# Patient Record
Sex: FEMALE | Race: WHITE | NOT HISPANIC OR LATINO | Employment: OTHER | URBAN - METROPOLITAN AREA
[De-identification: names, ages, dates, MRNs, and addresses within clinical notes are randomized per-mention and may not be internally consistent; named-entity substitution may affect disease eponyms.]

---

## 2017-01-18 ENCOUNTER — MYC MEDICAL ADVICE (OUTPATIENT)
Dept: FAMILY MEDICINE | Facility: CLINIC | Age: 62
End: 2017-01-18

## 2017-01-31 ENCOUNTER — OFFICE VISIT (OUTPATIENT)
Dept: FAMILY MEDICINE | Facility: CLINIC | Age: 62
End: 2017-01-31

## 2017-01-31 VITALS
TEMPERATURE: 97.6 F | OXYGEN SATURATION: 96 % | WEIGHT: 198.4 LBS | HEART RATE: 75 BPM | RESPIRATION RATE: 16 BRPM | BODY MASS INDEX: 32.04 KG/M2 | DIASTOLIC BLOOD PRESSURE: 76 MMHG | SYSTOLIC BLOOD PRESSURE: 113 MMHG

## 2017-01-31 DIAGNOSIS — Z00.00 PREVENTATIVE HEALTH CARE: ICD-10-CM

## 2017-01-31 DIAGNOSIS — Z01.818 PREOP GENERAL PHYSICAL EXAM: Primary | ICD-10-CM

## 2017-01-31 DIAGNOSIS — B35.1 TOENAIL FUNGUS: ICD-10-CM

## 2017-01-31 RX ORDER — CLOTRIMAZOLE 1 G/ML
SOLUTION TOPICAL 2 TIMES DAILY
Qty: 60 ML | Refills: 0 | Status: SHIPPED | OUTPATIENT
Start: 2017-01-31 | End: 2019-01-09

## 2017-01-31 NOTE — PROGRESS NOTES
[facility]  [address],  [address]  [facility] [address]  Phone: [phone]  Fax: [phone]    1/31/2017    Adult PRE-OP Evaluation:    Vickie REYES [place], 1955 presents for pre-operative evaluation and assessment as requested by  ***, prior to undergoing surgery/procedure for treatment of R intraocular lens dislocation.   Proposed procedure:  R eye scleral fixation of dislocated intraocular lens. Possible intraocular lens explantation, possible secondary intraocular lens placement .        Date of Surgery/ Procedure: 02/23/2017  Hospital/Surgical Facility: [facility]. Fax: [phone]     Primary Physician: Gaby Lynn  Type of Anesthesia Anticipated: {ANESTHESIA:820172}  History of anesthesia complications: NONE  History of  abnormal bleeding: NONE   History of blood transfusions: NO  Patient has a Health Care Directive or Living Will:  NO    Preoperative Questions   1. NO - Do you have a history of heart attack, stroke, stent, bypass or surgery on an artery in the head, neck, heart or legs?  2. NO - Do you ever have any pain or discomfort in your chest?  3. NO - Have you ever had a severe pain across the front of your chest lasting for half an hour or more?  4. NO - Do you have a history of Congestive Heart Failure?  5. NO - Are you troubled by shortness of breath when: walking on the level/ up a slight hill/ at night?  6. NO - Does your chest ever sound wheezy or whistling?  7. NO - Do you currently have a cold, bronchitis or other respiratory infection?  8. NO - Have you had a cold, bronchitis or other respiratory infection within the last 2 weeks?  9. NO - Do you usually have a cough?  10. NO - Do you sometimes get pains in the calves of your legs when you walk?  11. NO - Do you or anyone in your family have previous history of blood clots?  12. NO - Do you or does anyone in your family have a serious bleeding problem such as prolonged bleeding  following surgeries or cuts?  13. NO - Have you ever had problems with anemia or been told to take iron pills?  14. NO - Have you had any abnormal blood loss such as black, tarry or bloody stools, or abnormal vaginal bleeding?  15. NO - Have you ever had a blood transfusion?  16. NO - Have you or any of your relatives ever had problems with anesthesia?  17. YES - Do you have sleep apnea, excessive snoring or daytime drowsiness? Intermittent snorting with arousal at night, while lying supine  18. NO - Do you have any prosthetic heart valves?  19. NO - Do you have prosthetic joints?  20. NO - Is there any chance that you may be pregnant?    Patient Active Problem List   Diagnosis     CARDIOVASCULAR SCREENING; LDL GOAL LESS THAN 160     Encounter for counseling     Senile nuclear sclerosis     Tear film insufficiency     Hyperlipidemia     Regular astigmatism     Myopia     Overweight     Other specified menopausal and postmenopausal disorder     After-cataract     Presbyopia     Pupillary abnormalities     Diplopia     Visual field defect     Vitreous membranes and strands     Health Care Home     Tinnitus     Family history of malignant neoplasm of breast     Family history of cardiomyopathy     Benign neoplasm of colon         Current Outpatient Prescriptions on File Prior to Visit:  simvastatin (ZOCOR) 40 MG tablet Take 1 tablet (40 mg) by mouth At Bedtime   simvastatin (ZOCOR) 20 MG tablet 1 1/2 pills daily for total dose of 30 mg.   Carboxymeth-Glycerin-Polysorb (REFRESH OPTIVE ADVANCED) 0.5-1-0.5 % SOLN Place 1 drop into both eyes 3 times daily   loratadine (CLARITIN) 10 MG tablet Take 10 mg by mouth daily. Prn, seasonal for ragweed and lilacs     No current facility-administered medications on file prior to visit.    OTC products: Women's One a Day    Allergies   Allergen Reactions     Seasonal Allergies      Typhoid Vaccines Nausea and Vomiting     Fever, vice- h/a, body aches.  Was hospitalized for 2d.      Latex Allergy: NO    Social History     Social History     Marital Status: Single     Spouse Name: N/A     Number of Children: N/A     Years of Education: N/A     Social History Main Topics     Smoking status: Never Smoker      Smokeless tobacco: Never Used     Alcohol Use: Yes      Comment: 4 drinks per month     Drug Use: Not on file     Sexual Activity: Not on file     Other Topics Concern     Not on file     Social History Narrative       REVIEW OF SYSTEMS:   Constitutional, HEENT, cardiovascular, pulmonary, GI, , musculoskeletal, neuro, skin, endocrine and psych systems are negative, except as otherwise noted.    EXAM:   No data found.    There is no weight on file to calculate BMI.  GENERAL: healthy, alert and no distress  EYES: Eyes grossly normal to inspection, extraocular movements - intact, and PERRL  HENT: ear canals- normal; TMs- normal; Nose- normal; Mouth- no ulcers, no lesions  NECK: no tenderness, no adenopathy, no asymmetry, no masses, no stiffness; thyroid- normal to palpation  RESP: lungs clear to auscultation - no rales, no rhonchi, no wheezes  CV: regular rates and rhythm, normal S1 S2, no S3 or S4 and no murmur, no click or rub -  ABDOMEN: soft, no tenderness, no  hepatosplenomegaly, no masses, normal bowel sounds  MS: extremities- no gross deformities noted, no edema  SKIN: no suspicious lesions, no rashes  NEURO: strength and tone- normal, sensory exam- grossly normal, mentation- intact, speech- normal, reflexes- symmetric  BACK: no CVA tenderness, no paralumbar tenderness  PSYCH: Alert and oriented times 3; speech- coherent , normal rate and volume; able to articulate logical thoughts  LYMPHATICS: ant. cervical- normal, post. cervical- normal    DIAGNOSTICS:      No labs or EKG required for low risk surgery (cataract, skin procedure, breast biopsy, etc)    RISK ASSESSMENT:     Cardiovascular Risk:  -Patient is able to participate in strenuous activities without chest pain.  -The  "patient does not have chest pain at all.  -Patient does not have a history of congestive heart failure.    -The patient does not have a history of stroke and does not have a history of valvular disease.    Pulmonary Risk:  -In terms of risk factors for pulmonary complication, the patient has no risk factors    Perioperative Complications:  -The patient does not have a history of bleeding or clotting problems in the past.    -The patient has not had complications from surgeries.    -The patient does not have a family history of any anesthesia or surgical complications.      IMPRESSION:   Reason for surgery/procedure: R lens dislocation    The proposed surgical procedure is considered LOW risk.    For above listed surgery and anesthesia:   Patient is at low risk for surgery/procedure and perioperative/procedure complications.    RECOMMENDATIONS:     Labs:  Not needed    Fasting:  {FASTIN}    Preop Plan:  {PREOP PLAN:150129}    Medications:  Patient should {hold/take:388865864} their regular medications the morning of surgery unless otherwise instructed.    {PRE OP MED RECOMMENDATIONS:521906459}      Gaby Lynn MD    Please contact our office if there are any further questions or information required about this patient.        HPI:       Vickie Alvarado is a 61 year old who presents for the following  Patient presents with:  Pre-Op Exam: Eye Surgery [facility]        {  :426330}   {+++++++  Additional Concerns  (FM):686606}  Problem, Medication and Allergy Lists were {Reviewed Lists:805878::\"reviewed and are current.\"}  Patient is {New/Established:885938::\"an established patient of this clinic.\"}         Review of Systems:   Review of Systems          Physical Exam:   Patient Vitals for the past 24 hrs:   BP Temp Temp src Pulse Resp SpO2 Weight   17 0806 113/76 mmHg 97.6  F (36.4  C) Oral 75 16 96 % 198 lb 6.4 oz (89.994 kg)     Body mass index is 32.04 " "kg/(m^2).  {SMI VITALS OPTIONS:419392::\"Vitals were reviewed and were normal\"}     Physical Exam  {  Detailed Ortho and mental status exam:934842}    Results:     {Result Choices:004957}  Assessment and Plan     {Assessment/Plan Pick List :356430}  There are no discontinued medications.  Options for treatment and follow-up care were reviewed with the patient. Vickie Alvarado  engaged in the decision making process and verbalized understanding of the options discussed and agreed with the final plan.    Gaby Lynn MD      "

## 2017-01-31 NOTE — MR AVS SNAPSHOT
After Visit Summary   1/31/2017    Vickie Alvarado    MRN: [id number]           Patient Information     Date Of Birth          1955        Visit Information        Provider Department      1/31/2017 8:00 AM Gaby Lynn MD [facility]        Today's Diagnoses     Preop general physical exam    -  1     Toenail fungus           Care Instructions      Presurgery Checklist  You are scheduled to have surgery. The healthcare staff will try to make your stay comfortable. Use the guidelines below to remind yourself what to do before surgery. Be sure to follow any specific pre-op instructions from your surgeon or nurse.   Preparing for Surgery  Ask your surgeon if you ll need a blood transfusion during surgery and if so, how to prepare for it. In some cases, you can donate blood before surgery. If needed, this blood can be given back (transfused) to you during or after surgery.  If you are having abdominal surgery, ask what you need to do to clear your bowel.  Tell your surgeon if you have allergies to any medications or foods.  Arrange for an adult family member or friend to drive you home after surgery. If possible, have someone ready to help you at home as you recover.  Call the surgeon if you get a cold, fever, sore throat, diarrhea, or other health problem just before surgery. Your surgeon can decide whether or not to postpone the surgery.  Medications  Tell your surgeon about all medications you take, including prescription and over-the-counter products such as herbal remedies and vitamins. Ask if you should continue taking them.  If you take ibuprofen, naproxen, or  blood thinners  such as aspirin, clopidogrel (Plavix), or warfarin (Coumadin), ask your surgeon whether you should stop taking them and how long before surgery you should stop.  You may be told to take antibiotics just before surgery to prevent infection. If so, follow instructions carefully on how to take  them.  If you are told to take medications called anticoagulants to prevent blood clots after surgery, be sure to follow the instructions on how to take them.  Stop Smoking  If you smoke, healing may take longer. So at least 2 week(s) before surgery, stop smoking.  Bathing or Showering Before Surgery  If instructed, wash with antibacterial soap. Afterward, do not use lotions or powders.  If you are having surgery on the head, you may be asked to shampoo with antibacterial soap. Follow instructions for doing so.  Do Not Remove Hair from the Surgery Site  Do not shave hair from the incision site, unless you are given specific instructions to do so. Usually, if hair needs to be removed, it will be done at the hospital right before surgery.  Don t Eat or Drink  Your doctor will tell you when to stop eating and drinking. If you do not follow your doctor's instructions, your procedure may be postponed or rescheduled for another day.  If your surgeon tells you to continue any medications, take them with small sips of water.  You can brush your teeth and rinse your mouth, but don t swallow any water.  Day of Surgery  Do not wear makeup. Do not use perfume, deodorant, or hairspray. Remove nail polish and artificial nails.  Leave jewelry (including rings), watches, and other valuables at home.  Be sure to bring health insurance cards or forms and a photo ID.  Bring a list of your medications (include the name, dose, how often you take them, and the time last dose was taken).  Arrive on time at the hospital or surgery facility.        Follow-ups after your visit        Your next 10 appointments already scheduled     Feb 14, 2017  7:30 AM   NEW RETINA with Joy Fox MD   Eye Clinic ([facility])    [facility]  [address]  [facility] [address]   [phone]            Feb 24, 2017 10:00 AM   Post-Op with Dominga Walters MD   Eye Clinic ([facility])    [facility]  [address]  [address]  [place] [address]   [phone]            Mar 06, 2017  8:00 AM   Post-Op with Hiro Christian MD   Eye Clinic ([facility])    [facility]  [address]  [place] [address]   [phone]              Who to contact     Please call your clinic at [phone] to:    Ask questions about your health    Make or cancel appointments    Discuss your medicines    Learn about your test results    Speak to your doctor   If you have compliments or concerns about an experience at your clinic, or if you wish to file a complaint, please contact [facility] Patient Relations at [phone] or email us at [email or web address]         Additional Information About Your Visit        [employer] Information     [employer] gives you secure access to your electronic health record. If you see a primary care provider, you can also send messages to your care team and make appointments. If you have questions, please call your primary care clinic.  If you do not have a primary care provider, please call [phone] and they will assist you.      [employer] is an electronic gateway that provides easy, online access to your medical records. With [employer], you can request a clinic appointment, read your test results, renew a prescription or communicate with your care team.     To access your existing account, please contact your [facility] or call [phone] for assistance.        Care EveryWhere ID     This is your Care EveryWhere ID. This could be used by other organizations to access your [facility] medical records  [id number]        Your Vitals Were     Pulse Temperature Respirations Pulse Oximetry Last Period       75 97.6  F (36.4  C) (Oral) 16 96% 05/17/2011        Blood Pressure from Last 3 Encounters:   01/31/17 113/76   04/14/16 136/88   11/25/15 116/80    Weight  from Last 3 Encounters:   01/31/17 198 lb 6.4 oz (89.994 kg)   04/14/16 200 lb (90.719 kg)   11/25/15 199 lb (90.266 kg)              Today, you had the following     No orders found for display         Today's Medication Changes          These changes are accurate as of: 1/31/17  9:10 AM.  If you have any questions, ask your nurse or doctor.               Start taking these medicines.        Dose/Directions    clotrimazole 1 % solution   Commonly known as:  LOTRIMIN   Used for:  Toenail fungus   Started by:  Gaby Lynn MD        Apply topically 2 times daily   Quantity:  60 mL   Refills:  0         These medicines have changed or have updated prescriptions.        Dose/Directions    simvastatin 40 MG tablet   Commonly known as:  ZOCOR   This may have changed:  Another medication with the same name was removed. Continue taking this medication, and follow the directions you see here.   Used for:  Hyperlipidemia LDL goal <130   Changed by:  Gaby Lynn MD        Dose:  40 mg   Take 1 tablet (40 mg) by mouth At Bedtime   Quantity:  90 tablet   Refills:  3            Where to get your medicines      These medications were sent to [facility] [address] [address] [facility]  [address] [place] [address]     Phone:  [phone]    - clotrimazole 1 % solution             Primary Care Provider Office Phone # Fax #    Gaby Lynn [phone]       [facility] [address] [place] [phone]        Thank you!     Thank you for choosing [facility]  for your care. Our goal is always to provide you with excellent care. Hearing back from our patients is one way we can continue to improve our services. Please take a few minutes to complete the written survey that you may receive in the mail after your visit with us. Thank you!             Your Updated Medication List - Protect others around you:  Learn how to safely use, store and throw away your medicines at www.disposemymeds.org.          This list is accurate as of: 1/31/17  9:10 AM.  Always use your most recent med list.                   Brand Name Dispense Instructions for use    CLARITIN 10 MG tablet   Generic drug:  loratadine      Take 10 mg by mouth daily. Prn, seasonal for ragweed and lilacs       clotrimazole 1 % solution    LOTRIMIN    60 mL    Apply topically 2 times daily       REFRESH OPTIVE ADVANCED 0.5-1-0.5 % Soln   Generic drug:  Carboxymeth-Glycerin-Polysorb      Place 1 drop into both eyes 3 times daily       simvastatin 40 MG tablet    ZOCOR    90 tablet    Take 1 tablet (40 mg) by mouth At Bedtime

## 2017-01-31 NOTE — PROGRESS NOTES
[facility] MEDICINE CLINIC  [address],  [address]  [facility] [address]  Phone: [phone]  Fax: [phone]    1/31/2017    Adult PRE-OP Evaluation:    Vickie Alvarado, 1955 presents for pre-operative evaluation and assessment as requested by Dr. Hiro Christian, prior to undergoing surgery/procedure for treatment of R intraocular lens dislocation.   Proposed procedure:  R eye scleral fixation of dislocated intraocular lens. Possible intraocular lens explantation, possible secondary intraocular lens placement .        Date of Surgery/ Procedure: 02/23/2017  Hospital/Surgical Facility: [facility]. Fax: [phone]     Primary Physician: Gaby Lynn  Type of Anesthesia Anticipated: Local with MAC  History of anesthesia complications: NONE  History of  abnormal bleeding: NONE   History of blood transfusions: NO  Patient has a Health Care Directive or Living Will:  NO    Preoperative Questions   1. NO - Do you have a history of heart attack, stroke, stent, bypass or surgery on an artery in the head, neck, heart or legs?  2. NO - Do you ever have any pain or discomfort in your chest?  3. NO - Have you ever had a severe pain across the front of your chest lasting for half an hour or more?  4. NO - Do you have a history of Congestive Heart Failure?  5. NO - Are you troubled by shortness of breath when: walking on the level/ up a slight hill/ at night?  6. NO - Does your chest ever sound wheezy or whistling?  7. NO - Do you currently have a cold, bronchitis or other respiratory infection?  8. NO - Have you had a cold, bronchitis or other respiratory infection within the last 2 weeks?  9. NO - Do you usually have a cough?  10. NO - Do you sometimes get pains in the calves of your legs when you walk?  11. NO - Do you or anyone in your family have previous history of blood clots?  12. NO - Do you or does anyone in your family have a serious bleeding problem such as prolonged bleeding  following surgeries or cuts?  13. NO - Have you ever had problems with anemia or been told to take iron pills?  14. NO - Have you had any abnormal blood loss such as black, tarry or bloody stools, or abnormal vaginal bleeding?  15. NO - Have you ever had a blood transfusion?  16. NO - Have you or any of your relatives ever had problems with anesthesia?  17. YES - Do you have sleep apnea, excessive snoring or daytime drowsiness? Intermittent snorting with arousal at night, while lying supine  18. NO - Do you have any prosthetic heart valves?  19. NO - Do you have prosthetic joints?  20. NO - Is there any chance that you may be pregnant?    Patient Active Problem List   Diagnosis     CARDIOVASCULAR SCREENING; LDL GOAL LESS THAN 160     Encounter for counseling     Senile nuclear sclerosis     Tear film insufficiency     Hyperlipidemia     Regular astigmatism     Myopia     Overweight     Other specified menopausal and postmenopausal disorder     After-cataract     Presbyopia     Pupillary abnormalities     Diplopia     Visual field defect     Vitreous membranes and strands     Health Care Home     Tinnitus     Family history of malignant neoplasm of breast     Family history of cardiomyopathy     Benign neoplasm of colon         Current Outpatient Prescriptions on File Prior to Visit:  simvastatin (ZOCOR) 40 MG tablet Take 1 tablet (40 mg) by mouth At Bedtime   simvastatin (ZOCOR) 20 MG tablet 1 1/2 pills daily for total dose of 30 mg.   Carboxymeth-Glycerin-Polysorb (REFRESH OPTIVE ADVANCED) 0.5-1-0.5 % SOLN Place 1 drop into both eyes 3 times daily   loratadine (CLARITIN) 10 MG tablet Take 10 mg by mouth daily. Prn, seasonal for ragweed and lilacs     No current facility-administered medications on file prior to visit.    OTC products: Women's One a Day    Allergies   Allergen Reactions     Seasonal Allergies      Typhoid Vaccines Nausea and Vomiting     Fever, vice- h/a, body aches.  Was hospitalized for 2d.      Latex Allergy: NO    Social History     Social History     Marital Status: Single     Spouse Name: N/A     Number of Children: N/A     Years of Education: N/A     Social History Main Topics     Smoking status: Never Smoker      Smokeless tobacco: Never Used     Alcohol Use: Yes      Comment: 4 drinks per month     Drug Use: Not on file     Sexual Activity: Not on file     Other Topics Concern     Not on file     Social History Narrative       REVIEW OF SYSTEMS:   Constitutional, HEENT, cardiovascular, pulmonary, GI, , musculoskeletal, neuro, skin, endocrine and psych systems are negative, except as otherwise noted.    EXAM:   No data found.    There is no weight on file to calculate BMI.  GENERAL: healthy, alert and no distress  EYES: Eyes grossly normal to inspection, extraocular movements - intact, and PERRL  HENT: ear canals- normal; TMs- normal; Nose- normal; Mouth- no ulcers, no lesions  NECK: no tenderness, no adenopathy, no asymmetry, no masses, no stiffness; thyroid- normal to palpation  RESP: lungs clear to auscultation - no rales, no rhonchi, no wheezes  CV: regular rates and rhythm, normal S1 S2, no S3 or S4 and no murmur, no click or rub -  ABDOMEN: soft, no tenderness, no  hepatosplenomegaly, no masses, normal bowel sounds  MS: extremities- no gross deformities noted, no edema  SKIN: no suspicious lesions, no rashes  NEURO: strength and tone- normal, sensory exam- grossly normal, mentation- intact, speech- normal, reflexes- symmetric  BACK: no CVA tenderness, no paralumbar tenderness  PSYCH: Alert and oriented times 3; speech- coherent , normal rate and volume; able to articulate logical thoughts  LYMPHATICS: ant. cervical- normal, post. cervical- normal    DIAGNOSTICS:      No labs or EKG required for low risk surgery (cataract, skin procedure, breast biopsy, etc)    RISK ASSESSMENT:     Cardiovascular Risk:  -Patient is able to participate in strenuous activities without chest pain.  -The  patient does not have chest pain at all.  -Patient does not have a history of congestive heart failure.    -The patient does not have a history of stroke and does not have a history of valvular disease.    Pulmonary Risk:  -In terms of risk factors for pulmonary complication, the patient has no risk factors    Perioperative Complications:  -The patient does not have a history of bleeding or clotting problems in the past.    -The patient has not had complications from surgeries.    -The patient does not have a family history of any anesthesia or surgical complications.      IMPRESSION:   Reason for surgery/procedure: R lens dislocation    The proposed surgical procedure is considered LOW risk.    For above listed surgery and anesthesia:   Patient is at low risk for surgery/procedure and perioperative/procedure complications.    RECOMMENDATIONS:     Labs:  Not needed    Fasting:  NPO for 12 hours prior to surgery    Preop Plan:  --Approval given to proceed with proposed procedure, without further diagnostic evaluation    Medications:  Patient should take their regular medications the morning of surgery unless otherwise instructed.    Hold any NSAIDS for 5 days prior.

## 2017-02-13 ENCOUNTER — TELEPHONE (OUTPATIENT)
Dept: OPHTHALMOLOGY | Facility: CLINIC | Age: 62
End: 2017-02-13

## 2017-02-13 NOTE — TELEPHONE ENCOUNTER
The patient notes that her eye is more blurry.  I discussed her symptoms with the resident.  The patient will keep her upcoming appointment.

## 2017-02-14 ENCOUNTER — OFFICE VISIT (OUTPATIENT)
Dept: OPHTHALMOLOGY | Facility: CLINIC | Age: 62
End: 2017-02-14
Attending: OPHTHALMOLOGY
Payer: COMMERCIAL

## 2017-02-14 DIAGNOSIS — T85.22XA DISLOCATED IOL (INTRAOCULAR LENS), POSTERIOR, RIGHT: Primary | ICD-10-CM

## 2017-02-14 PROCEDURE — 99213 OFFICE O/P EST LOW 20 MIN: CPT | Mod: ZF

## 2017-02-14 ASSESSMENT — CONF VISUAL FIELD
OS_NORMAL: 1
OD_NORMAL: 1

## 2017-02-14 ASSESSMENT — EXTERNAL EXAM - LEFT EYE: OS_EXAM: NORMAL

## 2017-02-14 ASSESSMENT — EXTERNAL EXAM - RIGHT EYE: OD_EXAM: NORMAL

## 2017-02-14 ASSESSMENT — CUP TO DISC RATIO
OS_RATIO: 0.4
OD_RATIO: 0.4

## 2017-02-14 ASSESSMENT — SLIT LAMP EXAM - LIDS
COMMENTS: DERMATOCHALASIS
COMMENTS: DERMATOCHALSIS

## 2017-02-14 ASSESSMENT — TONOMETRY
IOP_METHOD: TONOPEN
OS_IOP_MMHG: 14
OD_IOP_MMHG: 10

## 2017-02-14 ASSESSMENT — VISUAL ACUITY
METHOD: SNELLEN - LINEAR
CORRECTION_TYPE: GLASSES
OS_CC: 20/20
OD_CC: 20/125
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
## 2017-02-14 NOTE — MR AVS SNAPSHOT
After Visit Summary   2/14/2017    Vickie Alvarado    MRN: [id number]           Patient Information     Date Of Birth          1955        Visit Information        Provider Department      2/14/2017 7:30 AM Joy Fox MD Eye Clinic        Today's Diagnoses     Dislocated IOL (intraocular lens), posterior, right    -  1       Follow-ups after your visit        Follow-up notes from your care team     Return in about 10 days (around 2/24/2017) for 1 day post-op follow-up.      Your next 10 appointments already scheduled     Feb 24, 2017 10:00 AM CST   Post-Op with Dominga Walters MD   Eye Clinic ([facility])    [address]  [address]  [address] [address]  [place] [address]   [phone]            Mar 06, 2017  8:00 AM CST   Post-Op with Hiro Christian MD   Eye Clinic ([facility])    [address]  [address]  [address] [address]  [place] [address]   [phone]              Who to contact     Please call your clinic at [phone] to:    Ask questions about your health    Make or cancel appointments    Discuss your medicines    Learn about your test results    Speak to your doctor   If you have compliments or concerns about an experience at your clinic, or if you wish to file a complaint, please contact [facility] Patient Relations at [phone] or email us at [email or web address]         Additional Information About Your Visit        MyChart Information     [employer] gives you secure access to your electronic health record. If you see a primary care provider, you can also send messages to your care team and make appointments. If you have questions, please call your primary care clinic.  If you do not have a primary care provider, please call [phone] and they will assist you.      [employer] is an electronic gateway that provides easy, online access to your medical records. With  [employer], you can request a clinic appointment, read your test results, renew a prescription or communicate with your care team.     To access your existing account, please contact your [facility] or call [phone] for assistance.        Care EveryWhere ID     This is your Care EveryWhere ID. This could be used by other organizations to access your [place] medical records  [id number]        Your Vitals Were     Last Period                   05/17/2011            Blood Pressure from Last 3 Encounters:   01/31/17 113/76   04/14/16 136/88   11/25/15 116/80    Weight from Last 3 Encounters:   01/31/17 90 kg (198 lb 6.4 oz)   04/14/16 90.7 kg (200 lb)   11/25/15 90.3 kg (199 lb)              Today, you had the following     No orders found for display       Primary Care Provider Office Phone # Fax #    Gaby Lynn [phone]       [facility] [address] [address]        Thank you!     Thank you for choosing EYE CLINIC  for your care. Our goal is always to provide you with excellent care. Hearing back from our patients is one way we can continue to improve our services. Please take a few minutes to complete the written survey that you may receive in the mail after your visit with us. Thank you!             Your Updated Medication List - Protect others around you: Learn how to safely use, store and throw away your medicines at www.disposemymeds.org.          This list is accurate as of: 2/14/17  9:01 AM.  Always use your most recent med list.                   Brand Name Dispense Instructions for use    CLARITIN 10 MG tablet   Generic drug:  loratadine      Take 10 mg by mouth daily. Prn, seasonal for ragweed and lilacs       clotrimazole 1 % solution    LOTRIMIN    60 mL    Apply topically 2 times daily       REFRESH OPTIVE ADVANCED 0.5-1-0.5 % Soln   Generic drug:  Carboxymeth-Glycerin-Polysorb      Place 1 drop into both eyes 3  times daily       simvastatin 40 MG tablet    ZOCOR    90 tablet    Take 1 tablet (40 mg) by mouth At Bedtime

## 2017-02-14 NOTE — NURSING NOTE
Chief Complaints and History of Present Illnesses   Patient presents with     Consult For     Subluxated lens     HPI    Affected eye(s):  Right   Symptoms:        Frequency:  Constant       Do you have eye pain now?:  No      Comments:  Sudden va change that started on Sunday  Tosin F&F  Roseanne Zuniga COT 7:35 AM February 14, 2017

## 2017-02-23 ENCOUNTER — TRANSFERRED RECORDS (OUTPATIENT)
Dept: HEALTH INFORMATION MANAGEMENT | Facility: CLINIC | Age: 62
End: 2017-02-23

## 2017-02-24 ENCOUNTER — TELEPHONE (OUTPATIENT)
Dept: OPHTHALMOLOGY | Facility: CLINIC | Age: 62
End: 2017-02-24

## 2017-02-24 ENCOUNTER — OFFICE VISIT (OUTPATIENT)
Dept: OPHTHALMOLOGY | Facility: CLINIC | Age: 62
End: 2017-02-24
Attending: OPHTHALMOLOGY
Payer: COMMERCIAL

## 2017-02-24 DIAGNOSIS — Z48.810 AFTERCARE FOLLOWING SURGERY OF A SENSORY ORGAN: Primary | ICD-10-CM

## 2017-02-24 PROCEDURE — 99212 OFFICE O/P EST SF 10 MIN: CPT | Mod: ZF

## 2017-02-24 ASSESSMENT — VISUAL ACUITY
OS_PH_SC: 20/30-3
OD_SC: CF @ 1FT
METHOD: SNELLEN - LINEAR
OS_SC: 20/150

## 2017-02-24 ASSESSMENT — TONOMETRY
IOP_METHOD: ICARE
OS_IOP_MMHG: 12
OD_IOP_MMHG: 4

## 2017-02-24 ASSESSMENT — SLIT LAMP EXAM - LIDS: COMMENTS: NORMAL

## 2017-02-24 NOTE — NURSING NOTE
No chief complaint on file.    HPI    Affected eye(s):  Right   Symptoms:        Duration:  1 day   Frequency:  Constant       Do you have eye pain now?:  Yes   Location:  OD   Pain Level:  No Pain (1), Mild Pain (2)   Other:  Took a pain med which is helpful   Pain Frequency:  Intermittent   Pain Characteristics:  Aching      Comments:  Pt slept well last night. Would rate her pain a 1-2 today, took a pain med which helps take the edge off of discomfort. Vision is very blurry, looks like a yellow fog in vision. MD please review post operative instructions with pt today. LEENA JACOB, COA 10:14 AM 02/24/2017

## 2017-02-24 NOTE — PROGRESS NOTES
POD #1 sutured IOL right eye (2/23/17)    Severe corneal edema explains visual acuity    Prednisolone every 2 hours right eye  Ofloxacin four times a day right eye  Post op precautions    F/u 1 week

## 2017-02-24 NOTE — MR AVS SNAPSHOT
After Visit Summary   2/24/2017    Vickie Alvarado    MRN: [id number]           Patient Information     Date Of Birth          1955        Visit Information        Provider Department      2/24/2017 10:00 AM Dominga Walters MD Eye Clinic        Today's Diagnoses     Aftercare following surgery of a sensory organ    -  1       Follow-ups after your visit        Your next 10 appointments already scheduled     Mar 06, 2017  8:00 AM CST   Post-Op with Hiro Christian MD   Eye Clinic ([facility])    [address]  [address] [facility]  [address] Clin [address]  [place] [address]   [phone]              Who to contact     Please call your clinic at [phone] to:    Ask questions about your health    Make or cancel appointments    Discuss your medicines    Learn about your test results    Speak to your doctor   If you have compliments or concerns about an experience at your clinic, or if you wish to file a complaint, please contact [facility] Patient Relations at [phone] or email us at [email or web address]         Additional Information About Your Visit        MyChart Information     [employer] gives you secure access to your electronic health record. If you see a primary care provider, you can also send messages to your care team and make appointments. If you have questions, please call your primary care clinic.  If you do not have a primary care provider, please call [phone] and they will assist you.      [employer] is an electronic gateway that provides easy, online access to your medical records. With [employer], you can request a clinic appointment, read your test results, renew a prescription or communicate with your care team.     To access your existing account, please contact your [facility] or call [phone] for assistance.        Care EveryWhere ID     This is your Care EveryWhere ID. This  could be used by other organizations to access your [place] medical records  [id number]        Your Vitals Were     Last Period                   05/17/2011            Blood Pressure from Last 3 Encounters:   01/31/17 113/76   04/14/16 136/88   11/25/15 116/80    Weight from Last 3 Encounters:   01/31/17 90 kg (198 lb 6.4 oz)   04/14/16 90.7 kg (200 lb)   11/25/15 90.3 kg (199 lb)              Today, you had the following     No orders found for display       Primary Care Provider Office Phone # Fax #    Gaby Lynn [phone]       [facility] [address] [address]        Thank you!     Thank you for choosing EYE CLINIC  for your care. Our goal is always to provide you with excellent care. Hearing back from our patients is one way we can continue to improve our services. Please take a few minutes to complete the written survey that you may receive in the mail after your visit with us. Thank you!             Your Updated Medication List - Protect others around you: Learn how to safely use, store and throw away your medicines at www.disposemymeds.org.          This list is accurate as of: 2/24/17  1:48 PM.  Always use your most recent med list.                   Brand Name Dispense Instructions for use    CLARITIN 10 MG tablet   Generic drug:  loratadine      Take 10 mg by mouth daily. Prn, seasonal for ragweed and lilacs       clotrimazole 1 % solution    LOTRIMIN    60 mL    Apply topically 2 times daily       REFRESH OPTIVE ADVANCED 0.5-1-0.5 % Soln   Generic drug:  Carboxymeth-Glycerin-Polysorb      Place 1 drop into both eyes 3 times daily       simvastatin 40 MG tablet    ZOCOR    90 tablet    Take 1 tablet (40 mg) by mouth At Bedtime

## 2017-02-25 NOTE — TELEPHONE ENCOUNTER
Patient seen today POD 1 s/p sutured IOL. Reports aching, eye redness, and tearing. Had been seen by Dr Brizuela today for postop. Discussed with Dr Alvarado, unlikely to have significant interval change due to endophthalmitis. Recommended patient take pain medicine and continue with post op drops to help with inflammation, may also use artificial tears for comfort. Offered to see tomorrow if pain/symptoms are worse.    Delfino Tanner MD  6:51 PM 02/24/17

## 2017-02-27 ENCOUNTER — OFFICE VISIT (OUTPATIENT)
Dept: OPHTHALMOLOGY | Facility: CLINIC | Age: 62
End: 2017-02-27
Attending: OPHTHALMOLOGY
Payer: COMMERCIAL

## 2017-02-27 ENCOUNTER — TELEPHONE (OUTPATIENT)
Dept: OPHTHALMOLOGY | Facility: CLINIC | Age: 62
End: 2017-02-27

## 2017-02-27 DIAGNOSIS — Z48.810 AFTERCARE FOLLOWING SURGERY OF A SENSORY ORGAN: Primary | ICD-10-CM

## 2017-02-27 PROCEDURE — 99212 OFFICE O/P EST SF 10 MIN: CPT | Mod: ZF

## 2017-02-27 ASSESSMENT — VISUAL ACUITY
METHOD: SNELLEN - LINEAR
OS_CC+: -1
OD_CC: 20/200
OS_CC: 20/20

## 2017-02-27 ASSESSMENT — TONOMETRY
OD_IOP_MMHG: 10
IOP_METHOD: ICARE
OS_IOP_MMHG: 20

## 2017-02-27 ASSESSMENT — SLIT LAMP EXAM - LIDS: COMMENTS: NORMAL

## 2017-02-27 NOTE — MR AVS SNAPSHOT
After Visit Summary   2/27/2017    Vickie Alvarado    MRN: [id number]           Patient Information     Date Of Birth          1955        Visit Information        Provider Department      2/27/2017 2:30 PM Hiro Christian MD Eye Clinic        Today's Diagnoses     Aftercare following surgery of a sensory organ    -  1       Follow-ups after your visit        Your next 10 appointments already scheduled     Mar 06, 2017  8:00 AM CST   Post-Op with Hiro Christian MD   Eye Clinic ([facility])    [address]  [address] [facility]  [address]  [facility] [address]   [phone]              Who to contact     Please call your clinic at [phone] to:    Ask questions about your health    Make or cancel appointments    Discuss your medicines    Learn about your test results    Speak to your doctor   If you have compliments or concerns about an experience at your clinic, or if you wish to file a complaint, please contact [facility] Patient Relations at [phone] or email us at [email or web address]         Additional Information About Your Visit        MyChart Information     [employer] gives you secure access to your electronic health record. If you see a primary care provider, you can also send messages to your care team and make appointments. If you have questions, please call your primary care clinic.  If you do not have a primary care provider, please call [phone] and they will assist you.      [employer] is an electronic gateway that provides easy, online access to your medical records. With [employer], you can request a clinic appointment, read your test results, renew a prescription or communicate with your care team.     To access your existing account, please contact your [facility] or call [phone] for assistance.        Care EveryWhere ID     This is your Care EveryWhere ID. This  could be used by other organizations to access your [place] medical records  [id number]        Your Vitals Were     Last Period                   05/17/2011            Blood Pressure from Last 3 Encounters:   01/31/17 113/76   04/14/16 136/88   11/25/15 116/80    Weight from Last 3 Encounters:   01/31/17 90 kg (198 lb 6.4 oz)   04/14/16 90.7 kg (200 lb)   11/25/15 90.3 kg (199 lb)              Today, you had the following     No orders found for display       Primary Care Provider Office Phone # Fax #    Gaby Lynn [phone]       [facility] [address] [address]        Thank you!     Thank you for choosing EYE CLINIC  for your care. Our goal is always to provide you with excellent care. Hearing back from our patients is one way we can continue to improve our services. Please take a few minutes to complete the written survey that you may receive in the mail after your visit with us. Thank you!             Your Updated Medication List - Protect others around you: Learn how to safely use, store and throw away your medicines at www.disposemymeds.org.          This list is accurate as of: 2/27/17 11:59 PM.  Always use your most recent med list.                   Brand Name Dispense Instructions for use    CLARITIN 10 MG tablet   Generic drug:  loratadine      Take 10 mg by mouth daily. Prn, seasonal for ragweed and lilacs       clotrimazole 1 % solution    LOTRIMIN    60 mL    Apply topically 2 times daily       REFRESH OPTIVE ADVANCED 0.5-1-0.5 % Soln   Generic drug:  Carboxymeth-Glycerin-Polysorb      Place 1 drop into both eyes 3 times daily       simvastatin 40 MG tablet    ZOCOR    90 tablet    Take 1 tablet (40 mg) by mouth At Bedtime

## 2017-02-27 NOTE — NURSING NOTE
Chief Complaints and History of Present Illnesses   Patient presents with     Post Op (Ophthalmology) Right Eye     4 day post op RE     HPI    Affected eye(s):  Right   Symptoms:        Duration:  4 days      Do you have eye pain now?:  Yes   Location:  OD   Pain Level:  Mild Pain (2)   Pain Frequency:  Constant   Pain Characteristics:  Aching      Comments:  sutured IOL right eye (2/23/17)  Ache, redness.tearing,burning  with drops  Had to leave work to come here.  prednisolone q2HRE  [id number] RE  Cass Boyle COA 2:47 PM February 27, 2017

## 2017-02-27 NOTE — TELEPHONE ENCOUNTER
Extreme redness and pain since one day post-op last friday.  Pain is an achy feeling  Cornea still cloudy-- h/o post-op cornea edema  Been using Prednisolone drops every 2 hours and antibiotic drop  Pt concerned if normal or not  Next scheduled appt 7 days from now  Recommended continuing preservative free artificial tears-- waiting 5 minutes in between.    Pt at work, but not able to tolerate work-- would be available for eval, but would at least like call back from MD to review    Note to facilitator/cornea resident for review and callback    Tyler Somers RN 10:02 AM 02/27/17

## 2017-03-06 ENCOUNTER — OFFICE VISIT (OUTPATIENT)
Dept: OPHTHALMOLOGY | Facility: CLINIC | Age: 62
End: 2017-03-06
Attending: OPHTHALMOLOGY
Payer: COMMERCIAL

## 2017-03-06 DIAGNOSIS — T85.22XA DISLOCATED IOL (INTRAOCULAR LENS), POSTERIOR, RIGHT: ICD-10-CM

## 2017-03-06 DIAGNOSIS — Z48.810 AFTERCARE FOLLOWING SURGERY OF A SENSORY ORGAN: Primary | ICD-10-CM

## 2017-03-06 PROCEDURE — 99213 OFFICE O/P EST LOW 20 MIN: CPT | Mod: ZF

## 2017-03-06 RX ORDER — PREDNISOLONE ACETATE 10 MG/ML
1 SUSPENSION/ DROPS OPHTHALMIC
Qty: 10 ML | Refills: 0 | Status: SHIPPED
Start: 2017-03-06 | End: 2019-01-09

## 2017-03-06 RX ORDER — PREDNISOLONE ACETATE 10 MG/ML
1 SUSPENSION/ DROPS OPHTHALMIC
COMMUNITY
End: 2017-04-19

## 2017-03-06 RX ORDER — OXYCODONE AND ACETAMINOPHEN 5; 325 MG/1; MG/1
TABLET ORAL
COMMUNITY
Start: 2017-02-23 | End: 2017-04-19

## 2017-03-06 RX ORDER — OFLOXACIN 3 MG/ML
1 SOLUTION/ DROPS OPHTHALMIC EVERY 4 HOURS
COMMUNITY
End: 2017-04-19

## 2017-03-06 ASSESSMENT — REFRACTION_WEARINGRX
SPECS_TYPE: PAL
OS_CYLINDER: +1.75
OS_ADD: +2.50
OD_AXIS: 089
OD_CYLINDER: +2.00
OD_ADD: +2.50
OS_SPHERE: -4.25
OD_SPHERE: -7.25
OS_AXIS: 074

## 2017-03-06 ASSESSMENT — SLIT LAMP EXAM - LIDS
COMMENTS: NORMAL
COMMENTS: NORMAL

## 2017-03-06 ASSESSMENT — VISUAL ACUITY
OS_CC: 20/15
OD_PH_SC: 20/40-2
METHOD: SNELLEN - LINEAR
OS_CC+: -1
OD_SC: 20/60

## 2017-03-06 ASSESSMENT — TONOMETRY
OD_IOP_MMHG: 10
IOP_METHOD: ICARE
OS_IOP_MMHG: 13

## 2017-03-06 NOTE — MR AVS SNAPSHOT
After Visit Summary   3/6/2017    Vickie Alvarado    MRN: [id number]           Patient Information     Date Of Birth          1955        Visit Information        Provider Department      3/6/2017 8:00 AM Hiro Christian MD [facility]        Today's Diagnoses     Aftercare following surgery of a sensory organ    -  1       Follow-ups after your visit        Your next 10 appointments already scheduled     Apr 19, 2017  7:30 AM CDT   RETURN CORNEA with Hiro Christian MD   Eye Clinic ([facility])    [address]  [address]  9[address]  [facility] [address]   [phone]              Who to contact     Please call your clinic at [phone] to:    Ask questions about your health    Make or cancel appointments    Discuss your medicines    Learn about your test results    Speak to your doctor   If you have compliments or concerns about an experience at your clinic, or if you wish to file a complaint, please contact [facility] Patient Relations at [phone] or email us at [email or web address]         Additional Information About Your Visit        MyChart Information     Pl[employer] gives you secure access to your electronic health record. If you see a primary care provider, you can also send messages to your care team and make appointments. If you have questions, please call your primary care clinic.  If you do not have a primary care provider, please call [phone] and they will assist you.      [employer] is an electronic gateway that provides easy, online access to your medical records. With [employer], you can request a clinic appointment, read your test results, renew a prescription or communicate with your care team.     To access your existing account, please contact your [facility] or call [phone] for assistance.        Care EveryWhere ID     This is your Care EveryWhere ID. This  could be used by other organizations to access your [place] medical records  [id number]        Your Vitals Were     Last Period                   05/17/2011            Blood Pressure from Last 3 Encounters:   01/31/17 113/76   04/14/16 136/88   11/25/15 116/80    Weight from Last 3 Encounters:   01/31/17 90 kg (198 lb 6.4 oz)   04/14/16 90.7 kg (200 lb)   11/25/15 90.3 kg (199 lb)              Today, you had the following     No orders found for display         Today's Medication Changes          These changes are accurate as of: 3/6/17  9:18 AM.  If you have any questions, ask your nurse or doctor.               These medicines have changed or have updated prescriptions.        Dose/Directions    * prednisoLONE acetate 1 % ophthalmic susp   Commonly known as:  PRED FORTE   This may have changed:  Another medication with the same name was added. Make sure you understand how and when to take each.   Changed by:  Hiro Christian MD        Dose:  1 drop   Place 1 drop into the right eye every 2 hours   Refills:  0       * prednisoLONE acetate 1 % ophthalmic susp   Commonly known as:  PRED FORTE   This may have changed:  You were already taking a medication with the same name, and this prescription was added. Make sure you understand how and when to take each.   Used for:  Aftercare following surgery of a sensory organ   Changed by:  Hiro Christian MD        Dose:  1 drop   Place 1 drop into the right eye every 3 hours Shake well before using. Wait at least 2 minutes between eye drops if using concurrently with other eye drops.   Quantity:  10 mL   Refills:  0       * Notice:  This list has 2 medication(s) that are the same as other medications prescribed for you. Read the directions carefully, and ask your doctor or other care provider to review them with you.         Where to get your medicines      These medications were sent to [facility] [address] [address] AT [address]  [address]  [address] [facility] [address]     Phone:  [phone]     prednisoLONE acetate 1 % ophthalmic susp                Primary Care Provider Office Phone # Fax #    Gaby Lynn [phone]       [facility] [address]   [facility] [address]        Thank you!     Thank you for choosing EYE CLINIC  for your care. Our goal is always to provide you with excellent care. Hearing back from our patients is one way we can continue to improve our services. Please take a few minutes to complete the written survey that you may receive in the mail after your visit with us. Thank you!             Your Updated Medication List - Protect others around you: Learn how to safely use, store and throw away your medicines at www.disposemymeds.org.          This list is accurate as of: 3/6/17  9:18 AM.  Always use your most recent med list.                   Brand Name Dispense Instructions for use    CLARITIN 10 MG tablet   Generic drug:  loratadine      Take 10 mg by mouth daily. Prn, seasonal for ragweed and lilacs       clotrimazole 1 % solution    LOTRIMIN    60 mL    Apply topically 2 times daily       ofloxacin 0.3 % ophthalmic solution    OCUFLOX     Place 1 drop into the right eye every 4 hours       oxyCODONE-acetaminophen 5-325 MG per tablet    PERCOCET     Take 1 tablet by mouth every 6 hours if needed for pain. Max acetaminophen dose: 4000 mg in 24 hours.       * prednisoLONE acetate 1 % ophthalmic susp    PRED FORTE     Place 1 drop into the right eye every 2 hours       * prednisoLONE acetate 1 % ophthalmic susp    PRED FORTE    10 mL    Place 1 drop into the right eye every 3 hours Shake well before using. Wait at least 2 minutes between eye drops if using concurrently with other eye drops.       REFRESH OPTIVE ADVANCED 0.5-1-0.5 % Soln   Generic drug:  Carboxymeth-Glycerin-Polysorb      Place 1 drop into both eyes 3 times daily       simvastatin 40 MG tablet     ZOCOR    90 tablet    Take 1 tablet (40 mg) by mouth At Bedtime       * Notice:  This list has 2 medication(s) that are the same as other medications prescribed for you. Read the directions carefully, and ask your doctor or other care provider to review them with you.

## 2017-03-06 NOTE — NURSING NOTE
Chief Complaints and History of Present Illnesses   Patient presents with     Follow Up For     1 week follow up sutured IOL right eye (2/23/17)     HPI    Affected eye(s):  Right   Symptoms:     No floaters   No flashes   No Dryness         Do you have eye pain now?:  Yes   Location:  OD   Pain Frequency:  Constant   Pain Characteristics:  Aching      Comments:  Pt states vision has improved in RE since last week. Pt notes constant ache in RE, little improvement with pain medication. Pain medication makes pt sleepy.    Tatiana LESLIE March 6, 2017 8:10 AM

## 2017-03-06 NOTE — PROGRESS NOTES
POD #11 sutured IOL right eye (2/23/17)    Pt states she is unable to work; wants to get short term disability  Taking Percocet at night to sleep  Still has an ache around the right eye  Taking glasses on and off    Recommend remove right lens in glasses due to anisometropia  D/C Kontour lens  Exposed sutures removed at slit lamp  Decrease prednisolone four times a right eye  Continue Ofloxacin four times a day right eye x 3 more day  Post op precautions    F/u 1 month    ~~~~~~~~~~~~~~~~~~~~~~~~~~~~~~~~~~~~~~~~~~~~~~~~~~~~~~~~~~~~~~~~    Complete documentation of historical and exam elements from today's encounter can be found in the full encounter summary report (not reduplicated in this progress note). I personally obtained the chief complaint(s) and history of present illness.  I confirmed and edited as necessary the review of systems, past medical/surgical history, family history, social history, and examination findings as documented by others; and I examined the patient myself. I personally reviewed the relevant tests, images, and reports as documented above. I formulated and edited as necessary the assessment and plan and discussed the findings and management plan with the patient and family.    Hiro Christian MD

## 2017-03-08 ENCOUNTER — TELEPHONE (OUTPATIENT)
Dept: OPHTHALMOLOGY | Facility: CLINIC | Age: 62
End: 2017-03-08

## 2017-03-08 NOTE — TELEPHONE ENCOUNTER
----- Message from Luna Cortez sent at 3/7/2017 12:37 PM CST -----  Regarding: call back  Contact: [phone]  Pt is requesting a call back concerning scheduling an appointment for Monday with anyone in cornea in order for her to go back to work. What she is looking for is to be seen by a provider who can ok her to go back to work on Monday 3/13/2017.      Thank you  jh

## 2017-03-08 NOTE — TELEPHONE ENCOUNTER
ELSAP today in regards to Disability forms; she wanted to know if she had to come in for another visit for these to be filled out. I advised she did not as we just seen her a few days ago, but that I needed her to fax them to me; gave her the fax number to clinic.

## 2017-03-09 DIAGNOSIS — E78.5 HYPERLIPIDEMIA LDL GOAL <130: ICD-10-CM

## 2017-03-09 RX ORDER — SIMVASTATIN 40 MG
40 TABLET ORAL AT BEDTIME
Qty: 90 TABLET | Refills: 3 | Status: SHIPPED | OUTPATIENT
Start: 2017-03-09 | End: 2018-04-12

## 2017-03-15 ENCOUNTER — TELEPHONE (OUTPATIENT)
Dept: OPHTHALMOLOGY | Facility: CLINIC | Age: 62
End: 2017-03-15

## 2017-03-15 NOTE — TELEPHONE ENCOUNTER
LVM with patient today that I was out of clinic yesterday and I did fax the forms needed for both her STD and HR on Thursday and Friday morning right away.    I was just doing a FU call and I do not need a call back from her unless she is having concerns or issues.    Sally Hunt COA 3:13 PM March 15, 2017

## 2017-03-15 NOTE — TELEPHONE ENCOUNTER
----- Message from Rena Jai sent at 3/14/2017 10:04 AM CDT -----  Regarding: Pt requesting a call back to let her know that her info was sent to HR...Pt called   Contact: [phone]  her HR dept this morning and they did not receive your information.  Pt was hoping to go back to work TODAY.    Pt hand-delivered the form to the reception area of the [facility] yesterday morning and then you called her back to ask for specific info to fax to her work (HR) secured Fax# [phone].    Please call [phone] to let Vickie know that the info was faxed to her HR dept at work.    Thank you,  Billy    Please DO NOT send this message and/or reply back to sender.  Call Center Representatives DO NOT respond to messages.

## 2017-04-19 ENCOUNTER — OFFICE VISIT (OUTPATIENT)
Dept: OPHTHALMOLOGY | Facility: CLINIC | Age: 62
End: 2017-04-19
Attending: OPHTHALMOLOGY
Payer: COMMERCIAL

## 2017-04-19 DIAGNOSIS — Z48.810 AFTERCARE FOLLOWING SURGERY OF A SENSORY ORGAN: Primary | ICD-10-CM

## 2017-04-19 DIAGNOSIS — T85.22XA DISLOCATED IOL (INTRAOCULAR LENS), POSTERIOR, RIGHT: ICD-10-CM

## 2017-04-19 PROCEDURE — 99213 OFFICE O/P EST LOW 20 MIN: CPT | Mod: ZF

## 2017-04-19 PROCEDURE — 92015 DETERMINE REFRACTIVE STATE: CPT | Mod: ZF

## 2017-04-19 ASSESSMENT — REFRACTION_MANIFEST
OD_ADD: +2.50
OD_SPHERE: -1.00
OD_CYLINDER: +0.50
OD_AXIS: 060
OS_ADD: +2.50
OS_AXIS: 085
OS_SPHERE: -4.50
OS_CYLINDER: +1.75

## 2017-04-19 ASSESSMENT — VISUAL ACUITY
METHOD: SNELLEN - LINEAR
OD_SC+: -2
OS_CC+: -1
OD_SC: 20/30
CORRECTION_TYPE: GLASSES
OS_CC: 20/20

## 2017-04-19 ASSESSMENT — TONOMETRY
IOP_METHOD: ICARE
OD_IOP_MMHG: 15
OS_IOP_MMHG: 15

## 2017-04-19 ASSESSMENT — SLIT LAMP EXAM - LIDS
COMMENTS: NORMAL
COMMENTS: NORMAL

## 2017-04-19 NOTE — NURSING NOTE
Chief Complaints and History of Present Illnesses   Patient presents with     Follow Up For     sutured IOL right eye (2/23/17)     HPI    Affected eye(s):  Right   Symptoms:     No blurred vision   No decreased vision         Do you have eye pain now?:  No      Comments:  Mimi HERMOSILLO 7:59 AM April 19, 2017

## 2017-04-19 NOTE — MR AVS SNAPSHOT
After Visit Summary   4/19/2017    Vickie Alvarado    MRN: [id number]           Patient Information     Date Of Birth          1955        Visit Information        Provider Department      4/19/2017 7:30 AM Hiro Christian MD [facility]        Today's Diagnoses     Aftercare following surgery of a sensory organ - Right Eye    -  1    Dislocated IOL (intraocular lens), posterior, right           Follow-ups after your visit        Who to contact     Please call your clinic at [phone] to:    Ask questions about your health    Make or cancel appointments    Discuss your medicines    Learn about your test results    Speak to your doctor   If you have compliments or concerns about an experience at your clinic, or if you wish to file a complaint, please contact [facility] Patient Relations at [phone] or email us at [email or web address]         Additional Information About Your Visit        MyChart Information     [employer] gives you secure access to your electronic health record. If you see a primary care provider, you can also send messages to your care team and make appointments. If you have questions, please call your primary care clinic.  If you do not have a primary care provider, please call [phone] and they will assist you.      PAYMILL is an electronic gateway that provides easy, online access to your medical records. With [employer], you can request a clinic appointment, read your test results, renew a prescription or communicate with your care team.     To access your existing account, please contact your [facility] or call [phone] for assistance.        Care EveryWhere ID     This is your Care EveryWhere ID. This could be used by other organizations to access your [place] medical records  [id number]        Your Vitals Were     Last Period                   05/17/2011            Blood Pressure from  Last 3 Encounters:   01/31/17 113/76   04/14/16 136/88   11/25/15 116/80    Weight from Last 3 Encounters:   01/31/17 90 kg (198 lb 6.4 oz)   04/14/16 90.7 kg (200 lb)   11/25/15 90.3 kg (199 lb)              Today, you had the following     No orders found for display         Today's Medication Changes          These changes are accurate as of: 4/19/17  8:34 AM.  If you have any questions, ask your nurse or doctor.               These medicines have changed or have updated prescriptions.        Dose/Directions    prednisoLONE acetate 1 % ophthalmic susp   Commonly known as:  PRED FORTE   This may have changed:  Another medication with the same name was removed. Continue taking this medication, and follow the directions you see here.   Used for:  Aftercare following surgery of a sensory organ   Changed by:  Hiro Christian MD        Dose:  1 drop   Place 1 drop into the right eye every 3 hours Shake well before using. Wait at least 2 minutes between eye drops if using concurrently with other eye drops.   Quantity:  10 mL   Refills:  0         Stop taking these medicines if you haven't already. Please contact your care team if you have questions.     ofloxacin 0.3 % ophthalmic solution   Commonly known as:  OCUFLOX   Stopped by:  Hiro Christian MD           oxyCODONE-acetaminophen 5-325 MG per tablet   Commonly known as:  PERCOCET   Stopped by:  Hiro Christian MD                    Primary Care Provider Office Phone # Fax #    Gaby TEDDY Lynn [phone]       [facility] [address] [address]        Thank you!     Thank you for choosing EYE CLINIC  for your care. Our goal is always to provide you with excellent care. Hearing back from our patients is one way we can continue to improve our services. Please take a few minutes to complete the written survey that you may receive in the mail after your visit with us. Thank you!             Your Updated  Medication List - Protect others around you: Learn how to safely use, store and throw away your medicines at www.disposemymeds.org.          This list is accurate as of: 4/19/17  8:34 AM.  Always use your most recent med list.                   Brand Name Dispense Instructions for use    CLARITIN 10 MG tablet   Generic drug:  loratadine      Take 10 mg by mouth daily. Prn, seasonal for ragweed and lilacs       clotrimazole 1 % solution    LOTRIMIN    60 mL    Apply topically 2 times daily       prednisoLONE acetate 1 % ophthalmic susp    PRED FORTE    10 mL    Place 1 drop into the right eye every 3 hours Shake well before using. Wait at least 2 minutes between eye drops if using concurrently with other eye drops.       REFRESH OPTIVE ADVANCED 0.5-1-0.5 % Soln   Generic drug:  Carboxymeth-Glycerin-Polysorb      Place 1 drop into both eyes 3 times daily       simvastatin 40 MG tablet    ZOCOR    90 tablet    Take 1 tablet (40 mg) by mouth At Bedtime

## 2017-04-19 NOTE — PROGRESS NOTES
CC: POM#2 sutured IOL right eye (2/23/17)    HPI: Pt is now able to work. Patient denies any pain, redness, irritation, tearing, itchiness, photophobia, blurry vision, or trauma  Took out lens (glasses) right eye due to anisometropia     Gtts:  Prednisolone QID OD    POHx:  Resuturing of dislocated IOL OD - complicated by post-op dislocation  Secondary sutured IOL OD    A/P:  1. S/p secondary sutured IOL OD  - Loose suture removed at slit lamp  - Decrease prednisolone to TID OD x 1week, then BID x1week, then daily x 1week, then STOP  - Start Ofloxacin QID OD x 3 days  - MRx today      F/u 2 months - DFE OU    ~~~~~~~~~~~~~~~~~~~~~~~~~~~~~~~~~~~~~~~~~~~~~~~~~~~~~~~~~~~~~~~~    Complete documentation of historical and exam elements from today's encounter can be found in the full encounter summary report (not reduplicated in this progress note). I personally obtained the chief complaint(s) and history of present illness.  I confirmed and edited as necessary the review of systems, past medical/surgical history, family history, social history, and examination findings as documented by others; and I examined the patient myself. I personally reviewed the relevant tests, images, and reports as documented above. I formulated and edited as necessary the assessment and plan and discussed the findings and management plan with the patient and family.    Hiro Christian MD

## 2017-04-20 DIAGNOSIS — Z98.890 POST-OPERATIVE STATE: Primary | ICD-10-CM

## 2017-04-20 RX ORDER — OFLOXACIN 3 MG/ML
1 SOLUTION/ DROPS OPHTHALMIC 4 TIMES DAILY
Qty: 1 BOTTLE | Refills: 0 | Status: SHIPPED | OUTPATIENT
Start: 2017-04-20 | End: 2019-01-09

## 2017-06-19 ENCOUNTER — OFFICE VISIT (OUTPATIENT)
Dept: OPHTHALMOLOGY | Facility: CLINIC | Age: 62
End: 2017-06-19
Attending: OPHTHALMOLOGY
Payer: COMMERCIAL

## 2017-06-19 DIAGNOSIS — Z98.890 POST-OPERATIVE STATE: Primary | ICD-10-CM

## 2017-06-19 DIAGNOSIS — Z48.810 AFTERCARE FOLLOWING SURGERY OF A SENSORY ORGAN: ICD-10-CM

## 2017-06-19 DIAGNOSIS — T85.22XA DISLOCATED IOL (INTRAOCULAR LENS), POSTERIOR, RIGHT: ICD-10-CM

## 2017-06-19 PROCEDURE — 99212 OFFICE O/P EST SF 10 MIN: CPT | Mod: ZF

## 2017-06-19 ASSESSMENT — CONF VISUAL FIELD
OD_NORMAL: 1
OS_NORMAL: 1

## 2017-06-19 ASSESSMENT — VISUAL ACUITY
METHOD: SNELLEN - LINEAR
CORRECTION_TYPE: GLASSES
OS_CC: 20/20
OD_CC: 20/20

## 2017-06-19 ASSESSMENT — EXTERNAL EXAM - LEFT EYE: OS_EXAM: NORMAL

## 2017-06-19 ASSESSMENT — EXTERNAL EXAM - RIGHT EYE: OD_EXAM: NORMAL

## 2017-06-19 ASSESSMENT — CUP TO DISC RATIO
OD_RATIO: 0.4
OS_RATIO: 0.4

## 2017-06-19 ASSESSMENT — SLIT LAMP EXAM - LIDS
COMMENTS: DERMATOCHALSIS
COMMENTS: DERMATOCHALASIS

## 2017-06-19 ASSESSMENT — TONOMETRY
OS_IOP_MMHG: 14
OD_IOP_MMHG: 12
IOP_METHOD: ICARE

## 2017-06-19 NOTE — NURSING NOTE
Chief Complaints and History of Present Illnesses   Patient presents with     Follow Up For     sutured IOL right eye (2/23/17)     HPI    Affected eye(s):  Both   Symptoms:        Duration:  2 months   Frequency:  Constant       Do you have eye pain now?:  No      Comments:  Pt. States that she is doing well.  No c/o VA or comfort BE.  Jordyn Sabillon COT 3:16 PM June 19, 2017

## 2017-06-19 NOTE — MR AVS SNAPSHOT
After Visit Summary   6/19/2017    Vickie Alvarado    MRN: [id number]           Patient Information     Date Of Birth          1955        Visit Information        Provider Department      6/19/2017 3:00 PM Hiro Christian MD [facility]        Today's Diagnoses     Post-operative state    -  1    Dislocated IOL (intraocular lens), posterior, right        Aftercare following surgery of a sensory organ - Right Eye           Follow-ups after your visit        Your next 10 appointments already scheduled     Jan 17, 2018  3:00 PM CST   RETURN CORNEA with Hiro Christian MD   Eye Clinic ([facility])    [address]  [address]  9th [address] Clin [address]  [facility] [address] [phone]              Who to contact     Please call your clinic at [phone] to:    Ask questions about your health    Make or cancel appointments    Discuss your medicines    Learn about your test results    Speak to your doctor   If you have compliments or concerns about an experience at your clinic, or if you wish to file a complaint, please contact [facility] Patient Relations at [phone] or email us at [email or web address]         Additional Information About Your Visit        MyChart Information     [employer] gives you secure access to your electronic health record. If you see a primary care provider, you can also send messages to your care team and make appointments. If you have questions, please call your primary care clinic.  If you do not have a primary care provider, please call [phone] and they will assist you.      [employer] is an electronic gateway that provides easy, online access to your medical records. With [employer], you can request a clinic appointment, read your test results, renew a prescription or communicate with your care team.     To access your existing account, please contact your [facility] or  call [phone] for assistance.        Care EveryWhere ID     This is your Care EveryWhere ID. This could be used by other organizations to access your [place] medical records  [id number]        Your Vitals Were     Last Period                   05/17/2011            Blood Pressure from Last 3 Encounters:   01/31/17 113/76   04/14/16 136/88   11/25/15 116/80    Weight from Last 3 Encounters:   01/31/17 90 kg (198 lb 6.4 oz)   04/14/16 90.7 kg (200 lb)   11/25/15 90.3 kg (199 lb)              Today, you had the following     No orders found for display       Primary Care Provider Office Phone # Fax #    Gaby Lynn [phone]       [facility] [address] [address]        Thank you!     Thank you for choosing EYE CLINIC  for your care. Our goal is always to provide you with excellent care. Hearing back from our patients is one way we can continue to improve our services. Please take a few minutes to complete the written survey that you may receive in the mail after your visit with us. Thank you!             Your Updated Medication List - Protect others around you: Learn how to safely use, store and throw away your medicines at www.disposemymeds.org.          This list is accurate as of: 6/19/17  4:35 PM.  Always use your most recent med list.                   Brand Name Dispense Instructions for use    CLARITIN 10 MG tablet   Generic drug:  loratadine      Take 10 mg by mouth daily. Prn, seasonal for ragweed and lilacs       clotrimazole 1 % solution    LOTRIMIN    60 mL    Apply topically 2 times daily       ofloxacin 0.3 % ophthalmic solution    OCUFLOX    1 Bottle    Place 1 drop into the right eye 4 times daily Use for 3 days       prednisoLONE acetate 1 % ophthalmic susp    PRED FORTE    10 mL    Place 1 drop into the right eye every 3 hours Shake well before using. Wait at least 2 minutes between eye drops if using concurrently with other eye drops.        REFRESH OPTIVE ADVANCED 0.5-1-0.5 % Soln   Generic drug:  Carboxymeth-Glycerin-Polysorb      Place 1 drop into both eyes 3 times daily       simvastatin 40 MG tablet    ZOCOR    90 tablet    Take 1 tablet (40 mg) by mouth At Bedtime

## 2017-06-19 NOTE — PROGRESS NOTES
CC: POM#4 sutured IOL right eye (2/23/17)    HPI: Pt is doing well. Patient denies any pain, redness, irritation, tearing, itchiness, photophobia, blurry vision, or trauma. No flashes, floaters, diplopia. She is happy with her current glasses.     Gtts:  None      POHx:  Resuturing of dislocated IOL OD - complicated by post-op dislocation  Secondary sutured IOL OD    A/P:  1. S/p secondary sutured IOL OD  - doing great  - vision 20/20 - happy with current glasses  - posterior exam looks very healthy  - mild superior exposure nylon sutures - trimmed at slit lamp  - start ofloxacin QID x 3 days    F/u 6-8 months, earlier if needed.    Sharif Alvarado MD  Ophthalmology resident, PGY-3      ~~~~~~~~~~~~~~~~~~~~~~~~~~~~~~~~~~~~~~~~~~~~~~~~~~~~~~~~~~~~~~~~    Complete documentation of historical and exam elements from today's encounter can be found in the full encounter summary report (not reduplicated in this progress note). I personally obtained the chief complaint(s) and history of present illness.  I confirmed and edited as necessary the review of systems, past medical/surgical history, family history, social history, and examination findings as documented by others; and I examined the patient myself. I personally reviewed the relevant tests, images, and reports as documented above. I formulated and edited as necessary the assessment and plan and discussed the findings and management plan with the patient and family.    Hiro Christian MD

## 2017-10-12 ENCOUNTER — OFFICE VISIT (OUTPATIENT)
Dept: FAMILY MEDICINE | Facility: CLINIC | Age: 62
End: 2017-10-12

## 2017-10-12 VITALS
HEIGHT: 66 IN | DIASTOLIC BLOOD PRESSURE: 84 MMHG | SYSTOLIC BLOOD PRESSURE: 125 MMHG | WEIGHT: 198.6 LBS | RESPIRATION RATE: 20 BRPM | BODY MASS INDEX: 31.92 KG/M2 | TEMPERATURE: 98.2 F | OXYGEN SATURATION: 93 % | HEART RATE: 86 BPM

## 2017-10-12 DIAGNOSIS — R53.81 PHYSICAL DECONDITIONING: ICD-10-CM

## 2017-10-12 DIAGNOSIS — R25.1 TREMOR: ICD-10-CM

## 2017-10-12 DIAGNOSIS — R53.83 FATIGUE, UNSPECIFIED TYPE: ICD-10-CM

## 2017-10-12 DIAGNOSIS — Z13.9 SCREENING FOR CONDITION: ICD-10-CM

## 2017-10-12 DIAGNOSIS — R05.9 COUGH: ICD-10-CM

## 2017-10-12 DIAGNOSIS — Z00.00 ROUTINE HEALTH MAINTENANCE: Primary | ICD-10-CM

## 2017-10-12 DIAGNOSIS — E66.3 OVERWEIGHT: ICD-10-CM

## 2017-10-12 DIAGNOSIS — E78.2 MIXED HYPERLIPIDEMIA: ICD-10-CM

## 2017-10-12 NOTE — MR AVS SNAPSHOT
After Visit Summary   10/12/2017    Vickie Alvarado    MRN: [id number]           Patient Information     Date Of Birth          1955        Visit Information        Provider Department      10/12/2017 3:20 PM Christiano Lynn MD [facility]        Today's Diagnoses     Routine health maintenance    -  1    Overweight        Physical deconditioning        Fatigue, unspecified type        Tremor        Cough        Mixed hyperlipidemia        Screening for condition          Care Instructions    Here is the plan from today's visit    Routine health maintenance  - ADMIN VACCINE, EACH ADDITIONAL  - ADMIN VACCINE, INITIAL  - FLU VAC PRESRV FREE QUAD SPLIT VIR IM, 0.5 mL dosage  - TDAP VACCINE ([facility])    Physical deconditioning    Fatigue, unspecified type  - TSH with free T4 reflex; Future  - CBC with Plt ([facility]); Future  - Comprehensive Metabolic Panel ([facility]); Future    Tremor    Mixed hyperlipidemia  - Lipid Cascade ([facility]); Future    Screening for condition  - Hemoglobin A1c ([facility]); Future      Follow up plan  Please make a clinic appointment for follow up with me (CHRISTIANO LYNN) to follow up labs    Thank you for coming to [facility] today.  Lab Testing:  **If you had lab testing today and your results are reassuring or normal they will be mailed to you or sent through [employer] within 7 days.   **If the lab tests need quick action we will call you with the results.  The phone number we will call with results is # [phone] (home) [phone] (work). If this is not the best number please call our clinic and change the number.  Medication Refills:  If you need any refills please call your pharmacy and they will contact us.   If you need to  your refill at a new pharmacy, please contact the new pharmacy directly. The new pharmacy will help you get your medications transferred faster.   Scheduling:  If you have any concerns about today's visit or  wish to schedule another appointment please call our office during normal business hours [phone] (8-5:00 M-F)  If a referral was made to a [facility] and you don't get a call from central scheduling please call [phone].  If a Mammogram was ordered for you at [facility] call [phone] to schedule or change your appointment.  If you had an XRay/CT/Ultrasound/MRI ordered the number is [phone] to schedule or change your radiology appointment.   Medical Concerns:  If you have urgent medical concerns please call [phone] at any time of the day.          Follow-ups after your visit        Your next 10 appointments already scheduled     Jan 17, 2018  3:00 PM CST   RETURN CORNEA with Hiro Christian MD   Eye Clinic ([facility])    [address]  [address]  [address] [address]  [facility] [address]   [phone]              Future tests that were ordered for you today     Open Future Orders        Priority Expected Expires Ordered    Hepatitis C Screen Reflex to HCV RNA Quant and Genotype Routine  10/12/2018 10/12/2017    CBC with Plt ([facility]) Routine  10/12/2018 10/12/2017    Comprehensive Metabolic Panel ([facility]) Routine  10/12/2018 10/12/2017    Hemoglobin A1c ([facility]) Routine  10/12/2018 10/12/2017    Lipid Ralls ([facility]) Routine  10/12/2018 10/12/2017    TSH with free T4 reflex Routine  10/12/2018 10/12/2017            Who to contact     Please call your clinic at [phone] to:    Ask questions about your health    Make or cancel appointments    Discuss your medicines    Learn about your test results    Speak to your doctor   If you have compliments or concerns about an experience at your clinic, or if you wish to file a complaint, please contact [facility] Patient Relations at [phone] or email us at [email or web address]         Additional Information About Your Visit        Micaela  "Information     [employer] gives you secure access to your electronic health record. If you see a primary care provider, you can also send messages to your care team and make appointments. If you have questions, please call your primary care clinic.  If you do not have a primary care provider, please call [phone] and they will assist you.      [employer] is an electronic gateway that provides easy, online access to your medical records. With [employer], you can request a clinic appointment, read your test results, renew a prescription or communicate with your care team.     To access your existing account, please contact your [facility] or call [phone] for assistance.        Care EveryWhere ID     This is your Care EveryWhere ID. This could be used by other organizations to access your [place] medical records  [id number]        Your Vitals Were     Pulse Temperature Respirations Height Last Period Pulse Oximetry    86 98.2  F (36.8  C) (Oral) 20 5' 6\" (167.6 cm) 05/17/2011 93%    Breastfeeding? BMI (Body Mass Index)                No 32.05 kg/m2           Blood Pressure from Last 3 Encounters:   10/12/17 125/84   01/31/17 113/76   04/14/16 136/88    Weight from Last 3 Encounters:   10/12/17 198 lb 9.6 oz (90.1 kg)   01/31/17 198 lb 6.4 oz (90 kg)   04/14/16 200 lb (90.7 kg)              We Performed the Following     ADMIN VACCINE, EACH ADDITIONAL     ADMIN VACCINE, INITIAL     FLU VAC PRESRV FREE QUAD SPLIT VIR IM, 0.5 mL dosage     TDAP VACCINE ([facility])        Primary Care Provider Office Phone # Fax #    Gaby Lynn [phone]       [address] [address]        Equal Access to Services     [facility]O [facility] AH: Hadii christiano Jimenez, wamelissada lurobinadaha, inna kaalmada kiya, antony lerner. So [facility] [phone].    ATENCIÓN: Si habla español, tiene a cevallos disposición servicios gratuitos de asistencia " lingüísticaAnant Siddiqui al [phone].    We comply with applicable federal civil rights laws and [place] laws. We do not discriminate on the basis of race, color, national origin, age, disability, sex, sexual orientation, or gender identity.            Thank you!     Thank you for choosing [facility]  for your care. Our goal is always to provide you with excellent care. Hearing back from our patients is one way we can continue to improve our services. Please take a few minutes to complete the written survey that you may receive in the mail after your visit with us. Thank you!             Your Updated Medication List - Protect others around you: Learn how to safely use, store and throw away your medicines at www.disposemymeds.org.          This list is accurate as of: 10/12/17  4:22 PM.  Always use your most recent med list.                   Brand Name Dispense Instructions for use Diagnosis    CLARITIN 10 MG tablet   Generic drug:  loratadine      Take 10 mg by mouth daily. Prn, seasonal for ragweed and lilacs        clotrimazole 1 % solution    LOTRIMIN    60 mL    Apply topically 2 times daily    Toenail fungus       ofloxacin 0.3 % ophthalmic solution    OCUFLOX    1 Bottle    Place 1 drop into the right eye 4 times daily Use for 3 days    Post-operative state       prednisoLONE acetate 1 % ophthalmic susp    PRED FORTE    10 mL    Place 1 drop into the right eye every 3 hours Shake well before using. Wait at least 2 minutes between eye drops if using concurrently with other eye drops.    Aftercare following surgery of a sensory organ       REFRESH OPTIVE ADVANCED 0.5-1-0.5 % Soln   Generic drug:  Carboxymeth-Glycerin-Polysorb      Place 1 drop into both eyes 3 times daily        simvastatin 40 MG tablet    ZOCOR    90 tablet    Take 1 tablet (40 mg) by mouth At Bedtime    Hyperlipidemia LDL goal <130

## 2017-10-12 NOTE — PATIENT INSTRUCTIONS
Here is the plan from today's visit    Routine health maintenance  - ADMIN VACCINE, EACH ADDITIONAL  - ADMIN VACCINE, INITIAL  - FLU VAC PRESRV FREE QUAD SPLIT VIR IM, 0.5 mL dosage  - TDAP VACCINE ([facility])    Physical deconditioning    Fatigue, unspecified type  - TSH with free T4 reflex; Future  - CBC with Plt ([facility]); Future  - Comprehensive Metabolic Panel ([facility]); Future    Tremor    Mixed hyperlipidemia  - Lipid Cascade ([facility]); Future    Screening for condition  - Hemoglobin A1c ([facility]); Future      Follow up plan  Please make a clinic appointment for follow up with me (CHRISTIANO MERCADO) to follow up labs    Thank you for coming to [facility] today.  Lab Testing:  **If you had lab testing today and your results are reassuring or normal they will be mailed to you or sent through [employer] within 7 days.   **If the lab tests need quick action we will call you with the results.  The phone number we will call with results is # [phone] (home) [phone] (work). If this is not the best number please call our clinic and change the number.  Medication Refills:  If you need any refills please call your pharmacy and they will contact us.   If you need to  your refill at a new pharmacy, please contact the new pharmacy directly. The new pharmacy will help you get your medications transferred faster.   Scheduling:  If you have any concerns about today's visit or wish to schedule another appointment please call our office during normal business hours [phone] (8-5:00 M-F)  If a referral was made to a [facility] and you don't get a call from central scheduling please call [phone].  If a Mammogram was ordered for you at [facility] call [phone] to schedule or change your appointment.  If you had an XRay/CT/Ultrasound/MRI ordered the number is [phone] to schedule or change your radiology appointment.   Medical Concerns:  If you have urgent medical  concerns please call [phone] at any time of the day.

## 2017-10-12 NOTE — PROGRESS NOTES
HPI:       Vickie Alvarado is a 62 year old who presents for the following  Patient presents with:  Fatigue: Ongoing    63 yo female well known to me, presents feeling more tired than she thinks she should. Feels this is likely related to her stressful job and lack of physical activity - used to work out regularly but has not in the last 2 years. Has plans to start again, but wanted to check labs in interim to make sure everything else ok. Also wants flu shot.    Fatigue    Onset: Ongoing for months    Description: How severe is the fatigue? moderate   Does the fatigue interfere with your life:no     How much sleep are you getting? 6.5 Hours     How much sleep do you normally need to feel well? 6 Hours  Exercise: sporadic irregular exercise  Are there episodes of normal energy levels: yes     Accompanying Signs & Symptoms:  Falling asleep during the day: no  Snoring: no  Do you stop breathing while sleeping: no                 Night sweats: no      Fevers:no  Chest Pain: no  Pain other places in the body? no  Change in appetite: no                 Weight gain/loss: no  Dark or bloody stools: no  Heavy periods (women) no  Substance use:             Caffeine use:no              Alcohol use: 1-3 beverages / week               Illicit drug use:None    Mood  Depressed mood: no  Any new anxiety/stressors:no  Any new deaths or losses? no  No flowsheet data found.      Problem, Medication and Allergy Lists were   reviewed and are current.     Patient Active Problem List    Diagnosis Date Noted     Toenail fungus 2017     Priority: Medium     Benign neoplasm of colon 06/15/2016     Priority: Medium     Colonoscopy . Repeat in 5 years ()       Family history of cardiomyopathy 2016     Priority: Medium     Family history of malignant neoplasm of breast 2015     Priority: Medium     Sister, post menopausal, .       Tinnitus 2013     Priority: Medium     Senile nuclear sclerosis  "10/26/2012     Priority: Medium     Tear film insufficiency 10/26/2012     Priority: Medium     Problem list name updated by automated process. Provider to review       Hyperlipidemia 10/26/2012     Priority: Medium     Problem list name updated by automated process. Provider to review       Regular astigmatism 10/26/2012     Priority: Medium     Myopia 10/26/2012     Priority: Medium     Overweight 10/26/2012     Priority: Medium     Problem list name updated by automated process. Provider to review       Other specified menopausal and postmenopausal disorder 10/26/2012     Priority: Medium     After-cataract 10/26/2012     Priority: Medium     Problem list name updated by automated process. Provider to review       Presbyopia 10/26/2012     Priority: Medium     Pupillary abnormalities 10/26/2012     Priority: Medium     Diplopia 10/26/2012     Priority: Medium     Visual field defect 10/26/2012     Priority: Medium     Problem list name updated by automated process. Provider to review       Vitreous membranes and strands 10/26/2012     Priority: Medium     Health Care Home 10/26/2012     Priority: Medium     Tier 1  DX V65.8 REPLACED WITH 33827 HEALTH CARE HOME (04/08/2013)       CARDIOVASCULAR SCREENING; LDL GOAL LESS THAN 160 11/17/2011     Priority: Medium     Encounter for counseling 11/17/2011     Priority: Medium     Problem list name updated by automated process. Provider to review       Patient is an established patient of this clinic.         Review of Systems:   Review of Systems   Constitutional: Negative for appetite change, chills, diaphoresis, fever and unexpected weight change.   HENT: Negative.    Eyes:        Significant eye issues requiring 2 surgeries over last 2 years have limited her physical activity. Vision good now   Respiratory: Negative.    Cardiovascular: Negative.    Gastrointestinal: Negative.    Endocrine: Negative.    Genitourinary: Negative.    Musculoskeletal:        Some \"aches " "and pains\"   Neurological: Negative.    Psychiatric/Behavioral: Negative.              Physical Exam:   Patient Vitals for the past 24 hrs:   BP Temp Temp src Pulse Resp SpO2 Height Weight   10/12/17 1546 125/84 98.2  F (36.8  C) Oral 86 20 93 % 5' 6\" (167.6 cm) 198 lb 9.6 oz (90.1 kg)     Body mass index is 32.05 kg/(m^2).  Vitals were reviewed and were normal       Physical Exam   Constitutional: She is oriented to person, place, and time. She appears well-developed. No distress.   Overweight   HENT:   Mouth/Throat: Oropharynx is clear and moist.   Eyes: Conjunctivae are normal.   Neck: Normal range of motion. Neck supple. No thyromegaly present.   Cardiovascular: Normal rate, regular rhythm and normal heart sounds.  Exam reveals no gallop and no friction rub.    No murmur heard.  Pulmonary/Chest: Effort normal and breath sounds normal.   Abdominal: Soft. She exhibits no distension. There is no tenderness.   Musculoskeletal: She exhibits no edema.   Lymphadenopathy:     She has no cervical adenopathy.   Neurological: She is alert and oriented to person, place, and time. She displays normal reflexes. No cranial nerve deficit. She exhibits normal muscle tone. Coordination normal.   Resting and intention tremor noted in B hands. She says \"everyone in my family has this\". Notes no stiffness, change in gait   Psychiatric: She has a normal mood and affect. Her behavior is normal.         Results:     pending  Assessment and Plan       Physical deconditioning  Fatigue, unspecified type  - TSH with free T4 reflex; Future  - CBC with Plt ([facility]); Future  - Comprehensive Metabolic Panel ([facility]); Future    Mixed hyperlipidemia  - Lipid Cascade ([facility]); Future    Screening for condition  - Hemoglobin A1c ([facility]); Future    Tremor - both hands, rest and intention. Suspect familial by history  - Otherwise normal neurologic exam. Continue to monitor.     Routine health maintenance  - ADMIN VACCINE, EACH ADDITIONAL  - " ADMIN VACCINE, INITIAL  - FLU VAC PRESRV FREE QUAD SPLIT VIR IM, 0.5 mL dosage  - TDAP VACCINE (BOOSTRIX)    There are no discontinued medications.  Options for treatment and follow-up care were reviewed with the patient. Vickie Alvarado  engaged in the decision making process and verbalized understanding of the options discussed and agreed with the final plan.    Gaby Lynn MD

## 2017-10-13 DIAGNOSIS — E78.2 MIXED HYPERLIPIDEMIA: ICD-10-CM

## 2017-10-13 DIAGNOSIS — R53.83 FATIGUE, UNSPECIFIED TYPE: ICD-10-CM

## 2017-10-13 DIAGNOSIS — Z13.9 SCREENING FOR CONDITION: ICD-10-CM

## 2017-10-13 DIAGNOSIS — E66.3 OVERWEIGHT: ICD-10-CM

## 2017-10-13 LAB
ALBUMIN SERPL-MCNC: 4.1 MG/DL (ref 3.5–4.7)
ALP SERPL-CCNC: 58.8 U/L (ref 31.7–110.5)
ALT SERPL-CCNC: 8.4 U/L (ref 0–45)
AST SERPL-CCNC: 18 U/L (ref 0–45)
BILIRUB SERPL-MCNC: 0.7 MG/DL (ref 0.2–1.3)
BUN SERPL-MCNC: 11.3 MG/DL (ref 7–19)
CALCIUM SERPL-MCNC: 9.6 MG/DL (ref 8.5–10.1)
CHLORIDE SERPLBLD-SCNC: 103.2 MMOL/L (ref 98–110)
CHOLEST SERPL-MCNC: 195 MG/DL (ref 0–200)
CHOLEST/HDLC SERPL: 3.6 {RATIO} (ref 0–5)
CO2 SERPL-SCNC: 27.3 MMOL/L (ref 20–32)
CREAT SERPL-MCNC: 0.7 MG/DL (ref 0.5–1)
GFR SERPL CREATININE-BSD FRML MDRD: >90 ML/MIN/1.7 M2
GLUCOSE SERPL-MCNC: 101.6 MG'DL (ref 70–99)
HBA1C MFR BLD: 5.2 % (ref 4.1–5.7)
HCT VFR BLD AUTO: 45.5 % (ref 35–47)
HCV AB SERPL QL IA: NONREACTIVE
HDLC SERPL-MCNC: 54.6 MG/DL
HEMOGLOBIN: 14.2 G/DL (ref 11.7–15.7)
LDLC SERPL CALC-MCNC: 110 MG/DL (ref 0–129)
MCH RBC QN AUTO: 30 PG (ref 26.5–35)
MCHC RBC AUTO-ENTMCNC: 31.2 G/DL (ref 32–36)
MCV RBC AUTO: 95.9 FL (ref 78–100)
PLATELET # BLD AUTO: 275 K/UL (ref 150–450)
POTASSIUM SERPL-SCNC: 4.2 MMOL/DL (ref 3.3–4.5)
PROT SERPL-MCNC: 7.6 G/DL (ref 6.8–8.8)
RBC # BLD AUTO: 4.74 M/UL (ref 3.8–5.2)
SODIUM SERPL-SCNC: 140.5 MMOL/L (ref 132.6–141.4)
TRIGL SERPL-MCNC: 150.5 MG/DL (ref 0–150)
TSH SERPL DL<=0.005 MIU/L-ACNC: 1.51 MU/L (ref 0.4–4)
VLDL CHOLESTEROL: 30.1 MG/DL (ref 7–32)
WBC # BLD AUTO: 4.9 K/UL (ref 4–11)

## 2017-10-14 NOTE — PROGRESS NOTES
Dear Marni    Here are your results from recent testing. As you can see, they are reassuring. No worrisome findings or explanations for your increased fatigue. I think your plan to increase your physical activity will make a lot of difference in how you feel.    A couple of comments on results:  - your glucose level was mildly elevated but your hemoglobin a1c is normal, and far from diabetic range.  - your cholesterol levels look pretty good! LDL (bad cholesterol) is the lowest it's been. Cautious diet and more exercise will only make this better still.     Please call or message me through My Chart if you have any questions or concerns. Always a pleasure to see you.    Sincerely,   Gaby Lynn MD

## 2017-10-16 ASSESSMENT — ENCOUNTER SYMPTOMS
DIAPHORESIS: 0
FEVER: 0
RESPIRATORY NEGATIVE: 1
ENDOCRINE NEGATIVE: 1
GASTROINTESTINAL NEGATIVE: 1
CHILLS: 0
APPETITE CHANGE: 0
CARDIOVASCULAR NEGATIVE: 1
NEUROLOGICAL NEGATIVE: 1
UNEXPECTED WEIGHT CHANGE: 0
PSYCHIATRIC NEGATIVE: 1

## 2017-11-01 ENCOUNTER — OFFICE VISIT (OUTPATIENT)
Dept: FAMILY MEDICINE | Facility: CLINIC | Age: 62
End: 2017-11-01

## 2017-11-01 ENCOUNTER — TELEPHONE (OUTPATIENT)
Dept: FAMILY MEDICINE | Facility: CLINIC | Age: 62
End: 2017-11-01

## 2017-11-01 VITALS
OXYGEN SATURATION: 91 % | WEIGHT: 198.8 LBS | TEMPERATURE: 97.8 F | SYSTOLIC BLOOD PRESSURE: 121 MMHG | RESPIRATION RATE: 16 BRPM | BODY MASS INDEX: 32.09 KG/M2 | HEART RATE: 87 BPM | DIASTOLIC BLOOD PRESSURE: 83 MMHG

## 2017-11-01 DIAGNOSIS — R31.9 URINARY TRACT INFECTION WITH HEMATURIA, SITE UNSPECIFIED: ICD-10-CM

## 2017-11-01 DIAGNOSIS — T50.905A MEDICATION REACTION, INITIAL ENCOUNTER: Primary | ICD-10-CM

## 2017-11-01 DIAGNOSIS — N39.0 URINARY TRACT INFECTION WITH HEMATURIA, SITE UNSPECIFIED: ICD-10-CM

## 2017-11-01 ASSESSMENT — ENCOUNTER SYMPTOMS
CHILLS: 0
NAUSEA: 1
HEADACHES: 1
ABDOMINAL PAIN: 0
VOMITING: 0
NECK PAIN: 1
FATIGUE: 0
RESPIRATORY NEGATIVE: 1
FREQUENCY: 0
CARDIOVASCULAR NEGATIVE: 1
DYSURIA: 0
FEVER: 0

## 2017-11-01 NOTE — PROGRESS NOTES
"      HPI:       Vickie Alvarado is a 62 year old who presents for the following  Patient presents with:  Allergic Reaction: Received from [facility] at [employer]. Sulfamethoxazole-Trimethoprim       Patient was seen in [facility] on 10/30/17 and was prescribed Bactrim for UTI.   Was on Bactrim last 2016 and no issues.    History of UTI's once yearly.      Patient reports not feeling \"so great\", worked from home in the morning and then went into work for the afternoon.  Started to feel a dull ache in her neck.  Feels like she was struggling to get out her thought processes.  Felt pain at the top of her head and then last night had worsening of headache and neck pain.  Was \"moaning' in pain last night.  +Nausea.    Didn't take anything for the pain.  Did try heat last evening.    This morning woke up with continued neck pain and very mild headache.      No history of migraines.      Took Bactrim dose this morning.  Has not missed any doses. Has had 4 doses in total.  Was put on 7 day course of Bactrim.    Read the drug handout for adverse effects.      Continues to have mild/achy flank pain.  No dysuria, frequency or urgency.  Denies abdominal pain.       Was able to review care everywhere.     Problem, Medication and Allergy Lists were   reviewed and are current.     Patient Active Problem List    Diagnosis Date Noted     Toenail fungus 2017     Priority: Medium     Benign neoplasm of colon 06/15/2016     Priority: Medium     Colonoscopy . Repeat in 5 years ()       Family history of cardiomyopathy 2016     Priority: Medium     Family history of malignant neoplasm of breast 2015     Priority: Medium     Sister, post menopausal, .       Tinnitus 2013     Priority: Medium     Senile nuclear sclerosis 10/26/2012     Priority: Medium     Tear film insufficiency 10/26/2012     Priority: Medium     Problem list name updated by automated process. Provider to review       " Hyperlipidemia 10/26/2012     Priority: Medium     Problem list name updated by automated process. Provider to review       Regular astigmatism 10/26/2012     Priority: Medium     Myopia 10/26/2012     Priority: Medium     Overweight 10/26/2012     Priority: Medium     Problem list name updated by automated process. Provider to review       Other specified menopausal and postmenopausal disorder 10/26/2012     Priority: Medium     After-cataract 10/26/2012     Priority: Medium     Problem list name updated by automated process. Provider to review       Presbyopia 10/26/2012     Priority: Medium     Pupillary abnormalities 10/26/2012     Priority: Medium     Diplopia 10/26/2012     Priority: Medium     Visual field defect 10/26/2012     Priority: Medium     Problem list name updated by automated process. Provider to review       Vitreous membranes and strands 10/26/2012     Priority: Medium     Health Care Home 10/26/2012     Priority: Medium     Tier 1  DX V65.8 REPLACED WITH 95707 HEALTH CARE HOME (04/08/2013)       CARDIOVASCULAR SCREENING; LDL GOAL LESS THAN 160 11/17/2011     Priority: Medium     Encounter for counseling 11/17/2011     Priority: Medium     Problem list name updated by automated process. Provider to review     ,     Current Outpatient Prescriptions   Medication Sig Dispense Refill     simvastatin (ZOCOR) 40 MG tablet Take 1 tablet (40 mg) by mouth At Bedtime 90 tablet 3     clotrimazole (LOTRIMIN) 1 % solution Apply topically 2 times daily 60 mL 0     Carboxymeth-Glycerin-Polysorb (REFRESH OPTIVE ADVANCED) 0.5-1-0.5 % SOLN Place 1 drop into both eyes 3 times daily       ofloxacin (OCUFLOX) 0.3 % ophthalmic solution Place 1 drop into the right eye 4 times daily Use for 3 days (Patient not taking: Reported on 10/12/2017) 1 Bottle 0     prednisoLONE acetate (PRED FORTE) 1 % ophthalmic susp Place 1 drop into the right eye every 3 hours Shake well before using.  Wait at least 2 minutes between eye  drops if using concurrently with other eye drops. (Patient not taking: Reported on 10/12/2017) 10 mL 0     loratadine (CLARITIN) 10 MG tablet Take 10 mg by mouth daily. Prn, seasonal for ragweed and lilacs     ,     Allergies   Allergen Reactions     Seasonal Allergies      Typhoid Vaccines Nausea and Vomiting     Fever, vice- h/a, body aches.  Was hospitalized for 2d.     Patient is an established patient of this clinic.         Review of Systems:   Review of Systems   Constitutional: Negative for chills, fatigue and fever.   Respiratory: Negative.    Cardiovascular: Negative.    Gastrointestinal: Positive for nausea. Negative for abdominal pain and vomiting.   Genitourinary: Negative for dysuria, frequency and urgency.   Musculoskeletal: Positive for neck pain.   Neurological: Positive for headaches.             Physical Exam:   Patient Vitals for the past 24 hrs:   BP Temp Temp src Pulse Resp SpO2 Weight   11/01/17 0926 121/83 97.8  F (36.6  C) Oral 87 16 91 % 198 lb 12.8 oz (90.2 kg)     Body mass index is 32.09 kg/(m^2).  Vitals were reviewed and were normal     Physical Exam   Constitutional: She is oriented to person, place, and time. She appears well-nourished. No distress.   Neck: Normal range of motion. Muscular tenderness (posterior neck with diffuse ttp) present.   Cardiovascular: Normal rate and regular rhythm.    Pulmonary/Chest: Effort normal and breath sounds normal. No respiratory distress. She has no wheezes.   Abdominal: Soft. There is no tenderness. There is no CVA tenderness.   Neurological: She is alert and oriented to person, place, and time. No cranial nerve deficit.       Assessment and Plan     Vickie was seen today for allergic reaction.    Diagnoses and all orders for this visit:    Possible Medication reaction, initial encounter  Discontinue Bactrim.   Discussed possible medication reaction vs. Headache/neck pain. Likely related to medication due to onset of symptoms after initiation  of medication.   Bactrim placed on allergy list.   Ibuprofen as needed for neck pain/headaches and rest today.   Follow-up with no improvement or worsening of symptoms.     Urinary tract infection with hematuria - resolving  Push fluids. Elected to wait on further antibiotics since patient received 4/6 doses of Bactrim recommended for acute cystitis treatment.   Urine culture not available on care everywhere.   Patient to follow-up in clinic with any return of urinary symptoms.     Over 50% of 25 minute appointment was spent on counseling and education regarding above symptoms.       Options for treatment and follow-up care were reviewed with the patient. Vickie Alvarado  engaged in the decision making process and verbalized understanding of the options discussed and agreed with the final plan.    Jocy Saavedra, RAS CNP

## 2017-11-01 NOTE — PATIENT INSTRUCTIONS
Here is the plan from today's visit    1. Possible Medication reaction, initial encounter  Discontinue Bactrim.   Bactrim placed on allergy list.     2. Urinary tract infection with hematuria - resolving  Continue to push fluids.   Follow-up in clinic with any return of pain with urination, urinary frequency or urgency.      Thank you for coming to [facility] today.  Lab Testing:  **If you had lab testing today and your results are reassuring or normal they will be mailed to you or sent through [employer] within 7 days.   **If the lab tests need quick action we will call you with the results.  The phone number we will call with results is # [phone] (home) [phone] (work). If this is not the best number please call our clinic and change the number.  Medication Refills:  If you need any refills please call your pharmacy and they will contact us.   If you need to  your refill at a new pharmacy, please contact the new pharmacy directly. The new pharmacy will help you get your medications transferred faster.   Scheduling:  If you have any concerns about today's visit or wish to schedule another appointment please call our office during normal business hours [phone] (8-5:00 M-F)  If a referral was made to a [facility] and you don't get a call from central scheduling please call [phone].  If a Mammogram was ordered for you at [facility] call [phone] to schedule or change your appointment.  If you had an XRay/CT/Ultrasound/MRI ordered the number is [phone] to schedule or change your radiology appointment.   Medical Concerns:  If you have urgent medical concerns please call [phone] at any time of the day.  If you have a medical emergency please call [phone].

## 2017-11-01 NOTE — MR AVS SNAPSHOT
After Visit Summary   11/1/2017    Vickie Alvarado    MRN: [id number]           Patient Information     Date Of Birth          1955        Visit Information        Provider Department      11/1/2017 9:20 AM Jocy Saavedra APRN CNP [facility]        Today's Diagnoses     Possible Medication reaction, initial encounter    -  1    Urinary tract infection with hematuria - resolving          Care Instructions    Here is the plan from today's visit    1. Possible Medication reaction, initial encounter  Discontinue Bactrim.   Bactrim placed on allergy list.     2. Urinary tract infection with hematuria - resolving  Continue to push fluids.   Follow-up in clinic with any return of pain with urination, urinary frequency or urgency.      Thank you for coming to [facility] today.  Lab Testing:  **If you had lab testing today and your results are reassuring or normal they will be mailed to you or sent through [employer] within 7 days.   **If the lab tests need quick action we will call you with the results.  The phone number we will call with results is # [phone] (home) [phone] (work). If this is not the best number please call our clinic and change the number.  Medication Refills:  If you need any refills please call your pharmacy and they will contact us.   If you need to  your refill at a new pharmacy, please contact the new pharmacy directly. The new pharmacy will help you get your medications transferred faster.   Scheduling:  If you have any concerns about today's visit or wish to schedule another appointment please call our office during normal business hours [phone] (8-5:00 M-F)  If a referral was made to a [facility] and you don't get a call from central scheduling please call [phone].  If a Mammogram was ordered for you at [facility] call [phone] to schedule or change your appointment.  If you had an  XRay/CT/Ultrasound/MRI ordered the number is [phone] to schedule or change your radiology appointment.   Medical Concerns:  If you have urgent medical concerns please call [phone] at any time of the day.  If you have a medical emergency please call 911.            Follow-ups after your visit        Your next 10 appointments already scheduled     Jan 17, 2018  3:00 PM CST   RETURN CORNEA with Hiro Christian MD   Eye Clinic ([facility])    [address]  [address]  [address] [address]  [facility] [address]   [phone]              Who to contact     Please call your clinic at [phone] to:    Ask questions about your health    Make or cancel appointments    Discuss your medicines    Learn about your test results    Speak to your doctor   If you have compliments or concerns about an experience at your clinic, or if you wish to file a complaint, please contact [facility] Patient Relations at [phone] or email us at [email or web address]         Additional Information About Your Visit        [employer] Information     [email or web address] gives you secure access to your electronic health record. If you see a primary care provider, you can also send messages to your care team and make appointments. If you have questions, please call your primary care clinic.  If you do not have a primary care provider, please call [phone] and they will assist you.      [employer] is an electronic gateway that provides easy, online access to your medical records. With [employer], you can request a clinic appointment, read your test results, renew a prescription or communicate with your care team.     To access your existing account, please contact your [facility] or call [phone] for assistance.        Care EveryWhere ID     This is your Care EveryWhere ID. This could be used by other organizations to access your [facility]  records  [id number]        Your Vitals Were     Pulse Temperature Respirations Last Period Pulse Oximetry BMI (Body Mass Index)    87 97.8  F (36.6  C) (Oral) 16 05/17/2011 91% 32.09 kg/m2       Blood Pressure from Last 3 Encounters:   11/01/17 121/83   10/12/17 125/84   01/31/17 113/76    Weight from Last 3 Encounters:   11/01/17 198 lb 12.8 oz (90.2 kg)   10/12/17 198 lb 9.6 oz (90.1 kg)   01/31/17 198 lb 6.4 oz (90 kg)              Today, you had the following     No orders found for display       Primary Care Provider Office Phone # Fax #    Gaby Lynn [phone]       [address] [address]        Equal Access to Services     [facility] : Hadii christiano mendoza Sobryan, waaxda luqadaha, qaybta kaalmasatnam huertas, antony mart . So [facility] [phone].    ATENCIÓN: Si habla español, tiene a cevallos disposición servicios gratuitos de asistencia lingüística. Llricky al [phone].    We comply with applicable federal civil rights laws and [place] laws. We do not discriminate on the basis of race, color, national origin, age, disability, sex, sexual orientation, or gender identity.            Thank you!     Thank you for choosing [facility]  for your care. Our goal is always to provide you with excellent care. Hearing back from our patients is one way we can continue to improve our services. Please take a few minutes to complete the written survey that you may receive in the mail after your visit with us. Thank you!             Your Updated Medication List - Protect others around you: Learn how to safely use, store and throw away your medicines at www.disposemymeds.org.          This list is accurate as of: 11/1/17  9:53 AM.  Always use your most recent med list.                   Brand Name Dispense Instructions for use Diagnosis    CLARITIN 10 MG tablet   Generic drug:  loratadine      Take 10 mg by mouth daily. Prn,  seasonal for ragweed and lilacs        clotrimazole 1 % solution    LOTRIMIN    60 mL    Apply topically 2 times daily    Toenail fungus       ofloxacin 0.3 % ophthalmic solution    OCUFLOX    1 Bottle    Place 1 drop into the right eye 4 times daily Use for 3 days    Post-operative state       prednisoLONE acetate 1 % ophthalmic susp    PRED FORTE    10 mL    Place 1 drop into the right eye every 3 hours Shake well before using. Wait at least 2 minutes between eye drops if using concurrently with other eye drops.    Aftercare following surgery of a sensory organ       REFRESH OPTIVE ADVANCED 0.5-1-0.5 % Soln   Generic drug:  Carboxymeth-Glycerin-Polysorb      Place 1 drop into both eyes 3 times daily        simvastatin 40 MG tablet    ZOCOR    90 tablet    Take 1 tablet (40 mg) by mouth At Bedtime    Hyperlipidemia LDL goal <130

## 2017-11-01 NOTE — TELEPHONE ENCOUNTER
Returned a call to patient who reported that she was recently diagnosed with UTI from urgent care and was started on Bactrim. Yesterday she developed new onset headaches, stiff/neck pain, nausea without vomiting. She denies fever, chills, shortness of breath. She reported flank pain prior to antibiotics. She has taken bactrim before and never had a reaction to medication. I told patient that its possible she might have allergy to medication (although no rash) or pyelonephritis considering systemic symptoms and flank pain. She will call [facility] today for doctors visit and potentially repeat urinalysis and cbc. She agreed to plan.      Pavel Saldana. MD  PGY3 [facility] Resident   Pager: [phone]

## 2017-11-03 ENCOUNTER — OFFICE VISIT (OUTPATIENT)
Dept: FAMILY MEDICINE | Facility: CLINIC | Age: 62
End: 2017-11-03

## 2017-11-03 VITALS
HEART RATE: 83 BPM | WEIGHT: 199.6 LBS | SYSTOLIC BLOOD PRESSURE: 137 MMHG | TEMPERATURE: 98.2 F | DIASTOLIC BLOOD PRESSURE: 88 MMHG | OXYGEN SATURATION: 92 % | BODY MASS INDEX: 32.22 KG/M2 | RESPIRATION RATE: 16 BRPM

## 2017-11-03 DIAGNOSIS — N30.00 ACUTE CYSTITIS WITHOUT HEMATURIA: Primary | ICD-10-CM

## 2017-11-03 DIAGNOSIS — R30.0 DYSURIA: ICD-10-CM

## 2017-11-03 LAB
BILIRUBIN UR: NEGATIVE
BLOOD UR: NEGATIVE
GLUCOSE URINE: NEGATIVE
KETONES UR QL: NEGATIVE
LEUKOCYTE ESTERASE UR: NEGATIVE
NITRITE UR QL STRIP: NEGATIVE
PH UR STRIP: 5.5 [PH] (ref 5–7)
PROTEIN UR: NEGATIVE
SP GR UR STRIP: >=1.03
UROBILINOGEN UR STRIP-ACNC: NORMAL

## 2017-11-03 NOTE — PROGRESS NOTES
HPI:       Vickie Alvarado is a 62 year old who presents for the following    Patient here for follow up UTI.  Was seen in [facility] on 10/30/17 and diagnosed with UTI.  Eventually came back showing E coli sensitive to Bactrim.  Patient prescribed Bactrim DS.  Two days later began having headache and neck discomfort.  Was thought to be allergic rxn.  Bactrim discontinued.  Patient here today for follow up UA.  Reports urinary symptoms gone.  No further headache or neck pain.  She does complain of some lower back pain.    Problem, Medication and Allergy Lists were reviewed and are current..    Patient is an established patient of this clinic.         Physical Exam:     Vitals:    11/03/17 1559   BP: 137/88   Pulse: 83   Resp: 16   Temp: 98.2  F (36.8  C)   TempSrc: Oral   SpO2: 92%   Weight: 199 lb 9.6 oz (90.5 kg)     Body mass index is 32.22 kg/(m^2).  Vitals were reviewed and were normal  GENERAL: healthy, alert and no distress  BACK: no CVA tenderness, no paralumbar tenderness  PSYCH: Alert and oriented times 3; coherent speech, normal rate and volume, able to articulate logical thoughts, able to abstract reason, no tangential thoughts, no hallucinations or delusions. Affect is normal.      Results:      Results from the last 24 hours  Results for orders placed or performed in visit on 11/03/17 (from the past 24 hour(s))   Urinalysis, Micro If (UA) ([facility])   Result Value Ref Range    Specific Gravity Urine >=1.030 1.005 - 1.030    pH Urine 5.5 4.5 - 8.0    Leukocyte Esterase UR Negative NEGATIVE    Nitrite Urine Negative NEGATIVE    Protein UR Negative NEGATIVE    Glucose Urine Negative NEGATIVE    Ketones Urine Negative NEGATIVE    Urobilinogen mg/dL 0.2 E.U./dL 0.2 E.U./dL    Bilirubin UR Negative NEGATIVE    Blood UR Negative NEGATIVE       Assessment and Plan     1. Acute cystitis without hematuria - Resolved  2. Dysuria - Resolved  UA now within normal limits.  Symptoms resolved.  Based on  "history of \"medication reaction\" I am unsure if due to Bactrim, as patient has taken many times before without symptoms.  If patient gets another UTI, could consider a different treatment or trial of Bactrim DS again.      - Urinalysis, Micro If (UA) ([facility])    Options for treatment and follow-up care were reviewed with the patient. Vickie Alvarado  engaged in the decision making process and verbalized understanding of the options discussed and agreed with the final plan.    Dominick Gaytan MD    "

## 2017-11-03 NOTE — MR AVS SNAPSHOT
After Visit Summary   11/3/2017    Vickie Alvarado    MRN: [id number]           Patient Information     Date Of Birth          1955        Visit Information        Provider Department      11/3/2017 4:00 PM Dominick Gaytan MD [facility]        Today's Diagnoses     Acute cystitis without hematuria - Resolved    -  1    Dysuria          Care Instructions    Here is the plan from today's visit    1. Acute cystitis without hematuria - Resolved  2. Dysuria  - Urinalysis, Micro If (UA) ([facility])    Please call or return to clinic if your symptoms don't go away or return    Thank you for coming to [facility] today.  Lab Testing:  **If you had lab testing today and your results are reassuring or normal they will be mailed to you or sent through [employer] within 7 days.   **If the lab tests need quick action we will call you with the results.  The phone number we will call with results is # [phone] (home) [phone] (work). If this is not the best number please call our clinic and change the number.  Medication Refills:  If you need any refills please call your pharmacy and they will contact us.   If you need to  your refill at a new pharmacy, please contact the new pharmacy directly. The new pharmacy will help you get your medications transferred faster.   Scheduling:  If you have any concerns about today's visit or wish to schedule another appointment please call our office during normal business hours [phone] (8-5:00 M-F)  If a referral was made to a [facility] and you don't get a call from central scheduling please call [phone].  If a Mammogram was ordered for you at [facility] call [phone] to schedule or change your appointment.  If you had an XRay/CT/Ultrasound/MRI ordered the number is [phone] to schedule or change your radiology appointment.   Medical Concerns:  If you have urgent medical concerns please  call [phone] at any time of the day.            Follow-ups after your visit        Your next 10 appointments already scheduled     Jan 17, 2018  3:00 PM CST   RETURN CORNEA with Hiro Christian MD   Eye Clinic ([facility])    [address]  [address]  [facility] [address]   [phone]              Who to contact     Please call your clinic at [phone] to:    Ask questions about your health    Make or cancel appointments    Discuss your medicines    Learn about your test results    Speak to your doctor   If you have compliments or concerns about an experience at your clinic, or if you wish to file a complaint, please contact [facility] Patient Relations at [phone] or email us at [email or web address]         Additional Information About Your Visit        [employer] Information     [employer] gives you secure access to your electronic health record. If you see a primary care provider, you can also send messages to your care team and make appointments. If you have questions, please call your primary care clinic.  If you do not have a primary care provider, please call [phone] and they will assist you.      [employer] is an electronic gateway that provides easy, online access to your medical records. With [employer], you can request a clinic appointment, read your test results, renew a prescription or communicate with your care team.     To access your existing account, please contact your [facility] or call [phone] for assistance.        Care EveryWhere ID     This is your Care EveryWhere ID. This could be used by other organizations to access your [facility] medical records  [id number]        Your Vitals Were     Pulse Temperature Respirations Last Period Pulse Oximetry BMI (Body Mass Index)    83 98.2  F (36.8  C) (Oral) 16 05/17/2011 92% 32.22 kg/m2       Blood Pressure from Last 3  Encounters:   11/03/17 137/88   11/01/17 121/83   10/12/17 125/84    Weight from Last 3 Encounters:   11/03/17 199 lb 9.6 oz (90.5 kg)   11/01/17 198 lb 12.8 oz (90.2 kg)   10/12/17 198 lb 9.6 oz (90.1 kg)              We Performed the Following     Urinalysis, Micro If (UA) ([facility])        Primary Care Provider Office Phone # Fax #    Gaby Lynn [phone]       [address] [address]        Equal Access to Services     [facility]: Hadii christiano Jimenez, waaxda lujavier, qaybta kaalmada kiya, antony mart . So [facility] [phone].    ATENCIÓN: Si habla español, tiene a cevallos disposición servicios gratuitos de asistencia lingüística. [facility] [phone].    We comply with applicable federal civil rights laws and [place] laws. We do not discriminate on the basis of race, color, national origin, age, disability, sex, sexual orientation, or gender identity.            Thank you!     Thank you for choosing [facility]  for your care. Our goal is always to provide you with excellent care. Hearing back from our patients is one way we can continue to improve our services. Please take a few minutes to complete the written survey that you may receive in the mail after your visit with us. Thank you!             Your Updated Medication List - Protect others around you: Learn how to safely use, store and throw away your medicines at www.disposemymeds.org.          This list is accurate as of: 11/3/17  4:52 PM.  Always use your most recent med list.                   Brand Name Dispense Instructions for use Diagnosis    CLARITIN 10 MG tablet   Generic drug:  loratadine      Take 10 mg by mouth daily. Prn, seasonal for ragweed and lilacs        clotrimazole 1 % solution    LOTRIMIN    60 mL    Apply topically 2 times daily    Toenail fungus       ofloxacin 0.3 % ophthalmic solution    OCUFLOX    1 Bottle    Place 1  drop into the right eye 4 times daily Use for 3 days    Post-operative state       prednisoLONE acetate 1 % ophthalmic susp    PRED FORTE    10 mL    Place 1 drop into the right eye every 3 hours Shake well before using. Wait at least 2 minutes between eye drops if using concurrently with other eye drops.    Aftercare following surgery of a sensory organ       REFRESH OPTIVE ADVANCED 0.5-1-0.5 % Soln   Generic drug:  Carboxymeth-Glycerin-Polysorb      Place 1 drop into both eyes 3 times daily        simvastatin 40 MG tablet    ZOCOR    90 tablet    Take 1 tablet (40 mg) by mouth At Bedtime    Hyperlipidemia LDL goal <130

## 2017-11-18 ENCOUNTER — HEALTH MAINTENANCE LETTER (OUTPATIENT)
Age: 62
End: 2017-11-18

## 2018-01-13 ENCOUNTER — TRANSFERRED RECORDS (OUTPATIENT)
Dept: HEALTH INFORMATION MANAGEMENT | Facility: CLINIC | Age: 63
End: 2018-01-13

## 2018-01-17 ENCOUNTER — OFFICE VISIT (OUTPATIENT)
Dept: OPHTHALMOLOGY | Facility: CLINIC | Age: 63
End: 2018-01-17
Attending: OPHTHALMOLOGY
Payer: COMMERCIAL

## 2018-01-17 DIAGNOSIS — T85.22XS DISLOCATED INTRAOCULAR LENS, SEQUELA: Primary | ICD-10-CM

## 2018-01-17 PROCEDURE — G0463 HOSPITAL OUTPT CLINIC VISIT: HCPCS | Mod: ZF

## 2018-01-17 RX ORDER — OFLOXACIN 3 MG/ML
1 SOLUTION/ DROPS OPHTHALMIC 4 TIMES DAILY
Qty: 1 ML | Refills: 0 | Status: SHIPPED | OUTPATIENT
Start: 2018-01-17 | End: 2018-01-20

## 2018-01-17 ASSESSMENT — TONOMETRY
OS_IOP_MMHG: 13
IOP_METHOD: ICARE
OD_IOP_MMHG: 13

## 2018-01-17 ASSESSMENT — REFRACTION_WEARINGRX
OD_ADD: +2.50
OD_AXIS: 089
OD_SPHERE: -7.25
OS_CYLINDER: +1.75
SPECS_TYPE: PAL
OS_SPHERE: -4.25
OS_AXIS: 074
OD_CYLINDER: +2.00
OS_ADD: +2.50

## 2018-01-17 ASSESSMENT — EXTERNAL EXAM - LEFT EYE: OS_EXAM: NORMAL

## 2018-01-17 ASSESSMENT — CONF VISUAL FIELD
OS_NORMAL: 1
OD_NORMAL: 1

## 2018-01-17 ASSESSMENT — VISUAL ACUITY
METHOD: SNELLEN - LINEAR
OS_CC: 20/20
OD_CC: 20/20
CORRECTION_TYPE: GLASSES

## 2018-01-17 ASSESSMENT — SLIT LAMP EXAM - LIDS
COMMENTS: DERMATOCHALASIS
COMMENTS: DERMATOCHALSIS

## 2018-01-17 ASSESSMENT — EXTERNAL EXAM - RIGHT EYE: OD_EXAM: NORMAL

## 2018-01-17 NOTE — PROGRESS NOTES
CC: s/p sutured IOL right eye (2/23/17)    HPI: Pt is doing well. Patient denies any pain, redness, irritation, tearing, itchiness, photophobia, blurry vision, or trauma. No flashes, floaters, diplopia. She is happy with her current glasses.     Gtts:  None      POHx:  Resuturing of dislocated IOL OD - complicated by post-op dislocation  Secondary sutured IOL OD    A/P:  1. S/p secondary sutured IOL OD  - doing great  - vision 20/20 - happy with current glasses  - posterior exam looks very healthy  - superior exposure nylon sutures - removed at slit lamp  - start ofloxacin QID x 3 days    F/u 1 year, earlier if needed.    ~~~~~~~~~~~~~~~~~~~~~~~~~~~~~~~~~~~~~~~~~~~~~~~~~~~~~~~~~~~~~~~~    Complete documentation of historical and exam elements from today's encounter can be found in the full encounter summary report (not reduplicated in this progress note). I personally obtained the chief complaint(s) and history of present illness.  I confirmed and edited as necessary the review of systems, past medical/surgical history, family history, social history, and examination findings as documented by others; and I examined the patient myself. I personally reviewed the relevant tests, images, and reports as documented above. I formulated and edited as necessary the assessment and plan and discussed the findings and management plan with the patient and family.    Hiro Christian MD

## 2018-01-17 NOTE — NURSING NOTE
Chief Complaints and History of Present Illnesses   Patient presents with     Follow Up For     S/p secondary sutured IOL OD     HPI    Affected eye(s):  Right   Symptoms:        Duration:  6 months   Frequency:  Constant       Do you have eye pain now?:  No      Comments:  Pt. States that she is dong well.  No change in VA BE.  No c/o comfort BE.  Jordyn Sabillon COT 2:52 PM January 17, 2018

## 2018-01-17 NOTE — MR AVS SNAPSHOT
After Visit Summary   1/17/2018    Vickie Alvarado    MRN: [id number]           Patient Information     Date Of Birth          1955        Visit Information        Provider Department      1/17/2018 3:00 PM Hiro Christian MD [facility]        Today's Diagnoses     Dislocated intraocular lens, sequela - Right Eye    -  1       Follow-ups after your visit        Who to contact     Please call your clinic at [phone] to:    Ask questions about your health    Make or cancel appointments    Discuss your medicines    Learn about your test results    Speak to your doctor   If you have compliments or concerns about an experience at your clinic, or if you wish to file a complaint, please contact [facility] Patient Relations at [phone] or email us at [email or web address]         Additional Information About Your Visit        MyChart Information     [employer] gives you secure access to your electronic health record. If you see a primary care provider, you can also send messages to your care team and make appointments. If you have questions, please call your primary care clinic.  If you do not have a primary care provider, please call [phone] and they will assist you.      [employer] is an electronic gateway that provides easy, online access to your medical records. With [employer], you can request a clinic appointment, read your test results, renew a prescription or communicate with your care team.     To access your existing account, please contact your [facility] or call [phone] for assistance.        Care EveryWhere ID     This is your Care EveryWhere ID. This could be used by other organizations to access your [place] medical records  [id number]        Your Vitals Were     Last Period                   05/17/2011            Blood Pressure from Last 3 Encounters:   11/03/17 137/88   11/01/17 121/83   10/12/17 125/84     Weight from Last 3 Encounters:   11/03/17 90.5 kg (199 lb 9.6 oz)   11/01/17 90.2 kg (198 lb 12.8 oz)   10/12/17 90.1 kg (198 lb 9.6 oz)              Today, you had the following     No orders found for display         Today's Medication Changes          These changes are accurate as of: 1/17/18  3:22 PM.  If you have any questions, ask your nurse or doctor.               These medicines have changed or have updated prescriptions.        Dose/Directions    * ofloxacin 0.3 % ophthalmic solution   Commonly known as:  OCUFLOX   This may have changed:  Another medication with the same name was added. Make sure you understand how and when to take each.   Used for:  Post-operative state   Changed by:  Sharif Alvarado MD        Dose:  1 drop   Place 1 drop into the right eye 4 times daily Use for 3 days   Quantity:  1 Bottle   Refills:  0       * ofloxacin 0.3 % ophthalmic solution   Commonly known as:  OCUFLOX   This may have changed:  You were already taking a medication with the same name, and this prescription was added. Make sure you understand how and when to take each.   Used for:  Dislocated intraocular lens, sequela   Changed by:  Hiro Christian MD        Dose:  1 drop   Place 1 drop into the right eye 4 times daily for 3 days   Quantity:  1 mL   Refills:  0       * Notice:  This list has 2 medication(s) that are the same as other medications prescribed for you. Read the directions carefully, and ask your doctor or other care provider to review them with you.         Where to get your medicines      These medications were sent to [facility] [id number] [address] AT [address]  [employer]  [address] [place] [address]     Phone:  [phone]     ofloxacin 0.3 % ophthalmic solution                Primary Care Provider Office Phone # Fax #    Gaby Lynn [phone]       [address] [address]        Equal Access to  Services     [facility]: Hadii aad ku hadhennysarai Toniabryan, waaxda luqadaha, qaybta kaalmada kiya, antony mart . So [facility] [phone].    ATENCIÓN: Si bonifacio corona, tiene a cevallos disposición servicios gratuitos de asistencia lingüística. Llame al [phone].    We comply with applicable federal civil rights laws and [place] laws. We do not discriminate on the basis of race, color, national origin, age, disability, sex, sexual orientation, or gender identity.            Thank you!     Thank you for choosing E[facility] CL[facility]IC  for your care. Our goal is always to provide you with excellent care. Hearing back from our patients is one way we can continue to improve our services. Please take a few minutes to complete the written survey that you may receive in the mail after your visit with us. Thank you!             Your Updated Medication List - Protect others around you: Learn how to safely use, store and throw away your medicines at www.disposemymeds.org.          This list is accurate as of: 1/17/18  3:22 PM.  Always use your most recent med list.                   Brand Name Dispense Instructions for use Diagnosis    CLARITIN 10 MG tablet   Generic drug:  loratadine      Take 10 mg by mouth daily. Prn, seasonal for ragweed and lilacs        clotrimazole 1 % solution    LOTRIMIN    60 mL    Apply topically 2 times daily    Toenail fungus       * ofloxacin 0.3 % ophthalmic solution    OCUFLOX    1 Bottle    Place 1 drop into the right eye 4 times daily Use for 3 days    Post-operative state       * ofloxacin 0.3 % ophthalmic solution    OCUFLOX    1 mL    Place 1 drop into the right eye 4 times daily for 3 days    Dislocated intraocular lens, sequela       prednisoLONE acetate 1 % ophthalmic susp    PRED FORTE    10 mL    Place 1 drop into the right eye every 3 hours Shake well before using. Wait at least 2 minutes between eye drops if using concurrently with other eye drops.    Aftercare  following surgery of a sensory organ       REFRESH OPTIVE ADVANCED 0.5-1-0.5 % Soln   Generic drug:  Carboxymeth-Glycerin-Polysorb      Place 1 drop into both eyes 3 times daily        simvastatin 40 MG tablet    ZOCOR    90 tablet    Take 1 tablet (40 mg) by mouth At Bedtime    Hyperlipidemia LDL goal <130       * Notice:  This list has 2 medication(s) that are the same as other medications prescribed for you. Read the directions carefully, and ask your doctor or other care provider to review them with you.

## 2018-03-11 ENCOUNTER — TRANSFERRED RECORDS (OUTPATIENT)
Dept: HEALTH INFORMATION MANAGEMENT | Facility: CLINIC | Age: 63
End: 2018-03-11

## 2018-04-12 ENCOUNTER — MYC REFILL (OUTPATIENT)
Dept: FAMILY MEDICINE | Facility: CLINIC | Age: 63
End: 2018-04-12

## 2018-04-12 DIAGNOSIS — E78.5 HYPERLIPIDEMIA LDL GOAL <130: ICD-10-CM

## 2018-04-13 RX ORDER — SIMVASTATIN 40 MG
40 TABLET ORAL AT BEDTIME
Qty: 90 TABLET | Refills: 3 | Status: SHIPPED | OUTPATIENT
Start: 2018-04-13 | End: 2019-05-22

## 2018-04-13 NOTE — TELEPHONE ENCOUNTER
Message from [employer]:  Original authorizing provider: MD Vickie Brown would like a refill of the following medications:  simvastatin (ZOCOR) 40 MG tablet [Gaby Lynn MD]    Preferred pharmacy: [facility] [id number] [place] [address] AT [address] OF [facility]    Comment:  Dr TEDDY Lynn: Seeking refill of my Simvastatin 40 MG to my pharmacy on record. Thank you, Marni Alvarado

## 2018-04-13 NOTE — TELEPHONE ENCOUNTER
Date of last visit at clinic: 11/3/17    Please complete refill and CLOSE ENCOUNTER.  Closing the encounter signifies the refill is complete.    Lorri Zamora RN

## 2018-12-11 ENCOUNTER — OFFICE VISIT (OUTPATIENT)
Dept: FAMILY MEDICINE | Facility: CLINIC | Age: 63
End: 2018-12-11
Payer: COMMERCIAL

## 2018-12-11 VITALS
DIASTOLIC BLOOD PRESSURE: 83 MMHG | OXYGEN SATURATION: 97 % | BODY MASS INDEX: 32.8 KG/M2 | SYSTOLIC BLOOD PRESSURE: 128 MMHG | RESPIRATION RATE: 16 BRPM | HEART RATE: 85 BPM | WEIGHT: 203.2 LBS

## 2018-12-11 DIAGNOSIS — M79.652 PAIN OF LEFT THIGH: ICD-10-CM

## 2018-12-11 DIAGNOSIS — Z11.51 SCREENING FOR HUMAN PAPILLOMAVIRUS: Primary | ICD-10-CM

## 2018-12-11 DIAGNOSIS — Z28.39 IMMUNIZATION DEFICIENCY: ICD-10-CM

## 2018-12-11 DIAGNOSIS — Z23 ENCOUNTER FOR IMMUNIZATION: ICD-10-CM

## 2018-12-11 DIAGNOSIS — M25.562 POSTERIOR KNEE PAIN, LEFT: ICD-10-CM

## 2018-12-11 NOTE — PROGRESS NOTES
HPI       Vickie Alvarado is a 63 year old  who presents for   Chief Complaint   Patient presents with     Gyn Exam     Musculoskeletal Problem     left leg pain     64 yo woman presents for routine pap smear and with new CC of L thigh/knee pain.    , post menopausal. No h/o abnormal pap and negative HPV previously. No vaginal complaints. Not sexually active.     Joint/Muscle Pain    Onset: 1 month of left leg pain. No injury    Injury?  no    Description:   Location(s): behind knee, side of thigh  Character: mostly dull ache and shooting pain    Intensity: moderate    Progression of Symptoms: worse    Accompanying Signs & Symptoms:  Other symptoms: sometimes radiates down into calf too    History:   Previous similar pain: no      Worsened by    Overuse?: Yes   Morning Stiffness?: no    Alleviating factors:  Improved by: ice  Therapies Tried and outcome: Nothing      Problem, Medication and Allergy Lists were reviewed and updated if needed.    Patient is an established patient of this clinic.         Review of Systems:   Review of Systems   All other systems reviewed and are negative.           Physical Exam:     Vitals:    18 0828   BP: 128/83   Pulse: 85   Resp: 16   SpO2: 97%   Weight: 92.2 kg (203 lb 3.2 oz)     Body mass index is 32.8 kg/m .  Vitals were reviewed   Physical Exam   Constitutional: She appears well-developed and well-nourished. No distress.   Overweight   Abdominal: Soft. There is no tenderness.   Genitourinary: Uterus normal. Pelvic exam was performed with patient supine. No vaginal discharge found.   Genitourinary Comments: Vaginal atrophy present.  No CMT, masses   Musculoskeletal:        Left knee: She exhibits normal range of motion, no swelling, no effusion, no deformity, no erythema, no LCL laxity, normal patellar mobility and no MCL laxity. No medial joint line, no lateral joint line and no patellar tendon tenderness noted.        Legs:  Discomfort localizes to  posterior knee and L lateral thigh. Increases with knee flexion and extension but ROM is full. No palpable post knee mass.  No thigh or calf swelling, warmth, erythema.          Results:   Results are ordered and pending    Assessment and Plan        62 yo woman with     Screening for human papillomavirus  - Pap imaged thin layer screen with HPV - recommended age 30 - 65 years (select HPV order below)  - HPV High Risk Types DNA Cervical    Posterior knee pain, left  Pain of left thigh  - suspect IT band issue. Consider Baker's cyst. Low suspicion for DVT  - ice/heat for comfort. Tylenol/Ibuprofen as needed  - [facility]    Encounter for immunization  - Dtap  - advise shingles at pharmacy    Follow up yearly and prn         There are no discontinued medications.    Options for treatment and follow-up care were reviewed with the patient. Vickie Alvarado  engaged in the decision making process and verbalized understanding of the options discussed and agreed with the final plan.    Gaby Lynn MD

## 2018-12-11 NOTE — PATIENT INSTRUCTIONS
Here is the plan from today's visit    Screening for human papillomavirus  - Pap imaged thin layer screen with HPV - recommended age 30 - 65 years (select HPV order below)  - HPV High Risk Types DNA Cervical    Posterior knee pain, left  Pain of left thigh  Make appt at your convenience  - ice/heat for comfort. Tylenol/Ibuprofen as needed  - [facility] REFERR[facility]    Encounter for immunization      Please call or return to clinic if your symptoms don't go away.    Follow up plan  Yearly     Thank you for coming to [facility] today.  Lab Testing:  **If you had lab testing today and your results are reassuring or normal they will be mailed to you or sent through appweevr within 7 days.   **If the lab tests need quick action we will call you with the results.  The phone number we will call with results is # [phone] (home) [phone] (work). If this is not the best number please call our clinic and change the number.  Medication Refills:  If you need any refills please call your pharmacy and they will contact us.   If you need to  your refill at a new pharmacy, please contact the new pharmacy directly. The new pharmacy will help you get your medications transferred faster.   Scheduling:  If you have any concerns about today's visit or wish to schedule another appointment please call our office during normal business hours [phone] (8-5:00 M-F)  If a referral was made to a [facility] and you don't get a call from central scheduling please call [phone].  If a Mammogram was ordered for you at [facility] call [phone] to schedule or change your appointment.  If you had an XRay/CT/Ultrasound/MRI ordered the number is [phone] to schedule or change your radiology appointment.   Medical Concerns:  If you have urgent medical concerns please call [phone] at any time of the day.    Gaby Lynn MD    Patient Education      Understanding Iliotibial Band Syndrome  Iliotibial band syndrome, or IT band syndrome, is a condition that causes pain on the outside of the knee. It most often occurs in athletes, especially long-distance runners. It can also happen if you cycle, ski, row, or play soccer. It can also occur in people who are just starting to exercise.  What is the IT band?  The iliotibial (IT) band is a strong, thick band of tissue that runs down the outside of your thigh. It goes all the way from your hipbones to the top of your shinbone (tibia), just below your knee joint. The bones of your knee joint include your tibia, your thighbone (femur), and your kneecap (patella).  When you bend and straighten your leg, the IT band moves over the outer lower edge of your femur. Over time, bending and straightening your leg can cause the IT band to irritate nearby tissues and cause pain.  What causes IT band syndrome?  Researchers are still learning the exact cause of IT band syndrome. The pain may be caused by the IT band rubbing over the lower outer edge of the femur. This may cause inflammation in the bone, tendons, and small fluid-filled sacs (bursa) in the area. The IT band may also compress the tissue under it and cause pain.  If you are a runner, you might be more likely to develop IT band syndrome if:    You run on uneven or downhill terrain    You run on worn-out shoes    You run many miles per day    Your legs slope a little inward from your knee to your ankle (bowlegs)  What are the symptoms of IT band syndrome?  IT band syndrome causes pain on the outside of the knee or side of the thigh. It might affect one or both of your knees. The pain is an aching, burning feeling that can spread up the thigh to the hip. You may feel this pain only when you exercise, such as while running. The pain may be worst right after you step on your foot. It may only happen near the end of your exercise. As the condition gets worse, pain may  start earlier and continue after you have stopped exercising. Going up and down the stairs may make the pain worse.  How is IT band syndrome diagnosed?  Your doctor will ask about your health history and your symptoms. He or she will give you a physical exam. This will include an exam of your knee. The range of motion and strength of your knee will be tested. The doctor will look for areas of pain around your knee, thigh, and hip. The symptoms of IT band syndrome can be like those of osteoarthritis or a meniscal tear. Your doctor will need to make sure IT band syndrome is the cause of your symptoms. If the diagnosis is not clear, you may need imaging tests. These can include an X-ray or MRI scan.  Date Last Reviewed: 4/1/2017 2000-2018 [employer]. [address] [place] [address], [place] [address]. All rights reserved. This information is not intended as a substitute for professional medical care. Always follow your healthcare professional's instructions.

## 2018-12-12 ENCOUNTER — TELEPHONE (OUTPATIENT)
Dept: FAMILY MEDICINE | Facility: CLINIC | Age: 63
End: 2018-12-12

## 2018-12-12 NOTE — LETTER
December 17, 2018    Vickie Alvarado  [address] [facility] [address]      Dear: Vickie Alvarado    You were recently referred for a Sports Medicine appointment by your primary care provider at [facility].  We have called twice to schedule this appointment, but have been unable to reach you.  If you are interested in receiving this service, please call [facility] at [phone].  We would be happy to schedule this appointment for you.      Thank you.      Sincerely,    Patient Representative    [facility]  Hours:  Monday-Friday 8:00 am - 5:00 pm   Phone Number: [phone]

## 2018-12-13 LAB
COPATH REPORT: NORMAL
PAP: NORMAL

## 2018-12-14 LAB
FINAL DIAGNOSIS: NORMAL
HPV HR 12 DNA CVX QL NAA+PROBE: NEGATIVE
HPV16 DNA SPEC QL NAA+PROBE: NEGATIVE
HPV18 DNA SPEC QL NAA+PROBE: NEGATIVE
SPECIMEN DESCRIPTION: NORMAL
SPECIMEN SOURCE CVX/VAG CYTO: NORMAL

## 2019-01-08 ENCOUNTER — ANCILLARY PROCEDURE (OUTPATIENT)
Dept: GENERAL RADIOLOGY | Facility: CLINIC | Age: 64
End: 2019-01-08
Payer: COMMERCIAL

## 2019-01-08 ENCOUNTER — TELEPHONE (OUTPATIENT)
Dept: FAMILY MEDICINE | Facility: CLINIC | Age: 64
End: 2019-01-08

## 2019-01-08 ENCOUNTER — OFFICE VISIT (OUTPATIENT)
Dept: FAMILY MEDICINE | Facility: CLINIC | Age: 64
End: 2019-01-08
Payer: COMMERCIAL

## 2019-01-08 VITALS
DIASTOLIC BLOOD PRESSURE: 84 MMHG | BODY MASS INDEX: 32.67 KG/M2 | HEART RATE: 85 BPM | WEIGHT: 202.4 LBS | RESPIRATION RATE: 16 BRPM | OXYGEN SATURATION: 96 % | SYSTOLIC BLOOD PRESSURE: 129 MMHG | TEMPERATURE: 97.6 F

## 2019-01-08 DIAGNOSIS — S82.102A CLOSED FRACTURE OF PROXIMAL END OF LEFT TIBIA, UNSPECIFIED FRACTURE MORPHOLOGY, INITIAL ENCOUNTER: ICD-10-CM

## 2019-01-08 DIAGNOSIS — M70.50 PES ANSERINE BURSITIS: ICD-10-CM

## 2019-01-08 DIAGNOSIS — M25.562 ACUTE PAIN OF LEFT KNEE: ICD-10-CM

## 2019-01-08 DIAGNOSIS — R53.1 WEAKNESS: ICD-10-CM

## 2019-01-08 DIAGNOSIS — M25.562 ACUTE PAIN OF LEFT KNEE: Primary | ICD-10-CM

## 2019-01-08 LAB
RADIOLOGIST FLAGS: ABNORMAL
RADIOLOGIST FLAGS: ABNORMAL

## 2019-01-08 NOTE — TELEPHONE ENCOUNTER
Per Dr. Jacob the medical assistant called the patient and gave her the ph # to radiology to schedule her MRI of the knee. Patient will call and schedule asap at the [facility].     Marleni Orlando CMA

## 2019-01-08 NOTE — TELEPHONE ENCOUNTER
Giovani called from Imaging with urgent results on the 3 view knee.  Asking for ordering doctor to call her back.  I did advise Giovani per Dr Jacob she has spoken with the radiologist about imaging already.

## 2019-01-08 NOTE — Clinical Note
"It won't let me delete the \"incomplete note\" so I made a second one that is signed and ready to go. Let me know if you can't delete it either. -Maya"

## 2019-01-08 NOTE — PATIENT INSTRUCTIONS
Ice as needed  Ibuprofen and tylenol as well  Insufficency fracture  Pool therapy with follow up in 8 weeks to reassess

## 2019-01-09 ASSESSMENT — ENCOUNTER SYMPTOMS
ACTIVITY CHANGE: 1
MYALGIAS: 0
COLOR CHANGE: 0
ARTHRALGIAS: 1
WEAKNESS: 1
NUMBNESS: 0
JOINT SWELLING: 1
BACK PAIN: 0

## 2019-01-09 NOTE — PROGRESS NOTES
HPI       Vickie Alvarado is a 63 year old  who presents for   Chief Complaint   Patient presents with     Knee Pain     left knee posterior pain for 2 months. Now pain radiates into the anterior part of the knee/hamstring. Possible cyst. Walking increases pain and feels tender       Left Knee Pain  Patient presents with worsening left knee pain over the past several months.  Patient first reports noticing some discomfort in her posterior aspect of her left knee after she went on a [place] this [place]. Since then she noted increased aching and discomfort most often in the posterior aspect of her knee, but sometimes radiating up into her hamstring.  Many times she is notics a shooting pain often when she is a sleep that will awaken her. Patient reports over the last 5 days an exacerbation of her pain, but now is noted more in the anterior inferior aspect of her knee comparative to the posterior aspect. She has been taking Aleve with relief for the past 48 hours. Patient did report using an ace wrap while she was in [place] with no change in overall symptoms. She feels her gait has been impacted due to the pain and discomfort. She has been unable to seek care earlier due to transitioning occurring at work. Patient was discussing her condition         Problem, Medication and Allergy Lists were reviewed and updated if needed..    Patient is an established patient of this clinic..         Review of Systems:   Review of Systems   Constitutional: Positive for activity change.   Musculoskeletal: Positive for arthralgias, gait problem and joint swelling (mild). Negative for back pain and myalgias.   Skin: Negative for color change.   Neurological: Positive for weakness. Negative for numbness.            Physical Exam:     Vitals:    01/08/19 0823   BP: 129/84   Pulse: 85   Resp: 16   Temp: 97.6  F (36.4  C)   TempSrc: Oral   SpO2: 96%   Weight: 91.8 kg (202 lb 6.4 oz)     Body mass index is 32.67  kg/m .  Vitals were reviewed and were normal     Physical Exam   Constitutional: She appears well-developed and well-nourished. No distress.   Obese   Cardiovascular: Intact distal pulses.   Pulmonary/Chest: Effort normal.   Musculoskeletal:        Left knee: She exhibits effusion (mild).   Neurological: She is alert.   Skin: Skin is warm and dry. Capillary refill takes less than 2 seconds.   Psychiatric:   Anxious appearing, flinching throughout exam even with minimal palpation, appears to be anticipating pain        Left Knee Exam     Tenderness   The patient is experiencing tenderness in the medial hamstring, pes anserinus and patellar tendon (General diffuse tenderness, flinching thoughout exam even with minimal palpation).    Range of Motion   Extension: normal   Flexion: normal     Tests   Varus: negative Valgus: negative  Lachman:  Anterior - negative      Drawer:  Anterior - negative     Posterior - negative  Pivot shift: negative  Patellar apprehension: negative    Other   Erythema: absent  Scars: absent  Sensation: normal  Effusion: effusion (mild) present    Comments:  Difficult to note specific tenderness as appeared to be anticipating pain, often vocalize that a palpation was not painful, but would pull away throughout exam              Results:   X-ray bl Knee and Left Knee 3 view, 1/8/ 2019    3 views left knee radiographs 1/8/2019 9:47 AM     History: 2 months posterior knee pain, radiating into hamstring,  difficult ambulation; Acute pain of left knee     Comparison: None available     Findings:     AP, lateral and patellofemoral views of the left knee were obtained.      There is approximately 7 x 9 mm osseous fragment projecting posterior  to the lateral tibial plateau and superior to the proximal fibula..  Moderate to large joint effusion.     Patellofemoral osteophytosis and joint space narrowing..     No patellar tilt or lateral subluxation.  Soft tissue is unremarkable.                                                                     Impression:  1. Posterior osseous fragment and a moderate to large joint effusion  is of uncertain significance. Recommend MRI.  2. Moderate patellofemoral osteoarthrosis.       Assessment and Plan        Vickie Alvarado is a 64 y/o female who was seen today for left knee pain.    Acute pain of left knee- insufficency fracture tibial plateau  Weakness  Pes anserine bursitis  Patient presents with intermittent and worsening left knee pain with no history of trauma or previous concerns. Physical exam challenging with concern for pes anerine bursitis and weakness of left lower extremity. Imaging concerning for posterior fragment. MRI ordered for follow up. Patient to start pool therapy to aid with overall strengthening. Follow up pending MRI.   -     X-ray bl Knee 1-2 vw; Future  -     X-ray lt knee 3 view; Future  -     POOL THERAPY REFERRAL - EXTERNAL; Future  -     MR Knee Left w/o Contrast; Future       Medications Discontinued During This Encounter   Medication Reason     clotrimazole (LOTRIMIN) 1 % solution      ofloxacin (OCUFLOX) 0.3 % ophthalmic solution      prednisoLONE acetate (PRED FORTE) 1 % ophthalmic susp      clotrimazole (LOTRIMIN) 1 % solution      ofloxacin (OCUFLOX) 0.3 % ophthalmic solution      prednisoLONE acetate (PRED FORTE) 1 % ophthalmic susp      Carboxymeth-Glycerin-Polysorb (REFRESH OPTIVE ADVANCED) 0.5-1-0.5 % SOLN        Options for treatment and follow-up care were reviewed with the patient. Vickie Alvarado  engaged in the decision making process and verbalized understanding of the options discussed and agreed with the final plan.    Maya Rojas MD  [facility] Resident PGY-2  [phone]    Those present during this appointment were: patient, myself and Dr. Jacob  Impression:  1. Extensive medial tibial plateau moderate edema with subchondral  bone plate depression, in absence of recent trauma history, finding  most likely represent insufficiency  fracture.  2. Radial tear of the posterior horn/root ligament junction of the  medial meniscus with likely tear extension into posterior root  ligament.   a. Moderate tibial collateral ligament sprain, pes anserine bursitis,  may be secondary to altered biomechanics from meniscal tear.  3. Signal attenuation of the posterior horn of lateral meniscus, may  be degenerative.  4. Tricompartmental osteoarthrosis with areas of grade IV  chondromalacia in the patellofemoral compartment and medial  Compartment.     Patient seen, evaluated and discussed with the resident. I have verified the content of the note, which accurately reflects my assessment of the patient and the plan of care.   Supervising Physician:  Josie Jacob MD

## 2019-01-10 ENCOUNTER — ANCILLARY PROCEDURE (OUTPATIENT)
Dept: MRI IMAGING | Facility: CLINIC | Age: 64
End: 2019-01-10
Payer: COMMERCIAL

## 2019-01-10 DIAGNOSIS — M70.50 PES ANSERINE BURSITIS: ICD-10-CM

## 2019-01-10 DIAGNOSIS — R53.1 WEAKNESS: ICD-10-CM

## 2019-01-10 DIAGNOSIS — M25.562 ACUTE PAIN OF LEFT KNEE: ICD-10-CM

## 2019-01-11 ENCOUNTER — TELEPHONE (OUTPATIENT)
Dept: FAMILY MEDICINE | Facility: CLINIC | Age: 64
End: 2019-01-11

## 2019-01-11 NOTE — TELEPHONE ENCOUNTER
RN left 2 Vm on home and cell number    Please schedule below if pt calls back    Radha FANNY Baer

## 2019-01-11 NOTE — TELEPHONE ENCOUNTER
Patient called back, lab and Sports medicine scheduled and message below relayed. Patient verbalize understanding.

## 2019-01-14 DIAGNOSIS — S82.102A CLOSED FRACTURE OF PROXIMAL END OF LEFT TIBIA, UNSPECIFIED FRACTURE MORPHOLOGY, INITIAL ENCOUNTER: ICD-10-CM

## 2019-01-15 ENCOUNTER — OFFICE VISIT (OUTPATIENT)
Dept: FAMILY MEDICINE | Facility: CLINIC | Age: 64
End: 2019-01-15
Payer: COMMERCIAL

## 2019-01-15 VITALS
SYSTOLIC BLOOD PRESSURE: 123 MMHG | TEMPERATURE: 98 F | DIASTOLIC BLOOD PRESSURE: 83 MMHG | RESPIRATION RATE: 16 BRPM | BODY MASS INDEX: 32.7 KG/M2 | OXYGEN SATURATION: 95 % | HEART RATE: 94 BPM | WEIGHT: 202.6 LBS

## 2019-01-15 DIAGNOSIS — E55.9 VITAMIN D DEFICIENCY: ICD-10-CM

## 2019-01-15 DIAGNOSIS — M17.12 TRICOMPARTMENT OSTEOARTHRITIS OF LEFT KNEE: ICD-10-CM

## 2019-01-15 DIAGNOSIS — S83.207A TEAR OF MENISCUS OF LEFT KNEE AS CURRENT INJURY, UNSPECIFIED MENISCUS, UNSPECIFIED TEAR TYPE, INITIAL ENCOUNTER: ICD-10-CM

## 2019-01-15 DIAGNOSIS — M84.469A INSUFFICIENCY FRACTURE OF TIBIA, INITIAL ENCOUNTER: Primary | ICD-10-CM

## 2019-01-15 DIAGNOSIS — M70.50 PES ANSERINE BURSITIS: ICD-10-CM

## 2019-01-15 LAB — DEPRECATED CALCIDIOL+CALCIFEROL SERPL-MC: 16 UG/L (ref 20–75)

## 2019-01-15 RX ORDER — CIPROFLOXACIN 500 MG/1
500 TABLET, FILM COATED ORAL 2 TIMES DAILY
COMMUNITY
Start: 2019-01-13 | End: 2019-01-18

## 2019-01-15 RX ORDER — ERGOCALCIFEROL 1.25 MG/1
50000 CAPSULE, LIQUID FILLED ORAL WEEKLY
Qty: 12 CAPSULE | Refills: 0 | Status: SHIPPED | OUTPATIENT
Start: 2019-01-15 | End: 2020-01-15

## 2019-01-15 RX ORDER — ERGOCALCIFEROL 1.25 MG/1
50000 CAPSULE, LIQUID FILLED ORAL DAILY
Qty: 90 CAPSULE | Refills: 3 | Status: SHIPPED | OUTPATIENT
Start: 2019-01-15 | End: 2019-01-15

## 2019-01-15 ASSESSMENT — ENCOUNTER SYMPTOMS
ACTIVITY CHANGE: 1
JOINT SWELLING: 1
BACK PAIN: 0
WEAKNESS: 0
ARTHRALGIAS: 1
COLOR CHANGE: 0
MYALGIAS: 0

## 2019-01-15 NOTE — PROGRESS NOTES
"       HPI       Vickie Alvarado is a 63 year old  who presents for   Chief Complaint   Patient presents with     RECHECK     MRI results of her left knee. Been resting her knee as much as possible and using crutches. Not taking pain meds currently.          MRI follow up   Patient presents for follow up of MRI of her left knee. Patient cannot recall any episodes of trauma to her knee. She was recently in [place], but denies any extensive hiking and only walked on the sand for approximately 30 minutes. Off loading has helped with the pain and patient reports using the crutches most of the time to aid with ambulation. She does drive a manual transmission, but denies any ongoing pain when pushing in the clutch. Patient has kept to simple forward and back motion and minimally side to side. She did discuss a \"Supple\" supplementation drink that has glucosamine and chondrointin that her older sibling takes for her joints.  Patient is curious if she should be taking the supplementation.  Patient has not been taking any ibuprofen or Tylenol at this time.  Patient denies any family history of osteoporosis.       Problem, Medication and Allergy Lists were reviewed and updated if needed..    Patient is an established patient of this clinic..         Review of Systems:   Review of Systems   Constitutional: Positive for activity change (minimal activity).   Musculoskeletal: Positive for arthralgias, gait problem and joint swelling. Negative for back pain and myalgias.   Skin: Negative for color change.   Neurological: Negative for weakness.            Physical Exam:     Vitals:    01/15/19 0935 01/15/19 0937   BP: 138/90 123/83   Pulse: 94    Resp: 16    Temp: 98  F (36.7  C)    TempSrc: Oral    SpO2: 95%    Weight: 91.9 kg (202 lb 9.6 oz)      Body mass index is 32.7 kg/m .  Vital signs normal except elevated diastolic on first check with repeat being appropriate     Physical Exam   Constitutional: She appears well-developed " "and well-nourished. No distress.   Crutches next to patient   HENT:   Head: Normocephalic and atraumatic.   Eyes: Conjunctivae are normal.   Cardiovascular: Intact distal pulses.   Pulmonary/Chest: Effort normal.   Skin: Skin is warm and dry. Capillary refill takes less than 2 seconds.   Psychiatric: She has a normal mood and affect. Thought content normal.       Results:   Results are ordered and pending    Assessment and Plan        Vickie Alvarado is a 64 y/o female who was seen today for MRI follow up    Insufficiency fracture of tibia, initial encounter  Pes anserine bursitis  Tear of meniscus of left knee as current injury, unspecified meniscus, unspecified tear type, initial encounter  Tricompartment osteoarthritis of left knee  Patient presents for MRI follow-up of her left knee. Patient reports overall having improvement with pain with off loading her joint. Writer and attending discussed moving forward with pool therapy to keep up strength in her leg.  We highly recommended against any pounding exercises such as treadmill at this time or in the near future.  We will continue to move forward with conservative management including offloading with crutches.  Did discuss with patient consideration for orthopedics referral for surgical management discussion.  Patient is not interested at this time, but is open to in the future if no improvement.  Additionally discussed other therapies such as Synvisc injection if no improvement. Would not want to move forward with corticosteroids injection. Will obtain a DEXA scan as there is concern for insufficiency fracture and possible underlying bone insufficiency. Encouraged against \"supple\" supplementation as it is not FDA regulated. Follow-up in 1 month.  -     DX Hip/Pelvis/Spine (DEXA); Future     There are no discontinued medications.    Maya Rojas MD  [facility] Resident PGY-2  [phone]    Those present during this appointment were: patient, myself and Dr." Obkiaratar    Options for treatment and follow-up care were reviewed with the patient. Vickie Alvarado  engaged in the decision making process and verbalized understanding of the options discussed and agreed with the final plan.     Patient seen, evaluated and discussed with the resident. I have verified the content of the note, which accurately reflects my assessment of the patient and the plan of care.   Supervising Physician:  Josie Jacob MD

## 2019-01-15 NOTE — PATIENT INSTRUCTIONS
Continue to off load with your crutches. Recommend against to much side to side movement. Please move forward with pool therapy. Follow up in 1 month. Also please schedule to have your DEXA scan to assess your bone density.

## 2019-01-18 ENCOUNTER — OFFICE VISIT (OUTPATIENT)
Dept: OPHTHALMOLOGY | Facility: CLINIC | Age: 64
End: 2019-01-18
Attending: OPHTHALMOLOGY
Payer: COMMERCIAL

## 2019-01-18 DIAGNOSIS — T85.22XS DISLOCATED INTRAOCULAR LENS, SEQUELA: Primary | ICD-10-CM

## 2019-01-18 PROCEDURE — 92015 DETERMINE REFRACTIVE STATE: CPT | Mod: ZF

## 2019-01-18 PROCEDURE — G0463 HOSPITAL OUTPT CLINIC VISIT: HCPCS | Mod: 25,ZF

## 2019-01-18 ASSESSMENT — CUP TO DISC RATIO
OD_RATIO: 0.4
OS_RATIO: 0.4

## 2019-01-18 ASSESSMENT — REFRACTION_WEARINGRX
OD_SPHERE: -1.00
OS_SPHERE: -3.50
OS_AXIS: 074
OD_CYLINDER: +1.25
OS_CYLINDER: +1.25
OD_ADD: +2.50
OD_AXIS: 055
OS_ADD: +2.50
SPECS_TYPE: PAL

## 2019-01-18 ASSESSMENT — VISUAL ACUITY
OS_CC: J1+
OD_CC: 20/20
OS_CC: 20/20
OS_CC+: -2
METHOD: SNELLEN - LINEAR
OD_CC: J1+

## 2019-01-18 ASSESSMENT — REFRACTION_MANIFEST
OD_AXIS: 030
OS_AXIS: 092
OD_SPHERE: -1.00
OD_ADD: +2.25
OS_ADD: +2.25
OS_SPHERE: -4.00
OS_CYLINDER: +1.25
OD_CYLINDER: +0.50

## 2019-01-18 ASSESSMENT — CONF VISUAL FIELD
METHOD: COUNTING FINGERS
OS_NORMAL: 1
OD_NORMAL: 1

## 2019-01-18 ASSESSMENT — SLIT LAMP EXAM - LIDS
COMMENTS: DERMATOCHALSIS
COMMENTS: DERMATOCHALASIS

## 2019-01-18 ASSESSMENT — TONOMETRY
IOP_METHOD: TONOPEN
OD_IOP_MMHG: 17
OS_IOP_MMHG: 14

## 2019-01-18 ASSESSMENT — EXTERNAL EXAM - LEFT EYE: OS_EXAM: NORMAL

## 2019-01-18 ASSESSMENT — EXTERNAL EXAM - RIGHT EYE: OD_EXAM: NORMAL

## 2019-01-18 NOTE — PROGRESS NOTES
CC: s/p sutured IOL right eye (2/23/17)    HPI: Pt is doing well. Patient denies any pain, redness, irritation, tearing, itchiness, photophobia, blurry vision, or trauma. No flashes, floaters, diplopia. She is happy with her current glasses.     Interval: Here for annual exam. Noted irritation in the right eye about 10 days ago - putting in lubricating drops helped. No changes in vision or flashes/floaters. No diplopia.    Gtts:  Refresh lubricating tears PRN     POHx:  Resuturing of dislocated IOL OD - complicated by post-op dislocation  Secondary sutured IOL OD    A/P:  1. S/p secondary sutured IOL OD  - doing great  - vision 20/20 - happy with current glasses  - posterior exam looks very healthy  - suture inferotemp covered by conj but elevated  - can try lubricating ointment  - if no improvement, pt to contact eye clinic    2. Nuclear sclerosis, each eye  -minimally visually significant  -monitor    F/u 1 year, earlier if needed.    Irma Shelley MD  PGY-5, [facility] Fellow  Ophthalmology    Attending Physician Attestation:  Complete documentation of historical and exam elements from today's encounter can be found in the full encounter summary report (not reduplicated in this progress note).  I personally obtained the chief complaint(s) and history of present illness.  I confirmed and edited as necessary the review of systems, past medical/surgical history, family history, social history, and examination findings as documented by others; and I examined the patient myself.  I personally reviewed the relevant tests, images, and reports as documented above.  I formulated and edited as necessary the assessment and plan and discussed the findings and management plan with the patient and family. - Hiro Christian MD

## 2019-01-23 ENCOUNTER — ANCILLARY PROCEDURE (OUTPATIENT)
Dept: BONE DENSITY | Facility: CLINIC | Age: 64
End: 2019-01-23
Payer: COMMERCIAL

## 2019-01-23 DIAGNOSIS — M84.469A INSUFFICIENCY FRACTURE OF TIBIA, INITIAL ENCOUNTER: ICD-10-CM

## 2019-01-23 DIAGNOSIS — M17.12 TRICOMPARTMENT OSTEOARTHRITIS OF LEFT KNEE: ICD-10-CM

## 2019-02-25 ENCOUNTER — OFFICE VISIT (OUTPATIENT)
Dept: FAMILY MEDICINE | Facility: CLINIC | Age: 64
End: 2019-02-25
Payer: COMMERCIAL

## 2019-02-25 VITALS
SYSTOLIC BLOOD PRESSURE: 138 MMHG | BODY MASS INDEX: 32.09 KG/M2 | RESPIRATION RATE: 18 BRPM | TEMPERATURE: 97.9 F | HEART RATE: 88 BPM | DIASTOLIC BLOOD PRESSURE: 86 MMHG | OXYGEN SATURATION: 95 % | WEIGHT: 198.8 LBS

## 2019-02-25 DIAGNOSIS — T36.95XA ANTIBIOTIC-ASSOCIATED DIARRHEA: Primary | ICD-10-CM

## 2019-02-25 DIAGNOSIS — S89.90XS KNEE INJURY, UNSPECIFIED LATERALITY, SEQUELA: ICD-10-CM

## 2019-02-25 DIAGNOSIS — K52.1 ANTIBIOTIC-ASSOCIATED DIARRHEA: Primary | ICD-10-CM

## 2019-02-25 ASSESSMENT — ENCOUNTER SYMPTOMS
VOMITING: 0
CONSTIPATION: 0
DIARRHEA: 1
RHINORRHEA: 0
NAUSEA: 0
HEADACHES: 1
LIGHT-HEADEDNESS: 0
APPETITE CHANGE: 0
FEVER: 0
RECTAL PAIN: 1
SHORTNESS OF BREATH: 0
ABDOMINAL DISTENTION: 0
COUGH: 0
BLOOD IN STOOL: 1
UNEXPECTED WEIGHT CHANGE: 0
WEAKNESS: 0
FATIGUE: 0
ANAL BLEEDING: 0
CHILLS: 0
ABDOMINAL PAIN: 0
DYSURIA: 0

## 2019-02-25 NOTE — PROGRESS NOTES
Preceptor Attestation:   Patient seen, evaluated and discussed with the resident. I have verified the content of the note, which accurately reflects my assessment of the patient and the plan of care.   Supervising Physician:  Shanthi Ortega MD

## 2019-02-25 NOTE — Clinical Note
Can you help with this referral? Patient was referred by obkiaratar for pool therapy. When patient called to make the appointment they said they need the referral faxed. I put in the referral again today and asked patient to call again. Is there anything we can do to help facilitate? Referral was sent to: [address], [phone]!- Jaycee

## 2019-02-25 NOTE — PROGRESS NOTES
"       HPI       Vickie Alvarado is a 63 year old  who presents for   Chief Complaint   Patient presents with     RECHECK     ER Follow up/2/7/19     GI Problem     ED follow-up    Presented to [facility] ED on 2/7 following acute onset nausea, vomiting, and more persistent diarrhea. What triggered ED visit was one episode of stools that appeared grossly bloody (which followed a day of frequent episodes of diarrhea)    CT performed at [facility] showed non-specific L-colonic inflammation     Was afebrile, no WBC elevations    Was discharged with 10 day course of ceftin 500 mg BID x 10 days, and flagyl 500 TID x 10 days.     ED physician also recommended Kefir for probiotics which she has been drinking    Other/subsequent history:    Prior to this acute illness, has never had issues with diarrhea, bloody stools, constipation, chronic abdominal pain.    Colonoscopy in 2016 was normal.    No family history of IBD    Finished course of abx above on 2/17 - during this time, no significant change in amount of stools she put out, but no recurrence of bloody stools.    On 2/22, finally made normal well-formed stools.    Then on 2/24, started to have some \"soft thready loose stools.\" Goes to the toilet q10-15 minutes, small amounts of output each time    Sense of urgency. No blood in stools. Mucousy.     Does have an appetite, no nausea, some mild lower abdominal discomfort without pain.    AFEBRILE throughout this entire course.    Reports frequent UTIs since menopause - last used antibiotics in January (prescribed in urgent care)    Usually stools very regularly, never issues with constipation.    Recent travel in December to [place]. No sick contacts, no contact with young children.    Problem, Medication and Allergy Lists were reviewed and updated if needed..    Patient is an established patient of this clinic..         Review of Systems:   Review of Systems   Constitutional: Negative for appetite change, chills, " fatigue, fever and unexpected weight change.   HENT: Negative for rhinorrhea.    Eyes: Negative for visual disturbance.   Respiratory: Negative for cough and shortness of breath.    Gastrointestinal: Positive for blood in stool, diarrhea and rectal pain. Negative for abdominal distention, abdominal pain (mild discomfort 2-3/10, relieved with heat), anal bleeding, constipation, nausea and vomiting.   Genitourinary: Negative for decreased urine volume and dysuria.   Skin: Negative for rash.   Neurological: Positive for headaches. Negative for weakness and light-headedness.            Physical Exam:     Vitals:    02/25/19 1402   BP: 138/86   Pulse: 88   Resp: 18   Temp: 97.9  F (36.6  C)   SpO2: 95%   Weight: 90.2 kg (198 lb 12.8 oz)     Body mass index is 32.09 kg/m .  Vitals were reviewed and were normal     Physical Exam   Constitutional: She appears well-developed and well-nourished.   HENT:   Head: Normocephalic and atraumatic.   Eyes: Conjunctivae and EOM are normal.   Neck: Normal range of motion.   Abdominal: Soft. Bowel sounds are normal. She exhibits no distension and no mass. There is no tenderness. There is no guarding.   Musculoskeletal: Normal range of motion.   Neurological: She is alert. No cranial nerve deficit.   Skin:   Appears well hydrated         Results:   No testing ordered today    Assessment and Plan        Vickie was seen today for recheck and gi problem.    Diagnoses and all orders for this visit:    Antibiotic-associated diarrhea  Initial ED visit likely viral gastroenteritis, with non-specific CT findings of colitis. Never febrile nor leukocytosis. Patient maintaining good hydration, and initial emesis resolved after acute episode, and only 1 episode of bloody stools (pictures seen) likely inflammatory after having already had several bouts of profuse diarrhea.     Current episodes of diarrhea now most likely associated with 10 day course of abx prescribed in ED as well as continued  intake of dairy in form of kefir and cheeses after acute diarrheal illness. Unlikely C diff given more benign symptoms and exam, and no travel history to indicate parasitic infection. No family history of IBD and colonoscopy 2 years ago normal. Will treat conservatively with fiber, probiotics, avoiding dairy, maintaining hydration. If continuing to have diarrhea despite these measures, consider stool studies for subacute and chronic diarrhea.     Knee injury, unspecified laterality, sequela  Patient sees Dr. Jacob for knee issues and was previously referred for pool therapy but having issues with this referral at [facility] (they say it needs to be faxed in). Put in referral again today, and asked patient to call again and I will also have our coordinators in clinic assist.  -     POOL THERAPY REFERRAL - EXTERNAL; [address], [place]  [phone]       There are no discontinued medications.    Options for treatment and follow-up care were reviewed with the patient. Vickie Alvarado  engaged in the decision making process and verbalized understanding of the options discussed and agreed with the final plan.    Jaycee Mcdaniel MD  [facility] PGY-1  Pager [phone]

## 2019-02-25 NOTE — PATIENT INSTRUCTIONS
Here is the plan from today's visit    1. Antibiotic-associated diarrhea  - Probiotics  - Fiber (metamucil)  - Avoid dairy while your body is recovering  - Keep hydrated    2. Knee injury, unspecified laterality, sequela  - POOL THERAPY REFERRAL - EXTERNAL; Future  Give them a call again today. I will work with our clinic staff to see if they require a fax for referrals.    [address], [place]  [phone]    Please call or return to clinic if your symptoms don't go away.    Follow up plan  Please make a clinic appointment for follow up with your primary physician Gaby Lynn MD in 4 weeks for follow-up.    Thank you for coming to [facility] today.  Lab Testing:  **If you had lab testing today and your results are reassuring or normal they will be mailed to you or sent through [employer] within 7 days.   **If the lab tests need quick action we will call you with the results.  The phone number we will call with results is # [phone] (home) [phone] (work). If this is not the best number please call our clinic and change the number.  Medication Refills:  If you need any refills please call your pharmacy and they will contact us.   If you need to  your refill at a new pharmacy, please contact the new pharmacy directly. The new pharmacy will help you get your medications transferred faster.   Scheduling:  If you have any concerns about today's visit or wish to schedule another appointment please call our office during normal business hours [phone] (8-5:00 M-F)  If a referral was made to a [facility] and you don't get a call from central scheduling please call [phone].  If a Mammogram was ordered for you at [facility] call [phone] to schedule or change your appointment.  If you had an XRay/CT/Ultrasound/MRI ordered the number is [phone] to schedule or change your radiology appointment.   Medical Concerns:  If you have  urgent medical concerns please call [phone] at any time of the day.    Jaycee Mcdaniel MD

## 2019-05-22 ENCOUNTER — MYC REFILL (OUTPATIENT)
Dept: FAMILY MEDICINE | Facility: CLINIC | Age: 64
End: 2019-05-22

## 2019-05-22 DIAGNOSIS — E78.5 HYPERLIPIDEMIA LDL GOAL <130: ICD-10-CM

## 2019-05-22 RX ORDER — SIMVASTATIN 40 MG
40 TABLET ORAL AT BEDTIME
Qty: 90 TABLET | Refills: 3 | Status: SHIPPED | OUTPATIENT
Start: 2019-05-22 | End: 2019-08-27

## 2019-07-02 ENCOUNTER — ANCILLARY PROCEDURE (OUTPATIENT)
Dept: MAMMOGRAPHY | Facility: CLINIC | Age: 64
End: 2019-07-02
Attending: FAMILY MEDICINE
Payer: COMMERCIAL

## 2019-07-02 DIAGNOSIS — Z12.31 VISIT FOR SCREENING MAMMOGRAM: ICD-10-CM

## 2019-07-25 ENCOUNTER — OFFICE VISIT (OUTPATIENT)
Dept: FAMILY MEDICINE | Facility: CLINIC | Age: 64
End: 2019-07-25
Payer: COMMERCIAL

## 2019-07-25 VITALS
WEIGHT: 199.6 LBS | HEIGHT: 66 IN | TEMPERATURE: 97.9 F | BODY MASS INDEX: 32.08 KG/M2 | SYSTOLIC BLOOD PRESSURE: 136 MMHG | HEART RATE: 87 BPM | OXYGEN SATURATION: 95 % | DIASTOLIC BLOOD PRESSURE: 84 MMHG

## 2019-07-25 DIAGNOSIS — M85.9 LOW BONE DENSITY: ICD-10-CM

## 2019-07-25 DIAGNOSIS — Z87.19 HISTORY OF COLITIS: ICD-10-CM

## 2019-07-25 DIAGNOSIS — M17.12 TRICOMPARTMENT OSTEOARTHRITIS OF LEFT KNEE: ICD-10-CM

## 2019-07-25 DIAGNOSIS — S83.207A TEAR OF MENISCUS OF LEFT KNEE AS CURRENT INJURY, UNSPECIFIED MENISCUS, UNSPECIFIED TEAR TYPE, INITIAL ENCOUNTER: Primary | ICD-10-CM

## 2019-07-25 ASSESSMENT — MIFFLIN-ST. JEOR: SCORE: 1477.51

## 2019-07-25 NOTE — Clinical Note
Pt referred to [facility] for [facility] x 2 (can see referrals in Epic). Never contacted. Still wants to go. Please assist. KP

## 2019-07-25 NOTE — PROGRESS NOTES
HPI       Vickie Alvarado is a 64 year old  who presents for   Chief Complaint   Patient presents with     RECHECK     left knee, colitis     Pt well known to me. Here for continuing care.    1) Episode of infectious colitis in Feb treated with abx, then followed by a period of abx induced diarrhea.   Symptoms have resolved. Has noted that avoiding dairy seems to make her feel better overall, so she plans to continue.     2) L knee - seen by Obkiaratar 1/15/19. MRI showed  Insufficiency fracture of tibia, initial encounter  Pes anserine bursitis  Tear of meniscus of left knee as current injury, unspecified meniscus, unspecified tear type, initial encounter  Tricompartment osteoarthritis of left knee  - conservative management; considered synvisc, ortho surgical consult.   - dexa obtained - t score of -2.1 (low bone density). Taking calcium gummies OTC and vit d supplement daily, not sure of dose. Wants to know about dexa follow-up/bone health.  - was [facility] for [facility] x 2 (referrals are in Epic) but never contacted about scheduling. Would still like to go, at that site if possible.     ++++++    Problem, Medication and Allergy Lists were reviewed and updated if needed.     Patient Active Problem List    Diagnosis Date Noted     Toenail fungus 2017     Priority: Medium     Benign neoplasm of colon 06/15/2016     Priority: Medium     Colonoscopy . Repeat in 5 years ()       Family history of cardiomyopathy 2016     Priority: Medium     Family history of malignant neoplasm of breast 2015     Priority: Medium     Sister, post menopausal, .       Tinnitus 2013     Priority: Medium     Senile nuclear sclerosis 10/26/2012     Priority: Medium     Tear film insufficiency 10/26/2012     Priority: Medium     Problem list name updated by automated process. Provider to review       Hyperlipidemia 10/26/2012     Priority: Medium     Problem list name updated by  automated process. Provider to review       Regular astigmatism 10/26/2012     Priority: Medium     Myopia 10/26/2012     Priority: Medium     Overweight 10/26/2012     Priority: Medium     Problem list name updated by automated process. Provider to review       Other specified menopausal and postmenopausal disorder 10/26/2012     Priority: Medium     After-cataract 10/26/2012     Priority: Medium     Problem list name updated by automated process. Provider to review       Presbyopia 10/26/2012     Priority: Medium     Pupillary abnormalities 10/26/2012     Priority: Medium     Diplopia 10/26/2012     Priority: Medium     Visual field defect 10/26/2012     Priority: Medium     Problem list name updated by automated process. Provider to review       Vitreous membranes and strands 10/26/2012     Priority: Medium     Health Care Home 10/26/2012     Priority: Medium     Tier 1  DX V65.8 REPLACED WITH [facility]10 HEALTH CARE HOME (04/08/2013)       CARDIOVASCULAR SCREENING; LDL GOAL LESS THAN 160 11/17/2011     Priority: Medium     Encounter for counseling 11/17/2011     Priority: Medium     Problem list name updated by automated process. Provider to review           Current Outpatient Medications   Medication Sig Dispense Refill     loratadine (CLARITIN) 10 MG tablet Take 10 mg by mouth daily. Prn, seasonal for ragweed and lilacs       simvastatin (ZOCOR) 40 MG tablet Take 1 tablet (40 mg) by mouth At Bedtime 90 tablet 3     vitamin D2 (ERGOCALCIFEROL) 56196 units (1250 mcg) capsule Take 1 capsule (50,000 Units) by mouth once a week (Patient not taking: Reported on 7/25/2019) 12 capsule 0   .    Patient is an established patient of this clinic..         Review of Systems:   Review of Systems   Constitutional: Positive for fatigue. Negative for activity change, appetite change, chills, diaphoresis, fever and unexpected weight change.   Respiratory: Negative.    Cardiovascular: Negative.    Gastrointestinal: Negative for  "abdominal distention, abdominal pain, blood in stool, constipation, diarrhea and vomiting.   Musculoskeletal: Positive for arthralgias and joint swelling. Negative for back pain.            Physical Exam:     Vitals:    07/25/19 1301   BP: 136/84   Pulse: 87   Temp: 97.9  F (36.6  C)   TempSrc: Oral   SpO2: 95%   Weight: 90.5 kg (199 lb 9.6 oz)   Height: 1.685 m (5' 6.34\")     Body mass index is 31.89 kg/m .  Vitals were reviewed   Physical Exam   Constitutional: She appears well-developed and well-nourished. No distress.       Results:       Assessment and Plan      Vickie was seen today for recheck.    Diagnoses and all orders for this visit:    Tear of meniscus of left knee as current injury, unspecified meniscus, unspecified tear type, initial encounter  Tricompartment osteoarthritis of left knee  - Care coordinator to assist with pool therapy referral     History of infectious colitis  - resovled    Low bone density  - postmenopausal. No concerns for secondary causes and unremarkable family hx for osteoporosis.   - not eating dairy.   - advise daily Ca 1200mg divided into twice daily dosing as well as Vit d 800-1000 units daily.      RTC for preventative care visit.   Referred to pharmacy for zoster vaccine.      There are no discontinued medications.    Options for treatment and follow-up care were reviewed with the patient. Vickie REYES Jenny  engaged in the decision making process and verbalized understanding of the options discussed and agreed with the final plan.    Gaby Lynn MD  "

## 2019-07-30 ASSESSMENT — ENCOUNTER SYMPTOMS
DIAPHORESIS: 0
BLOOD IN STOOL: 0
DIARRHEA: 0
ABDOMINAL PAIN: 0
VOMITING: 0
ACTIVITY CHANGE: 0
ARTHRALGIAS: 1
CONSTIPATION: 0
JOINT SWELLING: 1
APPETITE CHANGE: 0
BACK PAIN: 0
ABDOMINAL DISTENTION: 0
CARDIOVASCULAR NEGATIVE: 1
RESPIRATORY NEGATIVE: 1
UNEXPECTED WEIGHT CHANGE: 0
FATIGUE: 1
CHILLS: 0
FEVER: 0

## 2019-08-26 DIAGNOSIS — E78.5 HYPERLIPIDEMIA LDL GOAL <130: ICD-10-CM

## 2019-08-26 NOTE — TELEPHONE ENCOUNTER

## 2019-08-27 RX ORDER — SIMVASTATIN 40 MG
40 TABLET ORAL AT BEDTIME
Qty: 90 TABLET | Refills: 3 | Status: SHIPPED | OUTPATIENT
Start: 2019-08-27 | End: 2020-07-17

## 2019-08-29 ENCOUNTER — MYC MEDICAL ADVICE (OUTPATIENT)
Dept: FAMILY MEDICINE | Facility: CLINIC | Age: 64
End: 2019-08-29

## 2019-08-30 ENCOUNTER — TELEPHONE (OUTPATIENT)
Dept: FAMILY MEDICINE | Facility: CLINIC | Age: 64
End: 2019-08-30

## 2019-08-30 DIAGNOSIS — G89.29 CHRONIC PAIN OF LEFT KNEE: Primary | ICD-10-CM

## 2019-08-30 DIAGNOSIS — M25.562 CHRONIC PAIN OF LEFT KNEE: Primary | ICD-10-CM

## 2019-08-30 NOTE — TELEPHONE ENCOUNTER
[facility] phone call message - order or referral request from patient:     Order or referral being requested: Referral to: Pool therapy  At Location: [facility]    Additional Details: Gisele calling from [facility] to advise that referral for pool therapy needs to indicate whether it is for PT or OT, as they need to do an evaluation. Please update referral to include this information and fax to [phone].    OK to leave a message on voice mail? Yes    Primary language: English      needed? No    Call taken on August 30, 2019 at 2:07 PM by Verena Hall    Order request route to P [facility] TRIAGE   Referrals Route to P [facility] (Green/[place]/Purple) CARE COORDINATOR

## 2019-08-30 NOTE — TELEPHONE ENCOUNTER
9:42a.m last referral was written 2/25/19 and it states that patient will call to schedule own appointment. I can refax referral, but will need new referral from . Forwarded message to  as FYI.

## 2019-08-30 NOTE — PROGRESS NOTES
[facility]  [address] [address]  [phone]  Referral faxed [phone], [facility] to review and will contact patient directly to schedule.      Honey Cantor  Care Coordinator

## 2019-10-01 NOTE — PROGRESS NOTES
Noted.   [facility]     POD #4 sutured IOL right eye (2/23/17)    Has more pain, redness, or irritation right eye - but no evidence of infection  Corneal edema improved    Prednisolone every 2 hours right eye  Ofloxacin four times a day right eye  Post op precautions    F/u 1 week    ~~~~~~~~~~~~~~~~~~~~~~~~~~~~~~~~~~~~~~~~~~~~~~~~~~~~~~~~~~~~~~~~    Complete documentation of historical and exam elements from today's encounter can be found in the full encounter summary report (not reduplicated in this progress note). I personally obtained the chief complaint(s) and history of present illness.  I confirmed and edited as necessary the review of systems, past medical/surgical history, family history, social history, and examination findings as documented by others; and I examined the patient myself. I personally reviewed the relevant tests, images, and reports as documented above. I formulated and edited as necessary the assessment and plan and discussed the findings and management plan with the patient and family.    Hiro Christian MD

## 2019-10-02 ENCOUNTER — HEALTH MAINTENANCE LETTER (OUTPATIENT)
Age: 64
End: 2019-10-02

## 2020-07-08 ENCOUNTER — ANCILLARY PROCEDURE (OUTPATIENT)
Dept: MAMMOGRAPHY | Facility: CLINIC | Age: 65
End: 2020-07-08
Attending: FAMILY MEDICINE
Payer: COMMERCIAL

## 2020-07-08 DIAGNOSIS — Z12.31 VISIT FOR SCREENING MAMMOGRAM: ICD-10-CM

## 2020-07-16 ENCOUNTER — MYC MEDICAL ADVICE (OUTPATIENT)
Dept: FAMILY MEDICINE | Facility: CLINIC | Age: 65
End: 2020-07-16

## 2020-07-16 DIAGNOSIS — E78.5 HYPERLIPIDEMIA LDL GOAL <130: ICD-10-CM

## 2020-07-17 RX ORDER — SIMVASTATIN 40 MG
40 TABLET ORAL AT BEDTIME
Qty: 90 TABLET | Refills: 3 | Status: SHIPPED | OUTPATIENT
Start: 2020-07-17 | End: 2021-03-24

## 2020-07-29 DIAGNOSIS — Z13.9 SCREENING FOR CONDITION: Primary | ICD-10-CM

## 2020-07-29 LAB
ALBUMIN SERPL-MCNC: 4.2 MG/DL (ref 3.5–4.7)
ALP SERPL-CCNC: 65.6 U/L (ref 31.7–110.5)
ALT SERPL-CCNC: 6.6 U/L (ref 0–45)
AST SERPL-CCNC: 16.2 U/L (ref 0–45)
BILIRUB SERPL-MCNC: 0.7 MG/DL (ref 0.2–1.3)
BUN SERPL-MCNC: 14.3 MG/DL (ref 7–19)
CALCIUM SERPL-MCNC: 9.8 MG/DL (ref 8.5–10.1)
CHLORIDE SERPLBLD-SCNC: 99.3 MMOL/L (ref 98–110)
CHOLEST SERPL-MCNC: 219.3 MG/DL (ref 0–200)
CHOLEST/HDLC SERPL: 3.6 {RATIO} (ref 0–5)
CO2 SERPL-SCNC: 28.7 MMOL/L (ref 20–32)
CREAT SERPL-MCNC: 0.7 MG/DL (ref 0.5–1)
GFR SERPL CREATININE-BSD FRML MDRD: 86.8 ML/MIN/1.7 M2
GLUCOSE SERPL-MCNC: 100 MG'DL (ref 70–99)
HDLC SERPL-MCNC: 60.6 MG/DL
LDLC SERPL CALC-MCNC: 135 MG/DL (ref 0–129)
POTASSIUM SERPL-SCNC: 4.2 MMOL/L (ref 3.3–4.5)
PROT SERPL-MCNC: 7.6 G/DL (ref 6.8–8.8)
SODIUM SERPL-SCNC: 134.4 MMOL/L (ref 132.6–141.4)
TRIGL SERPL-MCNC: 118.2 MG/DL (ref 0–150)
VLDL CHOLESTEROL: 23.6 MG/DL (ref 7–32)

## 2020-07-30 LAB
DEPRECATED CALCIDIOL+CALCIFEROL SERPL-MC: 29 UG/L (ref 20–75)
HIV 1+2 AB+HIV1 P24 AG SERPL QL IA: NONREACTIVE

## 2020-08-05 ENCOUNTER — OFFICE VISIT (OUTPATIENT)
Dept: FAMILY MEDICINE | Facility: CLINIC | Age: 65
End: 2020-08-05
Payer: COMMERCIAL

## 2020-08-05 VITALS
DIASTOLIC BLOOD PRESSURE: 86 MMHG | TEMPERATURE: 98.2 F | RESPIRATION RATE: 16 BRPM | WEIGHT: 201.8 LBS | BODY MASS INDEX: 32.43 KG/M2 | HEART RATE: 91 BPM | SYSTOLIC BLOOD PRESSURE: 117 MMHG | HEIGHT: 66 IN | OXYGEN SATURATION: 95 %

## 2020-08-05 DIAGNOSIS — M85.9 LOW BONE DENSITY: ICD-10-CM

## 2020-08-05 DIAGNOSIS — Z13.9 SCREENING FOR CONDITION: ICD-10-CM

## 2020-08-05 DIAGNOSIS — Z79.899 MEDICATION MANAGEMENT: ICD-10-CM

## 2020-08-05 DIAGNOSIS — Z00.00 ENCOUNTER FOR PREVENTIVE CARE: ICD-10-CM

## 2020-08-05 DIAGNOSIS — E78.2 MIXED HYPERLIPIDEMIA: Primary | ICD-10-CM

## 2020-08-05 DIAGNOSIS — Z00.00 MEDICARE ANNUAL WELLNESS VISIT, INITIAL: ICD-10-CM

## 2020-08-05 DIAGNOSIS — E66.3 OVERWEIGHT: ICD-10-CM

## 2020-08-05 RX ORDER — METAPROTERENOL SULFATE 10 MG
500 TABLET ORAL DAILY
Qty: 90 CAPSULE | Refills: 3 | Status: SHIPPED | OUTPATIENT
Start: 2020-08-05 | End: 2022-08-29

## 2020-08-05 ASSESSMENT — MIFFLIN-ST. JEOR: SCORE: 1477.11

## 2020-08-05 NOTE — PROGRESS NOTES
>65 year old Wellness Visit ( Medicare etc)         HPI       This 65 year old female presents as an established patient  Gaby Lynn  who presents for a  Annual Wellness Exam.    Other issues patient wants to address today:    none        Visual Acuity:  Has an appointment next month 09/14/20    Patient Active Problem List   Diagnosis     CARDIOVASCULAR SCREENING; LDL GOAL LESS THAN 160     Encounter for counseling     Senile nuclear sclerosis     Tear film insufficiency     Hyperlipidemia     Regular astigmatism     Myopia     Overweight     Other specified menopausal and postmenopausal disorder     After-cataract     Presbyopia     Pupillary abnormalities     Diplopia     Visual field defect     Vitreous membranes and strands     Health Care Home     Tinnitus     Family history of malignant neoplasm of breast     Family history of cardiomyopathy     Benign neoplasm of colon     Toenail fungus       Past Medical History:   Diagnosis Date     Arthritis      Chest pain      Diarrhea      Headache(784.0)      Hoarseness      Indigestion      Itchy eyes     itchy nose and throat     Nasal congestion      Night sweats      Numbness      Other and unspecified hyperlipidemia      Pneumonia      Sleep problems      Sneezing      Sore throat      Tinnitus      Weight loss         Family History   Problem Relation Age of Onset     C.A.D. Mother      Diabetes Mother      Arthritis Mother      Thyroid Disease Mother      C.A.D. Father      Arthritis Sister      Genetic Disorder Other         nephew         Problem List, Family History and past Medical History reviewed and unchanged/updated.       Health risk Assessment/ Review of Systems:     Constitutional:   Fevers or night sweats?  NO      Eyes:   Vision problems?  NO            Hearing:  Do you feel you have hearing loss?  NO  Cardiovascular:  Chest pain, palpitations, or pain with walking?  NO        Respiratory:   Breathing problems or cough?  NO    :   Difficulty  controlling urination?  NO        Musculoskeletal:   Stiffness or sore joints?  yes       Skin:   concerning lesions or moles?  NO           Nervous System:   loss of strength or sensation,  numbness or tingling,  tremor,  dizziness,  headache?  NO         Mental Health:   depression or anxiety,  sleep problems?  NO   Cognition:  Memory problems?  NO       Weight Loss: Have you lost 10 or more pounds unintentionally in the previous year?  NO  Energy: How much of the time during the past 3 weeks did you feel tired?   a little of the time    PHQ-2 Score:   PHQ-2 ( 1999 Pfizer) 7/25/2019 2/25/2019   Q1: Little interest or pleasure in doing things 1 0   Q2: Feeling down, depressed or hopeless 0 0   PHQ-2 Score 1 0       PHQ-9 Score:   No flowsheet data found.  Medical Care:     Patient Care Team       Relationship Specialty Notifications Start End    Gaby Lynn MD PCP - General   10/3/12     Referred to Eye    Phone: [phone] Fax: [phone]         [address] [address]    Gaby Lynn MD Referring Physician Family Practice  6/2/15     MAMMOGRAM REFERRAL    Phone: [phone] Fax: [phone]         [address] [address]    Hiro Christian MD MD Ophthalmology  7/14/15     Phone: [phone] Fax: [phone]         [address] [address]    Gaby Lynn MD Assigned PCP   6/21/20     Phone: [phone] Fax: [phone]         [address] [address]    Yuri Sarah MD MD Ophthalmology  7/27/20     Phone: [phone] Fax: [phone]         [address] [id number]          Do you have an advanced directive? Yes      Social History / Home Safety     Social History     Tobacco Use     Smoking status: Never Smoker     Smokeless tobacco: Never Used   Substance Use Topics     Alcohol use: Yes     Comment: 4 drinks per month     Marital Status:Single  Who  lives in your household? self  Does your home have any of the following safety concerns? Loose rugs in the hallway, no grab bars in the bathroom, no handrails on the stairs or have poorly lit areas?  No   Do you feel threatened or controlled by a partner, ex-partner or anyone in your life? {Yes No   Has anyone hurt you physically, for example by pushing, hitting, slapping or kicking you   or forcing you to have sex? No       Functional Status     Do you need help with dressing yourself, bathing, or walking?No   Do you need help with the phone, transportation, shopping, preparing meals, housework, laundry, medications or managing money?No   By yourself and not using aids, do you have any difficulty walking up 10 steps  without resting? No   By yourself and not using aids, do you have any difficulty walking several hundred yards? No              Risk Behaviors and Healthy Habits     History   Smoking Status     Never Smoker   Smokeless Tobacco     Never Used     How many servings of fruits and vegetables do you eat a day? 2  Exercise:walking  4-5 days/week for an average of 30-45 minutes  Do you frequently drive without a seatbelt? No   History   Smoking Status     Never Smoker   Smokeless Tobacco     Never Used     Do you use any other drugs? No       Do you use alcohol?Yes  Number of drinking days a week 1-2      Functional Ability   Was the patient's timed Up & Go test (Get up from chair walk, 10 feet turn, return to chair and sit down) unsteady or longer than 10 seconds? No     Fall risk:     1. Have you fallen two or more times in the past year? No   2. Have you fallen and had an injury in the past year?  No         Evaulation of Cognitive Function     Family member/caregiver input: Not needed    1) Repeat 3 items (Leader, Season, Table)2) Clock draw: {Say the following phrases in order: NORMAL  3) 3 item recall: Recalls 3 objects  Results: NORMAL clock, 1-2 items recalled: COGNITIVE IMPAIRMENT LESS  "LIKELY    Mini-CogTM Copyright S Yudelka. Licensed by the author for use in [facility]; reprinted with permission (nehal@.[facility]). All rights reserved.            Other Assessments:     CV Risk based on Pooled Cohort Risk (consider assessing every 4-6 years; consider statin in patients with 10-yr risk > 7.5%):     The 10-year ASCVD risk score (Giana VELASQUEZ JrAnant, et al., 2013) is: 4.7%    Values used to calculate the score:      Age: 65 years      Sex: Female      Is Non- : No      Diabetic: No      Tobacco smoker: No      Systolic Blood Pressure: 117 mmHg      Is BP treated: No      HDL Cholesterol: 60.6 mg/dL      Total Cholesterol: 219.3 mg/dL      Advance Directives: Discussed with patient and family as appropriate.  Has patient completed advance directives? Yes, patient states has an Advance Care Planning document and will bring a copy to the clinic.    Immunization History   Administered Date(s) Administered     HEPA 05/05/2011, 11/17/2011     HepB 11/19/2008, 02/11/2009, 11/24/2009     Influenza (IIV3) PF 11/07/2007, 11/24/2009, 11/04/2010, 10/13/2011, 09/24/2013     Influenza Vaccine IM > 6 months Valent IIV4 11/25/2015, 01/31/2017, 10/12/2017     MMR Not Indicated - By Titer 01[birth year]01/1957     Measles 1955     Pneumo Conj 13-V (2010&after) 12/11/2018     TD (ADULT, 7+) 09/24/1996     TDAP Vaccine (Boostrix) 10/12/2017     Tdap (Adacel,Boostrix) 08/28/2007     Varicella 1955     Varicella Pt Report Hx of Varicella/Chicken Pox 01/01/1957, 05/05/2011     Reviewed Immunization Record Today    Physical Exam     /86   Pulse 91   Temp 98.2  F (36.8  C) (Oral)   Resp 16   Ht 1.676 m (5' 6\")   Wt 91.5 kg (201 lb 12.8 oz)   LMP 05/17/2011   SpO2 95%   BMI 32.57 kg/m       GENERAL APPEARANCE: alert and no distress  HENT: ear canals patent and TM's normal; Hearing loss screen: Normal   DENTITION:  Good repair?  Yes per report  RESP: lungs clear to auscultation - no " rales, rhonchi or wheezes  CV: regular rates and rhythm, no murmur, click or rub, no peripheral edema and peripheral pulses strong  SKIN: no suspicious lesions or rashes  NEURO: Normal strength and tone  MENTAL STATUS EXAM  Appearance: appropriate  Attitude: cooperative  Behavior: normal  Eye Contact: normal  Speech: normal  Orientation: oriented to person , place, time and situation  Mood:  normal  Affect: Mood Congruent  Thought Process: clear    Assessment and Plan     Welcome to Medicare Preventive Visit / Initial Medicare Annual Wellness Exam - reviewed Preventive Services and Plan form with patient as specified in Patient Instructions.    Vickie was seen today for wellness visit.    Diagnoses and all orders for this visit:    Medicare annual wellness visit, initial       -      Routine screenings UTD  -     ADMIN VACCINE, INITIAL  -     Pneumococcal vaccine 23 valent  PPSV23 (Pneumovax) [59804]    Mixed hyperlipidemia  -     Lipid Panel ([employer])  -     Continue statin. Add Omega-3 Fish Oil 500 MG capsule; Take 1 capsule (500 mg) by mouth daily    Overweight  -     Comprehensive Metabolic Panel ([employer])    Low bone density  -     Vitamin D level  -     Continue Ca with D daily    Screening for condition  -     HIV Antigen Antibody Combo      Options for treatment and follow-up care were reviewed with the Vickie Crowleyen and/or guardian engaged in the decision making process and verbalized understanding of the options discussed and agreed with the final plan.    Gaby Lynn MD

## 2020-08-05 NOTE — PATIENT INSTRUCTIONS
[employer] PERSONAL PREVENTIVE SERVICES PLAN - IMMUNIZATIONS     Here are your recommended immunizations.  Take this home for your reference.                                                    IMMUNIZATIONS Description Recommend today?     Influenza (Flu shot) Prevents flu; should get every year No; is up to date.   PCV 13 Pneumonia vaccination; you get it once Yes; Recommended and ordered.   PPSV 23 Second pneumonia vaccination; usually get it 1 year after PCV 13 Not indicated today   Zoster (Shingles) Prevents shingles; you get it once Yes; Recommended   Tetanus Prevents tetanus; once every 10 years No; is up to date.

## 2020-08-10 LAB — HIV 1+2 AB+HIV1 P24 AG SERPL QL IA: NORMAL

## 2020-09-14 ENCOUNTER — OFFICE VISIT (OUTPATIENT)
Dept: OPHTHALMOLOGY | Facility: CLINIC | Age: 65
End: 2020-09-14
Attending: OPHTHALMOLOGY
Payer: COMMERCIAL

## 2020-09-14 DIAGNOSIS — H25.10 NUCLEAR SENILE CATARACT, UNSPECIFIED LATERALITY: ICD-10-CM

## 2020-09-14 DIAGNOSIS — H04.123 INSUFFICIENCY OF TEAR FILM OF BOTH EYES: ICD-10-CM

## 2020-09-14 DIAGNOSIS — H40.002 GLAUCOMA SUSPECT OF LEFT EYE: Primary | ICD-10-CM

## 2020-09-14 PROCEDURE — 92015 DETERMINE REFRACTIVE STATE: CPT | Mod: ZF

## 2020-09-14 PROCEDURE — G0463 HOSPITAL OUTPT CLINIC VISIT: HCPCS | Mod: ZF

## 2020-09-14 PROCEDURE — 92133 CPTRZD OPH DX IMG PST SGM ON: CPT | Mod: ZF | Performed by: OPHTHALMOLOGY

## 2020-09-14 ASSESSMENT — CUP TO DISC RATIO
OD_RATIO: 0.4
OS_RATIO: 0.7

## 2020-09-14 ASSESSMENT — CONF VISUAL FIELD
METHOD: COUNTING FINGERS
OS_NORMAL: 1
OD_NORMAL: 1

## 2020-09-14 ASSESSMENT — REFRACTION_WEARINGRX
OD_ADD: +2.50
OS_ADD: +2.50
OS_SPHERE: -3.50
OS_CYLINDER: +1.25
SPECS_TYPE: PAL
OD_AXIS: 055
OS_AXIS: 074
OD_CYLINDER: +1.25
OD_SPHERE: -1.00

## 2020-09-14 ASSESSMENT — EXTERNAL EXAM - RIGHT EYE: OD_EXAM: NORMAL

## 2020-09-14 ASSESSMENT — REFRACTION_MANIFEST
OD_AXIS: 027
OS_CYLINDER: +1.50
OD_ADD: +2.75
OS_ADD: +2.75
OS_AXIS: 085
OD_SPHERE: -1.00
OS_SPHERE: -3.50
OD_CYLINDER: +0.50

## 2020-09-14 ASSESSMENT — VISUAL ACUITY
METHOD_MR: PT REQUESTED UPDATED MRX TODAY
OD_CC: 20/20
CORRECTION_TYPE: GLASSES
OS_CC+: -2+2
OS_CC: 20/25
METHOD: SNELLEN - LINEAR

## 2020-09-14 ASSESSMENT — SLIT LAMP EXAM - LIDS
COMMENTS: DERMATOCHALSIS
COMMENTS: DERMATOCHALASIS

## 2020-09-14 ASSESSMENT — TONOMETRY
IOP_METHOD: ICARE
OS_IOP_MMHG: 14
OD_IOP_MMHG: 15

## 2020-09-14 ASSESSMENT — EXTERNAL EXAM - LEFT EYE: OS_EXAM: NORMAL

## 2020-09-14 NOTE — NURSING NOTE
Chief Complaints and History of Present Illnesses   Patient presents with     Follow Up     1.5+ year follow up s/p sutured IOL right eye (2/23/17)     Chief Complaint(s) and History of Present Illness(es)     Follow Up     Laterality: both eyes    Course: stable    Associated symptoms: dryness.  Negative for eye pain, tearing and discharge    Treatments tried: artificial tears    Pain scale: 0/10    Comments: 1.5+ year follow up s/p sutured IOL right eye (2/23/17)              Comments     Pt denies any significant vision changes in either eye since last visit.  Complains of BE feeling dry lately.  Complains of having an episode of loosing a spot in vision RE about a year ago - has not had episode since.  Denies any pain, pressure, discharge, and tearing.  Ocular meds: AT's PRN BE    Esther Diaz OT 2:59 PM September 14, 2020

## 2020-09-14 NOTE — PROGRESS NOTES
CC: s/p sutured IOL right eye (2/23/17)    HPI: Pt is doing well. Patient denies any pain, redness, irritation, tearing, itchiness, photophobia, blurry vision, or trauma. No flashes, floaters, diplopia. She is happy with her current glasses.     Interval: Here for annual exam. Having some dryness right eye > left eye. She notes one episode where there was a spot missing in the vision of one eye 11 months ago. This persisted for about 10 minutes and with some lightheadedness. She drank water which helped. It has never happened again. Otherwise her vision is great. No new flashes, floaters, or diplopia. No pain, redness, or tearing.       Gtts:  Refresh lubricating tears PRN     POHx:  Resuturing of dislocated IOL OD - complicated by post-op dislocation  Secondary sutured IOL right eye    +FHx glaucoma: grandfather, brother    A/P:  1. S/p secondary sutured IOL OD  - doing great  - suture inferotemp covered by conj but elevated  - more aggressive AT's and ointment   - if no improvement, pt to contact eye clinic    2. Nuclear sclerosis, left eye  -borderline visually significant  -discussed proceeding with CE/IOL OS if vision deteriorates or symptoms worse    3. Glaucoma suspect  - Positive family history  - Baseline OCT(N) WNL each eye today 9/14/20    F/u 1 year, earlier if needed.    Maral Nieves MD  Cornea & External Disease Fellow    Attending Physician Attestation:  Complete documentation of historical and exam elements from today's encounter can be found in the full encounter summary report (not reduplicated in this progress note).  I personally obtained the chief complaint(s) and history of present illness.  I confirmed and edited as necessary the review of systems, past medical/surgical history, family history, social history, and examination findings as documented by others; and I examined the patient myself.  I personally reviewed the relevant tests, images, and reports as documented above.  I  formulated and edited as necessary the assessment and plan and discussed the findings and management plan with the patient and family. I personally reviewed the ophthalmic test(s) associated with this encounter, agree with the interpretation(s) as documented by the resident/fellow, and have edited the corresponding report(s) as necessary. - Hiro Christian MD    I personally spent great than 40min with the patient, of which >50% of the time was spent face to face with the patient, counseling and coordinating care with the patient. We discussed the complexity of her diagnosis, the need for further information prior to proceeding with yet another surgery, and the unknown prognosis for the patient at this time.    Hiro Christian MD

## 2021-01-15 ENCOUNTER — HEALTH MAINTENANCE LETTER (OUTPATIENT)
Age: 66
End: 2021-01-15

## 2021-02-02 DIAGNOSIS — R93.89 ABNORMAL CAROTID ULTRASOUND: Primary | ICD-10-CM

## 2021-02-02 DIAGNOSIS — E04.1 THYROID NODULE INCIDENTALLY NOTED ON IMAGING STUDY: ICD-10-CM

## 2021-02-10 ENCOUNTER — ANCILLARY PROCEDURE (OUTPATIENT)
Dept: ULTRASOUND IMAGING | Facility: CLINIC | Age: 66
End: 2021-02-10
Attending: FAMILY MEDICINE
Payer: COMMERCIAL

## 2021-02-10 DIAGNOSIS — E04.1 THYROID NODULE INCIDENTALLY NOTED ON IMAGING STUDY: ICD-10-CM

## 2021-02-10 PROCEDURE — 76536 US EXAM OF HEAD AND NECK: CPT | Performed by: RADIOLOGY

## 2021-03-24 DIAGNOSIS — E78.5 HYPERLIPIDEMIA LDL GOAL <130: ICD-10-CM

## 2021-03-24 RX ORDER — SIMVASTATIN 40 MG
40 TABLET ORAL AT BEDTIME
Qty: 30 TABLET | Refills: 11 | Status: SHIPPED | OUTPATIENT
Start: 2021-03-24 | End: 2022-01-06

## 2021-07-13 ENCOUNTER — OFFICE VISIT (OUTPATIENT)
Dept: FAMILY MEDICINE | Facility: CLINIC | Age: 66
End: 2021-07-13
Payer: COMMERCIAL

## 2021-07-13 VITALS
RESPIRATION RATE: 14 BRPM | DIASTOLIC BLOOD PRESSURE: 81 MMHG | WEIGHT: 190 LBS | SYSTOLIC BLOOD PRESSURE: 125 MMHG | OXYGEN SATURATION: 97 % | BODY MASS INDEX: 29.82 KG/M2 | HEART RATE: 82 BPM | HEIGHT: 67 IN | TEMPERATURE: 98.5 F

## 2021-07-13 DIAGNOSIS — M17.11 PRIMARY OSTEOARTHRITIS OF RIGHT KNEE: Primary | ICD-10-CM

## 2021-07-13 PROCEDURE — 99213 OFFICE O/P EST LOW 20 MIN: CPT | Mod: GC | Performed by: FAMILY MEDICINE

## 2021-07-13 ASSESSMENT — MIFFLIN-ST. JEOR: SCORE: 1427.07

## 2021-07-13 NOTE — PATIENT INSTRUCTIONS
Here is the plan from today's visit    1. Primary osteoarthritis of right knee  - PHYSICAL THERAPY REFERRAL - EXTERNAL; Future    Please call or return to clinic if your symptoms don't go away.    Follow up plan  Return if symptoms worsen or fail to improve in about 2 months.     Thank you for coming to [facility] today.  Lab Testing:  **If you had lab testing today and your results are reassuring or normal they will be mailed to you or sent through [email or web address] within 7 days.   **If the lab tests need quick action we will call you with the results.  The phone number we will call with results is # [phone] (home) . If this is not the best number please call our clinic and change the number.  Medication Refills:  If you need any refills please call your pharmacy and they will contact us.   If you need to  your refill at a new pharmacy, please contact the new pharmacy directly. The new pharmacy will help you get your medications transferred faster.   Scheduling:  If you have any concerns about today's visit or wish to schedule another appointment please call our office during normal business hours [phone] (8-5:00 M-F)  Referrals to [facility] please call [phone].   Mammogram Scheduling [phone]     XRay/CT/Ultrasound/MRI Scheduling [phone]    Medical Concerns:  If you have urgent medical concerns please call [phone] at any time of the day.    Julia Cook MD    Referral faxed to [facility] pool therapy. They will contact the patient to schedule appointment.

## 2021-07-13 NOTE — PROGRESS NOTES
"SUBJECTIVE: Vickie Alvarado is a 66 year old female who presents for acute right knee pain    6/30 and 7/3 episode stepping up and down a stair (while carrying a heavy object in right head) caused shooting, sharp pain in right knee. Pain persisted through the day, which would be brought on with any movement.     From 7/3-7/10, avoiding stairs, but able to do her daily her activities. She did note some soreness.     On 7/10, sharp, stabbing pain started again when she woke up. Pain with every step. Using a cane. No pain with standing, sitting, and laying.     Has attempted icing at first episode (6/30) and 7/10 which brought some relief, Alleve which helped.    Sister broke her hip 6/20 and patient was her main helper.     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 Since retiring, she lost 10 pounds.    Notes some hip aching due to change in walking from knee. Denies foot or ankle pain, new back pain.    PAST MEDICAL, SOCIAL, SURGICAL AND FAMILY HISTORY: Past medical, surgical, family and social history was reviewed and unchanged since last visit.    ALLERGIES: She is allergic to bactrim [sulfamethoxazole w/trimethoprim], seasonal allergies, and typhoid vaccines.    CURRENT MEDICATIONS: She has a current medication list which includes the following prescription(s): calcium carb-cholecalciferol, probiotic childrens, loratadine, omega-3 fish oil, and simvastatin.     REVIEW OF SYSTEMS: Review of systems is negative except as noted above.    EXAM:  /81   Pulse 82   Temp 98.5  F (36.9  C) (Oral)   Resp 14   Ht 1.69 m (5' 6.54\")   Wt 86.2 kg (190 lb)   LMP 05/17/2011   SpO2 97%   BMI 30.18 kg/m    CONSTITUTIONIAL: healthy, alert and no distress  HEAD: Normocephalic. No masses, lesions, tenderness or abnormalities  ENT: ENT exam normal, no neck nodes or sinus tenderness  SKIN: no suspicious lesions or rashes  GAIT: limping, favoring " left leg  Stance: normal  NEUROLOGIC: Normal muscle tone and strength, reflexes normal, sensation grossly normal.  PSYCHIATRIC: affect normal/bright and mentation appears normal.    MUSCULOSKELETAL:   Knee appear symmetrical bilaterally to inspection.   Right knee tender to palpation over patellar tendon, non tender to lateral and medial joint line, patella, or quadriceps tendon.  Full range of motions to knee flexion, not limited by pain.   Strength: 4+/5 for right knee flexion, 5/5 for bilateral hip flexion, left knee flexion and bilateral knee extension  Positive Lee on right  Negative anterior drawer test, posterior drawer test, varus and valgus stress, patellar grind test      ASSESSMENT/PLAN:  #Primary osteoarthritis of right knee    Given physical exam tenderness over patellar tender and positive Lee, possible degenerative meniscus and patellar tendinopathy. Not currently concerned for any acute tears. Given patient's previous success with pool therapy on her left knee. Will send a referral for pool therapy for patient's right knee. Instructed patient to follow up if pain worsens or fails to improve over the next couple of months. Patient expressed understanding to the plan.    Julia Cook MD  [facility] Medicine, PGY-1     Patient seen, evaluated and discussed with the resident. I have verified the content of the note, which accurately reflects my assessment of the patient and the plan of care.   Supervising Physician:  Josie Jacob MD

## 2021-07-20 ENCOUNTER — TELEPHONE (OUTPATIENT)
Dept: ORTHOPEDICS | Facility: CLINIC | Age: 66
End: 2021-07-20

## 2021-07-20 DIAGNOSIS — Z80.3 FAMILY HISTORY OF MALIGNANT NEOPLASM OF BREAST: ICD-10-CM

## 2021-07-20 DIAGNOSIS — M17.0 PRIMARY OSTEOARTHRITIS OF BOTH KNEES: Primary | ICD-10-CM

## 2021-07-20 DIAGNOSIS — M17.11 PRIMARY OSTEOARTHRITIS OF RIGHT KNEE: ICD-10-CM

## 2021-07-20 DIAGNOSIS — Z13.9 SCREENING FOR CONDITION: Primary | ICD-10-CM

## 2021-07-20 NOTE — TELEPHONE ENCOUNTER
Worsening right knee pain  MRI orders placed- Can you help coordinate?      We can inject the right knee, but we could try to get the MRI done first    voltaren gel, NSAIDs, Tylenol, lidocaine, biofreeze  Ice, bracing, using a cane

## 2021-07-22 ENCOUNTER — ANCILLARY PROCEDURE (OUTPATIENT)
Dept: MAMMOGRAPHY | Facility: CLINIC | Age: 66
End: 2021-07-22
Attending: FAMILY MEDICINE
Payer: COMMERCIAL

## 2021-07-22 DIAGNOSIS — Z80.3 FAMILY HISTORY OF MALIGNANT NEOPLASM OF BREAST: ICD-10-CM

## 2021-07-22 DIAGNOSIS — Z12.31 VISIT FOR SCREENING MAMMOGRAM: ICD-10-CM

## 2021-07-22 DIAGNOSIS — E66.3 OVERWEIGHT: ICD-10-CM

## 2021-07-22 DIAGNOSIS — Z13.9 SCREENING FOR CONDITION: ICD-10-CM

## 2021-07-22 DIAGNOSIS — M85.9 LOW BONE DENSITY: ICD-10-CM

## 2021-07-22 DIAGNOSIS — E78.2 MIXED HYPERLIPIDEMIA: Primary | ICD-10-CM

## 2021-07-22 PROCEDURE — 77067 SCR MAMMO BI INCL CAD: CPT

## 2021-07-22 PROCEDURE — 77063 BREAST TOMOSYNTHESIS BI: CPT

## 2021-07-23 ENCOUNTER — HOSPITAL ENCOUNTER (OUTPATIENT)
Dept: MRI IMAGING | Facility: CLINIC | Age: 66
Discharge: HOME | End: 2021-07-23
Attending: FAMILY MEDICINE | Admitting: FAMILY MEDICINE
Payer: COMMERCIAL

## 2021-07-23 DIAGNOSIS — M17.0 PRIMARY OSTEOARTHRITIS OF BOTH KNEES: ICD-10-CM

## 2021-07-23 DIAGNOSIS — M17.11 PRIMARY OSTEOARTHRITIS OF RIGHT KNEE: ICD-10-CM

## 2021-07-23 LAB — RADIOLOGIST FLAGS: NORMAL

## 2021-07-23 PROCEDURE — 73721 MRI JNT OF LWR EXTRE W/O DYE: CPT | Mod: 26 | Performed by: RADIOLOGY

## 2021-07-23 PROCEDURE — 73721 MRI JNT OF LWR EXTRE W/O DYE: CPT | Mod: RT

## 2021-07-26 ENCOUNTER — TELEPHONE (OUTPATIENT)
Dept: ORTHOPEDICS | Facility: CLINIC | Age: 66
End: 2021-07-26

## 2021-07-26 NOTE — TELEPHONE ENCOUNTER
[facility] flagged images completed on 7/23/21 ordered by Dr Jacob. MRI of Right knee.   Flagged for Subcortical insufficiency fracture of the lateral tibial plateau.     Forwarded to care team to advise.    Hamida Tsai ATC

## 2021-07-27 ENCOUNTER — MYC MEDICAL ADVICE (OUTPATIENT)
Dept: FAMILY MEDICINE | Facility: CLINIC | Age: 66
End: 2021-07-27

## 2021-07-27 DIAGNOSIS — M25.561 CHRONIC PAIN OF RIGHT KNEE: ICD-10-CM

## 2021-07-27 DIAGNOSIS — M85.9 LOW BONE DENSITY: Primary | ICD-10-CM

## 2021-07-27 DIAGNOSIS — G89.29 CHRONIC PAIN OF RIGHT KNEE: ICD-10-CM

## 2021-07-27 NOTE — TELEPHONE ENCOUNTER
Called patient    MRI with insufficiency fracture and known low bone density    Optimize vit D and calcium    DXA and bone health labs    Needs to off load joint, has crutches    Needs to travel to get her sister settled after hip fracture    2 weeks limiting activities and no cortisone injection at this point

## 2021-08-20 ENCOUNTER — LAB (OUTPATIENT)
Dept: LAB | Facility: CLINIC | Age: 66
End: 2021-08-20
Payer: MEDICARE

## 2021-08-20 DIAGNOSIS — M85.9 LOW BONE DENSITY: ICD-10-CM

## 2021-08-20 DIAGNOSIS — E78.2 MIXED HYPERLIPIDEMIA: ICD-10-CM

## 2021-08-20 DIAGNOSIS — E66.3 OVERWEIGHT: ICD-10-CM

## 2021-08-20 LAB
ALBUMIN SERPL-MCNC: 3.5 G/DL (ref 3.4–5)
ALP SERPL-CCNC: 71 U/L (ref 40–150)
ALT SERPL W P-5'-P-CCNC: 19 U/L (ref 0–50)
ANION GAP SERPL CALCULATED.3IONS-SCNC: 5 MMOL/L (ref 3–14)
AST SERPL W P-5'-P-CCNC: 21 U/L (ref 0–45)
BILIRUB SERPL-MCNC: 0.6 MG/DL (ref 0.2–1.3)
BUN SERPL-MCNC: 16 MG/DL (ref 7–30)
CALCIUM SERPL-MCNC: 9 MG/DL (ref 8.5–10.1)
CHLORIDE BLD-SCNC: 108 MMOL/L (ref 94–109)
CHOLEST SERPL-MCNC: 219 MG/DL
CO2 SERPL-SCNC: 28 MMOL/L (ref 20–32)
CREAT SERPL-MCNC: 0.71 MG/DL (ref 0.52–1.04)
DEPRECATED CALCIDIOL+CALCIFEROL SERPL-MC: 32 UG/L (ref 20–75)
ERYTHROCYTE [DISTWIDTH] IN BLOOD BY AUTOMATED COUNT: 12.6 % (ref 10–15)
FASTING STATUS PATIENT QL REPORTED: NO
GFR SERPL CREATININE-BSD FRML MDRD: 89 ML/MIN/1.73M2
GLUCOSE BLD-MCNC: 86 MG/DL (ref 70–99)
HBA1C MFR BLD: 5.4 % (ref 0–5.6)
HCT VFR BLD AUTO: 42.3 % (ref 35–47)
HDLC SERPL-MCNC: 70 MG/DL
HGB BLD-MCNC: 13.8 G/DL (ref 11.7–15.7)
LDLC SERPL CALC-MCNC: 123 MG/DL
MAGNESIUM SERPL-MCNC: 2.2 MG/DL (ref 1.6–2.3)
MCH RBC QN AUTO: 30.8 PG (ref 26.5–33)
MCHC RBC AUTO-ENTMCNC: 32.6 G/DL (ref 31.5–36.5)
MCV RBC AUTO: 94 FL (ref 78–100)
NONHDLC SERPL-MCNC: 149 MG/DL
PHOSPHATE SERPL-MCNC: 3.6 MG/DL (ref 2.5–4.5)
PLATELET # BLD AUTO: 245 10E3/UL (ref 150–450)
POTASSIUM BLD-SCNC: 4 MMOL/L (ref 3.4–5.3)
PROT SERPL-MCNC: 7.6 G/DL (ref 6.8–8.8)
PTH-INTACT SERPL-MCNC: 48 PG/ML (ref 18–80)
RBC # BLD AUTO: 4.48 10E6/UL (ref 3.8–5.2)
SODIUM SERPL-SCNC: 141 MMOL/L (ref 133–144)
TRIGL SERPL-MCNC: 129 MG/DL
TSH SERPL DL<=0.005 MIU/L-ACNC: 1.41 MU/L (ref 0.4–4)
WBC # BLD AUTO: 5.1 10E3/UL (ref 4–11)

## 2021-08-20 PROCEDURE — 36415 COLL VENOUS BLD VENIPUNCTURE: CPT

## 2021-08-20 PROCEDURE — 83970 ASSAY OF PARATHORMONE: CPT

## 2021-08-20 PROCEDURE — 83036 HEMOGLOBIN GLYCOSYLATED A1C: CPT

## 2021-08-20 PROCEDURE — 80061 LIPID PANEL: CPT

## 2021-08-20 PROCEDURE — 85027 COMPLETE CBC AUTOMATED: CPT

## 2021-08-20 PROCEDURE — 84443 ASSAY THYROID STIM HORMONE: CPT

## 2021-08-20 PROCEDURE — 84100 ASSAY OF PHOSPHORUS: CPT

## 2021-08-20 PROCEDURE — 82306 VITAMIN D 25 HYDROXY: CPT

## 2021-08-20 PROCEDURE — 83735 ASSAY OF MAGNESIUM: CPT

## 2021-08-20 PROCEDURE — 80053 COMPREHEN METABOLIC PANEL: CPT

## 2021-08-24 ENCOUNTER — TELEPHONE (OUTPATIENT)
Dept: ORTHOPEDICS | Facility: CLINIC | Age: 66
End: 2021-08-24

## 2021-08-24 ENCOUNTER — OFFICE VISIT (OUTPATIENT)
Dept: FAMILY MEDICINE | Facility: CLINIC | Age: 66
End: 2021-08-24
Payer: COMMERCIAL

## 2021-08-24 VITALS
DIASTOLIC BLOOD PRESSURE: 76 MMHG | BODY MASS INDEX: 30.31 KG/M2 | WEIGHT: 188.6 LBS | TEMPERATURE: 97.7 F | HEIGHT: 66 IN | OXYGEN SATURATION: 96 % | HEART RATE: 52 BPM | SYSTOLIC BLOOD PRESSURE: 124 MMHG

## 2021-08-24 DIAGNOSIS — D36.9 ADENOMATOUS POLYP: ICD-10-CM

## 2021-08-24 DIAGNOSIS — S89.91XD RIGHT KNEE INJURY, SUBSEQUENT ENCOUNTER: Primary | ICD-10-CM

## 2021-08-24 DIAGNOSIS — I49.9 IRREGULAR CARDIAC RHYTHM: ICD-10-CM

## 2021-08-24 DIAGNOSIS — Z78.0 POST-MENOPAUSAL: ICD-10-CM

## 2021-08-24 DIAGNOSIS — E78.2 MIXED HYPERLIPIDEMIA: ICD-10-CM

## 2021-08-24 DIAGNOSIS — M85.9 LOW BONE DENSITY: ICD-10-CM

## 2021-08-24 DIAGNOSIS — Z13.9 SCREENING FOR CONDITION: ICD-10-CM

## 2021-08-24 DIAGNOSIS — R00.1 BRADYCARDIA: Primary | ICD-10-CM

## 2021-08-24 DIAGNOSIS — M84.469S INSUFFICIENCY FRACTURE OF TIBIA, SEQUELA: ICD-10-CM

## 2021-08-24 DIAGNOSIS — Z00.00 MEDICARE ANNUAL WELLNESS VISIT, SUBSEQUENT: ICD-10-CM

## 2021-08-24 PROCEDURE — G0439 PPPS, SUBSEQ VISIT: HCPCS | Performed by: FAMILY MEDICINE

## 2021-08-24 PROCEDURE — 93000 ELECTROCARDIOGRAM COMPLETE: CPT | Performed by: FAMILY MEDICINE

## 2021-08-24 ASSESSMENT — MIFFLIN-ST. JEOR: SCORE: 1404.29

## 2021-08-24 NOTE — TELEPHONE ENCOUNTER
Asked by PCP to call regarding patients right knee pain.    Right knee pain is uncomfortable, shin splint pain in the knee.    Would love to proceed with pool therapy    Lives [place] mile away and could be on the wait list.    Using OTC meds for pain, now tappered off    OA achy pain- not as much    Land based aquatic eval is done    Dr. Lynn will order DXA- bone health      Radiology for DXA and right knee images    Pt wants to make fixing knee a priority

## 2021-08-24 NOTE — PATIENT INSTRUCTIONS
[facility] MEDICARE PERSONAL PREVENTIVE SERVICES PLAN - SERVICES     Review these tests with your doctor then decide which ones you want and take this page home for your reference  Immunization History   Administered Date(s) Administered     COVID-19,PF,Pfizer 02/25/2021, 03/18/2021     Flu, Unspecified 11/24/2009     HEPA 05/05/2011, 11/17/2011     Hep B, Peds or Adolescent 02/11/2009     HepA-Adult 05/05/2011, 11/17/2011     HepB 11/19/2008, 02/11/2009, 11/24/2009     HepB, Unspecified 11/24/2009     HepB-Adult 11/19/2008     Influenza (IIV3) PF 11/07/2007, 11/24/2009, 11/04/2010, 10/13/2011, 10/11/2012, 09/24/2013, 09/23/2014     Influenza Not Indicated - By Hx 09/27/2020     Influenza Vaccine IM > 6 months Valent IIV4 09/24/2013, 11/25/2015, 01/31/2017, 10/12/2017, 10/07/2018     Influenza, Quad, High Dose, Pf, 65yr + 09/27/2020     MMR Not Indicated - By Titer 01/01/1957     Measles 1955     Pneumo Conj 13-V (2010&after) 12/11/2018     Pneumococcal 23 valent 08/05/2020     TD (ADULT, 7+) 09/24/1996     TDAP Vaccine (Boostrix) 10/12/2017     Td (Adult), Adsorbed 09/24/1996     Tdap (Adacel,Boostrix) 08/28/2007, 10/12/2017     Varicella 1955     Varicella Pt Report Hx of Varicella/Chicken Pox 01/01/1957, 05/05/2011     Zoster Vaccine, Unspecified (historical) 10/01/2020, 10/21/2020     Zoster vaccine recombinant adjuvanted (SHINGRIX) 08/15/2020, 10/21/2020          IMMUNIZATIONS Description Recommend today?     Influenza (flu shot) Helps to prevent flu; you should get it every year Yes; Recommended    PCV 13 Prevents pneumonia; given once No; is up to date.   PPSV 23 Prevents pneumonia; given once No; is up to date.   Tetanus Prevents tetanus; need every 10 years No; is up to date.   Herpes Zoster (shingles) Prevents or lessens symptoms from shingles; given once Yes; Recommended and ordered.   Hepatitis B  If you have any of the following risk factors you should be immunized for hepatitis B: severe  kidney disease, people who live in the same house as a carrier of Hepatitis B virus, people who live in  institutions (e.g. nursing homes or group homes), homosexual men, patients with hemophilia who received Factor VIII or IX concentrates, abusers of illicit injectable drugs No; is up to date.           Health Maintenance   Topic Date Due     ZOSTER IMMUNIZATION (2 of 2) 12/16/2020     MEDICARE ANNUAL WELLNESS VISIT  08/05/2021     FALL RISK ASSESSMENT  09/14/2021     INFLUENZA VACCINE (1) 09/01/2021     MAMMO SCREENING  07/22/2023     ADVANCE CARE PLANNING  08/11/2025     COLORECTAL CANCER SCREENING  05/27/2026     LIPID  08/20/2026     DTAP/TDAP/TD IMMUNIZATION (4 - Td or Tdap) 10/12/2027     DEXA  01/23/2034     HEPATITIS C SCREENING  Completed     PHQ-2  Completed     Pneumococcal Vaccine: 65+ Years  Completed     COVID-19 Vaccine  Completed     IPV IMMUNIZATION  Aged Out     MENINGITIS IMMUNIZATION  Aged Out     HEPATITIS B IMMUNIZATION  Aged Out       SCREENING TESTS     Description   Year of Last Screening   Recommended Today?   Heart disease screening blood tests    Cholesterol level Reducing cholesterol can reduce your risk of heart attacks by 25%.  Screening is recommended yearly if you are at risk of heart disease otherwise every 4-5 years  No; is up to date.   Diabetes screening tests    Hemoglobin A1c blood test   Finding and treating diabetes early can reduce complications.  Screening recommended/covered yearly if you have high blood pressure, high cholesterol, obesity (BMI >30), or a history of high blood glucose tests; or 2 of the following: family history of diabetes, overweight (BMI >25 but <30), age 65 years or older, and a history of diabetes of pregnancy or gave birth to baby weighing more than 9 lbs.  No; is up to date.   Hepatitis B screening Finding hepatitis B early can reduce complications.  Screening is recommended for persons with selected risk factors.  No; is up to date.   Hepatitis C  screening Finding hepatitis C early can reduce complications.  Screening is recommended for all persons born from 1945 through 1965 and for those with selected other risk factors.   No; is up to date.   HIV screening Finding HIV early can reduce complications.  Screening is recommended for persons with risk factors for HIV infection.  No; is up to date.   Glaucoma screening Early detection of glaucoma can prevent blindness.   Please talk to your eye doctor about this.       Health Maintenance   Topic Date Due     ZOSTER IMMUNIZATION (2 of 2) 12/16/2020     MEDICARE ANNUAL WELLNESS VISIT  08/05/2021     FALL RISK ASSESSMENT  09/14/2021     INFLUENZA VACCINE (1) 09/01/2021     MAMMO SCREENING  07/22/2023     ADVANCE CARE PLANNING  08/11/2025     COLORECTAL CANCER SCREENING  05/27/2026     LIPID  08/20/2026     DTAP/TDAP/TD IMMUNIZATION (4 - Td or Tdap) 10/12/2027     DEXA  01/23/2034     HEPATITIS C SCREENING  Completed     PHQ-2  Completed     Pneumococcal Vaccine: 65+ Years  Completed     COVID-19 Vaccine  Completed     IPV IMMUNIZATION  Aged Out     MENINGITIS IMMUNIZATION  Aged Out     HEPATITIS B IMMUNIZATION  Aged Out   }      SCREENING TESTS     Description   Year of Last Screening   Recommended Today?   Colorectal cancer screening    Fecal occult blood test     Screening colonoscopy Screening for colon cancer has been shown to reduce death from colon cancer by 25-30%. Screening recommended to start at 50 years and continuing until age 75 years.    Yes; Recommended and ordered.   Breast Cancer Screening (women)    Mammogram Mammogram screening for breast cancer has been shown to reduce the risk of dying from breast cancer and prolong life. Screening is recommended every 1-2 years for women aged 50 to 74 years.   No; is up to date.   Cervical Cancer screening (women)    Pap Cervical pap smears can reduce cervical cancer. Screening is recommended annually if high risk (history of abnormal pap smears) otherwise  every 2-3 years, stop screening at 65 years of age if history of normal paps.  No; is up to date.   Screening for Osteoporosis:  Bone mass measurements (women)    Dexa Scan Screening and treating Osteoporosis can reduce the risk of hip and spine fractures. Screening is recommended in women 65 years or older and in women and men at risk of osteoporosis.  May need repeat Dexa. TBD   Screening for Lung Cancer     Low-dose CT scanning Screening can reduce mortality in persons aged 55-80 who have smoked at least 30 pack-years and who are either still smoking or have quit in the past 15 years.  No: is not indicated today.   Abdominal Aortic Aneurysm (AAA) screening    Ultrasound (US)   An aneurysm treated before rupture is very safe -a ruptured aneurysm can be fatal.  Screening  by US for AAA is limited to patients who meet one of the following criteria:    Men who are 65-75 years old and have smoked more than 100 cigarettes in their lifetime    Anyone with a family history of abdominal aortic aneurysm  No: is not indicated today.       MEDICARE WELLNESS EXAM PATIENT INSTRUCTIONS    Yearly exam:     See your health care provider every year in order to review changes in your health, review medicines that you take, and discuss preventive care needs such as immunizations and cancer screening.    Get a flu shot each year.     Advance Directive:    If you have not done so, you are encouraged to complete an advance directive. If you would like support with this, please contact the clinic  through the main clinic line. More information about advance directives can be found at:     [email or web address]    Nutrition:     Eat at least 5 servings of fruits and vegetables each day.     Eat whole-grain bread, whole-wheat pasta and brown rice instead of white grains and rice.     Talk to your doctor about Calcium and Vitamin D.     Lifestyle:    Exercise for at least 150  minutes a week (30 minutes a day, 5 days a week). This will help you control your weight and prevent disease.     Limit alcohol to one drink per day.     If you smoke, try to quit - your doctor will be happy to help.     Wear sunscreen to prevent skin cancer.     See your dentist every six months for an exam and cleaning.     See your eye doctor every 1 to 2 years to screen for conditions such as glaucoma, macular degeneration and cataracts.      GI referral    Carole Smallwood Christie R Hey Christie,     I spoke with Marni today and she is going to be going to [facility] in [place] for her procedure. She had to wait until the end of this month for her new insurance to kick in.     Thank you   Carole             Previous Messages       ----- Message -----   From: Honey Cantor   Sent: 9/23/2021  10:36 AM CDT   To: Endoscopy Scheduling Pool     Good Morning,     Can you please take a look at the colonoscopy referral written on 8/24/21 and let me know status for my provider? Thank you, I appreciate your help.     Honey Clemente

## 2021-08-24 NOTE — PROGRESS NOTES
>65 year old Wellness Visit ( Medicare etc)         HPI       This 66 year old female presents as an established patient  Gaby Lynn who presents for an  Annual Wellness Exam.    Other issues patient wants to address today:    Follow-up knee and results    Visual Acuity: Testing not done; patient has seen eye doctor in the past 12 months.    Patient Active Problem List   Diagnosis     CARDIOVASCULAR SCREENING; LDL GOAL LESS THAN 160     Encounter for counseling     Senile nuclear sclerosis     Tear film insufficiency     Hyperlipidemia     Regular astigmatism     Myopia     Overweight     Other specified menopausal and postmenopausal disorder     After-cataract     Presbyopia     Pupillary abnormalities     Diplopia     Visual field defect     Vitreous membranes and strands     Health Care Home     Tinnitus     Family history of malignant neoplasm of breast     Family history of cardiomyopathy     Benign neoplasm of colon     Toenail fungus       Past Medical History:   Diagnosis Date     Arthritis      Chest pain      Diarrhea      Headache(784.0)      Hoarseness      Indigestion      Itchy eyes     itchy nose and throat     Nasal congestion      Night sweats      Numbness      Other and unspecified hyperlipidemia      Pneumonia      Sleep problems      Sneezing      Sore throat      Tinnitus      Weight loss         Family History   Problem Relation Age of Onset     C.A.D. Mother      Diabetes Mother      Arthritis Mother      Thyroid Disease Mother      C.A.D. Father      Arthritis Sister      Genetic Disorder Other         nephew         Problem List, Family History and past Medical History reviewed and unchanged/updated.       Health risk Assessment/ Review of Systems:       Constitutional:   Fevers or night sweats?  NO    Intentional 10# wt loss  Eyes:   Vision problems?  NO            Hearing:  Do you feel you have hearing loss?  NO  Cardiovascular:  Chest pain, palpitations, or pain with walking?  NO         Respiratory:   Breathing problems or cough?  NO    :   Difficulty controlling urination?  NO        Musculoskeletal:   Stiffness or sore joints?  Yes, has been working with Dr. Jacob for what has become chronic right knee pain. MRI shows tricompartmental OA, but also a possible tibial insufficiency fracture. Some recent overuse with helping sister move has caused more pain. Unsure when she should start pool therapy.   Skin:   concerning lesions or moles?  NO           Nervous System:   loss of strength or sensation,  numbness or tingling,  tremor,  dizziness,  headache?  NO       Mental Health:   depression or anxiety,  sleep problems?  NO   Cognition:  Memory problems?  NO       Weight Loss: Have you lost 10 or more pounds unintentionally in the previous year?  NO  Energy: How much of the time during the past 3 weeks did you feel tired?   (check one of the following):   ?  All of the time  ? Most of the time  ? Some of the time  XA little of the time  ? None of the Time    PHQ-2 Score:   PHQ-2 ( 1999 Pfizer) 8/24/2021 7/13/2021   Q1: Little interest or pleasure in doing things 0 0   Q2: Feeling down, depressed or hopeless 0 0   PHQ-2 Score 0 0       PHQ-9 Score:   No flowsheet data found.  Medical Care:     What other specialists or organizations are involved in your medical care?  Sports Medicine with Dr. Jacob for right knee, future plan for aquatic therapy @ [facility] [place]   Patient Care Team       Relationship Specialty Notifications Start End    Gaby Lynn MD PCP - General   10/3/12     Referred to Eye    Phone: [phone] Fax: [phone]         [address] [address]    Gaby Lynn MD Referring Physician Family Practice  6/2/15     MAMMOGRAM REFERRAL    Phone: [phone] Fax: [phone]         [address] [address]    Hiro Christian MD MD Ophthalmology  7/14/15     Phone: [phone] Fax: [phone]  [facility] [address] [facility] [address]    Yuri Sarah MD MD Ophthalmology  7/27/20     Phone: [phone] Fax: [phone]         [address] [place] [address] [address] [facility] [address]    Gaby Lynn MD Assigned PCP   5/27/21     Phone: [phone] Fax: [phone]         [address] [facility] [address] [facility] [address]          Do you have an advanced directive? No-ACP Packet given to patient.      Social History / Home Safety     Social History     Tobacco Use     Smoking status: Never Smoker     Smokeless tobacco: Never Used   Substance Use Topics     Alcohol use: Yes     Comment: 4 drinks per month     Marital Status:Single  Who lives in your household? Self   Does your home have any of the following safety concerns? Loose rugs in the hallway, no grab bars in the bathroom, no handrails on the stairs or have poorly lit areas?  No   Do you feel threatened or controlled by a partner, ex-partner or anyone in your life?  No   Has anyone hurt you physically, for example by pushing, hitting, slapping or kicking you   or forcing you to have sex? No       Functional Status     Do you need help with dressing yourself, bathing, or walking?No   Do you need help with the phone, transportation, shopping, preparing meals, housework, laundry, medications or managing money?No   By yourself and not using aids, do you have any difficulty walking up 10 steps  without resting? No   By yourself and not using aids, do you have any difficulty walking several hundred yards? No              Risk Behaviors and Healthy Habits     History   Smoking Status     Never Smoker   Smokeless Tobacco     Never Used     How many servings of fruits and vegetables do you eat a day? 2  Exercise:walking  Due to knee injury she has placed things on hold   Do you frequently drive without a seatbelt? No   History   Smoking Status     Never Smoker   Smokeless Tobacco     Never Used     Do you use any other drugs? No       Do you use  alcohol?Yes  Number of drinking days a week 1-2      Functional Ability   Was the patient's timed Up & Go test (Get up from chair walk, 10 feet turn, return to chair and sit down) unsteady or longer than 10 seconds? No     Fall risk:     1. Have you fallen two or more times in the past year? No   2. Have you fallen and had an injury in the past year?  No         Evaluation of Cognitive Function     Family member/caregiver input: n/a  1) Repeat 3 items (Leader, Season, Table)    2) Clock draw: NORMAL  3) 3 item recall: Recalls 3 objects  Results: 3 items recalled: COGNITIVE IMPAIRMENT LESS LIKELY      Other Assessments:     CV Risk based on Pooled Cohort Risk (consider assessing every 4-6 years; consider statin in patients with 10-yr risk > 7.5%):    The 10-year ASCVD risk score (Giana VELASQUEZ Jr., et al., 2013) is: 5.6%    Values used to calculate the score:      Age: 66 years      Sex: Female      Is Non- : No      Diabetic: No      Tobacco smoker: No      Systolic Blood Pressure: 124 mmHg      Is BP treated: No      HDL Cholesterol: 70 mg/dL      Total Cholesterol: 219 mg/dL    Advance Directives: Provided with packet    Immunization History   Administered Date(s) Administered     COVID-19,PF,Pfizer 02/25/2021, 03/18/2021     Flu, Unspecified 11/24/2009     HEPA 05/05/2011, 11/17/2011     Hep B, Peds or Adolescent 02/11/2009     HepA-Adult 05/05/2011, 11/17/2011     HepB 11/19/2008, 02/11/2009, 11/24/2009     HepB, Unspecified 11/24/2009     HepB-Adult 11/19/2008     Influenza (IIV3) PF 11/07/2007, 11/24/2009, 11/04/2010, 10/13/2011, 10/11/2012, 09/24/2013, 09/23/2014     Influenza Not Indicated - By Hx 09/27/2020     Influenza Vaccine IM > 6 months Valent IIV4 09/24/2013, 11/25/2015, 01/31/2017, 10/12/2017, 10/07/2018     Influenza, Quad, High Dose, Pf, 65yr + 09/27/2020     MMR Not Indicated - By Titer 01/01/1957     Measles 1955     Pneumo Conj 13-V (2010&after) 12/11/2018      "Pneumococcal 23 valent 08/05/2020     TD (ADULT, 7+) 09/24/1996     TDAP Vaccine (Boostrix) 10/12/2017     Td (Adult), Adsorbed 09/24/1996     Tdap (Adacel,Boostrix) 08/28/2007, 10/12/2017     Varicella 1955     Varicella Pt Report Hx of Varicella/Chicken Pox 01/01/1957, 05/05/2011     Zoster Vaccine, Unspecified (historical) 10/01/2020, 10/21/2020     Zoster vaccine recombinant adjuvanted (SHINGRIX) 08/15/2020, 10/21/2020     Reviewed Immunization Record Today    Physical Exam     Vitals: /76   Pulse 52   Temp 97.7  F (36.5  C) (Oral)   Ht 1.664 m (5' 5.5\")   Wt 85.5 kg (188 lb 9.6 oz)   LMP 05/17/2011   SpO2 96%   BMI 30.91 kg/m       GENERAL APPEARANCE: alert and no distress. Overweight  HENT: ear canals patent and TM's normal; mouth without ulcers or lesions; and oral mucous membranes moist  Hearing loss screen:  normal  DENTITION:  Good repair?  yes  RESP: lungs clear to auscultation - no rales, rhonchi or wheezes  CV: sounds irregular, rate in 70's, no murmur, click or rub.  SKIN: no suspicious lesions or rashes  NEURO: Normal strength and tone  MENTAL STATUS EXAM  Appearance: appropriate  Attitude: cooperative  Behavior: normal  Eye Contact: normal  Speech: normal  Orientation: oriented to person , place, time and situation  Mood:  normal  Affect: Mood Congruent  Thought Process: clear    EKG: shows NSR with rate 76, no ST or T wave changes. RSR in V1 read as nondiagnostic and likely normal    Assessment and Plan     Welcome to Medicare Preventive Visit / Initial Medicare Annual Wellness Exam - reviewed Preventive Services and Plan form with patient as specified in Patient Instructions.    Medicare annual wellness visit, subsequent  - had mammogram  - Immunizations UTD. Has had zoster vax 2, will send date for records    Colon Ca screening  H/o adenomatous polyp  -     Adult Gastro Ref - Procedure Only; Future - on a 5 year schedule    Bradycardia  Irregular cardiac rhythm  Slow rate (50s) " when roomed, in 70's during exam and on EKG.   Sounded irregular but has p waves before every QRS and reads as regular on EKG. No PVCs or PACs on EKG. Asymptomatic and no h/o arrhythmia. (Low CHADsVAD score of 2 - calculated in the event the EKG showed A fib).  -     EKG 12-lead complete w/read - Clinics - read as normal.   -     Advise RTC for repeat CV exam in the next 2 weeks    Mixed hyperlipidemia  - continue statin    Chronic R knee pain/tricompartmental OA  - working with Sports Med, starting pool therapy.    Post-menopausal  Low bone density  Possible insufficiency fracture of tibia, sequela  -     Repeat DX Hip/Pelvis/Spine; Future  -     Continue Ca with Vit D  -     Likely needs more aggressive treatment - bisphosphonate vs other.      Options for treatment and follow-up care were reviewed with the Vickie Crowleyen and/or guardian engaged in the decision making process and verbalized understanding of the options discussed and agreed with the final plan.    Gaby Lynn MD

## 2021-09-04 ENCOUNTER — HEALTH MAINTENANCE LETTER (OUTPATIENT)
Age: 66
End: 2021-09-04

## 2021-09-10 ENCOUNTER — ANCILLARY PROCEDURE (OUTPATIENT)
Dept: GENERAL RADIOLOGY | Facility: CLINIC | Age: 66
End: 2021-09-10
Attending: FAMILY MEDICINE
Payer: MEDICARE

## 2021-09-10 ENCOUNTER — ANCILLARY PROCEDURE (OUTPATIENT)
Dept: BONE DENSITY | Facility: CLINIC | Age: 66
End: 2021-09-10
Attending: FAMILY MEDICINE
Payer: MEDICARE

## 2021-09-10 DIAGNOSIS — S89.91XD RIGHT KNEE INJURY, SUBSEQUENT ENCOUNTER: ICD-10-CM

## 2021-09-10 DIAGNOSIS — M85.9 LOW BONE DENSITY: ICD-10-CM

## 2021-09-10 PROCEDURE — 73562 X-RAY EXAM OF KNEE 3: CPT | Mod: RT | Performed by: RADIOLOGY

## 2021-09-10 PROCEDURE — 77080 DXA BONE DENSITY AXIAL: CPT

## 2021-09-15 ENCOUNTER — OFFICE VISIT (OUTPATIENT)
Dept: OPHTHALMOLOGY | Facility: CLINIC | Age: 66
End: 2021-09-15
Attending: OPHTHALMOLOGY
Payer: MEDICARE

## 2021-09-15 DIAGNOSIS — H40.003 GLAUCOMA SUSPECT OF BOTH EYES: Primary | ICD-10-CM

## 2021-09-15 DIAGNOSIS — H25.12 NUCLEAR SENILE CATARACT OF LEFT EYE: ICD-10-CM

## 2021-09-15 DIAGNOSIS — Z96.1 PSEUDOPHAKIA OF RIGHT EYE: ICD-10-CM

## 2021-09-15 PROCEDURE — 92015 DETERMINE REFRACTIVE STATE: CPT

## 2021-09-15 PROCEDURE — G0463 HOSPITAL OUTPT CLINIC VISIT: HCPCS

## 2021-09-15 PROCEDURE — 99215 OFFICE O/P EST HI 40 MIN: CPT | Mod: GC | Performed by: OPHTHALMOLOGY

## 2021-09-15 PROCEDURE — 92133 CPTRZD OPH DX IMG PST SGM ON: CPT | Performed by: OPHTHALMOLOGY

## 2021-09-15 ASSESSMENT — VISUAL ACUITY
CORRECTION_TYPE: GLASSES
OS_CC+: -2
OS_CC: 20/30
METHOD: SNELLEN - LINEAR
OD_CC: 20/20

## 2021-09-15 ASSESSMENT — REFRACTION_MANIFEST
OS_CYLINDER: +1.75
OS_SPHERE: -3.25
OD_ADD: +2.50
OD_AXIS: 055
OS_ADD: +2.50
OD_SPHERE: -1.00
OD_CYLINDER: +0.50
OS_AXIS: 075

## 2021-09-15 ASSESSMENT — TONOMETRY
OS_IOP_MMHG: 15
IOP_METHOD: TONOPEN
OD_IOP_MMHG: 17

## 2021-09-15 ASSESSMENT — EXTERNAL EXAM - RIGHT EYE: OD_EXAM: NORMAL

## 2021-09-15 ASSESSMENT — REFRACTION_WEARINGRX
OS_ADD: +2.50
OS_CYLINDER: +1.25
OD_SPHERE: -1.00
OS_AXIS: 074
OD_AXIS: 055
OD_ADD: +2.50
OS_SPHERE: -3.50
SPECS_TYPE: PAL
OD_CYLINDER: +1.25

## 2021-09-15 ASSESSMENT — EXTERNAL EXAM - LEFT EYE: OS_EXAM: NORMAL

## 2021-09-15 ASSESSMENT — SLIT LAMP EXAM - LIDS
COMMENTS: DERMATOCHALSIS
COMMENTS: DERMATOCHALASIS

## 2021-09-15 ASSESSMENT — CUP TO DISC RATIO
OS_RATIO: 0.6
OD_RATIO: 0.5

## 2021-09-15 ASSESSMENT — CONF VISUAL FIELD
OD_NORMAL: 1
OS_NORMAL: 1

## 2021-09-15 NOTE — RESULT ENCOUNTER NOTE
Will this patient be following up with Dr. Lynn?  Needs to discuss bone health.  Could put her in on a Tuesday if Gaby wants me to discuss.

## 2021-09-15 NOTE — PROGRESS NOTES
CC: s/p sutured IOL right eye (2/23/17)    HPI: Pt is doing well. Patient denies any pain, redness, irritation, tearing, itchiness, photophobia, blurry vision, or trauma. No flashes, floaters, diplopia. She is happy with her current glasses.     Interval: Here for annual exam. In April or May, she notes that she had an approx 20 minute episode where her central vision was gone but resolved without incident. . No new flashes, floaters, or diplopia. No pain, redness, or tearing.       Gtts:  Refresh lubricating tears PRN     POHx:  Resuturing of dislocated IOL OD - complicated by post-op dislocation  Secondary sutured IOL right eye (2/23/17)    +FHx glaucoma: grandfather, brother    A/P:  # S/p secondary sutured IOL OD  - doing great  - suture inferotemp covered by conj but elevated  - more aggressive AT's and ointment   - if no improvement, pt to contact eye clinic    # Nuclear sclerosis, left eye  -minimally visually significant  -discussed proceeding with CE/IOL OS if vision deteriorates or symptoms worse     # Glaucoma suspect OU  - Positive family history  - Repeat OCT(N) today, WNL each eye 9/2020    F/u 1 year, earlier if needed.    Doug Philippe,   Fellow, Cornea & External Disease  Department of Ophthalmology  [facility]    Attending Physician Attestation:  Complete documentation of historical and exam elements from today's encounter can be found in the full encounter summary report (not reduplicated in this progress note).  I personally obtained the chief complaint(s) and history of present illness.  I confirmed and edited as necessary the review of systems, past medical/surgical history, family history, social history, and examination findings as documented by others; and I examined the patient myself.  I personally reviewed the relevant tests, images, and reports as documented above.  I formulated and edited as necessary the assessment and plan and discussed the findings and management plan  with the patient and family. I personally reviewed the ophthalmic test(s) associated with this encounter, agree with the interpretation(s) as documented by the resident/fellow, and have edited the corresponding report(s) as necessary. - Hiro Christian MD    I personally spent great than 40min with the patient, of which >50% of the time was spent face to face with the patient, counseling and coordinating care with the patient. We discussed the complexity of her diagnosis, the need for further information prior to proceeding with yet another surgery, and the unknown prognosis for the patient at this time.    Hiro Christian MD

## 2021-09-15 NOTE — NURSING NOTE
"Chief Complaints and History of Present Illnesses   Patient presents with     Annual Eye Exam     Chief Complaint(s) and History of Present Illness(es)     Annual Eye Exam     Laterality: both eyes    Course: stable    Associated symptoms: dryness.  Negative for eye pain, floaters and flashes              Comments     States va is the same since last visit. Pt reports \"missing vision\" right eye 2x in April 2021. Pt says when she drinks water it gets better. Pt denies headaches or aura.  PT using:  Refresh Pm each eye  Refresh PRN each eye  JAIMEE GONZALES 12:31 PM September 15, 2021                     "

## 2021-09-21 ENCOUNTER — OFFICE VISIT (OUTPATIENT)
Dept: FAMILY MEDICINE | Facility: CLINIC | Age: 66
End: 2021-09-21
Payer: MEDICARE

## 2021-09-21 VITALS
HEART RATE: 61 BPM | TEMPERATURE: 97.8 F | DIASTOLIC BLOOD PRESSURE: 78 MMHG | SYSTOLIC BLOOD PRESSURE: 123 MMHG | RESPIRATION RATE: 16 BRPM | OXYGEN SATURATION: 96 %

## 2021-09-21 DIAGNOSIS — M84.469S INSUFFICIENCY FRACTURE OF TIBIA, SEQUELA: Primary | ICD-10-CM

## 2021-09-21 DIAGNOSIS — M80.00XA AGE-RELATED OSTEOPOROSIS WITH CURRENT PATHOLOGICAL FRACTURE, INITIAL ENCOUNTER: ICD-10-CM

## 2021-09-21 PROCEDURE — 99214 OFFICE O/P EST MOD 30 MIN: CPT | Performed by: FAMILY MEDICINE

## 2021-09-21 RX ORDER — ALENDRONATE SODIUM 70 MG/1
70 TABLET ORAL
Qty: 12 TABLET | Refills: 3 | Status: SHIPPED | OUTPATIENT
Start: 2021-09-21 | End: 2022-08-29

## 2021-09-21 NOTE — PROGRESS NOTES
SUBJECTIVE:    Vickie Alvarado is a 66 year old female here today to disuss osteoporosis.  Previous DEXA done 2021.  T-score showed her to be Osteopenia.  Gap in going forward with DXA due to Medicare B issues. Risk factors for osteroporosis include postmenopausal,  or  and no cancer or kidney stones.  Never on bisphosp  Osteoporosis Risk Score/FRAX score  Calculated with recent insuffic fx of right knee   Calcium +vit D taken  Broccoli and salmon quite a bit  Diarrhea after consuming dairy so doesn't do much of it     http://[email or web address].ac.uk/FRAX/  Risk of Hip Fracture: 2.4% (Significant if >3%)  Risk of Overall Fracture: 15% (Significant if >20%)    Past Medical History:   Diagnosis Date     Arthritis      Chest pain      Diarrhea      Headache(784.0)      Hoarseness      Indigestion      Itchy eyes     itchy nose and throat     Nasal congestion      Night sweats      Numbness      Other and unspecified hyperlipidemia      Pneumonia      Sleep problems      Sneezing      Sore throat      Tinnitus      Weight loss        Past Surgical History:   Procedure Laterality Date     CATARACT IOL, RT/LT Right 2006     HC YAG LASER CAPSULOTOMY Right 2009     LASER VITREOLYSIS, ANTERIOR OD (RIGHT EYE) Right 2007     REPOSITION INTRAOCULAR LENS Right 11/18/2014    Procedure: REPOSITION INTRAOCULAR LENS;  Surgeon: Vickie Guerra MD;  Location: [facility]     VITRECTOMY PARSPLANA WITH 23 GAUGE SYSTEM Right 11/18/2014    Procedure: VITRECTOMY PARSPLANA WITH 23 GAUGE SYSTEM;  Surgeon: Vickie Guerra MD;  Location: [facility]       Current Outpatient Medications   Medication Sig Dispense Refill     calcium carb-cholecalciferol 600-500 MG-UNIT CAPS        Lactobacillus (PROBIOTIC CHILDRENS) CHEW        loratadine (CLARITIN) 10 MG tablet Take 10 mg by mouth daily. Prn, seasonal for ragweed and lilacs       Omega-3 Fish Oil 500 MG capsule Take 1 capsule (500 mg) by mouth daily 90 capsule 3     simvastatin  "(ZOCOR) 40 MG tablet Take 1 tablet (40 mg) by mouth At Bedtime 30 tablet 11       Family History   Problem Relation Age of Onset     C.A.D. Mother      Diabetes Mother      Arthritis Mother      Thyroid Disease Mother      C.A.D. Father      Arthritis Sister      Genetic Disorder Other         nephew       Social History     Socioeconomic History     Marital status: Single     Spouse name: Not on file     Number of children: Not on file     Years of education: Not on file     Highest education level: Not on file   Occupational History     Not on file   Tobacco Use     Smoking status: Never Smoker     Smokeless tobacco: Never Used   Substance and Sexual Activity     Alcohol use: Yes     Comment: 4 drinks per month     Drug use: No     Sexual activity: Never   Other Topics Concern     Not on file   Social History Narrative     Not on file     Social Determinants of Health     Financial Resource Strain:      Difficulty of Paying Living Expenses:    Food Insecurity:      Worried About Running Out of Food in the Last Year:      Ran Out of Food in the Last Year:    Transportation Needs:      Lack of Transportation (Medical):      Lack of Transportation (Non-Medical):    Physical Activity:      Days of Exercise per Week:      Minutes of Exercise per Session:    Stress:      Feeling of Stress :    Social Connections:      Frequency of Communication with Friends and Family:      Frequency of Social Gatherings with Friends and Family:      Attends Restoration[employer] Services:      Active Member of Clubs or Organizations:      Attends Club or Organization Meetings:      Marital Status:    Intimate Partner Violence:      Fear of Current or Ex-Partner:      Emotionally Abused:      Physically Abused:      Sexually Abused:        OBJECTIVE:  /78   Pulse 61   Temp 97.8  F (36.6  C) (Oral)   Resp 16   LMP 05/17/2011   SpO2 96%   There has been a change in patients height. 5'73/4\" down to 5'5\"    ASSESSMENT:  Osteoporosis  Right " knee insufficency fracture    PLAN:  Optimized vit D and calcium, space calcium out across the day, doesn't do dairy  Fosamax 70mg qweek  Working on weight loss and checking on pool therapy    The importance of calcium and vitamin D in bone health was discussed in detail. A calcium intake of 1500 mg total per day in divided doses, to include diet and supplements, was recommended.  I have urged the patient to obtain as much as the recommended amount of calcium as possible from the diet.  I recommended use of the International Osteoporosis Foundation Calcium Calculator to get an estimate of daily total intake in the diet (www.iofcalciumcalculator).800-1000 international units vitamin D daily was also recommended.       We also discussed the role of weight bearing exercise for the treatment of bone loss. I have also recommended weight training at a minimum of 2x/week.         Josie Jacob MD, CAQ   Sports Medicine and Bone Health team

## 2021-09-21 NOTE — PATIENT INSTRUCTIONS
.[email or web address]      Vit D 2000 international unit(s) daily    Pool therapy scheduled for 2 weeks out    Insurance changes again 10/1/21

## 2021-09-28 ENCOUNTER — OFFICE VISIT (OUTPATIENT)
Dept: FAMILY MEDICINE | Facility: CLINIC | Age: 66
End: 2021-09-28
Payer: MEDICARE

## 2021-09-28 VITALS
RESPIRATION RATE: 16 BRPM | TEMPERATURE: 97.7 F | SYSTOLIC BLOOD PRESSURE: 127 MMHG | OXYGEN SATURATION: 96 % | BODY MASS INDEX: 30.7 KG/M2 | DIASTOLIC BLOOD PRESSURE: 76 MMHG | HEIGHT: 66 IN | WEIGHT: 191 LBS | HEART RATE: 81 BPM

## 2021-09-28 DIAGNOSIS — R01.1 SYSTOLIC MURMUR: ICD-10-CM

## 2021-09-28 DIAGNOSIS — Z23 NEED FOR PROPHYLACTIC VACCINATION AND INOCULATION AGAINST INFLUENZA: Primary | ICD-10-CM

## 2021-09-28 DIAGNOSIS — M84.469S INSUFFICIENCY FRACTURE OF TIBIA, SEQUELA: ICD-10-CM

## 2021-09-28 DIAGNOSIS — M80.00XA AGE-RELATED OSTEOPOROSIS WITH CURRENT PATHOLOGICAL FRACTURE, INITIAL ENCOUNTER: ICD-10-CM

## 2021-09-28 DIAGNOSIS — E78.2 MIXED HYPERLIPIDEMIA: ICD-10-CM

## 2021-09-28 DIAGNOSIS — Z82.49 FAMILY HISTORY OF CARDIOMYOPATHY: ICD-10-CM

## 2021-09-28 PROCEDURE — 99214 OFFICE O/P EST MOD 30 MIN: CPT | Mod: 25 | Performed by: FAMILY MEDICINE

## 2021-09-28 PROCEDURE — 90662 IIV NO PRSV INCREASED AG IM: CPT | Performed by: FAMILY MEDICINE

## 2021-09-28 PROCEDURE — 90471 IMMUNIZATION ADMIN: CPT | Performed by: FAMILY MEDICINE

## 2021-09-28 ASSESSMENT — MIFFLIN-ST. JEOR: SCORE: 1425.37

## 2021-09-28 NOTE — PROGRESS NOTES
"  Assessment & Plan     Systolic murmur, not previously heard  Irregular cardiac rhythm on exam, not picked up on EKG  Family history of cardiomyopathy  - asymptomatic. Agrees to additional evaluation starting with Echo. Requests that referral be held until 10/1/21 when her insurance change is effective. Also considering Zio patch.    Mixed hyperlipidemia  - continue statin and omega 3    Insufficiency fracture of tibia, sequela  Age-related osteoporosis with current pathological fracture, initial encounter  - has been started on Fosamax 70 every day and is taking Ca with Vit D. Repeat Dexa 1 year  - working with Sports Med and starting pool therapy for LE symptoms    Need for prophylactic vaccination and inoculation against influenza  - INFLUENZA, QUAD, HIGH DOSE, PF, 65YR + (FLUZONE HD)  - has been referred for zoster vaccine  - does not qualify for Covid 3 here. May wait or go to her pharmacy to get now    Follow-up post echo    Gaby Lynn MD  [facility]  --------------------------------------------------------------------------------  Subjective   Marni is a 66 year old who presents for the following health issues     HPI   Recheck cardiac exam. Was bradycardic compared to baseline (52) and sounded irregular on exam at CPE on 8/24/21.  EKG showed NSR with rate in 70's. Does not endorse any specific cardiac symptoms. Does have fhx of cardiomyopathy.    Still dealing with a lot of discomfort in LE due to tibial insufficiency fracture. Dexa confirms osteoporosis and has started Fosamax and daily ca with Vit d. Will start pool therapy next week.     Review of Systems   Constitutional: Negative for diaphoresis and fatigue.   Respiratory: Negative for shortness of breath.    Cardiovascular: Negative for chest pain, palpitations and peripheral edema.            Objective    /76   Pulse 81   Temp 97.7  F (36.5  C) (Oral)   Resp 16   Ht 1.68 m (5' 6.14\")   Wt 86.6 kg (191 lb)   LMP " 05/17/2011   SpO2 96%   BMI 30.70 kg/m       Physical Exam  Constitutional:       General: She is not in acute distress.     Appearance: She is not ill-appearing.      Comments: Overweight. Using crutches   Cardiovascular:      Rate and Rhythm: Normal rate.      Heart sounds: Murmur heard.   Systolic murmur is present with a grade of 2/6.        Comments: There is an audible pause in her rhythm every 4-5 beats initially, then became regular. Syst murmur heard at B USB  Musculoskeletal:      Right lower leg: No edema.      Left lower leg: No edema.   Neurological:      Mental Status: She is alert.            Lab on 08/20/2021   Component Date Value Ref Range Status     Cholesterol 08/20/2021 219* <200 mg/dL Final    Age 0-19 years  Desirable: <170 mg/dL  Borderline high:  170-199 mg/dl  High:            >199 mg/dl    Age 20 years and older  Desirable: <200 mg/dL     Triglycerides 08/20/2021 129  <150 mg/dL Final    0-9 years:  Normal:    Less than 75 mg/dL  Borderline high:  75-99 mg/dL  High:             Greater than or equal to 100 mg/dL    0-19 years:  Normal:    Less than 90 mg/dL  Borderline high:   mg/dL  High:             Greater than or equal to 130 mg/dL    20 years and older:  Normal:    Less than 150 mg/dL  Borderline high:  150-199 mg/dL  High:             200-499 mg/dL  Very high:   Greater than or equal to 500 mg/dL     Direct Measure HDL 08/20/2021 70  >=50 mg/dL Final    0-19 years:       Greater than or equal to 45 mg/dL   Low: Less than 40 mg/dL   Borderline low: 40-44 mg/dL     20 years and older:   Female: Greater than or equal to 50 mg/dL   Male:   Greater than or equal to 40 mg/dL          LDL Cholesterol Calculated 08/20/2021 123* <=100 mg/dL Final    Age 0-19 years:  Desirable: 0-110 mg/dL   Borderline high: 110-129 mg/dL   High: >= 130 mg/dL    Age 20 years and older:  Desirable: <100mg/dL  Above desirable: 100-129 mg/dL   Borderline high: 130-159 mg/dL   High: 160-189 mg/dL   Very  high: >= 190 mg/dL     Non HDL Cholesterol 08/20/2021 149* <130 mg/dL Final    0-19 years:  Desirable:          Less than 120 mg/dL  Borderline high:   120-144 mg/dL  High:                   Greater than or equal to 145 mg/dL    20 years and older:  Desirable:          130 mg/dL  Above Desirable: 130-159 mg/dL  Borderline high:   160-189 mg/dL  High:               190-219 mg/dL  Very high:     Greater than or equal to 220 mg/dL     Patient Fasting > 8hrs? 08/20/2021 No   Final     Hemoglobin A1C 08/20/2021 5.4  0.0 - 5.6 % Final    Normal <5.7%   Prediabetes 5.7-6.4%    Diabetes 6.5% or higher     Note: Adopted from ADA consensus guidelines.     Vitamin D, Total (25-Hydroxy) 08/20/2021 32  20 - 75 ug/L Final     TSH 08/20/2021 1.41  0.40 - 4.00 mU/L Final     WBC Count 08/20/2021 5.1  4.0 - 11.0 10e3/uL Final     RBC Count 08/20/2021 4.48  3.80 - 5.20 10e6/uL Final     Hemoglobin 08/20/2021 13.8  11.7 - 15.7 g/dL Final     Hematocrit 08/20/2021 42.3  35.0 - 47.0 % Final     MCV 08/20/2021 94  78 - 100 fL Final     MCH 08/20/2021 30.8  26.5 - 33.0 pg Final     MCHC 08/20/2021 32.6  31.5 - 36.5 g/dL Final     RDW 08/20/2021 12.6  10.0 - 15.0 % Final     Platelet Count 08/20/2021 245  150 - 450 10e3/uL Final     Sodium 08/20/2021 141  133 - 144 mmol/L Final     Potassium 08/20/2021 4.0  3.4 - 5.3 mmol/L Final     Chloride 08/20/2021 108  94 - 109 mmol/L Final     Carbon Dioxide (CO2) 08/20/2021 28  20 - 32 mmol/L Final     Anion Gap 08/20/2021 5  3 - 14 mmol/L Final     Urea Nitrogen 08/20/2021 16  7 - 30 mg/dL Final     Creatinine 08/20/2021 0.71  0.52 - 1.04 mg/dL Final     Calcium 08/20/2021 9.0  8.5 - 10.1 mg/dL Final     Glucose 08/20/2021 86  70 - 99 mg/dL Final     Alkaline Phosphatase 08/20/2021 71  40 - 150 U/L Final     AST 08/20/2021 21  0 - 45 U/L Final     ALT 08/20/2021 19  0 - 50 U/L Final     Protein Total 08/20/2021 7.6  6.8 - 8.8 g/dL Final     Albumin 08/20/2021 3.5  3.4 - 5.0 g/dL Final      Bilirubin Total 08/20/2021 0.6  0.2 - 1.3 mg/dL Final     GFR Estimate 08/20/2021 89  >60 mL/min/1.73m2 Final    As of July 11, 2021, eGFR is calculated by the CKD-EPI creatinine equation, without race adjustment. eGFR can be influenced by muscle mass, exercise, and diet. The reported eGFR is an estimation only and is only applicable if the renal function is stable.     Parathyroid Hormone Intact 08/20/2021 48  18 - 80 pg/mL Final     Magnesium 08/20/2021 2.2  1.6 - 2.3 mg/dL Final     Phosphorus 08/20/2021 3.6  2.5 - 4.5 mg/dL Final     The 10-year ASCVD risk score (Giana VELASQUEZ Jr., et al., 2013) is: 5.8%    Values used to calculate the score:      Age: 66 years      Sex: Female      Is Non- : No      Diabetic: No      Tobacco smoker: No      Systolic Blood Pressure: 127 mmHg      Is BP treated: No      HDL Cholesterol: 70 mg/dL      Total Cholesterol: 219 mg/dL

## 2021-09-30 ASSESSMENT — ENCOUNTER SYMPTOMS
FATIGUE: 0
SHORTNESS OF BREATH: 0
PALPITATIONS: 0
DIAPHORESIS: 0

## 2021-10-01 ENCOUNTER — TELEPHONE (OUTPATIENT)
Dept: FAMILY MEDICINE | Facility: CLINIC | Age: 66
End: 2021-10-01

## 2021-10-01 NOTE — TELEPHONE ENCOUNTER
Pcs contact Marni was assisted in helping scheduling for the ECHO that her PCP had ordered.     Kaycee Tanner MA

## 2021-10-19 ENCOUNTER — OFFICE VISIT (OUTPATIENT)
Dept: FAMILY MEDICINE | Facility: CLINIC | Age: 66
End: 2021-10-19
Payer: MEDICARE

## 2021-10-19 VITALS
DIASTOLIC BLOOD PRESSURE: 61 MMHG | TEMPERATURE: 97.9 F | SYSTOLIC BLOOD PRESSURE: 124 MMHG | HEART RATE: 89 BPM | RESPIRATION RATE: 16 BRPM | OXYGEN SATURATION: 97 %

## 2021-10-19 DIAGNOSIS — M80.00XS AGE-RELATED OSTEOPOROSIS WITH CURRENT PATHOLOGICAL FRACTURE, SEQUELA: ICD-10-CM

## 2021-10-19 DIAGNOSIS — M84.469S INSUFFICIENCY FRACTURE OF TIBIA, SEQUELA: Primary | ICD-10-CM

## 2021-10-19 DIAGNOSIS — Z51.81 ENCOUNTER FOR THERAPEUTIC DRUG MONITORING: ICD-10-CM

## 2021-10-19 PROCEDURE — 99213 OFFICE O/P EST LOW 20 MIN: CPT | Mod: GC | Performed by: FAMILY MEDICINE

## 2021-10-19 NOTE — PROGRESS NOTES
SUBJECTIVE: Vickie Alvarado is a 66 year old female who is presenting for follow up after an insufficiency fracture in her right knee. Is tired because she has had knee pain for he past three months. She is no longer needing to use crutches in the house, just when going out for longer times. Pain is a hair better at home but with prolonged walking is uncomfortable. She's had three pool therapy sessions and silver sneakers time at the pool (going 3x per week on her own). She is getting physical land therapy starting next week, but it has been harder to get into that schedule. Is now taking the fosamax, only taken two doses thus far. Has worked on getting more calcium and vitamin D in diet, is taking the calcium supplement. Ice packs are helpful, heating sometimes helpful to get to sleep. Worse aching after therapy that can slow sleep. Tylenol and aleve for pain control with moderate effect.       PAST MEDICAL, SOCIAL, SURGICAL AND FAMILY HISTORY: Past medical, surgical, family and social history was reviewed and unchanged since last visit.    ALLERGIES: She is allergic to bactrim [sulfamethoxazole w/trimethoprim], seasonal allergies, and typhoid vaccines.    CURRENT MEDICATIONS: She has a current medication list which includes the following prescription(s): alendronate, calcium carb-cholecalciferol, probiotic childrens, loratadine, omega-3 fish oil, and simvastatin.     REVIEW OF SYSTEMS:   No ROM limitations in pool, R knee pain with walking long distances or heavy weight bearing. Lifting is worst pain. Can walking without pain for about 100 feet. No deformity, no buckling, no bruising, no swelling.     EXAM:  /61 (BP Location: Left arm, Patient Position: Sitting, Cuff Size: Adult Regular)   Pulse 89   Temp 97.9  F (36.6  C) (Oral)   Resp 16   LMP 05/17/2011   SpO2 97%   Breastfeeding No   CONSTITUTIONIAL: healthy, alert and no distress  SKIN: no suspicious lesions or rashes  GAIT: crutches and limping  to R leg  Stance: normal  NEUROLOGIC: Normal muscle tone and strength, reflexes normal, sensation grossly normal.  PSYCHIATRIC: affect normal/bright and mentation appears normal.    MUSCULOSKELETAL:   R Knee  No swelling, full ROM at knee against gravity  R medial tibial tenderness to palpation- mild, more anticipation pain  Ligaments intact and stable  Strength intact    XR 9/10 R Knee  Impression:  Subtle concavity in the of the medial tibial plateau corresponding to  insufficiency fracture noted on recent MRI.      ASSESSMENT/PLAN:  Insufficiency fracture tibia  - Continue with PT in pool and land  - Heat packs for comfort   - Goal is to get off crutches and weight bearing    Drug monitoring  - Recheck vitamin D, calcium, kidney function in 3 months    Page Rivero MD         Patient seen, evaluated and discussed with the resident. I have verified the content of the note, which accurately reflects my assessment of the patient and the plan of care.   Supervising Physician:  Josie Jacob MD

## 2021-11-02 ENCOUNTER — TELEPHONE (OUTPATIENT)
Dept: FAMILY MEDICINE | Facility: CLINIC | Age: 66
End: 2021-11-02

## 2021-11-02 DIAGNOSIS — R00.1 BRADYCARDIA: Primary | ICD-10-CM

## 2021-11-02 NOTE — TELEPHONE ENCOUNTER
"Contacted central scheduling to try and get Leadless EKG monitor scheduled along with ECHO Complete that is scheduled for tomorrow 11/3/21 @ 3:00p.m. Scheduling had nothing past 2:00p.m, so Leadless EKG monitor was scheduled for Friday 11/5/21 @ 11:00a.m. Patient has follow up appointment with  with Cardiology on 12/6/21 @ 1:00p.m. All appointments [facility]. Patient stated she \"will get details off of MyChart\".    Honey Cantor  Care Coordinator  "

## 2021-11-03 ENCOUNTER — ANCILLARY PROCEDURE (OUTPATIENT)
Dept: CARDIOLOGY | Facility: CLINIC | Age: 66
End: 2021-11-03
Attending: FAMILY MEDICINE
Payer: MEDICARE

## 2021-11-03 PROCEDURE — 93306 TTE W/DOPPLER COMPLETE: CPT | Performed by: INTERNAL MEDICINE

## 2021-11-05 ENCOUNTER — ANCILLARY PROCEDURE (OUTPATIENT)
Dept: CARDIOLOGY | Facility: CLINIC | Age: 66
End: 2021-11-05
Attending: FAMILY MEDICINE
Payer: MEDICARE

## 2021-11-05 DIAGNOSIS — R00.1 BRADYCARDIA: ICD-10-CM

## 2021-11-05 PROCEDURE — 93248 EXT ECG>7D<15D REV&INTERPJ: CPT | Performed by: INTERNAL MEDICINE

## 2021-11-05 PROCEDURE — 93242 EXT ECG>48HR<7D RECORDING: CPT

## 2021-11-05 NOTE — PROGRESS NOTES
Per Dr. Gaby Lynn, patient to have 7 Day Ziopatch monitor placed.  Diagnosis: Bradycardia, R00.1  Monitor placed: Yes  Patient Instructed: Yes  Patient verbalized understanding: Yes  Holter # [id number]    Monitor placed by Liam Burnett CMA  11:25 AM

## 2021-11-12 NOTE — TELEPHONE ENCOUNTER
RECORDS RECEIVED FROM:   DATE RECEIVED:   NOTES STATUS DETAILS   OFFICE NOTE from referring provider    Internal See mychart messages 11-1-21 from Dr. Lynn    OFFICE NOTE from other cardiologist    N/A    DISCHARGE SUMMARY from hospital    N/A    DISCHARGE REPORT from the ER   N/A    OPERATIVE REPORT    N/A    MEDICATION LIST   Internal    LABS     BMP   N/A    CBC   Internal 8-20-21   CMP   Internal 8-20-21   Lipids   Internal 8-20-21   TSH   Internal 8-20-21   DIAGNOSTIC PROCEDURES     EKG   Internal 8-24-21   Monitor Reports   Internal 11-5-21   IMAGING (DISC & REPORT)      Echo   Internal 11-3-21   Stress Tests   N/A    Cath   N/A    MRI/MRA   N/A    CT/CTA   N/A

## 2021-11-29 ENCOUNTER — MYC MEDICAL ADVICE (OUTPATIENT)
Dept: FAMILY MEDICINE | Facility: CLINIC | Age: 66
End: 2021-11-29

## 2021-11-29 ENCOUNTER — OFFICE VISIT (OUTPATIENT)
Dept: FAMILY MEDICINE | Facility: CLINIC | Age: 66
End: 2021-11-29
Payer: MEDICARE

## 2021-11-29 VITALS
SYSTOLIC BLOOD PRESSURE: 115 MMHG | HEART RATE: 82 BPM | TEMPERATURE: 98.1 F | OXYGEN SATURATION: 96 % | DIASTOLIC BLOOD PRESSURE: 77 MMHG | RESPIRATION RATE: 16 BRPM

## 2021-11-29 DIAGNOSIS — R35.0 URINARY FREQUENCY: Primary | ICD-10-CM

## 2021-11-29 DIAGNOSIS — M80.00XS AGE-RELATED OSTEOPOROSIS WITH CURRENT PATHOLOGICAL FRACTURE, SEQUELA: Primary | ICD-10-CM

## 2021-11-29 DIAGNOSIS — R10.2 SUPRAPUBIC PAIN: ICD-10-CM

## 2021-11-29 DIAGNOSIS — E55.9 VITAMIN D INSUFFICIENCY: ICD-10-CM

## 2021-11-29 LAB
ALBUMIN UR-MCNC: 30 MG/DL
APPEARANCE UR: CLEAR
BACTERIA #/AREA URNS HPF: ABNORMAL /HPF
BILIRUB UR QL STRIP: NEGATIVE
CALCIUM SERPL-MCNC: 8.8 MG/DL (ref 8.5–10.1)
COLOR UR AUTO: YELLOW
CREAT SERPL-MCNC: 0.79 MG/DL (ref 0.52–1.04)
GFR SERPL CREATININE-BSD FRML MDRD: 78 ML/MIN/1.73M2
GLUCOSE UR STRIP-MCNC: NEGATIVE MG/DL
HGB UR QL STRIP: ABNORMAL
KETONES UR STRIP-MCNC: ABNORMAL MG/DL
LEUKOCYTE ESTERASE UR QL STRIP: ABNORMAL
NITRATE UR QL: NEGATIVE
PH UR STRIP: 5.5 [PH] (ref 5–8)
RBC #/AREA URNS AUTO: ABNORMAL /HPF
SP GR UR STRIP: >=1.03 (ref 1–1.03)
UROBILINOGEN UR STRIP-ACNC: 0.2 E.U./DL
WBC #/AREA URNS AUTO: >100 /HPF

## 2021-11-29 PROCEDURE — 99214 OFFICE O/P EST MOD 30 MIN: CPT | Mod: GC | Performed by: STUDENT IN AN ORGANIZED HEALTH CARE EDUCATION/TRAINING PROGRAM

## 2021-11-29 PROCEDURE — 82565 ASSAY OF CREATININE: CPT | Performed by: STUDENT IN AN ORGANIZED HEALTH CARE EDUCATION/TRAINING PROGRAM

## 2021-11-29 PROCEDURE — 81001 URINALYSIS AUTO W/SCOPE: CPT | Performed by: STUDENT IN AN ORGANIZED HEALTH CARE EDUCATION/TRAINING PROGRAM

## 2021-11-29 PROCEDURE — 82306 VITAMIN D 25 HYDROXY: CPT | Performed by: STUDENT IN AN ORGANIZED HEALTH CARE EDUCATION/TRAINING PROGRAM

## 2021-11-29 PROCEDURE — 87086 URINE CULTURE/COLONY COUNT: CPT | Performed by: STUDENT IN AN ORGANIZED HEALTH CARE EDUCATION/TRAINING PROGRAM

## 2021-11-29 PROCEDURE — 36415 COLL VENOUS BLD VENIPUNCTURE: CPT | Performed by: STUDENT IN AN ORGANIZED HEALTH CARE EDUCATION/TRAINING PROGRAM

## 2021-11-29 PROCEDURE — 82310 ASSAY OF CALCIUM: CPT | Performed by: STUDENT IN AN ORGANIZED HEALTH CARE EDUCATION/TRAINING PROGRAM

## 2021-11-29 RX ORDER — CEFPODOXIME PROXETIL 100 MG/1
100 TABLET, FILM COATED ORAL 2 TIMES DAILY
Qty: 10 TABLET | Refills: 0 | Status: SHIPPED | OUTPATIENT
Start: 2021-11-29 | End: 2021-12-04

## 2021-11-29 RX ORDER — ESTRADIOL 0.1 MG/G
CREAM VAGINAL
Qty: 30 G | Refills: 1 | Status: CANCELLED | OUTPATIENT
Start: 2021-11-29

## 2021-11-29 NOTE — PATIENT INSTRUCTIONS
Patient Education   Here is the plan from today's visit    Urinary frequency  - cefpodoxime (VANTIN) 100 MG tablet; Take 1 tablet (100 mg) by mouth 2 times daily for 5 days  Dispense: 10 tablet; Refill: 0  - UA reflex to Microscopic  - Urine Culture Aerobic Bacterial  - Urine Microscopic Exam    Please call or return to clinic if your symptoms don't go away.    Follow up plan  Return if symptoms worsen or fail to improve.    Thank you for coming to [facility] today.  Lab Testing:  **If you had lab testing today and your results are reassuring or normal they will be mailed to you or sent through [employer] within 7 days.   **If the lab tests need quick action we will call you with the results.  **If you are having labs done on a different day, please call [phone] to schedule at [facility] or [phone] for other [facility] locations. Labs do not offer walk-in appointments.  The phone number we will call with results is # [phone] (home) . If this is not the best number please call our clinic and change the number.  Medication Refills:  If you need any refills please call your pharmacy and they will contact us.   If you need to  your refill at a new pharmacy, please contact the new pharmacy directly. The new pharmacy will help you get your medications transferred faster.   Scheduling:  If you have any concerns about today's visit or wish to schedule another appointment please call our office during normal business hours [phone] (8-5:00 M-F)  If a referral was made to a [facility] and you don't get a call from central scheduling please call [phone].  If a Mammogram was ordered for you at [facility] call [phone] to schedule or change your appointment.  If you had an EKG/XRay/CT/Ultrasound/MRI ordered the number is [phone] to schedule or change your radiology appointment.   [facility] has limited ultrasound appointments available  on Wednesdays, if you would like your ultrasound at [facility], please call [phone] to schedule.   Medical Concerns:  If you have urgent medical concerns please call [phone] at any time of the day.    Rani Casillas, DO

## 2021-11-29 NOTE — TELEPHONE ENCOUNTER
[facility]   Carole Mendenhall RN

## 2021-11-29 NOTE — PROGRESS NOTES
Assessment & Plan     Urinary frequency  Suprapubic pain  Patient with symptoms consistent with UTI including suprapubic pain, urinary frequency, cloudy urine. UA with moderate leukocyte esterase, negative nitrites, WBC > 100. Due to symptoms and preliminary UA will treat as UTI. No fever, normotensive, no CVA tenderness not likely pyelonephritis and not consistent with systemic infection. No previous hx of antibiotic resistant UTIs per patient (no cultures for me to review based on epic search). Will treat empirically. Intolerance of Bactrim, will treat with 5 day course of cefpodoxime. Urine culture pending, will alter antibiotics if urine culture shows antibiotic resistance to chosen abx. Discussed vaginal estrogen for recurrent UTIs, pt will think about this for future.   - cefpodoxime (VANTIN) 100 MG tablet  Dispense: 10 tablet; Refill: 0  - UA reflex to Microscopic  - Urine Culture Aerobic Bacterial  - Urine Microscopic Exam    Review of external notes as documented elsewhere in note  Review of the result(s) of each unique test - see HPI  Ordering of each unique test  Prescription drug management     Return if symptoms worsen or fail to improve.    Svetlana Casillas DO  Family Medicine PGY-2  [facility], [facility]   Pronouns: She/Hers    [facility]    Ajay Grider is a 66 year old who presents for the following health issues.     HPI     Concern for UTI  Reviewed MyChart documentation from today   Urinating frequently, cloudy urine  Previous UTI hx:   No previous cultures upon review of chart   Pap NIL 2014 and 2018    UA unremarkable from 2017    A1c 5.4 8/20/2021   Probiotic a few years ago    Today:   Stopped period, got a series of UTIs  56 years age of menopause  Went on probiotic helped symptoms   Now its been a few years since had one   No hx of antibiotic resistant UTIs  Urinary frequency, cloudy urine started a few days ago  Little bit of  suprapubic pressure   Hasn't historically had bad pain with UTIs before   Does feel consistent with previous UTIs   Discussed vaginal estrogen  Fx of breast cancer, no personal history       Review of Systems   Constitutional, HEENT, cardiovascular, pulmonary, gi and gu systems are negative, except as otherwise noted.      Objective    /77   Pulse 82   Temp 98.1  F (36.7  C) (Oral)   Resp 16   LMP 05/17/2011   SpO2 96%   Breastfeeding No   There is no height or weight on file to calculate BMI.  Physical Exam     General: Alert and oriented, in no acute distress.  Skin: Warm and dry, no abnormalities noted.  Eyes: Extra-ocular muscles grossly intact, pupils equal.  ENT: Speech intact, nasal passages open, no hearing impairment noted.  CV: No cyanosis or pallor, warm and well perfused.  Respiratory: No respiratory distress, no accessory muscle use.  Abdomen: Soft, NT, ND. NA BSx2 quad. No rebound, guarding or mass. No HSM. No CVA tenderness.   Neuro: Gait and station normal, comprehension intact. Gross and fine motor skills intact.   Psychiatric: Mood and affect appear normal.   Extremities: Warm, able to move all four extremities at will.      Results for orders placed or performed in visit on 11/29/21 (from the past 24 hour(s))   UA reflex to Microscopic   Result Value Ref Range    Color Urine Yellow Colorless, Straw, Light Yellow, Yellow    Appearance Urine Clear Clear    Glucose Urine Negative Negative mg/dL    Bilirubin Urine Negative Negative    Ketones Urine Trace (A) Negative mg/dL    Specific Gravity Urine >=1.030 1.005 - 1.030    Blood Urine Moderate (A) Negative    pH Urine 5.5 5.0 - 8.0    Protein Albumin Urine 30  (A) Negative mg/dL    Urobilinogen Urine 0.2 0.2, 1.0 E.U./dL    Nitrite Urine Negative Negative    Leukocyte Esterase Urine Moderate (A) Negative   Urine Microscopic Exam   Result Value Ref Range    Bacteria Urine None Seen None Seen /HPF    RBC Urine 10-25 (A) 0-2 /HPF /HPF    WBC  Urine >100 (A) 0-5 /HPF /HPF

## 2021-11-29 NOTE — TELEPHONE ENCOUNTER
Diagnoses and all orders for this visit:    Urinary frequency  -     UA reflex to Microscopic; Future  -     Urine Culture Aerobic Bacterial; Future      Svetlana Casillas DO  Family Medicine PGY-2  [facility], [facility]   Pronouns: She/Hers

## 2021-11-29 NOTE — TELEPHONE ENCOUNTER
RN spoke to patient directly- scheduled for in person visit at 1540 with Dr. Casillas. Patient requesting orders be placed for UA/UC and she will come early to provide sample.   Carole Mendenhall RN

## 2021-11-30 LAB
BACTERIA UR CULT: NORMAL
DEPRECATED CALCIDIOL+CALCIFEROL SERPL-MC: 25 UG/L (ref 20–75)

## 2021-11-30 RX ORDER — ERGOCALCIFEROL 1.25 MG/1
50000 CAPSULE, LIQUID FILLED ORAL WEEKLY
Qty: 8 CAPSULE | Refills: 0 | Status: SHIPPED | OUTPATIENT
Start: 2021-11-30 | End: 2022-02-16

## 2021-11-30 NOTE — TELEPHONE ENCOUNTER
"Spoke with patient  Regarding the my chart message about future lab orders (Vit D, Calcium and Creatine) being completed at the 11/29/21visit vs. being done \"in January to check her kidney functions after being on Fosamax\".   Patient had come in for UTI symptoms on 11/29/21 and had both urine and blood taken.      After reviewing chart did note that Vit D was low - Dr. Jacob notified- see below. Patient notified of this.    Will route question to the billing department re: Pt. On Fosmax and the plan was to have her  creatine level drawn at a future appointment in January . Since the lab was drawn earlier and will probably be reordered in Jan. (at her f/u visit)... how will this be billed to her insurance as they potentially will be paying for a lab that was drawn early.    ROSEMARY Cotto RN    "

## 2021-11-30 NOTE — TELEPHONE ENCOUNTER
Can you tell her I sent Vit D 50,000 international unit(s) qweek x 8 weeks to pharmacy  Vit D only 25    We can delay her labs since these were drawn early

## 2021-12-01 NOTE — TELEPHONE ENCOUNTER
Rn spoke to patient- relayed message from Dr. Jacob about Vit D level. Advised that high dose Vit D weekly supplement sent to [facility] and plan to take for 8 weeks. She verbalized understanding and had no further questions at this time..  Carole Mendenhall RN

## 2021-12-02 PROBLEM — I35.0 AORTIC STENOSIS: Status: ACTIVE | Noted: 2021-12-02

## 2021-12-03 ASSESSMENT — ENCOUNTER SYMPTOMS
SLEEP DISTURBANCES DUE TO BREATHING: 0
JOINT SWELLING: 1
FATIGUE: 1
FLANK PAIN: 0
SHORTNESS OF BREATH: 0
ARTHRALGIAS: 1
MUSCLE CRAMPS: 0
PALPITATIONS: 1
INCREASED ENERGY: 0
EXERCISE INTOLERANCE: 0
MUSCLE WEAKNESS: 1
HALLUCINATIONS: 0
ALTERED TEMPERATURE REGULATION: 0
WHEEZING: 0
ORTHOPNEA: 0
COUGH DISTURBING SLEEP: 0
POLYPHAGIA: 0
CHILLS: 0
HYPERTENSION: 0
DIFFICULTY URINATING: 0
COUGH: 1
DYSPNEA ON EXERTION: 0
DECREASED APPETITE: 0
LEG PAIN: 0
WEIGHT LOSS: 0
POSTURAL DYSPNEA: 0
WEIGHT GAIN: 0
HEMATURIA: 0
BACK PAIN: 1
SYNCOPE: 0
MYALGIAS: 1
HEMOPTYSIS: 0
STIFFNESS: 1
LIGHT-HEADEDNESS: 0
FEVER: 0
DYSURIA: 0
SNORES LOUDLY: 0
HYPOTENSION: 0
NECK PAIN: 0
POLYDIPSIA: 0
NIGHT SWEATS: 0
SPUTUM PRODUCTION: 0

## 2021-12-06 ENCOUNTER — PRE VISIT (OUTPATIENT)
Dept: CARDIOLOGY | Facility: CLINIC | Age: 66
End: 2021-12-06
Payer: MEDICARE

## 2021-12-06 ENCOUNTER — OFFICE VISIT (OUTPATIENT)
Dept: CARDIOLOGY | Facility: CLINIC | Age: 66
End: 2021-12-06
Attending: INTERNAL MEDICINE
Payer: MEDICARE

## 2021-12-06 VITALS
HEIGHT: 66 IN | WEIGHT: 191.1 LBS | HEART RATE: 83 BPM | BODY MASS INDEX: 30.71 KG/M2 | DIASTOLIC BLOOD PRESSURE: 93 MMHG | OXYGEN SATURATION: 96 % | SYSTOLIC BLOOD PRESSURE: 129 MMHG

## 2021-12-06 DIAGNOSIS — I49.3 PVC'S (PREMATURE VENTRICULAR CONTRACTIONS): ICD-10-CM

## 2021-12-06 DIAGNOSIS — I35.0 NONRHEUMATIC AORTIC VALVE STENOSIS: Primary | ICD-10-CM

## 2021-12-06 DIAGNOSIS — R93.1 ABNORMAL FINDINGS ON DIAGNOSTIC IMAGING OF HEART AND CORONARY CIRCULATION: ICD-10-CM

## 2021-12-06 PROCEDURE — 93005 ELECTROCARDIOGRAM TRACING: CPT

## 2021-12-06 PROCEDURE — 99205 OFFICE O/P NEW HI 60 MIN: CPT | Mod: 25 | Performed by: INTERNAL MEDICINE

## 2021-12-06 PROCEDURE — G0463 HOSPITAL OUTPT CLINIC VISIT: HCPCS | Mod: 25

## 2021-12-06 RX ORDER — METOPROLOL SUCCINATE 25 MG/1
25 TABLET, EXTENDED RELEASE ORAL DAILY
Qty: 30 TABLET | Refills: 3 | Status: SHIPPED | OUTPATIENT
Start: 2021-12-06 | End: 2022-03-15

## 2021-12-06 ASSESSMENT — PAIN SCALES - GENERAL: PAINLEVEL: NO PAIN (0)

## 2021-12-06 ASSESSMENT — MIFFLIN-ST. JEOR: SCORE: 1422.7

## 2021-12-06 NOTE — PATIENT INSTRUCTIONS
Metoprolol 25 mg daily - RX sent to pharmacy.    Cardiac MRI     Referral placed to Sleep Medicine department.    If you have not been contacted by them within a few days, call the number listed on the referral below.    Cardiac MRI/MRA:     You will be given intravenous contrast for this exam. To prepare:    The day before your exam, drink extra fluids--at least six 8-ounce glasses (unless your doctor tells you to restrict your fluids).    The MRI machine uses a strong magnet. Please wear clothes without metal (snaps, zippers). A sweatsuit works well, or we may give you a hospital gown.    Please remove any body piercings and hair extensions before you arrive. You will also remove watches, jewelry, hairpins, wallets, dentures, partial dental plates and hearing aids. You may wear contact lenses, and you may be able to wear your rings. We have a safe place to keep your personal items, but it is safer to leave them at home.

## 2021-12-06 NOTE — LETTER
12/6/2021      RE: Vickie Alvarado  [address]  [facility] [address]       Dear Colleague,    Thank you for the opportunity to participate in the care of your patient, Vickie Alavrado, at the [facility] [place] at [facility]. Please see a copy of my visit note below.                                                                                                General Cardiology Clinic       Referring provider:Gaby yLnn MD    Chief complaint: Palpitations, fatigue and chest pressure    HPI: Ms. Vickie Alvarado is a 66 year old  female with PMH significant for    -Hyperlipidemia  -Obesity    Patient is being seen today at the request of  for moderate aortic stenosis recently detected on echocardiogram.  Patient was seen on 9/28/2021 and found to have systolic murmur which was apparently not previously heard.  She was also found to have irregular cardiac rhythm on physical exam.  This was not demonstrated on EKG.  She was recommended echocardiogram and Zio patch.  I reviewed the echocardiogram from 11/3/2021 which shows calcification of the aortic valve with moderate aortic stenosis.  Zio patch showed 9.9% PVCs corresponding to patient's symptoms.  Patient reports feeling fatigued, chest pressure and heart flutters when she lies down at night.  Does not happen every day.  She tells me that she is not aware of the onset of the symptoms or the total duration of the episodes.  She cannot tell me how long this has been going on but definitely not too long.  No apparent trigger.  She recently had knee surgery and she is doing physical therapy in the pool.  No limitation of physical activity during the day.    Lifetime non-smoker.  Alcohol rarely.  No drug abuse.   The patient's risk factor profile is: (-) HTN, (-) diabetes, (+) hyperlipidemia, (-) tobacco use, (-) family Hx CAD.  Patient tells me that there is family members on his  dad's side with hypertrophic cardiomyopathy.  His nephew was diagnosed with HCM as well.  No premature CAD or sudden cardiac death in the family.    Medications, personal, family, and social history reviewed with patient and revised.    PAST MEDICAL HISTORY:  Past Medical History:   Diagnosis Date     Arthritis      Chest pain      Diarrhea      Headache(784.0)      Hoarseness      Indigestion      Itchy eyes     itchy nose and throat     Nasal congestion      Night sweats      Numbness      Other and unspecified hyperlipidemia      Pneumonia      Sleep problems      Sneezing      Sore throat      Tinnitus      Weight loss        CURRENT MEDICATIONS:  Current Outpatient Medications   Medication Sig Dispense Refill     alendronate (FOSAMAX) 70 MG tablet Take 1 tablet (70 mg) by mouth every 7 days 12 tablet 3     calcium carb-cholecalciferol 600-500 MG-UNIT CAPS        Lactobacillus (PROBIOTIC CHILDRENS) CHEW        loratadine (CLARITIN) 10 MG tablet Take 10 mg by mouth daily. Prn, seasonal for ragweed and lilacs       Omega-3 Fish Oil 500 MG capsule Take 1 capsule (500 mg) by mouth daily 90 capsule 3     simvastatin (ZOCOR) 40 MG tablet Take 1 tablet (40 mg) by mouth At Bedtime 30 tablet 11     vitamin D2 (ERGOCALCIFEROL) 03910 units (1250 mcg) capsule Take 1 capsule (50,000 Units) by mouth once a week 8 capsule 0       PAST SURGICAL HISTORY:  Past Surgical History:   Procedure Laterality Date     CATARACT IOL, RT/LT Right 2006     HC YAG LASER CAPSULOTOMY Right 2009     LASER VITREOLYSIS, ANTERIOR OD (RIGHT EYE) Right 2007     REPOSITION INTRAOCULAR LENS Right 11/18/2014    Procedure: REPOSITION INTRAOCULAR LENS;  Surgeon: Vickie Guerra MD;  Location: [facility]     VITRECTOMY PARSPLANA WITH 23 GAUGE SYSTEM Right 11/18/2014    Procedure: VITRECTOMY PARSPLANA WITH 23 GAUGE SYSTEM;  Surgeon: Vickie Guerra MD;  Location: [facility]       ALLERGIES:     Allergies   Allergen Reactions     Bactrim  [Sulfamethoxazole W/Trimethoprim] Nausea     Headache     Seasonal Allergies      Typhoid Vaccines Nausea and Vomiting     Fever, vice- h/a, body aches.  Was hospitalized for 2d.       FAMILY HISTORY:  Family History   Problem Relation Age of Onset     C.A.D. Mother      Diabetes Mother      Arthritis Mother      Thyroid Disease Mother      C.A.D. Father      Arthritis Sister      Genetic Disorder Other         nephew         SOCIAL HISTORY:  Social History     Tobacco Use     Smoking status: Never Smoker     Smokeless tobacco: Never Used   Substance Use Topics     Alcohol use: Yes     Comment: 4 drinks per month     Drug use: No       ROS:   Answers for HPI/ROS submitted by the patient on 12/3/2021  General Symptoms: Yes  Skin Symptoms: No  HENT Symptoms: No  EYE SYMPTOMS: No  HEART SYMPTOMS: Yes  LUNG SYMPTOMS: Yes  INTESTINAL SYMPTOMS: No  URINARY SYMPTOMS: Yes  GYNECOLOGIC SYMPTOMS: No  BREAST SYMPTOMS: No  SKELETAL SYMPTOMS: Yes  BLOOD SYMPTOMS: No  NERVOUS SYSTEM SYMPTOMS: No  MENTAL HEALTH SYMPTOMS: No  Fever: No  Loss of appetite: No  Weight loss: No  Weight gain: No  Fatigue: Yes  Night sweats: No  Chills: No  Increased stress: No  Excessive hunger: No  Excessive thirst: No  Feeling hot or cold when others believe the temperature is normal: No  Loss of height: No  Post-operative complications: No  Surgical site pain: No  Hallucinations: No  Change in or Loss of Energy: No  Hyperactivity: No  Confusion: No  Chest pain or pressure: No  Fast or irregular heartbeat: Yes  Pain in legs with walking: No  Trouble breathing while lying down: No  Fingers or toes appear blue: No  High blood pressure: No  Low blood pressure: No  Fainting: No  Murmurs: Yes  Pacemaker: No  Varicose veins: No  Edema or swelling: No  Wake up at night with shortness of breath: No  Light-headedness: No  Exercise intolerance: No  Cough: Yes  Sputum or phlegm: No  Coughing up blood: No  Difficulty breating or shortness of breath:  "No  Snoring: No  Wheezing: No  Difficulty breathing on exertion: No  Nighttime Cough: No  Difficulty breathing when lying flat: No  Trouble holding urine or incontinence: No  Pain or burning: No  Trouble starting or stopping: No  Increased frequency of urination: Yes  Blood in urine: No  Decreased frequency of urination: No  Frequent nighttime urination: No  Flank pain: No  Difficulty emptying bladder: No  Back pain: Yes  Muscle aches: Yes  Neck pain: No  Swollen joints: Yes  Joint pain: Yes  Bone pain: Yes  Muscle cramps: No  Muscle weakness: Yes  Joint stiffness: Yes  Bone fracture: No      Exam:  BP (!) 129/93   Pulse 83   Ht 1.675 m (5' 5.95\")   Wt 86.7 kg (191 lb 1.6 oz)   LMP 05/17/2011   SpO2 96%   BMI 30.90 kg/m    GENERAL APPEARANCE: alert and no distress  HEENT: no icterus, no central cyanosis  LYMPH/NECK: no adenopathy, no asymmetry, JVP not elevated, no carotid bruits.  RESPIRATORY: lungs clear to auscultation - no rales, rhonchi or wheezes, no use of accessory muscles, no retractions, respirations are unlabored, normal respiratory rate  CARDIOVASCULAR: irregularly regular rhythm, normal S1, S2, no S3 or S4, left parasternal 2/6 systolic ejection murmur  GI: soft, non tender  EXTREMITIES: no edema  NEURO: alert, normal speech,and affect  SKIN: no ecchymoses, no rashes     I have reviewed the labs and personally reviewed the imaging below and made my comment in the assessment and plan.    Labs:  CBC RESULTS:   Lab Results   Component Value Date    WBC 5.1 08/20/2021    WBC 6.1 09/08/2014    RBC 4.48 08/20/2021    RBC 4.60 09/08/2014    HGB 13.8 08/20/2021    HGB 14.2 10/13/2017    HCT 42.3 08/20/2021    HCT 45.5 10/13/2017    MCV 94 08/20/2021    MCV 95.9 10/13/2017    MCH 30.8 08/20/2021    MCH 30.0 10/13/2017    MCHC 32.6 08/20/2021    MCHC 31.2 (L) 10/13/2017    RDW 12.6 08/20/2021    RDW 12.7 09/08/2014     08/20/2021     09/08/2014       BMP RESULTS:  Lab Results   Component Value " Date     08/20/2021    .4 07/29/2020    POTASSIUM 4.0 08/20/2021    POTASSIUM 4.2 07/29/2020    CHLORIDE 108 08/20/2021    CHLORIDE 99.3 07/29/2020    CO2 28 08/20/2021    CO2 28.7 07/29/2020    ANIONGAP 5 08/20/2021    ANIONGAP 8 09/08/2014    GLC 86 08/20/2021    .0 (H) 07/29/2020    BUN 16 08/20/2021    BUN 14.3 07/29/2020    CR 0.79 11/29/2021    CR 0.7 07/29/2020    GFRESTIMATED 78 11/29/2021    GFRESTIMATED 86.8 07/29/2020    GFRESTBLACK >90 07/29/2020    VALENTINA 8.8 11/29/2021    VALENTINA 9.8 07/29/2020      Echocardiogram 11/3/2021  Moderate, paradoxical low-flow, aortic stenosis is present.  Global and regional left ventricular function is normal with an EF of 60-65%.  Global right ventricular function is normal.  IVC diameter <2.1 cm collapsing >50% with sniff suggests a normal RA pressure  of 3 mmHg.  There is no prior study for direct comparison.  Moderate aortic valve calcification is present. Moderate aortic stenosis is  present. The calculated aortic valve are is 1.2 cm^2. The mean AoV pressure  gradient is 17.8 mmHg.    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  It appears idiopathic at this time.  The patient is symptomatic therefore I will start her on metoprolol 25 mg daily.  -Recommended cardiac MRI to rule out infiltrative disease.  -Recommend sleep study to rule out sleep apnea     #Moderate aortic stenosis  -It is difficult to assess aortic valve morphology bicuspid versus tricuspid.  I recommended cardiac MRA of the chest to assess aortic valve morphology and ascending aorta.    Return to clinic 3 months    Total time spent today for this visit is 60 minutes including precharting, face-to-face clinic visit, review of labs/imaging and medical  documentation.    Please donot hesitate to contact me if you have any questions or concerns. Again, thank you for allowing me to participate in the care of your patient.    Nicole BACA MD  [facility] Division of Cardiology  Pager [phone]

## 2021-12-06 NOTE — PROGRESS NOTES
General Cardiology Clinic       Referring provider:Gaby Lynn MD    Chief complaint: Palpitations, fatigue and chest pressure    HPI: Ms. Vickie Alvarado is a 66 year old  female with PMH significant for    -Hyperlipidemia  -Obesity    Patient is being seen today at the request of  for moderate aortic stenosis recently detected on echocardiogram.  Patient was seen on 9/28/2021 and found to have systolic murmur which was apparently not previously heard.  She was also found to have irregular cardiac rhythm on physical exam.  This was not demonstrated on EKG.  She was recommended echocardiogram and Zio patch.  I reviewed the echocardiogram from 11/3/2021 which shows calcification of the aortic valve with moderate aortic stenosis.  Zio patch showed 9.9% PVCs corresponding to patient's symptoms.  Patient reports feeling fatigued, chest pressure and heart flutters when she lies down at night.  Does not happen every day.  She tells me that she is not aware of the onset of the symptoms or the total duration of the episodes.  She cannot tell me how long this has been going on but definitely not too long.  No apparent trigger.  She recently had knee surgery and she is doing physical therapy in the pool.  No limitation of physical activity during the day.    Lifetime non-smoker.  Alcohol rarely.  No drug abuse.   The patient's risk factor profile is: (-) HTN, (-) diabetes, (+) hyperlipidemia, (-) tobacco use, (-) family Hx CAD.  Patient tells me that there is family members on his dad's side with hypertrophic cardiomyopathy.  His nephew was diagnosed with HCM as well.  No premature CAD or sudden cardiac death in the family.    Medications, personal, family, and social history reviewed with patient and revised.    PAST MEDICAL HISTORY:  Past Medical History:   Diagnosis Date     Arthritis      Chest pain      Diarrhea       Headache(784.0)      Hoarseness      Indigestion      Itchy eyes     itchy nose and throat     Nasal congestion      Night sweats      Numbness      Other and unspecified hyperlipidemia      Pneumonia      Sleep problems      Sneezing      Sore throat      Tinnitus      Weight loss        CURRENT MEDICATIONS:  Current Outpatient Medications   Medication Sig Dispense Refill     alendronate (FOSAMAX) 70 MG tablet Take 1 tablet (70 mg) by mouth every 7 days 12 tablet 3     calcium carb-cholecalciferol 600-500 MG-UNIT CAPS        Lactobacillus (PROBIOTIC CHILDRENS) CHEW        loratadine (CLARITIN) 10 MG tablet Take 10 mg by mouth daily. Prn, seasonal for ragweed and lilacs       Omega-3 Fish Oil 500 MG capsule Take 1 capsule (500 mg) by mouth daily 90 capsule 3     simvastatin (ZOCOR) 40 MG tablet Take 1 tablet (40 mg) by mouth At Bedtime 30 tablet 11     vitamin D2 (ERGOCALCIFEROL) 07097 units (1250 mcg) capsule Take 1 capsule (50,000 Units) by mouth once a week 8 capsule 0       PAST SURGICAL HISTORY:  Past Surgical History:   Procedure Laterality Date     CATARACT IOL, RT/LT Right 2006     HC YAG LASER CAPSULOTOMY Right 2009     LASER VITREOLYSIS, ANTERIOR OD (RIGHT EYE) Right 2007     REPOSITION INTRAOCULAR LENS Right 11/18/2014    Procedure: REPOSITION INTRAOCULAR LENS;  Surgeon: Vickie Guerra MD;  Location: [facility]     VITRECTOMY PARSPLANA WITH 23 GAUGE SYSTEM Right 11/18/2014    Procedure: VITRECTOMY PARSPLANA WITH 23 GAUGE SYSTEM;  Surgeon: Vickie Guerra MD;  Location: [facility]       ALLERGIES:     Allergies   Allergen Reactions     Bactrim [Sulfamethoxazole W/Trimethoprim] Nausea     Headache     Seasonal Allergies      Typhoid Vaccines Nausea and Vomiting     Fever, vice- h/a, body aches.  Was hospitalized for 2d.       FAMILY HISTORY:  Family History   Problem Relation Age of Onset     C.A.D. Mother      Diabetes Mother      Arthritis Mother      Thyroid Disease Mother      C.A.D. Father       Arthritis Sister      Genetic Disorder Other         nephew         SOCIAL HISTORY:  Social History     Tobacco Use     Smoking status: Never Smoker     Smokeless tobacco: Never Used   Substance Use Topics     Alcohol use: Yes     Comment: 4 drinks per month     Drug use: No       ROS:   Answers for HPI/ROS submitted by the patient on 12/3/2021  General Symptoms: Yes  Skin Symptoms: No  HENT Symptoms: No  EYE SYMPTOMS: No  HEART SYMPTOMS: Yes  LUNG SYMPTOMS: Yes  INTESTINAL SYMPTOMS: No  URINARY SYMPTOMS: Yes  GYNECOLOGIC SYMPTOMS: No  BREAST SYMPTOMS: No  SKELETAL SYMPTOMS: Yes  BLOOD SYMPTOMS: No  NERVOUS SYSTEM SYMPTOMS: No  MENTAL HEALTH SYMPTOMS: No  Fever: No  Loss of appetite: No  Weight loss: No  Weight gain: No  Fatigue: Yes  Night sweats: No  Chills: No  Increased stress: No  Excessive hunger: No  Excessive thirst: No  Feeling hot or cold when others believe the temperature is normal: No  Loss of height: No  Post-operative complications: No  Surgical site pain: No  Hallucinations: No  Change in or Loss of Energy: No  Hyperactivity: No  Confusion: No  Chest pain or pressure: No  Fast or irregular heartbeat: Yes  Pain in legs with walking: No  Trouble breathing while lying down: No  Fingers or toes appear blue: No  High blood pressure: No  Low blood pressure: No  Fainting: No  Murmurs: Yes  Pacemaker: No  Varicose veins: No  Edema or swelling: No  Wake up at night with shortness of breath: No  Light-headedness: No  Exercise intolerance: No  Cough: Yes  Sputum or phlegm: No  Coughing up blood: No  Difficulty breating or shortness of breath: No  Snoring: No  Wheezing: No  Difficulty breathing on exertion: No  Nighttime Cough: No  Difficulty breathing when lying flat: No  Trouble holding urine or incontinence: No  Pain or burning: No  Trouble starting or stopping: No  Increased frequency of urination: Yes  Blood in urine: No  Decreased frequency of urination: No  Frequent nighttime urination: No  Flank  "pain: No  Difficulty emptying bladder: No  Back pain: Yes  Muscle aches: Yes  Neck pain: No  Swollen joints: Yes  Joint pain: Yes  Bone pain: Yes  Muscle cramps: No  Muscle weakness: Yes  Joint stiffness: Yes  Bone fracture: No      Exam:  BP (!) 129/93   Pulse 83   Ht 1.675 m (5' 5.95\")   Wt 86.7 kg (191 lb 1.6 oz)   LMP 05/17/2011   SpO2 96%   BMI 30.90 kg/m    GENERAL APPEARANCE: alert and no distress  HEENT: no icterus, no central cyanosis  LYMPH/NECK: no adenopathy, no asymmetry, JVP not elevated, no carotid bruits.  RESPIRATORY: lungs clear to auscultation - no rales, rhonchi or wheezes, no use of accessory muscles, no retractions, respirations are unlabored, normal respiratory rate  CARDIOVASCULAR: irregularly regular rhythm, normal S1, S2, no S3 or S4, left parasternal 2/6 systolic ejection murmur  GI: soft, non tender  EXTREMITIES: no edema  NEURO: alert, normal speech,and affect  SKIN: no ecchymoses, no rashes     I have reviewed the labs and personally reviewed the imaging below and made my comment in the assessment and plan.    Labs:  CBC RESULTS:   Lab Results   Component Value Date    WBC 5.1 08/20/2021    WBC 6.1 09/08/2014    RBC 4.48 08/20/2021    RBC 4.60 09/08/2014    HGB 13.8 08/20/2021    HGB 14.2 10/13/2017    HCT 42.3 08/20/2021    HCT 45.5 10/13/2017    MCV 94 08/20/2021    MCV 95.9 10/13/2017    MCH 30.8 08/20/2021    MCH 30.0 10/13/2017    MCHC 32.6 08/20/2021    MCHC 31.2 (L) 10/13/2017    RDW 12.6 08/20/2021    RDW 12.7 09/08/2014     08/20/2021     09/08/2014       BMP RESULTS:  Lab Results   Component Value Date     08/20/2021    .4 07/29/2020    POTASSIUM 4.0 08/20/2021    POTASSIUM 4.2 07/29/2020    CHLORIDE 108 08/20/2021    CHLORIDE 99.3 07/29/2020    CO2 28 08/20/2021    CO2 28.7 07/29/2020    ANIONGAP 5 08/20/2021    ANIONGAP 8 09/08/2014    GLC 86 08/20/2021    .0 (H) 07/29/2020    BUN 16 08/20/2021    BUN 14.3 07/29/2020    CR 0.79 " 11/29/2021    CR 0.7 07/29/2020    GFRESTIMATED 78 11/29/2021    GFRESTIMATED 86.8 07/29/2020    GFRESTBLACK >90 07/29/2020    VALENTINA 8.8 11/29/2021    VALENTINA 9.8 07/29/2020      Echocardiogram 11/3/2021  Moderate, paradoxical low-flow, aortic stenosis is present.  Global and regional left ventricular function is normal with an EF of 60-65%.  Global right ventricular function is normal.  IVC diameter <2.1 cm collapsing >50% with sniff suggests a normal RA pressure  of 3 mmHg.  There is no prior study for direct comparison.  Moderate aortic valve calcification is present. Moderate aortic stenosis is  present. The calculated aortic valve are is 1.2 cm^2. The mean AoV pressure  gradient is 17.8 mmHg.    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  It appears idiopathic at this time.  The patient is symptomatic therefore I will start her on metoprolol 25 mg daily.  -Recommended cardiac MRI to rule out infiltrative disease.  -Recommend sleep study to rule out sleep apnea     #Moderate aortic stenosis  -It is difficult to assess aortic valve morphology bicuspid versus tricuspid.  I recommended cardiac MRA of the chest to assess aortic valve morphology and ascending aorta.    Return to clinic 3 months    Total time spent today for this visit is 60 minutes including precharting, face-to-face clinic visit, review of labs/imaging and medical documentation.    Please donot hesitate to contact me if you have any questions or concerns. Again, thank you for allowing me to participate in the care of your patient.    Nicole BACA MD  [facility] Division of Cardiology  Pager [phone]

## 2021-12-07 LAB
ATRIAL RATE - MUSE: 61 BPM
DIASTOLIC BLOOD PRESSURE - MUSE: NORMAL MMHG
INTERPRETATION ECG - MUSE: NORMAL
P AXIS - MUSE: 14 DEGREES
PR INTERVAL - MUSE: 134 MS
QRS DURATION - MUSE: 82 MS
QT - MUSE: 396 MS
QTC - MUSE: 398 MS
R AXIS - MUSE: -10 DEGREES
SYSTOLIC BLOOD PRESSURE - MUSE: NORMAL MMHG
T AXIS - MUSE: 3 DEGREES
VENTRICULAR RATE- MUSE: 61 BPM

## 2022-01-05 DIAGNOSIS — E78.5 HYPERLIPIDEMIA LDL GOAL <130: ICD-10-CM

## 2022-01-05 NOTE — TELEPHONE ENCOUNTER
"Request for medication refill: Simvastatin     Providers if patient needs an appointment and you are willing to give a one month supply please refill for one month and  send a letter/MyChart using \".SMILLIMITEDREFILL\" .smillimited and route chart to \"P SMI \" (Giving one month refill in non controlled medications is strongly recommended before denial)    If refill has been denied, meaning absolutely no refills without visit, please complete the smart phrase \".smirxrefuse\" and route it to the \"P SMI MED REFILLS\"  pool to inform the patient and the pharmacy.    Shiela Justice, [facility]        "

## 2022-01-06 RX ORDER — SIMVASTATIN 40 MG
40 TABLET ORAL AT BEDTIME
Qty: 90 TABLET | Refills: 3 | Status: SHIPPED | OUTPATIENT
Start: 2022-01-06 | End: 2023-02-17

## 2022-01-27 ENCOUNTER — HOSPITAL ENCOUNTER (OUTPATIENT)
Dept: MRI IMAGING | Facility: CLINIC | Age: 67
End: 2022-01-27
Attending: INTERNAL MEDICINE
Payer: MEDICARE

## 2022-01-27 VITALS
DIASTOLIC BLOOD PRESSURE: 78 MMHG | OXYGEN SATURATION: 97 % | RESPIRATION RATE: 16 BRPM | SYSTOLIC BLOOD PRESSURE: 133 MMHG | HEART RATE: 70 BPM

## 2022-01-27 DIAGNOSIS — R93.1 ABNORMAL FINDINGS ON DIAGNOSTIC IMAGING OF HEART AND CORONARY CIRCULATION: ICD-10-CM

## 2022-01-27 DIAGNOSIS — I49.3 PVC'S (PREMATURE VENTRICULAR CONTRACTIONS): ICD-10-CM

## 2022-01-27 DIAGNOSIS — I35.0 NONRHEUMATIC AORTIC VALVE STENOSIS: ICD-10-CM

## 2022-01-27 LAB
ATRIAL RATE - MUSE: 85 BPM
DIASTOLIC BLOOD PRESSURE - MUSE: NORMAL MMHG
INTERPRETATION ECG - MUSE: NORMAL
P AXIS - MUSE: 50 DEGREES
PR INTERVAL - MUSE: 138 MS
QRS DURATION - MUSE: 80 MS
QT - MUSE: 364 MS
QTC - MUSE: 433 MS
R AXIS - MUSE: 1 DEGREES
SYSTOLIC BLOOD PRESSURE - MUSE: NORMAL MMHG
T AXIS - MUSE: 10 DEGREES
VENTRICULAR RATE- MUSE: 85 BPM

## 2022-01-27 PROCEDURE — 255N000002 HC RX 255 OP 636: Performed by: INTERNAL MEDICINE

## 2022-01-27 PROCEDURE — 93005 ELECTROCARDIOGRAM TRACING: CPT

## 2022-01-27 PROCEDURE — 93010 ELECTROCARDIOGRAM REPORT: CPT | Mod: 59 | Performed by: INTERNAL MEDICINE

## 2022-01-27 PROCEDURE — 93017 CV STRESS TEST TRACING ONLY: CPT

## 2022-01-27 PROCEDURE — 250N000011 HC RX IP 250 OP 636: Performed by: INTERNAL MEDICINE

## 2022-01-27 PROCEDURE — 71555 MRI ANGIO CHEST W OR W/O DYE: CPT | Mod: 26 | Performed by: INTERNAL MEDICINE

## 2022-01-27 PROCEDURE — G1004 CDSM NDSC: HCPCS

## 2022-01-27 PROCEDURE — 999N000054 HC STATISTIC EKG NON-CHARGEABLE

## 2022-01-27 PROCEDURE — G1004 CDSM NDSC: HCPCS | Performed by: INTERNAL MEDICINE

## 2022-01-27 PROCEDURE — 93016 CV STRESS TEST SUPVJ ONLY: CPT | Performed by: INTERNAL MEDICINE

## 2022-01-27 PROCEDURE — 71555 MRI ANGIO CHEST W OR W/O DYE: CPT | Mod: ME

## 2022-01-27 PROCEDURE — 75563 CARD MRI W/STRESS IMG & DYE: CPT | Mod: 26 | Performed by: INTERNAL MEDICINE

## 2022-01-27 PROCEDURE — 93018 CV STRESS TEST I&R ONLY: CPT | Performed by: INTERNAL MEDICINE

## 2022-01-27 PROCEDURE — A9585 GADOBUTROL INJECTION: HCPCS | Performed by: INTERNAL MEDICINE

## 2022-01-27 PROCEDURE — 93010 ELECTROCARDIOGRAM REPORT: CPT | Mod: 76 | Performed by: INTERNAL MEDICINE

## 2022-01-27 RX ORDER — CAFFEINE CITRATE 20 MG/ML
60 SOLUTION INTRAVENOUS
Status: DISCONTINUED | OUTPATIENT
Start: 2022-01-27 | End: 2022-01-28 | Stop reason: HOSPADM

## 2022-01-27 RX ORDER — GADOBUTROL 604.72 MG/ML
10 INJECTION INTRAVENOUS ONCE
Status: COMPLETED | OUTPATIENT
Start: 2022-01-27 | End: 2022-01-27

## 2022-01-27 RX ORDER — ACYCLOVIR 200 MG/1
0-1 CAPSULE ORAL
Status: DISCONTINUED | OUTPATIENT
Start: 2022-01-27 | End: 2022-01-28 | Stop reason: HOSPADM

## 2022-01-27 RX ORDER — DIPHENHYDRAMINE HYDROCHLORIDE 50 MG/ML
25-50 INJECTION INTRAMUSCULAR; INTRAVENOUS
Status: DISCONTINUED | OUTPATIENT
Start: 2022-01-27 | End: 2022-01-28 | Stop reason: HOSPADM

## 2022-01-27 RX ORDER — AMINOPHYLLINE 25 MG/ML
100 INJECTION, SOLUTION INTRAVENOUS ONCE
Status: COMPLETED | OUTPATIENT
Start: 2022-01-27 | End: 2022-01-27

## 2022-01-27 RX ORDER — ALBUTEROL SULFATE 90 UG/1
2 AEROSOL, METERED RESPIRATORY (INHALATION) EVERY 5 MIN PRN
Status: DISCONTINUED | OUTPATIENT
Start: 2022-01-27 | End: 2022-01-28 | Stop reason: HOSPADM

## 2022-01-27 RX ORDER — ONDANSETRON 2 MG/ML
4 INJECTION INTRAMUSCULAR; INTRAVENOUS
Status: DISCONTINUED | OUTPATIENT
Start: 2022-01-27 | End: 2022-01-28 | Stop reason: HOSPADM

## 2022-01-27 RX ORDER — METHYLPREDNISOLONE SODIUM SUCCINATE 125 MG/2ML
125 INJECTION, POWDER, LYOPHILIZED, FOR SOLUTION INTRAMUSCULAR; INTRAVENOUS
Status: DISCONTINUED | OUTPATIENT
Start: 2022-01-27 | End: 2022-01-28 | Stop reason: HOSPADM

## 2022-01-27 RX ORDER — DIAZEPAM 5 MG
5 TABLET ORAL EVERY 30 MIN PRN
Status: DISCONTINUED | OUTPATIENT
Start: 2022-01-27 | End: 2022-01-28 | Stop reason: HOSPADM

## 2022-01-27 RX ORDER — REGADENOSON 0.08 MG/ML
0.4 INJECTION, SOLUTION INTRAVENOUS ONCE
Status: COMPLETED | OUTPATIENT
Start: 2022-01-27 | End: 2022-01-27

## 2022-01-27 RX ORDER — DIPHENHYDRAMINE HCL 25 MG
25 CAPSULE ORAL
Status: DISCONTINUED | OUTPATIENT
Start: 2022-01-27 | End: 2022-01-28 | Stop reason: HOSPADM

## 2022-01-27 RX ADMIN — GADOBUTROL 10 ML: 604.72 INJECTION INTRAVENOUS at 09:27

## 2022-01-27 RX ADMIN — AMINOPHYLLINE 100 MG: 25 INJECTION, SOLUTION INTRAVENOUS at 08:44

## 2022-01-27 RX ADMIN — REGADENOSON 0.4 MG: 0.08 INJECTION, SOLUTION INTRAVENOUS at 08:41

## 2022-01-27 RX ADMIN — GADOBUTROL 10 ML: 604.72 INJECTION INTRAVENOUS at 09:26

## 2022-01-27 NOTE — PROGRESS NOTES
Patient presents for cardiac stress MRI and denies caffeine consumption in the past 12 hours.  Patient is educated on procedure for cardiac stress MRI including the medications that will be used and their possible side effects.  Lungs are clear bulaterally.  Patient tolerated the Lexiscan injection reporting mild SOB which patient states has resolved by 4 minutes post injection.  Aminophylline is given per protocol and patient is monitored x 10 mn, then is left under the care of MRI staff.

## 2022-01-28 LAB
ATRIAL RATE - MUSE: 70 BPM
DIASTOLIC BLOOD PRESSURE - MUSE: NORMAL MMHG
INTERPRETATION ECG - MUSE: NORMAL
P AXIS - MUSE: 27 DEGREES
PR INTERVAL - MUSE: 128 MS
QRS DURATION - MUSE: 80 MS
QT - MUSE: 378 MS
QTC - MUSE: 408 MS
R AXIS - MUSE: -2 DEGREES
SYSTOLIC BLOOD PRESSURE - MUSE: NORMAL MMHG
T AXIS - MUSE: -53 DEGREES
VENTRICULAR RATE- MUSE: 70 BPM

## 2022-02-01 ENCOUNTER — OFFICE VISIT (OUTPATIENT)
Dept: FAMILY MEDICINE | Facility: CLINIC | Age: 67
End: 2022-02-01
Payer: MEDICARE

## 2022-02-01 ENCOUNTER — ANCILLARY PROCEDURE (OUTPATIENT)
Dept: GENERAL RADIOLOGY | Facility: CLINIC | Age: 67
End: 2022-02-01
Attending: FAMILY MEDICINE
Payer: MEDICARE

## 2022-02-01 VITALS
SYSTOLIC BLOOD PRESSURE: 112 MMHG | BODY MASS INDEX: 31.88 KG/M2 | HEART RATE: 86 BPM | OXYGEN SATURATION: 96 % | TEMPERATURE: 98.5 F | WEIGHT: 197.2 LBS | RESPIRATION RATE: 18 BRPM | DIASTOLIC BLOOD PRESSURE: 65 MMHG

## 2022-02-01 DIAGNOSIS — M80.00XS AGE-RELATED OSTEOPOROSIS WITH CURRENT PATHOLOGICAL FRACTURE, SEQUELA: Primary | ICD-10-CM

## 2022-02-01 DIAGNOSIS — G89.29 CHRONIC PAIN OF RIGHT KNEE: ICD-10-CM

## 2022-02-01 DIAGNOSIS — M80.00XS AGE-RELATED OSTEOPOROSIS WITH CURRENT PATHOLOGICAL FRACTURE, SEQUELA: ICD-10-CM

## 2022-02-01 DIAGNOSIS — M25.561 CHRONIC PAIN OF RIGHT KNEE: ICD-10-CM

## 2022-02-01 DIAGNOSIS — M17.11 PRIMARY OSTEOARTHRITIS OF RIGHT KNEE: ICD-10-CM

## 2022-02-01 PROCEDURE — 20610 DRAIN/INJ JOINT/BURSA W/O US: CPT | Performed by: FAMILY MEDICINE

## 2022-02-01 PROCEDURE — 73562 X-RAY EXAM OF KNEE 3: CPT | Mod: RT | Performed by: RADIOLOGY

## 2022-02-01 PROCEDURE — 99207 PR DROP WITH A PROCEDURE: CPT | Performed by: FAMILY MEDICINE

## 2022-02-01 RX ORDER — TRIAMCINOLONE ACETONIDE 40 MG/ML
40 INJECTION, SUSPENSION INTRA-ARTICULAR; INTRAMUSCULAR ONCE
Status: COMPLETED | OUTPATIENT
Start: 2022-02-01 | End: 2022-02-01

## 2022-02-01 RX ADMIN — TRIAMCINOLONE ACETONIDE 40 MG: 40 INJECTION, SUSPENSION INTRA-ARTICULAR; INTRAMUSCULAR at 11:54

## 2022-02-01 NOTE — PROGRESS NOTES
"  Assessment & Plan     Age-related osteoporosis with current pathological fracture, sequela    - XR Knee Right 3 Views; Future    Chronic pain of right knee    - XR Knee Right 3 Views; Future  - triamcinolone (KENALOG-40) injection 40 mg    Primary osteoarthritis of right knee  Moderate OA changes  Patient would prefer to be in pool therapy but due to omicron and Covid she has been concerned about pursuing this physical activity  Patient noted increased pain on the treadmill its possible that she may need to go to just a walking program             BMI:   Estimated body mass index is 31.88 kg/m  as calculated from the following:    Height as of 12/6/21: 1.675 m (5' 5.95\").    Weight as of this encounter: 89.4 kg (197 lb 3.2 oz).   Weight management plan: Discussed healthy diet and exercise guidelines    Work on weight loss  Regular exercise  See Patient Instructions    Return in about 6 weeks (around 3/15/2022), or if symptoms worsen or fail to improve.    Josie Jacob MD  [facility] MELISSA Grider is a 66 year old who presents for the following health issues  accompanied by her self.    HPI   R Knee Insufficiency fracture    Noted in 7/21    Working w/ pool therapy, land therapy, and silver sneakers    Taking fosamax + increasing Ca / Vit D in diet    Pt has helped improve the knee pain, maintenance, 3 months for 12 appts    Wasn't very active, PT, grocery store and sitting at home    Dec- 2 4 hour windows for shopping and can't go beyond 4 hours being out    Trying to live her life and do deep cleaning, hard to fall asleep, stabbing pain when going downstairs    Trying to avoid the pain, loves the pool, can go with Silver Sneakers but due to Omicron has stopped pool therapy, on red [place], 1 mile from home but just worries about state of pandemic    Stabbing pain hasn't really ever gone away    Had a heart MRI- heart monitor, PVCs and found out walking would be helpful, " PT started in October  Had her sister here, she had broken her hip  Trying to get heart appts out of the way  2019- PT for left knee, did 2 visits a week, 12 appts in 6 weeks  Had progress with PT, using bands and bending and moving over- Monster walk was impossible  Exercises added band, working on glute med and hip strength, gave up cane, able to walk if grabbing glutes        How many servings of fruits and vegetables do you eat daily?  2-3    On average, how many sweetened beverages do you drink each day (Examples: soda, juice, sweet tea, etc.  Do NOT count diet or artificially sweetened beverages)?   0    How many days per week do you exercise enough to make your heart beat faster? 3 or less    How many minutes a day do you exercise enough to make your heart beat faster? 9 or less, MRI cardiac- normal so wasn't increasing HR until was clearance    How many days per week do you miss taking your medication? 0        Review of Systems   Constitutional, HEENT, cardiovascular, pulmonary, gi and gu systems are negative, except as otherwise noted.      Objective    /65 (BP Location: Left arm, Patient Position: Sitting, Cuff Size: Adult Large)   Pulse 86   Temp 98.5  F (36.9  C) (Oral)   Resp 18   Wt 89.4 kg (197 lb 3.2 oz)   LMP 05/17/2011   SpO2 96%   Breastfeeding No   BMI 31.88 kg/m    Body mass index is 31.88 kg/m .  Physical Exam       Inspection:       AP/lateral alignment normal  Tender: proximal medial tibia      Non-tender: patellar facets, MCL, LCL, lateral joint line, medial joint line, IT band, posterior knee   Active Range of Motion: all normal  Strength: quad  5/5, Hamstrings  5/5  Special tests: normal Valgus stress test, normal Varus, negative Lachman's test, negative Lee's, no apprehension with lateral stress of the patella.                  PROCEDURE:  JOINT ASPIRATION/INJECTION.         After a discussion of risks, benefits and side effects of procedure, informed patient consent  was obtained.       The right knee was prepped and draped in the usual clean fashion (sterile not required for this procedure).      ASPIRATION: Not performed. (or)                                 INJECTION:  Using 4 cc of 1% lidocaine mixed                           with 40 mg of kenalog, the right knee was successfully injected                           without complication.  Patient did experience some pain                          relief following injection.

## 2022-02-01 NOTE — PATIENT INSTRUCTIONS
Right knee injected    Continue strengthening exercises, walking program  Weight loss    If knee pain is worsening please return to clinic

## 2022-02-13 ASSESSMENT — ENCOUNTER SYMPTOMS
HYPERTENSION: 0
NECK PAIN: 0
MUSCLE WEAKNESS: 0
SLEEP DISTURBANCES DUE TO BREATHING: 0
DOUBLE VISION: 0
HYPOTENSION: 0
DIFFICULTY URINATING: 0
STIFFNESS: 1
VOMITING: 0
DYSURIA: 0
DIARRHEA: 1
EYE WATERING: 0
BACK PAIN: 1
CONSTIPATION: 0
PALPITATIONS: 1
EXERCISE INTOLERANCE: 0
RECTAL PAIN: 0
BLOOD IN STOOL: 0
FLANK PAIN: 0
NAUSEA: 0
EYE REDNESS: 0
JOINT SWELLING: 1
MUSCLE CRAMPS: 0
BOWEL INCONTINENCE: 0
ABDOMINAL PAIN: 0
LIGHT-HEADEDNESS: 0
JAUNDICE: 0
SYNCOPE: 0
EYE IRRITATION: 0
EYE PAIN: 0
LEG PAIN: 0
ARTHRALGIAS: 1
HEARTBURN: 1
HEMATURIA: 0
MYALGIAS: 1
BLOATING: 0
ORTHOPNEA: 0

## 2022-02-13 ASSESSMENT — SLEEP AND FATIGUE QUESTIONNAIRES
HOW LIKELY ARE YOU TO NOD OFF OR FALL ASLEEP WHILE SITTING INACTIVE IN A PUBLIC PLACE: WOULD NEVER DOZE
HOW LIKELY ARE YOU TO NOD OFF OR FALL ASLEEP IN A CAR, WHILE STOPPED FOR A FEW MINUTES IN TRAFFIC: WOULD NEVER DOZE
HOW LIKELY ARE YOU TO NOD OFF OR FALL ASLEEP WHILE SITTING AND READING: WOULD NEVER DOZE
HOW LIKELY ARE YOU TO NOD OFF OR FALL ASLEEP WHEN YOU ARE A PASSENGER IN A CAR FOR AN HOUR WITHOUT A BREAK: WOULD NEVER DOZE
HOW LIKELY ARE YOU TO NOD OFF OR FALL ASLEEP WHILE WATCHING TV: SLIGHT CHANCE OF DOZING
HOW LIKELY ARE YOU TO NOD OFF OR FALL ASLEEP WHILE LYING DOWN TO REST IN THE AFTERNOON WHEN CIRCUMSTANCES PERMIT: MODERATE CHANCE OF DOZING
HOW LIKELY ARE YOU TO NOD OFF OR FALL ASLEEP WHILE SITTING QUIETLY AFTER LUNCH WITHOUT ALCOHOL: WOULD NEVER DOZE
HOW LIKELY ARE YOU TO NOD OFF OR FALL ASLEEP WHILE SITTING AND TALKING TO SOMEONE: WOULD NEVER DOZE

## 2022-02-15 PROBLEM — I49.3 PVC'S (PREMATURE VENTRICULAR CONTRACTIONS): Chronic | Status: ACTIVE | Noted: 2022-02-15

## 2022-02-15 PROBLEM — I35.0 AORTIC STENOSIS: Chronic | Status: ACTIVE | Noted: 2021-12-02

## 2022-02-15 PROBLEM — E66.09 CLASS 1 OBESITY DUE TO EXCESS CALORIES WITHOUT SERIOUS COMORBIDITY WITH BODY MASS INDEX (BMI) OF 30.0 TO 30.9 IN ADULT: Chronic | Status: ACTIVE | Noted: 2022-02-15

## 2022-02-15 PROBLEM — E66.811 CLASS 1 OBESITY DUE TO EXCESS CALORIES WITHOUT SERIOUS COMORBIDITY WITH BODY MASS INDEX (BMI) OF 30.0 TO 30.9 IN ADULT: Chronic | Status: ACTIVE | Noted: 2022-02-15

## 2022-02-15 PROBLEM — M84.469S: Status: RESOLVED | Noted: 2021-09-28 | Resolved: 2022-02-15

## 2022-02-15 PROBLEM — I49.3 PVC'S (PREMATURE VENTRICULAR CONTRACTIONS): Status: ACTIVE | Noted: 2022-02-15

## 2022-02-15 PROBLEM — M80.00XA AGE-RELATED OSTEOPOROSIS WITH CURRENT PATHOLOGICAL FRACTURE, INITIAL ENCOUNTER: Chronic | Status: ACTIVE | Noted: 2021-09-28

## 2022-02-15 PROBLEM — M80.00XA AGE-RELATED OSTEOPOROSIS WITH CURRENT PATHOLOGICAL FRACTURE, INITIAL ENCOUNTER: Status: ACTIVE | Noted: 2021-09-28

## 2022-02-16 ENCOUNTER — OFFICE VISIT (OUTPATIENT)
Dept: SLEEP MEDICINE | Facility: CLINIC | Age: 67
End: 2022-02-16
Attending: INTERNAL MEDICINE
Payer: MEDICARE

## 2022-02-16 VITALS
OXYGEN SATURATION: 96 % | RESPIRATION RATE: 12 BRPM | SYSTOLIC BLOOD PRESSURE: 126 MMHG | BODY MASS INDEX: 31.82 KG/M2 | WEIGHT: 198 LBS | HEIGHT: 66 IN | HEART RATE: 82 BPM | DIASTOLIC BLOOD PRESSURE: 78 MMHG

## 2022-02-16 DIAGNOSIS — I49.3 PVC'S (PREMATURE VENTRICULAR CONTRACTIONS): Primary | ICD-10-CM

## 2022-02-16 DIAGNOSIS — E66.09 CLASS 1 OBESITY DUE TO EXCESS CALORIES WITHOUT SERIOUS COMORBIDITY WITH BODY MASS INDEX (BMI) OF 30.0 TO 30.9 IN ADULT: Chronic | ICD-10-CM

## 2022-02-16 DIAGNOSIS — I35.0 NONRHEUMATIC AORTIC VALVE STENOSIS: ICD-10-CM

## 2022-02-16 DIAGNOSIS — E66.811 CLASS 1 OBESITY DUE TO EXCESS CALORIES WITHOUT SERIOUS COMORBIDITY WITH BODY MASS INDEX (BMI) OF 30.0 TO 30.9 IN ADULT: Chronic | ICD-10-CM

## 2022-02-16 DIAGNOSIS — R93.1 ABNORMAL FINDINGS ON DIAGNOSTIC IMAGING OF HEART AND CORONARY CIRCULATION: ICD-10-CM

## 2022-02-16 DIAGNOSIS — R06.00 DYSPNEA AND RESPIRATORY ABNORMALITY: ICD-10-CM

## 2022-02-16 DIAGNOSIS — R06.89 DYSPNEA AND RESPIRATORY ABNORMALITY: ICD-10-CM

## 2022-02-16 PROCEDURE — 99204 OFFICE O/P NEW MOD 45 MIN: CPT | Performed by: INTERNAL MEDICINE

## 2022-02-16 NOTE — PROGRESS NOTES
Outpatient Sleep Medicine Consultation:  February 16, 2022    Name: Vickie Alvarado MRN# [id number]   Age: 66 year old YOB: 1955     Date of Consultation: February 16, 2022  Consultation is requested by: Nicole Brooke MD  [address]  [place] [address] Nicole Brooke  Primary care provider: Gaby Lynn         Reason for Sleep Consult:     Vickie Alvarado is sent by Nicole Brooke for a sleep consultation regarding possible obstructive sleep apnea .    Chief Complaint   Patient presents with     Consult     PVC's,Nonrheumatic aortic valve stenosis, Abnormal findings on diagnostic imaging of heart and coronary circulation            Assessment and Plan:     Summary Sleep Diagnoses:  Mild/minimal snoring, no bedpartner, crowded oropharynx, obesity minimal symptoms, comorbidities      Comorbid Diagnoses:  PVCs, aortic stenosis       Summary Recommendations:  Polysomnogram (using 4% desaturation/Medicare/ AASM 1B scoring rules) for possible obstructive sleep apnea.          Summary Counseling:    STEVEN, risks, testing    I spent 30 minutes with patient including counseling, and 15 minutes with chart review, and documentation            History of Present Illness:       SLEEP-WAKE SCHEDULE:        Usually gets into bed at  1130 pm  Has trouble falling asleep 0 nights per week  She watches TV in bed most nights on a timer  Wakes up in the middle of the night >3  times per night due to 'light sleep' uncertain reasons  She has no trouble falling back asleep    Patient is usually up at 8-9 AM  Does not use alarm    Vickie Alvarado prefers to sleep in this position(s):  Side and back       Naps  Patient takes a purposeful nap 3  times a week and naps are usually 2-3 hours in duration  She dozes off unintentionally 0 days per week  Patient has not had a driving accident or near-miss due to sleepiness/drowsiness        Sleep Disruptions:  Breathing/Snoring  Patient snores: minimally per her  "sister  Patient lives alone     Patient has not been told she stops breathing in etelvina sleep:    Patient has had infrequent snort arousals     She has no morning headaches  She denies frequent reflux at night    Movement:  Patient denies restless leg syndrome symptoms   Patient has not been told she kicks her legs at night:        Behaviors in Sleep:  She denies any dream enactment          Caffeine, Alcohol and Other Substances:  Number of caffeinated beverages 1 coffee per day      Family History:  Patient has a family member been diagnosed with a sleep disorder:  Nephew sleep walks        Scales:  [place] Sleepiness Scale   How likely are you to doze off or fall asleep in the following situations, in contrast to just feeling tired?    This refers to your usual way of life recently.    Sitting and reading: Would never doze  Watching TV: Slight chance of dozing  Sitting, inactive in a public place (e.g. a theatre or a meeting): Would never doze  As a passenger in a car for an hour without a break: Would never doze  Lying down to rest in the afternoon when circumstances permit: Moderate chance of dozing  Sitting and talking to someone: Would never doze  Sitting quietly after a lunch without alcohol: Would never doze  In a car, while stopped for a few minutes in traffic: Would never doze    Total score - [place]: 3     M.WAnant Mireles  [id number] ESS - [place]/English - Final version - 21 Nov 07 - [facility] [place].       INSOMNIA SEVERITY INDEX (SUNNY)    The Insomnia Severity Index has seven questions. The seven answers are added up to get a total score. When you have your total score, look at the \"Guidelines for Scoring/Interpretation\" below to see where your sleep difficulty fits.     For each question, please choose the number that best describes your answer.     Please rate the CURRENT (i.e LAST 2 WEEKS) SEVERITY of your insomnia problem(s).     1. Difficulty falling asleep: Mild  2. Difficulty staying asleep: " Moderate  3. Problems waking up too early: None  4. How SATISFIED/DISSATISFIED are you with your CURRENT sleep pattern? Moderately Satisfied  5. How NOTICEABLE to others do you think your sleep problem is in terms of impairing the quality of your life? Not at all noticeable  6. How WORRIED/DISTRESSED are you about your sleep problem? Not at all Worried  7. To what extent do you consider your sleep problem to INTERFERE with your daily functioning (e.g. daytime fatigue, mood, ability to fun[place]on at work/daily chores, concentration, memory, mood, etc.) CURRENTLY? A Little    SUNNY Total Score: 6    Guidelines for Scoring/Interpretation:    Total score categories:  0-7 = No clinically significant insomnia   8-14 = Subthreshold insomnia   15-21 = Clinical insomnia (moderate severity)  22-28 = Clinical insomnia (severe)    Used via courtesy of www.[employer].va.gov with permission from Robbie Chowdhury PhD., [facility]          Allergies:    Allergies   Allergen Reactions     Bactrim [Sulfamethoxazole W/Trimethoprim] Nausea     Headache     Seasonal Allergies      Typhoid Vaccines Nausea and Vomiting     Fever, vice- h/a, body aches.  Was hospitalized for 2d.       Medications:    Current Outpatient Medications   Medication Sig Dispense Refill     alendronate (FOSAMAX) 70 MG tablet Take 1 tablet (70 mg) by mouth every 7 days 12 tablet 3     calcium carb-cholecalciferol 600-500 MG-UNIT CAPS        Lactobacillus (PROBIOTIC CHILDRENS) CHEW        loratadine (CLARITIN) 10 MG tablet Take 10 mg by mouth daily. Prn, seasonal for ragweed and lilacs       metoprolol succinate ER (TOPROL-XL) 25 MG 24 hr tablet Take 1 tablet (25 mg) by mouth daily 30 tablet 3     Omega-3 Fish Oil 500 MG capsule Take 1 capsule (500 mg) by mouth daily 90 capsule 3     simvastatin (ZOCOR) 40 MG tablet Take 1 tablet (40 mg) by mouth At Bedtime 90 tablet 3       Problem List:  Patient Active Problem List    Diagnosis Date Noted     Aortic stenosis  2021     Priority: Medium     Echo 10/19/21 - Moderate, paradoxical low-flow, aortic stenosis is present. Global and regional left ventricular function is normal with an EF of 60-65%. Global right ventricular function is normal. IVC diameter <2.1 cm collapsing >50% with sniff suggests a normal RA pressure of 3 mmHg. There is no prior study for direct comparison.       Hyperlipidemia 10/26/2012     Priority: Medium     PVC's (premature ventricular contractions) 02/15/2022     Priority: Low     Class 1 obesity due to excess calories without serious comorbidity with body mass index (BMI) of 30.0 to 30.9 in adult 02/15/2022     Priority: Low     Age-related osteoporosis with current pathological fracture, initial encounter 2021     Priority: Low     Fosamax started 2021, requires DXA 1-2 years, presumed bisphosphate course through        Toenail fungus 2017     Priority: Low     History of colonic polyps 06/15/2016     Priority: Low     Colonoscopy . Repeat in 5 years ()       Diverticular disease of large intestine 2016     Priority: Low     Family history of cardiomyopathy 2016     Priority: Low     Family history of malignant neoplasm of breast 2015     Priority: Low     Sister, post menopausal, .       Tinnitus 2013     Priority: Low     Senile nuclear sclerosis 10/26/2012     Priority: Low     Tear film insufficiency 10/26/2012     Priority: Low     Regular astigmatism 10/26/2012     Priority: Low     Myopia 10/26/2012     Priority: Low     After-cataract 10/26/2012     Priority: Low     Presbyopia 10/26/2012     Priority: Low     Pupillary abnormalities 10/26/2012     Priority: Low     Diplopia 10/26/2012     Priority: Low     Visual field defect 10/26/2012     Priority: Low     Vitreous membranes and strands 10/26/2012     Priority: Low     [facility] 10/26/2012     Priority: Low     Tier 1  DX V65.8 REPLACED WITH [id number] [facility]  (04/08/2013)          Past Medical/Surgical History:  Past Medical History:   Diagnosis Date     Aortic stenosis      Hyperlipidemia      Insufficiency fracture of tibia, sequela 09/28/2021     Polyp of colon 05/26/2016     Past Surgical History:   Procedure Laterality Date     CATARACT IOL, RT/LT Right 2006     HC YAG LASER CAPSULOTOMY Right 2009     LASER VITREOLYSIS, ANTERIOR OD (RIGHT EYE) Right 2007     REPOSITION INTRAOCULAR LENS Right 11/18/2014    Procedure: REPOSITION INTRAOCULAR LENS;  Surgeon: Vickie Guerra MD;  Location: [facility]     VITRECTOMY PARSPLANA WITH 23 GAUGE SYSTEM Right 11/18/2014    Procedure: VITRECTOMY PARSPLANA WITH 23 GAUGE SYSTEM;  Surgeon: Vickie Guerra MD;  Location: [facility]       Social History:  Social History     Socioeconomic History     Marital status: Single     Spouse name: Not on file     Number of children: Not on file     Years of education: Not on file     Highest education level: Not on file   Occupational History     Not on file   Tobacco Use     Smoking status: Never Smoker     Smokeless tobacco: Never Used   Vaping Use     Vaping Use: Never used   Substance and Sexual Activity     Alcohol use: Yes     Comment: 4 drinks per month     Drug use: No     Sexual activity: Never   Other Topics Concern     Not on file   Social History Narrative     Not on file     Social Determinants of Health     Financial Resource Strain: Not on file   Food Insecurity: Not on file   Transportation Needs: Not on file   Physical Activity: Not on file   Stress: Not on file   Social Connections: Not on file   Intimate Partner Violence: Not on file   Housing Stability: Not on file       Family History:  Family History   Problem Relation Age of Onset     C.A.D. Mother      Diabetes Mother      Arthritis Mother      Thyroid Disease Mother      C.A.D. Father      Arthritis Sister      Genetic Disorder Other         nephew       Review of Systems:  A complete review of systems reviewed  "by me is negative with the exeption of what has been mentioned in the history of present illness.    Dry eyes: Yes  Floaters: Yes  Fast or irregular heartbeat: Yes  Heart burn or indigestion: Yeso  Diarrhea: Yes  Trouble holding urine or incontinence: Yes  Back pain: Yes  Muscle aches: Yes  Swollen joints: Yes  Joint pain: Yes  Joint stiffness: Yes          Physical Examination:  Vitals: Resp 12   Ht 1.676 m (5' 6\")   Wt 89.8 kg (198 lb)   LMP 05/17/2011   BMI 31.96 kg/m    BMI= Body mass index is 31.96 kg/m .    Neck Cir (cm): 35 cm    SpO2 Readings from Last 4 Encounters:   02/16/22 96%   02/01/22 96%   01/27/22 97%   12/06/21 96%       GENERAL APPEARANCE: alert and no distress  EYES: eyes grossly normal to inspection  HENT: mouth without ulcers or lesions  NECK: generous size  LUNGS: no shortness of breath , cough, cleare  CV: regular rate and rhythm, 2/6 systolic ejection murmur   ABD: slight protruberamt  NEURO: mentation intact, speech normal and cranial nerves 2-12 appear intact  PSYCH: affect normal/bright  EXT: no edema  Skin: no rahes  Mallampati Class: 2-3              Data: All pertinent previous laboratory data reviewed     MR HEART  Result Date: 1/27/2022  ========================================================================================================== Clinical history: 66F with history of frequent monomorphic PVC, moderate AS. CMR to assess for possible infiltrative disease, aortic valve morphology and ascending aorta. Comparison CMR: none   1. The LV is normal in cavity size and wall thickness. The global systolic function is normal. The LVEF is 63%. There are no regional wall motion abnormalities. There is a myocardial crypt in the basal inferior segment.  2. The RV is normal in cavity size. The global systolic function is normal. The RVEF is 64%.   3. Both atria are normal in size.   4. The aortic valve is calcified and likely tricuspid. There is restricted opening of NCC and RCC. " Aortic stenosis is present, not quantified.   5. Late gadolinium enhancement imaging shows no MI, fibrosis or infiltrative disease.   6. Regadenoson stress perfusion imaging shows no ischemia. 7. There is no pericardial effusion or thickening.   8. There is no intracardiac thrombus.   MRA Aorta   1. The aortic root, ascending aorta, transverse arch and descending thoracic aorta are normal in size without an aneurysm or dissection.  2. The aortic arch is left sided. There is normal branching of the arch vessels. There is no coarctation.   3. The main and proximal branch pulmonary arteries are normal in size.   4. The systemic venous connections are normal.   CONCLUSIONS: No myocardial ischemia or infarction, with normal cardiac function, and no evidence of infiltrative disease. Tricuspid aortic valve, stenosis present (not quantified), with normal thoracic aort        CC: Nicole Kinney MD 2/16/2022

## 2022-02-16 NOTE — NURSING NOTE
Patient scheduled for PSG and follow-up with provider. Gave patient PSG hand-out. Jennifer Hicks, PEG

## 2022-02-16 NOTE — NURSING NOTE
"Chief Complaint   Patient presents with     Consult     PVC's,Nonrheumatic aortic valve stenosis, Abnormal findings on diagnostic imaging of heart and coronary circulation       Initial /78   Pulse 82   Resp 12   Ht 1.676 m (5' 6\")   Wt 89.8 kg (198 lb)   LMP 05/17/2011   SpO2 96%   BMI 31.96 kg/m   Estimated body mass index is 31.96 kg/m  as calculated from the following:    Height as of this encounter: 1.676 m (5' 6\").    Weight as of this encounter: 89.8 kg (198 lb).    Medication Reconciliation: complete   ESS: 3  Neck circumference: 35 centimeters.  DME: None  Jennifer Hicks CMA      "

## 2022-02-16 NOTE — PATIENT INSTRUCTIONS
Your BMI is Body mass index is 31.96 kg/m .  Weight management is a personal decision.  If you are interested in exploring weight loss strategies, the following discussion covers the approaches that may be successful. Body mass index (BMI) is one way to tell whether you are at a healthy weight, overweight, or obese. It measures your weight in relation to your height.  A BMI of 18.5 to 24.9 is in the healthy range. A person with a BMI of 25 to 29.9 is considered overweight, and someone with a BMI of 30 or greater is considered obese. More than two-thirds of American adults are considered overweight or obese.  Being overweight or obese increases the risk for further weight gain. Excess weight may lead to heart disease and diabetes.  Creating and following plans for healthy eating and physical activity may help you improve your health.  Weight control is part of healthy lifestyle and includes exercise, emotional health, and healthy eating habits. Careful eating habits lifelong are the mainstay of weight control. Though there are significant health benefits from weight loss, long-term weight loss with diet alone may be very difficult to achieve- studies show long-term success with dietary management in less than 10% of people. Attaining a healthy weight may be especially difficult to achieve in those with severe obesity. In some cases, medications, devices and surgical management might be considered.  What can you do?  If you are overweight or obese and are interested in methods for weight loss, you should discuss this with your provider.     Consider reducing daily calorie intake by 500 calories.     Keep a food journal.     Avoiding skipping meals, consider cutting portions instead.    Diet combined with exercise helps maintain muscle while optimizing fat loss. Strength training is particularly important for building and maintaining muscle mass. Exercise helps reduce stress, increase energy, and improves fitness.  Increasing exercise without diet control, however, may not burn enough calories to loose weight.       Start walking three days a week 10-20 minutes at a time    Work towards walking thirty minutes five days a week     Eventually, increase the speed of your walking for 1-2 minutes at time    And look into health and wellness programs that may be available through your health insurance provider, employer, local community center, or adan club.    Weight management plan: Patient was referred to their PCP to discuss a diet and exercise plan.

## 2022-02-23 ENCOUNTER — TRANSFERRED RECORDS (OUTPATIENT)
Dept: HEALTH INFORMATION MANAGEMENT | Facility: CLINIC | Age: 67
End: 2022-02-23
Payer: MEDICARE

## 2022-02-28 ENCOUNTER — E-VISIT (OUTPATIENT)
Dept: URGENT CARE | Facility: CLINIC | Age: 67
End: 2022-02-28
Payer: MEDICARE

## 2022-02-28 DIAGNOSIS — N39.0 ACUTE UTI (URINARY TRACT INFECTION): Primary | ICD-10-CM

## 2022-02-28 PROCEDURE — 99207 PR NO BILLABLE SERVICE THIS VISIT: CPT | Performed by: FAMILY MEDICINE

## 2022-02-28 RX ORDER — NITROFURANTOIN 25; 75 MG/1; MG/1
100 CAPSULE ORAL 2 TIMES DAILY
Qty: 10 CAPSULE | Refills: 0 | Status: SHIPPED | OUTPATIENT
Start: 2022-02-28 | End: 2022-03-05

## 2022-02-28 NOTE — PATIENT INSTRUCTIONS
Dear Vickie Alvarado    After reviewing your responses, I've been able to diagnose you with a urinary tract infection, which is a common infection of the bladder with bacteria.  This is not a sexually transmitted infection, though urinating immediately after intercourse can help prevent infections.  Drinking lots of fluids is also helpful to clear your current infection and prevent the next one.      I have sent a prescription for antibiotics to your pharmacy to treat this infection.    It is important that you take all of your prescribed medication even if your symptoms are improving after a few doses.  Taking all of your medicine helps prevent the symptoms from returning.     If your symptoms worsen, you develop pain in your back or stomach, develop fevers, or are not improving in 5 days, please contact your primary care provider for an appointment or visit any of our convenient Walk-in or Urgent Care Centers to be seen, which can be found on our website here.    Thanks again for choosing us as your health care partner,    Krystle Buckley MD

## 2022-03-14 NOTE — PROGRESS NOTES
Marni is a 66 year old who is being evaluated via a billable video visit.      How would you like to obtain your AVS? Mail a copy  If the video visit is dropped, the invitation should be resent by: Send to e-mail at: [email or web address]  Will anyone else be joining your video visit? No            Video-Visit Details    Type of service:  Video Visit  Video Start Time: 3 PM  Video End Time: 3:15 PM    Originating Location (pt. Location): Home    Distant Location (provider location):  [facility] HEART [facility]     Platform used for Video Visit: [facility]       Referring provider:Gaby Lynn MD    Chief complaint: Palpitations, fatigue and chest pressure    HPI: Ms. Vickie Alvarado is a 66 year old  female with PMH significant for    -Hyperlipidemia  -Obesity    Patient is being seen today at the request of  for moderate aortic stenosis recently detected on echocardiogram.  Patient was seen on 9/28/2021 and found to have systolic murmur which was apparently not previously heard.  She was also found to have irregular cardiac rhythm on physical exam.  This was not demonstrated on EKG.  She was recommended echocardiogram and Zio patch.  I reviewed the echocardiogram from 11/3/2021 which shows calcification of the aortic valve with moderate aortic stenosis.  Zio patch showed 9.9% PVCs corresponding to patient's symptoms.  Patient reports feeling fatigued, chest pressure and heart flutters when she lies down at night.  Does not happen every day.  She tells me that she is not aware of the onset of the symptoms or the total duration of the episodes.  She cannot tell me how long this has been going on but definitely not too long.  No apparent trigger.  She recently had knee surgery and she is doing physical therapy in the pool.  No limitation of physical activity during the day.     Lifetime non-smoker.  Alcohol rarely.  No drug abuse.   The patient's risk factor profile is: (-) HTN, (-) diabetes, (+) hyperlipidemia, (-) tobacco use, (-) family Hx CAD.  Patient tells me that there is family members on his dad's side with hypertrophic cardiomyopathy.  His nephew was diagnosed with HCM as well.  No premature CAD or sudden cardiac death in the family.    Medications, personal, family, and social history reviewed with patient and revised.    Interval history 3/15/2022:  Patient is being seen today to discuss results of cardiac MRI which showed trileaflet calcific aortic stenosis.  Normal biventricular size and function.  No infiltrative disease.  Ascending aorta is normal size.      When I saw her last time 3 months ago I started her on metoprolol 25 mg for frequent PVCs and palpitations.  She tells me that she no longer feels heart flutters.  She is very happy with the metoprolol.    PAST MEDICAL HISTORY:  Past Medical History:   Diagnosis Date     Aortic stenosis      Hyperlipidemia      Insufficiency fracture of tibia, sequela 09/28/2021     Polyp of colon 05/26/2016       CURRENT MEDICATIONS:  Current Outpatient Medications   Medication Sig Dispense Refill     alendronate (FOSAMAX) 70 MG tablet Take 1 tablet (70 mg) by mouth every 7 days 12 tablet 3     calcium carb-cholecalciferol 600-500 MG-UNIT CAPS        Lactobacillus (PROBIOTIC CHILDRENS) CHEW        loratadine (CLARITIN) 10 MG tablet Take 10 mg by mouth daily. Prn, seasonal for ragweed and lilacs       metoprolol succinate ER (TOPROL-XL) 25 MG 24 hr tablet Take 1 tablet (25 mg) by mouth daily 30 tablet 3     Omega-3 Fish Oil 500 MG capsule Take 1 capsule (500 mg) by mouth daily 90 capsule 3     simvastatin (ZOCOR) 40 MG tablet Take 1 tablet (40 mg) by mouth At Bedtime 90 tablet 3       PAST SURGICAL HISTORY:  Past Surgical History:   Procedure Laterality Date     CATARACT IOL, RT/LT Right 2006     HC YAG LASER CAPSULOTOMY Right 2009      LASER VITREOLYSIS, ANTERIOR OD (RIGHT EYE) Right 2007     REPOSITION INTRAOCULAR LENS Right 11/18/2014    Procedure: REPOSITION INTRAOCULAR LENS;  Surgeon: Vickie Guerra MD;  Location: [facility]     VITRECTOMY PARSPLANA WITH 23 GAUGE SYSTEM Right 11/18/2014    Procedure: VITRECTOMY PARSPLANA WITH 23 GAUGE SYSTEM;  Surgeon: Vickie Guerra MD;  Location: [facility]       ALLERGIES:     Allergies   Allergen Reactions     Bactrim [Sulfamethoxazole W/Trimethoprim] Nausea     Headache     Seasonal Allergies      Typhoid Vaccines Nausea and Vomiting     Fever, vice- h/a, body aches.  Was hospitalized for 2d.       FAMILY HISTORY:  Family History   Problem Relation Age of Onset     C.A.D. Mother      Diabetes Mother      Arthritis Mother      Thyroid Disease Mother      C.A.D. Father      Arthritis Sister      Genetic Disorder Other         nephew         SOCIAL HISTORY:  Social History     Tobacco Use     Smoking status: Never Smoker     Smokeless tobacco: Never Used   Vaping Use     Vaping Use: Never used   Substance Use Topics     Alcohol use: Yes     Comment: 4 drinks per month     Drug use: No         Exam:  Physical Exam Elements attainable via telehealth:       Constitutional - alert and no distress    Eyes - no redness, no discharge    Respiratory - no cough, no labored breathing    Skin - no discoloration or lesions on the face or the arm.    Neurological -alert, normal speech,and affect, no tremor.       I have reviewed the labs and personally reviewed the imaging below and made my comment in the assessment and plan.    Labs:  CBC RESULTS:   Lab Results   Component Value Date    WBC 5.1 08/20/2021    WBC 6.1 09/08/2014    RBC 4.48 08/20/2021    RBC 4.60 09/08/2014    HGB 13.8 08/20/2021    HGB 14.2 10/13/2017    HCT 42.3 08/20/2021    HCT 45.5 10/13/2017    MCV 94 08/20/2021    MCV 95.9 10/13/2017    MCH 30.8 08/20/2021    MCH 30.0 10/13/2017    MCHC 32.6 08/20/2021    MCHC 31.2 (L) 10/13/2017     RDW 12.6 08/20/2021    RDW 12.7 09/08/2014     08/20/2021     09/08/2014       BMP RESULTS:  Lab Results   Component Value Date     08/20/2021    .4 07/29/2020    POTASSIUM 4.0 08/20/2021    POTASSIUM 4.2 07/29/2020    CHLORIDE 108 08/20/2021    CHLORIDE 99.3 07/29/2020    CO2 28 08/20/2021    CO2 28.7 07/29/2020    ANIONGAP 5 08/20/2021    ANIONGAP 8 09/08/2014    GLC 86 08/20/2021    .0 (H) 07/29/2020    BUN 16 08/20/2021    BUN 14.3 07/29/2020    CR 0.79 11/29/2021    CR 0.7 07/29/2020    GFRESTIMATED 78 11/29/2021    GFRESTIMATED 86.8 07/29/2020    GFRESTBLACK >90 07/29/2020    VALENTINA 8.8 11/29/2021    VALENTINA 9.8 07/29/2020   Cardiac MRI January 27, 2022:  1. The LV is normal in cavity size and wall thickness. The global systolic function is normal. The LVEF is  63%. There are no regional wall motion abnormalities. 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      2. The RV is normal in cavity size. The global systolic function is normal. The RVEF is 64%.      3. Both atria are normal in size.     4. The aortic valve is calcified and likely tricuspid. There is restricted opening of NCC and RCC. Aortic  stenosis is present, not quantified.     5. Late gadolinium enhancement imaging shows no MI, fibrosis or infiltrative disease.      6. Regadenoson stress perfusion imaging shows no ischemia.     7. There is no pericardial effusion or thickening.     8. There is no intracardiac thrombus.     MRA Aorta     1. The aortic root, ascending aorta, transverse arch and descending thoracic aorta are normal in size  without an aneurysm or dissection.     2. The aortic arch is left sided. There is normal branching of the arch vessels. There is no coarctation.     3. The main and proximal branch pulmonary arteries are normal in size.      4. The systemic venous connections are normal.      CONCLUSIONS: No myocardial ischemia or infarction, with normal cardiac function, and no  evidence of infiltrative disease. Tricuspid aortic valve, stenosis present (not quantified), with normal thoracic  aorta.   Echocardiogram 11/3/2021  Moderate, paradoxical low-flow, aortic stenosis is present.  Global and regional left ventricular function is normal with an EF of 60-65%.  Global right ventricular function is normal.  IVC diameter <2.1 cm collapsing >50% with sniff suggests a normal RA pressure  of 3 mmHg.  There is no prior study for direct comparison.  Moderate aortic valve calcification is present. Moderate aortic stenosis is  present. The calculated aortic valve are is 1.2 cm^2. The mean AoV pressure  gradient is 17.8 mmHg.    EKG 12/6/2021 personally reviewed in clinic shows sinus rhythm otherwise normal      Zio patch              Assessment and Plan:     #Frequent monomorphic PVCs detected on Zio patch (9.9%) corresponding the patient's symptoms  -Patient has been on metoprolol 25 mg over the last 3 months which resolved her symptoms.  Cardiac MRI showed normal LV size and function.    #Moderate aortic stenosis  -Cardiac MRI reported trileaflet aortic valve stenosis.  -Recommend echo cardiogram in 1 year to recheck aortic stenosis.    Return to clinic in 1 year with prior echocardiogram.    Total time spent today for this visit is 30 minutes including precharting, face-to-face clinic visit, review of labs/imaging and medical documentation.    Please donot hesitate to contact me if you have any questions or concerns. Again, thank you for allowing me to participate in the care of your patient.    Nicole BACA MD  [facility] Division of Cardiology  Pager [phone]

## 2022-03-15 ENCOUNTER — VIRTUAL VISIT (OUTPATIENT)
Dept: CARDIOLOGY | Facility: CLINIC | Age: 67
End: 2022-03-15
Payer: MEDICARE

## 2022-03-15 DIAGNOSIS — I35.0 MODERATE AORTIC STENOSIS: Primary | ICD-10-CM

## 2022-03-15 DIAGNOSIS — I49.3 PVC'S (PREMATURE VENTRICULAR CONTRACTIONS): ICD-10-CM

## 2022-03-15 PROCEDURE — 99214 OFFICE O/P EST MOD 30 MIN: CPT | Mod: 95 | Performed by: INTERNAL MEDICINE

## 2022-03-15 RX ORDER — METOPROLOL SUCCINATE 25 MG/1
25 TABLET, EXTENDED RELEASE ORAL DAILY
Qty: 90 TABLET | Refills: 3 | Status: SHIPPED | OUTPATIENT
Start: 2022-03-15 | End: 2022-06-13

## 2022-03-15 NOTE — LETTER
3/15/2022      RE: Vickie Alvarado  [address]  [facility] [address]       Dear Colleague,    Thank you for the opportunity to participate in the care of your patient, Vickie Alvarado, at the [facility] HEART [facility]. Please see a copy of my visit note below.    Marni is a 66 year old who is being evaluated via a billable video visit.      How would you like to obtain your AVS? Mail a copy  If the video visit is dropped, the invitation should be resent by: Send to e-mail at: [email or web address]  Will anyone else be joining your video visit? No            Video-Visit Details    Type of service:  Video Visit  Video Start Time: 3 PM  Video End Time: 3:15 PM    Originating Location (pt. Location): Home    Distant Location (provider location):  [facility] HEART [facility]     Platform used for Video Visit: [facility]       Referring provider:Gaby Lynn MD    Chief complaint: Palpitations, fatigue and chest pressure    HPI: Ms. Vikcie Alvarado is a 66 year old  female with PMH significant for    -Hyperlipidemia  -Obesity    Patient is being seen today at the request of  for moderate aortic stenosis recently detected on echocardiogram.  Patient was seen on 9/28/2021 and found to have systolic murmur which was apparently not previously heard.  She was also found to have irregular cardiac rhythm on physical exam.  This was not demonstrated on EKG.  She was recommended echocardiogram and Zio patch.  I reviewed the echocardiogram from 11/3/2021 which shows calcification of the aortic valve with moderate aortic stenosis.  Zio patch showed 9.9% PVCs corresponding to patient's symptoms.  Patient reports feeling fatigued, chest pressure and heart flutters when she lies down at night.  Does not happen  every day.  She tells me that she is not aware of the onset of the symptoms or the total duration of the episodes.  She cannot tell me how long this has been going on but definitely not too long.  No apparent trigger.  She recently had knee surgery and she is doing physical therapy in the pool.  No limitation of physical activity during the day.    Lifetime non-smoker.  Alcohol rarely.  No drug abuse.   The patient's risk factor profile is: (-) HTN, (-) diabetes, (+) hyperlipidemia, (-) tobacco use, (-) family Hx CAD.  Patient tells me that there is family members on his dad's side with hypertrophic cardiomyopathy.  His nephew was diagnosed with HCM as well.  No premature CAD or sudden cardiac death in the family.    Medications, personal, family, and social history reviewed with patient and revised.    Interval history 3/15/2022:  Patient is being seen today to discuss results of cardiac MRI which showed trileaflet calcific aortic stenosis.  Normal biventricular size and function.  No infiltrative disease.  Ascending aorta is normal size.      When I saw her last time 3 months ago I started her on metoprolol 25 mg for frequent PVCs and palpitations.  She tells me that she no longer feels heart flutters.  She is very happy with the metoprolol.    PAST MEDICAL HISTORY:  Past Medical History:   Diagnosis Date     Aortic stenosis      Hyperlipidemia      Insufficiency fracture of tibia, sequela 09/28/2021     Polyp of colon 05/26/2016       CURRENT MEDICATIONS:  Current Outpatient Medications   Medication Sig Dispense Refill     alendronate (FOSAMAX) 70 MG tablet Take 1 tablet (70 mg) by mouth every 7 days 12 tablet 3     calcium carb-cholecalciferol 600-500 MG-UNIT CAPS        Lactobacillus (PROBIOTIC CHILDRENS) CHEW        loratadine (CLARITIN) 10 MG tablet Take 10 mg by mouth daily. Prn, seasonal for ragweed and lilacs       metoprolol succinate ER (TOPROL-XL) 25 MG 24 hr tablet Take 1 tablet (25 mg) by mouth daily  30 tablet 3     Omega-3 Fish Oil 500 MG capsule Take 1 capsule (500 mg) by mouth daily 90 capsule 3     simvastatin (ZOCOR) 40 MG tablet Take 1 tablet (40 mg) by mouth At Bedtime 90 tablet 3       PAST SURGICAL HISTORY:  Past Surgical History:   Procedure Laterality Date     CATARACT IOL, RT/LT Right 2006     HC YAG LASER CAPSULOTOMY Right 2009     LASER VITREOLYSIS, ANTERIOR OD (RIGHT EYE) Right 2007     REPOSITION INTRAOCULAR LENS Right 11/18/2014    Procedure: REPOSITION INTRAOCULAR LENS;  Surgeon: Vickie Guerra MD;  [facility]     VITRECTOMY PARSPLANA WITH 23 GAUGE SYSTEM Right 11/18/2014    Procedure: VITRECTOMY PARSPLANA WITH 23 GAUGE SYSTEM;  Surgeon: Vickie Guerra MD;  Location:  [facility]       ALLERGIES:     Allergies   Allergen Reactions     Bactrim [Sulfamethoxazole W/Trimethoprim] Nausea     Headache     Seasonal Allergies      Typhoid Vaccines Nausea and Vomiting     Fever, vice- h/a, body aches.  Was hospitalized for 2d.       FAMILY HISTORY:  Family History   Problem Relation Age of Onset     C.A.D. Mother      Diabetes Mother      Arthritis Mother      Thyroid Disease Mother      C.A.D. Father      Arthritis Sister      Genetic Disorder Other         nephew         SOCIAL HISTORY:  Social History     Tobacco Use     Smoking status: Never Smoker     Smokeless tobacco: Never Used   Vaping Use     Vaping Use: Never used   Substance Use Topics     Alcohol use: Yes     Comment: 4 drinks per month     Drug use: No         Exam:  Physical Exam Elements attainable via telehealth:       Constitutional - alert and no distress    Eyes - no redness, no discharge    Respiratory - no cough, no labored breathing    Skin - no discoloration or lesions on the face or the arm.    Neurological -alert, normal speech,and affect, no tremor.       I have reviewed the labs and personally reviewed the imaging below and made my comment in the assessment and plan.    Labs:  CBC RESULTS:   Lab Results    Component Value Date    WBC 5.1 08/20/2021    WBC 6.1 09/08/2014    RBC 4.48 08/20/2021    RBC 4.60 09/08/2014    HGB 13.8 08/20/2021    HGB 14.2 10/13/2017    HCT 42.3 08/20/2021    HCT 45.5 10/13/2017    MCV 94 08/20/2021    MCV 95.9 10/13/2017    MCH 30.8 08/20/2021    MCH 30.0 10/13/2017    MCHC 32.6 08/20/2021    MCHC 31.2 (L) 10/13/2017    RDW 12.6 08/20/2021    RDW 12.7 09/08/2014     08/20/2021     09/08/2014       BMP RESULTS:  Lab Results   Component Value Date     08/20/2021    .4 07/29/2020    POTASSIUM 4.0 08/20/2021    POTASSIUM 4.2 07/29/2020    CHLORIDE 108 08/20/2021    CHLORIDE 99.3 07/29/2020    CO2 28 08/20/2021    CO2 28.7 07/29/2020    ANIONGAP 5 08/20/2021    ANIONGAP 8 09/08/2014    GLC 86 08/20/2021    .0 (H) 07/29/2020    BUN 16 08/20/2021    BUN 14.3 07/29/2020    CR 0.79 11/29/2021    CR 0.7 07/29/2020    GFRESTIMATED 78 11/29/2021    GFRESTIMATED 86.8 07/29/2020    GFRESTBLACK >90 07/29/2020    VALENTINA 8.8 11/29/2021    VALENTINA 9.8 07/29/2020   Cardiac MRI January 27, 2022:  1. The LV is normal in cavity size and wall thickness. The global systolic function is normal. The LVEF is  63%. There are no regional wall motion abnormalities. 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      2. The RV is normal in cavity size. The global systolic function is normal. The RVEF is 64%.      3. Both atria are normal in size.     4. The aortic valve is calcified and likely tricuspid. There is restricted opening of NCC and RCC. Aortic  stenosis is present, not quantified.     5. Late gadolinium enhancement imaging shows no MI, fibrosis or infiltrative disease.      6. Regadenoson stress perfusion imaging shows no ischemia.     7. There is no pericardial effusion or thickening.     8. There is no intracardiac thrombus.     MRA Aorta     1. The aortic root, ascending aorta, transverse arch and descending thoracic aorta are normal in size  without an aneurysm or  dissection.     2. The aortic arch is left sided. There is normal branching of the arch vessels. There is no coarctation.     3. The main and proximal branch pulmonary arteries are normal in size.      4. The systemic venous connections are normal.      CONCLUSIONS: No myocardial ischemia or infarction, with normal cardiac function, and no evidence of infiltrative disease. Tricuspid aortic valve, stenosis present (not quantified), with normal thoracic  aorta.   Echocardiogram 11/3/2021  Moderate, paradoxical low-flow, aortic stenosis is present.  Global and regional left ventricular function is normal with an EF of 60-65%.  Global right ventricular function is normal.  IVC diameter <2.1 cm collapsing >50% with sniff suggests a normal RA pressure  of 3 mmHg.  There is no prior study for direct comparison.  Moderate aortic valve calcification is present. Moderate aortic stenosis is  present. The calculated aortic valve are is 1.2 cm^2. The mean AoV pressure  gradient is 17.8 mmHg.    EKG 12/6/2021 personally reviewed in clinic shows sinus rhythm otherwise normal      Zio patch              Assessment and Plan:     #Frequent monomorphic PVCs detected on Zio patch (9.9%) corresponding the patient's symptoms  -Patient has been on metoprolol 25 mg over the last 3 months which resolved her symptoms.  Cardiac MRI showed normal LV size and function.    #Moderate aortic stenosis  -Cardiac MRI reported trileaflet aortic valve stenosis.  -Recommend echo cardiogram in 1 year to recheck aortic stenosis.    Return to clinic in 1 year with prior echocardiogram.    Total time spent today for this visit is 30 minutes including precharting, face-to-face clinic visit, review of labs/imaging and medical documentation.    Please donot hesitate to contact me if you have any questions or concerns. Again, thank you for allowing me to participate in the care of your patient.    Nicole BACA MD  [facility] Division of  Cardiology  Pager [phone]

## 2022-03-15 NOTE — PATIENT INSTRUCTIONS
Thank you for coming to the [facility] [place]; please note the following instructions:    1.  Echocardiogram prior to visit in 1 year.    2.  Return to clinic 1 year.        If you have any questions regarding your visit please contact your care team:     Cardiology  Telephone Number   Vikki AUGUSTINE, RN  Kristina FONSECA, RN   Shiela DAMON, RMRAVINDER ZAMBRANO, RANDY SNEED, LPN  Kiya MENJIVAR, Visit Facilitator   [phone] (option 1)   For scheduling appts:     [phone] (select option 1)       For the Device Clinic (Pacemakers and ICD's)  RN's :  Zaina Cline   During business hours: [phone]    *After business hours:  [phone] (select option 4)      Normal test result notifications will be released via [employer] or mailed within 7 business days.  All other test results, will be communicated via telephone once reviewed by your cardiologist.    If you need a medication refill please contact your pharmacy.  Please allow 3 business days for your refill to be completed.    As always, thank you for trusting us with your health care needs!

## 2022-03-31 ASSESSMENT — ENCOUNTER SYMPTOMS
BACK PAIN: 0
JOINT SWELLING: 0
EYE WATERING: 0
VOMITING: 0
BLOOD IN STOOL: 0
DIARRHEA: 1
DIFFICULTY URINATING: 0
DYSURIA: 0
MYALGIAS: 0
EYE IRRITATION: 0
RECTAL PAIN: 0
BOWEL INCONTINENCE: 0
NECK PAIN: 0
HYPOTENSION: 0
LEG PAIN: 0
ORTHOPNEA: 0
HYPERTENSION: 0
FLANK PAIN: 0
SLEEP DISTURBANCES DUE TO BREATHING: 0
ARTHRALGIAS: 1
EYE REDNESS: 0
HEMATURIA: 0
LIGHT-HEADEDNESS: 0
STIFFNESS: 1
NAUSEA: 0
HEARTBURN: 1
CONSTIPATION: 0
EYE PAIN: 0
MUSCLE CRAMPS: 0
MUSCLE WEAKNESS: 0
JAUNDICE: 0
ABDOMINAL PAIN: 0
PALPITATIONS: 1
EXERCISE INTOLERANCE: 0
BLOATING: 0
DOUBLE VISION: 0
SYNCOPE: 0

## 2022-03-31 ASSESSMENT — SLEEP AND FATIGUE QUESTIONNAIRES
HOW LIKELY ARE YOU TO NOD OFF OR FALL ASLEEP WHILE SITTING AND READING: WOULD NEVER DOZE
HOW LIKELY ARE YOU TO NOD OFF OR FALL ASLEEP WHILE WATCHING TV: SLIGHT CHANCE OF DOZING
HOW LIKELY ARE YOU TO NOD OFF OR FALL ASLEEP WHILE LYING DOWN TO REST IN THE AFTERNOON WHEN CIRCUMSTANCES PERMIT: MODERATE CHANCE OF DOZING
HOW LIKELY ARE YOU TO NOD OFF OR FALL ASLEEP WHILE SITTING QUIETLY AFTER LUNCH WITHOUT ALCOHOL: WOULD NEVER DOZE
HOW LIKELY ARE YOU TO NOD OFF OR FALL ASLEEP WHILE SITTING INACTIVE IN A PUBLIC PLACE: WOULD NEVER DOZE
HOW LIKELY ARE YOU TO NOD OFF OR FALL ASLEEP WHILE SITTING AND TALKING TO SOMEONE: WOULD NEVER DOZE
HOW LIKELY ARE YOU TO NOD OFF OR FALL ASLEEP WHEN YOU ARE A PASSENGER IN A CAR FOR AN HOUR WITHOUT A BREAK: WOULD NEVER DOZE
HOW LIKELY ARE YOU TO NOD OFF OR FALL ASLEEP IN A CAR, WHILE STOPPED FOR A FEW MINUTES IN TRAFFIC: WOULD NEVER DOZE

## 2022-04-03 ENCOUNTER — THERAPY VISIT (OUTPATIENT)
Dept: SLEEP MEDICINE | Facility: CLINIC | Age: 67
End: 2022-04-03
Payer: MEDICARE

## 2022-04-03 DIAGNOSIS — R06.00 DYSPNEA AND RESPIRATORY ABNORMALITY: ICD-10-CM

## 2022-04-03 DIAGNOSIS — E66.09 CLASS 1 OBESITY DUE TO EXCESS CALORIES WITHOUT SERIOUS COMORBIDITY WITH BODY MASS INDEX (BMI) OF 30.0 TO 30.9 IN ADULT: Chronic | ICD-10-CM

## 2022-04-03 DIAGNOSIS — I35.0 NONRHEUMATIC AORTIC VALVE STENOSIS: ICD-10-CM

## 2022-04-03 DIAGNOSIS — R93.1 ABNORMAL FINDINGS ON DIAGNOSTIC IMAGING OF HEART AND CORONARY CIRCULATION: ICD-10-CM

## 2022-04-03 DIAGNOSIS — I49.3 PVC'S (PREMATURE VENTRICULAR CONTRACTIONS): ICD-10-CM

## 2022-04-03 DIAGNOSIS — R06.89 DYSPNEA AND RESPIRATORY ABNORMALITY: ICD-10-CM

## 2022-04-03 DIAGNOSIS — E66.811 CLASS 1 OBESITY DUE TO EXCESS CALORIES WITHOUT SERIOUS COMORBIDITY WITH BODY MASS INDEX (BMI) OF 30.0 TO 30.9 IN ADULT: Chronic | ICD-10-CM

## 2022-04-03 PROCEDURE — 95810 POLYSOM 6/> YRS 4/> PARAM: CPT | Performed by: INTERNAL MEDICINE

## 2022-04-04 NOTE — PATIENT INSTRUCTIONS
[place] [facility]    1. Your sleep study will be reviewed by a sleep physician within the next few days.     2. Please follow up in the sleep clinic as scheduled, or, make an appointment with your sleep provider to be seen within two weeks to discuss the results of the sleep study.    3. If you have any questions or problems with your treatment plan, please contact your sleep clinic provider at [phone] to further manage your condition.    4. Please review your attached medication list, and, at your follow-up appointment advise your sleep clinic provider about any changes.    5. Go to [email or web address] for more information about your sleep problems.    Yvette Proctor, MIGUELGT  April 4, 2022

## 2022-04-07 LAB — SLPCOMP: NORMAL

## 2022-04-07 NOTE — PROCEDURES
" SLEEP STUDY INTERPRETATION  DIAGNOSTIC POLYSOMNOGRAPHY REPORT      Patient: FRANDY ANDERSON  YOB: 1955  Study Date: 4/3/2022  MRN: [id number]  Referring Provider: MD Brooke Ilknur  Ordering Provider: MD Kinney Kent    Indications for Polysomnography: The patient is a 66 year old Female who is 5' 6\" and weighs 198.0 lbs. Her BMI is 32.2, [place] sleepiness scale 3 and neck circumference is 35 cm. Relevant medical history includes (co-morbidities). A diagnostic polysomnogram was performed to evaluate for Mild/minimal snoring, no bedpartner, crowded oropharynx, obesity minimal symptoms, comorbidities.       Polysomnogram Data: A full night polysomnogram recorded the standard physiologic parameters including EEG, EOG, EMG, ECG, nasal and oral airflow. Respiratory parameters of chest and abdominal movements were recorded with respiratory inductance plethysmography. Oxygen saturation was recorded by pulse oximetry. Hypopnea scoring rule used: 1B 4%.    Sleep Architecture:   The total recording time of the polysomnogram was 464.1 minutes. The total sleep time was 297.0 minutes. Sleep latency was normal at 14.1 minutes without the use of a sleep aid. REM latency was 393.5 minutes. Arousal index was increased at 52.1 arousals per hour. Sleep efficiency was decreased at 64.0%. Wake after sleep onset was 152.5 minutes. The patient spent 17.0% of total sleep time in Stage N1, 49.3% in Stage N2, 24.6% in Stage N3, and 9.1% in REM. Time in REM supine was 0 minutes.    Respiration:     Events ? The polysomnogram revealed a presence of 5 obstructive, 0 central, and 0 mixed apneas resulting in an apnea index of 1.0 events per hour. There were 1 obstructive hypopneas and 0 central hypopneas resulting in an obstructive hypopnea index of 0.2 and central hypopnea index of 0 events per hour. The combined apnea/hypopnea index was 1.2 events per hour (central apnea/hypopnea index was 0 events per hour). The REM AHI was 0 " events per hour. The supine AHI was 5.3 events per hour. The RERA index was 5.1 events per hour.  The RDI was 6.3 events per hour.    Snoring - was reported as absent    Respiratory rate and pattern - was notable for normal respiratory rate and pattern.    Sustained Sleep Associated Hypoventilation - Transcutaneous carbon dioxide monitoring was not used, however significant hypoventilation was not suggested by oximetry    Sleep Associated Hypoxemia - (Greater than 5 minutes O2 sat at or below 88%) was not present. Baseline oxygen saturation was 92.7%. Lowest oxygen saturation was 88.0%. Time spent less than or equal to 88% was 0 minutes. Time spent less than or equal to 89% was 1.6 minutes.    Movement Activity:     Periodic Limb Activity - There were 395 PLMs during the entire study. The PLM index was 79.8 movements per hour. The PLM Arousal Index was 28.7 per hour.    REM EMG Activity - Excessive transient/sustained muscle activity was not present.    Nocturnal Behavior - Abnormal sleep related behaviors were not noted     Bruxism - None apparent.      Cardiac Summary:   The average pulse rate was 64.1 bpm. The minimum pulse rate was 51.0 bpm while the maximum pulse rate was 86.0 bpm.  Arrhythmias were not noted.          Assessment:     No evidence of significant obstructive sleep apnea    68 minutes total sleep time supine    Periodic limb movements of sleep    Recommendations:    Suggest optimizing sleep schedule and avoiding sleep deprivation.    Weight management (if BMI > 30).    Pharmacologic therapy should be used for management of restless legs syndrome only if present and clinically indicated and not based on the presence of periodic limb movements alone.      Diagnostic Codes:   Nonrheumatic aortic valve stenosis I35.0    _____________________________________   Electronically Signed By: Joey Kinney MD 4/7/22           Range(%) Time in range (min)   0.0 - 89.0 1.6   0.0 - 88.0 -         Stage Min(mm Hg)  Max(mm Hg)   Wake - -   NREM(1+2+3) - -   REM - -       Range(mmHg) Time in range (min)   55.0 - 100.0 -   Excluded data <20.0 & >65.0 464.5

## 2022-04-23 ASSESSMENT — SLEEP AND FATIGUE QUESTIONNAIRES
HOW LIKELY ARE YOU TO NOD OFF OR FALL ASLEEP WHILE LYING DOWN TO REST IN THE AFTERNOON WHEN CIRCUMSTANCES PERMIT: MODERATE CHANCE OF DOZING
HOW LIKELY ARE YOU TO NOD OFF OR FALL ASLEEP WHILE WATCHING TV: SLIGHT CHANCE OF DOZING
HOW LIKELY ARE YOU TO NOD OFF OR FALL ASLEEP WHILE SITTING INACTIVE IN A PUBLIC PLACE: WOULD NEVER DOZE
HOW LIKELY ARE YOU TO NOD OFF OR FALL ASLEEP WHILE SITTING QUIETLY AFTER LUNCH WITHOUT ALCOHOL: WOULD NEVER DOZE
HOW LIKELY ARE YOU TO NOD OFF OR FALL ASLEEP WHILE SITTING AND READING: WOULD NEVER DOZE
HOW LIKELY ARE YOU TO NOD OFF OR FALL ASLEEP IN A CAR, WHILE STOPPED FOR A FEW MINUTES IN TRAFFIC: WOULD NEVER DOZE
HOW LIKELY ARE YOU TO NOD OFF OR FALL ASLEEP WHILE SITTING AND TALKING TO SOMEONE: WOULD NEVER DOZE
HOW LIKELY ARE YOU TO NOD OFF OR FALL ASLEEP WHEN YOU ARE A PASSENGER IN A CAR FOR AN HOUR WITHOUT A BREAK: WOULD NEVER DOZE

## 2022-04-28 ENCOUNTER — VIRTUAL VISIT (OUTPATIENT)
Dept: SLEEP MEDICINE | Facility: CLINIC | Age: 67
End: 2022-04-28
Payer: MEDICARE

## 2022-04-28 VITALS — BODY MASS INDEX: 30.92 KG/M2 | WEIGHT: 197 LBS | HEIGHT: 67 IN

## 2022-04-28 DIAGNOSIS — E66.811 CLASS 1 OBESITY DUE TO EXCESS CALORIES WITHOUT SERIOUS COMORBIDITY WITH BODY MASS INDEX (BMI) OF 30.0 TO 30.9 IN ADULT: Chronic | ICD-10-CM

## 2022-04-28 DIAGNOSIS — I35.0 AORTIC VALVE STENOSIS, ETIOLOGY OF CARDIAC VALVE DISEASE UNSPECIFIED: Primary | Chronic | ICD-10-CM

## 2022-04-28 DIAGNOSIS — E66.09 CLASS 1 OBESITY DUE TO EXCESS CALORIES WITHOUT SERIOUS COMORBIDITY WITH BODY MASS INDEX (BMI) OF 30.0 TO 30.9 IN ADULT: Chronic | ICD-10-CM

## 2022-04-28 PROCEDURE — 99212 OFFICE O/P EST SF 10 MIN: CPT | Mod: 95 | Performed by: INTERNAL MEDICINE

## 2022-04-28 ASSESSMENT — PAIN SCALES - GENERAL: PAINLEVEL: MODERATE PAIN (4)

## 2022-04-28 NOTE — PROGRESS NOTES
Marni is a 66 year old who is being evaluated via a billable video visit.      How would you like to obtain your AVS? MyChart  If the video visit is dropped, the invitation should be resent by: Send to e-mail at: [email or web address]  Will anyone else be joining your video visit? No       PUNEET Garcia    Video Start Time: 10:20 AM     Video-Visit Details    Type of service:  Video Visit    Video End Time:10:28 AM    Originating Location (pt. Location): Home    Distant Location (provider location):  [facility]     Platform used for Video Visit: [facility]         Sleep Study Follow-Up Visit:    Date on this visit: 4/28/2022    Vickie Alvarado comes in today for follow-up of her sleep study done at the [facility] for Mild/minimal snoring, no bedpartner, crowded oropharynx, obesity minimal symptoms, comorbidities -  PVCs, aortic stenosis     Study Date: 4/3/2022 (198.0 lbs)      Sleep Architecture:   The total recording time of the polysomnogram was 464.1 minutes. The total sleep time was 297.0 minutes. Sleep latency was normal at 14.1 minutes without the use of a sleep aid. REM latency was 393.5 minutes. Arousal index was increased at 52.1 arousals per hour. Sleep efficiency was decreased at 64.0%. Wake after sleep onset was 152.5 minutes. The patient spent 17.0% of total sleep time in Stage N1, 49.3% in Stage N2, 24.6% in Stage N3, and 9.1% in REM. Time in REM supine was 0 minutes.     Respiration:     Events ? The polysomnogram revealed a presence of 5 obstructive, 0 central, and 0 mixed apneas resulting in an apnea index of 1.0 events per hour. There were 1 obstructive hypopneas and 0 central hypopneas resulting in an obstructive hypopnea index of 0.2 and central hypopnea index of 0 events per hour. The combined apnea/hypopnea index was 1.2 events per hour (central apnea/hypopnea index was 0 events per hour). The REM AHI was 0 events per hour. The supine AHI was 5.3  events per hour. The RERA index was 5.1 events per hour.  The RDI was 6.3 events per hour.    Snoring - was reported as absent    Respiratory rate and pattern - was notable for normal respiratory rate and pattern.    Sustained Sleep Associated Hypoventilation - Transcutaneous carbon dioxide monitoring was not used, however significant hypoventilation was not suggested by oximetry    Sleep Associated Hypoxemia - (Greater than 5 minutes O2 sat at or below 88%) was not present. Baseline oxygen saturation was 92.7%. Lowest oxygen saturation was 88.0%. Time spent less than or equal to 88% was 0 minutes. Time spent less than or equal to 89% was 1.6 minutes.     Movement Activity:     Periodic Limb Activity - There were 395 PLMs during the entire study. The PLM index was 79.8 movements per hour. The PLM Arousal Index was 28.7 per hour.    REM EMG Activity - Excessive transient/sustained muscle activity was not present.    Nocturnal Behavior - Abnormal sleep related behaviors were not noted     Bruxism - None apparent.     Cardiac Summary:   The average pulse rate was 64.1 bpm. The minimum pulse rate was 51.0 bpm while the maximum pulse rate was 86.0 bpm.  Arrhythmias were not noted.       These findings were reviewed with patient.         Past medical/surgical history, family history, social history, medications and allergies were reviewed.      Problem List:  Patient Active Problem List    Diagnosis Date Noted     Aortic stenosis 12/02/2021     Priority: Medium     Echo 10/19/21 - Moderate, paradoxical low-flow, aortic stenosis is present. Global and regional left ventricular function is normal with an EF of 60-65%. Global right ventricular function is normal. IVC diameter <2.1 cm collapsing >50% with sniff suggests a normal RA pressure of 3 mmHg. There is no prior study for direct comparison.       Hyperlipidemia 10/26/2012     Priority: Medium     PVC's (premature ventricular contractions) 02/15/2022     Priority: Low      Class 1 obesity due to excess calories without serious comorbidity with body mass index (BMI) of 30.0 to 30.9 in adult 02/15/2022     Priority: Low     Age-related osteoporosis with current pathological fracture, initial encounter 2021     Priority: Low     Fosamax started 2021, requires DXA 1-2 years, presumed bisphosphate course through        Toenail fungus 2017     Priority: Low     History of colonic polyps 06/15/2016     Priority: Low     Colonoscopy . Repeat in 5 years ()       Diverticular disease of large intestine 2016     Priority: Low     Family history of cardiomyopathy 2016     Priority: Low     Family history of malignant neoplasm of breast 2015     Priority: Low     Sister, post menopausal, .       Tinnitus 2013     Priority: Low     Senile nuclear sclerosis 10/26/2012     Priority: Low     Tear film insufficiency 10/26/2012     Priority: Low     Regular astigmatism 10/26/2012     Priority: Low     Myopia 10/26/2012     Priority: Low     After-cataract 10/26/2012     Priority: Low     Presbyopia 10/26/2012     Priority: Low     Pupillary abnormalities 10/26/2012     Priority: Low     Diplopia 10/26/2012     Priority: Low     Visual field defect 10/26/2012     Priority: Low     Vitreous membranes and strands 10/26/2012     Priority: Low     [facility] 10/26/2012     Priority: Low     Tier 1  DX V65.8 REPLACED WITH [id number] [facility] (2013)          Impression/Plan:    No evidence of significant obstructive sleep apnea      She will follow up with me if needed    I spent 5 minutes with patient including counseling, and 5 minutes with chart review, and documentation

## 2022-05-24 DIAGNOSIS — N64.51 HARDNESS OF BREAST: Primary | ICD-10-CM

## 2022-05-24 DIAGNOSIS — Z80.3 FAMILY HISTORY OF MALIGNANT NEOPLASM OF BREAST: ICD-10-CM

## 2022-05-31 ENCOUNTER — ANCILLARY PROCEDURE (OUTPATIENT)
Dept: MAMMOGRAPHY | Facility: CLINIC | Age: 67
End: 2022-05-31
Attending: FAMILY MEDICINE
Payer: MEDICARE

## 2022-05-31 DIAGNOSIS — Z80.3 FAMILY HISTORY OF MALIGNANT NEOPLASM OF BREAST: ICD-10-CM

## 2022-05-31 DIAGNOSIS — N64.51 HARDNESS OF BREAST: ICD-10-CM

## 2022-05-31 PROCEDURE — 76642 ULTRASOUND BREAST LIMITED: CPT | Mod: LT | Performed by: STUDENT IN AN ORGANIZED HEALTH CARE EDUCATION/TRAINING PROGRAM

## 2022-06-03 ENCOUNTER — ANCILLARY PROCEDURE (OUTPATIENT)
Dept: MAMMOGRAPHY | Facility: CLINIC | Age: 67
End: 2022-06-03
Attending: FAMILY MEDICINE
Payer: MEDICARE

## 2022-06-03 DIAGNOSIS — N63.0 BREAST MASS: ICD-10-CM

## 2022-06-03 DIAGNOSIS — R92.8 OTHER ABNORMAL AND INCONCLUSIVE FINDINGS ON DIAGNOSTIC IMAGING OF BREAST: ICD-10-CM

## 2022-06-03 PROCEDURE — 88305 TISSUE EXAM BY PATHOLOGIST: CPT | Mod: 26 | Performed by: PATHOLOGY

## 2022-06-03 PROCEDURE — G0279 TOMOSYNTHESIS, MAMMO: HCPCS | Performed by: RADIOLOGY

## 2022-06-03 PROCEDURE — 88360 TUMOR IMMUNOHISTOCHEM/MANUAL: CPT | Mod: 26 | Performed by: PATHOLOGY

## 2022-06-03 PROCEDURE — 19083 BX BREAST 1ST LESION US IMAG: CPT | Mod: LT | Performed by: RADIOLOGY

## 2022-06-03 PROCEDURE — 88305 TISSUE EXAM BY PATHOLOGIST: CPT | Mod: TC | Performed by: FAMILY MEDICINE

## 2022-06-03 PROCEDURE — 77066 DX MAMMO INCL CAD BI: CPT | Performed by: RADIOLOGY

## 2022-06-03 PROCEDURE — 88377 M/PHMTRC ALYS ISHQUANT/SEMIQ: CPT | Mod: 26 | Performed by: MEDICAL GENETICS

## 2022-06-03 PROCEDURE — 77065 DX MAMMO INCL CAD UNI: CPT | Mod: LT | Performed by: RADIOLOGY

## 2022-06-03 PROCEDURE — 88377 M/PHMTRC ALYS ISHQUANT/SEMIQ: CPT | Performed by: FAMILY MEDICINE

## 2022-06-03 RX ORDER — IOPAMIDOL 612 MG/ML
100 INJECTION, SOLUTION INTRAVASCULAR ONCE
Status: COMPLETED | OUTPATIENT
Start: 2022-06-03 | End: 2022-06-03

## 2022-06-03 RX ADMIN — IOPAMIDOL 134 ML: 612 INJECTION, SOLUTION INTRAVASCULAR at 14:17

## 2022-06-08 ENCOUNTER — TELEPHONE (OUTPATIENT)
Dept: MAMMOGRAPHY | Facility: CLINIC | Age: 67
End: 2022-06-08
Payer: MEDICARE

## 2022-06-08 ENCOUNTER — PATIENT OUTREACH (OUTPATIENT)
Dept: ONCOLOGY | Facility: CLINIC | Age: 67
End: 2022-06-08
Payer: MEDICARE

## 2022-06-08 DIAGNOSIS — C50.912 INVASIVE DUCTAL CARCINOMA OF BREAST, FEMALE, LEFT (H): Primary | ICD-10-CM

## 2022-06-08 NOTE — TELEPHONE ENCOUNTER
Spoke to Marni about her new diagnosis of Invasive Ductal Carcinoma found in her left breast.  We discussed the Radiologist's recommendation of surgical and oncology consultation.  A referral has been placed and someone from their offices will be reaching out to help coordinate the appointments.  Marni verbalized understanding and all questions and concerns were answered at this time.

## 2022-06-08 NOTE — PROGRESS NOTES
New Patient Oncology Nurse Navigator Note     Referring provider: Gaby Lynn MD      Referring Clinic/Organization: [facility]      Referred to (specialty:) Medical Oncology and Cancer Surgery      Date Referral Received: June 8, 2022     Evaluation for:  Breast cancer     Clinical History (per Nurse review of records provided):      Patient presented with a superior mid left breast palpable concern and a bilateral diagnostic mammogram was conducted on 5/31/22 with left breast ultrasound following.    Mammogram views demonstrate new irregular isodense mass 11-12:00 position posterior depth measuring up to 3.2 cm that correlates with palpable concern.  No significant change right breast. Targeted ultrasound shows an irregular hypoechoic solid mass 11:00 position 8 cm nipple that correlates with mammogram, 3.1 x 2.6 x 1.6 cm. No morphologically suspicious lymph nodes in the left axilla. A contrast-enhanced mammogram followed on 6/3/22 and the mass on the left longest diameter is 33 mm. A 5 mm focus of enhancement posteriorly on the right at approximately 4:30 corresponds to a supernumerary nipple.    6/3/22-   LEFT breast, 11:00, 8 cm from nipple, mass, ultrasound guided core biopsy:  -INVASIVE BREAST CARCINOMA OF NO SPECIAL TYPE (INVASIVE DUCTAL CARCINOMA), Rantoul grade 3, measuring at least 14 mm in this biopsy  -Invasive carcinoma is estrogen receptor positive and progesterone receptor positive by immunohistochemistry (see biomarker reporting template below)  -HER2 FISH results will be reported separately by cytogenetics  -See comment  Comment     Invasive carcinoma involves 2 tissue cores, and is 14 mm in greatest dimension in a single core.  The Terry grade is 3 (tubule formation 3 + nuclear pleomorphism 3 + mitotic activity 2 = Rantoul score 8).  There are up to 10 mitotic figures in 10 high power fields (field diameter 0.5 mm).     HER2 by FISH subsequently reported Group 4 and IHC pending.   With IHC it was negative. Please see the original pathology report for detail.    Records Location: See Bookmarked material     Telephoned and spoke with patient to assist in scheduling medical oncology and breast surgery consultations.  She was pleased to hear from us so soon and call transferred to New Patient Scheduling to finalize appointments with Dr. Cadet on 6/13 and Dr. Horvath on 6/14.  Patient is very interested in participating in research if appropriate. Olga Sifuentes RN

## 2022-06-09 NOTE — TELEPHONE ENCOUNTER
RECORDS STATUS - BREAST    RECORDS REQUESTED FROM: Epic   DATE REQUESTED: 6/9   NOTES DETAILS STATUS   OFFICE NOTE from referring provider Epic Gaby Lynn MD    MEDICATION LIST [facility] 3/15/22   LABS     PATHOLOGY REPORTS  (Tissue diagnosis, Stage, ER/WY percentage positive and intensity of staining, HER2 IHC, FISH, and all biopsies from breast and any distant metastasis)                 [facility] 6/3/22L HER 2 MARIA ESTHER FISH/Surg Path   IMAGING (NEED IMAGES & REPORT)     MRI PACS [facility]   MAMMO PACS Epic   ULTRASOUND PACS Epic

## 2022-06-13 ENCOUNTER — ONCOLOGY VISIT (OUTPATIENT)
Dept: ONCOLOGY | Facility: CLINIC | Age: 67
End: 2022-06-13
Attending: FAMILY MEDICINE
Payer: MEDICARE

## 2022-06-13 ENCOUNTER — PRE VISIT (OUTPATIENT)
Dept: ONCOLOGY | Facility: CLINIC | Age: 67
End: 2022-06-13
Payer: MEDICARE

## 2022-06-13 VITALS
WEIGHT: 202 LBS | BODY MASS INDEX: 32.47 KG/M2 | SYSTOLIC BLOOD PRESSURE: 122 MMHG | HEIGHT: 66 IN | OXYGEN SATURATION: 96 % | DIASTOLIC BLOOD PRESSURE: 82 MMHG | RESPIRATION RATE: 16 BRPM | HEART RATE: 91 BPM | TEMPERATURE: 98 F

## 2022-06-13 DIAGNOSIS — C50.912 INVASIVE DUCTAL CARCINOMA OF BREAST, FEMALE, LEFT (H): ICD-10-CM

## 2022-06-13 DIAGNOSIS — I49.3 PVC'S (PREMATURE VENTRICULAR CONTRACTIONS): ICD-10-CM

## 2022-06-13 PROBLEM — Z17.0 MALIGNANT NEOPLASM OF UPPER-INNER QUADRANT OF LEFT BREAST IN FEMALE, ESTROGEN RECEPTOR POSITIVE (H): Status: ACTIVE | Noted: 2022-06-13

## 2022-06-13 PROBLEM — C50.212 MALIGNANT NEOPLASM OF UPPER-INNER QUADRANT OF LEFT BREAST IN FEMALE, ESTROGEN RECEPTOR POSITIVE (H): Status: ACTIVE | Noted: 2022-06-13

## 2022-06-13 LAB
CYTOGENETICS ADDENDUM: NORMAL
INTERPRETATION: NORMAL
PATH REPORT.ADDENDUM SPEC: ABNORMAL
PATH REPORT.COMMENTS IMP SPEC: ABNORMAL
PATH REPORT.COMMENTS IMP SPEC: YES
PATH REPORT.FINAL DX SPEC: ABNORMAL
PATH REPORT.GROSS SPEC: ABNORMAL
PATH REPORT.MICROSCOPIC SPEC OTHER STN: ABNORMAL
PATH REPORT.RELEVANT HX SPEC: ABNORMAL
PATHOLOGY SYNOPTIC REPORT: ABNORMAL
PHOTO IMAGE: ABNORMAL

## 2022-06-13 PROCEDURE — G0463 HOSPITAL OUTPT CLINIC VISIT: HCPCS

## 2022-06-13 PROCEDURE — 99204 OFFICE O/P NEW MOD 45 MIN: CPT | Performed by: SURGERY

## 2022-06-13 RX ORDER — METOPROLOL SUCCINATE 25 MG/1
25 TABLET, EXTENDED RELEASE ORAL DAILY
Qty: 90 TABLET | Refills: 3 | Status: SHIPPED | OUTPATIENT
Start: 2022-06-13 | End: 2023-07-10

## 2022-06-13 ASSESSMENT — PAIN SCALES - GENERAL: PAINLEVEL: MILD PAIN (3)

## 2022-06-13 NOTE — PROGRESS NOTES
HISTORY OF PRESENT ILLNESS:  Vickie Alvarado is a 66-year-old woman who presents with a new left breast cancer.  She noticed a palpable lump in her left breast.  She underwent a mammogram and ultrasound, which demonstrated a 3.1 x 3.2 cm mass.  Her lymph nodes looked normal by on ultrasound.  She underwent a contrast-enhanced mammography, which revealed a 33 mm lesion, no other suspicious findings.  She underwent an image-guided biopsy of a mass at the 11 o'clock position 8 cm from the nipple.  This demonstrated grade 3 invasive ductal cancer, ER positive, WV positive, HER-2/elif negative.  In reviewing her last screening mammogram in July, this was not present.  She has had no prior history of any breast problems.  She has otherwise been healthy except for obesity, PVCs and aortic stenosis.  She has had 2 sisters with breast cancer.  She states that one of her sisters underwent genetic testing and was negative.    PHYSICAL EXAMINATION:  She has a palpable 3 cm mass in her left breast at the 11 o'clock position.  She has no palpable lymphadenopathy.  Right breast examination reveals no dominant masses, skin changes, nipple changes or axillary lymphadenopathy.    IMPRESSION:  Stage II, ER positive breast cancer.    PLAN:  I talked to her about the various treatment options including mastectomy with or without reconstruction versus lumpectomy plus radiation therapy.  I did recommend a sentinel lymph node biopsy.  I told her that endocrine therapy would be recommended.  I told her I did not know whether or not she would benefit from chemotherapy.  She is scheduled to see Dr. Horvath tomorrow.  I did recommend that she have genetic testing.  She is going to have genetic testing tomorrow after Dr. Horvath's consult.  If  Dr. Horvath wants to give her chemotherapy based on the rapid growth of this tumor and the grade 3 tumor, then I would recommended it be done in the neoadjuvant setting.  If Dr. Horvath wants an  Oncotype DX study before starting therapy, then we would delay her surgery until those results are available.    Total time was 45 minutes, which included reviewing her imaging, in-person visit, coordinating her care.

## 2022-06-13 NOTE — NURSING NOTE
"Oncology Rooming Note    June 13, 2022 4:22 PM   Vickie Alvarado is a 66 year old female who presents for:    Chief Complaint   Patient presents with     Oncology Clinic Visit     New; Breast Ca     Initial Vitals: /82   Pulse 91   Temp 98  F (36.7  C) (Oral)   Resp 16   Ht 1.675 m (5' 5.95\")   Wt 91.6 kg (202 lb)   LMP 05/17/2011   SpO2 96%   BMI 32.66 kg/m   Estimated body mass index is 32.66 kg/m  as calculated from the following:    Height as of this encounter: 1.675 m (5' 5.95\").    Weight as of this encounter: 91.6 kg (202 lb). Body surface area is 2.06 meters squared.  Mild Pain (3) Comment: Data Unavailable   Patient's last menstrual period was 05/17/2011.  Allergies reviewed: Yes  Medications reviewed: Yes    Medications: Medication refills not needed today.  Pharmacy [facility] entered into [id number] - [place] [id number] & [facility]    Clinical concerns: Newly diagnosed.       Kristina Munguia CMA              "

## 2022-06-13 NOTE — LETTER
6/13/2022         RE: Vickie Alvarado  [address]  [place] [address]        Dear Colleague,    Thank you for referring your patient, Vickie Alvarado, to the [facility] BREAST [facility]. Please see a copy of my visit note below.    HISTORY OF PRESENT ILLNESS:  Vickie Alvarado is a 66-year-old woman who presents with a new left breast cancer.  She noticed a palpable lump in her left breast.  She underwent a mammogram and ultrasound, which demonstrated a 3.1 x 3.2 cm mass.  Her lymph nodes looked normal by on ultrasound.  She underwent a contrast-enhanced mammography, which revealed a 33 mm lesion, no other suspicious findings.  She underwent an image-guided biopsy of a mass at the 11 o'clock position 8 cm from the nipple.  This demonstrated grade 3 invasive ductal cancer, ER positive, HI positive, HER-2/elif negative.  In reviewing her last screening mammogram in July, this was not present.  She has had no prior history of any breast problems.  She has otherwise been healthy except for obesity, PVCs and aortic stenosis.  She has had 2 sisters with breast cancer.  She states that one of her sisters underwent genetic testing and was negative.    PHYSICAL EXAMINATION:  She has a palpable 3 cm mass in her left breast at the 11 o'clock position.  She has no palpable lymphadenopathy.  Right breast examination reveals no dominant masses, skin changes, nipple changes or axillary lymphadenopathy.    IMPRESSION:  Stage II, ER positive breast cancer.    PLAN:  I talked to her about the various treatment options including mastectomy with or without reconstruction versus lumpectomy plus radiation therapy.  I did recommend a sentinel lymph node biopsy.  I told her that endocrine therapy would be recommended.  I told her I did not know whether or not she would benefit from chemotherapy.  She is scheduled to see Dr. Horvath tomorrow.  I did recommend that she have genetic testing.  She is going to  have genetic testing tomorrow after Dr. Horvath's consult.  If  Dr. Horvath wants to give her chemotherapy based on the rapid growth of this tumor and the grade 3 tumor, then I would recommended it be done in the neoadjuvant setting.  If Dr. Horvath wants an Oncotype DX study before starting therapy, then we would delay her surgery until those results are available.    Total time was 45 minutes, which included reviewing her imaging, in-person visit, coordinating her care.      Sincerely,    Alphonso Cadet MD

## 2022-06-14 ENCOUNTER — PATIENT OUTREACH (OUTPATIENT)
Dept: ONCOLOGY | Facility: CLINIC | Age: 67
End: 2022-06-14
Payer: MEDICARE

## 2022-06-14 ENCOUNTER — ONCOLOGY VISIT (OUTPATIENT)
Dept: ONCOLOGY | Facility: CLINIC | Age: 67
End: 2022-06-14
Attending: INTERNAL MEDICINE
Payer: MEDICARE

## 2022-06-14 VITALS
OXYGEN SATURATION: 97 % | HEIGHT: 66 IN | TEMPERATURE: 98 F | SYSTOLIC BLOOD PRESSURE: 137 MMHG | BODY MASS INDEX: 32.47 KG/M2 | DIASTOLIC BLOOD PRESSURE: 86 MMHG | HEART RATE: 79 BPM | WEIGHT: 202 LBS

## 2022-06-14 DIAGNOSIS — Z86.0100 PERSONAL HISTORY OF COLONIC POLYPS: ICD-10-CM

## 2022-06-14 DIAGNOSIS — Z80.41 FAMILY HISTORY OF MALIGNANT NEOPLASM OF OVARY: ICD-10-CM

## 2022-06-14 DIAGNOSIS — Z80.3 FAMILY HISTORY OF MALIGNANT NEOPLASM OF BREAST: ICD-10-CM

## 2022-06-14 DIAGNOSIS — Z17.0 MALIGNANT NEOPLASM OF UPPER-INNER QUADRANT OF LEFT BREAST IN FEMALE, ESTROGEN RECEPTOR POSITIVE (H): Primary | ICD-10-CM

## 2022-06-14 DIAGNOSIS — C50.212 MALIGNANT NEOPLASM OF UPPER-INNER QUADRANT OF LEFT BREAST IN FEMALE, ESTROGEN RECEPTOR POSITIVE (H): Primary | ICD-10-CM

## 2022-06-14 LAB
INTERPRETATION: NORMAL
LAB PDF RESULT: NORMAL
SIGNIFICANT RESULTS: NORMAL
SPECIMEN DESCRIPTION: NORMAL
TEST DETAILS, MDL: NORMAL

## 2022-06-14 PROCEDURE — 99205 OFFICE O/P NEW HI 60 MIN: CPT | Performed by: INTERNAL MEDICINE

## 2022-06-14 PROCEDURE — G0463 HOSPITAL OUTPT CLINIC VISIT: HCPCS

## 2022-06-14 PROCEDURE — 36415 COLL VENOUS BLD VENIPUNCTURE: CPT | Performed by: INTERNAL MEDICINE

## 2022-06-14 ASSESSMENT — PAIN SCALES - GENERAL: PAINLEVEL: MILD PAIN (3)

## 2022-06-14 NOTE — PROGRESS NOTES
[facility]: Cancer Care                                                                                          Met with patient and niece to introduce self and provide contact information  Plan to submit Oncotype on biopsy specimen.   Will contact once results are back.   Encouraged her to call with questions or concerns.       Deborah King MSN, RN ,OCN  RN Care Coordinator   [facility]  [phone]

## 2022-06-14 NOTE — NURSING NOTE
Patient labs drawn in clinic via venipuncture. Patient tolerated well and was discharged. Specimen(s) sent to first floor lab for processing. See flowsheet for details.      Gisele Grissom on 6/14/2022 at 2:03 PM
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
## 2022-06-15 LAB — INTERPRETATION: NORMAL

## 2022-06-28 ENCOUNTER — VIRTUAL VISIT (OUTPATIENT)
Dept: ONCOLOGY | Facility: CLINIC | Age: 67
End: 2022-06-28
Attending: INTERNAL MEDICINE
Payer: MEDICARE

## 2022-06-28 DIAGNOSIS — Z17.0 MALIGNANT NEOPLASM OF UPPER-INNER QUADRANT OF LEFT BREAST IN FEMALE, ESTROGEN RECEPTOR POSITIVE (H): Primary | ICD-10-CM

## 2022-06-28 DIAGNOSIS — Z51.11 ENCOUNTER FOR ANTINEOPLASTIC CHEMOTHERAPY: ICD-10-CM

## 2022-06-28 DIAGNOSIS — C50.212 MALIGNANT NEOPLASM OF UPPER-INNER QUADRANT OF LEFT BREAST IN FEMALE, ESTROGEN RECEPTOR POSITIVE (H): Primary | ICD-10-CM

## 2022-06-28 LAB — SPECIMEN STATUS: NORMAL

## 2022-06-28 PROCEDURE — G0463 HOSPITAL OUTPT CLINIC VISIT: HCPCS | Mod: PN,RTG | Performed by: INTERNAL MEDICINE

## 2022-06-28 PROCEDURE — 99215 OFFICE O/P EST HI 40 MIN: CPT | Mod: 95 | Performed by: INTERNAL MEDICINE

## 2022-06-28 RX ORDER — EPINEPHRINE 1 MG/ML
0.3 INJECTION, SOLUTION INTRAMUSCULAR; SUBCUTANEOUS EVERY 5 MIN PRN
Status: CANCELLED | OUTPATIENT
Start: 2022-07-06

## 2022-06-28 RX ORDER — ONDANSETRON 2 MG/ML
8 INJECTION INTRAMUSCULAR; INTRAVENOUS ONCE
Status: CANCELLED | OUTPATIENT
Start: 2022-07-06

## 2022-06-28 RX ORDER — NALOXONE HYDROCHLORIDE 0.4 MG/ML
0.2 INJECTION, SOLUTION INTRAMUSCULAR; INTRAVENOUS; SUBCUTANEOUS
Status: CANCELLED | OUTPATIENT
Start: 2022-07-06

## 2022-06-28 RX ORDER — HEPARIN SODIUM (PORCINE) LOCK FLUSH IV SOLN 100 UNIT/ML 100 UNIT/ML
5 SOLUTION INTRAVENOUS
Status: CANCELLED | OUTPATIENT
Start: 2022-07-06

## 2022-06-28 RX ORDER — HEPARIN SODIUM,PORCINE 10 UNIT/ML
5 VIAL (ML) INTRAVENOUS
Status: CANCELLED | OUTPATIENT
Start: 2022-07-06

## 2022-06-28 RX ORDER — ALBUTEROL SULFATE 0.83 MG/ML
2.5 SOLUTION RESPIRATORY (INHALATION)
Status: CANCELLED | OUTPATIENT
Start: 2022-07-06

## 2022-06-28 RX ORDER — ALBUTEROL SULFATE 90 UG/1
1-2 AEROSOL, METERED RESPIRATORY (INHALATION)
Status: CANCELLED
Start: 2022-07-06

## 2022-06-28 RX ORDER — LORAZEPAM 2 MG/ML
0.5 INJECTION INTRAMUSCULAR EVERY 4 HOURS PRN
Status: CANCELLED | OUTPATIENT
Start: 2022-07-06

## 2022-06-28 RX ORDER — METHYLPREDNISOLONE SODIUM SUCCINATE 125 MG/2ML
125 INJECTION, POWDER, LYOPHILIZED, FOR SOLUTION INTRAMUSCULAR; INTRAVENOUS
Status: CANCELLED
Start: 2022-07-06

## 2022-06-28 RX ORDER — DIPHENHYDRAMINE HYDROCHLORIDE 50 MG/ML
50 INJECTION INTRAMUSCULAR; INTRAVENOUS
Status: CANCELLED
Start: 2022-07-06

## 2022-06-28 RX ORDER — MEPERIDINE HYDROCHLORIDE 25 MG/ML
25 INJECTION INTRAMUSCULAR; INTRAVENOUS; SUBCUTANEOUS EVERY 30 MIN PRN
Status: CANCELLED | OUTPATIENT
Start: 2022-07-06

## 2022-06-28 NOTE — PROGRESS NOTES
Marni is a 66 year old who is being evaluated via a billable video visit.      How would you like to obtain your AVS? [employer]  If the video visit is dropped, the invitation should be resent by: Text to cell phone: [phone]  Will anyone else be joining your video visit? No      Video-Visit Details    Type of service:  Video Visit    Originating Location (pt. Location): Home    Distant Location (provider location):  [facility]     Platform used for Video Visit: Acumensuzette    Oncology visit:  Date on this visit: 6/28/2022    Diagnosis: left breast cancer.    Primary Physician: Gaby Lynn     History Of Present Illness:  Ms. Alvarado is a 66 year old female with a left breast cancer.  She self palpated a mass in her mid left breast.  Bilateral diagnostic mammograms showed a mass in the upper inner left breast.  Ultrasound of the left breast showed a mass at 11:00, 8 cm from the nipple measuring 3.1 cm.  There were no suspicious lymph nodes in the left axilla.  Biopsy of the left breast mass was c/w a grade 3 invasive ductal carcinoma, ER strong in 98%, MT moderate in 70%, and HER2 low by IHC (score 1+).   Oncotype DX recurrence score was 24.    Interval History:  Patient was contacted via video visit today to review the results of Oncotype DX testing and treatment plan removing forward.  She continues to feel well.  She denies significant change in left breast mass since last visit.        Past Medical/Surgical History:  Past Medical History:   Diagnosis Date     Aortic stenosis      Hyperlipidemia      Insufficiency fracture of tibia, sequela 09/28/2021     Polyp of colon 05/26/2016   - PVCs    Past Surgical History:   Procedure Laterality Date     CATARACT IOL, RT/LT Right 2006     HC YAG LASER CAPSULOTOMY Right 2009     LASER VITREOLYSIS, ANTERIOR OD (RIGHT EYE) Right 2007     REPOSITION INTRAOCULAR LENS Right 11/18/2014    Procedure: REPOSITION INTRAOCULAR  LENS;  Surgeon: Vickie Guerra MD;  [facility]     VITRECTOMY PARSPLANA WITH 23 GAUGE SYSTEM Right 11/18/2014    Procedure: VITRECTOMY PARSPLANA WITH 23 GAUGE SYSTEM;  Surgeon: Vickie Guerra MD;  Location:  [facility]     Allergies:  Allergies as of 06/28/2022 - Reviewed 06/28/2022   Allergen Reaction Noted     Bactrim [sulfamethoxazole w/trimethoprim] Nausea 11/01/2017     Seasonal allergies  11/13/2014     Typhoid vaccines Nausea and Vomiting 05/05/2011     Current Medications:  Current Outpatient Medications   Medication Sig Dispense Refill     alendronate (FOSAMAX) 70 MG tablet Take 1 tablet (70 mg) by mouth every 7 days 12 tablet 0     alendronate (FOSAMAX) 70 MG tablet Take 1 tablet (70 mg) by mouth every 7 days 12 tablet 3     calcium carb-cholecalciferol 600-500 MG-UNIT CAPS        dexamethasone (DECADRON) 4 MG tablet Take 2 tablets (8 mg) by mouth 2 times daily (with meals) for 3 doses Start evening of Docetaxel infusion and continue for a total of 3 doses. 6 tablet 3     Lactobacillus (PROBIOTIC CHILDRENS) CHEW        loratadine (CLARITIN) 10 MG tablet Take 10 mg by mouth daily Prn, seasonal for ragweed and lilacs       metoprolol succinate ER (TOPROL XL) 25 MG 24 hr tablet Take 1 tablet (25 mg) by mouth daily 90 tablet 3     Omega-3 Fish Oil 500 MG capsule Take 1 capsule (500 mg) by mouth daily 90 capsule 3     ondansetron (ZOFRAN) 8 MG tablet Take 1 tablet (8 mg) by mouth every 8 hours as needed for nausea (vomiting) 30 tablet 2     prochlorperazine (COMPAZINE) 10 MG tablet Take 0.5 tablets (5 mg) by mouth every 6 hours as needed for nausea or vomiting 30 tablet 2     simvastatin (ZOCOR) 40 MG tablet Take 1 tablet (40 mg) by mouth At Bedtime 90 tablet 3      Family and Social History:  See initial consultation dated 6/14/2022 for further details.    Physical Exam:  General:  Well appearing, well nourished adult female in NAD  Eyes:  No erythema or discharge  Respiratory:  Breathing  comfortably on room air.  No wheezing or distress.  Musculoskeletal:  Full ROM of the extremities.  Skin:  No visible concerning skin rashes or lesions  Neuro:  No notable tremor and dyskinetic movements.  Psych:  Mood and affect appear normal.    The rest of a comprehensive physical examination is deferred due to PHE (public health emergency) video visit restrictions.    Laboratory/Imaging Studies  Oncotype DX recurrence score = 24.    ASSESSMENT/PLAN:  Ms. Alvarado is a 66 year old woman with a stage II, T2N0M0, ER+/LA+/HER2 low IDC of her left breast.    1. Left breast carcinoma:   We reviewed Oncotype DX recurrence score of 24.  In a woman >49 yo, an Oncotype DX recurrence score of 24 correlates with an approximately 10% distant risk of recurrence in 10 years if treated with endocrine therapy alone; predicted benefit of chemotherapy is <1%.  This is a group result, meaning any woman >49 yo with an Oncotype of 24 will have these same prediction numbers.  We discussed there is a calculator called RSClin which incorporates both Oncotype DX recurrence score with clinical features of the breast cancer in order to give an individualized prediction of risk of recurrence and benefit from chemotherapy.  According to RSClin, her individualized risk of distant recurrence in 10 years if treated with endocrine therapy alone is 23% and predicted benefit from chemotherapy is an absolute risk reduction of 9%.  Based on this predicted benefit, I am recommending chemotherapy.    We will plan to administer chemotherapy in the neoadjuvant setting.  Options for chemotherapy in the treatment of patients with high-risk ER positive breast cancer include Taxol followed by adriamycin and cyclophosphamide or Taxotere and cyclophosphamide.  We reviewed the risk of cardiac toxicity with adriamycin and that given age, strong family history of CHF, and personal history of PVCs, my recommendation is to use an anthracycline based regimen.  I am  therefore recommending treatment with Taxotere and cyclophosphamide (TC) administered IV once every 3 weeks for a total of 4 cycles.  We discussed TC chemotherapy can be given via either peripheral vein or a port.  She expressed she would like to try peripheral vein.  We reviewed the potential side effects of chemotherapy including alopecia, fatigue, nausea, diarrhea, constipation, low blood counts, neuropathy, and risk of hypersensitivity reaction.  She verbally consented to chemotherapy.  We will schedule first infusion the week of 7/4.  We discussed that my care coordinator will contact her for chemotherapy teaching prior to this.    Following planned 4 cycles of TC chemotherapy, plan will be to proceed with surgery.  Whether or not she will require adjuvant radiation will depend on type of breast surgery and whether she has a positive lymph node at the time of surgery.  Finally, we will plan to treat with a 5-10 course of endocrine therapy.    2.  Family history of breast and ovarian neoplasm:  Patient has a strong family history of both breast and ovarian cancer. She had Breast [employer] genetic testing lab draw.  Results are pending at this time.  She asked if there was a way to expedite these, I will inquire about it.    3.  Personal history of colon polyps:  She has a h/o colon polyps.  Last colonoscopy 2/23/2022 with removal of 3 polyps, each 2-3 mm (2 tubular adenomas, 1 hyperplastic).  Repeat colonoscopy in 02/2027 is recommended.    4.  Follow Up:  Labs, provider visit, and cycle #1 of Taxotere and cyclophosphamide 7/6 or later. Of note, patient already has appointment with me on 7/6 if you can get infusion appointment that day. If no infusion appointment on 7/6, cancel visit with me and schedule for labs and JHONATAN visit prior to chemotherapy infusion 7/7 or 7/8.    Ms. Alvarado had multiple questions which I answered to the best of my ability.  I spent 45 minutes on the date of the encounter writing  chemotherapy orders, and doing chart review, review of test results, interpretation of tests, patient visit and documentation.

## 2022-06-28 NOTE — NURSING NOTE
Please review distress screening. Patient would like resources to be in contact with a dietitian.     Ana Loyd

## 2022-06-28 NOTE — LETTER
6/28/2022         RE: Vickie Alvarado  [address]  [facility] [address]        Dear Colleague,    Thank you for referring your patient, Vickie Alvarado, to the [facility]. Please see a copy of my visit note below.    Marni is a 66 year old who is being evaluated via a billable video visit.      How would you like to obtain your AVS? MyChart  If the video visit is dropped, the invitation should be resent by: Text to cell phone: [phone]  Will anyone else be joining your video visit? No      Oncology visit:  Date on this visit: 6/28/2022    Diagnosis: left breast cancer.    Primary Physician: Gaby Lynn     History Of Present Illness:  Ms. Alvarado is a 66 year old female with a left breast cancer.  She self palpated a mass in her mid left breast.  Bilateral diagnostic mammograms showed a mass in the upper inner left breast.  Ultrasound of the left breast showed a mass at 11:00, 8 cm from the nipple measuring 3.1 cm.  There were no suspicious lymph nodes in the left axilla.  Biopsy of the left breast mass was c/w a grade 3 invasive ductal carcinoma, ER strong in 98%, MS moderate in 70%, and HER2 low by IHC (score 1+).   Oncotype DX recurrence score was 24.    Interval History:  Patient was contacted via video visit today to review the results of Oncotype DX testing and treatment plan removing forward.  She continues to feel well.  She denies significant change in left breast mass since last visit.        Past Medical/Surgical History:  Past Medical History:   Diagnosis Date     Aortic stenosis      Hyperlipidemia      Insufficiency fracture of tibia, sequela 09/28/2021     Polyp of colon 05/26/2016   - PVCs    Past Surgical History:   Procedure Laterality Date     CATARACT IOL, RT/LT Right 2006     HC YAG LASER CAPSULOTOMY Right 2009     LASER VITREOLYSIS, ANTERIOR OD (RIGHT EYE) Right 2007     REPOSITION INTRAOCULAR LENS Right 11/18/2014    Procedure: REPOSITION  INTRAOCULAR LENS;  Surgeon: Vickie Guerra MD;  [facility]     VITRECTOMY PARSPLANA WITH 23 GAUGE SYSTEM Right 11/18/2014    Procedure: VITRECTOMY PARSPLANA WITH 23 GAUGE SYSTEM;  Surgeon: Vickie Guerra MD;  Location:  [facility]     Allergies:  Allergies as of 06/28/2022 - Reviewed 06/28/2022   Allergen Reaction Noted     Bactrim [sulfamethoxazole w/trimethoprim] Nausea 11/01/2017     Seasonal allergies  11/13/2014     Typhoid vaccines Nausea and Vomiting 05/05/2011     Current Medications:  Current Outpatient Medications   Medication Sig Dispense Refill     alendronate (FOSAMAX) 70 MG tablet Take 1 tablet (70 mg) by mouth every 7 days 12 tablet 0     alendronate (FOSAMAX) 70 MG tablet Take 1 tablet (70 mg) by mouth every 7 days 12 tablet 3     calcium carb-cholecalciferol 600-500 MG-UNIT CAPS        dexamethasone (DECADRON) 4 MG tablet Take 2 tablets (8 mg) by mouth 2 times daily (with meals) for 3 doses Start evening of Docetaxel infusion and continue for a total of 3 doses. 6 tablet 3     Lactobacillus (PROBIOTIC CHILDRENS) CHEW        loratadine (CLARITIN) 10 MG tablet Take 10 mg by mouth daily Prn, seasonal for ragweed and lilacs       metoprolol succinate ER (TOPROL XL) 25 MG 24 hr tablet Take 1 tablet (25 mg) by mouth daily 90 tablet 3     Omega-3 Fish Oil 500 MG capsule Take 1 capsule (500 mg) by mouth daily 90 capsule 3     ondansetron (ZOFRAN) 8 MG tablet Take 1 tablet (8 mg) by mouth every 8 hours as needed for nausea (vomiting) 30 tablet 2     prochlorperazine (COMPAZINE) 10 MG tablet Take 0.5 tablets (5 mg) by mouth every 6 hours as needed for nausea or vomiting 30 tablet 2     simvastatin (ZOCOR) 40 MG tablet Take 1 tablet (40 mg) by mouth At Bedtime 90 tablet 3      Family and Social History:  See initial consultation dated 6/14/2022 for further details.    Physical Exam:  General:  Well appearing, well nourished adult female in NAD  Eyes:  No erythema or discharge  Respiratory:   Breathing comfortably on room air.  No wheezing or distress.  Musculoskeletal:  Full ROM of the extremities.  Skin:  No visible concerning skin rashes or lesions  Neuro:  No notable tremor and dyskinetic movements.  Psych:  Mood and affect appear normal.    The rest of a comprehensive physical examination is deferred due to PHE (public health emergency) video visit restrictions.    Laboratory/Imaging Studies  Oncotype DX recurrence score = 24.    ASSESSMENT/PLAN:  Ms. Alvarado is a 66 year old woman with a stage II, T2N0M0, ER+/IL+/HER2 low IDC of her left breast.    1. Left breast carcinoma:   We reviewed Oncotype DX recurrence score of 24.  In a woman >49 yo, an Oncotype DX recurrence score of 24 correlates with an approximately 10% distant risk of recurrence in 10 years if treated with endocrine therapy alone; predicted benefit of chemotherapy is <1%.  This is a group result, meaning any woman >49 yo with an Oncotype of 24 will have these same prediction numbers.  We discussed there is a calculator called RSClin which incorporates both Oncotype DX recurrence score with clinical features of the breast cancer in order to give an individualized prediction of risk of recurrence and benefit from chemotherapy.  According to RSClin, her individualized risk of distant recurrence in 10 years if treated with endocrine therapy alone is 23% and predicted benefit from chemotherapy is an absolute risk reduction of 9%.  Based on this predicted benefit, I am recommending chemotherapy.    We will plan to administer chemotherapy in the neoadjuvant setting.  Options for chemotherapy in the treatment of patients with high-risk ER positive breast cancer include Taxol followed by adriamycin and cyclophosphamide or Taxotere and cyclophosphamide.  We reviewed the risk of cardiac toxicity with adriamycin and that given age, strong family history of CHF, and personal history of PVCs, my recommendation is to use an anthracycline based  regimen.  I am therefore recommending treatment with Taxotere and cyclophosphamide (TC) administered IV once every 3 weeks for a total of 4 cycles.  We discussed TC chemotherapy can be given via either peripheral vein or a port.  She expressed she would like to try peripheral vein.  We reviewed the potential side effects of chemotherapy including alopecia, fatigue, nausea, diarrhea, constipation, low blood counts, neuropathy, and risk of hypersensitivity reaction.  She verbally consented to chemotherapy.  We will schedule first infusion the week of 7/4.  We discussed that my care coordinator will contact her for chemotherapy teaching prior to this.    Following planned 4 cycles of TC chemotherapy, plan will be to proceed with surgery.  Whether or not she will require adjuvant radiation will depend on type of breast surgery and whether she has a positive lymph node at the time of surgery.  Finally, we will plan to treat with a 5-10 course of endocrine therapy.    2.  Family history of breast and ovarian neoplasm:  Patient has a strong family history of both breast and ovarian cancer. She had Breast Actionable genetic testing lab draw.  Results are pending at this time.  She asked if there was a way to expedite these, I will inquire about it.    3.  Personal history of colon polyps:  She has a h/o colon polyps.  Last colonoscopy 2/23/2022 with removal of 3 polyps, each 2-3 mm (2 tubular adenomas, 1 hyperplastic).  Repeat colonoscopy in 02/2027 is recommended.    4.  Follow Up:  Labs, provider visit, and cycle #1 of Taxotere and cyclophosphamide 7/6 or later. Of note, patient already has appointment with me on 7/6 if you can get infusion appointment that day. If no infusion appointment on 7/6, cancel visit with me and schedule for labs and JHONATAN visit prior to chemotherapy infusion 7/7 or 7/8.    Ms. Alvarado had multiple questions which I answered to the best of my ability.  I spent 45 minutes on the date of the  encounter writing chemotherapy orders, and doing chart review, review of test results, interpretation of tests, patient visit and documentation.          Again, thank you for allowing me to participate in the care of your patient.      Sincerely,    Chandrika Horvath MD

## 2022-06-30 ENCOUNTER — TELEPHONE (OUTPATIENT)
Dept: ONCOLOGY | Facility: CLINIC | Age: 67
End: 2022-06-30

## 2022-06-30 NOTE — PROGRESS NOTES
CLINICAL NUTRITION SERVICES- ONCOLOGY DISTRESS SCREENING     Identified Concern and Score From Distress Screening: Patient requested to speak with a Dietitian on the Oncology Distress Screening tool.     Date of Distress Screenin/28     Findings: Marni had questions about if there are certain foods she should be eating and how to deal with possible side effects of chemo like nausea.      Intervention: Discussed importance of adequate protein and ways to deal with potential side effects that may affect her appetite.      Education Provided: as above     Follow-up Required: PRN, provided RD oncology phone number     Julia Romero RD, LD  [phone]

## 2022-07-06 PROBLEM — Z51.11 ENCOUNTER FOR ANTINEOPLASTIC CHEMOTHERAPY: Status: ACTIVE | Noted: 2022-07-06

## 2022-07-07 ENCOUNTER — PATIENT OUTREACH (OUTPATIENT)
Dept: ONCOLOGY | Facility: CLINIC | Age: 67
End: 2022-07-07

## 2022-07-07 NOTE — PROGRESS NOTES
"[facility]: Cancer Care Plan of Care Education Note                                    Discussion with Patient:                                                      Met with Marni via phone to discuss chemotherapy and resources available at the [facility]. Provided patient with \"My Cancer Guidebook\", and [facility] printouts on docetaxel,Cyclophosphamide and neulasta . Reviewed administration, side effects (including myelosuppression, nausea/vomiting, diarrhea/constipation, hair loss, mouth sores) and ongoing symptom management by APPs in clinic. Provided phone numbers for triage and after hours care. Highlighted steps to expect when getting chemotherapy (check in, labs, pre- meds, infusion), Discussed that chemo treatment may be delayed a day or two due to blood counts, infusion schedule or patient's need to modify. Included a one page resource of symptoms and when to contact the Cancer Clinic with questions or concerns.      Answered all Vickie's questions to her stated satisfaction.                                                                                                                                            Assessment:                                                      Assessment completed with:: Patient    Current living arrangement  I live alone    Plan of Care Education   Yearly learning assessment completed?: Yes (see Education tab)  Diagnosis:: breast cancer  Does patient understand diagnosis?: Yes  Tx plan/regimen:: doxetaxel, cyclophosphamide and neulasta  Does patient understand treatment plan/regimen?: Yes  Preparing for treatment:: Reviewed treatment preparation information with patient (vascular access, day of chemo, visitor policy, what to bring, etc.)  Vascular access:: Peripheral IV  Side effect education:: Diarrhea/Constipation;Fatigue;Hair loss;Infection;Lab value monitoring (anemia, neutropenia, thrombocytopenia);Mouth sores;Mylosuppression;Skin " changes;Neuropathy;Nausea/Vomiting  Transportation means:: Regular car  Safety/self care at home reviewed with patient:: Yes  Informal Support system:: Family;Friends  Coping - concerns/fears reviewed with patient:: Yes  Plan of Care:: JHONATAN follow-up appointment;Lab appointment;MD follow-up appointment;Treatment schedule  When to call provider:: Bleeding;Increased shortness of breath;New/worsening pain;Shaking chills;Temperature >100.4F;Uncontrolled nausea/vomiting;Uncontrolled diarrhea/constipation  Reasons for deferring treatment reviewed with patient:: Yes    Evaluation of Learning  Patient Education Provided: Yes  Readiness:: Acceptance  Method:: Explanation;Literature  Response:: Verbalizes understanding    Intervention/Education provided during outreach:                                                       See above note    Patient to follow up as scheduled at next appt    Signature:  Zoey Flores RN

## 2022-07-08 ENCOUNTER — INFUSION THERAPY VISIT (OUTPATIENT)
Dept: ONCOLOGY | Facility: CLINIC | Age: 67
End: 2022-07-08
Attending: INTERNAL MEDICINE
Payer: MEDICARE

## 2022-07-08 ENCOUNTER — APPOINTMENT (OUTPATIENT)
Dept: LAB | Facility: CLINIC | Age: 67
End: 2022-07-08
Attending: INTERNAL MEDICINE
Payer: MEDICARE

## 2022-07-08 ENCOUNTER — ONCOLOGY VISIT (OUTPATIENT)
Dept: ONCOLOGY | Facility: CLINIC | Age: 67
End: 2022-07-08
Attending: PHYSICIAN ASSISTANT
Payer: MEDICARE

## 2022-07-08 VITALS
OXYGEN SATURATION: 96 % | HEART RATE: 79 BPM | DIASTOLIC BLOOD PRESSURE: 80 MMHG | WEIGHT: 201.3 LBS | BODY MASS INDEX: 32.54 KG/M2 | TEMPERATURE: 97.8 F | SYSTOLIC BLOOD PRESSURE: 126 MMHG | RESPIRATION RATE: 18 BRPM

## 2022-07-08 DIAGNOSIS — Z17.0 MALIGNANT NEOPLASM OF UPPER-INNER QUADRANT OF LEFT BREAST IN FEMALE, ESTROGEN RECEPTOR POSITIVE (H): ICD-10-CM

## 2022-07-08 DIAGNOSIS — C50.212 MALIGNANT NEOPLASM OF UPPER-INNER QUADRANT OF LEFT BREAST IN FEMALE, ESTROGEN RECEPTOR POSITIVE (H): ICD-10-CM

## 2022-07-08 DIAGNOSIS — Z17.0 MALIGNANT NEOPLASM OF UPPER-INNER QUADRANT OF LEFT BREAST IN FEMALE, ESTROGEN RECEPTOR POSITIVE (H): Primary | ICD-10-CM

## 2022-07-08 DIAGNOSIS — C50.212 MALIGNANT NEOPLASM OF UPPER-INNER QUADRANT OF LEFT BREAST IN FEMALE, ESTROGEN RECEPTOR POSITIVE (H): Primary | ICD-10-CM

## 2022-07-08 DIAGNOSIS — Z51.11 ENCOUNTER FOR ANTINEOPLASTIC CHEMOTHERAPY: Primary | ICD-10-CM

## 2022-07-08 PROCEDURE — 96375 TX/PRO/DX INJ NEW DRUG ADDON: CPT

## 2022-07-08 PROCEDURE — 96367 TX/PROPH/DG ADDL SEQ IV INF: CPT

## 2022-07-08 PROCEDURE — 96372 THER/PROPH/DIAG INJ SC/IM: CPT | Performed by: INTERNAL MEDICINE

## 2022-07-08 PROCEDURE — 99215 OFFICE O/P EST HI 40 MIN: CPT | Performed by: PHYSICIAN ASSISTANT

## 2022-07-08 PROCEDURE — 96417 CHEMO IV INFUS EACH ADDL SEQ: CPT

## 2022-07-08 PROCEDURE — 258N000003 HC RX IP 258 OP 636: Performed by: INTERNAL MEDICINE

## 2022-07-08 PROCEDURE — G0463 HOSPITAL OUTPT CLINIC VISIT: HCPCS | Mod: 25

## 2022-07-08 PROCEDURE — 250N000011 HC RX IP 250 OP 636: Performed by: INTERNAL MEDICINE

## 2022-07-08 PROCEDURE — 96377 APPLICATON ON-BODY INJECTOR: CPT | Mod: 59

## 2022-07-08 PROCEDURE — 96413 CHEMO IV INFUSION 1 HR: CPT

## 2022-07-08 RX ORDER — ONDANSETRON 2 MG/ML
8 INJECTION INTRAMUSCULAR; INTRAVENOUS ONCE
Status: COMPLETED | OUTPATIENT
Start: 2022-07-08 | End: 2022-07-08

## 2022-07-08 RX ADMIN — DEXAMETHASONE SODIUM PHOSPHATE: 10 INJECTION, SOLUTION INTRAMUSCULAR; INTRAVENOUS at 12:30

## 2022-07-08 RX ADMIN — PEGFILGRASTIM 6 MG: KIT SUBCUTANEOUS at 14:40

## 2022-07-08 RX ADMIN — DOCETAXEL 154 MG: 20 INJECTION, SOLUTION, CONCENTRATE INTRAVENOUS at 13:30

## 2022-07-08 RX ADMIN — SODIUM CHLORIDE 250 ML: 9 INJECTION, SOLUTION INTRAVENOUS at 12:21

## 2022-07-08 RX ADMIN — CYCLOPHOSPHAMIDE 1235 MG: 1 INJECTION, POWDER, FOR SOLUTION INTRAVENOUS; ORAL at 14:36

## 2022-07-08 RX ADMIN — ONDANSETRON 8 MG: 2 INJECTION INTRAMUSCULAR; INTRAVENOUS at 12:24

## 2022-07-08 ASSESSMENT — PAIN SCALES - GENERAL: PAINLEVEL: MODERATE PAIN (4)

## 2022-07-08 NOTE — NURSING NOTE
Chief Complaint   Patient presents with     Blood Draw     IV placement with blood draw by lab RN. Vitals taken and appointment arrived     Lizbet Chappell RN

## 2022-07-08 NOTE — NURSING NOTE
"Oncology Rooming Note    July 8, 2022 10:59 AM   Vickie Alvarado is a 66 year old female who presents for:    Chief Complaint   Patient presents with     Blood Draw     IV placement with blood draw by lab RN. Vitals taken and appointment arrived     Oncology Clinic Visit     Malignant neoplasm of upper-inner quadrant of left breast in female, estrogen receptor positive      Initial Vitals: /80   Pulse 79   Temp 97.8  F (36.6  C) (Oral)   Resp 18   Wt 91.3 kg (201 lb 4.8 oz)   LMP 05/17/2011   SpO2 96%   BMI 32.54 kg/m   Estimated body mass index is 32.54 kg/m  as calculated from the following:    Height as of 6/14/22: 1.675 m (5' 5.95\").    Weight as of this encounter: 91.3 kg (201 lb 4.8 oz). Body surface area is 2.06 meters squared.  Moderate Pain (4) Comment: right knee   Patient's last menstrual period was 05/17/2011.  Allergies reviewed: Yes  Medications reviewed: Yes    Medications: Medication refills not needed today.  Pharmacy name entered into [facility]: [id number] - [facility] [address] AT [place] [address] & [facility]    Clinical concerns: none       Kirt Scales            "

## 2022-07-08 NOTE — PROGRESS NOTES
Oncology Follow up :  Date on this visit: Jul 8, 2022    Primary Physician: Gaby Lynn     History Of Present Illness:  Ms. Alvarado is a 66 year old female with a left breast cancer.  She self palpated a mass in her mid left breast.  Bilateral diagnostic mammograms showed a mass in the upper inner left breast.  Ultrasound of the left breast showed a mass at 11:00, 8 cm from the nipple measuring 3.1 cm.  There were no suspicious lymph nodes in the left axilla.  Biopsy of the left breast mass was c/w a grade 3 invasive ductal carcinoma, ER strong in 98%, KS moderate in 70%, and HER2 low by IHC (score 1+). Her case was discussed at tumor board on 06/16/22. Her oncotype is 24. After risk vs benefit was discussed, it was decided to start neoadjuvant chemotherapy with TC.     Treatment to date:  - Neoadjuvant chemotherapy initiated on 07/08/22: docetaxel, cyclophosphamide with neulasta.     Interval history:   Marni reports feeling well today. She feels well informed about starting chemotherapy today and has no further questions regarding side effects. She questions if there is a specific time frame when she should take her Fosamax and if she can take vitamin c supplements. No chest pain, shortness of breath, or cough. No fevers or chills. No bowel concerns. No urinary concerns. No abdominal pain, nausea, or vomiting. No headaches or vision changes. No peripheral neuropathy at baseline. Endorses chronic right knee pain.     The remainder of a complete 12 point review of systems was reviewed with the patient and was negative with the exception of that mentioned above.    Past Medical/Surgical History:  Past Medical History:   Diagnosis Date     Aortic stenosis      Hyperlipidemia      Insufficiency fracture of tibia, sequela 09/28/2021     Polyp of colon 05/26/2016   - PVCs    Past Surgical History:   Procedure Laterality Date     CATARACT IOL, RT/LT Right 2006      YAG LASER CAPSULOTOMY Right 2009     LASER  VITREOLYSIS, ANTERIOR OD (RIGHT EYE) Right 2007     REPOSITION INTRAOCULAR LENS Right 11/18/2014    Procedure: REPOSITION INTRAOCULAR LENS;  Surgeon: Vickie Guerra MD;  Location: [facility]     VITRECTOMY PARSPLANA WITH 23 GAUGE SYSTEM Right 11/18/2014    Procedure: VITRECTOMY PARSPLANA WITH 23 GAUGE SYSTEM;  Surgeon: Vickie Guerra MD;  Location:  [facility]     Allergies:  Allergies as of 07/08/2022 - Reviewed 06/28/2022   Allergen Reaction Noted     Bactrim [sulfamethoxazole w/trimethoprim] Nausea 11/01/2017     Seasonal allergies  11/13/2014     Typhoid vaccines Nausea and Vomiting 05/05/2011     Current Medications:  Current Outpatient Medications   Medication Sig Dispense Refill     alendronate (FOSAMAX) 70 MG tablet Take 1 tablet (70 mg) by mouth every 7 days 12 tablet 3     calcium carb-cholecalciferol 600-500 MG-UNIT CAPS        dexamethasone (DECADRON) 4 MG tablet Take 2 tablets (8 mg) by mouth 2 times daily (with meals) for 3 doses Start evening of Docetaxel infusion and continue for a total of 3 doses. 6 tablet 3     Lactobacillus (PROBIOTIC CHILDRENS) CHEW        loratadine (CLARITIN) 10 MG tablet Take 10 mg by mouth daily Prn, seasonal for ragweed and lilacs       metoprolol succinate ER (TOPROL XL) 25 MG 24 hr tablet Take 1 tablet (25 mg) by mouth daily 90 tablet 3     Omega-3 Fish Oil 500 MG capsule Take 1 capsule (500 mg) by mouth daily 90 capsule 3     ondansetron (ZOFRAN) 8 MG tablet Take 1 tablet (8 mg) by mouth every 8 hours as needed for nausea (vomiting) 30 tablet 2     prochlorperazine (COMPAZINE) 10 MG tablet Take 0.5 tablets (5 mg) by mouth every 6 hours as needed for nausea or vomiting 30 tablet 2     simvastatin (ZOCOR) 40 MG tablet Take 1 tablet (40 mg) by mouth At Bedtime 90 tablet 3      Social History:  Social History     Socioeconomic History     Marital status: Single     Spouse name: Not on file     Number of children: Not on file     Years of education: Not on file      Highest education level: Not on file   Occupational History     Occupation: Retired - [employer], 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   Tobacco Use     Smoking status: Never Smoker     Smokeless tobacco: Never Used   Vaping Use     Vaping Use: Never used   Substance and Sexual Activity     Alcohol use: Yes     Comment: 4 drinks per month     Drug use: No     Sexual activity: Never   Other Topics Concern     Not on file   Social History Narrative     Not on file     Social Determinants of Health     Financial Resource Strain: Not on file   Food Insecurity: Not on file   Transportation Needs: Not on file   Physical Activity: Not on file   Stress: Not on file   Social Connections: Not on file   Intimate Partner Violence: Not on file   Housing Stability: Not on file     Physical Exam:  /80   Pulse 79   Temp 97.8  F (36.6  C) (Oral)   Resp 18   Wt 91.3 kg (201 lb 4.8 oz)   LMP 05/17/2011   SpO2 96%   BMI 32.54 kg/m     LMP 05/17/2011   General:  Well appearing adult female in NAD.  Alert and oriented x 3.  HEENT:  Normocephalic.  Sclera anicteric.  MMM.  No lesions of the oropharynx.  Lymph:  No axillary lymphadenopathy.   Chest:  CTA bilaterally.  No wheezes or crackles.  CV:  RRR.  Nl S1 and S2.   Breast: 4 x 4.5 cm firm mass palpable in left breast at 11:00, 8 cm from nipple. Left nipple everted and without discharge.  Abd:  Soft/NT/ND.   Ext:  No pitting edema of the bilateral lower extremities.   Musculo:  Full ROM of the bilateral upper extremity  Neuro:  Cranial nerves grossly intact.  Psych:  Mood and affect appear normal.  Skin:  No rashes or lesions on visible skin.     Laboratory/Imaging Studies  5/31/2022 Bilateral diagnostic mammograms:  New irregular isodense mass at 11-12:00 position, posterior depth measuring up to 3.2 cm.  No significant change in the right breast.    5/31/2022 Ultrasound left breast:  Irregular hypoechoic solid mass at 11:00, 8 cm from the nipple measuring 3.1 x 2.6 x 1.6  cm.  No suspicious lymph nodes in the left axilla.    6/3/2022 Left breast biopsy, 11:00, 8 cm from the nipple:  - Grade 3 invasive ductal carcinoma  - Invasive carcinoma is ER strong in 98% and PA moderate in 70%  - HER2 equivocal by FISH with an average of 4.7 HER2 signals/nucleus and a HER2/JOSE-17 ratio of 1.5  - HER2 low by IHC (score 1+)    ASSESSMENT/PLAN:  Ms. Alvarado is a 66 year old woman with an extensive family history of cancer who presents with newly discovered stage II, T2N0M0, ER+/PA+/HER2 low IDC of her left breast.    1. Left breast IDC, grade 3, ER positive, PA positive, and HER2 low:   Imaging demonstrates an approximate 3.2 cm mass in the upper inner left breast. The goal of treatment is cure. She has met with Dr. Cadet were surgical options were discussed. Oncotype is 24. Dr. Horvath discussed with Marni the risk vs benefit of neoadjuvant chemotherapy vs heading directly to surgery. It was decided to proceed with neoadjuvant chemotherapy with doxetaxel and cyclophosphamide with neulasta support.   - Labs and clinically appropriate to move forward with C1D1 TC with neulasta support. We received common side effects and chemotherapy plan again today. Patient had a good understanding what to expect regarding side effects.   - We reviewed oral dexamethasone instructions. [facility].     2.  Family history of breast and ovarian neoplasm:  Patient has a strong family history of both breast and ovarian cancer. Breast Actionable panel pending. Referral to Cancer Risk Management scheduled for September 2022.     3.  Personal history of colon polyps:  She has a h/o colon polyps.  Last colonoscopy 2/23/2022 with removal of 3 polyps, each 2-3 mm (2 tubular adenomas, 1 hyperplastic).  Repeat colonoscopy in 02/2027 is recommended.    4. Osteoporosis: on fosamax and calcium/vit D for osteoporosis with previous history of fracture.     5. Cardiac: hx of aortic stenosis  and PVC's, asymptomatic. On metoprolol, follows with cardiology.     6. Supplements: discussed discontinuation of high dose vitamin c supplementation, which she is agreeable to    4. Follow Up:  Return to clinic in 3 weeks for labs, infusion, and visit with Kate.     45 minutes spent on the date of the encounter doing chart review, review of test results, interpretation of tests, patient visit and documentation     Paty Oakes PA-C    The patient was seen in conjunction with Paty Oakes who served as a scribe for today's visit. I have reviewed and edited the above note, and agree with the above findings and plan.    Kate Quintero PA-C

## 2022-07-08 NOTE — PROGRESS NOTES
"Infusion Nursing Note:  Vickie Alvarado presents today for Cycle 1 Day 1 Taxotere/Cytoxan and Neulasta On-Pro.    Patient seen by provider today: Yes: Kate Quintero NP   present during visit today: Not Applicable.    Note: Patient new to oncology infusion room and is receiving Taxotere/Cytoxan and Neulasta On-Pro for the first time. Pt oriented to infusion room and call light. Chemotherapy teaching done previously by RNCC.  Reinforced chemotherapy teaching/side effects and schedule during infusion visit. New patient folder given to patient.    Patient arrives feeling very well and verbalized a good understanding of chemo side effects upon arrival. Denies pain or other acute concerns.    Copy of AVS reviewed with patient. Pt instructed to call care coordinator, triage (or MD on call if after hours/weekends) with chills/temp >=100.4, questions/concerns. Pt stated understanding of plan.       Intravenous Access:  Peripheral IV placed.    Treatment Conditions:  Lab Results   Component Value Date    HGB 14.2 07/08/2022    WBC 6.3 07/08/2022    ANEU 4.1 09/08/2014    ANEUTAUTO 4.4 07/08/2022     07/08/2022      Lab Results   Component Value Date     07/08/2022    POTASSIUM 3.8 07/08/2022    MAG 2.2 08/20/2021    CR 0.64 07/08/2022    VALENTINA 9.2 07/08/2022    BILITOTAL 0.4 07/08/2022    ALBUMIN 3.6 07/08/2022    ALT 15 07/08/2022    AST 18 07/08/2022     Results reviewed, labs MET treatment parameters, ok to proceed with treatment.    Post Infusion Assessment:  Patient tolerated infusion without incident. Patient noted mild tingling in her feet after Emend done, but she did not endorse any S/S of allergic reaction. She stated \"maybe it's just in my head\".  Blood return noted pre and post infusion.  Site patent and intact, free from redness, edema or discomfort.  No evidence of extravasations.  Access discontinued per protocol.     Neulasta Onpro On-Body injector applied to LUE at ~1445.  Writer " discussed Neulasta injection would start tomorrow 7/9 at 1745, approximately 27 hours after application applied today.  Written and Verbal instruction reviewed with patient.  Pt instructed when the dose delivery starts, it will take about 45 minutes to complete.  Pt aware Neulasta Onpro On-Body should have green flashing light and to call triage or on-call MD if injector flashes red or appears to be leaking. Pt aware to keep Onpro On-Body Neulasta 4 inches away from electrical equipment and to avoid showering 4 hours prior to injection.     Patient already takes Claritin routinely and is aware of bone pain side effect.      Discharge Plan:   Prescription refills given for Zofran, Dexamethasone and Compazine. Pt counseled on medications by pharmacist.  Discharge instructions reviewed with: Patient.  Patient and/or family verbalized understanding of discharge instructions and all questions answered.  Copy of AVS reviewed with patient and/or family.  Patient will return 7/29 for next appointment. Check-out notes yet to scheduled at time of discharge. Pt aware to call triage if no new appts seen on [facility] by Monday afternoon.  Patient discharged in stable condition accompanied by: self.  Departure Mode: Ambulatory.      Gregoria Lowery RN

## 2022-07-08 NOTE — PATIENT INSTRUCTIONS
Contact Numbers    [facility] (for Scheduling/Triage/After Hours Nurse Line): [phone]    Please call the [facility] nurse triage or the after hours nurse line if you experience a temperature greater than or equal to 100.5, shaking chills, have uncontrolled nausea, vomiting and/or diarrhea, dizziness, lightheadedness, shortness of breath, chest pain, bleeding, unexplained bruising, or if you have any other new/concerning symptoms, questions or concerns.     If you are having any concerning symptoms or wish to speak to a provider before your next infusion visit, please call your care coordinator or triage to notify them so we can adequately serve you.     If you need any refills on medications (narcotics or other medications), please call before your infusion appointment.        Lab Results:  Recent Results (from the past 12 hour(s))   Comprehensive metabolic panel    Collection Time: 07/08/22 10:28 AM   Result Value Ref Range    Sodium 138 133 - 144 mmol/L    Potassium 3.8 3.4 - 5.3 mmol/L    Chloride 109 94 - 109 mmol/L    Carbon Dioxide (CO2) 25 20 - 32 mmol/L    Anion Gap 4 3 - 14 mmol/L    Urea Nitrogen 18 7 - 30 mg/dL    Creatinine 0.64 0.52 - 1.04 mg/dL    Calcium 9.2 8.5 - 10.1 mg/dL    Glucose 101 (H) 70 - 99 mg/dL    Alkaline Phosphatase 54 40 - 150 U/L    AST 18 0 - 45 U/L    ALT 15 0 - 50 U/L    Protein Total 7.5 6.8 - 8.8 g/dL    Albumin 3.6 3.4 - 5.0 g/dL    Bilirubin Total 0.4 0.2 - 1.3 mg/dL    GFR Estimate >90 >60 mL/min/1.73m2   CBC with platelets and differential    Collection Time: 07/08/22 10:28 AM   Result Value Ref Range    WBC Count 6.3 4.0 - 11.0 10e3/uL    RBC Count 4.63 3.80 - 5.20 10e6/uL    Hemoglobin 14.2 11.7 - 15.7 g/dL    Hematocrit 42.1 35.0 - 47.0 %    MCV 91 78 - 100 fL    MCH 30.7 26.5 - 33.0 pg    MCHC 33.7 31.5 - 36.5 g/dL    RDW 12.3 10.0 - 15.0 %    Platelet Count 261 150 - 450 10e3/uL    % Neutrophils 69 %    % Lymphocytes 21 %    % Monocytes 7 %    % Eosinophils 2 %    %  Basophils 1 %    % Immature Granulocytes 0 %    NRBCs per 100 WBC 0 <1 /100    Absolute Neutrophils 4.4 1.6 - 8.3 10e3/uL    Absolute Lymphocytes 1.3 0.8 - 5.3 10e3/uL    Absolute Monocytes 0.4 0.0 - 1.3 10e3/uL    Absolute Eosinophils 0.1 0.0 - 0.7 10e3/uL    Absolute Basophils 0.0 0.0 - 0.2 10e3/uL    Absolute Immature Granulocytes 0.0 <=0.4 10e3/uL    Absolute NRBCs 0.0 10e3/uL

## 2022-07-09 ENCOUNTER — TELEPHONE (OUTPATIENT)
Dept: NURSING | Facility: CLINIC | Age: 67
End: 2022-07-09

## 2022-07-09 ENCOUNTER — TELEPHONE (OUTPATIENT)
Dept: ONCOLOGY | Facility: CLINIC | Age: 67
End: 2022-07-09

## 2022-07-10 NOTE — TELEPHONE ENCOUNTER
Pt called oncology clinic line She has a history of L breast cancer T2N0M0 ER+/NM+/HER2 low IDC of her left breast. Started on C1D1 taxotere/cytoxan and neulasta on-pro yesterday. Today she reports some facial swelling and erythema on b/l facial checks. She reported that it is not bothersome and only noticed in the mirror, denies any symptoms, including no fever, chills, lightheadedness, pain, shortness of breath, itch. Temperature at home was reported to be 98.8 Can use cold compresses and benadryl. Advised to go to emergency room if develops concerning symptoms such as shortness of breath, wheezing, or acute worsening swelling.   -Will forward as FYI to primary oncology team

## 2022-07-10 NOTE — TELEPHONE ENCOUNTER
Pt calling with concerns about;    Reports face is red and quite puffy, warm to touch after her neulasta today at or about 5:45 PM  Pt states she feels fine and is not alarmed but was instructed to call with side effects including face swelling.    Denies;  Shortness of breath  Wheezing  Fever (98.8 at time of this call)    Page sent to OC, Dr. Cruz who advised;    Cold compress to face  Can take a benadryl  Go to ED if worsening symptoms/ swelling of face, tongue, or any shortness of breath, or fever develop    Pt notified and verbalized understanding/agrees with this plan.    Tracy Salcido RN, Nurse Advisor 8:00 PM 7/9/2022  COVID 19 Nurse Triage Plan/Patient Instructions    Please be aware that novel coronavirus (COVID-19) may be circulating in the community. If you develop symptoms such as fever, cough, or SOB or if you have concerns about the presence of another infection including coronavirus (COVID-19), please contact your health care provider or visit [email or web address].     Disposition/Instructions    Home care recommended. Follow home care protocol based instructions.    Thank you for taking steps to prevent the spread of this virus.  o Limit your contact with others.  o Wear a simple mask to cover your cough.  o Wash your hands well and often.    Resources    [facility]: About COVID-19: [email or web address]    CDC: What to Do If You're Sick: www.cdc.gov/coronavirus/2019-ncov/about/steps-when-sick.html    CDC: Ending Home Isolation: www.cdc.gov/coronavirus/2019-ncov/hcp/disposition-in-home-patients.html     CDC: Caring for Someone: www.cdc.gov/coronavirus/2019-ncov/if-you-are-sick/care-for-someone.html     [facility]: Interim Guidance for Hospital Discharge to Home: www.health.[facility]/diseases/coronavirus/hcp/hospdischarge.pdf    [facility] clinical trials (COVID-19 research studies): [email or web address].[email or web address]/umn-clinical-trials     Below are the COVID-19 hotlines at  the [place] Department of Health ([facility]). Interpreters are available.   o For health questions: Call 713-437-9144 or 1-503.556.8144 (7 a.m. to 7 p.m.)  o For questions about schools and childcare: Call 698-025-4528 or 1-458.647.6131 (7 a.m. to 7 p.m.)

## 2022-07-11 ENCOUNTER — LAB (OUTPATIENT)
Dept: LAB | Facility: CLINIC | Age: 67
End: 2022-07-11
Payer: MEDICARE

## 2022-07-11 DIAGNOSIS — C50.212 MALIGNANT NEOPLASM OF UPPER-INNER QUADRANT OF LEFT BREAST IN FEMALE, ESTROGEN RECEPTOR POSITIVE (H): Primary | ICD-10-CM

## 2022-07-11 DIAGNOSIS — Z17.0 MALIGNANT NEOPLASM OF UPPER-INNER QUADRANT OF LEFT BREAST IN FEMALE, ESTROGEN RECEPTOR POSITIVE (H): Primary | ICD-10-CM

## 2022-07-11 DIAGNOSIS — Z80.41 FAMILY HISTORY OF MALIGNANT NEOPLASM OF OVARY: ICD-10-CM

## 2022-07-11 DIAGNOSIS — Z80.3 FAMILY HISTORY OF MALIGNANT NEOPLASM OF BREAST: ICD-10-CM

## 2022-07-11 PROCEDURE — G0452 MOLECULAR PATHOLOGY INTERPR: HCPCS | Mod: 26 | Performed by: PATHOLOGY

## 2022-07-11 PROCEDURE — 81162 BRCA1&2 GEN FULL SEQ DUP/DEL: CPT | Performed by: INTERNAL MEDICINE

## 2022-07-12 ENCOUNTER — TELEPHONE (OUTPATIENT)
Dept: ORTHOPEDICS | Facility: CLINIC | Age: 67
End: 2022-07-12

## 2022-07-12 DIAGNOSIS — M80.00XD OSTEOPOROSIS WITH CURRENT PATHOLOGICAL FRACTURE WITH ROUTINE HEALING, UNSPECIFIED OSTEOPOROSIS TYPE, SUBSEQUENT ENCOUNTER: Primary | ICD-10-CM

## 2022-07-12 RX ORDER — ALENDRONATE SODIUM 70 MG/1
70 TABLET ORAL
Qty: 12 TABLET | Refills: 0 | Status: SHIPPED | OUTPATIENT
Start: 2022-07-12 | End: 2022-10-06

## 2022-07-12 NOTE — TELEPHONE ENCOUNTER
Will refill Fosamax for 3 months.    Pt aware will need to be discontinued if she needs Radiation.

## 2022-07-14 ENCOUNTER — PATIENT OUTREACH (OUTPATIENT)
Dept: ONCOLOGY | Facility: CLINIC | Age: 67
End: 2022-07-14

## 2022-07-14 NOTE — PROGRESS NOTES
"[facility]: Cancer Care Long Assessment    Discussion with Patient:                                                      Spoke with patient in follow-up to first chemotherapy treatment.   Overall she is pleased with how things went. She denies n/v or antiemetic use. She is having \"loose\" stools not requiring Imodium.     On Sat she called into triage to report \"red, warm, and puffy cheeks\" within an hour of Neulasta administration. She was directed to take benadryl, symptoms subsided in 2 days. Discussed it is common to have red/warm cheeks for a few days due to steroids, she is unsure if that is what happened or if it is related to Neulasta. She will bring photos of the cheeks to her next appointment.     She reports having \"knee problems\" that flared up after chemo. She experienced one night of \"excruciating\" pain preventing her from sleeping. She has been seen by sports medicine who recommended a cortisone injection. Advised ok to receive injection.                                      Dates of Treament:                                                      Infusion given in last 28 days     Administered MAR Action Medication Dose Rate Visit    07/08/2022 13:30 New Bag DOCEtaxel (TAXOTERE) 154 mg in sodium chloride in non-PVC container 0.9 % 283 mL infusion 154 mg 283 mL/hr Infusion Therapy Visit on 07/08/2022 in [facility]    07/08/2022 14:36 New Bag cyclophosphamide (CYTOXAN) 1,235 mg in sodium chloride 0.9 % 337 mL infusion 1,235 mg 674 mL/hr Infusion Therapy Visit on 07/08/2022 in [facility]          Assessment:                                                      Assessment completed with:: Patient    Constitutional  Patient Reported Constitutional Symptoms?: No    Neurosensory  Patient Reported Neurosensory Symptoms?: No    Eye Disorders  Patient Reported Eye Disorders?: No    Ear Disorders  Ear Disorders  Patient Reported Ear Disorder?: " "No    Cardiovascular  Patient Reported Cardiovascular Symptoms?: No    Respiratory  Patient Reported Respiratory Symptoms?: No    Gastrointestinal  Patient Reported Gastrointestinal Symptoms?: Yes  Anorexia: Absent or within normal limits  Nausea: Absent or within normal limits  Vomiting: Absent or within normal limits  Constipation: Absent or within normal limits  Diarrhea: Increase of less than 4 stools per day over baseline OR mild increase in ostomy output compared to baseline (loose stools)    Genitourinary  Patient Reported Genitourinary Symptoms?: No    Lymph System Disorders       Musculoskeletal and Connective Tissue Disorders  Patient Reported Musculoskeletal or Connective Tissue Disorders?: Yes  Bone Pain: Moderate pain OR limiting instrumental ADL (knee pain x1 day, unable to sleep. Has seen sports med and needs cortisone injection)    Integumentary  Patient Reported Integumentary Symptoms?: Yes  Flushing: Asymptomatic OR clinical or diagnostic observations only (Within hour of Neulasta injection developed \"red puffy\" cheeks. Cheeks  warm to touch. Called on call and was directed to take Benadyl. Redness and warm subsided over next 2 days)    Pain       Patient Coping  Accepting    Clinic Utilization  Patient Aware of Next Appointment?: Yes    Other Patient Concerns  Other Patient Reported Concerns: No        Intervention/Education provided during outreach:                                                       Patient to follow up as scheduled at next appt  Patient to call/[employer] message with updates  Confirmed patient has clinic and triage numbers         Deborah King MSN, RN ,OCN  RN Care Coordinator   [facility]  [phone]   "

## 2022-07-15 ENCOUNTER — NURSE TRIAGE (OUTPATIENT)
Dept: NURSING | Facility: CLINIC | Age: 67
End: 2022-07-15

## 2022-07-15 ENCOUNTER — PATIENT OUTREACH (OUTPATIENT)
Dept: ONCOLOGY | Facility: CLINIC | Age: 67
End: 2022-07-15

## 2022-07-15 NOTE — PROGRESS NOTES
[facility]: Cancer Care                                                                                          Received voice message from Marni stating that last night she had severe, acute knee and tailbone pain, what she suspect is from the neulasta. Patient stated she called the nurse line and was able to speak to the on call oncologist, who suggested she take the tylenol every 4 hours with one aleve. She was able to get relief and some sleep. RN called her back to follow up. Patient stated that she is feeling better, however, since the phone message has started to feel the pain returning. Patient stated she will take some tylenol before the pain gets worse. Patient was instructed to call triage if pain isn't relieved by the tylenol. Patient verbalized understanding.     Signature:  Zoey Flores RN

## 2022-07-15 NOTE — TELEPHONE ENCOUNTER
Last Saturday patient had some knee pain and believes it has to do with Neulasta.    Currently is having pain in tailbone.  Patient took 2 tylenol at 10pm.  Patient states when she took the medication at 10pm her pain was a 8.  It did get better but now is coming back again and it has only been 2 hours.  She has 4 hours before she can take tylenol again.  Patient states the pain is stabbing.  She is not able to get comfortable.  Laying down seems to make it worse.    Patient is concerned this again is a result of the Neulesta.  Bone pain was one side effect discussed.  Patient is wondering if she needs to be seen or can get stronger pain medication.    Paged Valente Leigh on call at [phone] to call patient at .    Josie Sanders RN   07/15/22 12:48 AM  [facility] Nurse Advisor      Reason for Disposition    [1] Caller has URGENT medication question about med that PCP or specialist prescribed AND [2] triager unable to answer question    Additional Information    Negative: Drug overdose and triager unable to answer question    Negative: Caller requesting information unrelated to medicine    Negative: Caller requesting a prescription for Strep throat and has a positive culture result    Negative: Rash while taking a medication or within 3 days of stopping it    Negative: Immunization reaction suspected    Negative: [1] Asthma and [2] having symptoms of asthma (cough, wheezing, etc.)    Negative: [1] Influenza symptoms AND [2] anti-viral med prescription request, such as Tamiflu    Negative: [1] Symptom of illness (e.g., headache, abdominal pain, earache, vomiting) AND [2] more than mild    Negative: MORE THAN A DOUBLE DOSE of a prescription or over-the-counter (OTC) drug    Negative: [1] DOUBLE DOSE (an extra dose or lesser amount) of over-the-counter (OTC) drug AND [2] any symptoms (e.g., dizziness, nausea, pain, sleepiness)    Negative: [1] DOUBLE DOSE (an extra dose or lesser amount) of  "prescription drug AND [2] any symptoms (e.g., dizziness, nausea, pain, sleepiness)    Negative: Took another person's prescription drug    Negative: [1] DOUBLE DOSE (an extra dose or lesser amount) of prescription drug AND [2] NO symptoms (Exception: a double dose of antibiotics)    Negative: Diabetes drug error or overdose (e.g., took wrong type of insulin or took extra dose)    Negative: [1] Request for URGENT new prescription or refill of \"essential\" medication (i.e., likelihood of harm to patient if not taken) AND [2] triager unable to fill per unit policy    Negative: [1] Prescription not at pharmacy AND [2] was prescribed by PCP recently    Negative: [1] Pharmacy calling with prescription questions AND [2] triager unable to answer question    Protocols used: MEDICATION QUESTION CALL-A-AH      "

## 2022-07-26 ENCOUNTER — OFFICE VISIT (OUTPATIENT)
Dept: FAMILY MEDICINE | Facility: CLINIC | Age: 67
End: 2022-07-26
Payer: MEDICARE

## 2022-07-26 VITALS
HEART RATE: 75 BPM | DIASTOLIC BLOOD PRESSURE: 77 MMHG | RESPIRATION RATE: 16 BRPM | TEMPERATURE: 98 F | OXYGEN SATURATION: 100 % | WEIGHT: 201 LBS | SYSTOLIC BLOOD PRESSURE: 114 MMHG | BODY MASS INDEX: 32.5 KG/M2

## 2022-07-26 DIAGNOSIS — M17.11 PRIMARY OSTEOARTHRITIS OF RIGHT KNEE: Primary | ICD-10-CM

## 2022-07-26 PROCEDURE — 20610 DRAIN/INJ JOINT/BURSA W/O US: CPT | Performed by: FAMILY MEDICINE

## 2022-07-26 PROCEDURE — 99207 PR DROP WITH A PROCEDURE: CPT | Performed by: FAMILY MEDICINE

## 2022-07-26 RX ORDER — TRIAMCINOLONE ACETONIDE 40 MG/ML
40 INJECTION, SUSPENSION INTRA-ARTICULAR; INTRAMUSCULAR ONCE
Status: COMPLETED | OUTPATIENT
Start: 2022-07-26 | End: 2022-07-26

## 2022-07-26 RX ADMIN — TRIAMCINOLONE ACETONIDE 40 MG: 40 INJECTION, SUSPENSION INTRA-ARTICULAR; INTRAMUSCULAR at 11:31

## 2022-07-26 NOTE — PROGRESS NOTES
Assessment & Plan   1. Right knee osteoarthritis-   CSI  Continue knee mobility, cycling might be easier or elliptical vs treadmill  Small exercise burst all count               Regular exercise  See Patient Instructions    Return in about 3 months (around 10/26/2022), or if symptoms worsen or fail to improve.    Josie Jacob MD  [facility] MELISSA Grider is a 67 year old accompanied by her self, presenting for the following health issues:  Knee Pain (Pt here for right knee pain, would like injection. Last injection 2/22. Localized to lower patella region. )      HPI     Pt is a 66 yo female who reports [facility] has really been something.  Oncologist wants 20 min a day.  Treadmill walking 5 min at a time.  Hard time walking around the block.  Going around apt and grocery shopping.  Breast cancer treatments now.  Knee pain taken a back seat.  Sister bosch- pool therapy, trying to avoid COVID areas.  Land based therapy on back burner.  Getting chemo on Friday.  Cortisone to hold her over.  Hx of insufficiency fracture right knee.      How many servings of fruits and vegetables do you eat daily?  4 or more    On average, how many sweetened beverages do you drink each day (Examples: soda, juice, sweet tea, etc.  Do NOT count diet or artificially sweetened beverages)?   0    How many days per week do you exercise enough to make your heart beat faster? 7    How many minutes a day do you exercise enough to make your heart beat faster? 9 or less    How many days per week do you miss taking your medication? 0        Review of Systems   Constitutional, HEENT, cardiovascular, pulmonary, gi and gu systems are negative, except as otherwise noted.      Objective    /77   Pulse 75   Temp 98  F (36.7  C) (Oral)   Resp 16   Wt 91.2 kg (201 lb)   LMP 05/17/2011   SpO2 100%   BMI 32.50 kg/m    Body mass index is 32.5 kg/m .  Physical Exam       Inspection:       AP/lateral  alignment normal  Tender: medial right knee      Non-tender: patellar facets, MCL, LCL, lateral joint line, medial joint line, IT band, posterior knee   Active Range of Motion: all normal  Strength: quad  5-/5, Hamstrings  5-/5         Reviewed past imaging- decreased medial jt compartment            PROCEDURE:  JOINT ASPIRATION/INJECTION.         After a discussion of risks, benefits and side effects of procedure, informed patient consent was obtained.       The right knee was prepped and draped in the usual clean fashion (sterile not required for this procedure).     ASPIRATION: Not performed.                                 INJECTION:  Using 4 cc of 1% lidocaine mixed                           with 40 mg of kenalog, the right knee was successfully injected                           without complication.  Patient did experience some pain                          relief following injection.

## 2022-07-27 ENCOUNTER — ONCOLOGY VISIT (OUTPATIENT)
Dept: ONCOLOGY | Facility: CLINIC | Age: 67
End: 2022-07-27
Attending: PHYSICIAN ASSISTANT
Payer: MEDICARE

## 2022-07-27 ENCOUNTER — PATIENT OUTREACH (OUTPATIENT)
Dept: ONCOLOGY | Facility: CLINIC | Age: 67
End: 2022-07-27

## 2022-07-27 ENCOUNTER — APPOINTMENT (OUTPATIENT)
Dept: LAB | Facility: CLINIC | Age: 67
End: 2022-07-27
Attending: PHYSICIAN ASSISTANT
Payer: MEDICARE

## 2022-07-27 VITALS
RESPIRATION RATE: 16 BRPM | WEIGHT: 198.6 LBS | BODY MASS INDEX: 32.11 KG/M2 | TEMPERATURE: 97.6 F | SYSTOLIC BLOOD PRESSURE: 130 MMHG | DIASTOLIC BLOOD PRESSURE: 79 MMHG | OXYGEN SATURATION: 96 % | HEART RATE: 83 BPM

## 2022-07-27 DIAGNOSIS — Z51.11 ENCOUNTER FOR ANTINEOPLASTIC CHEMOTHERAPY: ICD-10-CM

## 2022-07-27 DIAGNOSIS — Z17.0 MALIGNANT NEOPLASM OF UPPER-INNER QUADRANT OF LEFT BREAST IN FEMALE, ESTROGEN RECEPTOR POSITIVE (H): Primary | ICD-10-CM

## 2022-07-27 DIAGNOSIS — L98.9 SKIN LESION: ICD-10-CM

## 2022-07-27 DIAGNOSIS — C50.212 MALIGNANT NEOPLASM OF UPPER-INNER QUADRANT OF LEFT BREAST IN FEMALE, ESTROGEN RECEPTOR POSITIVE (H): Primary | ICD-10-CM

## 2022-07-27 LAB
ALBUMIN SERPL-MCNC: 3.4 G/DL (ref 3.4–5)
ALP SERPL-CCNC: 57 U/L (ref 40–150)
ALT SERPL W P-5'-P-CCNC: 18 U/L (ref 0–50)
ANION GAP SERPL CALCULATED.3IONS-SCNC: 7 MMOL/L (ref 3–14)
AST SERPL W P-5'-P-CCNC: 16 U/L (ref 0–45)
BASOPHILS # BLD AUTO: 0 10E3/UL (ref 0–0.2)
BASOPHILS NFR BLD AUTO: 0 %
BILIRUB SERPL-MCNC: 0.3 MG/DL (ref 0.2–1.3)
BUN SERPL-MCNC: 14 MG/DL (ref 7–30)
CALCIUM SERPL-MCNC: 8.7 MG/DL (ref 8.5–10.1)
CHLORIDE BLD-SCNC: 110 MMOL/L (ref 94–109)
CO2 SERPL-SCNC: 25 MMOL/L (ref 20–32)
CREAT SERPL-MCNC: 0.6 MG/DL (ref 0.52–1.04)
EOSINOPHIL # BLD AUTO: 0 10E3/UL (ref 0–0.7)
EOSINOPHIL NFR BLD AUTO: 0 %
ERYTHROCYTE [DISTWIDTH] IN BLOOD BY AUTOMATED COUNT: 12.7 % (ref 10–15)
GFR SERPL CREATININE-BSD FRML MDRD: >90 ML/MIN/1.73M2
GLUCOSE BLD-MCNC: 102 MG/DL (ref 70–99)
HCT VFR BLD AUTO: 37.2 % (ref 35–47)
HGB BLD-MCNC: 12.8 G/DL (ref 11.7–15.7)
IMM GRANULOCYTES # BLD: 0.1 10E3/UL
IMM GRANULOCYTES NFR BLD: 1 %
LYMPHOCYTES # BLD AUTO: 1 10E3/UL (ref 0.8–5.3)
LYMPHOCYTES NFR BLD AUTO: 9 %
MCH RBC QN AUTO: 31.3 PG (ref 26.5–33)
MCHC RBC AUTO-ENTMCNC: 34.4 G/DL (ref 31.5–36.5)
MCV RBC AUTO: 91 FL (ref 78–100)
MONOCYTES # BLD AUTO: 0.6 10E3/UL (ref 0–1.3)
MONOCYTES NFR BLD AUTO: 6 %
NEUTROPHILS # BLD AUTO: 9.1 10E3/UL (ref 1.6–8.3)
NEUTROPHILS NFR BLD AUTO: 84 %
NRBC # BLD AUTO: 0 10E3/UL
NRBC BLD AUTO-RTO: 0 /100
PLATELET # BLD AUTO: 362 10E3/UL (ref 150–450)
POTASSIUM BLD-SCNC: 3.9 MMOL/L (ref 3.4–5.3)
PROT SERPL-MCNC: 7.2 G/DL (ref 6.8–8.8)
RBC # BLD AUTO: 4.09 10E6/UL (ref 3.8–5.2)
SODIUM SERPL-SCNC: 142 MMOL/L (ref 133–144)
WBC # BLD AUTO: 10.8 10E3/UL (ref 4–11)

## 2022-07-27 PROCEDURE — 80053 COMPREHEN METABOLIC PANEL: CPT | Performed by: PHYSICIAN ASSISTANT

## 2022-07-27 PROCEDURE — 85025 COMPLETE CBC W/AUTO DIFF WBC: CPT | Performed by: PHYSICIAN ASSISTANT

## 2022-07-27 PROCEDURE — 36415 COLL VENOUS BLD VENIPUNCTURE: CPT | Performed by: PHYSICIAN ASSISTANT

## 2022-07-27 PROCEDURE — 82040 ASSAY OF SERUM ALBUMIN: CPT | Performed by: PHYSICIAN ASSISTANT

## 2022-07-27 PROCEDURE — 99215 OFFICE O/P EST HI 40 MIN: CPT | Performed by: PHYSICIAN ASSISTANT

## 2022-07-27 PROCEDURE — G0463 HOSPITAL OUTPT CLINIC VISIT: HCPCS

## 2022-07-27 RX ORDER — HEPARIN SODIUM (PORCINE) LOCK FLUSH IV SOLN 100 UNIT/ML 100 UNIT/ML
5 SOLUTION INTRAVENOUS
Status: CANCELLED | OUTPATIENT
Start: 2022-07-29

## 2022-07-27 RX ORDER — EPINEPHRINE 1 MG/ML
0.3 INJECTION, SOLUTION INTRAMUSCULAR; SUBCUTANEOUS EVERY 5 MIN PRN
Status: CANCELLED | OUTPATIENT
Start: 2022-07-29

## 2022-07-27 RX ORDER — ALBUTEROL SULFATE 90 UG/1
1-2 AEROSOL, METERED RESPIRATORY (INHALATION)
Status: CANCELLED
Start: 2022-07-29

## 2022-07-27 RX ORDER — ONDANSETRON 2 MG/ML
8 INJECTION INTRAMUSCULAR; INTRAVENOUS ONCE
Status: CANCELLED | OUTPATIENT
Start: 2022-07-29

## 2022-07-27 RX ORDER — MUPIROCIN 20 MG/G
OINTMENT TOPICAL 2 TIMES DAILY
Qty: 22 G | Refills: 0 | Status: SHIPPED | OUTPATIENT
Start: 2022-07-27 | End: 2022-10-06

## 2022-07-27 RX ORDER — MEPERIDINE HYDROCHLORIDE 25 MG/ML
25 INJECTION INTRAMUSCULAR; INTRAVENOUS; SUBCUTANEOUS EVERY 30 MIN PRN
Status: CANCELLED | OUTPATIENT
Start: 2022-07-29

## 2022-07-27 RX ORDER — METHYLPREDNISOLONE SODIUM SUCCINATE 125 MG/2ML
125 INJECTION, POWDER, LYOPHILIZED, FOR SOLUTION INTRAMUSCULAR; INTRAVENOUS
Status: CANCELLED
Start: 2022-07-29

## 2022-07-27 RX ORDER — NALOXONE HYDROCHLORIDE 0.4 MG/ML
0.2 INJECTION, SOLUTION INTRAMUSCULAR; INTRAVENOUS; SUBCUTANEOUS
Status: CANCELLED | OUTPATIENT
Start: 2022-07-29

## 2022-07-27 RX ORDER — HEPARIN SODIUM,PORCINE 10 UNIT/ML
5 VIAL (ML) INTRAVENOUS
Status: CANCELLED | OUTPATIENT
Start: 2022-07-29

## 2022-07-27 RX ORDER — DIPHENHYDRAMINE HYDROCHLORIDE 50 MG/ML
50 INJECTION INTRAMUSCULAR; INTRAVENOUS
Status: CANCELLED
Start: 2022-07-29

## 2022-07-27 RX ORDER — LORAZEPAM 2 MG/ML
0.5 INJECTION INTRAMUSCULAR EVERY 4 HOURS PRN
Status: CANCELLED | OUTPATIENT
Start: 2022-07-29

## 2022-07-27 RX ORDER — ALBUTEROL SULFATE 0.83 MG/ML
2.5 SOLUTION RESPIRATORY (INHALATION)
Status: CANCELLED | OUTPATIENT
Start: 2022-07-29

## 2022-07-27 ASSESSMENT — PAIN SCALES - GENERAL: PAINLEVEL: NO PAIN (0)

## 2022-07-27 NOTE — PROGRESS NOTES
Oncology Follow up :  Date on this visit: Jul 27, 2022    Primary Physician: Gaby Lynn     History Of Present Illness:  Ms. Alvarado is a 67 year old female with a left breast cancer.  She self palpated a mass in her mid left breast.  Bilateral diagnostic mammograms showed a mass in the upper inner left breast.  Ultrasound of the left breast showed a mass at 11:00, 8 cm from the nipple measuring 3.1 cm.  There were no suspicious lymph nodes in the left axilla.  Biopsy of the left breast mass was c/w a grade 3 invasive ductal carcinoma, ER strong in 98%, WV moderate in 70%, and HER2 low by IHC (score 1+). Her case was discussed at tumor board on 06/16/22. Her oncotype is 24. After risk vs benefit was discussed, it was decided to start neoadjuvant chemotherapy with TC.     Treatment to date:  - Neoadjuvant chemotherapy initiated on 07/08/22: docetaxel, cyclophosphamide with neulasta.     Interval history:   Marni returns to clinic for consideration of C2 TC on 07/29/22. She is feeling well today. She reports fatigue the Monday and Tuesday following infusion. She also reports facial flushing that she initially noticed on the Saturday after infusion and ~1 hour after neulasta. The rash initially had resolved but has again returned today, after she had a cortisone shot yesterday. She also reports small acne like spots around her mouth and nose. Of note, she was having knee pain and she presented to sports medicine for a cortisone injection yesterday. She denies any associated shortness of breath, cough, angioedema, or chest pain. No fevers or chills. No pain at the neulasta injection site. On 07/14/22, she started to experience an increase in knee pain and tail bone pain which was excruciating during the night. She called the on-call oncologist who recommend tylenol and aleve. She took tylenol 1,000 mg every 4 hours for just over 24 hours and 1 tablet of aleve x 2 doses. She take Claritin daily. The bone pain in her  tail bone has since subsided. She also reports the first ~10 days after infusion diarrhea and cramping that fluctuated in the amount of episodes per day. She had one sudden episode of vomiting on the 23rd along with abdominal cramping and a tail bone ache without any subsequent episodes. She has zofran and compazine but did not them for nausea otherwise. She has been eating and drinking well, doing protein shakes ~1 per day 5 out of 7 days of the week. She had a couple of episodes of acid reflux improved with one 500 mg generic link at the end of the day. No urinary concerns. No peripheral neuropathy.     The remainder of a complete 12 point review of systems was reviewed with the patient and was negative with the exception of that mentioned above.    Past Medical/Surgical History:  Past Medical History:   Diagnosis Date     Aortic stenosis      Hyperlipidemia      Insufficiency fracture of tibia, sequela 09/28/2021     Polyp of colon 05/26/2016   - PVCs    Past Surgical History:   Procedure Laterality Date     CATARACT IOL, RT/LT Right 2006     HC YAG LASER CAPSULOTOMY Right 2009     LASER VITREOLYSIS, ANTERIOR OD (RIGHT EYE) Right 2007     REPOSITION INTRAOCULAR LENS Right 11/18/2014    Procedure: REPOSITION INTRAOCULAR LENS;  Surgeon: Vickie Guerra MD;  Location: [facility]     VITRECTOMY PARSPLANA WITH 23 GAUGE SYSTEM Right 11/18/2014    Procedure: VITRECTOMY PARSPLANA WITH 23 GAUGE SYSTEM;  Surgeon: Vickie Guerra MD;  Location: [facility]     Allergies:  Allergies as of 07/27/2022 - Reviewed 07/26/2022   Allergen Reaction Noted     Bactrim [sulfamethoxazole w/trimethoprim] Nausea 11/01/2017     Seasonal allergies  11/13/2014     Typhoid vaccines Nausea and Vomiting 05/05/2011     Current Medications:  Current Outpatient Medications   Medication Sig Dispense Refill     alendronate (FOSAMAX) 70 MG tablet Take 1 tablet (70 mg) by mouth every 7 days 12 tablet 0     alendronate (FOSAMAX) 70 MG tablet Take  1 tablet (70 mg) by mouth every 7 days 12 tablet 3     calcium carb-cholecalciferol 600-500 MG-UNIT CAPS        dexamethasone (DECADRON) 4 MG tablet Take 2 tablets (8 mg) by mouth 2 times daily (with meals) for 3 doses Start evening of Docetaxel infusion and continue for a total of 3 doses. 6 tablet 3     Lactobacillus (PROBIOTIC CHILDRENS) CHEW        loratadine (CLARITIN) 10 MG tablet Take 10 mg by mouth daily Prn, seasonal for ragweed and lilacs       metoprolol succinate ER (TOPROL XL) 25 MG 24 hr tablet Take 1 tablet (25 mg) by mouth daily 90 tablet 3     Omega-3 Fish Oil 500 MG capsule Take 1 capsule (500 mg) by mouth daily 90 capsule 3     ondansetron (ZOFRAN) 8 MG tablet Take 1 tablet (8 mg) by mouth every 8 hours as needed for nausea (vomiting) 30 tablet 2     prochlorperazine (COMPAZINE) 10 MG tablet Take 0.5 tablets (5 mg) by mouth every 6 hours as needed for nausea or vomiting 30 tablet 2     simvastatin (ZOCOR) 40 MG tablet Take 1 tablet (40 mg) by mouth At Bedtime 90 tablet 3      Social History:  Social History     Socioeconomic History     Marital status: Single     Spouse name: Not on file     Number of children: Not on file     Years of education: Not on file     Highest education level: Not on file   Occupational History     Occupation: Retired - [employer], maintain [employer]   Tobacco Use     Smoking status: Never Smoker     Smokeless tobacco: Never Used   Vaping Use     Vaping Use: Never used   Substance and Sexual Activity     Alcohol use: Yes     Comment: 4 drinks per month     Drug use: No     Sexual activity: Never   Other Topics Concern     Not on file   Social History Narrative     Not on file     Social Determinants of Health     Financial Resource Strain: Not on file   Food Insecurity: Not on file   Transportation Needs: Not on file   Physical Activity: Not on file   Stress: Not on file   Social Connections: Not on file   Intimate Partner Violence: Not on file   Housing  Stability: Not on file     Physical Exam:  /79   Pulse 83   Temp 97.6  F (36.4  C) (Oral)   Resp 16   Wt 90.1 kg (198 lb 9.6 oz)   LMP 05/17/2011   SpO2 96%   BMI 32.11 kg/m     /79   Pulse 83   Temp 97.6  F (36.4  C) (Oral)   Resp 16   Wt 90.1 kg (198 lb 9.6 oz)   LMP 05/17/2011   SpO2 96%   BMI 32.11 kg/m    General:  Well appearing adult female in NAD.  Alert and oriented x 3.  HEENT:  Normocephalic.  Sclera anicteric.  MMM.  No lesions of the oropharynx.  Lymph:  No axillary lymphadenopathy.   Chest:  CTA bilaterally.  No wheezes or crackles.  CV:  RRR.  Nl S1 and S2.   Breast: 3.5 x 4.0 cm firm mass palpable in left breast at 11:00, 8 cm from nipple. Left nipple everted and without discharge.  Abd:  Soft/NT/ND.   Ext:  No pitting edema of the bilateral lower extremities.   Musculo:  Full ROM of the bilateral upper extremity  Neuro:  Cranial nerves grossly intact.  Psych:  Mood and affect appear normal.  Skin:  Mild facial flushing on the bilateral cheeks with ~4 scattered actiniform lesions around the nose and mouth. One actiniform lesion with increased erythema in the left nare. No other rashes or lesions on visible skin.     Laboratory/Imaging Studies  Most Recent 3 CBC's:Recent Labs   Lab Test 07/27/22  1232 07/08/22  1028 08/20/21  0800   WBC 10.8 6.3 5.1   HGB 12.8 14.2 13.8   MCV 91 91 94    261 245   ANEUTAUTO 9.1* 4.4  --      Most Recent 3 BMP's:  Recent Labs   Lab Test 07/27/22  1232 07/08/22  1028 11/29/21  1543 08/20/21  0800    138  --  141   POTASSIUM 3.9 3.8  --  4.0   CHLORIDE 110* 109  --  108   CO2 25 25  --  28   BUN 14 18  --  16   CR 0.60 0.64 0.79 0.71   ANIONGAP 7 4  --  5   VALENTINA 8.7 9.2 8.8 9.0   * 101*  --  86   PROTTOTAL 7.2 7.5  --  7.6   ALBUMIN 3.4 3.6  --  3.5    Most Recent 3 LFT's:  Recent Labs   Lab Test 07/27/22  1232 07/08/22  1028 08/20/21  0800   AST 16 18 21   ALT 18 15 19   ALKPHOS 57 54 71   BILITOTAL 0.3 0.4 0.6     I reviewed  the above labs today.    5/31/2022 Bilateral diagnostic mammograms:  New irregular isodense mass at 11-12:00 position, posterior depth measuring up to 3.2 cm.  No significant change in the right breast.    5/31/2022 Ultrasound left breast:  Irregular hypoechoic solid mass at 11:00, 8 cm from the nipple measuring 3.1 x 2.6 x 1.6 cm.  No suspicious lymph nodes in the left axilla.    6/3/2022 Left breast biopsy, 11:00, 8 cm from the nipple:  - Grade 3 invasive ductal carcinoma  - Invasive carcinoma is ER strong in 98% and NC moderate in 70%  - HER2 equivocal by FISH with an average of 4.7 HER2 signals/nucleus and a HER2/JOSE-17 ratio of 1.5  - HER2 low by IHC (score 1+)    ASSESSMENT/PLAN:  Ms. Alvarado is a 67 year old woman with an extensive family history of cancer who presents with newly discovered stage II, T2N0M0, ER+/NC+/HER2 low IDC of her left breast.    1. Left breast IDC, grade 3, ER positive, NC positive, and HER2 low:   Imaging demonstrates an approximate 3.2 cm mass in the upper inner left breast. The goal of treatment is cure. She has met with Dr. Cadet were surgical options were discussed. Oncotype is 24. Dr. Horvath discussed with Marni the risk vs benefit of neoadjuvant chemotherapy vs heading directly to surgery. It was decided to proceed with neoadjuvant chemotherapy with doxetaxel and cyclophosphamide with neulasta support. Next generation testing and BRCA1/2 Hereditary testing are negative for pathogenic variants.   - Labs and clinically appropriate to move forward with C2D1 TC with neulasta support on Friday 07/29/22.  -  Her facial flushing likely is related to her steroids as it returned with her recent cortisone shot yesterday and is less likely related to neulasta. We discussed that a benadryl may be taken at the time of neulasta if desired as well.       2.  Family history of breast and ovarian neoplasm:  Patient has a strong family history of both breast and ovarian cancer. Breast  Actionable panel pending. Referral to Cancer Risk Management scheduled for September 2022.     3.  Personal history of colon polyps:  She has a h/o colon polyps.  Last colonoscopy 2/23/2022 with removal of 3 polyps, each 2-3 mm (2 tubular adenomas, 1 hyperplastic).  Repeat colonoscopy in 02/2027 is recommended.    4. Osteoporosis: on fosamax and calcium/vit D for osteoporosis with previous history of fracture.     5. Cardiac: hx of aortic stenosis and PVC's, asymptomatic. On metoprolol, follows with cardiology.     6. FEN: Eating and drinking well. CMP without concerning findings.     7. Acnieform lesions: Mupirocin ointment to be applied to the lesion at the left nare BID until improves. Reviewed gentle face wash     8.  Bone pain: Tailbone pain, currently resolved. Can take tylenol if it recurs. Counseled on max dose of Tylenol. Continue to monitor     9. Loose stools: Patient has imodium available to use as needed for diarrhea.     10. GERD: Okay to continue tums prn. Declined need further support with pepcid at this time.     11. Follow Up:  Return to clinic in 3 weeks for labs, infusion, and visit with Jessy.     60 minutes spent on the date of the encounter doing chart review, review of test results, interpretation of tests, patient visit and documentation     Paty Oakes PA-C    The patient was seen in conjunction with Paty Oakes who served as a scribe for today's visit. I have reviewed and edited the above note, and agree with the above findings and plan.    Kate Quintero PA-C

## 2022-07-27 NOTE — PROGRESS NOTES
[facility]: Cancer Care                                                                                          Returned patient call to answer questions surrounds appointments on Friday.   Questions answered to her stated satisfaction.     Deborah King MSN, RN ,OCN  RN Care Coordinator   [facility]  [phone]

## 2022-07-27 NOTE — NURSING NOTE
Chief Complaint   Patient presents with     Blood Draw     Labs drawn via  by RN in lab.  VS taken       Labs collected from venipuncture by RN. Vitals taken. Checked in for appointment(s).    Marie Mejia RN

## 2022-07-29 ENCOUNTER — INFUSION THERAPY VISIT (OUTPATIENT)
Dept: ONCOLOGY | Facility: CLINIC | Age: 67
End: 2022-07-29
Attending: PHYSICIAN ASSISTANT
Payer: MEDICARE

## 2022-07-29 VITALS
RESPIRATION RATE: 16 BRPM | OXYGEN SATURATION: 97 % | SYSTOLIC BLOOD PRESSURE: 144 MMHG | TEMPERATURE: 98.5 F | HEART RATE: 92 BPM | DIASTOLIC BLOOD PRESSURE: 84 MMHG

## 2022-07-29 DIAGNOSIS — Z17.0 MALIGNANT NEOPLASM OF UPPER-INNER QUADRANT OF LEFT BREAST IN FEMALE, ESTROGEN RECEPTOR POSITIVE (H): ICD-10-CM

## 2022-07-29 DIAGNOSIS — Z51.11 ENCOUNTER FOR ANTINEOPLASTIC CHEMOTHERAPY: Primary | ICD-10-CM

## 2022-07-29 DIAGNOSIS — C50.212 MALIGNANT NEOPLASM OF UPPER-INNER QUADRANT OF LEFT BREAST IN FEMALE, ESTROGEN RECEPTOR POSITIVE (H): ICD-10-CM

## 2022-07-29 PROCEDURE — 250N000011 HC RX IP 250 OP 636: Performed by: PHYSICIAN ASSISTANT

## 2022-07-29 PROCEDURE — 96367 TX/PROPH/DG ADDL SEQ IV INF: CPT

## 2022-07-29 PROCEDURE — 258N000003 HC RX IP 258 OP 636: Performed by: PHYSICIAN ASSISTANT

## 2022-07-29 PROCEDURE — 96375 TX/PRO/DX INJ NEW DRUG ADDON: CPT

## 2022-07-29 PROCEDURE — 96372 THER/PROPH/DIAG INJ SC/IM: CPT | Performed by: PHYSICIAN ASSISTANT

## 2022-07-29 PROCEDURE — 96417 CHEMO IV INFUS EACH ADDL SEQ: CPT

## 2022-07-29 PROCEDURE — 96377 APPLICATON ON-BODY INJECTOR: CPT | Mod: 59

## 2022-07-29 PROCEDURE — 96413 CHEMO IV INFUSION 1 HR: CPT

## 2022-07-29 RX ORDER — ONDANSETRON 2 MG/ML
8 INJECTION INTRAMUSCULAR; INTRAVENOUS ONCE
Status: COMPLETED | OUTPATIENT
Start: 2022-07-29 | End: 2022-07-29

## 2022-07-29 RX ADMIN — PEGFILGRASTIM 6 MG: KIT SUBCUTANEOUS at 09:35

## 2022-07-29 RX ADMIN — ONDANSETRON 8 MG: 2 INJECTION INTRAMUSCULAR; INTRAVENOUS at 07:34

## 2022-07-29 RX ADMIN — DEXAMETHASONE SODIUM PHOSPHATE: 10 INJECTION, SOLUTION INTRAMUSCULAR; INTRAVENOUS at 07:38

## 2022-07-29 RX ADMIN — CYCLOPHOSPHAMIDE 1235 MG: 1 INJECTION, POWDER, FOR SOLUTION INTRAVENOUS; ORAL at 09:34

## 2022-07-29 RX ADMIN — SODIUM CHLORIDE 250 ML: 9 INJECTION, SOLUTION INTRAVENOUS at 07:34

## 2022-07-29 RX ADMIN — DOCETAXEL 154 MG: 160 INJECTION, SOLUTION INTRAVENOUS at 08:07

## 2022-07-29 ASSESSMENT — PAIN SCALES - GENERAL: PAINLEVEL: NO PAIN (0)

## 2022-07-29 NOTE — PROGRESS NOTES
Infusion Nursing Note:  Vickie Alvarado presents today for Cycle 2 Day 1 Taxotere/Cytoxan and Neulasta On-Pro.    Patient seen by provider today: No   present during visit today: Not Applicable.    Note: Patient arrives feeling well and tolerated Cycle 1 very well. Experienced delayed emesis x 1 about two weeks after treatment. No other concerns or changes since visit with Kate Quintero NP two days ago. Taking Dexamethasone as prescribed.    Intravenous Access:  Peripheral IV placed.    Treatment Conditions:  Lab Results   Component Value Date    HGB 12.8 07/27/2022    WBC 10.8 07/27/2022    ANEU 4.1 09/08/2014    ANEUTAUTO 9.1 (H) 07/27/2022     07/27/2022      Lab Results   Component Value Date     07/27/2022    POTASSIUM 3.9 07/27/2022    MAG 2.2 08/20/2021    CR 0.60 07/27/2022    VALENTINA 8.7 07/27/2022    BILITOTAL 0.3 07/27/2022    ALBUMIN 3.4 07/27/2022    ALT 18 07/27/2022    AST 16 07/27/2022     Results reviewed, labs MET treatment parameters, ok to proceed with treatment.    Post Infusion Assessment:  Patient tolerated infusion without incident.  Blood return noted pre and post infusion.  Site patent and intact, free from redness, edema or discomfort.  No evidence of extravasations.  Access discontinued per protocol.     Neulasta Onpro On-Body injector applied to RUE at 0940.  Writer discussed Neulasta injection would start tomorrow 7/30 at 1240, approximately 27 hours after application applied today.  Written and Verbal instruction reviewed with patient.  Pt instructed when the dose delivery starts, it will take about 45 minutes to complete.  Pt aware Neulasta Onpro On-Body should have green flashing light and to call triage or on-call MD if injector flashes red or appears to be leaking. Pt aware to keep Onpro On-Body Neulasta 4 inches away from electrical equipment and to avoid showering 4 hours prior to injection.   Neulasta Onpro Lot number: see MAR.      Discharge Plan:    Patient requested refills for Dexamethasone.  Discharge instructions reviewed with: Patient.  Patient and/or family verbalized understanding of discharge instructions and all questions answered.  AVS to patient via [employer].  Patient will return 8/19 for next appointment.   Patient discharged in stable condition accompanied by: self.  Departure Mode: Ambulatory.      Gregoria Lowery RN

## 2022-08-08 ENCOUNTER — PATIENT OUTREACH (OUTPATIENT)
Dept: ONCOLOGY | Facility: CLINIC | Age: 67
End: 2022-08-08

## 2022-08-08 NOTE — PROGRESS NOTES
[facility]: Cancer Care Short Note                                    Discussion with Patient:                                                      Spoke with patient who called with questions regarding dry scalp.   She is feeling well after last cycle of chemo, having manageable diarrhea.       Assessment:                                                      Assessment completed with:: Patient    Constitutional  Fatigue: Fatigue relieved by rest    Respiratory       Gastrointestinal  Nausea: Absent or within normal limits  Dehydration: Absent or within normal limits  Diarrhea: Increase of less than 4 stools per day over baseline OR mild increase in ostomy output compared to baseline         Pain  Patient Reported Pain?: No    Patient Coping       Clinic Utilization  Patient Aware of Next Appointment?: Yes        Intervention/Education provided during outreach:                                                       Answered questions to her stated satisfaction.     Patient to follow up as scheduled at next appt  Patient to call/[employer] message with updates  Confirmed patient has clinic and triage numbers    Signature:  Carol King RN

## 2022-08-19 ENCOUNTER — APPOINTMENT (OUTPATIENT)
Dept: LAB | Facility: CLINIC | Age: 67
End: 2022-08-19
Attending: PHYSICIAN ASSISTANT
Payer: MEDICARE

## 2022-08-19 ENCOUNTER — INFUSION THERAPY VISIT (OUTPATIENT)
Dept: ONCOLOGY | Facility: CLINIC | Age: 67
End: 2022-08-19
Attending: PHYSICIAN ASSISTANT
Payer: MEDICARE

## 2022-08-19 VITALS
DIASTOLIC BLOOD PRESSURE: 81 MMHG | SYSTOLIC BLOOD PRESSURE: 129 MMHG | WEIGHT: 196.9 LBS | BODY MASS INDEX: 31.83 KG/M2 | TEMPERATURE: 98.2 F | HEART RATE: 99 BPM | RESPIRATION RATE: 16 BRPM | OXYGEN SATURATION: 95 %

## 2022-08-19 DIAGNOSIS — Z51.11 ENCOUNTER FOR ANTINEOPLASTIC CHEMOTHERAPY: Primary | ICD-10-CM

## 2022-08-19 DIAGNOSIS — Z17.0 MALIGNANT NEOPLASM OF UPPER-INNER QUADRANT OF LEFT BREAST IN FEMALE, ESTROGEN RECEPTOR POSITIVE (H): ICD-10-CM

## 2022-08-19 DIAGNOSIS — C50.212 MALIGNANT NEOPLASM OF UPPER-INNER QUADRANT OF LEFT BREAST IN FEMALE, ESTROGEN RECEPTOR POSITIVE (H): Primary | ICD-10-CM

## 2022-08-19 DIAGNOSIS — Z51.11 ENCOUNTER FOR ANTINEOPLASTIC CHEMOTHERAPY: ICD-10-CM

## 2022-08-19 DIAGNOSIS — Z17.0 MALIGNANT NEOPLASM OF UPPER-INNER QUADRANT OF LEFT BREAST IN FEMALE, ESTROGEN RECEPTOR POSITIVE (H): Primary | ICD-10-CM

## 2022-08-19 DIAGNOSIS — C50.212 MALIGNANT NEOPLASM OF UPPER-INNER QUADRANT OF LEFT BREAST IN FEMALE, ESTROGEN RECEPTOR POSITIVE (H): ICD-10-CM

## 2022-08-19 LAB
ALBUMIN SERPL-MCNC: 3.2 G/DL (ref 3.4–5)
ALP SERPL-CCNC: 61 U/L (ref 40–150)
ALT SERPL W P-5'-P-CCNC: 16 U/L (ref 0–50)
ANION GAP SERPL CALCULATED.3IONS-SCNC: 7 MMOL/L (ref 3–14)
AST SERPL W P-5'-P-CCNC: 16 U/L (ref 0–45)
BASOPHILS # BLD AUTO: 0 10E3/UL (ref 0–0.2)
BASOPHILS NFR BLD AUTO: 1 %
BILIRUB SERPL-MCNC: 0.5 MG/DL (ref 0.2–1.3)
BUN SERPL-MCNC: 12 MG/DL (ref 7–30)
CALCIUM SERPL-MCNC: 8.4 MG/DL (ref 8.5–10.1)
CHLORIDE BLD-SCNC: 106 MMOL/L (ref 94–109)
CO2 SERPL-SCNC: 27 MMOL/L (ref 20–32)
CREAT SERPL-MCNC: 0.58 MG/DL (ref 0.52–1.04)
EOSINOPHIL # BLD AUTO: 0.1 10E3/UL (ref 0–0.7)
EOSINOPHIL NFR BLD AUTO: 1 %
ERYTHROCYTE [DISTWIDTH] IN BLOOD BY AUTOMATED COUNT: 14.9 % (ref 10–15)
GFR SERPL CREATININE-BSD FRML MDRD: >90 ML/MIN/1.73M2
GLUCOSE BLD-MCNC: 107 MG/DL (ref 70–99)
HCT VFR BLD AUTO: 34.5 % (ref 35–47)
HGB BLD-MCNC: 11.6 G/DL (ref 11.7–15.7)
IMM GRANULOCYTES # BLD: 0 10E3/UL
IMM GRANULOCYTES NFR BLD: 0 %
LYMPHOCYTES # BLD AUTO: 0.6 10E3/UL (ref 0.8–5.3)
LYMPHOCYTES NFR BLD AUTO: 8 %
MCH RBC QN AUTO: 31.6 PG (ref 26.5–33)
MCHC RBC AUTO-ENTMCNC: 33.6 G/DL (ref 31.5–36.5)
MCV RBC AUTO: 94 FL (ref 78–100)
MONOCYTES # BLD AUTO: 0.6 10E3/UL (ref 0–1.3)
MONOCYTES NFR BLD AUTO: 8 %
NEUTROPHILS # BLD AUTO: 6.2 10E3/UL (ref 1.6–8.3)
NEUTROPHILS NFR BLD AUTO: 82 %
NRBC # BLD AUTO: 0 10E3/UL
NRBC BLD AUTO-RTO: 0 /100
PLATELET # BLD AUTO: 272 10E3/UL (ref 150–450)
POTASSIUM BLD-SCNC: 3.5 MMOL/L (ref 3.4–5.3)
PROT SERPL-MCNC: 7 G/DL (ref 6.8–8.8)
RBC # BLD AUTO: 3.67 10E6/UL (ref 3.8–5.2)
SODIUM SERPL-SCNC: 140 MMOL/L (ref 133–144)
WBC # BLD AUTO: 7.4 10E3/UL (ref 4–11)

## 2022-08-19 PROCEDURE — 85025 COMPLETE CBC W/AUTO DIFF WBC: CPT | Performed by: PHYSICIAN ASSISTANT

## 2022-08-19 PROCEDURE — 96375 TX/PRO/DX INJ NEW DRUG ADDON: CPT

## 2022-08-19 PROCEDURE — 96413 CHEMO IV INFUSION 1 HR: CPT

## 2022-08-19 PROCEDURE — 96377 APPLICATON ON-BODY INJECTOR: CPT | Mod: 59

## 2022-08-19 PROCEDURE — 96417 CHEMO IV INFUS EACH ADDL SEQ: CPT

## 2022-08-19 PROCEDURE — 96367 TX/PROPH/DG ADDL SEQ IV INF: CPT

## 2022-08-19 PROCEDURE — 258N000003 HC RX IP 258 OP 636: Performed by: PHYSICIAN ASSISTANT

## 2022-08-19 PROCEDURE — 36415 COLL VENOUS BLD VENIPUNCTURE: CPT | Performed by: PHYSICIAN ASSISTANT

## 2022-08-19 PROCEDURE — 99214 OFFICE O/P EST MOD 30 MIN: CPT | Performed by: PHYSICIAN ASSISTANT

## 2022-08-19 PROCEDURE — 82040 ASSAY OF SERUM ALBUMIN: CPT | Performed by: PHYSICIAN ASSISTANT

## 2022-08-19 PROCEDURE — G0463 HOSPITAL OUTPT CLINIC VISIT: HCPCS

## 2022-08-19 PROCEDURE — 250N000011 HC RX IP 250 OP 636: Performed by: PHYSICIAN ASSISTANT

## 2022-08-19 PROCEDURE — 96372 THER/PROPH/DIAG INJ SC/IM: CPT | Performed by: PHYSICIAN ASSISTANT

## 2022-08-19 PROCEDURE — 80053 COMPREHEN METABOLIC PANEL: CPT | Performed by: PHYSICIAN ASSISTANT

## 2022-08-19 RX ORDER — ONDANSETRON 2 MG/ML
8 INJECTION INTRAMUSCULAR; INTRAVENOUS ONCE
Status: CANCELLED | OUTPATIENT
Start: 2022-08-19

## 2022-08-19 RX ORDER — DIPHENHYDRAMINE HYDROCHLORIDE 50 MG/ML
50 INJECTION INTRAMUSCULAR; INTRAVENOUS
Status: CANCELLED
Start: 2022-08-19

## 2022-08-19 RX ORDER — ALBUTEROL SULFATE 0.83 MG/ML
2.5 SOLUTION RESPIRATORY (INHALATION)
Status: CANCELLED | OUTPATIENT
Start: 2022-08-19

## 2022-08-19 RX ORDER — LORAZEPAM 2 MG/ML
0.5 INJECTION INTRAMUSCULAR EVERY 4 HOURS PRN
Status: CANCELLED | OUTPATIENT
Start: 2022-08-19

## 2022-08-19 RX ORDER — MEPERIDINE HYDROCHLORIDE 25 MG/ML
25 INJECTION INTRAMUSCULAR; INTRAVENOUS; SUBCUTANEOUS EVERY 30 MIN PRN
Status: CANCELLED | OUTPATIENT
Start: 2022-08-19

## 2022-08-19 RX ORDER — HEPARIN SODIUM,PORCINE 10 UNIT/ML
5 VIAL (ML) INTRAVENOUS
Status: CANCELLED | OUTPATIENT
Start: 2022-08-19

## 2022-08-19 RX ORDER — ONDANSETRON 2 MG/ML
8 INJECTION INTRAMUSCULAR; INTRAVENOUS ONCE
Status: COMPLETED | OUTPATIENT
Start: 2022-08-19 | End: 2022-08-19

## 2022-08-19 RX ORDER — EPINEPHRINE 1 MG/ML
0.3 INJECTION, SOLUTION INTRAMUSCULAR; SUBCUTANEOUS EVERY 5 MIN PRN
Status: CANCELLED | OUTPATIENT
Start: 2022-08-19

## 2022-08-19 RX ORDER — HEPARIN SODIUM (PORCINE) LOCK FLUSH IV SOLN 100 UNIT/ML 100 UNIT/ML
5 SOLUTION INTRAVENOUS
Status: CANCELLED | OUTPATIENT
Start: 2022-08-19

## 2022-08-19 RX ORDER — ALBUTEROL SULFATE 90 UG/1
1-2 AEROSOL, METERED RESPIRATORY (INHALATION)
Status: CANCELLED
Start: 2022-08-19

## 2022-08-19 RX ORDER — HEPARIN SODIUM (PORCINE) LOCK FLUSH IV SOLN 100 UNIT/ML 100 UNIT/ML
5 SOLUTION INTRAVENOUS
Status: DISCONTINUED | OUTPATIENT
Start: 2022-08-19 | End: 2022-08-19 | Stop reason: HOSPADM

## 2022-08-19 RX ORDER — METHYLPREDNISOLONE SODIUM SUCCINATE 125 MG/2ML
125 INJECTION, POWDER, LYOPHILIZED, FOR SOLUTION INTRAMUSCULAR; INTRAVENOUS
Status: CANCELLED
Start: 2022-08-19

## 2022-08-19 RX ORDER — NALOXONE HYDROCHLORIDE 0.4 MG/ML
0.2 INJECTION, SOLUTION INTRAMUSCULAR; INTRAVENOUS; SUBCUTANEOUS
Status: CANCELLED | OUTPATIENT
Start: 2022-08-19

## 2022-08-19 RX ADMIN — SODIUM CHLORIDE 154 MG: 9 INJECTION, SOLUTION INTRAVENOUS at 11:41

## 2022-08-19 RX ADMIN — PEGFILGRASTIM 6 MG: KIT SUBCUTANEOUS at 12:55

## 2022-08-19 RX ADMIN — CYCLOPHOSPHAMIDE 1235 MG: 1 INJECTION, POWDER, FOR SOLUTION INTRAVENOUS; ORAL at 12:44

## 2022-08-19 RX ADMIN — ONDANSETRON 8 MG: 2 INJECTION INTRAMUSCULAR; INTRAVENOUS at 11:05

## 2022-08-19 RX ADMIN — SODIUM CHLORIDE 250 ML: 9 INJECTION, SOLUTION INTRAVENOUS at 10:57

## 2022-08-19 RX ADMIN — FOSAPREPITANT: 150 INJECTION, POWDER, LYOPHILIZED, FOR SOLUTION INTRAVENOUS at 11:06

## 2022-08-19 ASSESSMENT — PAIN SCALES - GENERAL: PAINLEVEL: NO PAIN (0)

## 2022-08-19 NOTE — LETTER
8/19/2022         RE: Vickie Alvarado  [address]  [facility] [address]      Oncology Follow up :  Date on this visit: Aug 19, 2022    Primary Physician: Gaby Lynn     History Of Present Illness:  Ms. Alvarado is a 67 year old female with a left breast cancer.  She self palpated a mass in her mid left breast.  Bilateral diagnostic mammograms showed a mass in the upper inner left breast.  Ultrasound of the left breast showed a mass at 11:00, 8 cm from the nipple measuring 3.1 cm.  There were no suspicious lymph nodes in the left axilla.  Biopsy of the left breast mass was c/w a grade 3 invasive ductal carcinoma, ER strong in 98%, MD moderate in 70%, and HER2 low by IHC (score 1+). Her case was discussed at tumor board on 06/16/22. Her oncotype is 24. After risk vs benefit was discussed, it was decided to start neoadjuvant chemotherapy with TC.     Treatment to date:  - Neoadjuvant chemotherapy initiated on 07/08/22: docetaxel, cyclophosphamide with neulasta.     Interval history:   Marni returns to clinic for consideration of C3 TC. She is feeling well. She did not have any diarrhea or bowel issues this cycle. She has not had any nausea. Her scalp folliculitis and acne on face is better with using mupirocin. She had a rash on her R arm after this past cycle which resolved with benadryl cream. She is using s/s rinses preventatively. She was less active this cycle and is going to have a friend help her with accountability. She is eating and drinking well. She did not have any bone pain this cycle.     No fevers/chills, cough, sore throat, dysuria. No SOB or edema. No neuropathy.         Past Medical/Surgical History:  Past Medical History:   Diagnosis Date     Aortic stenosis      Hyperlipidemia      Insufficiency fracture of tibia, sequela 09/28/2021     Polyp of colon 05/26/2016   - PVCs    Past Surgical History:   Procedure Laterality Date     CATARACT IOL, RT/LT Right 2006     HC YAG LASER  CAPSULOTOMY Right 2009     LASER VITREOLYSIS, ANTERIOR OD (RIGHT EYE) Right 2007     REPOSITION INTRAOCULAR LENS Right 11/18/2014    Procedure: REPOSITION INTRAOCULAR LENS;  Surgeon: Vickie Guerra MD;  Location: [facility]     VITRECTOMY PARSPLANA WITH 23 GAUGE SYSTEM Right 11/18/2014    Procedure: VITRECTOMY PARSPLANA WITH 23 GAUGE SYSTEM;  Surgeon: Vickie Guerra MD;  Location: [facility]     Allergies:  Allergies as of 08/19/2022 - Reviewed 08/19/2022   Allergen Reaction Noted     Bactrim [sulfamethoxazole w/trimethoprim] Nausea 11/01/2017     Seasonal allergies  11/13/2014     Typhoid vaccines Nausea and Vomiting 05/05/2011     Current Medications:  Current Outpatient Medications   Medication Sig Dispense Refill     alendronate (FOSAMAX) 70 MG tablet Take 1 tablet (70 mg) by mouth every 7 days 12 tablet 0     calcium carb-cholecalciferol 600-500 MG-UNIT CAPS        Lactobacillus (PROBIOTIC CHILDRENS) CHEW        loratadine (CLARITIN) 10 MG tablet Take 10 mg by mouth daily Prn, seasonal for ragweed and lilacs       metoprolol succinate ER (TOPROL XL) 25 MG 24 hr tablet Take 1 tablet (25 mg) by mouth daily 90 tablet 3     mupirocin (BACTROBAN) 2 % external ointment Apply topically 2 times daily 22 g 0     Omega-3 Fatty Acids (FISH OIL ADULT GUMMIES PO) Take 250 mg by mouth       ondansetron (ZOFRAN) 8 MG tablet Take 1 tablet (8 mg) by mouth every 8 hours as needed for nausea (vomiting) 30 tablet 2     prochlorperazine (COMPAZINE) 10 MG tablet Take 0.5 tablets (5 mg) by mouth every 6 hours as needed for nausea or vomiting 30 tablet 2     simvastatin (ZOCOR) 40 MG tablet Take 1 tablet (40 mg) by mouth At Bedtime 90 tablet 3     alendronate (FOSAMAX) 70 MG tablet Take 1 tablet (70 mg) by mouth every 7 days 12 tablet 3     dexamethasone (DECADRON) 4 MG tablet Take 2 tablets (8 mg) by mouth 2 times daily (with meals) for 3 doses Start evening of Docetaxel infusion and continue for a total of 3 doses. 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[employer]   Tobacco Use     Smoking status: Never Smoker     Smokeless tobacco: Never Used   Vaping Use     Vaping Use: Never used   Substance and Sexual Activity     Alcohol use: Yes     Comment: 4 drinks per month     Drug use: No     Sexual activity: Never   Other Topics Concern     Not on file   Social History Narrative     Not on file     Social Determinants of Health     Financial Resource Strain: Not on file   Food Insecurity: Not on file   Transportation Needs: Not on file   Physical Activity: Not on file   Stress: Not on file   Social Connections: Not on file   Intimate Partner Violence: Not on file   Housing Stability: Not on file     Physical Exam:  /81 (BP Location: Right arm, Patient Position: Sitting, Cuff Size: Adult Regular)   Pulse 99   Temp 98.2  F (36.8  C) (Oral)   Resp 16   Wt 89.3 kg (196 lb 14.4 oz)   LMP 05/17/2011   SpO2 95%   BMI 31.83 kg/m       General:  Well appearing adult female in NAD.  Alert and oriented x 3.  HEENT:  Normocephalic.  Sclera anicteric.  MMM.  No lesions of the oropharynx.  Lymph:  No axillary lymphadenopathy.   Chest:  CTA bilaterally.  No wheezes or crackles.  CV:  RRR.  Breast: 3.0 x 3.5 cm soft mass palpable in left breast at 11:00, 8 cm from nipple. Left nipple everted and without discharge.  Abd:  Soft/NT/ND. +BS   Ext:  No pitting edema of the bilateral lower extremities.   Neuro:  Cranial nerves grossly intact.  Psych:  Mood and affect appear normal.  Skin: Papules on front of scalp and occiput area. No  rash on R arm    Laboratory/Imaging Studies  Most Recent 3 CBC's:  Recent Labs   Lab Test 08/19/22  0925 07/27/22  1232 07/08/22  1028   WBC 7.4 10.8 6.3   HGB 11.6* 12.8 14.2   MCV 94 91 91    362 261   ANEUTAUTO 6.2 9.1* 4.4     Most Recent 3 BMP's:  Recent Labs   Lab Test 08/19/22  0925 07/27/22  1232 07/08/22  1028    142 138   POTASSIUM 3.5 3.9 3.8   CHLORIDE 106 110* 109   CO2 27 25 25   BUN 12 14 18   CR 0.58 0.60 0.64   ANIONGAP 7 7 4   VALENTINA 8.4* 8.7 9.2   * 102* 101*   PROTTOTAL 7.0 7.2 7.5   ALBUMIN 3.2* 3.4 3.6    Most Recent 3 LFT's:  Recent Labs   Lab Test 08/19/22  0925 07/27/22  1232 07/08/22  1028   AST 16 16 18   ALT 16 18 15   ALKPHOS 61 57 54   BILITOTAL 0.5 0.3 0.4     I reviewed the above labs today.      ASSESSMENT/PLAN:  Ms. Alvarado is a 67 year old woman with an extensive family history of cancer who presents with newly discovered stage II, T2N0M0, ER+/ME+/HER2 low IDC of her left breast.    1. Left breast IDC, grade 3, ER positive, ME positive, and HER2 low:   Imaging demonstrates an approximate 3.2 cm mass in the upper inner left breast. The goal of treatment is cure. She has met with Dr. Cadet were surgical options were discussed. Oncotype is 24. Dr. Horvath discussed with Marni the risk vs benefit of neoadjuvant chemotherapy vs heading directly to surgery. It was decided to proceed with neoadjuvant chemotherapy with doxetaxel and cyclophosphamide with neulasta support. Next generation testing and BRCA1/2 Hereditary testing are negative for pathogenic variants.   - Labs and clinically appropriate to move forward with C3D1 TC with neulasta support today   -Follow-up with me in 3 weeks. Dr. Cadet follow-up is scheduled. She will meet with Dr. Horvath again after surgery.     2.  Family history of breast and ovarian neoplasm:  Patient has a strong family history of both breast and ovarian cancer. Breast Actionable panel was negative.. Referral to Cancer Risk Management  scheduled for September 2022.     3.  Personal history of colon polyps:  She has a h/o colon polyps.  Last colonoscopy 2/23/2022 with removal of 3 polyps, each 2-3 mm (2 tubular adenomas, 1 hyperplastic).  Repeat colonoscopy in 02/2027 is recommended.    4. Osteoporosis: on fosamax and calcium/vit D for osteoporosis with previous history of fracture.     5. Cardiac: hx of aortic stenosis and PVC's, asymptomatic. On metoprolol, follows with cardiology.     6. Acnieform lesions: Mupirocin ointment was initially prescribed for a nasal lesion. It is helping lesions on face and scalp. Okay to continue.     7. Rash: Resolved. Likely from taxane. Okay to use hydrocortisone if this recurs.     JULIO CESAR Patino PA-C

## 2022-08-19 NOTE — PROGRESS NOTES
Infusion Nursing Note:  Vickie Alvarado presents today for Cycle 3 Day 1 Taxotere and Cyclophosphamide + Neulasta OnPro.  Patient seen by provider today: Yes: IVETTE Patino   present during visit today: Not Applicable.    Note: Pt presents to infusion feeling well. She offers no new concerns following her provider appointment. She confirms she is taking Dexamethasone as prescribed following chemo.    Intravenous Access:  Peripheral IV placed.    Treatment Conditions:  Lab Results   Component Value Date    HGB 11.6 (L) 08/19/2022    WBC 7.4 08/19/2022    ANEU 4.1 09/08/2014    ANEUTAUTO 6.2 08/19/2022     08/19/2022      Lab Results   Component Value Date     08/19/2022    POTASSIUM 3.5 08/19/2022    MAG 2.2 08/20/2021    CR 0.58 08/19/2022    VALENTINA 8.4 (L) 08/19/2022    BILITOTAL 0.5 08/19/2022    ALBUMIN 3.2 (L) 08/19/2022    ALT 16 08/19/2022    AST 16 08/19/2022     Results reviewed, labs MET treatment parameters, ok to proceed with treatment.    Post Infusion Assessment:  Patient tolerated infusion without incident.  Blood return noted pre and post infusion.  Site patent and intact, free from redness, edema or discomfort.  No evidence of extravasations.  Access discontinued per protocol.     Neulasta Onpro On-Body injector applied to Left Arm at 1300.  Writer discussed Neulasta injection would start tomorrow 8/20/22 at 1600, approximately 27 hours after application applied today.  Written and Verbal instruction reviewed with patient.  Pt instructed when the dose delivery starts, it will take about 45 minutes to complete.  Pt aware Neulasta Onpro On-Body should have green flashing light and to call triage or on-call MD if injector flashes red or appears to be leaking. Pt aware to keep Onpro On-Body Neulasta 4 inches away from electrical equipment and to avoid showering 4 hours prior to injection.       Discharge Plan:   Prescription refills given for Dexamethasone.  Discharge  instructions reviewed with: Patient.  Patient and/or family verbalized understanding of discharge instructions and all questions answered.  AVS to patient via [employer]. Patient will return 9/8/22 for next appointment.   Patient discharged in stable condition accompanied by: self.  Departure Mode: Ambulatory.      Funim Crain RN

## 2022-08-19 NOTE — PATIENT INSTRUCTIONS
Remember to take 2 tablets of Dexamethasone tonight (8/19), tomorrow AM and tomorrow PM (8/20).         August 2022 Sunday Monday Tuesday Wednesday Thursday Friday Saturday        1     2     3     4     5     6       7     8     9     10     11     12     13       14     15     16     17     18     19    LAB PERIPHERAL   9:15 AM   (15 min.)   [facility] DRAW   [facility]    RETURN   9:45 AM   (45 min.)   Jessy Molina PA-C   [facility]    ONC INFUSION 2 HR (120 MIN)  11:00 AM   (120 min.)   [facility] NURSE   [facility] 20       21     22     23     24     25     26     27       28     29     30     31                                  September 2022 Sunday Monday Tuesday Wednesday Thursday Friday Saturday                       1     2    VIDEO VISIT NEW   8:45 AM   (75 min.)   Gregoria Benjamin GC   [facility] 3       4     5     6     7     8    LAB PERIPHERAL  10:00 AM   (15 min.)   [facility]RAW   [facility]    RETURN  10:15 AM   (45 min.)   Jessy Molina PA-C   [facility]    ONC INFUSION 2 HR (120 MIN)  12:00 PM   (120 min.)   [facility] INFUSION NURSE   [facility] 9    VIDEO VISIT RETURN  11:25 AM   (20 min.)   Alphonso Cadet MD   [facility] 10       11     12     13     14     15     16     17       18     19     20     21     22     23     24       25     26     27     28     29     30                            Lab Results:  Recent Results (from the past 12 hour(s))   Comprehensive metabolic panel    Collection Time: 08/19/22  9:25 AM   Result Value Ref Range    Sodium 140 133 - 144 mmol/L    Potassium 3.5 3.4 - 5.3 mmol/L    Chloride 106 94 - 109 mmol/L    Carbon Dioxide (CO2) 27 20 - 32 mmol/L    Anion Gap 7 3 - 14 mmol/L    Urea  Nitrogen 12 7 - 30 mg/dL    Creatinine 0.58 0.52 - 1.04 mg/dL    Calcium 8.4 (L) 8.5 - 10.1 mg/dL    Glucose 107 (H) 70 - 99 mg/dL    Alkaline Phosphatase 61 40 - 150 U/L    AST 16 0 - 45 U/L    ALT 16 0 - 50 U/L    Protein Total 7.0 6.8 - 8.8 g/dL    Albumin 3.2 (L) 3.4 - 5.0 g/dL    Bilirubin Total 0.5 0.2 - 1.3 mg/dL    GFR Estimate >90 >60 mL/min/1.73m2   CBC with platelets and differential    Collection Time: 08/19/22  9:25 AM   Result Value Ref Range    WBC Count 7.4 4.0 - 11.0 10e3/uL    RBC Count 3.67 (L) 3.80 - 5.20 10e6/uL    Hemoglobin 11.6 (L) 11.7 - 15.7 g/dL    Hematocrit 34.5 (L) 35.0 - 47.0 %    MCV 94 78 - 100 fL    MCH 31.6 26.5 - 33.0 pg    MCHC 33.6 31.5 - 36.5 g/dL    RDW 14.9 10.0 - 15.0 %    Platelet Count 272 150 - 450 10e3/uL    % Neutrophils 82 %    % Lymphocytes 8 %    % Monocytes 8 %    % Eosinophils 1 %    % Basophils 1 %    % Immature Granulocytes 0 %    NRBCs per 100 WBC 0 <1 /100    Absolute Neutrophils 6.2 1.6 - 8.3 10e3/uL    Absolute Lymphocytes 0.6 (L) 0.8 - 5.3 10e3/uL    Absolute Monocytes 0.6 0.0 - 1.3 10e3/uL    Absolute Eosinophils 0.1 0.0 - 0.7 10e3/uL    Absolute Basophils 0.0 0.0 - 0.2 10e3/uL    Absolute Immature Granulocytes 0.0 <=0.4 10e3/uL    Absolute NRBCs 0.0 10e3/uL

## 2022-08-19 NOTE — NURSING NOTE
Chief Complaint   Patient presents with     Blood Draw     Labs drawn with PIV start by RN. Vitals taken.     Labs drawn with PIV start by RN. Pt tolerated well. Vitals taken. Pt checked into next appointment.    Ana De La Cruz RN

## 2022-08-19 NOTE — NURSING NOTE
"Oncology Rooming Note    August 19, 2022 9:40 AM   Vickie Alvarado is a 67 year old female who presents for:    Chief Complaint   Patient presents with     Blood Draw     Labs drawn with PIV start by RN. Vitals taken.     Oncology Clinic Visit     Breast Ca     Initial Vitals: /81 (BP Location: Right arm, Patient Position: Sitting, Cuff Size: Adult Regular)   Pulse 99   Temp 98.2  F (36.8  C) (Oral)   Resp 16   Wt 89.3 kg (196 lb 14.4 oz)   LMP 05/17/2011   SpO2 95%   BMI 31.83 kg/m   Estimated body mass index is 31.83 kg/m  as calculated from the following:    Height as of 6/14/22: 1.675 m (5' 5.95\").    Weight as of this encounter: 89.3 kg (196 lb 14.4 oz). Body surface area is 2.04 meters squared.  No Pain (0) Comment: Data Unavailable   Patient's last menstrual period was 05/17/2011.  Allergies reviewed: Yes  Medications reviewed: Yes    Medications: Medication refills not needed today.  Pharmacy name entered into [employer]: [id number] [place] [id number] [facility] AT [place] [id number] & [facility]    Clinical concerns:  none      Kristina Munguia CMA              "

## 2022-08-19 NOTE — PROGRESS NOTES
Oncology Follow up :  Date on this visit: Aug 19, 2022    Primary Physician: Gaby Lynn     History Of Present Illness:  Ms. Alvarado is a 67 year old female with a left breast cancer.  She self palpated a mass in her mid left breast.  Bilateral diagnostic mammograms showed a mass in the upper inner left breast.  Ultrasound of the left breast showed a mass at 11:00, 8 cm from the nipple measuring 3.1 cm.  There were no suspicious lymph nodes in the left axilla.  Biopsy of the left breast mass was c/w a grade 3 invasive ductal carcinoma, ER strong in 98%, MI moderate in 70%, and HER2 low by IHC (score 1+). Her case was discussed at tumor board on 06/16/22. Her oncotype is 24. After risk vs benefit was discussed, it was decided to start neoadjuvant chemotherapy with TC.     Treatment to date:  - Neoadjuvant chemotherapy initiated on 07/08/22: docetaxel, cyclophosphamide with neulasta.     Interval history:   Marni returns to clinic for consideration of C3 TC. She is feeling well. She did not have any diarrhea or bowel issues this cycle. She has not had any nausea. Her scalp folliculitis and acne on face is better with using mupirocin. She had a rash on her R arm after this past cycle which resolved with benadryl cream. She is using s/s rinses preventatively. She was less active this cycle and is going to have a friend help her with accountability. She is eating and drinking well. She did not have any bone pain this cycle.     No fevers/chills, cough, sore throat, dysuria. No SOB or edema. No neuropathy.         Past Medical/Surgical History:  Past Medical History:   Diagnosis Date     Aortic stenosis      Hyperlipidemia      Insufficiency fracture of tibia, sequela 09/28/2021     Polyp of colon 05/26/2016   - PVCs    Past Surgical History:   Procedure Laterality Date     CATARACT IOL, RT/LT Right 2006     HC YAG LASER CAPSULOTOMY Right 2009     LASER VITREOLYSIS, ANTERIOR OD (RIGHT EYE) Right 2007     REPOSITION  INTRAOCULAR LENS Right 11/18/2014    Procedure: REPOSITION INTRAOCULAR LENS;  Surgeon: Vickie Guerra MD;  Location:  [facility]     VITRECTOMY PARSPLANA WITH 23 GAUGE SYSTEM Right 11/18/2014    Procedure: VITRECTOMY PARSPLANA WITH 23 GAUGE SYSTEM;  Surgeon: Vickie Guerra MD;  Location:  [facility]     Allergies:  Allergies as of 08/19/2022 - Reviewed 08/19/2022   Allergen Reaction Noted     Bactrim [sulfamethoxazole w/trimethoprim] Nausea 11/01/2017     Seasonal allergies  11/13/2014     Typhoid vaccines Nausea and Vomiting 05/05/2011     Current Medications:  Current Outpatient Medications   Medication Sig Dispense Refill     alendronate (FOSAMAX) 70 MG tablet Take 1 tablet (70 mg) by mouth every 7 days 12 tablet 0     calcium carb-cholecalciferol 600-500 MG-UNIT CAPS        Lactobacillus (PROBIOTIC CHILDRENS) CHEW        loratadine (CLARITIN) 10 MG tablet Take 10 mg by mouth daily Prn, seasonal for ragweed and lilacs       metoprolol succinate ER (TOPROL XL) 25 MG 24 hr tablet Take 1 tablet (25 mg) by mouth daily 90 tablet 3     mupirocin (BACTROBAN) 2 % external ointment Apply topically 2 times daily 22 g 0     Omega-3 Fatty Acids (FISH OIL ADULT GUMMIES PO) Take 250 mg by mouth       ondansetron (ZOFRAN) 8 MG tablet Take 1 tablet (8 mg) by mouth every 8 hours as needed for nausea (vomiting) 30 tablet 2     prochlorperazine (COMPAZINE) 10 MG tablet Take 0.5 tablets (5 mg) by mouth every 6 hours as needed for nausea or vomiting 30 tablet 2     simvastatin (ZOCOR) 40 MG tablet Take 1 tablet (40 mg) by mouth At Bedtime 90 tablet 3     alendronate (FOSAMAX) 70 MG tablet Take 1 tablet (70 mg) by mouth every 7 days 12 tablet 3     dexamethasone (DECADRON) 4 MG tablet Take 2 tablets (8 mg) by mouth 2 times daily (with meals) for 3 doses Start evening of Docetaxel infusion and continue for a total of 3 doses. 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[employer]   Tobacco Use     Smoking status: Never Smoker     Smokeless tobacco: Never Used   Vaping Use     Vaping Use: Never used   Substance and Sexual Activity     Alcohol use: Yes     Comment: 4 drinks per month     Drug use: No     Sexual activity: Never   Other Topics Concern     Not on file   Social History Narrative     Not on file     Social Determinants of Health     Financial Resource Strain: Not on file   Food Insecurity: Not on file   Transportation Needs: Not on file   Physical Activity: Not on file   Stress: Not on file   Social Connections: Not on file   Intimate Partner Violence: Not on file   Housing Stability: Not on file     Physical Exam:  /81 (BP Location: Right arm, Patient Position: Sitting, Cuff Size: Adult Regular)   Pulse 99   Temp 98.2  F (36.8  C) (Oral)   Resp 16   Wt 89.3 kg (196 lb 14.4 oz)   LMP 05/17/2011   SpO2 95%   BMI 31.83 kg/m       General:  Well appearing adult female in NAD.  Alert and oriented x 3.  HEENT:  Normocephalic.  Sclera anicteric.  MMM.  No lesions of the oropharynx.  Lymph:  No axillary lymphadenopathy.   Chest:  CTA bilaterally.  No wheezes or crackles.  CV:  RRR.  Breast: 3.0 x 3.5 cm soft mass palpable in left breast at 11:00, 8 cm from nipple. Left nipple everted and without discharge.  Abd:  Soft/NT/ND. +BS   Ext:  No pitting edema of the bilateral lower extremities.   Neuro:  Cranial nerves grossly intact.  Psych:  Mood and affect appear normal.  Skin: Papules on front of scalp and occiput area. No rash on R arm    Laboratory/Imaging Studies  Most Recent 3 CBC's:  Recent Labs   Lab Test  08/19/22 0925 07/27/22  1232 07/08/22  1028   WBC 7.4 10.8 6.3   HGB 11.6* 12.8 14.2   MCV 94 91 91    362 261   ANEUTAUTO 6.2 9.1* 4.4     Most Recent 3 BMP's:  Recent Labs   Lab Test 08/19/22 0925 07/27/22  1232 07/08/22  1028    142 138   POTASSIUM 3.5 3.9 3.8   CHLORIDE 106 110* 109   CO2 27 25 25   BUN 12 14 18   CR 0.58 0.60 0.64   ANIONGAP 7 7 4   VALENTINA 8.4* 8.7 9.2   * 102* 101*   PROTTOTAL 7.0 7.2 7.5   ALBUMIN 3.2* 3.4 3.6    Most Recent 3 LFT's:  Recent Labs   Lab Test 08/19/22 0925 07/27/22  1232 07/08/22  1028   AST 16 16 18   ALT 16 18 15   ALKPHOS 61 57 54   BILITOTAL 0.5 0.3 0.4     I reviewed the above labs today.      ASSESSMENT/PLAN:  Ms. Alvarado is a 67 year old woman with an extensive family history of cancer who presents with newly discovered stage II, T2N0M0, ER+/OK+/HER2 low IDC of her left breast.    1. Left breast IDC, grade 3, ER positive, OK positive, and HER2 low:   Imaging demonstrates an approximate 3.2 cm mass in the upper inner left breast. The goal of treatment is cure. She has met with Dr. Cadet were surgical options were discussed. Oncotype is 24. Dr. Horvath discussed with Marni the risk vs benefit of neoadjuvant chemotherapy vs heading directly to surgery. It was decided to proceed with neoadjuvant chemotherapy with doxetaxel and cyclophosphamide with neulasta support. Next generation testing and BRCA1/2 Hereditary testing are negative for pathogenic variants.   - Labs and clinically appropriate to move forward with C3D1 TC with neulasta support today   -Follow-up with me in 3 weeks. Dr. Cadet follow-up is scheduled. She will meet with Dr. Horvath again after surgery.     2.  Family history of breast and ovarian neoplasm:  Patient has a strong family history of both breast and ovarian cancer. Breast Actionable panel was negative.. Referral to Cancer Risk Management scheduled for September 2022.     3.  Personal history of colon polyps:  She has a h/o  colon polyps.  Last colonoscopy 2/23/2022 with removal of 3 polyps, each 2-3 mm (2 tubular adenomas, 1 hyperplastic).  Repeat colonoscopy in 02/2027 is recommended.    4. Osteoporosis: on fosamax and calcium/vit D for osteoporosis with previous history of fracture.     5. Cardiac: hx of aortic stenosis and PVC's, asymptomatic. On metoprolol, follows with cardiology.     6. Acnieform lesions: Mupirocin ointment was initially prescribed for a nasal lesion. It is helping lesions on face and scalp. Okay to continue.     7. Rash: Resolved. Likely from taxane. Okay to use hydrocortisone if this recurs.     Jessy Molina PA-C

## 2022-08-28 ENCOUNTER — NURSE TRIAGE (OUTPATIENT)
Dept: NURSING | Facility: CLINIC | Age: 67
End: 2022-08-28

## 2022-08-28 NOTE — TELEPHONE ENCOUNTER
Pt completed the 3rd round of chemo one week ago and today feels as though she has a UTI.  Pt is experiencing frequency of urination.  Pt intends to go to [facility] [place], [place] today.  Noreen Prieto RN  FNA Nurse Advisor      Reason for Disposition    Urinating more frequently than usual (i.e., frequency)    Additional Information    Negative: Shock suspected (e.g., cold/pale/clammy skin, too weak to stand, low BP, rapid pulse)    Negative: Sounds like a life-threatening emergency to the triager    Negative: Followed a female genital area injury (e.g., vagina, vulva)    Negative: Followed a male genital area injury (e.g., penis, scrotum)    Negative: Vaginal discharge    Negative: Pus (white, yellow) or bloody discharge from end of penis    Negative: [1] Taking antibiotic for urinary tract infection (UTI) AND [2] female    Negative: [1] Taking antibiotic for urinary tract infection (UTI) AND [2] male    Negative: [1] Pain or burning with passing urine (urination) AND [2] pregnant    Negative: [1] Pain or burning with passing urine (urination) AND [2] postpartum (from 0 to 6 weeks after delivery)    Negative: [1] Pain or burning with passing urine (urination) AND [2] female    Negative: [1] Pain or burning with passing urine (urination) AND [2] male    Negative: Pain or itching in the vulvar area    Negative: Pain in scrotum is main symptom    Negative: Blood in the urine is main symptom    Negative: Symptoms arising from use of a urinary catheter (e.g., coude, Vasquez)    Negative: [1] Unable to urinate (or only a few drops) > 4 hours AND [2] bladder feels very full (e.g., palpable bladder or strong urge to urinate)    Negative: [1] Decreased urination and [2] drinking very little AND [2] dehydration suspected (e.g., dark urine, no urine > 12 hours, very dry mouth, very lightheaded)    Negative: Patient sounds very sick or weak to the triager    Negative: Fever > 100.4 F (38.0 C)    Negative: Side  (flank) or lower back pain present    Negative: [1] Can't control passage of urine (i.e., urinary incontinence) AND [2] new-onset (< 2 weeks) or worsening    Protocols used: URINARY SYMPTOMS-A-AH

## 2022-08-29 ENCOUNTER — OFFICE VISIT (OUTPATIENT)
Dept: FAMILY MEDICINE | Facility: CLINIC | Age: 67
End: 2022-08-29
Payer: MEDICARE

## 2022-08-29 VITALS
DIASTOLIC BLOOD PRESSURE: 78 MMHG | HEART RATE: 98 BPM | TEMPERATURE: 98.1 F | OXYGEN SATURATION: 93 % | WEIGHT: 195.8 LBS | BODY MASS INDEX: 31.66 KG/M2 | SYSTOLIC BLOOD PRESSURE: 116 MMHG

## 2022-08-29 DIAGNOSIS — R79.81 BORDERLINE LOW OXYGEN SATURATION LEVEL: ICD-10-CM

## 2022-08-29 DIAGNOSIS — R82.90 CLOUDY URINE: Primary | ICD-10-CM

## 2022-08-29 LAB
ALBUMIN UR-MCNC: NEGATIVE MG/DL
APPEARANCE UR: CLEAR
BACTERIA #/AREA URNS HPF: ABNORMAL /HPF
BILIRUB UR QL STRIP: NEGATIVE
COLOR UR AUTO: YELLOW
GLUCOSE UR STRIP-MCNC: NEGATIVE MG/DL
HGB UR QL STRIP: ABNORMAL
KETONES UR STRIP-MCNC: ABNORMAL MG/DL
LEUKOCYTE ESTERASE UR QL STRIP: ABNORMAL
NITRATE UR QL: NEGATIVE
PH UR STRIP: 6 [PH] (ref 5–8)
RBC #/AREA URNS AUTO: ABNORMAL /HPF
SP GR UR STRIP: 1.02 (ref 1–1.03)
UROBILINOGEN UR STRIP-ACNC: 0.2 E.U./DL
WBC #/AREA URNS AUTO: >100 /HPF
WBC CLUMPS #/AREA URNS HPF: PRESENT /HPF

## 2022-08-29 PROCEDURE — 99213 OFFICE O/P EST LOW 20 MIN: CPT | Mod: GC | Performed by: STUDENT IN AN ORGANIZED HEALTH CARE EDUCATION/TRAINING PROGRAM

## 2022-08-29 PROCEDURE — 87086 URINE CULTURE/COLONY COUNT: CPT | Performed by: STUDENT IN AN ORGANIZED HEALTH CARE EDUCATION/TRAINING PROGRAM

## 2022-08-29 PROCEDURE — 81001 URINALYSIS AUTO W/SCOPE: CPT | Performed by: STUDENT IN AN ORGANIZED HEALTH CARE EDUCATION/TRAINING PROGRAM

## 2022-08-29 NOTE — PATIENT INSTRUCTIONS
Patient Education   Here is the plan from today's visit    1. Cloudy urine  Your urine is showing some signs of possible infection with elevated white blood cell counts. I'd like to wait to see what the urine culture shows and we will then decide if an antibiotic is needed. Keep an eye on your MyChart and we will reach out in the next day but please go in if you start to have more signs of infection (fevers, back pain, pain with urination).   - UA reflex to Microscopic and Culture; Future  - UA reflex to Microscopic and Culture  - Urine Microscopic  - Urine Culture    2. Borderline low oxygen saturation level  Your oxygen level was lower than normal on exam today. The good news is you were able to get it up to the normal range (97%) with some deep breathing which reassures me, but if you have any shortness of breath I'd like you to go into be evaluated because you are at higher risk for blood clots given your cancer. Please check your oxygen level at home to ensure your device works well and monitor your levels over the next few days.           Please call or return to clinic if your symptoms don't go away.    Follow up plan  No follow-ups on file.    Thank you for coming to [facility] today.  Lab Testing:  **If you had lab testing today and your results are reassuring or normal they will be mailed to you or sent through [employer] within 7 days.   **If the lab tests need quick action we will call you with the results.  **If you are having labs done on a different day, please call [phone] to schedule at [facility] or [phone] for other [facility] locations. Labs do not offer walk-in appointments.  The phone number we will call with results is # [phone] (home) . If this is not the best number please call our clinic and change the number.  Medication Refills:  If you need any refills please call your pharmacy and they will contact us.   If you need to  your refill at a new  pharmacy, please contact the new pharmacy directly. The new pharmacy will help you get your medications transferred faster.   Scheduling:  If you have any concerns about today's visit or wish to schedule another appointment please call our office during normal business hours [phone] (8-5:00 M-F)  If a referral was made to an [facility] specialty provider and you do not get a call from central scheduling, please refer to directions on your visit summary or call our office during normal business hours for assistance.   If a Mammogram was ordered for you at the Breast Center call [phone] to schedule or change your appointment.  If you had an XRay/CT/Ultrasound/MRI ordered the number is [phone] to schedule or change your radiology appointment.   [facility] has limited ultrasound appointments available on Wednesdays, if you would like your ultrasound at [facility], please call [phone] to schedule.   Medical Concerns:  If you have urgent medical concerns please call [phone] at any time of the day.    Aleta Sibley MD

## 2022-08-29 NOTE — PROGRESS NOTES
"  Assessment & Plan     Cloudy urine  Cloudy urine for the past several days causing concern for possible UTI. No other current symptoms. UA shows small LE but with lack of other symptoms at this time will await micro and culture results prior to decision on whether or not to initiate antibiotics. Will Reach out to patient via MyChart with results and next steps.   - UA reflex to Microscopic and Culture  - Urine Microscopic  - Urine Culture    Borderline low oxygen saturation level  Oxygen levels 89-93% today, without subjective dyspnea. Newer cancer diagnosis increased concern for blood clots/PE as cause of new decreased oxygen saturation levels, however after deminstrating improveed SpO2 (96-97%) with deep inspiration I am less concerned for PE. Marni does have a home pulse oximeter so we discussed tracking oxygen levels at home. If levels drop <88% or if noticing increased shortness of breath would recommend going in for further evaluation.          BMI:   Estimated body mass index is 31.66 kg/m  as calculated from the following:    Height as of 6/14/22: 1.675 m (5' 5.95\").    Weight as of this encounter: 88.8 kg (195 lb 12.8 oz).       No follow-ups on file.    Aleta Sibley MD  [facility] MELISSA Grider is a 67 year old presenting for the following health issues:  UTI (Unsure if possibly have UTI but as noticed Cloudy urine, no pain or frequent or infrequent urgency. X yesterday morning. /Patient would like to consult on care as she is currently in chemo, treatment )      HPI   History of UTI that reduced after starting pro-biotic gummies. Has had 1 UTI in the past 12 months. Symptoms today include cloudy urine. No increased frequency, no dysuria, no back pain, no fevers.     SpO2 low today at 93%. Last chemo round was 8/19 and has been fairly fatigued since that time. Also having more night sweats. More nausea which is not a typical symptom for her.     No changes in vaginal " discharge. Post menopausal.     Allergies to bactrim.       Review of Systems   ROS otherwise negative if not stated in HPI        Objective    /78   Pulse 98   Temp 98.1  F (36.7  C) (Oral)   Wt 88.8 kg (195 lb 12.8 oz)   LMP 05/17/2011   SpO2 93%   BMI 31.66 kg/m    Body mass index is 31.66 kg/m .  Physical Exam   GENERAL: healthy, alert and no distress  RESP: lungs clear to auscultation - no rales, rhonchi or wheezes. Deep breathing improved oxygen to 96-97%.  CV: regular rate and rhythm, normal S1 S2, no S3 or S4, systolic murmur noted, no click or rub, no peripheral edema and peripheral pulses strong  ABDOMEN: soft, nontender, bowel sounds normal  MS: no gross musculoskeletal defects noted, no edema  BACK: no CVA tenderness  PSYCH: mentation appears normal and affect normal/bright       Results for orders placed or performed in visit on 08/29/22   UA reflex to Microscopic and Culture     Status: Abnormal    Specimen: Urine, Midstream   Result Value Ref Range    Color Urine Yellow Colorless, Straw, Light Yellow, Yellow    Appearance Urine Clear Clear    Glucose Urine Negative Negative mg/dL    Bilirubin Urine Negative Negative    Ketones Urine Trace (A) Negative mg/dL    Specific Gravity Urine 1.020 1.005 - 1.030    Blood Urine Small (A) Negative    pH Urine 6.0 5.0 - 8.0    Protein Albumin Urine Negative Negative mg/dL    Urobilinogen Urine 0.2 0.2, 1.0 E.U./dL    Nitrite Urine Negative Negative    Leukocyte Esterase Urine Small (A) Negative   Urine Microscopic     Status: Abnormal   Result Value Ref Range    Bacteria Urine Few (A) None Seen /HPF    RBC Urine None Seen 0-2 /HPF /HPF    WBC Urine >100 (A) 0-5 /HPF /HPF    WBC Clumps Urine Present (A) None Seen /HPF   Urine Culture     Status: None    Specimen: Urine, Midstream   Result Value Ref Range    Culture <10,000 CFU/mL Mixture of urogenital eliana

## 2022-08-29 NOTE — PROGRESS NOTES
Preceptor Attestation:   Patient seen, evaluated and discussed with the resident. I have verified the content of the note, which accurately reflects my assessment of the patient and the plan of care.   Supervising Physician:  Juan Ramon Hussein MD

## 2022-08-30 NOTE — PROGRESS NOTES
Marni is a 67 year old who is being evaluated via a billable telephone visit.      Pt is in [place]    How would you like to obtain your AVS? MyChart  If the video visit is dropped, the invitation should be resent by: Send to e-mail at: [email or web address]  Will anyone else be joining your video visit? No      Katiana Shaffer VF    9/2/2022    Referring Provider: Chandrika Horvath MD    Presenting Information:   I met with Vickie Alvarado today for her telephone genetic counseling visit through the Cancer Risk Management Program to discuss her personal history of breast cancer and family history of breast, ovarian, and stomach cancer. She is here today to review this history, cancer screening recommendations, and available genetic testing options.    Personal History  Vickie is a 67 year old female. She was recently diagnosed with breast cancer. A mammogram on 5/31/2022 found a mass in the left breast. On 6/3/2022 biopsy revealed invasive ductal carcinoma (ER/OR +, HER2 -). Vickie is currently undergoing neoadjuvant chemotherapy which will be followed by surgery.     Vickie already had some genetic testing ordered by Dr. Horvath via the Breast Actionable Panel offered by the [facility] at [facility]. Vickie is negative for mutations in the GRISEL, BARD1, BRCA1, BRCA2, CDH1, CHEK2, NF1, PALB2, PTEN, STK11, and TP53 genes by sequencing and deletion/duplication analysis. No mutations were found in any of the 11 genes analyzed. We reviewed her negative results in detail today.    In her hormonal-based medical history, she had her first menstrual period at age 15, does not have biological children, and underwent menopause at age 56. Vickie has her ovaries, fallopian tubes and uterus in place, and reports no ovarian cancer screening to date. She reports no history of hormone replacement therapy and no oral contraceptive use. Vickie began having colonoscopies at the age of 50. Her most recent  colonoscopy in February 2022 found two 2-3 mm tubular adenomas and one hyperelastic polyp in the sigmoid colon. Follow-up was recommended in 5 years. Vickie denies any history of dermatological exams. She does not regularly do any other cancer screening at this time. Vickie reported no history of smoking and occasional alcohol use.    Family History: (Please see scanned pedigree for detailed family history information)    Vickie's sister was diagnosed with breast cancer at age 53. She had recurrence at age 68. She is currently 81.     Another sister was diagnosed with breast cancer at age 62. She passed away at age 64.     Maternal aunt was diagnosed with leukemia in her 70's. She passed away at age 73.    Her daughter (Vickie's maternal first cousin) was diagnosed with ovarian cancer at age 78. She is currently 79.      Another daughter (Vickie's maternal first cousin) was diagnosed with lung cancer in her 60's. It was reported that she was a smoker. She passed away at age 66.     Maternal grandfather was diagnosed with stomach cancer and passed away at age 71.     Of note, there is no reported history of cancer on her paternal side of the family.     Her maternal and paternal ethnicity is [place]. There is no known Ashkenazi Restorationist ancestry on either side of her family. There is no reported consanguinity.    Discussion:    Vickie's personal history of breast cancer and family history of breast and ovarian cancer is suggestive of a hereditary cancer syndrome.    We reviewed the features of sporadic, familial, and hereditary cancers. We discussed the natural history and genetics of several hereditary cancer syndromes, including Hereditary Breast and Ovarian Cancer Syndrome (HBOC).     The most common cause of hereditary breast and ovarian cancer is HBOC, which is caused by mutations in the BRCA1 and BRCA2 genes. Individuals with HBOC syndrome are at increased risk for several different cancers, including breast,  ovarian, male breast, prostate, melanoma, and pancreatic cancer. HBOC typically presents with multiple family members diagnosed with breast cancer before age 50 and/or ovarian cancer.     A detailed handout regarding information about HBOC and related hereditary cancer syndromes will be provided to Vickie via [facility] and can be found in the after visit summary. Topics included: inheritance pattern, cancer risks, cancer screening recommendations, and also risks, benefits and limitations of testing.    In looking at Vickie's personal and family history, it is possible that a cancer susceptibility gene is present due to her personal history of breast cancer at age 66, her two sisters diagnosed with breast cancer at ages 53 and 62, and a maternal first cousin diagnosed with ovarian cancer at age 78.    Based on her personal and family history, Vickie meets current National Comprehensive Cancer Network (NCCN) criteria for genetic testing of high-penetrance breast cancer susceptibility genes (including BRCA1, BRCA2, CDH1, PALB2, PTEN, and TP53).     We reviewed Vickie's negative genetic testing results for the Breast Actionable panel ordered through [facility]. She was negative for mutations in the GRISEL, BARD1, BRCA1, BRCA2, CDH1, CHEK2, NF1, PALB2, PTEN, STK11, and TP53 genes by sequencing and deletion/duplication analysis. No mutations were found in any of the 11 genes analyzed.      Vickie's previous testing was a more limited breast cancer panel. There are several other breast cancer susceptibility genes that weren't included in her testing. It is possible that a mutation could be identified in Vickie in one of these other breast cancer associated genes. Therefore, it would be appropriate for Vickie to consider more comprehensive genetic testing related to genes associated with breast cancer to better understand her risk for hereditary cancer.    We reviewed additional genetic  testing options available for Vickie based on her personal and family history: a panel of genes associated with an increased risk for hereditary breast and gynecologic cancers, or larger panel options to include genes associated with increased risk for multiple different cancer types.   After our discussion, Vickie decided she wanted to think more about the information we discussed before deciding whether to pursue with additional genetic testing.   Vickie plans to reach out to me if she decides to proceed with additional testing in which case we would set up another appointment to discuss testing logistics.   Screening recommendations based upon personal/family history:     Vickie should continue to follow all breast cancer treatment and future follow-up recommendations as made by her oncology team.  Vickie s close female relatives remain at increased risk for breast cancer given their family history. Breast cancer screening is generally recommended to begin approximately 10 years younger than the earliest age of breast cancer diagnosis in the family, or at age 40, whichever comes first. In this family, screening may begin at age 40. Breast screening options should be discussed with an individual's primary care provider and a genetic counselor, to determine at what age to begin screening, what screening is appropriate, and if additional screening (such as breast MRI) is necessary based on personal/family history factors.   Due to Vickie's family history of ovarian cancer, Vickie and other close female relatives of the family member who had ovarian cancer remain at slightly increased risk for ovarian cancer. Screening is not typically recommended. That being said, women in this family should discuss this screening and the signs and symptoms of ovarian cancer with their primary OB/GYN provider, as they may have individualized recommendations.  Other population cancer screening options, such as those recommended by  "the American Cancer Society and the National Comprehensive Cancer Network (NCCN), are also appropriate for Vickie and her family. These screening recommendations may change if there are changes to Vickie's personal and/or family history of cancer.  Final screening recommendations should be made by each individual's managing physician.   Of note, these screening recommendations may change should Vickie elect to pursue genetic testing in the future.  Medical Management: If Vickie were to proceed with genetic testing in the future it may determine:  additional cancer screening for which Vickie may qualify (i.e. mammogram and breast MRI, more frequent colonoscopies, more frequent dermatologic exams, etc.),  options for risk reducing surgeries Vickie could consider (i.e. bilateral mastectomy, surgery to remove her ovaries and/or uterus, etc.),    and targeted chemotherapies for Vickie's active cancer, or if she were to develop certain cancers in the future (i.e. immunotherapy for individuals with Blanco syndrome, PARP inhibitors, etc.).   These recommendations and possible targeted chemotherapies would be discussed in detail if Vickie decided to purse genetic testing.     Plan  1) After today's discussion, Vickie decided to think about the information we discussed and about whether or not she wants to pursue additional genetic testing. Therefore, no additional genetic testing was ordered today. She will be sent a copy of her \"after visit summary\" and a copy of this clinic note.  2) Vickie plans to reach out to me if she decides to purse with additional genetic testing. If this is the case, we will schedule an appointment to review testing options, logistics surrounding testing, and review the consent form. My contact information was provided.   3) Information regarding recommended screening, based upon the family history, was reviewed for Vickie.  4) Vickie will contact me regularly and/or if the family or personal " history changes.     Vickie is 67 year old and is being evaluated via a billable telephone visit.    Time spent on phone: 40 minutes    Gregoria Benjamin MS, [facility]  Licensed, Certified Genetic Counselor  Phone: [phone]

## 2022-08-31 ENCOUNTER — PATIENT OUTREACH (OUTPATIENT)
Dept: ONCOLOGY | Facility: CLINIC | Age: 67
End: 2022-08-31

## 2022-08-31 ENCOUNTER — TELEPHONE (OUTPATIENT)
Dept: FAMILY MEDICINE | Facility: CLINIC | Age: 67
End: 2022-08-31

## 2022-08-31 LAB — BACTERIA UR CULT: NORMAL

## 2022-08-31 NOTE — PROGRESS NOTES
"[facility]: Cancer Care Long Assessment    Discussion with Patient:                                                      Marni calls into clinic today with several symptoms. She reports the 3rd cycle of chemo has been more difficult with n/v, diarrhea, reflux and fatigue.     On Monday she noticed her urine was \"cloudy\" and contacted PCP for a UA. She reports a hx of UTIs that start with \"cloudy urine and back pain\". She has not heard from PCP on the culture results and would like Dr. Horvath's opinion if she needs medication. She endorses lower back pain but is unsure if it is related to UTI sx or from lack of activity last week. Denies fever, urinary frequency/pain, bleeding. She drinks at least 1 glass of water hourly while awake , feels she is well hydrated.     She has developed a tight cough that she relates to significant reflux, denies SOB, chest pain, fever, increase heart rate, dizziness, painful or swollen calves. She has been monitoring her  O2 levels at home, she reports periods on readings in the low 90's, which increase when she takes a deep breath.     Reports an increase in nausea this cycle, taking Zofran around the clock. Discussed starting Omeprazole once daily for reflux which may be contributing to increased nausea. She will update Jessy at next appointment if this has been helpful.     She admits to fatigue \"took me down\" last week causing her to be less active than normal. She has increased activity this week.    She is having difficulty with meals, food is not appealing to her. She is working through this and trying new things, discussed nutrition referral which she declined at this time.                                 Dates of Treament:                                                      Infusion given in last 28 days     Administered MAR Action Medication Dose Rate Visit    08/19/2022 11:41 New Bag DOCEtaxel (TAXOTERE) 154 mg in sodium chloride in non-PVC container 0.9 % 283 mL " infusion 154 mg 283 mL/hr Infusion Therapy Visit on 08/19/2022 in [facility]    08/19/2022 12:44 New Bag cyclophosphamide (CYTOXAN) 1,235 mg in sodium chloride 0.9 % 337 mL infusion 1,235 mg 674 mL/hr Infusion Therapy Visit on 08/19/2022 in [facility]          Assessment:                                                      Assessment completed with:: Patient    Constitutional  Patient Reported Constitutional Symptoms?: Yes  Fatigue: Fatigue relieved by rest  Fever: Absent or within normal limits    Neurosensory  Patient Reported Neurosensory Symptoms?: No      Respiratory  Patient Reported Respiratory Symptoms?: Yes  Cough: Mild symptoms OR nonprescription intervention indicated (tight - believes it is related to reflux)  Dyspnea: Absent or within normal limits  Hypoxia: Absent or within normal limits (reports 02 levels drop to low 90s, rebounds when taking a deep breath.)    Gastrointestinal  Patient Reported Gastrointestinal Symptoms?: Yes (reflux)  Nausea: Loss of appetite without alteration in eating habits  Vomiting: Intervention not indicated (vomited x1 when behind on antiemetic)  Diarrhea: Increase of less than 4 stools per day over baseline OR mild increase in ostomy output compared to baseline    Genitourinary  Patient Reported Genitourinary Symptoms?: Yes (cludy urine with low back pain)    Lymph System Disorders  Patient Reported Lymph System Disorders?: No    Musculoskeletal and Connective Tissue Disorders  Patient Reported Musculoskeletal or Connective Tissue Disorders?: No    Integumentary  Patient Reported Integumentary Symptoms?: No       Patient Coping  Accepting    Clinic Utilization  Patient Aware of Next Appointment?: Yes           Plan of Care Education Review:   Assessment completed with:: Patient        Plan of Care Education   Yearly learning assessment completed?: No  Side effect education::  Nausea/Vomiting;Urinary;Fatigue  Supportive services:: Nutrition (declined referral)  Coping - concerns/fears reviewed with patient:: Yes  Plan of Care:: JHONATAN follow-up appointment;Treatment schedule  When to call provider:: Uncontrolled nausea/vomiting;Increased shortness of breath;New/worsening pain;Shaking chills;Temperature >100.4F;Uncontrolled diarrhea/constipation    Evaluation of Learning  Patient Education Provided: Yes  Readiness:: Acceptance  Method:: Explanation  Response:: Verbalizes understanding           Intervention/Education provided during outreach:                                                       Answered all questions to her stated satisfaction.   Patient to follow up as scheduled at next appt  Patient to call/icanbuyt message with updates  Route to provider to further advise  Confirmed patient has clinic and triage numbers         Deborah King MSN, RN ,OCN  RN Care Coordinator   [facility]  [phone]

## 2022-08-31 NOTE — TELEPHONE ENCOUNTER
[facility] phone call message- patient requesting results.    Test: Urine Lab Test     Date of test: 8/29/22    Additional Comments: Patient called and said that she took a lab test for UTI on Monday 8/29 and wants to know the lab results. Call back number for patient is [phone].       Lab tab checked to verify if result comment present with in each order: Yes    Letters tab checked to verify if lab result letter has been entered: Yes    OK to leave a message on voice mail? Yes    Advised patient response may take up to 2 business days: Yes    Primary language: English      needed? No    Call taken on August 31, 2022 at 8:53 AM by Savanna York    Route to [facility]

## 2022-09-02 ENCOUNTER — VIRTUAL VISIT (OUTPATIENT)
Dept: ONCOLOGY | Facility: CLINIC | Age: 67
End: 2022-09-02
Attending: INTERNAL MEDICINE
Payer: MEDICARE

## 2022-09-02 DIAGNOSIS — Z80.8 FAMILY HISTORY OF NONMELANOMA SKIN CANCER: ICD-10-CM

## 2022-09-02 DIAGNOSIS — Z80.1 FAMILY HISTORY OF LUNG CANCER: ICD-10-CM

## 2022-09-02 DIAGNOSIS — Z80.0 FAMILY HISTORY OF STOMACH CANCER: ICD-10-CM

## 2022-09-02 DIAGNOSIS — Z80.41 FAMILY HISTORY OF MALIGNANT NEOPLASM OF OVARY: ICD-10-CM

## 2022-09-02 DIAGNOSIS — Z17.0 MALIGNANT NEOPLASM OF UPPER-INNER QUADRANT OF LEFT BREAST IN FEMALE, ESTROGEN RECEPTOR POSITIVE (H): Primary | ICD-10-CM

## 2022-09-02 DIAGNOSIS — Z80.6 FAMILY HISTORY OF LEUKEMIA: ICD-10-CM

## 2022-09-02 DIAGNOSIS — Z80.3 FAMILY HISTORY OF MALIGNANT NEOPLASM OF BREAST: ICD-10-CM

## 2022-09-02 DIAGNOSIS — C50.212 MALIGNANT NEOPLASM OF UPPER-INNER QUADRANT OF LEFT BREAST IN FEMALE, ESTROGEN RECEPTOR POSITIVE (H): Primary | ICD-10-CM

## 2022-09-02 PROCEDURE — 96040 HC GENETIC COUNSELING, EACH 30 MINUTES: CPT | Mod: TEL

## 2022-09-02 NOTE — LETTER
Cancer Risk Management  Program Locations    [facility]  [facility]  [facility]  [facility]  [facility]  Mailing Address  Cancer Risk Management Program  [facility]  [address]  [place] [address]    New patient appointments  [phone]  September 2, 2022    Vickie Alvarado  [address]  [place] [address]    Dear Vickie,    It was a pleasure talking with you via your virtual genetic counseling visit on 9/2/2022.  Below is a copy of the progress note from that recent visit through the Cancer Risk Management Program.  If you have any additional questions, please feel free to contact me.    Referring Provider: Chandrika Horvath MD    Presenting Information:   I met with Vickie Marinodennise today for her telephone genetic counseling visit through the Cancer Risk Management Program to discuss her personal history of breast cancer and family history of breast, ovarian, and stomach cancer. She is here today to review this history, cancer screening recommendations, and available genetic testing options.    Personal History  Vickie is a 67 year old female. She was recently diagnosed with breast cancer. A mammogram on 5/31/2022 found a mass in the left breast. On 6/3/2022 biopsy revealed invasive ductal carcinoma (ER/WA +, HER2 -). Vickie is currently undergoing neoadjuvant chemotherapy which will be followed by surgery.     Vickie already had some genetic testing ordered by Dr. Horvath via the Breast Actionable Panel offered by the [facility] at [facility]. Vickie is negative for mutations in the GRISEL, BARD1, BRCA1, BRCA2, CDH1, CHEK2, NF1, PALB2, PTEN, STK11, and TP53 genes by sequencing and deletion/duplication analysis. No mutations were found in any of the 11 genes analyzed. We reviewed her negative results in  detail today.    In her hormonal-based medical history, she had her first menstrual period at age 15, does not have biological children, and underwent menopause at age 56. Vickie has her ovaries, fallopian tubes and uterus in place, and reports no ovarian cancer screening to date. She reports no history of hormone replacement therapy and no oral contraceptive use. Vickie began having colonoscopies at the age of 50. Her most recent colonoscopy in February 2022 found two 2-3 mm tubular adenomas and one hyperelastic polyp in the sigmoid colon. Follow-up was recommended in 5 years. Vickie denies any history of dermatological exams. She does not regularly do any other cancer screening at this time. Vickie reported no history of smoking and occasional alcohol use.    Family History: (Please see scanned pedigree for detailed family history information)    Vickie's sister was diagnosed with breast cancer at age 53. She had recurrence at age 68. She is currently 81.     Another sister was diagnosed with breast cancer at age 62. She passed away at age 64.     Maternal aunt was diagnosed with leukemia in her 70's. She passed away at age 73.    Her daughter (Vickie's maternal first cousin) was diagnosed with ovarian cancer at age 78. She is currently 79.      Another daughter (Vickie's maternal first cousin) was diagnosed with lung cancer in her 60's. It was reported that she was a smoker. She passed away at age 66.     Maternal grandfather was diagnosed with stomach cancer and passed away at age 71.     Of note, there is no reported history of cancer on her paternal side of the family.     Her maternal and paternal ethnicity is [place]. There is no known Ashkenazi Gnosticist ancestry on either side of her family. There is no reported consanguinity.    Discussion:    Vickie's personal history of breast cancer and family history of breast and ovarian cancer is suggestive of a hereditary cancer syndrome.    We reviewed the features of  sporadic, familial, and hereditary cancers. We discussed the natural history and genetics of several hereditary cancer syndromes, including Hereditary Breast and Ovarian Cancer Syndrome (HBOC).     The most common cause of hereditary breast and ovarian cancer is HBOC, which is caused by mutations in the BRCA1 and BRCA2 genes. Individuals with HBOC syndrome are at increased risk for several different cancers, including breast, ovarian, male breast, prostate, melanoma, and pancreatic cancer. HBOC typically presents with multiple family members diagnosed with breast cancer before age 50 and/or ovarian cancer.     A detailed handout regarding information about HBOC and related hereditary cancer syndromes will be provided to Vickie via [employer] and can be found in the after visit summary. Topics included: inheritance pattern, cancer risks, cancer screening recommendations, and also risks, benefits and limitations of testing.    In looking at Vickie's personal and family history, it is possible that a cancer susceptibility gene is present due to her personal history of breast cancer at age 66, her two sisters diagnosed with breast cancer at ages 53 and 62, and a maternal first cousin diagnosed with ovarian cancer at age 78.    Based on her personal and family history, Vickie meets current National Comprehensive Cancer Network (NCCN) criteria for genetic testing of high-penetrance breast cancer susceptibility genes (including BRCA1, BRCA2, CDH1, PALB2, PTEN, and TP53).     We reviewed Vickie's negative genetic testing results for the Breast Actionable panel ordered through [facility]. She was negative for mutations in the GRISEL, BARD1, BRCA1, BRCA2, CDH1, CHEK2, NF1, PALB2, PTEN, STK11, and TP53 genes by sequencing and deletion/duplication analysis. No mutations were found in any of the 11 genes analyzed.      Vickie's previous testing was a more limited breast cancer panel. There are  several other breast cancer susceptibility genes that weren't included in her testing. It is possible that a mutation could be identified in Vickie in one of these other breast cancer associated genes. Therefore, it would be appropriate for Vickie to consider more comprehensive genetic testing related to genes associated with breast cancer to better understand her risk for hereditary cancer.    We reviewed additional genetic testing options available for Vickie based on her personal and family history: a panel of genes associated with an increased risk for hereditary breast and gynecologic cancers, or larger panel options to include genes associated with increased risk for multiple different cancer types.   After our discussion, Vickie decided she wanted to think more about the information we discussed before deciding whether to pursue with additional genetic testing.   Vickie plans to reach out to me if she decides to proceed with additional testing in which case we would set up another appointment to discuss testing logistics.   Screening recommendations based upon personal/family history:     Vickie should continue to follow all breast cancer treatment and future follow-up recommendations as made by her oncology team.  Vickie s close female relatives remain at increased risk for breast cancer given their family history. Breast cancer screening is generally recommended to begin approximately 10 years younger than the earliest age of breast cancer diagnosis in the family, or at age 40, whichever comes first. In this family, screening may begin at age 40. Breast screening options should be discussed with an individual's primary care provider and a genetic counselor, to determine at what age to begin screening, what screening is appropriate, and if additional screening (such as breast MRI) is necessary based on personal/family history factors.   Due to Vickie's family history of ovarian cancer, Vickie and other close  "female relatives of the family member who had ovarian cancer remain at slightly increased risk for ovarian cancer. Screening is not typically recommended. That being said, women in this family should discuss this screening and the signs and symptoms of ovarian cancer with their primary OB/GYN provider, as they may have individualized recommendations.  Other population cancer screening options, such as those recommended by the American Cancer Society and the National Comprehensive Cancer Network (NCCN), are also appropriate for Vickie and her family. These screening recommendations may change if there are changes to Vickie's personal and/or family history of cancer.  Final screening recommendations should be made by each individual's managing physician.   Of note, these screening recommendations may change should Vickie elect to pursue genetic testing in the future.  Medical Management: If Vickie were to proceed with genetic testing in the future it may determine:  additional cancer screening for which Vickie may qualify (i.e. mammogram and breast MRI, more frequent colonoscopies, more frequent dermatologic exams, etc.),  options for risk reducing surgeries Vickie could consider (i.e. bilateral mastectomy, surgery to remove her ovaries and/or uterus, etc.),    and targeted chemotherapies for Vickie's active cancer, or if she were to develop certain cancers in the future (i.e. immunotherapy for individuals with Blanco syndrome, PARP inhibitors, etc.).   These recommendations and possible targeted chemotherapies would be discussed in detail if Vickie decided to purse genetic testing.     Plan  1) After today's discussion, Vickie decided to think about the information we discussed and about whether or not she wants to pursue additional genetic testing. Therefore, no additional genetic testing was ordered today. She will be sent a copy of her \"after visit summary\" and a copy of this clinic note.  2) Vickie plans to reach " out to me if she decides to purse with additional genetic testing. If this is the case, we will schedule an appointment to review testing options, logistics surrounding testing, and review the consent form. My contact information was provided.   3) Information regarding recommended screening, based upon the family history, was reviewed for Vickie.  4) Vickie will contact me regularly and/or if the family or personal history changes.     Vickie is 67 year old and is being evaluated via a billable telephone visit.    Time spent on phone: 40 minutes    Gregoria Benjamin MS, [facility]  Licensed, Certified Genetic Counselor  Phone: [phone]

## 2022-09-06 NOTE — PATIENT INSTRUCTIONS
Assessing Cancer Risk  Only about 5-10% of cancers are thought to be due to an inherited cancer susceptibility gene.    These families often have:  Several people with the same or related types of cancer  Cancers diagnosed at a young age (before age 50)  Individuals with more than one primary cancer  Multiple generations of the family affected with cancer    Some people may be candidates for genetic testing of more than one gene.  For these families, genetic testing using a cancer panel may be offered.  These panels will test different genes known to increase the risk for breast, ovarian, uterine, and/or other cancers. All of the genes discussed below have published clinical management guidelines for individuals who are found to carry a mutation. The purpose of this handout is to serve as a brief summary of the genes analyzed by the panels used to inquire about hereditary breast and gynecologic cancer:  GRISEL, BRCA1, BRCA2, BRIP1, CDH1, CHEK2, MLH1, MSH2, MSH6, PMS2, EPCAM, PTEN, PALB2, RAD51C, RAD51D, and TP53.  ______________________________________________________________________________  Hereditary Breast and Ovarian Cancer Syndrome   (BRCA1 and BRCA2)  A single mutation in one of the copies of BRCA1 or BRCA2 increases the risk for breast and ovarian cancer, among others.  The risk for pancreatic cancer and melanoma may also be slightly increased in some families.  The chart below shows the chance that someone with a BRCA mutation would develop cancer in his or her lifetime1,2,3,4.        A person s ethnic background is also important to consider, as individuals of Ashkenazi Hindu ancestry have a higher chance of having a BRCA gene mutation.  There are three BRCA mutations that occur more frequently in this population.    Blanco Syndrome   (MLH1, MSH2, MSH6, PMS2, and EPCAM)  Currently five genes are known to cause Blanco Syndrome: MLH1, MSH2, MSH6, PMS2, and EPCAM.  A single mutation in one of the Blanco  Syndrome genes increases the risk for colon, endometrial, ovarian, and stomach cancers.  Other cancers that occur less commonly in Blanco Syndrome include urinary tract, skin, and brain cancers.  The chart below shows the chance that a person with Blanco syndrome would develop cancer in his or her lifetime5.      *Cancer risk varies depending on Blanco syndrome gene found    Cowden Syndrome   (PTEN)  Cowden syndrome is a hereditary condition that increases the risk for breast, thyroid, endometrial, colon, and kidney cancer.  Cowden syndrome is caused by a mutation in the PTEN gene.  A single mutation in one of the copies of PTEN causes Cowden syndrome and increases cancer risk.  The chart below shows the chance that someone with a PTEN mutation would develop cancer in their lifetime6,7.  Other benign features seen in some individuals with Cowden syndrome include benign skin lesions (facial papules, keratoses, lipomas), learning disability, autism, thyroid nodules, colon polyps, and larger head size.      *One recent study found breast cancer risk to be increased to 85%    Li-Fraumeni Syndrome   (TP53)  Li-Fraumeni Syndrome (LFS) is a cancer predisposition syndrome caused by a mutation in the TP53 gene. A single mutation in one of the copies of TP53 increases the risk for multiple cancers. Individuals with LFS are at an increased risk for developing cancer at a young age. The lifetime risk for development of a LFS-associated cancer is 50% by age 30 and 90% by age 60.   Core Cancers: Sarcomas, Breast, Brain, Lung, Leukemias/Lymphomas, Adrenocortical carcinomas  Other Cancers: Gastrointestinal, Thyroid, Skin, Genitourinary    Hereditary Diffuse Gastric Cancer   (CDH1)  Currently, one gene is known to cause hereditary diffuse gastric cancer (HDGC): CDH1.  Individuals with HDGC are at increased risk for diffuse gastric cancer and lobular breast cancer. Of people diagnosed with HDGC, 30-50% have a mutation in the CDH1 gene.   This suggests there are likely other genes that may cause HDGC that have not been identified yet.      Lifetime Cancer Risks    General Population HDGC    Diffuse Gastric  <1% ~80%   Breast 12% 39-52%         Additional Genes  GRISEL  GRISEL is a moderate-risk breast cancer gene. Women who have a mutation in GRISEL can have between a 2-4 fold increased risk for breast cancer compared to the general population8. GRISEL mutations have also been associated with increased risk for pancreatic cancer, however an estimate of this cancer risk is not well understood9. Individuals who inherit two GRISEL mutations have a condition called ataxia-telangiectasia (AT).  This rare autosomal recessive condition affects the nervous system and immune system, and is associated with progressive cerebellar ataxia beginning in childhood.  Individuals with ataxia-telangiectasia often have a weakened immune system and have an increased risk for childhood cancers.    PALB2  Mutations in PALB2 have been shown to increase the risk of breast cancer up to 33-58% in some families; where individuals fall within this risk range is dependent upon family [id number]. PALB2 mutations have also been associated with increased risk for pancreatic cancer, although this risk has not been quantified yet.  Individuals who inherit two PALB2 mutations--one from their mother and one from their father--have a condition called Fanconi Anemia.  This rare autosomal recessive condition is associated with short stature, developmental delay, bone marrow failure, and increased risk for childhood cancers.    CHEK2   CHEK2 is a moderate-risk breast cancer gene.  Women who have a mutation in CHEK2 have around a 2-fold increased risk for breast cancer compared to the general population, and this risk may be higher depending upon family history.11,12,13 Mutations in CHEK2 have also been shown to increase the risk of a number of other cancers, including colon and prostate, however these  cancer risks are currently not well understood.    BRIP1, RAD51C and RAD51D  Mutations in BRIP1, RAD51C, and RAD51D have been shown to increase the risk of ovarian cancer and possibly female breast cancer as well14,15 .       Lifetime Cancer Risk    General Population BRIP1 RAD51C RAD51D   Ovarian 1-2% ~5-8% ~5-9% ~7-15%           Inheritance  All of the cancer syndromes reviewed above are inherited in an autosomal dominant pattern.  This means that if a parent has a mutation, each of his or her children will have a 50% chance of inheriting that same mutation.  Therefore, each child--male or female--would have a 50% chance of being at increased risk for developing cancer.      Image obtained from Genetics Home Reference, 2013     Mutations in some genes can occur de kareen, which means that a person s mutation occurred for the first time in them and was not inherited from a parent.  Now that they have the mutation, however, it can be passed on to future generations.    Genetic Testing  Genetic testing involves a blood test and will look at the genetic information in the GRISEL, BRCA1, BRCA2, BRIP1, CDH1, CHEK2, MLH1, MSH2, MSH6, PMS2, EPCAM, PTEN, PALB2, RAD51C, RAD51D, and TP53 genes for any harmful mutations that are associated with increased cancer risk.  If possible, it is recommended that the person(s) who has had cancer be tested before other family members.  That person will give us the most useful information about whether or not a specific gene is associated with the cancer in the family.    Results  There are three possible results of genetic testing:  Positive--a harmful mutation was identified in one or more of the genes  Negative--no mutation was identified in any of the genes on this panel  Variant of unknown significance--a variation in one of the genes was identified, but it is unclear how this impacts cancer risk in the family    Advantages and Disadvantages   There are advantages and disadvantages to  genetic testing.    Advantages  May clarify your cancer risk  Can help you make medical decisions  May explain the cancers in your family  May give useful information to your family members (if you share your results)    Disadvantages  Possible negative emotional impact of learning about inherited cancer risk  Uncertainty in interpreting a negative test result in some situations  Possible genetic discrimination concerns (see below)    Genetic Information Nondiscrimination Act (ADRIEL)  ADRIEL is a federal law that protects individuals from health insurance or employment discrimination based on a genetic test result alone.  Although rare, there are currently no legal discrimination protections in terms of life insurance, long term care, or disability insurances.  Visit the [employer] [place] website to learn more.    Reducing Cancer Risk  All of the genes described above have nationally recognized cancer screening guidelines that would be recommended for individuals who test positive.  In addition to increased cancer screening, surgeries may be offered or recommended to reduce cancer risk.  Recommendations are based upon an individual s genetic test result as well as their personal and family history of cancer.    Questions to Think About Regarding Genetic Testing:  What effect will the test result have on me and my relationship with my family members if I have an inherited gene mutation?  If I don t have a gene mutation?  Should I share my test results, and how will my family react to this news, which may also affect them?  Are my children ready to learn new information that may one day affect their own health?    Hereditary Cancer Resources    FORCE: Facing Our Risk of Cancer Empowered facingourrisk.org   Bright Pink bebrightpink.org   Li-Fraumeni Syndrome Association lfsassociation.org   PTEN World PTENworld.[place]   No stomach for cancer, Inc. nostomachforcancer.org   Stomach cancer relief network  Scrnet.org   Collaborative Group of the Americas on Inherited Colorectal Cancer (CGA) cgaicc.com    Cancer Care cancercare.org   American Cancer Society (ACS) cancer.org   National Cancer [place] (NCI) cancer.gov     Please call us if you have any questions or concerns.   Cancer Risk Management Program [phone]-CANCER ([phone])  Aurelio Perez, MS [facility]  [phone]  Gregoria Sourav, MS, [facility] [phone]  Selena Lincoln, MS, [facility]  [phone]  Janice Olmstead, MS, [facility]  [phone]  Nubia Rito, MS, [facility]  [phone]  Evelyn Armas, MS, [facility] [phone]  Lourdes Umaña, MS, [facility] [phone]    References  Kelby Mcnair PDP, Marbella S, Aleshia MOLINA, Brenda JE, Junaid JL, Larry N, Lexis H, Liyah O, Jonnie A, Antonio B, Abel P, Mannicolas S, Ingrid DM, Hector N, Sundeep E, Yon H, Zamora E, Lubinski J, Gronwald J, Karan B, Deloris H, Thorlacius S, Eerola H, Demetrio H, Constantine K, Shayna OP. Average risks of breast and ovarian cancer associated with BRCA1 or BRCA2 mutations detected in case series unselected for family history: a combined analysis of 222 studies. Am J Hum Laura. 2003;72:1117-30.  Latasha N, Loretta M, Nimo G.  BRCA1 and BRCA2 Hereditary Breast and Ovarian Cancer. Gene Reviews online. 2013.  Abdullahi YC, Harry S, Yanira G, Alfaro S. Breast cancer risk among male BRCA1 and BRCA2 mutation carriers. J Natl Cancer Inst. 2007;99:1811-4.  Ashish SAHU, Dayan I, Ron J, Lexi E, Ramiro ER, Edwar F. Risk of breast cancer in male BRCA2 carriers. J Med Laura. 2010;47:710-1.  National Comprehensive Cancer Network. Clinical practice guidelines in oncology, colorectal cancer screening. Available online (registration required). 2015.  Brant SHAFFER, Blake J, Csaa J, Clara LA, Ilana MS, Eng C. Lifetime cancer risks in individuals with germline PTEN mutations. Clin Cancer Res. 2012;18:400-7.  Polo LYNNE. Cowden Syndrome: A Critical Review of the Clinical Literature. J Laura .  2009:18:13-27.  Katlyn A, Gerald D, Helen S, Yanelis P, Mode T, Susanne M, Ha B, Jose Alberto H, Giorgio R, Malcolm K, Mike L, Ashish DG, Ingrid D, Bertin DF, Dominic MR, The Breast Cancer Susceptibility Collaboration () & Giselle WINTERS. GRISEL mutations that cause ataxia-telangiectasia are breast cancer susceptibility alleles. Nature Genetics. 2006;38:873-875  Uli N , Farooq Y, Trinity J, Jordan L, Lopez GM , Belen ML, Gallinger S, Black AG, Syngal S, Rigoberto ML, Hu J , Paul R, Kenneth SZ, Isabela JR, Joshua VE, Louis M, Vogeorges B, Lee N, Jessica RH, Jeniffer KW, and Wilbur AP. GRISEL mutations in patients with hereditary pancreatic cancer. Cancer Discover. 2012;2:41-46  Giancarlo SIMMONS., et al. Breast-Cancer Risk in Families with Mutations in PALB2. NEJM. 2014; 371(6):497-506.  CHEK2 Breast Cancer Case-Control Consortium. CHEK2*1100delC and susceptibility to breast cancer: A collaborative analysis involving 10,860 breast cancer cases and 9,065 controls from 10 studies. Am J Hum Laura, 74 (2004), pp. 3488-4489  Yanna T, Kota S, Rola K, et al. Spectrum of Mutations in BRCA1, BRCA2, CHEK2, and TP53 in Families at High Risk of Breast Cancer. VERITO. 2006;295(12):2235-7346.   Mariluz C, José D, Sherry A, et al. Risk of breast cancer in women with a CHEK2 mutation with and without a family history of breast cancer. J Clin Oncol. 2011;29:8586-4831.  Jason H, Oneyda E, Pasha SJ, et al. Contribution of germline mutations in the RAD51B, RAD51C, and RAD51D genes to ovarian cancer in the population. J Clin Oncol. 2015;33(26):6535-3820. Doi:10.1200/JCO.2015.61.2408.  Lizzie Bowseron DF, Bea P, et al. Mutations in BRIP1 confer high risk of ovarian cancer. [place] Laura. 2011;43(11):2652-0306. doi:10.1038/ng.955.

## 2022-09-07 NOTE — PROGRESS NOTES
Oncology Follow up :  Date on this visit: Sep 8, 2022    Primary Physician: Gaby Lynn     History Of Present Illness:  Ms. Alvarado is a 67 year old female with a left breast cancer.  She self palpated a mass in her mid left breast.  Bilateral diagnostic mammograms showed a mass in the upper inner left breast.  Ultrasound of the left breast showed a mass at 11:00, 8 cm from the nipple measuring 3.1 cm.  There were no suspicious lymph nodes in the left axilla.  Biopsy of the left breast mass was c/w a grade 3 invasive ductal carcinoma, ER strong in 98%, IN moderate in 70%, and HER2 low by IHC (score 1+). Her case was discussed at tumor board on 06/16/22. Her oncotype is 24. After risk vs benefit was discussed, it was decided to start neoadjuvant chemotherapy with TC.     Treatment to date:  - Neoadjuvant chemotherapy initiated on 07/08/22: docetaxel, cyclophosphamide with neulasta.     Interval history:   Marni returns to clinic for consideration of C4 TC. She struggled with symptoms with C3. She had nausea started the Wednesday following chemotherapy (6 days later). She started to take zofran every 8 hours with relief. She had slight headache from this which was manageable. Taking prunes to avoid constipation. She has tried to stop zofran the past few days and has had recurrent nausea. She has had intense GERD last week and started omeprazole 20 mg OTC on September 1. This has helped.     She has been more fatigued. She is trying to stay active with housework but this has been a struggle. Fatigue is better now compared to last week.     There was some concern about lower oxygen saturations in low 90s. These quickly rebound to above 95% with deeper breaths. Nothing below 90%. She has not had a cough, SOB, GREGORY, or tachycardia.     She was seen for cloudy urine and had a UA done with negative UC. This has resolved now.     She has had some sweats along her collar at night time and has to change to her shirts. She  has checked temp and has been afebrile.     Past Medical/Surgical History:  Past Medical History:   Diagnosis Date     Aortic stenosis      Hyperlipidemia      Insufficiency fracture of tibia, sequela 09/28/2021     Polyp of colon 05/26/2016   - PVCs    Past Surgical History:   Procedure Laterality Date     CATARACT IOL, RT/LT Right 2006     HC YAG LASER CAPSULOTOMY Right 2009     LASER VITREOLYSIS, ANTERIOR OD (RIGHT EYE) Right 2007     REPOSITION INTRAOCULAR LENS Right 11/18/2014    Procedure: REPOSITION INTRAOCULAR LENS;  Surgeon: Vickie Guerra MD;  Location: [facility]     VITRECTOMY PARSPLANA WITH 23 GAUGE SYSTEM Right 11/18/2014    Procedure: VITRECTOMY PARSPLANA WITH 23 GAUGE SYSTEM;  Surgeon: Vickie Guerra MD;  Location: [facility]     Allergies:  Allergies as of 09/08/2022 - Reviewed 09/08/2022   Allergen Reaction Noted     Bactrim [sulfamethoxazole w/trimethoprim] Nausea 11/01/2017     Seasonal allergies  11/13/2014     Typhoid vaccines Nausea and Vomiting 05/05/2011     Current Medications:  Current Outpatient Medications   Medication Sig Dispense Refill     simvastatin (ZOCOR) 40 MG tablet Take by mouth every 24 hours       alendronate (FOSAMAX) 70 MG tablet Take 1 tablet (70 mg) by mouth every 7 days 12 tablet 0     calcium carb-cholecalciferol 600-500 MG-UNIT CAPS        dexamethasone (DECADRON) 4 MG tablet Take 2 tablets (8 mg) by mouth 2 times daily (with meals) for 3 doses Start evening of Docetaxel infusion and continue for a total of 3 doses. 6 tablet 3     Lactobacillus (PROBIOTIC CHILDRENS) CHEW        loratadine (CLARITIN) 10 MG tablet Take 10 mg by mouth daily Prn, seasonal for ragweed and lilacs       metoprolol succinate ER (TOPROL XL) 25 MG 24 hr tablet Take 1 tablet (25 mg) by mouth daily 90 tablet 3     mupirocin (BACTROBAN) 2 % external ointment Apply topically 2 times daily 22 g 0     Omega-3 Fatty Acids (FISH OIL ADULT GUMMIES PO) Take 250 mg by mouth       ondansetron  (ZOFRAN) 8 MG tablet Take 1 tablet (8 mg) by mouth every 8 hours as needed for nausea (vomiting) 30 tablet 2     prochlorperazine (COMPAZINE) 10 MG tablet Take 0.5 tablets (5 mg) by mouth every 6 hours as needed for nausea or vomiting 30 tablet 2     prochlorperazine (COMPAZINE) 5 MG tablet        simvastatin (ZOCOR) 40 MG tablet Take 1 tablet (40 mg) by mouth At Bedtime 90 tablet 3      Social History:  Social History     Socioeconomic History     Marital status: Single     Spouse name: Not on file     Number of children: Not on file     Years of education: Not on file     Highest education level: Not on file   Occupational History     Occupation: Retired - [employer], maintain [employer]   Tobacco Use     Smoking status: Never Smoker     Smokeless tobacco: Never Used   Vaping Use     Vaping Use: Never used   Substance and Sexual Activity     Alcohol use: Yes     Comment: 4 drinks per month     Drug use: No     Sexual activity: Never   Other Topics Concern     Not on file   Social History Narrative     Not on file     Social Determinants of Health     Financial Resource Strain: Not on file   Food Insecurity: Not on file   Transportation Needs: Not on file   Physical Activity: Not on file   Stress: Not on file   Social Connections: Not on file   Intimate Partner Violence: Not on file   Housing Stability: Not on file     Physical Exam:  /68 (BP Location: Right arm, Patient Position: Sitting, Cuff Size: Adult Regular)   Pulse 95   Temp 97.7  F (36.5  C) (Oral)   Resp 18   Wt 88.9 kg (195 lb 14.4 oz)   LMP 05/17/2011   SpO2 94%   BMI 31.67 kg/m     Wt Readings from Last 4 Encounters:   09/08/22 88.9 kg (195 lb 14.4 oz)   08/29/22 88.8 kg (195 lb 12.8 oz)   08/19/22 89.3 kg (196 lb 14.4 oz)   07/27/22 90.1 kg (198 lb 9.6 oz)     General:  Well appearing adult female in NAD.  Alert and oriented x 3.  HEENT:  Normocephalic.  Sclera anicteric.  MMM.  No lesions of the oropharynx.  Lymph:  No  axillary lymphadenopathy.   Chest:  CTA bilaterally.  No wheezes or crackles.  CV:  RRR.  Breast: 2.2 x 3.0 cm soft mass palpable in left breast at 11:00, 8 cm from nipple. Left nipple everted.   Abd:  Soft/NT/ND. +BS   Ext:  No pitting edema of the bilateral lower extremities.   Neuro:  Cranial nerves grossly intact.  Psych:  Mood and affect appear normal.  Skin: No rashes on exposed skin     Laboratory/Imaging Studies  Most Recent 3 CBC's:  Recent Labs   Lab Test 09/08/22  1012 08/19/22  0925 07/27/22  1232   WBC 8.1 7.4 10.8   HGB 9.9* 11.6* 12.8   MCV 95 94 91    272 362   ANEUTAUTO 7.1 6.2 9.1*     Most Recent 3 BMP's:  Recent Labs   Lab Test 09/08/22  1012 08/19/22  0925 07/27/22  1232    140 142   POTASSIUM 3.6 3.5 3.9   CHLORIDE 106 106 110*   CO2 23 27 25   BUN 13.1 12 14   CR 0.63 0.58 0.60   ANIONGAP 12 7 7   VAL[place] 8.8 8.4* 8.7   * 107* 102*   PROTTOTAL 6.7 7.0 7.2   ALBUMIN 3.4* 3.2* 3.4    Most Recent 3 LFT's:  Recent Labs   Lab Test 09/08/22 1012 08/19/22  0925 07/27/22  1232   AST 19 16 16   ALT 5* 16 18   ALKPHOS 60 61 57   BILITOTAL 0.3 0.5 0.3     I reviewed the above labs today.      ASSESSMENT/PLAN:  Ms. Alvarado is a 67 year old woman with an extensive family history of cancer who presents with newly discovered stage II, T2N0M0, ER+/VT+/HER2 low IDC of her left breast.    1. Left breast IDC, grade 3, ER positive, VT positive, and HER2 low:   Imaging demonstrates an approximate 3.2 cm mass in the upper inner left breast. The goal of treatment is cure. She has met with Dr. Cadet were surgical options were discussed. Oncotype is 24. Dr. Horvath discussed with Marni the risk vs benefit of neoadjuvant chemotherapy vs heading directly to surgery. It was decided to proceed with neoadjuvant chemotherapy with doxetaxel and cyclophosphamide with neulasta support. Next generation testing and BRCA1/2 Hereditary testing are negative for pathogenic variants.   - Labs and clinically  appropriate to move forward with C4D1 TC with neulasta support today   - Dr. Cadet follow-up is scheduled. She will meet with Dr. Horvath again after surgery.     2.  Family history of breast and ovarian neoplasm:  Patient has a strong family history of both breast and ovarian cancer. Breast Actionable panel was negative. Referral to Cancer Risk Management and visit was done. She probably will pursue further genetic testing as this is at no cost to her.     3.  Personal history of colon polyps:  She has a h/o colon polyps.  Last colonoscopy 2/23/2022 with removal of 3 polyps, each 2-3 mm (2 tubular adenomas, 1 hyperplastic).  Repeat colonoscopy in 02/2027 is recommended.    4. Osteoporosis: on fosamax and calcium/vit D for osteoporosis with previous history of fracture.     5. Cardiac: hx of aortic stenosis and PVC's, asymptomatic. On metoprolol, follows with cardiology.     6. Nausea/GERD: I suspect delayed nausea she had with C3 with actually from GERD. We have given her fairly high dose steroids with this regimen. Continue omeprazole 20 mg daily. Okay to take zofran BID days 1-7 but if nausea breaks through then take pepcid or TUMS first line. Any nausea after the first week, try this first.     7. Fatigue: Progressive and cumulative which is expected. Continue gentle exercise as able.     8. Night sweats: Afebrile. Will add on TSH/T4.     Greater than 40 minutes was spent with this patient with greater than 20 minutes spent in counseling and coordination of care.      Jessy Molina PA-C

## 2022-09-08 ENCOUNTER — ONCOLOGY VISIT (OUTPATIENT)
Dept: ONCOLOGY | Facility: CLINIC | Age: 67
End: 2022-09-08
Attending: PHYSICIAN ASSISTANT
Payer: MEDICARE

## 2022-09-08 ENCOUNTER — APPOINTMENT (OUTPATIENT)
Dept: LAB | Facility: CLINIC | Age: 67
End: 2022-09-08
Attending: INTERNAL MEDICINE
Payer: MEDICARE

## 2022-09-08 VITALS
HEART RATE: 95 BPM | RESPIRATION RATE: 18 BRPM | OXYGEN SATURATION: 94 % | BODY MASS INDEX: 31.67 KG/M2 | TEMPERATURE: 97.7 F | DIASTOLIC BLOOD PRESSURE: 68 MMHG | WEIGHT: 195.9 LBS | SYSTOLIC BLOOD PRESSURE: 122 MMHG

## 2022-09-08 DIAGNOSIS — Z17.0 MALIGNANT NEOPLASM OF UPPER-INNER QUADRANT OF LEFT BREAST IN FEMALE, ESTROGEN RECEPTOR POSITIVE (H): Primary | ICD-10-CM

## 2022-09-08 DIAGNOSIS — R61 NIGHT SWEATS: ICD-10-CM

## 2022-09-08 DIAGNOSIS — Z51.11 ENCOUNTER FOR ANTINEOPLASTIC CHEMOTHERAPY: Primary | ICD-10-CM

## 2022-09-08 DIAGNOSIS — C50.212 MALIGNANT NEOPLASM OF UPPER-INNER QUADRANT OF LEFT BREAST IN FEMALE, ESTROGEN RECEPTOR POSITIVE (H): ICD-10-CM

## 2022-09-08 DIAGNOSIS — Z51.11 ENCOUNTER FOR ANTINEOPLASTIC CHEMOTHERAPY: ICD-10-CM

## 2022-09-08 DIAGNOSIS — Z17.0 MALIGNANT NEOPLASM OF UPPER-INNER QUADRANT OF LEFT BREAST IN FEMALE, ESTROGEN RECEPTOR POSITIVE (H): ICD-10-CM

## 2022-09-08 DIAGNOSIS — C50.212 MALIGNANT NEOPLASM OF UPPER-INNER QUADRANT OF LEFT BREAST IN FEMALE, ESTROGEN RECEPTOR POSITIVE (H): Primary | ICD-10-CM

## 2022-09-08 LAB
ALBUMIN SERPL BCG-MCNC: 3.4 G/DL (ref 3.5–5.2)
ALP SERPL-CCNC: 60 U/L (ref 35–104)
ALT SERPL W P-5'-P-CCNC: 5 U/L (ref 10–35)
ANION GAP SERPL CALCULATED.3IONS-SCNC: 12 MMOL/L (ref 7–15)
AST SERPL W P-5'-P-CCNC: 19 U/L (ref 10–35)
BASOPHILS # BLD AUTO: 0 10E3/UL (ref 0–0.2)
BASOPHILS NFR BLD AUTO: 1 %
BILIRUB SERPL-MCNC: 0.3 MG/DL
BUN SERPL-MCNC: 13.1 MG/DL (ref 8–23)
CALCIUM SERPL-MCNC: 8.8 MG/DL (ref 8.8–10.2)
CHLORIDE SERPL-SCNC: 106 MMOL/L (ref 98–107)
CREAT SERPL-MCNC: 0.63 MG/DL (ref 0.51–0.95)
DEPRECATED HCO3 PLAS-SCNC: 23 MMOL/L (ref 22–29)
EOSINOPHIL # BLD AUTO: 0 10E3/UL (ref 0–0.7)
EOSINOPHIL NFR BLD AUTO: 0 %
ERYTHROCYTE [DISTWIDTH] IN BLOOD BY AUTOMATED COUNT: 15.7 % (ref 10–15)
GFR SERPL CREATININE-BSD FRML MDRD: >90 ML/MIN/1.73M2
GLUCOSE SERPL-MCNC: 127 MG/DL (ref 70–99)
HCT VFR BLD AUTO: 30.7 % (ref 35–47)
HGB BLD-MCNC: 9.9 G/DL (ref 11.7–15.7)
IMM GRANULOCYTES # BLD: 0 10E3/UL
IMM GRANULOCYTES NFR BLD: 1 %
LYMPHOCYTES # BLD AUTO: 0.4 10E3/UL (ref 0.8–5.3)
LYMPHOCYTES NFR BLD AUTO: 5 %
MCH RBC QN AUTO: 30.7 PG (ref 26.5–33)
MCHC RBC AUTO-ENTMCNC: 32.2 G/DL (ref 31.5–36.5)
MCV RBC AUTO: 95 FL (ref 78–100)
MONOCYTES # BLD AUTO: 0.5 10E3/UL (ref 0–1.3)
MONOCYTES NFR BLD AUTO: 6 %
NEUTROPHILS # BLD AUTO: 7.1 10E3/UL (ref 1.6–8.3)
NEUTROPHILS NFR BLD AUTO: 87 %
NRBC # BLD AUTO: 0 10E3/UL
NRBC BLD AUTO-RTO: 0 /100
PLATELET # BLD AUTO: 317 10E3/UL (ref 150–450)
POTASSIUM SERPL-SCNC: 3.6 MMOL/L (ref 3.4–5.3)
PROT SERPL-MCNC: 6.7 G/DL (ref 6.4–8.3)
RBC # BLD AUTO: 3.23 10E6/UL (ref 3.8–5.2)
SODIUM SERPL-SCNC: 141 MMOL/L (ref 136–145)
TSH SERPL DL<=0.005 MIU/L-ACNC: 1.05 UIU/ML (ref 0.3–4.2)
WBC # BLD AUTO: 8.1 10E3/UL (ref 4–11)

## 2022-09-08 PROCEDURE — 96372 THER/PROPH/DIAG INJ SC/IM: CPT | Performed by: PHYSICIAN ASSISTANT

## 2022-09-08 PROCEDURE — G0463 HOSPITAL OUTPT CLINIC VISIT: HCPCS

## 2022-09-08 PROCEDURE — 258N000003 HC RX IP 258 OP 636: Performed by: PHYSICIAN ASSISTANT

## 2022-09-08 PROCEDURE — 250N000011 HC RX IP 250 OP 636: Performed by: PHYSICIAN ASSISTANT

## 2022-09-08 PROCEDURE — 85025 COMPLETE CBC W/AUTO DIFF WBC: CPT | Performed by: PHYSICIAN ASSISTANT

## 2022-09-08 PROCEDURE — 96377 APPLICATON ON-BODY INJECTOR: CPT | Mod: 59

## 2022-09-08 PROCEDURE — 99215 OFFICE O/P EST HI 40 MIN: CPT | Performed by: PHYSICIAN ASSISTANT

## 2022-09-08 PROCEDURE — 84443 ASSAY THYROID STIM HORMONE: CPT | Performed by: PHYSICIAN ASSISTANT

## 2022-09-08 PROCEDURE — 96417 CHEMO IV INFUS EACH ADDL SEQ: CPT

## 2022-09-08 PROCEDURE — 82435 ASSAY OF BLOOD CHLORIDE: CPT | Performed by: PHYSICIAN ASSISTANT

## 2022-09-08 PROCEDURE — 96413 CHEMO IV INFUSION 1 HR: CPT

## 2022-09-08 PROCEDURE — 96367 TX/PROPH/DG ADDL SEQ IV INF: CPT

## 2022-09-08 PROCEDURE — 36415 COLL VENOUS BLD VENIPUNCTURE: CPT | Performed by: PHYSICIAN ASSISTANT

## 2022-09-08 PROCEDURE — 96375 TX/PRO/DX INJ NEW DRUG ADDON: CPT

## 2022-09-08 RX ORDER — ONDANSETRON 2 MG/ML
8 INJECTION INTRAMUSCULAR; INTRAVENOUS ONCE
Status: CANCELLED | OUTPATIENT
Start: 2022-09-09

## 2022-09-08 RX ORDER — HEPARIN SODIUM,PORCINE 10 UNIT/ML
5 VIAL (ML) INTRAVENOUS
Status: CANCELLED | OUTPATIENT
Start: 2022-09-09

## 2022-09-08 RX ORDER — MEPERIDINE HYDROCHLORIDE 25 MG/ML
25 INJECTION INTRAMUSCULAR; INTRAVENOUS; SUBCUTANEOUS EVERY 30 MIN PRN
Status: CANCELLED | OUTPATIENT
Start: 2022-09-09

## 2022-09-08 RX ORDER — DIPHENHYDRAMINE HYDROCHLORIDE 50 MG/ML
50 INJECTION INTRAMUSCULAR; INTRAVENOUS
Status: CANCELLED
Start: 2022-09-09

## 2022-09-08 RX ORDER — ONDANSETRON 2 MG/ML
8 INJECTION INTRAMUSCULAR; INTRAVENOUS ONCE
Status: COMPLETED | OUTPATIENT
Start: 2022-09-08 | End: 2022-09-08

## 2022-09-08 RX ORDER — METHYLPREDNISOLONE SODIUM SUCCINATE 125 MG/2ML
125 INJECTION, POWDER, LYOPHILIZED, FOR SOLUTION INTRAMUSCULAR; INTRAVENOUS
Status: CANCELLED
Start: 2022-09-09

## 2022-09-08 RX ORDER — EPINEPHRINE 1 MG/ML
0.3 INJECTION, SOLUTION INTRAMUSCULAR; SUBCUTANEOUS EVERY 5 MIN PRN
Status: CANCELLED | OUTPATIENT
Start: 2022-09-09

## 2022-09-08 RX ORDER — ALBUTEROL SULFATE 0.83 MG/ML
2.5 SOLUTION RESPIRATORY (INHALATION)
Status: CANCELLED | OUTPATIENT
Start: 2022-09-09

## 2022-09-08 RX ORDER — SIMVASTATIN 40 MG
TABLET ORAL EVERY 24 HOURS
COMMUNITY
Start: 2022-02-23 | End: 2022-10-06

## 2022-09-08 RX ORDER — PROCHLORPERAZINE MALEATE 5 MG
TABLET ORAL
COMMUNITY
Start: 2022-07-07 | End: 2022-10-06

## 2022-09-08 RX ORDER — HEPARIN SODIUM (PORCINE) LOCK FLUSH IV SOLN 100 UNIT/ML 100 UNIT/ML
5 SOLUTION INTRAVENOUS
Status: CANCELLED | OUTPATIENT
Start: 2022-09-09

## 2022-09-08 RX ORDER — NALOXONE HYDROCHLORIDE 0.4 MG/ML
0.2 INJECTION, SOLUTION INTRAMUSCULAR; INTRAVENOUS; SUBCUTANEOUS
Status: CANCELLED | OUTPATIENT
Start: 2022-09-09

## 2022-09-08 RX ORDER — LORAZEPAM 2 MG/ML
0.5 INJECTION INTRAMUSCULAR EVERY 4 HOURS PRN
Status: CANCELLED | OUTPATIENT
Start: 2022-09-09

## 2022-09-08 RX ORDER — ALBUTEROL SULFATE 90 UG/1
1-2 AEROSOL, METERED RESPIRATORY (INHALATION)
Status: CANCELLED
Start: 2022-09-09

## 2022-09-08 RX ADMIN — DOCETAXEL 154 MG: 20 INJECTION, SOLUTION, CONCENTRATE INTRAVENOUS at 13:04

## 2022-09-08 RX ADMIN — PEGFILGRASTIM 6 MG: KIT SUBCUTANEOUS at 14:14

## 2022-09-08 RX ADMIN — DEXAMETHASONE SODIUM PHOSPHATE: 10 INJECTION, SOLUTION INTRAMUSCULAR; INTRAVENOUS at 12:31

## 2022-09-08 RX ADMIN — ONDANSETRON 8 MG: 2 INJECTION INTRAMUSCULAR; INTRAVENOUS at 12:31

## 2022-09-08 RX ADMIN — CYCLOPHOSPHAMIDE 1235 MG: 2 INJECTION, POWDER, FOR SOLUTION INTRAVENOUS; ORAL at 14:13

## 2022-09-08 RX ADMIN — SODIUM CHLORIDE 250 ML: 9 INJECTION, SOLUTION INTRAVENOUS at 12:29

## 2022-09-08 ASSESSMENT — PAIN SCALES - GENERAL: PAINLEVEL: NO PAIN (0)

## 2022-09-08 NOTE — PROGRESS NOTES
Infusion Nursing Note:  Vickie Alvarado presents today for Cycle 4 Day 1 Docetaxel, Cyclophosphamide and Neulasta OnPro.    Patient seen by provider today: Yes: Jessy Molina PA-C    Note: Patient presents to the infusion center today after her provider appt.    Writer confirmed the patient knows to take her Dexamethasone.    Intravenous Access:  Peripheral IV placed.    Treatment Conditions:   Latest Reference Range & Units 09/08/22 10:12   Sodium 136 - 145 mmol/L 141   Potassium 3.4 - 5.3 mmol/L 3.6   Chloride 98 - 107 mmol/L 106   Carbon Dioxide (CO2) 22 - 29 mmol/L 23   Urea Nitrogen 8.0 - 23.0 mg/dL 13.1   Creatinine 0.51 - 0.95 mg/dL 0.63   GFR Estimate >60 mL/min/1.73m2 >90   Calcium 8.8 - 10.2 mg/dL 8.8   Anion Gap 7 - 15 mmol/L 12   Albumin 3.5 - 5.2 g/dL 3.4 (L)   Protein Total 6.4 - 8.3 g/dL 6.7   Alkaline Phosphatase 35 - 104 U/L 60   ALT 10 - 35 U/L 5 (L)   AST 10 - 35 U/L 19   Bilirubin Total <=1.2 mg/dL 0.3   Glucose 70 - 99 mg/dL 127 (H)   WBC 4.0 - 11.0 10e3/uL 8.1   Hemoglobin 11.7 - 15.7 g/dL 9.9 (L)   Hematocrit 35.0 - 47.0 % 30.7 (L)   Platelet Count 150 - 450 10e3/uL 317   RBC Count 3.80 - 5.20 10e6/uL 3.23 (L)   MCV 78 - 100 fL 95   MCH 26.5 - 33.0 pg 30.7   MCHC 31.5 - 36.5 g/dL 32.2   RDW 10.0 - 15.0 % 15.7 (H)   % Neutrophils % 87   % Lymphocytes % 5   % Monocytes % 6   % Eosinophils % 0   % Basophils % 1   Absolute Basophils 0.0 - 0.2 10e3/uL 0.0   Absolute Eosinophils 0.0 - 0.7 10e3/uL 0.0   Absolute Immature Granulocytes <=0.4 10e3/uL 0.0   Absolute Lymphocytes 0.8 - 5.3 10e3/uL 0.4 (L)   Absolute Monocytes 0.0 - 1.3 10e3/uL 0.5   % Immature Granulocytes % 1   Absolute Neutrophils 1.6 - 8.3 10e3/uL 7.1   Absolute NRBCs 10e3/uL 0.0   NRBCs per 100 WBC <1 /100 0     Results reviewed, labs MET treatment parameters, ok to proceed with treatment.    Post Infusion Assessment:  Patient tolerated infusion without incident.  Blood return noted pre and post infusion.  No evidence of  extravasations.  Access discontinued per protocol.     Neulasta Onpro On-Body injector applied to right arm at 2:15p.  Writer discussed Neulasta injection would start tomorrow 9/9 at 5:15p, approximately 27 hours after application applied today.  Written and Verbal instruction reviewed with patient.  Pt instructed when the dose delivery starts, it will take about 45 minutes to complete.  Pt aware Neulasta Onpro On-Body should have green flashing light and to call triage or on-call MD if injector flashes red or appears to be leaking. Pt aware to keep Onpro On-Body Neulasta 4 inches away from electrical equipment and to avoid showering 4 hours prior to injection.   Neulasta Onpro Lot number: see MAR    Discharge Plan:   Prescription refills given for dexamethasone.  Discharge instructions reviewed with: Patient.  Patient and/or family verbalized understanding of discharge instructions and all questions answered.  AVS to patient via [employer].  Patient will return in 8-10 weeks for next provider appointment.   Patient discharged in stable condition accompanied by: self.  Departure Mode: Ambulatory.      Karli Alvarez RN

## 2022-09-08 NOTE — NURSING NOTE
"Oncology Rooming Note    September 8, 2022 10:24 AM   Vickie Alvarado is a 67 year old female who presents for:    Chief Complaint   Patient presents with     Blood Draw     Labs drawn via piv placed by RN in lab. VS taken.      Oncology Clinic Visit     Encounter for antineoplastic chemotherapy      Initial Vitals: /68 (BP Location: Right arm, Patient Position: Sitting, Cuff Size: Adult Regular)   Pulse 95   Temp 97.7  F (36.5  C) (Oral)   Resp 18   Wt 88.9 kg (195 lb 14.4 oz)   LMP 05/17/2011   SpO2 94%   BMI 31.67 kg/m   Estimated body mass index is 31.67 kg/m  as calculated from the following:    Height as of 6/14/22: 1.675 m (5' 5.95\").    Weight as of this encounter: 88.9 kg (195 lb 14.4 oz). Body surface area is 2.03 meters squared.  No Pain (0) Comment: Data Unavailable   Patient's last menstrual period was 05/17/2011.  Allergies reviewed: Yes  Medications reviewed: Yes    Medications: MEDICATION REFILLS NEEDED TODAY. Provider was notified.  Pharm[facility] entered into [facility]: [id number] - [place] [address] [facility]    Clinical concerns: needs refill for dexamethazone jazmin was notified.      Hiro Grimaldo            "

## 2022-09-08 NOTE — PATIENT INSTRUCTIONS
[facility] Triage and after hours / weekends / holidays:  [phone]    Please call the triage or after hours line if you experience a temperature greater than or equal to 100.4, shaking chills, have uncontrolled nausea, vomiting and/or diarrhea, dizziness, shortness of breath, chest pain, bleeding, unexplained bruising, or if you have any other new/concerning symptoms, questions or concerns.      If you are having any concerning symptoms or wish to speak to a provider before your next infusion visit, please call your care coordinator or triage to notify them so we can adequately serve you.     If you need a refill on a narcotic prescription or other medication, please call before your infusion appointment.                September 2022 Sunday Monday Tuesday Wednesday Thursday Friday Saturday                       1     2    TELEPHONE VISIT NEW   9:00 AM   (75 min.)   Gregoria Benjamin GC   [facility] 3       4     5     6     7     8    LAB PERIPHERAL  10:00 AM   (15 min.)   [facility] LAB DRAW   [facility]    RETURN  10:15 AM   (45 min.)   Jessy Molina PA-C   [facility]    ONC INFUSION 2 HR (120 MIN)  12:00 PM   (120 min.)    ONC INFUSION NURSE   [facility] 9    VIDEO VISIT RETURN  11:25 AM   (20 min.)   Alphonso Cadet MD   [facility] 10       11     12     13     14     15     16     17       18     19     20     21     22     23     24       25     26     27     28     29     30                        October 2022 Sunday Monday Tuesday Wednesday Thursday Friday Saturday                                 1       2     3     4     5     6     7     8       9     10     11     12     13     14     15       16     17     18     19     20     21     22       23     24     25     26     27     28     29       30     31                                                  Recent Results (from the past 24 hour(s))   Comprehensive metabolic panel    Collection Time: 09/08/22 10:12 AM   Result Value Ref Range    Sodium 141 136 - 145 mmol/L    Potassium 3.6 3.4 - 5.3 mmol/L    Creatinine 0.63 0.51 - 0.95 mg/dL    Urea Nitrogen 13.1 8.0 - 23.0 mg/dL    Chloride 106 98 - 107 mmol/L    Carbon Dioxide (CO2) 23 22 - 29 mmol/L    Anion Gap 12 7 - 15 mmol/L    Glucose 127 (H) 70 - 99 mg/dL    Calcium 8.8 8.8 - 10.2 mg/dL    Protein Total 6.7 6.4 - 8.3 g/dL    Albumin 3.4 (L) 3.5 - 5.2 g/dL    Bilirubin Total 0.3 <=1.2 mg/dL    Alkaline Phosphatase 60 35 - 104 U/L    AST 19 10 - 35 U/L    ALT 5 (L) 10 - 35 U/L    GFR Estimate >90 >60 mL/min/1.73m2   CBC with platelets and differential    Collection Time: 09/08/22 10:12 AM   Result Value Ref Range    WBC Count 8.1 4.0 - 11.0 10e3/uL    RBC Count 3.23 (L) 3.80 - 5.20 10e6/uL    Hemoglobin 9.9 (L) 11.7 - 15.7 g/dL    Hematocrit 30.7 (L) 35.0 - 47.0 %    MCV 95 78 - 100 fL    MCH 30.7 26.5 - 33.0 pg    MCHC 32.2 31.5 - 36.5 g/dL    RDW 15.7 (H) 10.0 - 15.0 %    Platelet Count 317 150 - 450 10e3/uL    % Neutrophils 87 %    % Lymphocytes 5 %    % Monocytes 6 %    % Eosinophils 0 %    % Basophils 1 %    % Immature Granulocytes 1 %    NRBCs per 100 WBC 0 <1 /100    Absolute Neutrophils 7.1 1.6 - 8.3 10e3/uL    Absolute Lymphocytes 0.4 (L) 0.8 - 5.3 10e3/uL    Absolute Monocytes 0.5 0.0 - 1.3 10e3/uL    Absolute Eosinophils 0.0 0.0 - 0.7 10e3/uL    Absolute Basophils 0.0 0.0 - 0.2 10e3/uL    Absolute Immature Granulocytes 0.0 <=0.4 10e3/uL    Absolute NRBCs 0.0 10e3/uL

## 2022-09-08 NOTE — LETTER
9/8/2022         RE: Vickie Alvarado  [address]  [facility] [address]      Oncology Follow up :  Date on this visit: Sep 8, 2022    Primary Physician: Gaby Lynn     History Of Present Illness:  Ms. Alvarado is a 67 year old female with a left breast cancer.  She self palpated a mass in her mid left breast.  Bilateral diagnostic mammograms showed a mass in the upper inner left breast.  Ultrasound of the left breast showed a mass at 11:00, 8 cm from the nipple measuring 3.1 cm.  There were no suspicious lymph nodes in the left axilla.  Biopsy of the left breast mass was c/w a grade 3 invasive ductal carcinoma, ER strong in 98%, ND moderate in 70%, and HER2 low by IHC (score 1+). Her case was discussed at tumor board on 06/16/22. Her oncotype is 24. After risk vs benefit was discussed, it was decided to start neoadjuvant chemotherapy with TC.     Treatment to date:  - Neoadjuvant chemotherapy initiated on 07/08/22: docetaxel, cyclophosphamide with neulasta.     Interval history:   Marni returns to clinic for consideration of C4 TC. She struggled with symptoms with C3. She had nausea started the Wednesday following chemotherapy (6 days later). She started to take zofran every 8 hours with relief. She had slight headache from this which was manageable. Taking prunes to avoid constipation. She has tried to stop zofran the past few days and has had recurrent nausea. She has had intense GERD last week and started omeprazole 20 mg OTC on September 1. This has helped.     She has been more fatigued. She is trying to stay active with housework but this has been a struggle. Fatigue is better now compared to last week.     There was some concern about lower oxygen saturations in low 90s. These quickly rebound to above 95% with deeper breaths. Nothing below 90%. She has not had a cough, SOB, GREGORY, or tachycardia.     She was seen for cloudy urine and had a UA done with negative UC. This has resolved now.      She has had some sweats along her collar at night time and has to change to her shirts. She has checked temp and has been afebrile.     Past Medical/Surgical History:  Past Medical History:   Diagnosis Date     Aortic stenosis      Hyperlipidemia      Insufficiency fracture of tibia, sequela 09/28/2021     Polyp of colon 05/26/2016   - PVCs    Past Surgical History:   Procedure Laterality Date     CATARACT IOL, RT/LT Right 2006     HC YAG LASER CAPSULOTOMY Right 2009     LASER VITREOLYSIS, ANTERIOR OD (RIGHT EYE) Right 2007     REPOSITION INTRAOCULAR LENS Right 11/18/2014    Procedure: REPOSITION INTRAOCULAR LENS;  Surgeon: Vickie Guerra MD;  Location: [facility]     VITRECTOMY PARSPLANA WITH 23 GAUGE SYSTEM Right 11/18/2014    Procedure: VITRECTOMY PARSPLANA WITH 23 GAUGE SYSTEM;  Surgeon: Vickie Guerra MD;  Location: [facility]     Allergies:  Allergies as of 09/08/2022 - Reviewed 09/08/2022   Allergen Reaction Noted     Bactrim [sulfamethoxazole w/trimethoprim] Nausea 11/01/2017     Seasonal allergies  11/13/2014     Typhoid vaccines Nausea and Vomiting 05/05/2011     Current Medications:  Current Outpatient Medications   Medication Sig Dispense Refill     simvastatin (ZOCOR) 40 MG tablet Take by mouth every 24 hours       alendronate (FOSAMAX) 70 MG tablet Take 1 tablet (70 mg) by mouth every 7 days 12 tablet 0     calcium carb-cholecalciferol 600-500 MG-UNIT CAPS        dexamethasone (DECADRON) 4 MG tablet Take 2 tablets (8 mg) by mouth 2 times daily (with meals) for 3 doses Start evening of Docetaxel infusion and continue for a total of 3 doses. 6 tablet 3     Lactobacillus (PROBIOTIC CHILDRENS) CHEW        loratadine (CLARITIN) 10 MG tablet Take 10 mg by mouth daily Prn, seasonal for ragweed and lilacs       metoprolol succinate ER (TOPROL XL) 25 MG 24 hr tablet Take 1 tablet (25 mg) by mouth daily 90 tablet 3     mupirocin (BACTROBAN) 2 % external ointment Apply topically 2 times daily 22 g 0      Omega-3 Fatty Acids (FISH OIL ADULT GUMMIES PO) Take 250 mg by mouth       ondansetron (ZOFRAN) 8 MG tablet Take 1 tablet (8 mg) by mouth every 8 hours as needed for nausea (vomiting) 30 tablet 2     prochlorperazine (COMPAZINE) 10 MG tablet Take 0.5 tablets (5 mg) by mouth every 6 hours as needed for nausea or vomiting 30 tablet 2     prochlorperazine (COMPAZINE) 5 MG tablet        simvastatin (ZOCOR) 40 MG tablet Take 1 tablet (40 mg) by mouth At Bedtime 90 tablet 3      Social History:  Social History     Socioeconomic History     Marital status: Single     Spouse name: Not on file     Number of children: Not on file     Years of education: Not on file     Highest education level: Not on file   Occupational History     Occupation: Retired - [employer], maintain [employer]   Tobacco Use     Smoking status: Never Smoker     Smokeless tobacco: Never Used   Vaping Use     Vaping Use: Never used   Substance and Sexual Activity     Alcohol use: Yes     Comment: 4 drinks per month     Drug use: No     Sexual activity: Never   Other Topics Concern     Not on file   Social History Narrative     Not on file     Social Determinants of Health     Financial Resource Strain: Not on file   Food Insecurity: Not on file   Transportation Needs: Not on file   Physical Activity: Not on file   Stress: Not on file   Social Connections: Not on file   Intimate Partner Violence: Not on file   Housing Stability: Not on file     Physical Exam:  /68 (BP Location: Right arm, Patient Position: Sitting, Cuff Size: Adult Regular)   Pulse 95   Temp 97.7  F (36.5  C) (Oral)   Resp 18   Wt 88.9 kg (195 lb 14.4 oz)   LMP 05/17/2011   SpO2 94%   BMI 31.67 kg/m     Wt Readings from Last 4 Encounters:   09/08/22 88.9 kg (195 lb 14.4 oz)   08/29/22 88.8 kg (195 lb 12.8 oz)   08/19/22 89.3 kg (196 lb 14.4 oz)   07/27/22 90.1 kg (198 lb 9.6 oz)     General:  Well appearing adult female in NAD.  Alert and oriented x  3.  HEENT:  Normocephalic.  Sclera anicteric.  MMM.  No lesions of the oropharynx.  Lymph:  No axillary lymphadenopathy.   Chest:  CTA bilaterally.  No wheezes or crackles.  CV:  RRR.  Breast: 2.2 x 3.0 cm soft mass palpable in left breast at 11:00, 8 cm from nipple. Left nipple everted.   Abd:  Soft/NT/ND. +BS   Ext:  No pitting edema of the bilateral lower extremities.   Neuro:  Cranial nerves grossly intact.  Psych:  Mood and affect appear normal.  Skin: No rashes on exposed skin     Laboratory/Imaging Studies  Most Recent 3 CBC's:  Recent Labs   Lab Test 09/08/22  1012 08/19/22  0925 07/27/22  1232   WBC 8.1 7.4 10.8   HGB 9.9* 11.6* 12.8   MCV 95 94 91    272 362   ANEUTAUTO 7.1 6.2 9.1*     Most Recent 3 BMP's:  Recent Labs   Lab Test 09/08/22  1012 08/19/22  0925 07/27/22  1232    140 142   POTASSIUM 3.6 3.5 3.9   CHLORIDE 106 106 110*   CO2 23 27 25   BUN 13.1 12 14   CR 0.63 0.58 0.60   ANIONGAP 12 7 7   VALENTINA 8.8 8.4* 8.7   * 107* 102*   PROTTOTAL 6.7 7.0 7.2   ALBUMIN 3.4* 3.2* 3.4    Most Recent 3 LFT's:  Recent Labs   Lab Test 09/08/22  1012 08/19/22  0925 07/27/22  1232   AST 19 16 16   ALT 5* 16 18   ALKPHOS 60 61 57   BILITOTAL 0.3 0.5 0.3     I reviewed the above labs today.      ASSESSMENT/PLAN:  Ms. Alvarado is a 67 year old woman with an extensive family history of cancer who presents with newly discovered stage II, T2N0M0, ER+/MS+/HER2 low IDC of her left breast.    1. Left breast IDC, grade 3, ER positive, MS positive, and HER2 low:   Imaging demonstrates an approximate 3.2 cm mass in the upper inner left breast. The goal of treatment is cure. She has met with Dr. Cadet were surgical options were discussed. Oncotype is 24. Dr. Horvath discussed with Marni the risk vs benefit of neoadjuvant chemotherapy vs heading directly to surgery. It was decided to proceed with neoadjuvant chemotherapy with doxetaxel and cyclophosphamide with neulasta support. Next generation testing and  BRCA1/2 Hereditary testing are negative for pathogenic variants.   - Labs and clinically appropriate to move forward with C4D1 TC with neulasta support today   - Dr. Cadet follow-up is scheduled. She will meet with Dr. Horvath again after surgery.     2.  Family history of breast and ovarian neoplasm:  Patient has a strong family history of both breast and ovarian cancer. Breast Actionable panel was negative. Referral to Cancer Risk Management and visit was done. She probably will pursue further genetic testing as this is at no cost to her.     3.  Personal history of colon polyps:  She has a h/o colon polyps.  Last colonoscopy 2/23/2022 with removal of 3 polyps, each 2-3 mm (2 tubular adenomas, 1 hyperplastic).  Repeat colonoscopy in 02/2027 is recommended.    4. Osteoporosis: on fosamax and calcium/vit D for osteoporosis with previous history of fracture.     5. Cardiac: hx of aortic stenosis and PVC's, asymptomatic. On metoprolol, follows with cardiology.     6. Nausea/GERD: I suspect delayed nausea she had with C3 with actually from GERD. We have given her fairly high dose steroids with this regimen. Continue omeprazole 20 mg daily. Okay to take zofran BID days 1-7 but if nausea breaks through then take pepcid or TUMS first line. Any nausea after the first week, try this first.     7. Fatigue: Progressive and cumulative which is expected. Continue gentle exercise as able.     8. Night sweats: Afebrile. Will add on TSH/T4.     Greater than 40 minutes was spent with this patient with greater than 20 minutes spent in counseling and coordination of care.      JULIO CESAR Patino PA-C

## 2022-09-08 NOTE — NURSING NOTE
Chief Complaint   Patient presents with     Blood Draw     Labs drawn via piv placed by RN in lab. VS taken.      Labs drawn from PIV placed by RN. Line flushed with saline. Vitals taken. Pt checked in for appointment(s).    Candi Hernandez RN

## 2022-09-09 ENCOUNTER — PATIENT OUTREACH (OUTPATIENT)
Dept: ONCOLOGY | Facility: CLINIC | Age: 67
End: 2022-09-09

## 2022-09-09 ENCOUNTER — VIRTUAL VISIT (OUTPATIENT)
Dept: ONCOLOGY | Facility: CLINIC | Age: 67
End: 2022-09-09
Attending: SURGERY
Payer: MEDICARE

## 2022-09-09 DIAGNOSIS — C50.212 MALIGNANT NEOPLASM OF UPPER-INNER QUADRANT OF LEFT BREAST IN FEMALE, ESTROGEN RECEPTOR POSITIVE (H): Primary | ICD-10-CM

## 2022-09-09 DIAGNOSIS — Z17.0 MALIGNANT NEOPLASM OF UPPER-INNER QUADRANT OF LEFT BREAST IN FEMALE, ESTROGEN RECEPTOR POSITIVE (H): Primary | ICD-10-CM

## 2022-09-09 PROCEDURE — 99442 PR PHYSICIAN TELEPHONE EVALUATION 11-20 MIN: CPT | Mod: 95 | Performed by: SURGERY

## 2022-09-09 NOTE — NURSING NOTE
Patient declined individual allergy and medication review by support staff because pt states everything was up-to-date during echeck-in and nothing has changed since last reviewed yesterday.    Sonia Rodriguez VF

## 2022-09-09 NOTE — LETTER
9/9/2022         RE: Vickie Alvarado  [address]  [place] [address]        Dear Colleague,    Thank you for referring your patient, Vickie Alvarado, to the [facility]. Please see a copy of my visit note below.      TELEPHONE VISIT      Today I had a telephone visit with Vickie Alvarado.  This was initially scheduled as a video visit but it did not work.  The phone visit lasted 15 minutes.  Vickie has a T2 N0 M0 left breast cancer.  She received neoadjuvant chemotherapy after having an Oncotype of 24.  She says she can no longer palpate the mass.  She has had some difficulties with chemotherapy.  Also, since I have seen her, she had genetic testing and did not have a mutation.  I talked to her about treatment options including mastectomy with or without reconstruction versus a lumpectomy with radiation therapy.  She has elected to have a lumpectomy.  I told her I would like to repeat an ultrasound of her breast just to determine the change in size of the tumor.  She will also have a seed localization as well, so we will have an evaluation of her breast by mammography as well.  So, we are going to perform a left seed localized lumpectomy and sentinel lymph node biopsy.  All of her questions were asked and answered.  We would like to have her surgery in about a month's time.    Total time was 30 minutes which included the telephone visit, reviewing her history and coordinating her care.      Sincerely,    Alphonso Cadet MD

## 2022-09-09 NOTE — PROGRESS NOTES
Mrani is a 67 year old who is being evaluated via a billable video visit.      How would you like to obtain your AVS? MyChart  If the video visit is dropped, the invitation should be resent by: Send to e-mail at: [email or web address]  Will anyone else be joining your video visit? Tosin TEIXEIRA     Video-Visit Details

## 2022-09-09 NOTE — PROGRESS NOTES
TELEPHONE VISIT    Today I had a telephone visit with Vickie Alvarado.  This was initially scheduled as a video visit but it did not work.  The phone visit lasted 15 minutes.  Vickie has a T2 N0 M0 left breast cancer.  She received neoadjuvant chemotherapy after having an Oncotype of 24.  She says she can no longer palpate the mass.  She has had some difficulties with chemotherapy.  Also, since I have seen her, she had genetic testing and did not have a mutation.  I talked to her about treatment options including mastectomy with or without reconstruction versus a lumpectomy with radiation therapy.  She has elected to have a lumpectomy.  I told her I would like to repeat an ultrasound of her breast just to determine the change in size of the tumor.  She will also have a seed localization as well, so we will have an evaluation of her breast by mammography as well.  So, we are going to perform a left seed localized lumpectomy and sentinel lymph node biopsy.  All of her questions were asked and answered.  We would like to have her surgery in about a month's time.    Total time was 30 minutes which included the telephone visit, reviewing her history and coordinating her care.

## 2022-09-09 NOTE — PROGRESS NOTES
RN CARE COORDINATION  Surgical Oncology    Spoke with following her visit with Dr. Cadet on 9/9/2022. Introduced self and explained role of RNCC. Reviewed plan for LEFT tag localized lumpectomy with sentinel lymph node biopsy at the [facility]. Discussed pre-op requirements including H&P and Covid test. Marni prefers a video PAC visit and will complete a home Covid test. Reviewed shower instructions and will mail written hand-out and bottle of surgical scrub.     Marni will need a repeat left breast US. This can be done on the same day as her tag placement.     Informed patient they should get a call within the next three business days from our OR  with surgery date, H&P date and date of post-op visit with their surgeon. Writer answered all questions and concerns to the best of her ability and provided her contact information. Reviewed use of [employer] as alternative way to contact team.  Encouraged patient to contact with questions.         MARTHA Eller, RN  RN Care Coordinator  [facility]  Surgical Onolcogy      Approximately 15 minutes was spent in discussion with patient.

## 2022-09-12 ENCOUNTER — MYC MEDICAL ADVICE (OUTPATIENT)
Dept: CARDIOLOGY | Facility: CLINIC | Age: 67
End: 2022-09-12

## 2022-09-13 ENCOUNTER — TELEPHONE (OUTPATIENT)
Dept: ONCOLOGY | Facility: CLINIC | Age: 67
End: 2022-09-13

## 2022-09-13 DIAGNOSIS — Z17.0 MALIGNANT NEOPLASM OF UPPER-INNER QUADRANT OF LEFT BREAST IN FEMALE, ESTROGEN RECEPTOR POSITIVE (H): Primary | ICD-10-CM

## 2022-09-13 DIAGNOSIS — C50.212 MALIGNANT NEOPLASM OF UPPER-INNER QUADRANT OF LEFT BREAST IN FEMALE, ESTROGEN RECEPTOR POSITIVE (H): Primary | ICD-10-CM

## 2022-09-13 NOTE — TELEPHONE ENCOUNTER
Called patient to schedule surgery with    Date of Surgery: 10/17  Approximate arrival time given:  Yes  Location of surgery: [facility]  Pre-Op H&P: Primary provider  Post-Op Appt Date: 11/04/22   Imaging needed:  No  Discussed COVID-19 Testing: Yes     Patient aware that pre-op RN will call 2-3 days prior to surgery with arrival time and instructions Yes  Packet sent out: Yes 09/13/22      Additional Comments:       All patients questions were answered and was instructed to review surgical packet and call back with any questions or concerns.       Sharath Kumar on 9/13/2022 at 8:54 AM

## 2022-09-16 ENCOUNTER — VIRTUAL VISIT (OUTPATIENT)
Dept: ONCOLOGY | Facility: CLINIC | Age: 67
End: 2022-09-16
Payer: MEDICARE

## 2022-09-16 DIAGNOSIS — Z80.0 FAMILY HISTORY OF STOMACH CANCER: ICD-10-CM

## 2022-09-16 DIAGNOSIS — Z80.1 FAMILY HISTORY OF LUNG CANCER: ICD-10-CM

## 2022-09-16 DIAGNOSIS — Z80.41 FAMILY HISTORY OF MALIGNANT NEOPLASM OF OVARY: ICD-10-CM

## 2022-09-16 DIAGNOSIS — Z80.8 FAMILY HISTORY OF NONMELANOMA SKIN CANCER: ICD-10-CM

## 2022-09-16 DIAGNOSIS — Z80.3 FAMILY HISTORY OF MALIGNANT NEOPLASM OF BREAST: ICD-10-CM

## 2022-09-16 DIAGNOSIS — C50.212 MALIGNANT NEOPLASM OF UPPER-INNER QUADRANT OF LEFT BREAST IN FEMALE, ESTROGEN RECEPTOR POSITIVE (H): Primary | ICD-10-CM

## 2022-09-16 DIAGNOSIS — Z17.0 MALIGNANT NEOPLASM OF UPPER-INNER QUADRANT OF LEFT BREAST IN FEMALE, ESTROGEN RECEPTOR POSITIVE (H): Primary | ICD-10-CM

## 2022-09-16 DIAGNOSIS — Z80.6 FAMILY HISTORY OF LEUKEMIA: ICD-10-CM

## 2022-09-16 PROCEDURE — 999N000069 HC STATISTIC GENETIC COUNSELING, < 16 MIN: Mod: TEL

## 2022-09-16 NOTE — PATIENT INSTRUCTIONS
Assessing Cancer Risk  Only about 5-10% of cancers are thought to be due to an inherited cancer susceptibility gene.    These families often have:  Several people with the same or related types of cancer  Cancers diagnosed at a young age (before age 50)  Individuals with more than one primary cancer  Multiple generations of the family affected with cancer    Some people may be candidates for genetic testing of more than one gene.  For these families, genetic testing using a cancer panel may be offered.  These panels will test different genes known to increase the risk for breast, ovarian, uterine, and/or other cancers. All of the genes discussed below have published clinical management guidelines for individuals who are found to carry a mutation. The purpose of this handout is to serve as a brief summary of the genes analyzed by the panels used to inquire about hereditary breast and gynecologic cancer:  GRISEL, BRCA1, BRCA2, BRIP1, CDH1, CHEK2, MLH1, MSH2, MSH6, PMS2, EPCAM, PTEN, PALB2, RAD51C, RAD51D, and TP53.  ______________________________________________________________________________  Hereditary Breast and Ovarian Cancer Syndrome   (BRCA1 and BRCA2)  A single mutation in one of the copies of BRCA1 or BRCA2 increases the risk for breast and ovarian cancer, among others.  The risk for pancreatic cancer and melanoma may also be slightly increased in some families.  The chart below shows the chance that someone with a BRCA mutation would develop cancer in his or her lifetime1,2,3,4.        A person s ethnic background is also important to consider, as individuals of Ashkenazi Scientologist ancestry have a higher chance of having a BRCA gene mutation.  There are three BRCA mutations that occur more frequently in this population.    Blanco Syndrome   (MLH1, MSH2, MSH6, PMS2, and EPCAM)  Currently five genes are known to cause Blanco Syndrome: MLH1, MSH2, MSH6, PMS2, and EPCAM.  A single mutation in one of the Blacno  Syndrome genes increases the risk for colon, endometrial, ovarian, and stomach cancers.  Other cancers that occur less commonly in Blanco Syndrome include urinary tract, skin, and brain cancers.  The chart below shows the chance that a person with Blanco syndrome would develop cancer in his or her lifetime5.      *Cancer risk varies depending on Blanco syndrome gene found    Cowden Syndrome   (PTEN)  Cowden syndrome is a hereditary condition that increases the risk for breast, thyroid, endometrial, colon, and kidney cancer.  Cowden syndrome is caused by a mutation in the PTEN gene.  A single mutation in one of the copies of PTEN causes Cowden syndrome and increases cancer risk.  The chart below shows the chance that someone with a PTEN mutation would develop cancer in their lifetime6,7.  Other benign features seen in some individuals with Cowden syndrome include benign skin lesions (facial papules, keratoses, lipomas), learning disability, autism, thyroid nodules, colon polyps, and larger head size.      *One recent study found breast cancer risk to be increased to 85%    Li-Fraumeni Syndrome   (TP53)  Li-Fraumeni Syndrome (LFS) is a cancer predisposition syndrome caused by a mutation in the TP53 gene. A single mutation in one of the copies of TP53 increases the risk for multiple cancers. Individuals with LFS are at an increased risk for developing cancer at a young age. The lifetime risk for development of a LFS-associated cancer is 50% by age 30 and 90% by age 60.   Core Cancers: Sarcomas, Breast, Brain, Lung, Leukemias/Lymphomas, Adrenocortical carcinomas  Other Cancers: Gastrointestinal, Thyroid, Skin, Genitourinary    Hereditary Diffuse Gastric Cancer   (CDH1)  Currently, one gene is known to cause hereditary diffuse gastric cancer (HDGC): CDH1.  Individuals with HDGC are at increased risk for diffuse gastric cancer and lobular breast cancer. Of people diagnosed with HDGC, 30-50% have a mutation in the CDH1 gene.   This suggests there are likely other genes that may cause HDGC that have not been identified yet.      Lifetime Cancer Risks    General Population HDGC    Diffuse Gastric  <1% ~80%   Breast 12% 39-52%         Additional Genes  GRISEL  GRISEL is a moderate-risk breast cancer gene. Women who have a mutation in GRISEL can have between a 2-4 fold increased risk for breast cancer compared to the general population8. GRISEL mutations have also been associated with increased risk for pancreatic cancer, however an estimate of this cancer risk is not well understood9. Individuals who inherit two GRISEL mutations have a condition called ataxia-telangiectasia (AT).  This rare autosomal recessive condition affects the nervous system and immune system, and is associated with progressive cerebellar ataxia beginning in childhood.  Individuals with ataxia-telangiectasia often have a weakened immune system and have an increased risk for childhood cancers.    PALB2  Mutations in PALB2 have been shown to increase the risk of breast cancer up to 33-58% in some families; where individuals fall within this risk range is dependent upon family [id number]. PALB2 mutations have also been associated with increased risk for pancreatic cancer, although this risk has not been quantified yet.  Individuals who inherit two PALB2 mutations--one from their mother and one from their father--have a condition called Fanconi Anemia.  This rare autosomal recessive condition is associated with short stature, developmental delay, bone marrow failure, and increased risk for childhood cancers.    CHEK2   CHEK2 is a moderate-risk breast cancer gene.  Women who have a mutation in CHEK2 have around a 2-fold increased risk for breast cancer compared to the general population, and this risk may be higher depending upon family history.11,12,13 Mutations in CHEK2 have also been shown to increase the risk of a number of other cancers, including colon and prostate, however these  cancer risks are currently not well understood.    BRIP1, RAD51C and RAD51D  Mutations in BRIP1, RAD51C, and RAD51D have been shown to increase the risk of ovarian cancer and possibly female breast cancer as well14,15 .       Lifetime Cancer Risk    General Population BRIP1 RAD51C RAD51D   Ovarian 1-2% ~5-8% ~5-9% ~7-15%           Inheritance  All of the cancer syndromes reviewed above are inherited in an autosomal dominant pattern.  This means that if a parent has a mutation, each of his or her children will have a 50% chance of inheriting that same mutation.  Therefore, each child--male or female--would have a 50% chance of being at increased risk for developing cancer.      Image obtained from Genetics Home Reference, 2013     Mutations in some genes can occur de kareen, which means that a person s mutation occurred for the first time in them and was not inherited from a parent.  Now that they have the mutation, however, it can be passed on to future generations.    Genetic Testing  Genetic testing involves a blood test and will look at the genetic information in the GRISEL, BRCA1, BRCA2, BRIP1, CDH1, CHEK2, MLH1, MSH2, MSH6, PMS2, EPCAM, PTEN, PALB2, RAD51C, RAD51D, and TP53 genes for any harmful mutations that are associated with increased cancer risk.  If possible, it is recommended that the person(s) who has had cancer be tested before other family members.  That person will give us the most useful information about whether or not a specific gene is associated with the cancer in the family.    Results  There are three possible results of genetic testing:  Positive--a harmful mutation was identified in one or more of the genes  Negative--no mutation was identified in any of the genes on this panel  Variant of unknown significance--a variation in one of the genes was identified, but it is unclear how this impacts cancer risk in the family    Advantages and Disadvantages   There are advantages and disadvantages to  genetic testing.    Advantages  May clarify your cancer risk  Can help you make medical decisions  May explain the cancers in your family  May give useful information to your family members (if you share your results)    Disadvantages  Possible negative emotional impact of learning about inherited cancer risk  Uncertainty in interpreting a negative test result in some situations  Possible genetic discrimination concerns (see below)    Genetic Information Nondiscrimination Act (ADRIEL)  ADRIEL is a federal law that protects individuals from health insurance or employment discrimination based on a genetic test result alone.  Although rare, there are currently no legal discrimination protections in terms of life insurance, long term care, or disability insurances.  Visit the [employer] website to learn more.    Reducing Cancer Risk  All of the genes described above have nationally recognized cancer screening guidelines that would be recommended for individuals who test positive.  In addition to increased cancer screening, surgeries may be offered or recommended to reduce cancer risk.  Recommendations are based upon an individual s genetic test result as well as their personal and family history of cancer.    Questions to Think About Regarding Genetic Testing:  What effect will the test result have on me and my relationship with my family members if I have an inherited gene mutation?  If I don t have a gene mutation?  Should I share my test results, and how will my family react to this news, which may also affect them?  Are my children ready to learn new information that may one day affect their own health?    Hereditary Cancer Resources    FORCE: Facing Our Risk of Cancer Empowered facingourrisk.org   Bright Pink bebrightpink.org   Li-Fraumeni Syndrome Association lfsassociation.org   PTEN World PTENworld.[employer]   No stomach for cancer, Inc. nostomachforcancer.org   Stomach cancer relief network  Scrnet.org   Collaborative Group of the Americas on Inherited Colorectal Cancer (CGA) cgaicc.com    Cancer Care cancercare.org   American Cancer Society (ACS) cancer.org   National Cancer War (NCI) cancer.gov     Please call us if you have any questions or concerns.   Cancer Risk Management Program [phone] ([phone])  Aurelio Perez, MS [facility]  [phone]  Gregoria Sourav, MS, [facility] [phone]  Selena Lincoln, MS, [facility]  [phone]  Janice Olmstead, MS, [facility]  [phone]  Nubia Rito, MS, [facility]  [phone]  Evelyn Armas, MS, [facility] [phone]  Lourdes Umaña, MS, [facility] [phone]    References  Kelby Mcnair PDP, Marbella S, Aleshia MOLINA, Brenda JE, Junaid JL, Larry N, Lexis H, Liyah O, Jonnie A, Antonio B, Abel P, Mannicolas S, Ingrid DM, Hector N, Sundeep E, Yon H, Zamora E, Lubinski J, Gronwald J, Karan B, Deloris H, Thorlacius S, Eerola H, Demetrio H, Constantine K, Shayna OP. Average risks of breast and ovarian cancer associated with BRCA1 or BRCA2 mutations detected in case series unselected for family history: a combined analysis of 222 studies. Am J Hum Laura. 2003;72:1117-30.  Latasha N, Loretta M, Nimo G.  BRCA1 and BRCA2 Hereditary Breast and Ovarian Cancer. Gene Reviews online. 2013.  Abdullahi YC, Harry S, Yanira G, Alfaro S. Breast cancer risk among male BRCA1 and BRCA2 mutation carriers. J Natl Cancer Inst. 2007;99:1811-4.  Ashish SAHU, Dayan I, Ron J, Lexi E, Ramiro ER, Edwar F. Risk of breast cancer in male BRCA2 carriers. J Med Laura. 2010;47:710-1.  National Comprehensive Cancer Network. Clinical practice guidelines in oncology, colorectal cancer screening. Available online (registration required). 2015.  Brant SHAFFER, Blake J, Casa J, Clara LA, Ilana MS, Eng C. Lifetime cancer risks in individuals with germline PTEN mutations. Clin Cancer Res. 2012;18:400-7.  Polo LYNNE. Cowden Syndrome: A Critical Review of the Clinical Literature. J Laura .  2009:18:13-27.  Katlyn A, Gerald D, Helen S, Yanelis P, Mode T, Susanne M, Ha B, Jose Alberto H, Giorgio R, Malcolm K, Mike L, Ashish DG, Ingrid D, Bertin DF, Dominic MR, The Breast Cancer Susceptibility Collaboration () & Giselle WINTERS. GRISEL mutations that cause ataxia-telangiectasia are breast cancer susceptibility alleles. Nature Genetics. 2006;38:873-875  Uli N , Farooq Y, Trinity J, Jordan L, Lopez GM , Belen ML, Gallinger S, Black AG, Syngal S, Rigoberto ML, Hu J , Paul R, Kenneth SZ, Isabela JR, Joshua VE, Louis M, Vogeorges B, Lee N, Jessica RH, Jeniffer KW, and Wilbur AP. GRISEL mutations in patients with hereditary pancreatic cancer. Cancer Discover. 2012;2:41-46  Giancarlo SIMMONS., et al. Breast-Cancer Risk in Families with Mutations in PALB2. NEJM. 2014; 371(6):497-506.  CHEK2 Breast Cancer Case-Control Consortium. CHEK2*1100delC and susceptibility to breast cancer: A collaborative analysis involving 10,860 breast cancer cases and 9,065 controls from 10 studies. Am J Hum Laura, 74 (2004), pp. 8873-4041  Yanna T, Kota S, Rola K, et al. Spectrum of Mutations in BRCA1, BRCA2, CHEK2, and TP53 in Families at High Risk of Breast Cancer. VERITO. 2006;295(12):7785-7711.   Mariluz C, José D, Sherry A, et al. Risk of breast cancer in women with a CHEK2 mutation with and without a family history of breast cancer. J Clin Oncol. 2011;29:5387-0262.  Jason H, Oenyda E, Pasha SJ, et al. Contribution of germline mutations in the RAD51B, RAD51C, and RAD51D genes to ovarian cancer in the population. J Clin Oncol. 2015;33(26):9370-8089. Doi:10.1200/JCO.2015.61.2408.  Lizzie Bowseron DF, Bea P, et al. Mutations in BRIP1 confer high risk of ovarian cancer. [place] Laura. 2011;43(11):5166-4468. doi:10.1038/ng.955.

## 2022-09-16 NOTE — PROGRESS NOTES
9/16/2022    Referring Provider: Chandrika Horvath MD    Presenting Information:   I met with Vickie Alvarado for a follow-up visit today. She had previously been seen through the Cancer Risk Management Program on 9/2/2022. Vickie is here today to discuss genetic testing options and logistics.     Discussion:    We previously reviewed the details of Vickie's personal and family history. Please see the clinic note from our previous appointment on 9/2/2022 for details.     We reviewed genetic testing options for Vickie based on her personal and family history: Vickie expressed interest in the Hereditary Cancer Comprehensive 40-gene panel through [facility].     Genetic testing is available for 40 genes associated with hereditary cancer: APC, GRISEL, AXIN2, BAP1, BARD1, BMPR1A, BRCA1, BRCA2, BRIP1, CDH1, CDK4, CDKN2A, CHEK2, DICER1, EPCAM, GREM1, HOXB13, MITF, MLH1, MRE11, MSH2, MSH3, MSH6, MUTYH, NBN, NF1, NTHL1, PALB2, PMS2, POLD1, POLE, POT1, PTEN, RAD50, RAD51C, RAD51D, SMAD4, SMARCA4, STK11, and TP53).    We discussed that many of the genes in the panel are associated with specific hereditary cancer syndromes and published management guidelines: Hereditary Breast and Ovarian Cancer syndrome (BRCA1, BRCA2), Blanco syndrome (MLH1, MSH2, MSH6, PMS2, EPCAM), Familial Adenomatous Polyposis (APC), Hereditary Diffuse Gastric Cancer (CDH1), Familial Atypical Multiple Mole Melanoma syndrome (CDK4, CDKN2A), Juvenile Polyposis syndrome (BMPR1A, SMAD4), Cowden syndrome (PTEN), Li Fraumeni syndrome (TP53), Peutz-Jeghers syndrome (STK11), MUTYH Associated Polyposis (MUTYH), and Neurofibromatosis type 1 (NF1).     The GRISEL, AXIN2, BRIP1, CHEK2, GREM1, MSH3, NBN, NTHL1, PALB2, POLD1, POLE, RAD51C, and RAD51D genes are associated with increased cancer risk and have published management guidelines for certain cancers.      The remaining genes (BAP1, BARD1, DICER1, HOXB13, MITF, MRE11,  POT1, RAD50, and SMARCA4) are associated with increased cancer risk and may allow us to make medical recommendations when mutations are identified.      Vickie's blood has already been drawn for testing and is currently at the lab.    Verbal consent was given over the phone and written on the consent form. Turnaround time is approximately 4 weeks.    Medical Management: For Vickie, we reviewed that the information from genetic testing may determine:    surgery to treat Vickie's active cancer diagnosis (i.e. lumpectomy versus bilateral mastectomy, etc.),    additional cancer screening for which Vickie may qualify (i.e. mammogram and breast MRI, more frequent colonoscopies, more frequent dermatologic exams, etc.),    options for risk reducing surgeries Vickie could consider (i.e. surgery to remove the ovaries and/or uterus, etc.),      and targeted chemotherapies for Vickie's active cancer, or if she were to develop certain cancers in the future (i.e. immunotherapy for individuals with Blanco syndrome, PARP inhibitors, etc.).     These recommendations and possible targeted chemotherapies will be discussed in detail once genetic testing is completed.     Plan:  1) Today Vickie elected to proceed with the Hereditary Cancer Comprehensive 40-gene panel through [facility]. Therefore, consent was reviewed and verbally obtained for this testing.   2) Vickie's blood has already been drawn for testing and is currently at the lab.  3) The results should be available in 4 weeks.  4) Vickie will either have a telephone visit or video visit to discuss the results.  Additional recommendations about screening will be made at that time.    Time spent over phone: 14 minutes    Gregoria Benjamin MS, [facility]  Licensed, Certified Genetic Counselor  Phone: [phone]

## 2022-09-16 NOTE — LETTER
Cancer Risk Management  Program Locations    [facility]  [facility]  [facility]  [facility]  [facility]  Mailing Address  Cancer Risk Management Program  [facility]  [address]  [place] [address]    New patient appointments  [phone]  September 16, 2022    Vickie Alvarado  [address]  [place] [address]    Dear Vickie,    It was a pleasure talking with you via your virtual genetic counseling visit on 9/16/2022.  Below is a copy of the progress note from that recent visit through the Cancer Risk Management Program.  If you have any additional questions, please feel free to contact me.    Referring Provider: Chandrika Horvath MD    Presenting Information:   I met with Vickie Alvarado for a follow-up visit today. She had previously been seen through the Cancer Risk Management Program on 9/2/2022. Vickie is here today to discuss genetic testing options and logistics.     Discussion:    We previously reviewed the details of Vickie's personal and family history. Please see the clinic note from our previous appointment on 9/2/2022 for details.     We reviewed genetic testing options for Vickie based on her personal and family history: Vickie expressed interest in the Hereditary Cancer Comprehensive 40-gene panel through [facility].     Genetic testing is available for 40 genes associated with hereditary cancer: APC, GRISEL, AXIN2, BAP1, BARD1, BMPR1A, BRCA1, BRCA2, BRIP1, CDH1, CDK4, CDKN2A, CHEK2, DICER1, EPCAM, GREM1, HOXB13, MITF, MLH1, MRE11, MSH2, MSH3, MSH6, MUTYH, NBN, NF1, NTHL1, PALB2, PMS2, POLD1, POLE, POT1, PTEN, RAD50, RAD51C, RAD51D, SMAD4, SMARCA4, STK11, and TP53).    We discussed that many of the genes in the panel are associated with specific hereditary cancer syndromes and  published management guidelines: Hereditary Breast and Ovarian Cancer syndrome (BRCA1, BRCA2), Blanco syndrome (MLH1, MSH2, MSH6, PMS2, EPCAM), Familial Adenomatous Polyposis (APC), Hereditary Diffuse Gastric Cancer (CDH1), Familial Atypical Multiple Mole Melanoma syndrome (CDK4, CDKN2A), Juvenile Polyposis syndrome (BMPR1A, SMAD4), Cowden syndrome (PTEN), Li Fraumeni syndrome (TP53), Peutz-Jeghers syndrome (STK11), MUTYH Associated Polyposis (MUTYH), and Neurofibromatosis type 1 (NF1).     The GRISEL, AXIN2, BRIP1, CHEK2, GREM1, MSH3, NBN, NTHL1, PALB2, POLD1, POLE, RAD51C, and RAD51D genes are associated with increased cancer risk and have published management guidelines for certain cancers.      The remaining genes (BAP1, BARD1, DICER1, HOXB13, MITF, MRE11, POT1, RAD50, and SMARCA4) are associated with increased cancer risk and may allow us to make medical recommendations when mutations are identified.      Sherrells blood has already been drawn for testing and is currently at the lab.    Verbal consent was given over the phone and written on the consent form. Turnaround time is approximately 4 weeks.    Medical Management: For Vickie, we reviewed that the information from genetic testing may determine:    surgery to treat Vickie's active cancer diagnosis (i.e. lumpectomy versus bilateral mastectomy, etc.),    additional cancer screening for which Vickie may qualify (i.e. mammogram and breast MRI, more frequent colonoscopies, more frequent dermatologic exams, etc.),    options for risk reducing surgeries Vickie could consider (i.e. surgery to remove the ovaries and/or uterus, etc.),      and targeted chemotherapies for Sherrells active cancer, or if she were to develop certain cancers in the future (i.e. immunotherapy for individuals with Blanco syndrome, PARP inhibitors, etc.).     These recommendations and possible targeted chemotherapies will be discussed in detail once genetic testing is completed.     Plan:  1)  Today Vickie elected to proceed with the Hereditary Cancer Comprehensive 40-gene panel through [facility]'s [facility]. Therefore, consent was reviewed and verbally obtained for this testing.   2) Vickie's blood has already been drawn for testing and is currently at the lab.  3) The results should be available in 4 weeks.  4) Vickie will either have a telephone visit or video visit to discuss the results.  Additional recommendations about screening will be made at that time.    Time spent over phone: 14 minutes    Gregoria Benjamin MS, [facility]  Licensed, Certified Genetic Counselor  Phone: [phone]

## 2022-09-23 ENCOUNTER — PATIENT OUTREACH (OUTPATIENT)
Dept: ONCOLOGY | Facility: CLINIC | Age: 67
End: 2022-09-23

## 2022-09-27 ENCOUNTER — MYC MEDICAL ADVICE (OUTPATIENT)
Dept: FAMILY MEDICINE | Facility: CLINIC | Age: 67
End: 2022-09-27

## 2022-09-27 DIAGNOSIS — M80.00XA AGE-RELATED OSTEOPOROSIS WITH CURRENT PATHOLOGICAL FRACTURE, INITIAL ENCOUNTER: Primary | ICD-10-CM

## 2022-09-27 DIAGNOSIS — M80.08XA AGE-RELATED OSTEOPOROSIS WITH CURRENT PATHOLOGICAL FRACTURE, VERTEBRA(E), INITIAL ENCOUNTER FOR FRACTURE (H): ICD-10-CM

## 2022-09-27 RX ORDER — ALENDRONATE SODIUM 70 MG/1
70 TABLET ORAL
Qty: 12 TABLET | Refills: 3 | Status: SHIPPED | OUTPATIENT
Start: 2022-09-27 | End: 2023-01-04

## 2022-09-27 NOTE — TELEPHONE ENCOUNTER
Cr and calcium just drawn two weeks ago    DXA to see how she's doing on Fosamax x 1 year      Refill of Fosamax- needs to continue    Recheck labs in 3-6 months

## 2022-10-06 ENCOUNTER — OFFICE VISIT (OUTPATIENT)
Dept: FAMILY MEDICINE | Facility: CLINIC | Age: 67
End: 2022-10-06
Payer: MEDICARE

## 2022-10-06 VITALS
HEIGHT: 66 IN | DIASTOLIC BLOOD PRESSURE: 80 MMHG | HEART RATE: 88 BPM | WEIGHT: 191.4 LBS | OXYGEN SATURATION: 98 % | SYSTOLIC BLOOD PRESSURE: 126 MMHG | BODY MASS INDEX: 30.76 KG/M2 | TEMPERATURE: 97.9 F

## 2022-10-06 DIAGNOSIS — I49.3 PVC'S (PREMATURE VENTRICULAR CONTRACTIONS): Chronic | ICD-10-CM

## 2022-10-06 DIAGNOSIS — D72.810 LYMPHOPENIA: ICD-10-CM

## 2022-10-06 DIAGNOSIS — Z01.818 PREOP GENERAL PHYSICAL EXAM: Primary | ICD-10-CM

## 2022-10-06 DIAGNOSIS — D64.9 NORMOCYTIC ANEMIA: ICD-10-CM

## 2022-10-06 DIAGNOSIS — C50.212 MALIGNANT NEOPLASM OF UPPER-INNER QUADRANT OF LEFT BREAST IN FEMALE, ESTROGEN RECEPTOR POSITIVE (H): ICD-10-CM

## 2022-10-06 DIAGNOSIS — Z17.0 MALIGNANT NEOPLASM OF UPPER-INNER QUADRANT OF LEFT BREAST IN FEMALE, ESTROGEN RECEPTOR POSITIVE (H): ICD-10-CM

## 2022-10-06 DIAGNOSIS — E78.2 MIXED HYPERLIPIDEMIA: Chronic | ICD-10-CM

## 2022-10-06 DIAGNOSIS — I35.0 AORTIC VALVE STENOSIS, ETIOLOGY OF CARDIAC VALVE DISEASE UNSPECIFIED: Chronic | ICD-10-CM

## 2022-10-06 DIAGNOSIS — R82.90 CLOUDY URINE: ICD-10-CM

## 2022-10-06 LAB
ALBUMIN UR-MCNC: NEGATIVE MG/DL
ANION GAP SERPL CALCULATED.3IONS-SCNC: 10 MMOL/L (ref 7–15)
APPEARANCE UR: CLEAR
BACTERIA #/AREA URNS HPF: ABNORMAL /HPF
BASOPHILS # BLD AUTO: 0 10E3/UL (ref 0–0.2)
BASOPHILS NFR BLD AUTO: 1 %
BILIRUB UR QL STRIP: NEGATIVE
BUN SERPL-MCNC: 10.2 MG/DL (ref 8–23)
CALCIUM SERPL-MCNC: 9.1 MG/DL (ref 8.8–10.2)
CHLORIDE SERPL-SCNC: 104 MMOL/L (ref 98–107)
COLOR UR AUTO: YELLOW
CREAT SERPL-MCNC: 0.6 MG/DL (ref 0.51–0.95)
DEPRECATED HCO3 PLAS-SCNC: 26 MMOL/L (ref 22–29)
EOSINOPHIL # BLD AUTO: 0.2 10E3/UL (ref 0–0.7)
EOSINOPHIL NFR BLD AUTO: 4 %
ERYTHROCYTE [DISTWIDTH] IN BLOOD BY AUTOMATED COUNT: 16 % (ref 10–15)
GFR SERPL CREATININE-BSD FRML MDRD: >90 ML/MIN/1.73M2
GLUCOSE SERPL-MCNC: 100 MG/DL (ref 70–99)
GLUCOSE UR STRIP-MCNC: NEGATIVE MG/DL
HCT VFR BLD AUTO: 38.3 % (ref 35–47)
HGB BLD-MCNC: 12 G/DL (ref 11.7–15.7)
HGB UR QL STRIP: ABNORMAL
IMM GRANULOCYTES # BLD: 0 10E3/UL
IMM GRANULOCYTES NFR BLD: 0 %
KETONES UR STRIP-MCNC: NEGATIVE MG/DL
LEUKOCYTE ESTERASE UR QL STRIP: NEGATIVE
LYMPHOCYTES # BLD AUTO: 0.5 10E3/UL (ref 0.8–5.3)
LYMPHOCYTES NFR BLD AUTO: 14 %
MCH RBC QN AUTO: 31.2 PG (ref 26.5–33)
MCHC RBC AUTO-ENTMCNC: 31.3 G/DL (ref 31.5–36.5)
MCV RBC AUTO: 100 FL (ref 78–100)
MONOCYTES # BLD AUTO: 0.4 10E3/UL (ref 0–1.3)
MONOCYTES NFR BLD AUTO: 10 %
NEUTROPHILS # BLD AUTO: 2.6 10E3/UL (ref 1.6–8.3)
NEUTROPHILS NFR BLD AUTO: 70 %
NITRATE UR QL: NEGATIVE
PH UR STRIP: 6 [PH] (ref 5–8)
PLATELET # BLD AUTO: 235 10E3/UL (ref 150–450)
POTASSIUM SERPL-SCNC: 3.9 MMOL/L (ref 3.4–5.3)
RBC # BLD AUTO: 3.85 10E6/UL (ref 3.8–5.2)
RBC #/AREA URNS AUTO: ABNORMAL /HPF
SODIUM SERPL-SCNC: 140 MMOL/L (ref 136–145)
SP GR UR STRIP: 1.01 (ref 1–1.03)
SQUAMOUS #/AREA URNS AUTO: ABNORMAL /LPF
UROBILINOGEN UR STRIP-ACNC: 0.2 E.U./DL
WBC # BLD AUTO: 3.7 10E3/UL (ref 4–11)
WBC #/AREA URNS AUTO: ABNORMAL /HPF

## 2022-10-06 PROCEDURE — 81001 URINALYSIS AUTO W/SCOPE: CPT | Performed by: STUDENT IN AN ORGANIZED HEALTH CARE EDUCATION/TRAINING PROGRAM

## 2022-10-06 PROCEDURE — 85025 COMPLETE CBC W/AUTO DIFF WBC: CPT | Performed by: STUDENT IN AN ORGANIZED HEALTH CARE EDUCATION/TRAINING PROGRAM

## 2022-10-06 PROCEDURE — 36415 COLL VENOUS BLD VENIPUNCTURE: CPT | Performed by: STUDENT IN AN ORGANIZED HEALTH CARE EDUCATION/TRAINING PROGRAM

## 2022-10-06 PROCEDURE — G0452 MOLECULAR PATHOLOGY INTERPR: HCPCS | Mod: 26 | Performed by: PATHOLOGY

## 2022-10-06 PROCEDURE — 99214 OFFICE O/P EST MOD 30 MIN: CPT | Mod: GC | Performed by: STUDENT IN AN ORGANIZED HEALTH CARE EDUCATION/TRAINING PROGRAM

## 2022-10-06 PROCEDURE — 80048 BASIC METABOLIC PNL TOTAL CA: CPT | Performed by: STUDENT IN AN ORGANIZED HEALTH CARE EDUCATION/TRAINING PROGRAM

## 2022-10-06 NOTE — PROGRESS NOTES
[address] [address]  Phone: [phone]  Fax: [phone]  Primary Provider: Gaby Lynn  Pre-op Performing Provider: LORENA MERA    {Provider  Link to PREOP SmartSet  Use this to apply standard patient instructions to AVS; includes medication directions, common orders, guidelines for anemia, warfarin, additional testing   :582095}  PREOPERATIVE EVALUATION:  Today's date: 10/6/2022    Vickie Alvarado is a 67 year old female who presents for a preoperative evaluation.    Surgical Information:  Surgery/Procedure: Left breast lumpectomy  Surgery Location: [facility]  Surgeon: Dr. Alphonso CONNOR  Surgery Date: 10/17/22  Time of Surgery: 7AM  Where patient plans to recover: {Preop post recovery plans :382643}  Fax number for surgical facility: {SURGERY FAX NUMBER:[id number]}    Type of Anesthesia Anticipated: {ANESTHESIA:942523}    {2021 Provider Charting Preference for Preop :912462}    Subjective     HPI related to upcoming procedure: ***    Preop Questions 10/3/2022   1. Have you ever had a heart attack or stroke? No   2. Have you ever had surgery on your heart or blood vessels, such as a stent placement, a coronary artery bypass, or surgery on an artery in your head, neck, heart, or legs? No   3. Do you have chest pain with activity? No   4. Do you have a history of  heart failure? No   5. Do you currently have a cold, bronchitis or symptoms of other infection? No   6. Do you have a cough, shortness of breath, or wheezing? No   7. Do you or anyone in your family have previous history of blood clots? No   8. Do you or does anyone in your family have a serious bleeding problem such as prolonged bleeding following surgeries or cuts? No   9. Have you ever had problems with anemia or been told to take iron pills? No   10. Have you had any abnormal blood loss such as black, tarry or bloody stools, or abnormal vaginal bleeding? No   11. Have you ever had  a blood transfusion? No   12. Are you willing to have a blood transfusion if it is medically needed before, during, or after your surgery? Yes   13. Have you or any of your relatives ever had problems with anesthesia? No   14. Do you have sleep apnea, excessive snoring or daytime drowsiness? No   15. Do you have any artifical heart valves or other implanted medical devices like a pacemaker, defibrillator, or continuous glucose monitor? No   16. Do you have artificial joints? No   17. Are you allergic to latex? No       Health Care Directive:  Patient does not have a Health Care Directive or Living Will: {ADVANCE_DIRECTIVE_STATUS:823334}    Preoperative Review of :  {Mnpmpreport:719050}  {Review MNPMP for all patients per [facility] MNPMP Profile:044969}    {Chronic problem details (Optional) :194880}    Review of Systems  {ROS Preop Choices:157265}    Patient Active Problem List    Diagnosis Date Noted     Encounter for antineoplastic chemotherapy 07/06/2022     Priority: Medium     Malignant neoplasm of upper-inner quadrant of left breast in female, estrogen receptor positive (H) 06/13/2022     Priority: Medium     Aortic stenosis 12/02/2021     Priority: Medium     Echo 10/19/21 - Moderate, paradoxical low-flow, aortic stenosis is present. Global and regional left ventricular function is normal with an EF of 60-65%. Global right ventricular function is normal. IVC diameter <2.1 cm collapsing >50% with sniff suggests a normal RA pressure of 3 mmHg. There is no prior study for direct comparison.       Age-related osteoporosis with current pathological fracture, initial encounter 09/28/2021     Priority: Medium     Fosamax started 9/2021, requires DXA 1-2 years, presumed bisphosphate course through 2026       Toenail fungus 01/31/2017     Priority: Medium     History of colonic polyps 06/15/2016     Priority: Medium     Colonoscopy 5/16. Repeat in 5 years (2021)       Diverticular disease of large intestine 05/26/2016      Priority: Medium     Family history of cardiomyopathy 2016     Priority: Medium     Family history of malignant neoplasm of breast 2015     Priority: Medium     Sister, post menopausal, .       Tinnitus 2013     Priority: Medium     Senile nuclear sclerosis 10/26/2012     Priority: Medium     Tear film insufficiency 10/26/2012     Priority: Medium     Hyperlipidemia 10/26/2012     Priority: Medium     Regular astigmatism 10/26/2012     Priority: Medium     Myopia 10/26/2012     Priority: Medium     After-cataract 10/26/2012     Priority: Medium     Presbyopia 10/26/2012     Priority: Medium     Pupillary abnormalities 10/26/2012     Priority: Medium     Diplopia 10/26/2012     Priority: Medium     Visual field defect 10/26/2012     Priority: Medium     Vitreous membranes and strands 10/26/2012     Priority: Medium     [facility] 10/26/2012     Priority: Medium     Tier 1  DX V65.8 REPLACED WITH [facility] (2013)       PVC's (premature ventricular contractions) 02/15/2022     Priority: Low     Class 1 obesity due to excess calories without serious comorbidity with body mass index (BMI) of 30.0 to 30.9 in adult 02/15/2022     Priority: Low      Past Medical History:   Diagnosis Date     Aortic stenosis      Hyperlipidemia      Insufficiency fracture of tibia, sequela 2021     Polyp of colon 2016     Past Surgical History:   Procedure Laterality Date     CATARACT IOL, RT/LT Right      HC YAG LASER CAPSULOTOMY Right      LASER VITREOLYSIS, ANTERIOR OD (RIGHT EYE) Right      REPOSITION INTRAOCULAR LENS Right 2014    Procedure: REPOSITION INTRAOCULAR LENS;  Surgeon: Vickie Guerra MD;  Location: [facility]     VITRECTOMY PARSPLANA WITH 23 GAUGE SYSTEM Right 2014    Procedure: VITRECTOMY PARSPLANA WITH 23 GAUGE SYSTEM;  Surgeon: Vickie Guerra MD;  Location: [facility]     Current Outpatient Medications   Medication Sig  Dispense Refill     alendronate (FOSAMAX) 70 MG tablet Take 1 tablet (70 mg) by mouth every 7 days 12 tablet 3     alendronate (FOSAMAX) 70 MG tablet Take 1 tablet (70 mg) by mouth every 7 days 12 tablet 0     calcium carb-cholecalciferol 600-500 MG-UNIT CAPS        dexamethasone (DECADRON) 4 MG tablet Take 2 tablets (8 mg) by mouth 2 times daily (with meals) for 3 doses Start evening of Docetaxel infusion and continue for a total of 3 doses. 6 tablet 3     Lactobacillus (PROBIOTIC CHILDRENS) CHEW        loratadine (CLARITIN) 10 MG tablet Take 10 mg by mouth daily Prn, seasonal for ragweed and lilacs       metoprolol succinate ER (TOPROL XL) 25 MG 24 hr tablet Take 1 tablet (25 mg) by mouth daily 90 tablet 3     mupirocin (BACTROBAN) 2 % external ointment Apply topically 2 times daily 22 g 0     Omega-3 Fatty Acids (FISH OIL ADULT GUMMIES PO) Take 250 mg by mouth       ondansetron (ZOFRAN) 8 MG tablet Take 1 tablet (8 mg) by mouth every 8 hours as needed for nausea (vomiting) 30 tablet 2     prochlorperazine (COMPAZINE) 10 MG tablet Take 0.5 tablets (5 mg) by mouth every 6 hours as needed for nausea or vomiting 30 tablet 2     prochlorperazine (COMPAZINE) 5 MG tablet        simvastatin (ZOCOR) 40 MG tablet Take by mouth every 24 hours       simvastatin (ZOCOR) 40 MG tablet Take 1 tablet (40 mg) by mouth At Bedtime 90 tablet 3       Allergies   Allergen Reactions     Bactrim [Sulfamethoxazole W/Trimethoprim] Nausea     Headache     Seasonal Allergies      Typhoid Vaccines Nausea and Vomiting     Fever, vice- h/a, body aches.  Was hospitalized for 2d.        Social History     Tobacco Use     Smoking status: Never Smoker     Smokeless tobacco: Never Used   Substance Use Topics     Alcohol use: Yes     Comment: 4 drinks per month     {FAMILY HISTORY (Optional):110349624}  History   Drug Use No         Objective     LMP 05/17/2011     Physical Exam  {EXAM Preop Choices:009888}    Recent Labs   Lab Test  "22  1012 22  0925 21  1543 21  0800   HGB 9.9* 11.6*   < > 13.8    272   < > 245    140   < > 141   POTASSIUM 3.6 3.5   < > 4.0   CR 0.63 0.58   < > 0.71   A1C  --   --   --  5.4    < > = values in this interval not displayed.        Diagnostics:  {LABS:430603}   {EK}    Revised Cardiac Risk Index (RCRI):  The patient has the following serious cardiovascular risks for perioperative complications:  {PREOP REVISED CARDIAC RISK INDEX (RCRI) :790970::\" - No serious cardiac risks = 0 points\"}     RCRI Interpretation: {REVISED CARDIAC RISK INTERPRETATION :198764}         Signed Electronically by: Karolina Rivera MD  Copy of this evaluation report is provided to requesting physician.    {Provider Resources  Preop [facility] Preop Guidelines  Revised Cardiac Risk Index :766212}    "

## 2022-10-06 NOTE — Clinical Note
Hi there,  Doing pre-op for this pt and wanted to run something by you. Has h/o moderate aortic stenosis, last echo nov 2021, saw you in mar 2022 and you said to follow-up in one year w/ repeat echo in one year. She developed breast cancer in may 2022, did chemo July-sept; and now is doing preop for lumpectomy 10/17. Per pre-op care map, looks ok to proceed from CV standpoint w/o further work-up,but wondering whether you think repeat echo (or other workup) is warranted prior to lumpectomy vs ok to wait til after?  -Karolina

## 2022-10-06 NOTE — PROGRESS NOTES
Preceptor Attestation:  Patient seen and evaluated in person. I discussed the patient with the resident. I have verified the content of the note, which accurately reflects my assessment of the patient and the plan of care.   Supervising Physician:  Silvia Curry DO

## 2022-10-06 NOTE — H&P (VIEW-ONLY)
[address]  [address] [place] [id number]  Phone: [phone]  Fax: [phone]  Primary Provider: Gaby Lynn  Pre-op Performing Provider: LORENA MERA      PREOPERATIVE EVALUATION:  Today's date: 10/6/2022    Vickie Alvarado is a 67 year old female who presents for a preoperative evaluation.    Surgical Information:  Surgery/Procedure: Left Breast lumpectomy  Surgery Location: [facility]  Surgeon: Alphonso Cadet  Surgery Date: 10/17/2022  Time of Surgery: 7:00am  Where patient plans to recover: At home with family live alone, but niece will come stay for 24 hours after surgery  Fax number for surgical facility: See in [facility]    Type of Anesthesia Anticipated: General    Assessment & Plan     The proposed surgical procedure is considered LOW risk.    Preop general physical exam. The following chronic, acute, and subacute issues were also addressed in the context of this visit.     Aortic valve stenosis, moderate  Hyperlipidemia   PVCs   Last echo 11/3/2021. Last saw cardiology Mar 2022, at which point PVCs were stable/astymptomatic on metoprolol and aortic stenosis was also stable with plan to repeat echo in one year. Per PreOp Care Map, a patient with a previously documented murmur who's has moderate-severe cardiac disease and has had an echo within the pas t12 months may proceed to the OR without any further work-up.     Because she has a new diagnosis of breast cancer and has undergone chemo since her last visit with Cardiology, and because her last echo was now >11 months ago, will reach out to cardiology to ensure they approve of proceeding with lumpectomy.     10/11/22 Confirmed with Cardiologist Dr. Nicole Brooke via Staff Message. Okay to proceed with surgery from cardiac standpoint without any further workup.     Malignant neoplasm of upper-inner quadrant of left breast in female, estrogen receptor positive (H)  Normocytic anemia,  resolved  Lymphocytopenia, mild, new  Staff messaged Oncologist Jessy Molina re: new mild lymphocytopenia, asking whether repeat CBC is warranted prior to surgery. It is not. Okay to proceed with surgery despite mild lymphocytopenia.     Cloudy urine   Back pain  Per patient report. UA is largely unremarkable with the exception of trace blood, but 0-2 RBCs on microscopy. She thinks back pain is from laying around more recently d/t recent covid shot, which makes sense. Uncertain why urine is cloudy, but this won't prevent her from proceeding to surgery.     Dizzy, resolved  Also obtained BMP given recent chemo and a one-time dizzy spell to rule-out electrolyte abnormalities. (BMP is normal.)      Risks and Recommendations:  The patient has the following additional risks and recommendations for perioperative complications:  Cardiovascular:   - Reaching out to Cardiology (see above). Per Pre-Op CareMap guidelines, okay to proceed from CV standpoint. Moreover, is low-risk procedure. If I hear otherwise from Cardiology, will alert surgeon promptly (prior to 10/17 surgery).     Medication Instructions:  Patient is to take all scheduled medications on the day of surgery EXCEPT for vitamins/minerals/herbal supplements (hold for 14d before surgery).     RECOMMENDATION:  APPROVAL GIVEN to proceed with proposed procedure, without further diagnostic evaluation.      Subjective     HPI related to upcoming procedure:   Family h/o breast cancer- both sisters.   3D mammography annually. Last was in July 2021-- normal.   In May 2022 felt a lump on left breast-- found to be malignant.   S/p 4 rounds chemotherapy; last ended 9/8.  Neoadjuvant chemotherapy initiated on 07/08/22: docetaxel, cyclophosphamide with neulasta.  Waiting 5 weeks for lumpectomy to regain some energy. Feeling a bit more energized. Doing eliptical daily.   Had genetic testing that was negative.     Going to do radiation as well.   Has cardiac hx. PVCs and  aortic stensois. Recent cardiac mri and mra. Follows with cards. Next due in mar 2023.    Preop Questions 10/3/2022   1. Have you ever had a heart attack or stroke? No, new heart history diagnosed  PVCs and aortic stenosis, sees cardiologist   2. Have you ever had surgery on your heart or blood vessels, such as a stent placement, a coronary artery bypass, or surgery on an artery in your head, neck, heart, or legs? No   3. Do you have chest pain with activity? No (does elliptical to strengthen for surgery)   4. Do you have a history of  heart failure? No (parents  of congestive heart failure in their 80s)   5. Do you currently have a cold, bronchitis or symptoms of other infection? No (sniffles with allergies)   6. Do you have a cough, shortness of breath, or wheezing? No   7. Do you or anyone in your family have previous history of blood clots? No   8. Do you or does anyone in your family have a serious bleeding problem such as prolonged bleeding following surgeries or cuts? No   9. Have you ever had problems with anemia or been told to take iron pills? No   10. Have you had any abnormal blood loss such as black, tarry or bloody stools, or abnormal vaginal bleeding? No   11. Have you ever had a blood transfusion? No   12. Are you willing to have a blood transfusion if it is medically needed before, during, or after your surgery? Yes   13. Have you or any of your relatives ever had problems with anesthesia? No    14. Do you have sleep apnea, excessive snoring or daytime drowsiness? No   15. Do you have any artifical heart valves or other implanted medical devices like a pacemaker, defibrillator, or continuous glucose monitor? No   16. Do you have artificial joints? No   17. Are you allergic to latex? No     Got booster 10/3/2022 told to wait one month for flu shot- pfizer.?  Ache in back, cloudy foamy urine  Laying around more lately b/c of recent covid shot on Monday   Health Care Directive:  Patient does  not have a Health Care Directive or Living Will: Patient states has Advance Directive and will bring in a copy to clinic.   Drafted in [employer], will review prior to surgery, needs to find notary    Preoperative Review of :   reviewed - no record of controlled substances prescribed.    Status of Chronic Conditions:  See problem list for active medical problems.  Problems all longstanding and stable, except as noted/documented.  See ROS for pertinent symptoms related to these conditions.      Review of Systems  CONSTITUTIONAL: NEGATIVE for fever,   lots of sweating with chemo  Lost 10 lbs since starting chemo    chills, change in weight  INTEGUMENTARY/SKIN: NEGATIVE for worrisome rashes, moles or lesions  EYES: NEGATIVE for vision changes or irritation  ENT/MOUTH: NEGATIVE for ear, mouth and throat problems  RESP: NEGATIVE for significant cough or SOB  CV: NEGATIVE for chest pain, palpitations or peripheral edema  GI: NEGATIVE for nausea, abdominal pain, heartburn, or change in bowel habits  : NEGATIVE for frequency, dysuria, or hematuria  MUSCULOSKELETAL: NEGATIVE for significant arthralgias or myalgia  NEURO: NEGATIVE for weakness, dizziness or paresthesias  ENDOCRINE: NEGATIVE for temperature intolerance, skin/hair changes  HEME: NEGATIVE for bleeding problems  PSYCHIATRIC: NEGATIVE for changes in mood or affect    Patient Active Problem List    Diagnosis Date Noted     Encounter for antineoplastic chemotherapy 07/06/2022     Priority: Medium     Malignant neoplasm of upper-inner quadrant of left breast in female, estrogen receptor positive (H) 06/13/2022     Priority: Medium     Aortic stenosis 12/02/2021     Priority: Medium     Echo 10/19/21 - Moderate, paradoxical low-flow, aortic stenosis is present. Global and regional left ventricular function is normal with an EF of 60-65%. Global right ventricular function is normal. IVC diameter <2.1 cm collapsing >50% with sniff suggests a normal RA pressure of 3  mmHg. There is no prior study for direct comparison.       Age-related osteoporosis with current pathological fracture, initial encounter 2021     Priority: Medium     Fosamax started 2021, requires DXA 1-2 years, presumed bisphosphate course through        Toenail fungus 2017     Priority: Medium     History of colonic polyps 06/15/2016     Priority: Medium     Colonoscopy . Repeat in 5 years ()       Diverticular disease of large intestine 2016     Priority: Medium     Family history of cardiomyopathy 2016     Priority: Medium     Family history of malignant neoplasm of breast 2015     Priority: Medium     Sister, post menopausal, .       Tinnitus 2013     Priority: Medium     Senile nuclear sclerosis 10/26/2012     Priority: Medium     Tear film insufficiency 10/26/2012     Priority: Medium     Hyperlipidemia 10/26/2012     Priority: Medium     Regular astigmatism 10/26/2012     Priority: Medium     Myopia 10/26/2012     Priority: Medium     After-cataract 10/26/2012     Priority: Medium     Presbyopia 10/26/2012     Priority: Medium     Pupillary abnormalities 10/26/2012     Priority: Medium     Diplopia 10/26/2012     Priority: Medium     Visual field defect 10/26/2012     Priority: Medium     Vitreous membranes and strands 10/26/2012     Priority: Medium     [facility] 10/26/2012     Priority: Medium     Tier 1  DX V65.8 REPLACED WITH [facility] (2013)       PVC's (premature ventricular contractions) 02/15/2022     Priority: Low     Class 1 obesity due to excess calories without serious comorbidity with body mass index (BMI) of 30.0 to 30.9 in adult 02/15/2022     Priority: Low      Past Medical History:   Diagnosis Date     Aortic stenosis      Hyperlipidemia      Insufficiency fracture of tibia, sequela 2021     Polyp of colon 2016     Past Surgical History:   Procedure Laterality Date     CATARACT IOL, RT/LT Right  2006     HC YAG LASER CAPSULOTOMY Right 2009     LASER VITREOLYSIS, ANTERIOR OD (RIGHT EYE) Right 2007     REPOSITION INTRAOCULAR LENS Right 11/18/2014    Procedure: REPOSITION INTRAOCULAR LENS;  Surgeon: Vickie Guerra MD;  Location: [facility]     VITRECTOMY PARSPLANA WITH 23 GAUGE SYSTEM Right 11/18/2014    Procedure: VITRECTOMY PARSPLANA WITH 23 GAUGE SYSTEM;  Surgeon: Vickie Guerra MD;  Location: [facility]     Current Outpatient Medications   Medication Sig Dispense Refill     alendronate (FOSAMAX) 70 MG tablet Take 1 tablet (70 mg) by mouth every 7 days 12 tablet 3     calcium carb-cholecalciferol 600-500 MG-UNIT CAPS        Lactobacillus (PROBIOTIC CHILDRENS) CHEW        loratadine (CLARITIN) 10 MG tablet Take 10 mg by mouth daily Prn, seasonal for ragweed and lilacs       metoprolol succinate ER (TOPROL XL) 25 MG 24 hr tablet Take 1 tablet (25 mg) by mouth daily 90 tablet 3     Omega-3 Fatty Acids (FISH OIL ADULT GUMMIES PO) Take 250 mg by mouth       simvastatin (ZOCOR) 40 MG tablet Take 1 tablet (40 mg) by mouth At Bedtime 90 tablet 3     ondansetron (ZOFRAN) 8 MG tablet Take 1 tablet (8 mg) by mouth every 8 hours as needed for nausea (vomiting) (Patient not taking: Reported on 10/6/2022) 30 tablet 2     prochlorperazine (COMPAZINE) 10 MG tablet Take 0.5 tablets (5 mg) by mouth every 6 hours as needed for nausea or vomiting (Patient not taking: Reported on 10/6/2022) 30 tablet 2       Allergies   Allergen Reactions     Bactrim [Sulfamethoxazole W/Trimethoprim] Nausea     Headache     Seasonal Allergies      Typhoid Vaccines Nausea and Vomiting     Fever, vice- h/a, body aches.  Was hospitalized for 2d.        Social History     Tobacco Use     Smoking status: Never Smoker     Smokeless tobacco: Never Used   Substance Use Topics     Alcohol use: Yes     Comment: 4 drinks per month     Family History   Problem Relation Age of Onset     C.A.D. Mother      Diabetes Mother      Arthritis Mother   "    Thyroid Disease Mother      C.A.D. Father      Breast Cancer Sister      Arthritis Sister      Breast Cancer Sister      Genetic Disorder Other         nephew     History   Drug Use No         Objective     /80   Pulse 88   Temp 97.9  F (36.6  C) (Oral)   Ht 1.67 m (5' 5.75\")   Wt 86.8 kg (191 lb 6.4 oz)   LMP 05/17/2011   SpO2 98%   BMI 31.13 kg/m      Physical Exam    GENERAL APPEARANCE: healthy, alert and no distress, sparse/thin hair     EYES: EOMI    HENT: ear canals and TM's normal and nose and mouth without ulcers or lesions     NECK: no adenopathy, no asymmetry, masses, or scars and thyroid normal to palpation     RESP: lungs clear to auscultation - no rales, rhonchi or wheezes     CV: regular rates and rhythm, 2-3/6 systolic murmur throughout precordium, loudest at RUSB    ABDOMEN:  soft, nontender, no HSM or masses and bowel sounds normal     MS: extremities normal- no gross deformities noted, no evidence of inflammation in joints     SKIN: no suspicious lesions or rashes     NEURO: Normal strength and tone, sensory exam grossly normal, mentation intact and speech normal     PSYCH: mentation appears normal. and affect normal/bright     LYMPHATICS: No cervical adenopathy     Recent Labs   Lab Test 09/08/22  1012 08/19/22  0925 11/29/21  1543 08/20/21  0800   HGB 9.9* 11.6*   < > 13.8    272   < > 245    140   < > 141   POTASSIUM 3.6 3.5   < > 4.0   CR 0.63 0.58   < > 0.71   A1C  --   --   --  5.4    < > = values in this interval not displayed.        Diagnostics:  Recent Results (from the past 168 hour(s))   UA reflex to Microscopic and Culture    Collection Time: 10/06/22 11:29 AM    Specimen: Urine, Midstream   Result Value Ref Range    Color Urine Yellow Colorless, Straw, Light Yellow, Yellow    Appearance Urine Clear Clear    Glucose Urine Negative Negative mg/dL    Bilirubin Urine Negative Negative    Ketones Urine Negative Negative mg/dL    Specific Gravity Urine 1.015 " 1.005 - 1.030    Blood Urine Trace (A) Negative    pH Urine 6.0 5.0 - 8.0    Protein Albumin Urine Negative Negative mg/dL    Urobilinogen Urine 0.2 0.2, 1.0 E.U./dL    Nitrite Urine Negative Negative    Leukocyte Esterase Urine Negative Negative   Basic metabolic panel    Collection Time: 10/06/22 11:29 AM   Result Value Ref Range    Sodium 140 136 - 145 mmol/L    Potassium 3.9 3.4 - 5.3 mmol/L    Chloride 104 98 - 107 mmol/L    Carbon Dioxide (CO2) 26 22 - 29 mmol/L    Anion Gap 10 7 - 15 mmol/L    Urea Nitrogen 10.2 8.0 - 23.0 mg/dL    Creatinine 0.60 0.51 - 0.95 mg/dL    Calcium 9.1 8.8 - 10.2 mg/dL    Glucose 100 (H) 70 - 99 mg/dL    GFR Estimate >90 >60 mL/min/1.73m2   CBC with platelets and differential    Collection Time: 10/06/22 11:29 AM   Result Value Ref Range    WBC Count 3.7 (L) 4.0 - 11.0 10e3/uL    RBC Count 3.85 3.80 - 5.20 10e6/uL    Hemoglobin 12.0 11.7 - 15.7 g/dL    Hematocrit 38.3 35.0 - 47.0 %     78 - 100 fL    MCH 31.2 26.5 - 33.0 pg    MCHC 31.3 (L) 31.5 - 36.5 g/dL    RDW 16.0 (H) 10.0 - 15.0 %    Platelet Count 235 150 - 450 10e3/uL    % Neutrophils 70 %    % Lymphocytes 14 %    % Monocytes 10 %    % Eosinophils 4 %    % Basophils 1 %    % Immature Granulocytes 0 %    Absolute Neutrophils 2.6 1.6 - 8.3 10e3/uL    Absolute Lymphocytes 0.5 (L) 0.8 - 5.3 10e3/uL    Absolute Monocytes 0.4 0.0 - 1.3 10e3/uL    Absolute Eosinophils 0.2 0.0 - 0.7 10e3/uL    Absolute Basophils 0.0 0.0 - 0.2 10e3/uL    Absolute Immature Granulocytes 0.0 <=0.4 10e3/uL   Urine Microscopic    Collection Time: 10/06/22 11:29 AM   Result Value Ref Range    Bacteria Urine None Seen None Seen /HPF    RBC Urine 0-2 0-2 /HPF /HPF    WBC Urine 0-5 0-5 /HPF /HPF    Squamous Epithelials Urine Few (A) None Seen /LPF      No EKG required for low risk surgery (cataract, skin procedure, breast biopsy, etc).    Revised Cardiac Risk Index (RCRI):  The patient has the following serious cardiovascular risks for perioperative  complications:   - No serious cardiac risks = 0 points     RCRI Interpretation: 0 points: Class I (very low risk - 0.4% complication rate)           Signed Electronically by: Karolina Rivera MD  Copy of this evaluation report is provided to requesting physician.

## 2022-10-06 NOTE — PROGRESS NOTES
[address]  [address] [place] [id number]  Phone: [phone]  Fax: [phone]  Primary Provider: Gaby Lynn  Pre-op Performing Provider: LORENA MERA      PREOPERATIVE EVALUATION:  Today's date: 10/6/2022    Vickie Alvarado is a 67 year old female who presents for a preoperative evaluation.    Surgical Information:  Surgery/Procedure: Left Breast lumpectomy  Surgery Location[facility]  Surgeon: Alphonso Cadet  Surgery Date: 10/17/2022  Time of Surgery: 7:00am  Where patient plans to recover: At home with family live alone, but niece will come stay for 24 hours after surgery  Fax number for surgical facility: See in [facility]    Type of Anesthesia Anticipated: General    Assessment & Plan     The proposed surgical procedure is considered LOW risk.    Preop general physical exam. The following chronic, acute, and subacute issues were also addressed in the context of this visit.     Aortic valve stenosis, moderate  Hyperlipidemia   PVCs   Last echo 11/3/2021. Last saw cardiology Mar 2022, at which point PVCs were stable/astymptomatic on metoprolol and aortic stenosis was also stable with plan to repeat echo in one year. Per PreOp Care Map, a patient with a previously documented murmur who's has moderate-severe cardiac disease and has had an echo within the pas t12 months may proceed to the OR without any further work-up.     Because she has a new diagnosis of breast cancer and has undergone chemo since her last visit with Cardiology, and because her last echo was now >11 months ago, will reach out to cardiology to ensure they approve of proceeding with lumpectomy.     10/11/22 Confirmed with Cardiologist Dr. Nicole Brooke via Staff Message. Okay to proceed with surgery from cardiac standpoint without any further workup.     Malignant neoplasm of upper-inner quadrant of left breast in female, estrogen receptor positive (H)  Normocytic anemia,  resolved  Lymphocytopenia, mild, new  Staff messaged Oncologist Jessy Molina re: new mild lymphocytopenia, asking whether repeat CBC is warranted prior to surgery. It is not. Okay to proceed with surgery despite mild lymphocytopenia.     Cloudy urine   Back pain  Per patient report. UA is largely unremarkable with the exception of trace blood, but 0-2 RBCs on microscopy. She thinks back pain is from laying around more recently d/t recent covid shot, which makes sense. Uncertain why urine is cloudy, but this won't prevent her from proceeding to surgery.     Dizzy, resolved  Also obtained BMP given recent chemo and a one-time dizzy spell to rule-out electrolyte abnormalities. (BMP is normal.)      Risks and Recommendations:  The patient has the following additional risks and recommendations for perioperative complications:  Cardiovascular:   - Reaching out to Cardiology (see above). Per Pre-Op CareMap guidelines, okay to proceed from CV standpoint. Moreover, is low-risk procedure. If I hear otherwise from Cardiology, will alert surgeon promptly (prior to 10/17 surgery).     Medication Instructions:  Patient is to take all scheduled medications on the day of surgery EXCEPT for vitamins/minerals/herbal supplements (hold for 14d before surgery).     RECOMMENDATION:  APPROVAL GIVEN to proceed with proposed procedure, without further diagnostic evaluation.      Subjective     HPI related to upcoming procedure:   Family h/o breast cancer- both sisters.   3D mammography annually. Last was in July 2021-- normal.   In May 2022 felt a lump on left breast-- found to be malignant.   S/p 4 rounds chemotherapy; last ended 9/8.  Neoadjuvant chemotherapy initiated on 07/08/22: docetaxel, cyclophosphamide with neulasta.  Waiting 5 weeks for lumpectomy to regain some energy. Feeling a bit more energized. Doing eliptical daily.   Had genetic testing that was negative.     Going to do radiation as well.   Has cardiac hx. PVCs and  aortic stensois. Recent cardiac mri and mra. Follows with cards. Next due in mar 2023.    Preop Questions 10/3/2022   1. Have you ever had a heart attack or stroke? No, new heart history diagnosed  PVCs and aortic stenosis, sees cardiologist   2. Have you ever had surgery on your heart or blood vessels, such as a stent placement, a coronary artery bypass, or surgery on an artery in your head, neck, heart, or legs? No   3. Do you have chest pain with activity? No (does elliptical to strengthen for surgery)   4. Do you have a history of  heart failure? No (parents  of congestive heart failure in their 80s)   5. Do you currently have a cold, bronchitis or symptoms of other infection? No (sniffles with allergies)   6. Do you have a cough, shortness of breath, or wheezing? No   7. Do you or anyone in your family have previous history of blood clots? No   8. Do you or does anyone in your family have a serious bleeding problem such as prolonged bleeding following surgeries or cuts? No   9. Have you ever had problems with anemia or been told to take iron pills? No   10. Have you had any abnormal blood loss such as black, tarry or bloody stools, or abnormal vaginal bleeding? No   11. Have you ever had a blood transfusion? No   12. Are you willing to have a blood transfusion if it is medically needed before, during, or after your surgery? Yes   13. Have you or any of your relatives ever had problems with anesthesia? No    14. Do you have sleep apnea, excessive snoring or daytime drowsiness? No   15. Do you have any artifical heart valves or other implanted medical devices like a pacemaker, defibrillator, or continuous glucose monitor? No   16. Do you have artificial joints? No   17. Are you allergic to latex? No     Got booster 10/3/2022 told to wait one month for flu shot- pfizer.?  Ache in back, cloudy foamy urine  Laying around more lately b/c of recent covid shot on Monday   Health Care Directive:  Patient does  not have a Health Care Directive or Living Will: Patient states has Advance Directive and will bring in a copy to clinic.   Drafted in [employer], will review prior to surgery, needs to find notary    Preoperative Review of :   reviewed - no record of controlled substances prescribed.    Status of Chronic Conditions:  See problem list for active medical problems.  Problems all longstanding and stable, except as noted/documented.  See ROS for pertinent symptoms related to these conditions.      Review of Systems  CONSTITUTIONAL: NEGATIVE for fever,   lots of sweating with chemo  Lost 10 lbs since starting chemo    chills, change in weight  INTEGUMENTARY/SKIN: NEGATIVE for worrisome rashes, moles or lesions  EYES: NEGATIVE for vision changes or irritation  ENT/MOUTH: NEGATIVE for ear, mouth and throat problems  RESP: NEGATIVE for significant cough or SOB  CV: NEGATIVE for chest pain, palpitations or peripheral edema  GI: NEGATIVE for nausea, abdominal pain, heartburn, or change in bowel habits  : NEGATIVE for frequency, dysuria, or hematuria  MUSCULOSKELETAL: NEGATIVE for significant arthralgias or myalgia  NEURO: NEGATIVE for weakness, dizziness or paresthesias  ENDOCRINE: NEGATIVE for temperature intolerance, skin/hair changes  HEME: NEGATIVE for bleeding problems  PSYCHIATRIC: NEGATIVE for changes in mood or affect    Patient Active Problem List    Diagnosis Date Noted     Encounter for antineoplastic chemotherapy 07/06/2022     Priority: Medium     Malignant neoplasm of upper-inner quadrant of left breast in female, estrogen receptor positive (H) 06/13/2022     Priority: Medium     Aortic stenosis 12/02/2021     Priority: Medium     Echo 10/19/21 - Moderate, paradoxical low-flow, aortic stenosis is present. Global and regional left ventricular function is normal with an EF of 60-65%. Global right ventricular function is normal. IVC diameter <2.1 cm collapsing >50% with sniff suggests a normal RA pressure of 3  mmHg. There is no prior study for direct comparison.       Age-related osteoporosis with current pathological fracture, initial encounter 2021     Priority: Medium     Fosamax started 2021, requires DXA 1-2 years, presumed bisphosphate course through        Toenail fungus 2017     Priority: Medium     History of colonic polyps 06/15/2016     Priority: Medium     Colonoscopy . Repeat in 5 years ()       Diverticular disease of large intestine 2016     Priority: Medium     Family history of cardiomyopathy 2016     Priority: Medium     Family history of malignant neoplasm of breast 2015     Priority: Medium     Sister, post menopausal, .       Tinnitus 2013     Priority: Medium     Senile nuclear sclerosis 10/26/2012     Priority: Medium     Tear film insufficiency 10/26/2012     Priority: Medium     Hyperlipidemia 10/26/2012     Priority: Medium     Regular astigmatism 10/26/2012     Priority: Medium     Myopia 10/26/2012     Priority: Medium     After-cataract 10/26/2012     Priority: Medium     Presbyopia 10/26/2012     Priority: Medium     Pupillary abnormalities 10/26/2012     Priority: Medium     Diplopia 10/26/2012     Priority: Medium     Visual field defect 10/26/2012     Priority: Medium     Vitreous membranes and strands 10/26/2012     Priority: Medium     [facility] 10/26/2012     Priority: Medium     Tier 1  DX V65.8 REPLACED WITH [facility] (2013)       PVC's (premature ventricular contractions) 02/15/2022     Priority: Low     Class 1 obesity due to excess calories without serious comorbidity with body mass index (BMI) of 30.0 to 30.9 in adult 02/15/2022     Priority: Low      Past Medical History:   Diagnosis Date     Aortic stenosis      Hyperlipidemia      Insufficiency fracture of tibia, sequela 2021     Polyp of colon 2016     Past Surgical History:   Procedure Laterality Date     CATARACT IOL, RT/LT Right  2006     HC YAG LASER CAPSULOTOMY Right 2009     LASER VITREOLYSIS, ANTERIOR OD (RIGHT EYE) Right 2007     REPOSITION INTRAOCULAR LENS Right 11/18/2014    Procedure: REPOSITION INTRAOCULAR LENS;  Surgeon: Vickie Guerra MD;  Location: [facility]     VITRECTOMY PARSPLANA WITH 23 GAUGE SYSTEM Right 11/18/2014    Procedure: VITRECTOMY PARSPLANA WITH 23 GAUGE SYSTEM;  Surgeon: Vickie Guerra MD;  Location: [facility]     Current Outpatient Medications   Medication Sig Dispense Refill     alendronate (FOSAMAX) 70 MG tablet Take 1 tablet (70 mg) by mouth every 7 days 12 tablet 3     calcium carb-cholecalciferol 600-500 MG-UNIT CAPS        Lactobacillus (PROBIOTIC CHILDRENS) CHEW        loratadine (CLARITIN) 10 MG tablet Take 10 mg by mouth daily Prn, seasonal for ragweed and lilacs       metoprolol succinate ER (TOPROL XL) 25 MG 24 hr tablet Take 1 tablet (25 mg) by mouth daily 90 tablet 3     Omega-3 Fatty Acids (FISH OIL ADULT GUMMIES PO) Take 250 mg by mouth       simvastatin (ZOCOR) 40 MG tablet Take 1 tablet (40 mg) by mouth At Bedtime 90 tablet 3     ondansetron (ZOFRAN) 8 MG tablet Take 1 tablet (8 mg) by mouth every 8 hours as needed for nausea (vomiting) (Patient not taking: Reported on 10/6/2022) 30 tablet 2     prochlorperazine (COMPAZINE) 10 MG tablet Take 0.5 tablets (5 mg) by mouth every 6 hours as needed for nausea or vomiting (Patient not taking: Reported on 10/6/2022) 30 tablet 2       Allergies   Allergen Reactions     Bactrim [Sulfamethoxazole W/Trimethoprim] Nausea     Headache     Seasonal Allergies      Typhoid Vaccines Nausea and Vomiting     Fever, vice- h/a, body aches.  Was hospitalized for 2d.        Social History     Tobacco Use     Smoking status: Never Smoker     Smokeless tobacco: Never Used   Substance Use Topics     Alcohol use: Yes     Comment: 4 drinks per month     Family History   Problem Relation Age of Onset     C.A.D. Mother      Diabetes Mother      Arthritis Mother   "    Thyroid Disease Mother      C.A.D. Father      Breast Cancer Sister      Arthritis Sister      Breast Cancer Sister      Genetic Disorder Other         nephew     History   Drug Use No         Objective     /80   Pulse 88   Temp 97.9  F (36.6  C) (Oral)   Ht 1.67 m (5' 5.75\")   Wt 86.8 kg (191 lb 6.4 oz)   LMP 05/17/2011   SpO2 98%   BMI 31.13 kg/m      Physical Exam    GENERAL APPEARANCE: healthy, alert and no distress, sparse/thin hair     EYES: EOMI    HENT: ear canals and TM's normal and nose and mouth without ulcers or lesions     NECK: no adenopathy, no asymmetry, masses, or scars and thyroid normal to palpation     RESP: lungs clear to auscultation - no rales, rhonchi or wheezes     CV: regular rates and rhythm, 2-3/6 systolic murmur throughout precordium, loudest at RUSB    ABDOMEN:  soft, nontender, no HSM or masses and bowel sounds normal     MS: extremities normal- no gross deformities noted, no evidence of inflammation in joints     SKIN: no suspicious lesions or rashes     NEURO: Normal strength and tone, sensory exam grossly normal, mentation intact and speech normal     PSYCH: mentation appears normal. and affect normal/bright     LYMPHATICS: No cervical adenopathy     Recent Labs   Lab Test 09/08/22  1012 08/19/22  0925 11/29/21  1543 08/20/21  0800   HGB 9.9* 11.6*   < > 13.8    272   < > 245    140   < > 141   POTASSIUM 3.6 3.5   < > 4.0   CR 0.63 0.58   < > 0.71   A1C  --   --   --  5.4    < > = values in this interval not displayed.        Diagnostics:  Recent Results (from the past 168 hour(s))   UA reflex to Microscopic and Culture    Collection Time: 10/06/22 11:29 AM    Specimen: Urine, Midstream   Result Value Ref Range    Color Urine Yellow Colorless, Straw, Light Yellow, Yellow    Appearance Urine Clear Clear    Glucose Urine Negative Negative mg/dL    Bilirubin Urine Negative Negative    Ketones Urine Negative Negative mg/dL    Specific Gravity Urine 1.015 " 1.005 - 1.030    Blood Urine Trace (A) Negative    pH Urine 6.0 5.0 - 8.0    Protein Albumin Urine Negative Negative mg/dL    Urobilinogen Urine 0.2 0.2, 1.0 E.U./dL    Nitrite Urine Negative Negative    Leukocyte Esterase Urine Negative Negative   Basic metabolic panel    Collection Time: 10/06/22 11:29 AM   Result Value Ref Range    Sodium 140 136 - 145 mmol/L    Potassium 3.9 3.4 - 5.3 mmol/L    Chloride 104 98 - 107 mmol/L    Carbon Dioxide (CO2) 26 22 - 29 mmol/L    Anion Gap 10 7 - 15 mmol/L    Urea Nitrogen 10.2 8.0 - 23.0 mg/dL    Creatinine 0.60 0.51 - 0.95 mg/dL    Calcium 9.1 8.8 - 10.2 mg/dL    Glucose 100 (H) 70 - 99 mg/dL    GFR Estimate >90 >60 mL/min/1.73m2   CBC with platelets and differential    Collection Time: 10/06/22 11:29 AM   Result Value Ref Range    WBC Count 3.7 (L) 4.0 - 11.0 10e3/uL    RBC Count 3.85 3.80 - 5.20 10e6/uL    Hemoglobin 12.0 11.7 - 15.7 g/dL    Hematocrit 38.3 35.0 - 47.0 %     78 - 100 fL    MCH 31.2 26.5 - 33.0 pg    MCHC 31.3 (L) 31.5 - 36.5 g/dL    RDW 16.0 (H) 10.0 - 15.0 %    Platelet Count 235 150 - 450 10e3/uL    % Neutrophils 70 %    % Lymphocytes 14 %    % Monocytes 10 %    % Eosinophils 4 %    % Basophils 1 %    % Immature Granulocytes 0 %    Absolute Neutrophils 2.6 1.6 - 8.3 10e3/uL    Absolute Lymphocytes 0.5 (L) 0.8 - 5.3 10e3/uL    Absolute Monocytes 0.4 0.0 - 1.3 10e3/uL    Absolute Eosinophils 0.2 0.0 - 0.7 10e3/uL    Absolute Basophils 0.0 0.0 - 0.2 10e3/uL    Absolute Immature Granulocytes 0.0 <=0.4 10e3/uL   Urine Microscopic    Collection Time: 10/06/22 11:29 AM   Result Value Ref Range    Bacteria Urine None Seen None Seen /HPF    RBC Urine 0-2 0-2 /HPF /HPF    WBC Urine 0-5 0-5 /HPF /HPF    Squamous Epithelials Urine Few (A) None Seen /LPF      No EKG required for low risk surgery (cataract, skin procedure, breast biopsy, etc).    Revised Cardiac Risk Index (RCRI):  The patient has the following serious cardiovascular risks for perioperative  complications:   - No serious cardiac risks = 0 points     RCRI Interpretation: 0 points: Class I (very low risk - 0.4% complication rate)           Signed Electronically by: Karolina Rivera MD  Copy of this evaluation report is provided to requesting physician.

## 2022-10-10 ENCOUNTER — ANCILLARY PROCEDURE (OUTPATIENT)
Dept: MAMMOGRAPHY | Facility: CLINIC | Age: 67
End: 2022-10-10
Attending: SURGERY
Payer: MEDICARE

## 2022-10-10 DIAGNOSIS — Z17.0 MALIGNANT NEOPLASM OF UPPER-INNER QUADRANT OF LEFT BREAST IN FEMALE, ESTROGEN RECEPTOR POSITIVE (H): ICD-10-CM

## 2022-10-10 DIAGNOSIS — C50.212 MALIGNANT NEOPLASM OF UPPER-INNER QUADRANT OF LEFT BREAST IN FEMALE, ESTROGEN RECEPTOR POSITIVE (H): ICD-10-CM

## 2022-10-10 PROCEDURE — 19285 PERQ DEV BREAST 1ST US IMAG: CPT | Mod: LT | Performed by: RADIOLOGY

## 2022-10-11 NOTE — PROGRESS NOTES
"Marni is a 67 year old who is being evaluated via a billable video visit.      How would you like to obtain your AVS? MyChart  If the video visit is dropped, the invitation should be resent by: Text to cell phone: [phone]  Will anyone else be joining your video visit? Tosin       Stephie Verduzcorio VF    10/12/2022    Referring Provider: Chandrika Horvath MD    Presenting Information:  I spoke to Vickie today to discuss her genetic testing results. Her blood was drawn on 6/14/2022. The Hereditary Cancer Comprehensive 40-gene panel was ordered from [facility]. This testing was done because of Vickie's personal history of breast cancer and family history of breast and ovarian cancer.    Genetic Testing Result: NEGATIVE  Vickie tested negative for mutations in the APC, GRISEL, AXIN2, BAP1, BARD1, BMPR1A, BRCA1, BRCA2, BRIP1, CDH1, CDK4, CDKN2A, CHEK2, DICER1, EPCAM, GREM1, HOXB13, MITF, MLH1, MRE11A, MSH2, MSH3, MSH6, MUTYH, NBN, NF1, NTHL1, PALB2, PMS2, POLD1, POLE, POT1, PTEN, RAD50, RAD51C, RAD51D, SMAD4, SMARCA4, STK11, and TP53 gene. No mutations were found in any of the 40 genes analyzed. This test involved sequencing and deletion/duplication analysis of all genes with the exceptions of EPCAM and GREM1 (deletions/duplications only).     A copy of the test report can be found in the Laboratory tab, dated 9/23/2022, and named \"Next generation sequencing\".     Interpretation:  We discussed several different interpretations of this negative test result.    1. One explanation may be that there is a different gene or combination of genes and environment that are associated with the cancers in this family.  2. It is possible that her relatives diagnosed with cancer did have a mutation and she did not inherit it.  3. There is also a small possibility that there is a mutation in one of these genes, and the testing laboratory could not find it with their current " testing methods.       Screening:  Based on this negative test result, it is important for Vikcie and her relatives to refer back to the family history for appropriate cancer screening.    Vickie should continue to follow all breast cancer treatment and future follow-up recommendations as made by her oncology team.  Vickie s close female relatives remain at increased risk for breast cancer given their family history. Breast cancer screening is generally recommended to begin approximately 10 years younger than the earliest age of breast cancer diagnosis in the family, or at age 40, whichever comes first. In this family, screening may begin at age 40. Breast screening options should be discussed with an individual's primary care provider and a genetic counselor, to determine at what age to begin screening, what screening is appropriate, and if additional screening (such as breast MRI) is necessary based on personal/family history factors.     Other population cancer screening options, such as those recommended by the American Cancer Society and the National Comprehensive Cancer Network (NCCN), are also appropriate for Vickie and her family. These screening recommendations may change if there are changes to Vickie's personal and/or family history of cancer.     Final screening recommendations should be made by each individual's primary care provider.    Additional Testing Considerations:  Although Vickie's genetic testing result was negative, other relatives may still carry a gene mutation associated with breast cancer. Genetic counseling is recommended for Vickie's sister who was diagnosed with breast cancer at age 53, her brother, her nieces and nephews, and other maternal relatives to discuss genetic testing options. If any of these relatives do pursue genetic testing, Vickie is encouraged to contact me so that we may review the impact of their test results on her.    Summary:  We do not have an explanation for  Vickie's personal and family history of cancer. While no genetic changes were identified, Vickie may still be at risk for certain cancers due to family history, environmental factors, or other genetic causes not identified by this test.  Because of that, it is important that she continue with cancer screening based on her personal and family history as discussed above.    Genetic testing is rapidly advancing, and new cancer susceptibility genes will most likely be identified in the future.  Therefore, I encouraged Vickie to contact me regularly or if there are changes in her personal or family history.  This may change how we assess her cancer risk, screening, and the testing we would offer.    Plan:  1. A copy of the test results will be mailed to Vickie.  2. She plans to follow-up with her other providers.  3. She should contact me regularly, or sooner if her family history changes.    If Vickie has any further questions, I encouraged her to contact me at [phone].     Time spent on video: 11 minutes.    Gregoria Benjamin MS, [facility] [place]  Licensed, Certified Genetic Counselor  Phone: [phone]

## 2022-10-12 ENCOUNTER — VIRTUAL VISIT (OUTPATIENT)
Dept: ONCOLOGY | Facility: CLINIC | Age: 67
End: 2022-10-12
Attending: SURGERY
Payer: MEDICARE

## 2022-10-12 DIAGNOSIS — Z80.1 FAMILY HISTORY OF LUNG CANCER: ICD-10-CM

## 2022-10-12 DIAGNOSIS — C50.212 MALIGNANT NEOPLASM OF UPPER-INNER QUADRANT OF LEFT BREAST IN FEMALE, ESTROGEN RECEPTOR POSITIVE (H): Primary | ICD-10-CM

## 2022-10-12 DIAGNOSIS — Z80.8 FAMILY HISTORY OF NONMELANOMA SKIN CANCER: ICD-10-CM

## 2022-10-12 DIAGNOSIS — Z80.0 FAMILY HISTORY OF STOMACH CANCER: ICD-10-CM

## 2022-10-12 DIAGNOSIS — Z80.41 FAMILY HISTORY OF MALIGNANT NEOPLASM OF OVARY: ICD-10-CM

## 2022-10-12 DIAGNOSIS — Z80.6 FAMILY HISTORY OF LEUKEMIA: ICD-10-CM

## 2022-10-12 DIAGNOSIS — Z80.3 FAMILY HISTORY OF MALIGNANT NEOPLASM OF BREAST: ICD-10-CM

## 2022-10-12 DIAGNOSIS — Z17.0 MALIGNANT NEOPLASM OF UPPER-INNER QUADRANT OF LEFT BREAST IN FEMALE, ESTROGEN RECEPTOR POSITIVE (H): Primary | ICD-10-CM

## 2022-10-12 PROCEDURE — 999N000069 HC STATISTIC GENETIC COUNSELING, < 16 MIN: Mod: GT

## 2022-10-12 NOTE — LETTER
"    Cancer Risk Management  Program Locations    [facility]  [facility]  [facility]  [facility]  Mailing Address  Cancer Risk Management Program  [address]  [place] [address]    New patient appointments  [phone]  October 12, 2022    Vickie Alvarado  [address]  [facility] [address]    Dear Vickie,    It was a pleasure talking with you via your virtual genetic counseling visit on 10/12/2022.  Below is a copy of the progress note from that recent visit through the Cancer Risk Management Program.  If you have any additional questions, please feel free to contact me.    Referring Provider: Chandrika Horvath MD  Presenting Information:  I spoke to Vickie today to discuss her genetic testing results. Her blood was drawn on 6/14/2022. The Hereditary Cancer Comprehensive 40-gene panel was ordered from [facility]'s [facility]. This testing was done because of Vickie's personal history of breast cancer and family history of breast and ovarian cancer.    Genetic Testing Result: NEGATIVE  Vickie tested negative for mutations in the APC, GRISEL, AXIN2, BAP1, BARD1, BMPR1A, BRCA1, BRCA2, BRIP1, CDH1, CDK4, CDKN2A, CHEK2, DICER1, EPCAM, GREM1, HOXB13, MITF, MLH1, MRE11A, MSH2, MSH3, MSH6, MUTYH, NBN, NF1, NTHL1, PALB2, PMS2, POLD1, POLE, POT1, PTEN, RAD50, RAD51C, RAD51D, SMAD4, SMARCA4, STK11, and TP53 gene. No mutations were found in any of the 40 genes analyzed. This test involved sequencing and deletion/duplication analysis of all genes with the exceptions of EPCAM and GREM1 (deletions/duplications only).     A copy of the test report can be found in the Laboratory tab, dated 9/23/2022, and named \"Next generation sequencing\".     Interpretation:  We discussed several different interpretations of " this negative test result.    1. One explanation may be that there is a different gene or combination of genes and environment that are associated with the cancers in this family.  2. It is possible that her relatives diagnosed with cancer did have a mutation and she did not inherit it.  3. There is also a small possibility that there is a mutation in one of these genes, and the testing laboratory could not find it with their current testing methods.     Screening:  Based on this negative test result, it is important for Vickie and her relatives to refer back to the family history for appropriate cancer screening.    Vickie should continue to follow all breast cancer treatment and future follow-up recommendations as made by her oncology team.  Vickie s close female relatives remain at increased risk for breast cancer given their family history. Breast cancer screening is generally recommended to begin approximately 10 years younger than the earliest age of breast cancer diagnosis in the family, or at age 40, whichever comes first. In this family, screening may begin at age 40. Breast screening options should be discussed with an individual's primary care provider and a genetic counselor, to determine at what age to begin screening, what screening is appropriate, and if additional screening (such as breast MRI) is necessary based on personal/family history factors.     Other population cancer screening options, such as those recommended by the American Cancer Society and the National Comprehensive Cancer Network (NCCN), are also appropriate for Vickie and her family. These screening recommendations may change if there are changes to Vickie's personal and/or family history of cancer.     Final screening recommendations should be made by each individual's primary care provider.  Additional Testing Considerations:  Although Vickie's genetic testing result was negative, other relatives may still carry a gene mutation  associated with breast cancer. Genetic counseling is recommended for Vickie's sister who was diagnosed with breast cancer at age 53, her brother, her nieces and nephews, and other maternal relatives to discuss genetic testing options. If any of these relatives do pursue genetic testing, Vickie is encouraged to contact me so that we may review the impact of their test results on her.  Summary:  We do not have an explanation for Vickie's personal and family history of cancer. While no genetic changes were identified, Vickie may still be at risk for certain cancers due to family history, environmental factors, or other genetic causes not identified by this test.  Because of that, it is important that she continue with cancer screening based on her personal and family history as discussed above.    Genetic testing is rapidly advancing, and new cancer susceptibility genes will most likely be identified in the future.  Therefore, I encouraged Vickie to contact me regularly or if there are changes in her personal or family history.  This may change how we assess her cancer risk, screening, and the testing we would offer.  Plan:  1. A copy of the test results will be mailed to Vickie.  2. She plans to follow-up with her other providers.  3. She should contact me regularly, or sooner if her family history changes.    If Vickie has any further questions, I encouraged her to contact me at [phone].     Time spent on video: 11 minutes.    Gregoria Benjamin MS, [facility]  Licensed, Certified Genetic Counselor  Phone: [phone]

## 2022-10-12 NOTE — PATIENT INSTRUCTIONS
Negative Genetic Test Result    Genetic Testing  You had a blood test that looked at the genetic information in one or more genes associated with increased cancer risk.  The testing looked for any harmful changes that would stop this particular gene from working like it should. If an individual does not have any harmful changes or variants of unknown significance found from their blood test, their genetic test result is reported as negative.       Results  The genetic test did not identify any pathogenic (harmful) changes in the genes that were tested. There are several possible explanations for a negative test result. Without knowing the gene mutation in your family, the cause of the cancer in you or your relatives is still unknown. Your genetic counselor can help interpret the result for you and your relatives. In this case, there are several reasons that may explain the negative test result:  There may be a gene mutation in the family that you did not inherit.   You may have a gene mutation in a different gene that was not included in the test, or has not yet been discovered.   The cancers in you or your family may be due to a combination of genetic factors and environment (multifactorial/familial).  The cancers in you or your family may be sporadic/random cancers.  There is very small chance that a mutation was not found by current testing methods.  As testing technology evolves over time, it may still be possible to identify a mutation in a gene that was not found on this test.    It is important to note which genes were included in your test. A list of these genes can be found on your test result.    Screening Recommendations  Due to this negative test result, cancer screening recommendations should be based on your personal and family history. This may include increased cancer screening for you and/or your family members. Your genetic counselor and health care provider can help make appropriate  recommendations.      Please call us if you have any questions or concerns.   Cancer Risk Management Program [phone] ([phone])  Aurelio Perez, MS [facility]  [phone]  Gregoria Benjamin, MS, [facility] [phone]  Selena Lincoln, MS, [facility]  [phone]  Janice Olmstead, MS, [facility]  [phone]  Nubia Veras, MS, [facility]  [phone]  Evelyn Armas, MS, [facility] [phone]  Lourdes Umaña, MS, [facility] [phone]

## 2022-10-12 NOTE — Clinical Note
"Hello,    Please enclose a copy of the test report from the laboratory tab titled \"Next Generation Sequencing\" dated 9/23/2022 to send to the patient along with the letter.    Referring provider: please see summary of genetic test results below.    Thank you,    Gregoria Benjamin MS, [facility]  Licensed, Certified Genetic Counselor  "

## 2022-10-14 ENCOUNTER — ANESTHESIA EVENT (OUTPATIENT)
Dept: SURGERY | Facility: AMBULATORY SURGERY CENTER | Age: 67
End: 2022-10-14
Payer: MEDICARE

## 2022-10-17 ENCOUNTER — ANESTHESIA (OUTPATIENT)
Dept: SURGERY | Facility: AMBULATORY SURGERY CENTER | Age: 67
End: 2022-10-17
Payer: MEDICARE

## 2022-10-17 ENCOUNTER — HOSPITAL ENCOUNTER (OUTPATIENT)
Facility: AMBULATORY SURGERY CENTER | Age: 67
Discharge: HOME | End: 2022-10-17
Attending: SURGERY
Payer: MEDICARE

## 2022-10-17 ENCOUNTER — ANCILLARY PROCEDURE (OUTPATIENT)
Dept: MAMMOGRAPHY | Facility: CLINIC | Age: 67
End: 2022-10-17
Attending: SURGERY
Payer: MEDICARE

## 2022-10-17 ENCOUNTER — HOSPITAL ENCOUNTER (OUTPATIENT)
Dept: NUCLEAR MEDICINE | Facility: CLINIC | Age: 67
Setting detail: NUCLEAR MEDICINE
Discharge: HOME | End: 2022-10-17
Admitting: SURGERY
Payer: MEDICARE

## 2022-10-17 VITALS
TEMPERATURE: 96.8 F | SYSTOLIC BLOOD PRESSURE: 109 MMHG | BODY MASS INDEX: 30.53 KG/M2 | DIASTOLIC BLOOD PRESSURE: 59 MMHG | WEIGHT: 190 LBS | HEIGHT: 66 IN | RESPIRATION RATE: 16 BRPM | HEART RATE: 70 BPM | OXYGEN SATURATION: 92 %

## 2022-10-17 DIAGNOSIS — Z17.0 MALIGNANT NEOPLASM OF UPPER-INNER QUADRANT OF LEFT BREAST IN FEMALE, ESTROGEN RECEPTOR POSITIVE (H): ICD-10-CM

## 2022-10-17 DIAGNOSIS — Z17.0 MALIGNANT NEOPLASM OF UPPER-INNER QUADRANT OF LEFT BREAST IN FEMALE, ESTROGEN RECEPTOR POSITIVE (H): Primary | ICD-10-CM

## 2022-10-17 DIAGNOSIS — C50.212 MALIGNANT NEOPLASM OF UPPER-INNER QUADRANT OF LEFT BREAST IN FEMALE, ESTROGEN RECEPTOR POSITIVE (H): Primary | ICD-10-CM

## 2022-10-17 DIAGNOSIS — C50.212 MALIGNANT NEOPLASM OF UPPER-INNER QUADRANT OF LEFT BREAST IN FEMALE, ESTROGEN RECEPTOR POSITIVE (H): ICD-10-CM

## 2022-10-17 PROCEDURE — 38900 IO MAP OF SENT LYMPH NODE: CPT | Mod: LT | Performed by: SURGERY

## 2022-10-17 PROCEDURE — 38525 BIOPSY/REMOVAL LYMPH NODES: CPT | Mod: LT | Performed by: SURGERY

## 2022-10-17 PROCEDURE — 38792 RA TRACER ID OF SENTINL NODE: CPT

## 2022-10-17 PROCEDURE — 76098 X-RAY EXAM SURGICAL SPECIMEN: CPT | Mod: LT | Performed by: RADIOLOGY

## 2022-10-17 PROCEDURE — 88342 IMHCHEM/IMCYTCHM 1ST ANTB: CPT | Mod: TC,59 | Performed by: SURGERY

## 2022-10-17 PROCEDURE — 88307 TISSUE EXAM BY PATHOLOGIST: CPT | Mod: TC | Performed by: SURGERY

## 2022-10-17 PROCEDURE — 19301 PARTIAL MASTECTOMY: CPT | Mod: LT | Performed by: SURGERY

## 2022-10-17 PROCEDURE — 88377 M/PHMTRC ALYS ISHQUANT/SEMIQ: CPT | Performed by: SURGERY

## 2022-10-17 PROCEDURE — 19301 PARTIAL MASTECTOMY: CPT | Mod: LT

## 2022-10-17 PROCEDURE — 38525 BIOPSY/REMOVAL LYMPH NODES: CPT | Mod: LT

## 2022-10-17 PROCEDURE — 99207 NM LYMPHOSCINTIGRAPHY INJECTION ONLY: CPT | Performed by: RADIOLOGY

## 2022-10-17 RX ORDER — KETOROLAC TROMETHAMINE 30 MG/ML
INJECTION, SOLUTION INTRAMUSCULAR; INTRAVENOUS PRN
Status: DISCONTINUED | OUTPATIENT
Start: 2022-10-17 | End: 2022-10-17

## 2022-10-17 RX ORDER — ONDANSETRON 4 MG/1
4 TABLET, ORALLY DISINTEGRATING ORAL EVERY 30 MIN PRN
Status: DISCONTINUED | OUTPATIENT
Start: 2022-10-17 | End: 2022-10-18 | Stop reason: HOSPADM

## 2022-10-17 RX ORDER — ONDANSETRON 2 MG/ML
4 INJECTION INTRAMUSCULAR; INTRAVENOUS EVERY 30 MIN PRN
Status: DISCONTINUED | OUTPATIENT
Start: 2022-10-17 | End: 2022-10-18 | Stop reason: HOSPADM

## 2022-10-17 RX ORDER — MEPERIDINE HYDROCHLORIDE 25 MG/ML
12.5 INJECTION INTRAMUSCULAR; INTRAVENOUS; SUBCUTANEOUS
Status: DISCONTINUED | OUTPATIENT
Start: 2022-10-17 | End: 2022-10-18 | Stop reason: HOSPADM

## 2022-10-17 RX ORDER — ACETAMINOPHEN 325 MG/1
650 TABLET ORAL
Status: DISCONTINUED | OUTPATIENT
Start: 2022-10-17 | End: 2022-10-18 | Stop reason: HOSPADM

## 2022-10-17 RX ORDER — CEFAZOLIN SODIUM 2 G/50ML
2 SOLUTION INTRAVENOUS
Status: COMPLETED | OUTPATIENT
Start: 2022-10-17 | End: 2022-10-17

## 2022-10-17 RX ORDER — LIDOCAINE HYDROCHLORIDE 20 MG/ML
INJECTION, SOLUTION INFILTRATION; PERINEURAL PRN
Status: DISCONTINUED | OUTPATIENT
Start: 2022-10-17 | End: 2022-10-17

## 2022-10-17 RX ORDER — IBUPROFEN 400 MG/1
400 TABLET, FILM COATED ORAL EVERY 6 HOURS PRN
Qty: 100 TABLET | Refills: 3 | Status: SHIPPED | OUTPATIENT
Start: 2022-10-17 | End: 2022-11-04

## 2022-10-17 RX ORDER — ISOSULFAN BLUE 50 MG/5ML
INJECTION, SOLUTION SUBCUTANEOUS PRN
Status: DISCONTINUED | OUTPATIENT
Start: 2022-10-17 | End: 2022-10-17 | Stop reason: HOSPADM

## 2022-10-17 RX ORDER — SODIUM CHLORIDE, SODIUM LACTATE, POTASSIUM CHLORIDE, CALCIUM CHLORIDE 600; 310; 30; 20 MG/100ML; MG/100ML; MG/100ML; MG/100ML
INJECTION, SOLUTION INTRAVENOUS CONTINUOUS
Status: DISCONTINUED | OUTPATIENT
Start: 2022-10-17 | End: 2022-10-18 | Stop reason: HOSPADM

## 2022-10-17 RX ORDER — PROPOFOL 10 MG/ML
INJECTION, EMULSION INTRAVENOUS CONTINUOUS PRN
Status: DISCONTINUED | OUTPATIENT
Start: 2022-10-17 | End: 2022-10-17

## 2022-10-17 RX ORDER — OXYCODONE HYDROCHLORIDE 5 MG/1
5 TABLET ORAL EVERY 4 HOURS PRN
Status: DISCONTINUED | OUTPATIENT
Start: 2022-10-17 | End: 2022-10-18 | Stop reason: HOSPADM

## 2022-10-17 RX ORDER — HYDROMORPHONE HYDROCHLORIDE 1 MG/ML
0.2 INJECTION, SOLUTION INTRAMUSCULAR; INTRAVENOUS; SUBCUTANEOUS EVERY 5 MIN PRN
Status: DISCONTINUED | OUTPATIENT
Start: 2022-10-17 | End: 2022-10-17 | Stop reason: HOSPADM

## 2022-10-17 RX ORDER — FENTANYL CITRATE 50 UG/ML
25 INJECTION, SOLUTION INTRAMUSCULAR; INTRAVENOUS
Status: DISCONTINUED | OUTPATIENT
Start: 2022-10-17 | End: 2022-10-18 | Stop reason: HOSPADM

## 2022-10-17 RX ORDER — FENTANYL CITRATE 50 UG/ML
25 INJECTION, SOLUTION INTRAMUSCULAR; INTRAVENOUS EVERY 5 MIN PRN
Status: DISCONTINUED | OUTPATIENT
Start: 2022-10-17 | End: 2022-10-17 | Stop reason: HOSPADM

## 2022-10-17 RX ORDER — SODIUM CHLORIDE, SODIUM LACTATE, POTASSIUM CHLORIDE, CALCIUM CHLORIDE 600; 310; 30; 20 MG/100ML; MG/100ML; MG/100ML; MG/100ML
INJECTION, SOLUTION INTRAVENOUS CONTINUOUS
Status: DISCONTINUED | OUTPATIENT
Start: 2022-10-17 | End: 2022-10-17 | Stop reason: HOSPADM

## 2022-10-17 RX ORDER — PROPOFOL 10 MG/ML
INJECTION, EMULSION INTRAVENOUS PRN
Status: DISCONTINUED | OUTPATIENT
Start: 2022-10-17 | End: 2022-10-17

## 2022-10-17 RX ORDER — ACETAMINOPHEN 325 MG/1
975 TABLET ORAL ONCE
Status: COMPLETED | OUTPATIENT
Start: 2022-10-17 | End: 2022-10-17

## 2022-10-17 RX ORDER — ACETAMINOPHEN 500 MG
1000 TABLET ORAL EVERY 6 HOURS PRN
Qty: 100 TABLET | Refills: 3 | Status: SHIPPED | OUTPATIENT
Start: 2022-10-17 | End: 2022-11-04

## 2022-10-17 RX ORDER — LIDOCAINE 40 MG/G
CREAM TOPICAL
Status: DISCONTINUED | OUTPATIENT
Start: 2022-10-17 | End: 2022-10-17 | Stop reason: HOSPADM

## 2022-10-17 RX ORDER — DEXAMETHASONE SODIUM PHOSPHATE 4 MG/ML
INJECTION, SOLUTION INTRA-ARTICULAR; INTRALESIONAL; INTRAMUSCULAR; INTRAVENOUS; SOFT TISSUE PRN
Status: DISCONTINUED | OUTPATIENT
Start: 2022-10-17 | End: 2022-10-17

## 2022-10-17 RX ORDER — FENTANYL CITRATE 50 UG/ML
INJECTION, SOLUTION INTRAMUSCULAR; INTRAVENOUS PRN
Status: DISCONTINUED | OUTPATIENT
Start: 2022-10-17 | End: 2022-10-17

## 2022-10-17 RX ORDER — ONDANSETRON 2 MG/ML
INJECTION INTRAMUSCULAR; INTRAVENOUS PRN
Status: DISCONTINUED | OUTPATIENT
Start: 2022-10-17 | End: 2022-10-17

## 2022-10-17 RX ADMIN — Medication 100 MCG: at 07:47

## 2022-10-17 RX ADMIN — SODIUM CHLORIDE, SODIUM LACTATE, POTASSIUM CHLORIDE, CALCIUM CHLORIDE: 600; 310; 30; 20 INJECTION, SOLUTION INTRAVENOUS at 06:37

## 2022-10-17 RX ADMIN — FENTANYL CITRATE 50 MCG: 50 INJECTION, SOLUTION INTRAMUSCULAR; INTRAVENOUS at 07:28

## 2022-10-17 RX ADMIN — ACETAMINOPHEN 975 MG: 325 TABLET ORAL at 06:27

## 2022-10-17 RX ADMIN — Medication 50 MCG: at 08:05

## 2022-10-17 RX ADMIN — LIDOCAINE HYDROCHLORIDE 60 MG: 20 INJECTION, SOLUTION INFILTRATION; PERINEURAL at 07:38

## 2022-10-17 RX ADMIN — PROPOFOL 150 MG: 10 INJECTION, EMULSION INTRAVENOUS at 07:38

## 2022-10-17 RX ADMIN — OXYCODONE HYDROCHLORIDE 5 MG: 5 TABLET ORAL at 09:04

## 2022-10-17 RX ADMIN — ONDANSETRON 4 MG: 2 INJECTION INTRAMUSCULAR; INTRAVENOUS at 08:24

## 2022-10-17 RX ADMIN — PROPOFOL 40 MG: 10 INJECTION, EMULSION INTRAVENOUS at 07:57

## 2022-10-17 RX ADMIN — Medication 100 MCG: at 07:34

## 2022-10-17 RX ADMIN — Medication 100 MCG: at 08:01

## 2022-10-17 RX ADMIN — Medication 50 MCG: at 08:14

## 2022-10-17 RX ADMIN — KETOROLAC TROMETHAMINE 15 MG: 30 INJECTION, SOLUTION INTRAMUSCULAR; INTRAVENOUS at 08:22

## 2022-10-17 RX ADMIN — PROPOFOL 150 MCG/KG/MIN: 10 INJECTION, EMULSION INTRAVENOUS at 07:28

## 2022-10-17 RX ADMIN — DEXAMETHASONE SODIUM PHOSPHATE 4 MG: 4 INJECTION, SOLUTION INTRA-ARTICULAR; INTRALESIONAL; INTRAMUSCULAR; INTRAVENOUS; SOFT TISSUE at 08:12

## 2022-10-17 RX ADMIN — FENTANYL CITRATE 25 MCG: 50 INJECTION, SOLUTION INTRAMUSCULAR; INTRAVENOUS at 07:55

## 2022-10-17 RX ADMIN — Medication 100 MCG: at 07:53

## 2022-10-17 RX ADMIN — Medication 100 MCG: at 07:41

## 2022-10-17 RX ADMIN — Medication 0.2 MCG/KG/MIN: at 08:03

## 2022-10-17 RX ADMIN — CEFAZOLIN SODIUM 2 G: 2 SOLUTION INTRAVENOUS at 07:32

## 2022-10-17 NOTE — ANESTHESIA PREPROCEDURE EVALUATION
Anesthesia Pre-Procedure Evaluation    Patient: Vickie Alvarado   MRN: [id number] : 1955        Procedure : Procedure(s):  LEFT seed localized lumpectomy with sentinel node biopsy  LEFT seed localized lumpectomy with sentinel node biopsy          Past Medical History:   Diagnosis Date     Aortic stenosis      Hyperlipidemia      Insufficiency fracture of tibia, sequela 2021     Polyp of colon 2016      Past Surgical History:   Procedure Laterality Date     CATARACT IOL, RT/LT Right 2006     HC YAG LASER CAPSULOTOMY Right 2009     LASER VITREOLYSIS, ANTERIOR OD (RIGHT EYE) Right 2007     REPOSITION INTRAOCULAR LENS Right 2014    Procedure: REPOSITION INTRAOCULAR LENS;  Surgeon: Vickie Guerra MD;  Location:  [facility]     VITRECTOMY PARSPLANA WITH 23 GAUGE SYSTEM Right 2014    Procedure: VITRECTOMY PARSPLANA WITH 23 GAUGE SYSTEM;  Surgeon: Vickie Guerra MD;  Location:  [facility]      Allergies   Allergen Reactions     Bactrim [Sulfamethoxazole W/Trimethoprim] Nausea     Headache     Seasonal Allergies      Typhoid Vaccines Nausea and Vomiting     Fever, vice- h/a, body aches.  Was hospitalized for 2d.      Social History     Tobacco Use     Smoking status: Never     Smokeless tobacco: Never   Substance Use Topics     Alcohol use: Yes     Comment: 4 drinks per month      Wt Readings from Last 1 Encounters:   10/17/22 86.2 kg (190 lb)        Anesthesia Evaluation   Pt has had prior anesthetic.     No history of anesthetic complications       ROS/MED HX  ENT/Pulmonary:  - neg pulmonary ROS     Neurologic:  - neg neurologic ROS     Cardiovascular: Comment: MRA 22:  No myocardial ischemia or infarction, with normal cardiac function, and no evidence of infiltrative disease. Tricuspid aortic valve, stenosis present (not quantified), with normal thoracic aorta.     (+) Dyslipidemia -----valvular problems/murmurs type: AS moderate AS. Previous cardiac testing      METS/Exercise Tolerance:     Hematologic:  - neg hematologic  ROS     Musculoskeletal:  - neg musculoskeletal ROS     GI/Hepatic:  - neg GI/hepatic ROS     Renal/Genitourinary:  - neg Renal ROS     Endo:  - neg endo ROS     Psychiatric/Substance Use:  - neg psychiatric ROS     Infectious Disease:  - neg infectious disease ROS     Malignancy:   (+) Malignancy, History of Breast.    Other:  - neg other ROS          Physical Exam    Airway  airway exam normal           Respiratory Devices and Support         Dental  no notable dental history         Cardiovascular   cardiovascular exam normal          Pulmonary   pulmonary exam normal                OUTSIDE LABS:  CBC:   Lab Results   Component Value Date    WBC 3.7 (L) 10/06/2022    WBC 8.1 09/08/2022    HGB 12.0 10/06/2022    HGB 9.9 (L) 09/08/2022    HCT 38.3 10/06/2022    HCT 30.7 (L) 09/08/2022     10/06/2022     09/08/2022     BMP:   Lab Results   Component Value Date     10/06/2022     09/08/2022    POTASSIUM 3.9 10/06/2022    POTASSIUM 3.6 09/08/2022    CHLORIDE 104 10/06/2022    CHLORIDE 106 09/08/2022    CO2 26 10/06/2022    CO2 23 09/08/2022    BUN 10.2 10/06/2022    BUN 13.1 09/08/2022    CR 0.60 10/06/2022    CR 0.63 09/08/2022     (H) 10/06/2022     (H) 09/08/2022     COAGS: No results found for: PTT, INR, FIBR  POC: No results found for: BGM, HCG, HCGS  HEPATIC:   Lab Results   Component Value Date    ALBUMIN 3.4 (L) 09/08/2022    PROTTOTAL 6.7 09/08/2022    ALT 5 (L) 09/08/2022    AST 19 09/08/2022    ALKPHOS 60 09/08/2022    BILITOTAL 0.3 09/08/2022     OTHER:   Lab Results   Component Value Date    A1C 5.4 08/20/2021    VAL[place] 9.1 10/06/2022    PHOS 3.6 08/20/2021    MAG 2.2 08/20/2021    TSH 1.05 09/08/2022       Anesthesia Plan    ASA Status:  2   NPO Status:  NPO Appropriate    Anesthesia Type: General.     - Airway: LMA   Induction: Intravenous.   Maintenance: Balanced.        Consents    Anesthesia Plan(s)  and associated risks, benefits, and realistic alternatives discussed. Questions answered and patient/representative(s) expressed understanding.    - Discussed:     - Discussed with:  Patient      - Extended Intubation/Ventilatory Support Discussed: No.      - Patient is DNR/DNI Status: No    Use of blood products discussed: No .     Postoperative Care    Pain management: Multi-modal analgesia, IV analgesics.   PONV prophylaxis: Dexamethasone or Solumedrol, Ondansetron (or other 5HT-3)     Comments:                Jamey Castillo MD

## 2022-10-17 NOTE — INTERVAL H&P NOTE
"I have reviewed the surgical (or preoperative) H&P that is linked to this encounter, and examined the patient. There are no significant changes    Clinical Conditions Present on Arrival:  Clinically Significant Risk Factors Present on Admission                   # Obesity: Estimated body mass index is 30.9 kg/m  as calculated from the following:    Height as of this encounter: 1.67 m (5' 5.75\").    Weight as of this encounter: 86.2 kg (190 lb).       "

## 2022-10-17 NOTE — ANESTHESIA CARE TRANSFER NOTE
Patient: Vickie Alvarado    Procedure: Procedure(s):  LEFT seed localized lumpectomy with sentinel node biopsy  LEFT seed localized lumpectomy with sentinel node biopsy       Diagnosis: Malignant neoplasm of upper-inner quadrant of left breast in female, estrogen receptor positive (H) [C50.212, Z17.0]  Diagnosis Additional Information: No value filed.    Anesthesia Type:   General     Note:    Oropharynx: oropharynx clear of all foreign objects and spontaneously breathing  Level of Consciousness: drowsy  Oxygen Supplementation: nasal cannula  Level of Supplemental Oxygen (L/min / FiO2): 3  Independent Airway: airway patency satisfactory and stable  Dentition: dentition unchanged    Report to RN Given: handoff report given  Patient transferred to: PACU    Handoff Report: Identifed the Patient, Identified the Reponsible Provider, Reviewed the pertinent medical history, Discussed the surgical course, Reviewed Intra-OP anesthesia mangement and issues during anesthesia, Set expectations for post-procedure period and Allowed opportunity for questions and acknowledgement of understanding      Vitals:  Vitals Value Taken Time   /51 10/17/22 0846   Temp     Pulse 73 10/17/22 0848   Resp 13 10/17/22 0848   SpO2 94 % 10/17/22 0848   Vitals shown include unvalidated device data.    Electronically Signed By: RAS Horn CRNA  October 17, 2022  8:50 AM

## 2022-10-17 NOTE — BRIEF OP NOTE
[facility]    Brief Operative Note    Pre-operative diagnosis: Malignant neoplasm of upper-inner quadrant of left breast in female, estrogen receptor positive (H) [C50.212, Z17.0]  Post-operative diagnosis Same as pre-operative diagnosis    Procedure: Procedure(s):  LEFT seed localized lumpectomy with sentinel node biopsy  LEFT seed localized lumpectomy with sentinel node biopsy  Surgeon: Surgeon(s) and Role:     * Alphonso Cadet MD - Primary     * Chen Cuello MD - Resident - Assisting   Assisting:  Callie Linda MS3    Anesthesia: General   Estimated Blood Loss: 2 ml    Drains: None  Specimens:   ID Type Source Tests Collected by Time Destination   1 : Left breast mass Tissue Breast, Left SURGICAL PATHOLOGY EXAM Alphonso Cadet MD 10/17/2022  8:02 AM    2 : Left axillary sentinel lymph node #1 Tissue Lymph Node(s), [place] SURGICAL PATHOLOGY EXAM Alphonso Cadet MD 10/17/2022  8:13 AM      Findings:   Lumpectomy performed with localization system, specimen radiographically confirmed with marker and calcifications present, no signal in cavity. One sentinel lymph node, blue and hot (max signal 59) removed, max signal 7 in cavity. Clips placed at 4 quadrants of lumpectomy cavity.  Complications: None.  Implants: * No implants in log *    Plan:  - PO pain meds, no narcotics  - Anticipate discharge home  - Follow up 2 weeks    - - - - - - - - - - - - - - - - - -  Chen Cuello MD  General Surgery PGY-5  See [facility] for on call information.

## 2022-10-17 NOTE — OP NOTE
Procedure Date: 10/17/2022    PREOPERATIVE DIAGNOSIS:  Left breast cancer.    POSTOPERATIVE DIAGNOSIS:  Left breast cancer.    PROCEDURE:  Left seed localized lumpectomy, lymphatic mapping and left axillary sentinel lymph node biopsy.    ATTENDING SURGEON:  Alphonso Cadet M.D.     RESIDENT SURGEON:  Chen Cuello M.D.     ANESTHESIA:  General with LMA.    INDICATIONS FOR PROCEDURE:  The patient is a 67-year-old woman who presented with a T2 N0 M0 left breast cancer, received preoperative chemotherapy.  She now presents for breast conserving surgery.    DESCRIPTION OF PROCEDURE:  After informed consent, the patient was brought to the operating room, given a general anesthetic, and LMA was placed.  I injected technetium sulfur colloid and isosulfan blue into her left breast.  She was prepped and draped in the usual fashion.  We used the localizer to identify the radiofrequency seed at the 11 o'clock position.  Local anesthetic was administered.  A curvilinear incision was made over the area closest to the seed.  The Bovie cautery was used to incise subcutaneous tissues.  I dissected circumferentially around the radiofrequency seed to ensure adequate surgical margins.  The specimen was removed, oriented and sent to the breast center.  Specimen radiograph demonstrated complete excision of the targeted lesion and the radiofrequency clip.  Strict hemostasis was obtained with the Bovie cautery.  Surgical clips were placed in all 4 quadrants of the resection bed to facilitate radiation therapy.  Next, we identified a transcutaneous hot spot in the left axilla.  Local anesthetic was administered, and a transverse axillary incision was made with a scalpel.  The Bovie cautery was used to incise subcutaneous tissues.  Dissection proceeded down through the clavipectoral fascia.  I identified a blue-stained radioactive lymph node.  [place] lymph node #1 was removed and had ex vivo counts of 57 counts per second.  After removal of  this lymph node, there were no additional blue, radioactive or palpable lymph nodes.  Both incisions were closed with interrupted 3-0 Vicryl for the dermal layer and a running 4-0 PDS subcuticular stitch for the skin.  Dermabond was placed, and the patient was taken to the recovery room in stable condition.    Alphonso Cadet MD        D: 10/17/2022   T: 10/17/2022   MT: YONATAN    Name:     FRANDY ANDERSONAnant  MRN:      [id number]        Account:        [id number]   :      1955           Procedure Date: 10/17/2022     Document: [id number]

## 2022-10-17 NOTE — DISCHARGE INSTRUCTIONS
[facility]  Home Care Following Anesthesia  For 24 hours after surgery:  Get plenty of rest.  A responsible adult must stay with you for at least 24 hours after you leave the surgery center.  Do not drive or use heavy equipment.  If you have weakness or tingling, don't drive or use heavy equipment until this feeling goes away.   Do not drink alcohol.   Avoid strenuous or risky activities.  Ask for help when climbing stairs.  You may feel lightheaded.  IF so, sit for a few minutes before standing.  Have someone help you get up.   If you have nausea (feel sick to your stomach): Drink only clear liquids such as apple juice, ginger ale, broth or 7-Up.  Rest may also help.  Be sure to drink enough fluids.  Move to a regular diet as you feel able.   You may have a slight fever.  Call the doctor if your fever is over 100 F (37.7 C) (taken under the tongue) or lasts longer than 24 hours.  You may have a dry mouth, a sore throat, muscle aches or trouble sleeping. These should go away after 24 hours.  Do not make important or legal decisions.   It is recommended to avoid smoking.               Tips for taking pain medications  To get the best pain relief possible, remember these points:  Take pain medications as directed, before pain becomes severe.  Pain medication can upset your stomach: taking it with food may help.  Constipation is a common side effect of pain medication. Drink plenty of  fluids.  Eat foods high in fiber. Take a stool softener if recommended by your doctor or pharmacist.  Do not drink alcohol, drive or operate machinery while taking pain medications.  Ask about other ways to control pain, such as with heat, ice or relaxation.    Tylenol/Acetaminophen Consumption  To help encourage the safe use of acetaminophen, the makers of TYLENOL  have lowered the maximum daily dose for single-ingredient Extra Strength TYLENOL  (acetaminophen) products sold in the [place] from 8 pills  per day (4,000 mg) to 6 pills per day (3,000 mg). The dosing interval has also changed from 2 pills every 4-6 hours to 2 pills every 6 hours.  If you feel your pain relief is insufficient, you may take Tylenol/Acetaminophen in addition to your narcotic pain medication.   Be careful not to exceed 3,000 mg of Tylenol/Acetaminophen in a 24 hour period from all sources.  If you are taking extra strength Tylenol/acetaminophen (500 mg), the maximum dose is 6 tablets in 24 hours.  If you are taking regular strength acetaminophen (325 mg), the maximum dose is 9 tablets in 24 hours.    Call a doctor for any of the following:  Signs of infection (fever, growing tenderness at the surgery site, a large amount of drainage or bleeding, severe pain, foul-smelling drainage, redness, swelling).  It has been over 8 to 10 hours since surgery and you are still not able to urinate (pass water).  Headache for over 24 hours.  Numbness, tingling or weakness the day after surgery (if you had spinal anesthesia).  Signs of Covid-19 infection (temperature over 100 degrees, shortness of breath, cough, loss of taste/smell, generalized body aches, persistent headache, chills, sore throat, nausea/vomiting/diarrhea)  Your doctor is:       Dr. Alphonso Cadet, [facility]: [phone]               Or dial [phone] and ask for the resident on call for:  [facility]  For emergency care, call the:  [facility]:  [phone] (TTY for hearing impaired: [phone])

## 2022-10-17 NOTE — ANESTHESIA POSTPROCEDURE EVALUATION
Patient: Vickie Alvarado    Procedure: Procedure(s):  LEFT seed localized lumpectomy with sentinel node biopsy  LEFT seed localized lumpectomy with sentinel node biopsy       Anesthesia Type:  General    Note:  Disposition: Outpatient   Postop Pain Control: Uneventful            Sign Out: Well controlled pain   PONV: No   Neuro/Psych: Uneventful            Sign Out: Acceptable/Baseline neuro status   Airway/Respiratory: Uneventful            Sign Out: Acceptable/Baseline resp. status   CV/Hemodynamics: Uneventful            Sign Out: Acceptable CV status; No obvious hypovolemia; No obvious fluid overload   Other NRE: NONE   DID A NON-ROUTINE EVENT OCCUR? No           Last vitals:  Vitals Value Taken Time   /59 10/17/22 0900   Temp 36.2  C (97.1  F) 10/17/22 0900   Pulse 73 10/17/22 0908   Resp 10 10/17/22 0908   SpO2 93 % 10/17/22 0908   Vitals shown include unvalidated device data.    Electronically Signed By: Jamey Castillo MD  October 17, 2022  10:28 AM

## 2022-10-19 ENCOUNTER — PATIENT OUTREACH (OUTPATIENT)
Dept: ONCOLOGY | Facility: CLINIC | Age: 67
End: 2022-10-19

## 2022-10-19 NOTE — PROGRESS NOTES
POST-OP CALL  Surgical Oncology     Date of call: 10/19/2022     Surgery Date: 10/17/2022  Surgery:  Left seed localized lumpectomy, lymphatic mapping and left axillary sentinel lymph node biopsy       Current concerns:  None     Pain: Well controlled with Tylenol and Ibuprofen     Incisions: No concerns.    Fevers/chills: Denies    Eating/drinking: Good PO intake        Reviewed follow up appointment scheduled on 11/4/2022 with Dr. Cadet.       Patient will call with any questions or concerns.         Shanthi Gordon RNCC  Surgical Oncology

## 2022-10-22 ENCOUNTER — HEALTH MAINTENANCE LETTER (OUTPATIENT)
Age: 67
End: 2022-10-22

## 2022-10-26 PROCEDURE — 88341 IMHCHEM/IMCYTCHM EA ADD ANTB: CPT | Mod: 26 | Performed by: PATHOLOGY

## 2022-10-26 PROCEDURE — 88342 IMHCHEM/IMCYTCHM 1ST ANTB: CPT | Mod: 26 | Performed by: PATHOLOGY

## 2022-10-26 PROCEDURE — 88360 TUMOR IMMUNOHISTOCHEM/MANUAL: CPT | Mod: 26 | Performed by: PATHOLOGY

## 2022-10-26 PROCEDURE — 88307 TISSUE EXAM BY PATHOLOGIST: CPT | Mod: 26 | Performed by: PATHOLOGY

## 2022-11-01 LAB — INTERPRETATION: NORMAL

## 2022-11-01 PROCEDURE — 88377 M/PHMTRC ALYS ISHQUANT/SEMIQ: CPT | Mod: 26 | Performed by: MEDICAL GENETICS

## 2022-11-04 ENCOUNTER — ONCOLOGY VISIT (OUTPATIENT)
Dept: ONCOLOGY | Facility: CLINIC | Age: 67
End: 2022-11-04
Attending: SURGERY
Payer: MEDICARE

## 2022-11-04 VITALS
BODY MASS INDEX: 30.95 KG/M2 | TEMPERATURE: 97.5 F | OXYGEN SATURATION: 97 % | SYSTOLIC BLOOD PRESSURE: 131 MMHG | WEIGHT: 190.3 LBS | DIASTOLIC BLOOD PRESSURE: 77 MMHG | HEART RATE: 88 BPM

## 2022-11-04 DIAGNOSIS — Z17.0 MALIGNANT NEOPLASM OF UPPER-INNER QUADRANT OF LEFT BREAST IN FEMALE, ESTROGEN RECEPTOR POSITIVE (H): Primary | ICD-10-CM

## 2022-11-04 DIAGNOSIS — C50.212 MALIGNANT NEOPLASM OF UPPER-INNER QUADRANT OF LEFT BREAST IN FEMALE, ESTROGEN RECEPTOR POSITIVE (H): Primary | ICD-10-CM

## 2022-11-04 PROCEDURE — G0463 HOSPITAL OUTPT CLINIC VISIT: HCPCS

## 2022-11-04 ASSESSMENT — PAIN SCALES - GENERAL: PAINLEVEL: NO PAIN (0)

## 2022-11-04 NOTE — PROGRESS NOTES
Oncology visit:  Date on this visit: 11/7/2022    Diagnosis: left breast cancer.    Primary Physician: Gaby Lynn     History Of Present Illness:  Ms. Alvarado is a 67 year old female with a left breast cancer.  She self palpated a mass in her mid left breast.  Bilateral diagnostic mammograms showed a mass in the upper inner left breast.  Ultrasound of the left breast showed a mass at 11:00, 8 cm from the nipple measuring 3.1 cm.  There were no suspicious lymph nodes in the left axilla.  Biopsy of the left breast mass was c/w a grade 3 invasive ductal carcinoma, ER strong in 98%, ID moderate in 70%, and HER2 low by IHC (score 1+).   Oncotype DX recurrence score was 24.  RSClin predicted a 23% risk of distant recurrence in 10 years if treated with endocrine therapy alone and a 9% absolute risk reduction with chemotherapy.  Based on this, she elected to proceed with chemotherapy.    She received neoadjuvant Taxotere and cyclophosphamide chemotherapy from 7/6/2022 - 9/8/2022.  She underwent left breast lumpectomy and left axillary sentinel lymph node procedure under the care of Dr. Cadet on 10/17/2022.  Pathology showed residual grade 2 invasive breast carcinoma (60% ductal and 40% micropapillary) measuring 2.2 cm.  Treatment related changes were present and invasive tumor cellularity was 30%.  There was associated DCIS.  Surgical margins for both invasive carcinoma and DCIS were close, but negative.  There was no lymphovascular invasion and a single sentinel lymph node was negative and without signs of prior involvement (i.e. no tumor bed or fibrotic changes in the lymph node).  Receptors were repeated on the residual invasive malignancy and were found to be ER positive (98%), ID positive (70%), HER-2 equivocal by IHC (2+), and HER-2 positive by FISH.    Interval History:  Ms. Alvarado comes into clinic today for routine breast cancer follow-up.  Since last visit, she underwent a left breast lumpectomy.  She  states overall she felt the surgery went well for her.  She states since surgery overall her breasts have had increased tenderness.  On Saturday, 11/5 she noted a painful lump in the area of her lumpectomy.  She has been wearing a bra 24/7 as this has made her breast tenderness less.  She denies any redness of the skin or drainage from her incision.  She has not had any fevers or chills.      She had an episode this morning where she felt acutely dizzy while in the shower.  She said it was more of a dizziness than a lightheadedness.  She denies tunnel vision, diaphoresis, flushing, or syncope.  She did not have heart palpitations, shortness of breath, or chest pain.  It was enough that she braced herself against the shower wall and did not feel comfortable driving herself to today's appointment.  Due to her h/o PVCs, she has been taking her heart rate and since the episode this morning, it has ranged anywhere from 29 - 117 bpm.  She has had approximately 6 episodes of dizziness in the last 3 months, however, the others were much more mild.  She denies current cough, shortness of breath, or chest pain.  She has no abdominal complaints.  She reports full range of motion of her bilateral upper extremities.  She has neuropathy in the middle toes of each foot.  She describes it as a numbness that is worse after showering.  The remainder of a complete 12 point review of systems is reviewed with patient was negative with exception that mentioned above.    Past Medical/Surgical History:  Past Medical History:   Diagnosis Date     Aortic stenosis      Hyperlipidemia      Insufficiency fracture of tibia, sequela 09/28/2021     Polyp of colon 05/26/2016   - PVCs    Past Surgical History:   Procedure Laterality Date     BIOPSY NODE SENTINEL Left 10/17/2022    Procedure: LEFT seed localized lumpectomy with sentinel node biopsy;  Surgeon: Alphonso Cadet MD;  Location: [facility] OR     CATARACT IOL, RT/LT Right 2006     HC YAG LASER  CAPSULOTOMY Right 2009     LASER VITREOLYSIS, ANTERIOR OD (RIGHT EYE) Right 2007     LUMPECTOMY BREAST WITH SENTINEL NODE, COMBINED Left 10/17/2022    Procedure: LEFT seed localized lumpectomy with sentinel node biopsy;  Surgeon: Alphonso Cadet MD;  Location: [facility] OR     REPOSITION INTRAOCULAR LENS Right 11/18/2014    Procedure: REPOSITION INTRAOCULAR LENS;  Surgeon: Vickie Guerra MD;  [facility]     VITRECTOMY PARSPLANA WITH 23 GAUGE SYSTEM Right 11/18/2014    Procedure: VITRECTOMY PARSPLANA WITH 23 GAUGE SYSTEM;  Surgeon: Vickie Guerra MD;  Location:  [facility]     Allergies:  Allergies as of 11/07/2022 - Reviewed 11/04/2022   Allergen Reaction Noted     Bactrim [sulfamethoxazole w/trimethoprim] Nausea 11/01/2017     Seasonal allergies  11/13/2014     Typhoid vaccines Nausea and Vomiting 05/05/2011     Current Medications:  Current Outpatient Medications   Medication Sig Dispense Refill     alendronate (FOSAMAX) 70 MG tablet Take 1 tablet (70 mg) by mouth every 7 days 12 tablet 3     calcium carb-cholecalciferol 600-500 MG-UNIT CAPS        Lactobacillus (PROBIOTIC CHILDRENS) CHEW        loratadine (CLARITIN) 10 MG tablet Take 10 mg by mouth daily Prn, seasonal for ragweed and lilacs       metoprolol succinate ER (TOPROL XL) 25 MG 24 hr tablet Take 1 tablet (25 mg) by mouth daily 90 tablet 3     Omega-3 Fatty Acids (FISH OIL ADULT GUMMIES PO) Take 250 mg by mouth       simvastatin (ZOCOR) 40 MG tablet Take 1 tablet (40 mg) by mouth At Bedtime 90 tablet 3      Family and Social History:  See initial consultation dated 6/14/2022 for further details.    Physical Exam:  /86   Pulse 85   Temp 97.9  F (36.6  C) (Oral)   Wt 86.1 kg (189 lb 12.8 oz)   LMP 05/17/2011   SpO2 97%   BMI 30.87 kg/m    General:  Well appearing adult female in NAD.  Alert and oriented x 3.  HEENT:  Normocephalic.  Sclera anicteric.  MMM.  No lesions of the oropharynx.  Lymph:  No palpable cervical,  supraclavicular, or axillary LAD.  Chest:  CTA bilaterally.  No wheezes or crackles.  CV:  Irregular heart rhythm.  Nl S1 and S2.  No m/r/g.GIII/VI YOLETTE at the LUSB.  Breast:  Left breast upper incision appears to be healing well.  There is an 8 x 6 cm seroma underneath.  Tender to palpation.  No overlying erythema or skin discoloration.  No other discretely palpable masses.  Left nipple is everted and without discharge.  Abd:  Soft/ND.     Ext:  No pitting edema of the bilateral lower extremities.   Musculo:  Full ROM of the bilateral upper extremities.  Neuro:  Cranial nerves grossly intact.  Psych:  Mood and affect appear normal.  Skin:  Dermal glue present upper left breast and left axillary incisions.    Laboratory/Imaging Studies  10/17/2022 Left breast lumpectomy and left axillary sentinel lymph node biopsy:  - Residual invasive breast carcinoma (60% ductal, 40% micropapillary), grade 2, measuring 2.2 x 1.2 cm.  - Treatment related changes are present.  Invasive carcinoma tumor cellularity is 30%.  - Receptors repeated on invasive component of this specimen and are ER positive (98%), OK positive (70%), and HER-2 equivocal (IHC 2+).  - high-grade DCIS measuring 1.2 cm  - No lymphovascular invasion  - Surgical margins are negative but close with invasive carcinoma 0.07 mm from the superior margin  - DCIS is 0.3 mm from the anterior margin.  - One benign sentinel lymph node.  - [facility] RCB 1.833, Class II    10/17/2022 HER2 FISH:  The HER2/JOSE 17 ratio of 2.2 and average number of HER2 signals/cell of 6.8 places this specimen in Group 1 (ANA LUISA Positive).      This patient's previous HER2 FISH analysis ([id number] from 6-3-22) was reviewed and the original interpretation from that case was confirmed. The tumor cells in the present specimen showed complex and heterogeneous signal patterns with increased cell-to-cell variability in the number of HER2 signals, resulting in an increased number of HER2 signals/cell in the  present study compared with the previous (6.8 vs. 4.7).    ASSESSMENT/PLAN:  Ms. Alvarado is a 66 year old woman with a clinical stage II, T2N0M0, ER+/IL+/HER2 low IDC of her left breast.  She is s/p 4 cycles of neoadjuvant TC chemotherapy followed by left breast lumpectomy and sentinel lymph node biopsy.  [id number], grade 2 disease.  Repeat receptor testing on the residual tumor demonstrates ER/IL/HER-2 positive disease.    1. Left breast carcinoma:   - We reviewed the pathology from her left breast lumpectomy which showed residual 2.2 cm invasive tumor in the left breast with adjacent DCIS.  There were treatment related changes and residual invasive tumor cellularity was 30%.  A single sentinel lymph node was negative.  - We reviewed that initial tumor biopsy was HER-2 equivocal by FISH and negative by IHC (score 1+).  HER-2 was repeated on the excision specimen and was equivocal by IHC (2+) and positive by FISH with an average of 6.8 HER2 signals per cell.  - HER-2 positive breast cancer is at increased risk of metastasis and recurrence.  Based on HER-2 positivity in the residual tumor cells, I am recommending adjuvant HER-2 targeted therapy.  Traditional treatment would be with a regimen combining a taxane with HER-2 targeted therapy.  Her case is unique, however, in that she has already received a taxane in the neoadjuvant setting.  Therefore additional taxane increases risk for peripheral neuropathy, which she already has.  My recommendation is therefore for treatment with ado trastuzumab emtansine (Kadcyla) which has reduced rates of neuropathy and cardiac toxicity when compared to a taxane + trastuzumab (Peter et al.  Cureus. 2021 May 27;13(5).  I recommend administering for a total of 17 cycles.  - We discussed Kadcyla is given IV once every 3 weeks.  We reviewed the potential side effects of Kadcyla including fatigue, nausea, diarrhea, peripheral neuropathy, low blood counts, increased risk of infection,  elevated liver tests, and rare cases of cardiac toxicity and pneumonitis.  - Given the complex nature of her case, I also plan to discuss at the [place] breast conference on 11/11.  - In addition to adjuvant HER2 therapy, I recommend proceeding with adjuvant radiation.  These treatments can be administered concurrently.  I have placed a radiation referral today.  She inquired about cardiac toxicity of radiation.  We discussed with EBRT, a breath holding technique can be used to spare the heart.  Another option would be to consider proton beam radiation.  I encouraged her to discuss the pros and cons of each at her radiation consultation.  - Upon completion of radiation, we will plan to initiate endocrine therapy with letrozole.  Plan will be to treat for a total of 7-10 years.    2.  Family history of breast and ovarian neoplasm:  Hereditary Comprehensive 40 Gene Panel was negative for pathogenic mutation.    3.  Personal history of colon polyps:  She has a h/o colon polyps.  Last colonoscopy 2/23/2022 with removal of 3 polyps, each 2-3 mm (2 tubular adenomas, 1 hyperplastic).  Repeat colonoscopy in 02/2027 is recommended.    4.  Dizziness:  EKG performed today shows occasional PVCs.  Will obtain an echocardiogram.  Recommend drinking at least 48 oz of non-caffeinated fluids daily.    5.  At risk for cardiomyopathy:  Will obtain a baseline echocardiogram to measure LVEF prior to starting HER2 targeted therapy, then repeat once every 3 months.    6.  Follow Up:    Radiation visit with Dr. Murphy within 1-2 weeks.   Echocardiogram within 1-2 weeks.   Return visit with me in 6 weeks.    50 minutes spent on the date of the encounter doing chart review, review of test results, interpretation of tests, patient visit, documentation and discussion with other provider(s).

## 2022-11-04 NOTE — NURSING NOTE
"Oncology Rooming Note    November 4, 2022 10:04 AM   Vickie Alvarado is a 67 year old female who presents for:    Chief Complaint   Patient presents with     Oncology Clinic Visit     Malignant neoplasm of upper-inner quadrant of left breast     Initial Vitals: /77 (BP Location: Right arm, Patient Position: Sitting, Cuff Size: Adult Regular)   Pulse 88   Temp 97.5  F (36.4  C) (Oral)   Wt 86.3 kg (190 lb 4.8 oz)   LMP 05/17/2011   SpO2 97%   BMI 30.95 kg/m   Estimated body mass index is 30.95 kg/m  as calculated from the following:    Height as of 10/17/22: 1.67 m (5' 5.75\").    Weight as of this encounter: 86.3 kg (190 lb 4.8 oz). Body surface area is 2 meters squared.  No Pain (0) Comment: Data Unavailable   Patient's last menstrual period was 05/17/2011.  Allergies reviewed: Yes  Medications reviewed: Yes    Medications: Medication refills not needed today.  Pharmacy name entered into [facility]: [id number] [place] [id number] [place] [id number] & [facility]    Clinical concerns: none.       Hiro Grimaldo"

## 2022-11-04 NOTE — PROGRESS NOTES
HISTORY OF PRESENT ILLNESS:  Vickie Alvarado is here for a postoperative visit after undergoing a left lumpectomy and sentinel lymph node biopsy after receiving neoadjuvant chemotherapy.  Her final pathology report demonstrated a 2.2 cm residual invasive cancer.  Her margins were negative.  Her sentinel node was negative.  She has done well since her surgery with no postoperative complications.    IMPRESSION:  Postoperative check.    PLAN:  She is scheduled to see Dr. Horvath next week to talk about endocrine therapy and radiation therapy.  She can have radiation therapy here at the [facility].  I will see her in the future if any problems arise.

## 2022-11-04 NOTE — LETTER
11/4/2022         RE: Vickie Alvarado  [address]  [facility] [address]        Dear Colleague,    Thank you for referring your patient, Vickie Alvarado, to the [facility]. Please see a copy of my visit note below.    HISTORY OF PRESENT ILLNESS:  Vickie Alvarado is here for a postoperative visit after undergoing a left lumpectomy and sentinel lymph node biopsy after receiving neoadjuvant chemotherapy.  Her final pathology report demonstrated a 2.2 cm residual invasive cancer.  Her margins were negative.  Her sentinel node was negative.  She has done well since her surgery with no postoperative complications.    IMPRESSION:  Postoperative check.    PLAN:  She is scheduled to see Dr. Horvath next week to talk about endocrine therapy and radiation therapy.  She can have radiation therapy here at the [facility].  I will see her in the future if any problems arise.    Sincerely,        Alphonso Cadet MD

## 2022-11-07 ENCOUNTER — MYC MEDICAL ADVICE (OUTPATIENT)
Dept: ONCOLOGY | Facility: CLINIC | Age: 67
End: 2022-11-07

## 2022-11-07 ENCOUNTER — ONCOLOGY VISIT (OUTPATIENT)
Dept: ONCOLOGY | Facility: CLINIC | Age: 67
End: 2022-11-07
Attending: INTERNAL MEDICINE
Payer: MEDICARE

## 2022-11-07 VITALS
BODY MASS INDEX: 30.87 KG/M2 | HEART RATE: 85 BPM | SYSTOLIC BLOOD PRESSURE: 138 MMHG | OXYGEN SATURATION: 97 % | TEMPERATURE: 97.9 F | WEIGHT: 189.8 LBS | DIASTOLIC BLOOD PRESSURE: 86 MMHG

## 2022-11-07 DIAGNOSIS — I49.3 PVC'S (PREMATURE VENTRICULAR CONTRACTIONS): ICD-10-CM

## 2022-11-07 DIAGNOSIS — Z51.11 ENCOUNTER FOR ANTINEOPLASTIC CHEMOTHERAPY: ICD-10-CM

## 2022-11-07 DIAGNOSIS — Z17.0 MALIGNANT NEOPLASM OF UPPER-INNER QUADRANT OF LEFT BREAST IN FEMALE, ESTROGEN RECEPTOR POSITIVE (H): Primary | ICD-10-CM

## 2022-11-07 DIAGNOSIS — L76.34 POSTOPERATIVE SEROMA OF SUBCUTANEOUS TISSUE AFTER NON-DERMATOLOGIC PROCEDURE: ICD-10-CM

## 2022-11-07 DIAGNOSIS — C50.212 MALIGNANT NEOPLASM OF UPPER-INNER QUADRANT OF LEFT BREAST IN FEMALE, ESTROGEN RECEPTOR POSITIVE (H): Primary | ICD-10-CM

## 2022-11-07 DIAGNOSIS — R55 SYNCOPE, UNSPECIFIED SYNCOPE TYPE: ICD-10-CM

## 2022-11-07 PROCEDURE — G0463 HOSPITAL OUTPT CLINIC VISIT: HCPCS

## 2022-11-07 PROCEDURE — 99215 OFFICE O/P EST HI 40 MIN: CPT | Performed by: INTERNAL MEDICINE

## 2022-11-07 PROCEDURE — 93005 ELECTROCARDIOGRAM TRACING: CPT

## 2022-11-07 PROCEDURE — 93010 ELECTROCARDIOGRAM REPORT: CPT | Performed by: INTERNAL MEDICINE

## 2022-11-07 ASSESSMENT — PAIN SCALES - GENERAL: PAINLEVEL: NO PAIN (0)

## 2022-11-07 NOTE — NURSING NOTE
"Oncology Rooming Note    November 7, 2022 9:38 AM   Vickie Alvarado is a 67 year old female who presents for:    Chief Complaint   Patient presents with     Oncology Clinic Visit     Malignant neoplasm of upper-inner quadrant of left breast in female, estrogen receptor positive     Initial Vitals: /86   Pulse 85   Temp 97.9  F (36.6  C) (Oral)   Wt 86.1 kg (189 lb 12.8 oz)   LMP 05/17/2011   SpO2 97%   BMI 30.87 kg/m   Estimated body mass index is 30.87 kg/m  as calculated from the following:    Height as of 10/17/22: 1.67 m (5' 5.75\").    Weight as of this encounter: 86.1 kg (189 lb 12.8 oz). Body surface area is 2 meters squared.  No Pain (0) Comment: Data Unavailable   Patient's last menstrual period was 05/17/2011.  Allergies reviewed: Yes  Medications reviewed: Yes    Medications: Medication refills not needed today.  Pharmacy [facility] entered into [email or web address]: [id number] - [place] [id number] & [facility]    Clinical concerns: Pt states she felt dizzy this morning. She was unable to drive to this appointment, she took an Uber.       Lorri Lujan"

## 2022-11-07 NOTE — LETTER
11/7/2022         RE: Vickie Alvarado  [address]  [facility] [address]        Dear Colleague,    Thank you for referring your patient, Vickie Alvarado, to the [facility]. Please see a copy of my visit note below.    Oncology visit:  Date on this visit: 11/7/2022    Diagnosis: left breast cancer.    Primary Physician: Gaby Lynn     History Of Present Illness:  Ms. Alvarado is a 67 year old female with a left breast cancer.  She self palpated a mass in her mid left breast.  Bilateral diagnostic mammograms showed a mass in the upper inner left breast.  Ultrasound of the left breast showed a mass at 11:00, 8 cm from the nipple measuring 3.1 cm.  There were no suspicious lymph nodes in the left axilla.  Biopsy of the left breast mass was c/w a grade 3 invasive ductal carcinoma, ER strong in 98%, IA moderate in 70%, and HER2 low by IHC (score 1+).   Oncotype DX recurrence score was 24.  RSClin predicted a 23% risk of distant recurrence in 10 years if treated with endocrine therapy alone and a 9% absolute risk reduction with chemotherapy.  Based on this, she elected to proceed with chemotherapy.    She received neoadjuvant Taxotere and cyclophosphamide chemotherapy from 7/6/2022 - 9/8/2022.  She underwent left breast lumpectomy and left axillary sentinel lymph node procedure under the care of Dr. Cadet on 10/17/2022.  Pathology showed residual grade 2 invasive breast carcinoma (60% ductal and 40% micropapillary) measuring 2.2 cm.  Treatment related changes were present and invasive tumor cellularity was 30%.  There was associated DCIS.  Surgical margins for both invasive carcinoma and DCIS were close, but negative.  There was no lymphovascular invasion and a single sentinel lymph node was negative and without signs of prior involvement (i.e. no tumor bed or fibrotic changes in the lymph node).  Receptors were repeated on the residual invasive malignancy and were found to  be ER positive (98%), CA positive (70%), HER-2 equivocal by IHC (2+), and HER-2 positive by FISH.    Interval History:  Ms. Alvarado comes into clinic today for routine breast cancer follow-up.  Since last visit, she underwent a left breast lumpectomy.  She states overall she felt the surgery went well for her.  She states since surgery overall her breasts have had increased tenderness.  On Saturday, 11/5 she noted a painful lump in the area of her lumpectomy.  She has been wearing a bra 24/7 as this has made her breast tenderness less.  She denies any redness of the skin or drainage from her incision.  She has not had any fevers or chills.      She had an episode this morning where she felt acutely dizzy while in the shower.  She said it was more of a dizziness than a lightheadedness.  She denies tunnel vision, diaphoresis, flushing, or syncope.  She did not have heart palpitations, shortness of breath, or chest pain.  It was enough that she braced herself against the shower wall and did not feel comfortable driving herself to today's appointment.  Due to her h/o PVCs, she has been taking her heart rate and since the episode this morning, it has ranged anywhere from 29 - 117 bpm.  She has had approximately 6 episodes of dizziness in the last 3 months, however, the others were much more mild.  She denies current cough, shortness of breath, or chest pain.  She has no abdominal complaints.  She reports full range of motion of her bilateral upper extremities.  She has neuropathy in the middle toes of each foot.  She describes it as a numbness that is worse after showering.  The remainder of a complete 12 point review of systems is reviewed with patient was negative with exception that mentioned above.    Past Medical/Surgical History:  Past Medical History:   Diagnosis Date     Aortic stenosis      Hyperlipidemia      Insufficiency fracture of tibia, sequela 09/28/2021     Polyp of colon 05/26/2016   - PVCs    Past  Surgical History:   Procedure Laterality Date     BIOPSY NODE SENTINEL Left 10/17/2022    Procedure: LEFT seed localized lumpectomy with sentinel node biopsy;  Surgeon: Alphonso Cadet MD;  Location: [facility] OR     CATARACT IOL, RT/LT Right 2006     HC YAG LASER CAPSULOTOMY Right 2009     LASER VITREOLYSIS, ANTERIOR OD (RIGHT EYE) Right 2007     LUMPECTOMY BREAST WITH SENTINEL NODE, COMBINED Left 10/17/2022    Procedure: LEFT seed localized lumpectomy with sentinel node biopsy;  Surgeon: Alphonso Cadet MD;  Location: [facility] OR     REPOSITION INTRAOCULAR LENS Right 11/18/2014    Procedure: REPOSITION INTRAOCULAR LENS;  Surgeon: Vickie Guerra MD;  [facility]     VITRECTOMY PARSPLANA WITH 23 GAUGE SYSTEM Right 11/18/2014    Procedure: VITRECTOMY PARSPLANA WITH 23 GAUGE SYSTEM;  Surgeon: Vickie Guerra MD;  [facility]     Allergies:  Allergies as of 11/07/2022 - Reviewed 11/04/2022   Allergen Reaction Noted     Bactrim [sulfamethoxazole w/trimethoprim] Nausea 11/01/2017     Seasonal allergies  11/13/2014     Typhoid vaccines Nausea and Vomiting 05/05/2011     Current Medications:  Current Outpatient Medications   Medication Sig Dispense Refill     alendronate (FOSAMAX) 70 MG tablet Take 1 tablet (70 mg) by mouth every 7 days 12 tablet 3     calcium carb-cholecalciferol 600-500 MG-UNIT CAPS        Lactobacillus (PROBIOTIC CHILDRENS) CHEW        loratadine (CLARITIN) 10 MG tablet Take 10 mg by mouth daily Prn, seasonal for ragweed and lilacs       metoprolol succinate ER (TOPROL XL) 25 MG 24 hr tablet Take 1 tablet (25 mg) by mouth daily 90 tablet 3     Omega-3 Fatty Acids (FISH OIL ADULT GUMMIES PO) Take 250 mg by mouth       simvastatin (ZOCOR) 40 MG tablet Take 1 tablet (40 mg) by mouth At Bedtime 90 tablet 3      Family and Social History:  See initial consultation dated 6/14/2022 for further details.    Physical Exam:  /86   Pulse 85   Temp 97.9  F (36.6  C) (Oral)   Wt 86.1 kg  (189 lb 12.8 oz)   LMP 05/17/2011   SpO2 97%   BMI 30.87 kg/m    General:  Well appearing adult female in NAD.  Alert and oriented x 3.  HEENT:  Normocephalic.  Sclera anicteric.  MMM.  No lesions of the oropharynx.  Lymph:  No palpable cervical, supraclavicular, or axillary LAD.  Chest:  CTA bilaterally.  No wheezes or crackles.  CV:  Irregular heart rhythm.  Nl S1 and S2.  No m/r/g.GIII/VI YOLETTE at the LUSB.  Breast:  Left breast upper incision appears to be healing well.  There is an 8 x 6 cm seroma underneath.  Tender to palpation.  No overlying erythema or skin discoloration.  No other discretely palpable masses.  Left nipple is everted and without discharge.  Abd:  Soft/ND.     Ext:  No pitting edema of the bilateral lower extremities.   Musculo:  Full ROM of the bilateral upper extremities.  Neuro:  Cranial nerves grossly intact.  Psych:  Mood and affect appear normal.  Skin:  Dermal glue present upper left breast and left axillary incisions.    Laboratory/Imaging Studies  10/17/2022 Left breast lumpectomy and left axillary sentinel lymph node biopsy:  - Residual invasive breast carcinoma (60% ductal, 40% micropapillary), grade 2, measuring 2.2 x 1.2 cm.  - Treatment related changes are present.  Invasive carcinoma tumor cellularity is 30%.  - Receptors repeated on invasive component of this specimen and are ER positive (98%), AK positive (70%), and HER-2 equivocal (IHC 2+).  - high-grade DCIS measuring 1.2 cm  - No lymphovascular invasion  - Surgical margins are negative but close with invasive carcinoma 0.07 mm from the superior margin  - DCIS is 0.3 mm from the anterior margin.  - One benign sentinel lymph node.  - MDA RCB 1.833, Class II    10/17/2022 HER2 FISH:  The HER2/JOSE 17 ratio of 2.2 and average number of HER2 signals/cell of 6.8 places this specimen in Group 1 (ANA LUISA Positive).      This patient's previous HER2 FISH analysis ([id number] from 6-3-22) was reviewed and the original interpretation  from that case was confirmed. The tumor cells in the present specimen showed complex and heterogeneous signal patterns with increased cell-to-cell variability in the number of HER2 signals, resulting in an increased number of HER2 signals/cell in the present study compared with the previous (6.8 vs. 4.7).    ASSESSMENT/PLAN:  Ms. Alvarado is a 66 year old woman with a clinical stage II, T2N0M0, ER+/SC+/HER2 low IDC of her left breast.  She is s/p 4 cycles of neoadjuvant TC chemotherapy followed by left breast lumpectomy and sentinel lymph node biopsy.  [id number], grade 2 disease.  Repeat receptor testing on the residual tumor demonstrates ER/SC/HER-2 positive disease.    1. Left breast carcinoma:   - We reviewed the pathology from her left breast lumpectomy which showed residual 2.2 cm invasive tumor in the left breast with adjacent DCIS.  There were treatment related changes and residual invasive tumor cellularity was 30%.  A single sentinel lymph node was negative.  - We reviewed that initial tumor biopsy was HER-2 equivocal by FISH and negative by IHC (score 1+).  HER-2 was repeated on the excision specimen and was equivocal by IHC (2+) and positive by FISH with an average of 6.8 HER2 signals per cell.  - HER-2 positive breast cancer is at increased risk of metastasis and recurrence.  Based on HER-2 positivity in the residual tumor cells, I am recommending adjuvant HER-2 targeted therapy.  Traditional treatment would be with a regimen combining a taxane with HER-2 targeted therapy.  Her case is unique, however, in that she has already received a taxane in the neoadjuvant setting.  Therefore additional taxane increases risk for peripheral neuropathy, which she already has.  My recommendation is therefore for treatment with ado trastuzumab emtansine (Kadcyla) which has reduced rates of neuropathy and cardiac toxicity when compared to a taxane + trastuzumab (Peter et al.  Cureus. 2021 May 27;13(5).  I recommend  administering for a total of 17 cycles.  - We discussed Kadcyla is given IV once every 3 weeks.  We reviewed the potential side effects of Kadcyla including fatigue, nausea, diarrhea, peripheral neuropathy, low blood counts, increased risk of infection, elevated liver tests, and rare cases of cardiac toxicity and pneumonitis.  - Given the complex nature of her case, I also plan to discuss at the [place] breast conference on 11/11.  - In addition to adjuvant HER2 therapy, I recommend proceeding with adjuvant radiation.  These treatments can be administered concurrently.  I have placed a radiation referral today.  She inquired about cardiac toxicity of radiation.  We discussed with EBRT, a breath holding technique can be used to spare the heart.  Another option would be to consider proton beam radiation.  I encouraged her to discuss the pros and cons of each at her radiation consultation.  - Upon completion of radiation, we will plan to initiate endocrine therapy with letrozole.  Plan will be to treat for a total of 7-10 years.    2.  Family history of breast and ovarian neoplasm:  Hereditary Comprehensive 40 Gene Panel was negative for pathogenic mutation.    3.  Personal history of colon polyps:  She has a h/o colon polyps.  Last colonoscopy 2/23/2022 with removal of 3 polyps, each 2-3 mm (2 tubular adenomas, 1 hyperplastic).  Repeat colonoscopy in 02/2027 is recommended.    4.  Dizziness:  EKG performed today shows occasional PVCs.  Will obtain an echocardiogram.  Recommend drinking at least 48 oz of non-caffeinated fluids daily.    5.  At risk for cardiomyopathy:  Will obtain a baseline echocardiogram to measure LVEF prior to starting HER2 targeted therapy, then repeat once every 3 months.    6.  Follow Up:    Radiation visit with Dr. Murphy within 1-2 weeks.   Echocardiogram within 1-2 weeks.   Return visit with me in 6 weeks.    50 minutes spent on the date of the encounter doing chart review, review of test  results, interpretation of tests, patient visit, documentation and discussion with other provider(s).           Again, thank you for allowing me to participate in the care of your patient.      Sincerely,    Chandrika Horvath MD

## 2022-11-07 NOTE — NURSING NOTE
I verified name and date of birth. Then performed an EKG and transmitted  the results right away.  Abida Colorado MA

## 2022-11-08 ENCOUNTER — ANCILLARY PROCEDURE (OUTPATIENT)
Dept: MAMMOGRAPHY | Facility: CLINIC | Age: 67
End: 2022-11-08
Attending: SURGERY
Payer: MEDICARE

## 2022-11-08 DIAGNOSIS — Z17.0 MALIGNANT NEOPLASM OF UPPER-INNER QUADRANT OF LEFT BREAST IN FEMALE, ESTROGEN RECEPTOR POSITIVE (H): ICD-10-CM

## 2022-11-08 DIAGNOSIS — C50.212 MALIGNANT NEOPLASM OF UPPER-INNER QUADRANT OF LEFT BREAST IN FEMALE, ESTROGEN RECEPTOR POSITIVE (H): ICD-10-CM

## 2022-11-08 DIAGNOSIS — L76.34 POSTOPERATIVE SEROMA OF SUBCUTANEOUS TISSUE AFTER NON-DERMATOLOGIC PROCEDURE: ICD-10-CM

## 2022-11-08 LAB
ATRIAL RATE - MUSE: 76 BPM
DIASTOLIC BLOOD PRESSURE - MUSE: NORMAL MMHG
INTERPRETATION ECG - MUSE: NORMAL
P AXIS - MUSE: 33 DEGREES
PR INTERVAL - MUSE: 134 MS
QRS DURATION - MUSE: 82 MS
QT - MUSE: 398 MS
QTC - MUSE: 447 MS
R AXIS - MUSE: -17 DEGREES
SYSTOLIC BLOOD PRESSURE - MUSE: NORMAL MMHG
T AXIS - MUSE: 12 DEGREES
VENTRICULAR RATE- MUSE: 76 BPM

## 2022-11-08 PROCEDURE — 76642 ULTRASOUND BREAST LIMITED: CPT | Mod: LT | Performed by: STUDENT IN AN ORGANIZED HEALTH CARE EDUCATION/TRAINING PROGRAM

## 2022-11-08 PROCEDURE — 10160 PNXR ASPIR ABSC HMTMA BULLA: CPT | Mod: GC | Performed by: STUDENT IN AN ORGANIZED HEALTH CARE EDUCATION/TRAINING PROGRAM

## 2022-11-08 PROCEDURE — 76942 ECHO GUIDE FOR BIOPSY: CPT | Mod: XU | Performed by: STUDENT IN AN ORGANIZED HEALTH CARE EDUCATION/TRAINING PROGRAM

## 2022-11-08 RX ORDER — LIDOCAINE HYDROCHLORIDE 10 MG/ML
10 INJECTION, SOLUTION EPIDURAL; INFILTRATION; INTRACAUDAL; PERINEURAL ONCE
Status: COMPLETED | OUTPATIENT
Start: 2022-11-08 | End: 2022-11-08

## 2022-11-08 RX ADMIN — LIDOCAINE HYDROCHLORIDE 10 ML: 10 INJECTION, SOLUTION EPIDURAL; INFILTRATION; INTRACAUDAL; PERINEURAL at 13:56

## 2022-11-11 ENCOUNTER — ANCILLARY PROCEDURE (OUTPATIENT)
Dept: CARDIOLOGY | Facility: CLINIC | Age: 67
End: 2022-11-11
Attending: INTERNAL MEDICINE
Payer: MEDICARE

## 2022-11-11 ENCOUNTER — TUMOR CONFERENCE (OUTPATIENT)
Dept: ONCOLOGY | Facility: CLINIC | Age: 67
End: 2022-11-11
Payer: MEDICARE

## 2022-11-11 DIAGNOSIS — Z51.11 ENCOUNTER FOR ANTINEOPLASTIC CHEMOTHERAPY: ICD-10-CM

## 2022-11-11 DIAGNOSIS — Z51.11 ENCOUNTER FOR ANTINEOPLASTIC CHEMOTHERAPY: Primary | ICD-10-CM

## 2022-11-11 DIAGNOSIS — C50.212 MALIGNANT NEOPLASM OF UPPER-INNER QUADRANT OF LEFT BREAST IN FEMALE, ESTROGEN RECEPTOR POSITIVE (H): ICD-10-CM

## 2022-11-11 DIAGNOSIS — Z17.0 MALIGNANT NEOPLASM OF UPPER-INNER QUADRANT OF LEFT BREAST IN FEMALE, ESTROGEN RECEPTOR POSITIVE (H): ICD-10-CM

## 2022-11-11 LAB
3D LVEF ECHO: NORMAL
PATH REPORT.COMMENTS IMP SPEC: ABNORMAL
PATH REPORT.COMMENTS IMP SPEC: YES
PATH REPORT.FINAL DX SPEC: ABNORMAL
PATH REPORT.GROSS SPEC: ABNORMAL
PATH REPORT.MICROSCOPIC SPEC OTHER STN: ABNORMAL
PATH REPORT.RELEVANT HX SPEC: ABNORMAL
PATHOLOGY SYNOPTIC REPORT: ABNORMAL
PHOTO IMAGE: ABNORMAL

## 2022-11-11 PROCEDURE — 76376 3D RENDER W/INTRP POSTPROCES: CPT | Mod: 26 | Performed by: INTERNAL MEDICINE

## 2022-11-11 PROCEDURE — 93306 TTE W/DOPPLER COMPLETE: CPT | Performed by: INTERNAL MEDICINE

## 2022-11-11 RX ORDER — MEPERIDINE HYDROCHLORIDE 25 MG/ML
25 INJECTION INTRAMUSCULAR; INTRAVENOUS; SUBCUTANEOUS EVERY 30 MIN PRN
Status: CANCELLED | OUTPATIENT
Start: 2022-11-21

## 2022-11-11 RX ORDER — ALBUTEROL SULFATE 90 UG/1
1-2 AEROSOL, METERED RESPIRATORY (INHALATION)
Status: CANCELLED
Start: 2022-11-21

## 2022-11-11 RX ORDER — ALBUTEROL SULFATE 0.83 MG/ML
2.5 SOLUTION RESPIRATORY (INHALATION)
Status: CANCELLED | OUTPATIENT
Start: 2022-11-21

## 2022-11-11 RX ORDER — EPINEPHRINE 1 MG/ML
0.3 INJECTION, SOLUTION INTRAMUSCULAR; SUBCUTANEOUS EVERY 5 MIN PRN
Status: CANCELLED | OUTPATIENT
Start: 2022-11-21

## 2022-11-11 RX ORDER — HEPARIN SODIUM,PORCINE 10 UNIT/ML
5 VIAL (ML) INTRAVENOUS
Status: CANCELLED | OUTPATIENT
Start: 2022-11-21

## 2022-11-11 RX ORDER — METHYLPREDNISOLONE SODIUM SUCCINATE 125 MG/2ML
125 INJECTION, POWDER, LYOPHILIZED, FOR SOLUTION INTRAMUSCULAR; INTRAVENOUS
Status: CANCELLED
Start: 2022-11-21

## 2022-11-11 RX ORDER — DIPHENHYDRAMINE HYDROCHLORIDE 50 MG/ML
50 INJECTION INTRAMUSCULAR; INTRAVENOUS
Status: CANCELLED
Start: 2022-11-21

## 2022-11-11 RX ORDER — LORAZEPAM 2 MG/ML
0.5 INJECTION INTRAMUSCULAR EVERY 4 HOURS PRN
Status: CANCELLED | OUTPATIENT
Start: 2022-11-21

## 2022-11-11 RX ORDER — HEPARIN SODIUM (PORCINE) LOCK FLUSH IV SOLN 100 UNIT/ML 100 UNIT/ML
5 SOLUTION INTRAVENOUS
Status: CANCELLED | OUTPATIENT
Start: 2022-11-21

## 2022-11-11 RX ORDER — ONDANSETRON 2 MG/ML
8 INJECTION INTRAMUSCULAR; INTRAVENOUS ONCE
Status: CANCELLED | OUTPATIENT
Start: 2022-11-21

## 2022-11-14 ENCOUNTER — MYC MEDICAL ADVICE (OUTPATIENT)
Dept: ONCOLOGY | Facility: CLINIC | Age: 67
End: 2022-11-14

## 2022-11-14 DIAGNOSIS — H81.10 BENIGN PAROXYSMAL POSITIONAL VERTIGO, UNSPECIFIED LATERALITY: Primary | ICD-10-CM

## 2022-11-14 NOTE — TELEPHONE ENCOUNTER
MEDICAL RECORDS REQUEST   Radiation Oncology  [address] [place] [address]  PHONE: [phone]  Fax: [phone]        FUTURE VISIT INFORMATION                                                   Vickie Crowleyrodney, : 1955 scheduled for future visit at [facility] Radiation Oncology    APPOINTMENT INFORMATION:    Date: 22    Provider:  Dr. gerson Murphy     Reason for Visit/Diagnosis: Malignant neoplasm of upper-inner quadrant of left breast in female, estrogen receptor positive (H) [C50.212, Z17.0]     RECORDS REQUESTED FOR VISIT                                                     NOTES STATUS DETAILS   OFFICE NOTE from referring provider Epic 22: Dr. Chandrika Horvath   OFFICE NOTE from medical oncologist Epic 22: Dr. Chandrika Horvath   OPERATIVE REPORT Epic 10/17/22: LEFT seed localized lumpectomy with sentinel node biopsy   MEDICATION LIST [facility]    LABS     PATHOLOGY REPORTS Report in EPIC 10/17/22: [id number]   ANYTHING RELATED TO DIAGNOSIS Epic Most recent 10/06/22   IMAGING (NEED IMAGES & REPORT)     MRI PACS 07: MR Breast   MAMMOS PACS 10/17/22-04   ULTRASOUND PACS 22-22: US Breast   BONE SCAN PACS 09/10/21, 19

## 2022-11-15 ENCOUNTER — PATIENT OUTREACH (OUTPATIENT)
Dept: ONCOLOGY | Facility: CLINIC | Age: 67
End: 2022-11-15

## 2022-11-15 NOTE — PROGRESS NOTES
[facility]: Cancer Care Plan of Care Education Note                                    Discussion with Patient:                                                      Spoke with Marni to answer questions and review change in treatment to Kadcyla.     Assessment:                                                      Assessment completed with:: Patient    Current living arrangement       Plan of Care Education   Diagnosis:: Breast Cancer Letrozole  Does patient understand diagnosis?: Yes  Tx plan/regimen:: Kadcyla  Does patient understand treatment plan/regimen?: Yes  Preparing for treatment:: Reviewed treatment preparation information with patient (vascular access, day of chemo, visitor policy, what to bring, etc.)  Vascular access:: Peripheral IV  Side effect education:: Diarrhea/Constipation;Endocrine therapy (myalgias, arthralgias, hot flashes, vaginal dryness, mood disorder, and thinning of the bones);Lab value monitoring (anemia, neutropenia, thrombocytopenia);Nausea/Vomiting;Neuropathy  Informal Support system:: Family;Friends  Coping - concerns/fears reviewed with patient:: Yes  Plan of Care:: Treatment schedule;JHONATAN follow-up appointment;MD follow-up appointment  When to call provider:: Bleeding;Increased shortness of breath;New/worsening pain;Shaking chills;Temperature >100.4F;Uncontrolled diarrhea/constipation;Uncontrolled nausea/vomiting    Evaluation of Learning  Patient Education Provided: Yes  Readiness:: Acceptance  Method:: Booklet/Handout;Explanation  Response:: Verbalizes understanding        Intervention/Education provided during outreach:                                                       Answered questions to sakshi stated satisfaction  Patient to follow up as scheduled at next appt  Patient to call/[employer]t message with updates  Confirmed patient has clinic and triage numbers    Deborah King MSN, RN ,OCN  RN Care Coordinator   [facility]  [phone]

## 2022-11-15 NOTE — PROGRESS NOTES
Department of Radiation Oncology                   [place] [address]  [address] [place]  [address]  Office:  [phone]  Fax:  [phone]   Radiation Oncology Clinic  [address] [place] [address]  Phone:  [phone]  Fax:  [phone]     RE: Vickie Alvarado : 1955   MRN: [id number] AL: 2022     OUTPATIENT VISIT NOTE       PROBLEM: Invasive ductal carcinoma of the left breast, cT2N0, ypT2N0(sn), ER+/TX+/HER2 amplified on final pathology     was seen for initial consultation in the Dept of Therapeutic Radiology on 2022 at the request of Dr. Horvath    HISTORY OF PRESENT ILLNESS: Ms. Alvarado is a 68 yo female with a left breast cancer. Her oncologic history if the followin/2022: She noticed a lump in her superior mid left breast. Diagnostic mammogram on 2022 showed a new irregular isodense masses 11:00-12:00 position posterior depth measuring up to 3.2 cm correlating with palpable concern. On the ultrasound, the mass was at 11:00 8 cm from the nipple. There were no morphologically suspicious nodes in the left axilla.     2022: Contrast mammogram showed enhancement to be 3.3 cm at the site of known mass. Surgical pathology from core biopsy was consistent with invasive ductal carcinoma, Terry grade 3, ER 98%, TX 70%, HER2 1+ and group 4 by FISH, interpreted as negative.     She established care with Dr. Cadet and Dr. Horvath. Exam confirmed a 3 cm palpable mass in upper out left breast with no adenopathy. Her case was also discussed at the multidisciplinary conference. She had an Oncotype Dx score of 24. However, RSClin predicted her distant recurrence to be 23% with a absolute chemo benefit of 9%. She was thus recommended neoadjuvant chemotherapy.    Hereditary comprehensive 40 gene panel was negative for deleterious mutations.     2022 - : She received TC x 4. Anthracycline was avoided given her underlying PVC  and aortic stenosis along with strong family history of cardiac disease.     10/17/2022: She proceeded with seed localized lumpectomy and SLN biopsy by Dr. Cadet. 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 Associated DCIS was nuclear grade 3, cribriform and solid types, spanning 12 mm. No definite LVSI was identified. Invasive margin was 0.07 mm superiorly and > 3 mm elsewhere. Closest margin for DCIS was 3 mm. One SLN biopsy was negative. Final pathologic stage ypT2N0(sn). Repeat markers show ER 95% positive, NC 20% positive, HER2 2+ by IHC, and amplified by FISH (copy number 6.8, ratio 2.2).     11/7/2022: She followed up with Dr. Horvath. Given HER2 amplified status in the residual tumor, Dr. Horvath recommended adjuvant Kadcycla. This was also the consensus from the tumor board.     Ms. Alvarado is here today for a consultation regarding adjuvant radiation therapy. On inteview, she states that she is healing well. She did develop a seroma at the lumpectomy site and had that drained last Tuesday, but it has now re-accumulated. She denies pain or redness at the surgical site. She is certainly a little disappointed that the pathology changed, prompting more systemic therapy, but she expressed understanding of the rationale and appreciated the care she received thus far.     PAST MEDICAL HISTORY:   Past Medical History:   Diagnosis Date     Aortic stenosis      Hyperlipidemia      Insufficiency fracture of tibia, sequela 09/28/2021     Polyp of colon 05/26/2016   PVC  Aortic stenosis    Past Surgical History:   Procedure Laterality Date     BIOPSY NODE SENTINEL Left 10/17/2022    Procedure: LEFT seed localized lumpectomy with sentinel node biopsy;  Surgeon: Alphonso Cadet MD;  Location: [facility] OR     CATARACT IOL, RT/LT Right 2006     HC YAG LASER CAPSULOTOMY Right 2009     LASER VITREOLYSIS, ANTERIOR OD (RIGHT EYE) Right  2007     LUMPECTOMY BREAST WITH SENTINEL NODE, COMBINED Left 10/17/2022    Procedure: LEFT seed localized lumpectomy with sentinel node biopsy;  Surgeon: Alphonso Cadet MD;  Location: [facility] OR     REPOSITION INTRAOCULAR LENS Right 11/18/2014    Procedure: REPOSITION INTRAOCULAR LENS;  Surgeon: Vickie Guerra MD;  [facility]     VITRECTOMY PARSPLANA WITH 23 GAUGE SYSTEM Right 11/18/2014    Procedure: VITRECTOMY PARSPLANA WITH 23 GAUGE SYSTEM;  Surgeon: Vickie Guerra MD;  [facility]         CHEMOTHERAPY HISTORY: TC x 4    PAST RADIATION THERAPY HISTORY: None    MEDICATIONS:   Current Outpatient Medications   Medication Sig Dispense Refill     alendronate (FOSAMAX) 70 MG tablet Take 1 tablet (70 mg) by mouth every 7 days 12 tablet 3     calcium carb-cholecalciferol 600-500 MG-UNIT CAPS        Lactobacillus (PROBIOTIC CHILDRENS) CHEW        loratadine (CLARITIN) 10 MG tablet Take 10 mg by mouth daily Prn, seasonal for ragweed and lilacs       metoprolol succinate ER (TOPROL XL) 25 MG 24 hr tablet Take 1 tablet (25 mg) by mouth daily 90 tablet 3     Omega-3 Fatty Acids (FISH OIL ADULT GUMMIES PO) Take 250 mg by mouth       simvastatin (ZOCOR) 40 MG tablet Take 1 tablet (40 mg) by mouth At Bedtime 90 tablet 3       ALLERGIES:  allergic to bactrim [sulfamethoxazole w/trimethoprim], seasonal allergies, and typhoid vaccines.    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 in her father and mother; Diabetes in her mother; Genetic Disorder in an other family member; Thyroid Disease in her mother.   Sisters: breast cancer x 2  Cousin: ovarian cancer  Maternal cousin: lung cancer  Maternal aunt: leukemia  Maternal grandfather: stomach cancer    REVIEW OF SYMPTOMS:   A full 14-point review of systems was performed.     No history of estrogen replacement therapy  See HPI.      PHYSICAL EXAMINATION:    LMP 2011   /86   Pulse 82   Wt 86.2 kg (190 lb)   LMP 2011   SpO2 95%   BMI 30.90 kg/m     Gen: Appears well  HEENT: Alopecia  Resp: Breathing comfortably on room air  CV: Well perfused   Breasts: Well healed lumpectomy scar in the upper inner quadrant. Palpable seroma. No erythema or warmth to suggest infection.   Neuro: Grossly intact.       IMAGING:    ASSESSMENT AND PLAN: In summary, Ms. Alvarado is a 68 yo female with a cT2N0 invasive carcinoma of the left breast, s/p neoadjuvant TC followed by a lumpectomy and SLN biopsy. Pathology found 2.2 cm residual carcinoma. Interestingly, while the initial markers showed ER+/RI+/HER2 nonamplified, the residual tumor did demonstrate HER2 amplification based on IHC of 2+ and FISH group 1. Other notable pathologic features include close margin of 0.07 cm superiorly for the invasive component.     We discussed the rationale for adjuvant radiation therapy in the setting of breast conservation therapy. It is expected to reduce the local recurrence by about 2/3. I recommend a course of 4240 cGy in 16 fractions to the left breast. Given the close superior margin, a boost of 12.5 Gy in 5 fractions will be given.     Understandably, Ms. Alvarado is concerned about cardiac toxicity from radiation therapy given her personal and family history of cardiac comorbidies and the need to start adjuvant HER2-based therapy. We thus spent sometime discussing measures to limit the radiation to the heart, including aggressive heart blocking and deep inspiration breath hold technique.     At the end of the discussion, Ms. Alvarado indicated a willingness to proceed and signed the consent form. She will be brought back for a simulation. Treatment will start approximately 7-10 days after the simulation.       Thank you for allowing us  to participate in this patient's care.  Please feel free to call with any questions or concerns.       Conchsi Murphy M.D./Ph.D.  Radiation Oncologist   Department of Radiation Oncology  [facility]  Phone: [phone]       I reviewed patient's chart, internal/external medical records, imaging studies (including actual images), labs and pathology reports.  I interviewed and counseled the patient face to face.  I additionally discussed the case with patient's referring physicians and care team during multidisciplinary conference.        Conchis Murphy MD

## 2022-11-16 ENCOUNTER — OFFICE VISIT (OUTPATIENT)
Dept: RADIATION ONCOLOGY | Facility: CLINIC | Age: 67
End: 2022-11-16
Attending: INTERNAL MEDICINE
Payer: MEDICARE

## 2022-11-16 ENCOUNTER — PRE VISIT (OUTPATIENT)
Dept: RADIATION ONCOLOGY | Facility: CLINIC | Age: 67
End: 2022-11-16

## 2022-11-16 VITALS
BODY MASS INDEX: 30.9 KG/M2 | WEIGHT: 190 LBS | HEART RATE: 82 BPM | SYSTOLIC BLOOD PRESSURE: 138 MMHG | DIASTOLIC BLOOD PRESSURE: 86 MMHG | OXYGEN SATURATION: 95 %

## 2022-11-16 DIAGNOSIS — Z17.0 MALIGNANT NEOPLASM OF UPPER-INNER QUADRANT OF LEFT BREAST IN FEMALE, ESTROGEN RECEPTOR POSITIVE (H): ICD-10-CM

## 2022-11-16 DIAGNOSIS — C50.212 MALIGNANT NEOPLASM OF UPPER-INNER QUADRANT OF LEFT BREAST IN FEMALE, ESTROGEN RECEPTOR POSITIVE (H): ICD-10-CM

## 2022-11-16 PROCEDURE — G0463 HOSPITAL OUTPT CLINIC VISIT: HCPCS

## 2022-11-16 ASSESSMENT — ENCOUNTER SYMPTOMS
BLOOD IN STOOL: 0
DEPRESSION: 0
SORE THROAT: 0
BACK PAIN: 0
FREQUENCY: 0
CONSTIPATION: 0
HEMATURIA: 0
SEIZURES: 0
SHORTNESS OF BREATH: 0
WEIGHT LOSS: 0
FALLS: 0
INSOMNIA: 0
DIAPHORESIS: 0
DIARRHEA: 0
BRUISES/BLEEDS EASILY: 0
FEVER: 0
DYSURIA: 0
NAUSEA: 0
NERVOUS/ANXIOUS: 1
HEADACHES: 0
DIZZINESS: 1
COUGH: 0
NECK PAIN: 0
TINGLING: 1
VOMITING: 0
CHILLS: 0

## 2022-11-16 NOTE — LETTER
2022         RE: Vickie Alvarado  [address]  [facility] [address]        Dear Colleague,    Thank you for referring your patient, Vickie Alvarado, to the [facility] RADIATION ONCOLOGY. Please see a copy of my visit note below.    Department of Radiation Oncology                   [facility] [address]  [address] [place]  [address]  Office:  [phone]  Fax:  [phone]   Radiation Oncology Clinic  [address] [place] [address]  Phone:  [phone]  Fax:  [phone]     RE: Vickie Alvarado : 1955   MRN: [id number] AL: 2022     OUTPATIENT VISIT NOTE       PROBLEM: Invasive ductal carcinoma of the left breast, cT2N0, ypT2N0(sn), ER+/SC+/HER2 amplified on final pathology     was seen for initial consultation in the Dept of Therapeutic Radiology on 2022 at the request of Dr. Horvath    HISTORY OF PRESENT ILLNESS: Ms. Alvarado is a 66 yo female with a left breast cancer. Her oncologic history if the followin/2022: She noticed a lump in her superior mid left breast. Diagnostic mammogram on 2022 showed a new irregular isodense masses 11:00-12:00 position posterior depth measuring up to 3.2 cm correlating with palpable concern. On the ultrasound, the mass was at 11:00 8 cm from the nipple. There were no morphologically suspicious nodes in the left axilla.     2022: Contrast mammogram showed enhancement to be 3.3 cm at the site of known mass. Surgical pathology from core biopsy was consistent with invasive ductal carcinoma, Terry grade 3, ER 98%, SC 70%, HER2 1+ and group 4 by FISH, interpreted as negative.     She established care with Dr. Cadet and Dr. Horvath. Exam confirmed a 3 cm palpable mass in upper out left breast with no adenopathy. Her case was also discussed at the multidisciplinary conference. She had an Oncotype Dx score of 24. However, RSClin predicted her distant recurrence to be  23% with a absolute chemo benefit of 9%. She was thus recommended neoadjuvant chemotherapy.    Hereditary comprehensive 40 gene panel was negative for deleterious mutations.     7/4/2022 - : She received TC x 4. Anthracycline was avoided given her underlying PVC and aortic stenosis along with strong family history of cardiac disease.     10/17/2022: She proceeded with seed localized lumpectomy and SLN biopsy by Dr. Cadet. 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 Associated DCIS was nuclear grade 3, cribriform and solid types, spanning 12 mm. No definite LVSI was identified. Invasive margin was 0.07 mm superiorly and > 3 mm elsewhere. Closest margin for DCIS was 3 mm. One SLN biopsy was negative. Final pathologic stage ypT2N0(sn). Repeat markers show ER 95% positive, AL 20% positive, HER2 2+ by IHC, and amplified by FISH (copy number 6.8, ratio 2.2).     11/7/2022: She followed up with Dr. Horvath. Given HER2 amplified status in the residual tumor, Dr. Horvath recommended adjuvant Kadcycla. This was also the consensus from the tumor board.     Ms. Alvarado is here today for a consultation regarding adjuvant radiation therapy. On inteview, she states that she is healing well. She did develop a seroma at the lumpectomy site and had that drained last Tuesday, but it has now re-accumulated. She denies pain or redness at the surgical site. She is certainly a little disappointed that the pathology changed, prompting more systemic therapy, but she expressed understanding of the rationale and appreciated the care she received thus far.     PAST MEDICAL HISTORY:   Past Medical History:   Diagnosis Date     Aortic stenosis      Hyperlipidemia      Insufficiency fracture of tibia, sequela 09/28/2021     Polyp of colon 05/26/2016   PVC  Aortic stenosis    Past Surgical History:   Procedure Laterality Date     BIOPSY NODE SENTINEL Left  10/17/2022    Procedure: LEFT seed localized lumpectomy with sentinel node biopsy;  Surgeon: Alphonso Cadet MD;  Location: [facility] OR     CATARACT IOL, RT/LT Right 2006     HC YAG LASER CAPSULOTOMY Right 2009     LASER VITREOLYSIS, ANTERIOR OD (RIGHT EYE) Right 2007     LUMPECTOMY BREAST WITH SENTINEL NODE, COMBINED Left 10/17/2022    Procedure: LEFT seed localized lumpectomy with sentinel node biopsy;  Surgeon: Alphonso Cadet MD;  Location: [facility] OR     REPOSITION INTRAOCULAR LENS Right 11/18/2014    Procedure: REPOSITION INTRAOCULAR LENS;  Surgeon: Vickie Guerra MD;  [facility]     VITRECTOMY PARSPLANA WITH 23 GAUGE SYSTEM Right 11/18/2014    Procedure: VITRECTOMY PARSPLANA WITH 23 GAUGE SYSTEM;  Surgeon: Vickie Guerra MD;  Location:  [facility]         CHEMOTHERAPY HISTORY: TC x 4    PAST RADIATION THERAPY HISTORY: None    MEDICATIONS:   Current Outpatient Medications   Medication Sig Dispense Refill     alendronate (FOSAMAX) 70 MG tablet Take 1 tablet (70 mg) by mouth every 7 days 12 tablet 3     calcium carb-cholecalciferol 600-500 MG-UNIT CAPS        Lactobacillus (PROBIOTIC CHILDRENS) CHEW        loratadine (CLARITIN) 10 MG tablet Take 10 mg by mouth daily Prn, seasonal for ragweed and lilacs       metoprolol succinate ER (TOPROL XL) 25 MG 24 hr tablet Take 1 tablet (25 mg) by mouth daily 90 tablet 3     Omega-3 Fatty Acids (FISH OIL ADULT GUMMIES PO) Take 250 mg by mouth       simvastatin (ZOCOR) 40 MG tablet Take 1 tablet (40 mg) by mouth At Bedtime 90 tablet 3       ALLERGIES:  allergic to bactrim [sulfamethoxazole w/trimethoprim], seasonal allergies, and typhoid vaccines.    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 in her father and mother;  Diabetes in her mother; Genetic Disorder in an other family member; Thyroid Disease in her mother.   Sisters: breast cancer x 2  Cousin: ovarian cancer  Maternal cousin: lung cancer  Maternal aunt: leukemia  Maternal grandfather: stomach cancer    REVIEW OF SYMPTOMS:  A full 14-point review of systems was performed.     No history of estrogen replacement therapy  See HPI.      PHYSICAL EXAMINATION:    LMP 2011   /86   Pulse 82   Wt 86.2 kg (190 lb)   LMP 2011   SpO2 95%   BMI 30.90 kg/m     Gen: Appears well  HEENT: Alopecia  Resp: Breathing comfortably on room air  CV: Well perfused   Breasts: Well healed lumpectomy scar in the upper inner quadrant. Palpable seroma. No erythema or warmth to suggest infection.   Neuro: Grossly intact.       IMAGING:    ASSESSMENT AND PLAN: In summary, Ms. Alvarado is a 66 yo female with a cT2N0 invasive carcinoma of the left breast, s/p neoadjuvant TC followed by a lumpectomy and SLN biopsy. Pathology found 2.2 cm residual carcinoma. Interestingly, while the initial markers showed ER+/[place]+/HER2 nonamplified, the residual tumor did demonstrate HER2 amplification based on IHC of 2+ and FISH group 1. Other notable pathologic features include close margin of 0.07 cm superiorly for the invasive component.     We discussed the rationale for adjuvant radiation therapy in the setting of breast conservation therapy. It is expected to reduce the local recurrence by about 2/3. I recommend a course of 4240 cGy in 16 fractions to the left breast. Given the close superior margin, a boost of 12.5 Gy in 5 fractions will be given.     Understandably, Ms. Alvarado is concerned about cardiac toxicity from radiation therapy given her personal and family history of cardiac comorbidies and the need to start adjuvant HER2-based therapy. We thus spent sometime discussing measures to limit the radiation to the heart, including aggressive heart blocking and deep inspiration breath  hold technique.     At the end of the discussion, Ms. Alvarado indicated a willingness to proceed and signed the consent form. She will be brought back for a simulation. Treatment will start approximately 7-10 days after the simulation.       Thank you for allowing us to participate in this patient's care.  Please feel free to call with any questions or concerns.       Conchis Murphy M.D./Ph.D.  Radiation Oncologist   Department of Radiation Oncology  [facility]  Phone: [phone]       I reviewed patient's chart, internal/external medical records, imaging studies (including actual images), labs and pathology reports.  I interviewed and counseled the patient face to face.  I additionally discussed the case with patient's referring physicians and care team during multidisciplinary conference.        Conchis Murphy MD      HPI    INITIAL PATIENT ASSESSMENT    Diagnosis: Breast cancer, Lt lumpectomy 10/17/22    Prior radiation therapy: None    Prior chemotherapy: Neoadjuvant Taxotere and cyclophosphamide chemotherapy from 7/8/2022 - 9/8/2022    Prior hormonal therapy:No    Pain Eval:  Denies    Psychosocial  Living arrangements: self  Fall Risk: independent   referral needs: Not needed    Advanced Directive: Yes - Location: Used my chart to plan it but needs to transfer and get notorized  Implantable Cardiac Device? No    Onset of menarche: @ age 15  LMP: Patient's last menstrual period was 05/17/2011.  Onset of menopause: @ age 56  Abnormal vaginal bleeding/discharge: No  Are you pregnant? No  Reproductive note: No children    Nurse face-to-face time: Level 4:  15 min face to face time    Review of Systems   Constitutional: Negative for chills, diaphoresis, fever, malaise/fatigue and weight loss.   HENT: Negative for ear pain, nosebleeds and sore throat.    Eyes:        Follows with the eye doctor at the  for some eye issues/surgeries in the past.   Respiratory: Negative  for cough and shortness of breath.    Cardiovascular: Negative for chest pain and leg swelling.   Gastrointestinal: Negative for blood in stool, constipation, diarrhea, nausea and vomiting.   Genitourinary: Negative for dysuria, frequency, hematuria and urgency.   Musculoskeletal: Negative for back pain, falls, joint pain and neck pain.   Skin: Negative for rash.   Neurological: Positive for dizziness (Had an EKG and cardiogram last week and will be evaluated for vertigo.) and tingling (Bilater middle toes). Negative for seizures and headaches.   Endo/Heme/Allergies: Does not bruise/bleed easily.   Psychiatric/Behavioral: Negative for depression and suicidal ideas. The patient is nervous/anxious (After appointments r/t change in pathology). The patient does not have insomnia.          Again, thank you for allowing me to participate in the care of your patient.        Sincerely,        Conchis Murphy MD

## 2022-11-16 NOTE — PROGRESS NOTES
HPI    INITIAL PATIENT ASSESSMENT    Diagnosis: Breast cancer, Lt lumpectomy 10/17/22    Prior radiation therapy: None    Prior chemotherapy: Neoadjuvant Taxotere and cyclophosphamide chemotherapy from 7/8/2022 - 9/8/2022    Prior hormonal therapy:No    Pain Eval:  Denies    Psychosocial  Living arrangements: self  Fall Risk: independent   referral needs: Not needed    Advanced Directive: Yes - Location: Used my chart to plan it but needs to transfer and get notorized  Implantable Cardiac Device? No    Onset of menarche: @ age 15  LMP: Patient's last menstrual period was 05/17/2011.  Onset of menopause: @ age 56  Abnormal vaginal bleeding/discharge: No  Are you pregnant? No  Reproductive note: No children    Nurse face-to-face time: Level 4:  15 min face to face time    Review of Systems   Constitutional: Negative for chills, diaphoresis, fever, malaise/fatigue and weight loss.   HENT: Negative for ear pain, nosebleeds and sore throat.    Eyes:        Follows with the eye doctor at the  for some eye issues/surgeries in the past.   Respiratory: Negative for cough and shortness of breath.    Cardiovascular: Negative for chest pain and leg swelling.   Gastrointestinal: Negative for blood in stool, constipation, diarrhea, nausea and vomiting.   Genitourinary: Negative for dysuria, frequency, hematuria and urgency.   Musculoskeletal: Negative for back pain, falls, joint pain and neck pain.   Skin: Negative for rash.   Neurological: Positive for dizziness (Had an EKG and cardiogram last week and will be evaluated for vertigo.) and tingling (Bilater middle toes). Negative for seizures and headaches.   Endo/Heme/Allergies: Does not bruise/bleed easily.   Psychiatric/Behavioral: Negative for depression and suicidal ideas. The patient is nervous/anxious (After appointments r/t change in pathology). The patient does not have insomnia.

## 2022-11-21 ENCOUNTER — OFFICE VISIT (OUTPATIENT)
Dept: RADIATION ONCOLOGY | Facility: CLINIC | Age: 67
End: 2022-11-21
Attending: RADIOLOGY
Payer: MEDICARE

## 2022-11-21 DIAGNOSIS — Z17.0 MALIGNANT NEOPLASM OF UPPER-INNER QUADRANT OF LEFT BREAST IN FEMALE, ESTROGEN RECEPTOR POSITIVE (H): Primary | ICD-10-CM

## 2022-11-21 DIAGNOSIS — C50.212 MALIGNANT NEOPLASM OF UPPER-INNER QUADRANT OF LEFT BREAST IN FEMALE, ESTROGEN RECEPTOR POSITIVE (H): Primary | ICD-10-CM

## 2022-11-21 PROCEDURE — 77290 THER RAD SIMULAJ FIELD CPLX: CPT | Performed by: RADIOLOGY

## 2022-11-21 PROCEDURE — 77334 RADIATION TREATMENT AID(S): CPT | Mod: 26 | Performed by: RADIOLOGY

## 2022-11-21 PROCEDURE — 77290 THER RAD SIMULAJ FIELD CPLX: CPT | Mod: 26 | Performed by: RADIOLOGY

## 2022-11-21 PROCEDURE — 77334 RADIATION TREATMENT AID(S): CPT | Performed by: RADIOLOGY

## 2022-11-21 NOTE — LETTER
11/21/2022         RE: Vickie Alvarado  [address]  [facility] [address]        Dear Colleague,    Thank you for referring your patient, Vickie Alvarado, to the [facility] RADIATION ONCOLOGY. Please see a copy of my visit note below.    Radiation Therapy Patient Education    Person involved with teaching: Patient    Patient educational needs for self management of treatment-related side effects assessment completed.  EPIC Patient Ed tab contains Patient Learning Assessment    Education Materials Given  Skin Care During Radiation Treatment, sim pamphlet and schedule    Educational Topics Discussed  Side effects expected, Skin care, Activity, Nutrition and weight loss and When to call MD/RN    Response To Teaching  Verbalizes understanding    Referrals sent: None    Chemotherapy?  No              Again, thank you for allowing me to participate in the care of your patient.        Sincerely,        Conchis Murphy MD

## 2022-11-22 ENCOUNTER — HOSPITAL ENCOUNTER (OUTPATIENT)
Dept: PHYSICAL THERAPY | Facility: CLINIC | Age: 67
Discharge: HOME | End: 2022-11-22
Attending: INTERNAL MEDICINE
Payer: MEDICARE

## 2022-11-22 DIAGNOSIS — H81.10 BENIGN PAROXYSMAL POSITIONAL VERTIGO, UNSPECIFIED LATERALITY: ICD-10-CM

## 2022-11-22 DIAGNOSIS — R42 DIZZINESS: Primary | ICD-10-CM

## 2022-11-22 PROCEDURE — 97161 PT EVAL LOW COMPLEX 20 MIN: CPT | Mod: GP | Performed by: PHYSICAL THERAPIST

## 2022-11-22 PROCEDURE — 97112 NEUROMUSCULAR REEDUCATION: CPT | Mod: GP | Performed by: PHYSICAL THERAPIST

## 2022-11-22 NOTE — PROGRESS NOTES
[facility]                                                                                   OUTPATIENT PHYSICAL THERAPY FUNCTIONAL EVALUATION  PLAN OF TREATMENT FOR OUTPATIENT REHABILITATION  (COMPLETE FOR INITIAL CLAIMS ONLY)  Patient's Last Name, First Name, M.I.  YOB: 1955  Vickie Alvarado     Provider's Name   [facility]   Medical Record No.  [id number]     Start of Care Date:  11/22/22   Onset Date:  11/14/22   Type:     _X__PT   ____OT  ____SLP Medical Diagnosis:  Benign paroxysmal positional vertigo, unspecified laterality     PT Diagnosis:  Impaired balance Visits from SOC:  1                              __________________________________________________________________________________  Plan of Treatment/Functional Goals:  gait training, balance training, neuromuscular re-education           GOALS  HEP  The pt will be independent with an HEP improving self management of symptoms  02/19/23    FGA  the pt will score>22/30 on the FGA indicating a low fall risk  02/19/23                           Therapy Frequency:  other (see comments) (1 appt/month)   Predicted Duration of Therapy Intervention:  90 days    Kavita Almodovar, PT                                    I CERTIFY THE NEED FOR THESE SERVICES FURNISHED UNDER        THIS PLAN OF TREATMENT AND WHILE UNDER MY CARE     (Physician co-signature of this document indicates review and certification of the therapy plan).                Certification Date From:  11/22/22   Certification Date To:  02/19/23    Referring Provider:  Chandrika Horvath MD    Initial Assessment  See Epic Evaluation- Start of Care Date: 11/22/22

## 2022-11-22 NOTE — PROGRESS NOTES
11/22/22 1400   Quick Adds   Quick Adds Certification   Type of Visit Initial OP PT Evaluation   General Information   Start of Care Date 11/22/22   Referring Physician Chandrika Horvath MD   Orders Evaluate and Treat as Indicated   Order Date 11/14/22   Medical Diagnosis Benign paroxysmal positional vertigo, unspecified laterality   Onset of illness/injury or Date of Surgery 11/14/22   Surgical/Medical history reviewed Yes   Pertinent history of current problem (include personal factors and/or comorbidities that impact the POC) Marni presents to therapy with PMH significant for a clinical stage II, T2N0M0, ER+/AL+/HER2 low IDC of her left breast.  She is s/p 4 cycles of neoadjuvant TC chemotherapy followed by left breast lumpectomy and sentinel lymph node biopsy. She has had episodic dizziness. Recently while in the shower, needed to hold on to wall for support. Reports that episodes are very brief. When it occured she did notice an irrgular heart rhythm. Dizziness last 1-2 seconds, like a wave.  Also had an expierence when looking down a steep set of stairs in an arena. Notes B knee pain. Also hisotry of cataract and cornea repairs.   Patient/Family Goals Statement Have dizziness assessed   Fall Risk Screen   Fall screen completed by PT   Have you fallen 2 or more times in the past year? No   Have you fallen and had an injury in the past year? No   Abuse Screen (yes response referral indicated)   Feels Unsafe at Home or Work/School no   Feels Threatened by Someone no   Does Anyone Try to Keep You From Having Contact with Others or Doing Things Outside Your Home? no   Physical Signs of Abuse Present no   Cognitive Status Examination   Orientation orientation to person, place and time   Bed Mobility   Bed Mobility Comments Independent   Transfer Skills   Transfer Comments Independent   Gait   Gait Comments Ambulates without signs of instability   Gait Special Tests   Gait Special Tests FUNCTIONAL GAIT  ASSESSMENT;DYNAMIC GAIT INDEX   Gait Special Tests Dynamic Gait Index   Comments 9/12 on 4 item DGI   Cervicogenic Screen   Neck ROM WFLs   Oculomotor Exam   Smooth Pursuit Normal   Saccades Normal   VOR Normal   VOR Cancellation Normal   Rapid Head Thrust Normal   Convergence Testing Normal   Infrared Goggle Exam or Frenzel Lense Exam   Vestibular Suppressant in Last 24 Hours? No   Exam completed with Infrared Goggles   Spontaneous Nystagmus Negative   Gaze Evoked Nystagmus Horizontal R;Downbeating R torsional  (at end range)   Elisabeth-Hallpike (right) Negative   Austin-Hallpike (Left) Negative   HSCC Supine Roll Test (Right) Negative   HSCC Supine Roll Test (Left) Negative   Dynamic Visual Acuity (DVA)   DVA Comments 2 line difference which is WNLs using small eye chart   Planned Therapy Interventions   Planned Therapy Interventions gait training;balance training;neuromuscular re-education   Clinical Impression   Criteria for Skilled Therapeutic Interventions Met yes, treatment indicated   PT Diagnosis Impaired balance   Influenced by the following impairments instability, mild dizziness   Functional limitations due to impairments difficulty with gait and head turns   Clinical Presentation Stable/Uncomplicated   Clinical Decision Making (Complexity) Low complexity   Therapy Frequency other (see comments)  (1 appt/month)   Predicted Duration of Therapy Intervention (days/wks) 90 days   Risk & Benefits of therapy have been explained Yes   Patient, Family & other staff in agreement with plan of care Yes   GOALS   PT Eval Goals 1;2   Goal 1   Goal Identifier HEP   Goal Description The pt will be independent with an HEP improving self management of symptoms   Target Date 02/19/23   Goal 2   Goal Identifier FGA   Goal Description the pt will score>22/30 on the FGA indicating a low fall risk   Goal Progress 9/12 on 4 item DGI   Target Date 02/19/23   Total Evaluation Time   PT Eval, Low Complexity Minutes (27396) 25   Therapy  Certification   Certification date from 11/22/22   Certification date to 02/19/23   Medical Diagnosis Benign paroxysmal positional vertigo, unspecified laterality

## 2022-11-22 NOTE — ADDENDUM NOTE
Encounter addended by: Kavita Almodovar, PT on: 11/22/2022 2:58 PM   Actions taken: Clinical Note Signed, Document created, Document edited

## 2022-11-27 RX ORDER — ONDANSETRON 8 MG/1
8 TABLET, FILM COATED ORAL EVERY 8 HOURS PRN
Qty: 30 TABLET | Refills: 3 | Status: SHIPPED | OUTPATIENT
Start: 2022-11-27 | End: 2024-01-14

## 2022-11-27 RX ORDER — PROCHLORPERAZINE MALEATE 10 MG
5 TABLET ORAL EVERY 6 HOURS PRN
Qty: 30 TABLET | Refills: 2 | Status: SHIPPED | OUTPATIENT
Start: 2022-11-27 | End: 2023-10-24

## 2022-11-28 NOTE — PROGRESS NOTES
Oncology visit:  Date on this visit: Nov 29, 2022    Diagnosis: left breast cancer.    Primary Physician: Gaby Lynn     History Of Present Illness:  Ms. Alvarado is a 67 year old female with a left breast cancer.  She self palpated a mass in her mid left breast.  Bilateral diagnostic mammograms showed a mass in the upper inner left breast.  Ultrasound of the left breast showed a mass at 11:00, 8 cm from the nipple measuring 3.1 cm.  There were no suspicious lymph nodes in the left axilla.  Biopsy of the left breast mass was c/w a grade 3 invasive ductal carcinoma, ER strong in 98%, NC moderate in 70%, and HER2 low by IHC (score 1+).   Oncotype DX recurrence score was 24.  RSClin predicted a 23% risk of distant recurrence in 10 years if treated with endocrine therapy alone and a 9% absolute risk reduction with chemotherapy.  Based on this, she elected to proceed with chemotherapy.    She received neoadjuvant Taxotere and cyclophosphamide chemotherapy from 7/6/2022 - 9/8/2022.  She underwent left breast lumpectomy and left axillary sentinel lymph node procedure under the care of Dr. Cadet on 10/17/2022.  Pathology showed residual grade 2 invasive breast carcinoma (60% ductal and 40% micropapillary) measuring 2.2 cm.  Treatment related changes were present and invasive tumor cellularity was 30%.  There was associated DCIS.  Surgical margins for both invasive carcinoma and DCIS were close, but negative.  There was no lymphovascular invasion and a single sentinel lymph node was negative and without signs of prior involvement (i.e. no tumor bed or fibrotic changes in the lymph node).  Receptors were repeated on the residual invasive malignancy and were found to be ER positive (98%), NC positive (70%), HER-2 equivocal by IHC (2+), and HER-2 positive by FISH.    Adjuvant kadcyla was recommended.     Interval History: Marni is feeling well. She notes her seroma returned after it was aspirated. Dr. Murphy states this is  not an issue for radiation. Seroma is not bothersome to her. No symptoms of infection. She has had some dry areas of scalp as her hair grows back and is using coconut oil as this. She also still is noticing some nail changes.     No fevers/chills or infectious concerns. No recurrent dizzy episodes. She was evaluated for vertigo and notes this was not diagnosed. Cardiology has let her know they don't recommend any evaluation at this time after seeing echo results.     Eating and drinking well.       Past Medical/Surgical History:  Past Medical History:   Diagnosis Date     Aortic stenosis      Breast cancer (H) 06/2022     Hyperlipidemia      Insufficiency fracture of tibia, sequela 09/28/2021     Polyp of colon 05/26/2016   - PVCs    Past Surgical History:   Procedure Laterality Date     BIOPSY NODE SENTINEL Left 10/17/2022    Procedure: LEFT seed localized lumpectomy with sentinel node biopsy;  Surgeon: Alphonso Cadet MD;  Location: [facility] OR     CATARACT IOL, RT/LT Right 2006     HC YAG LASER CAPSULOTOMY Right 2009     LASER VITREOLYSIS, ANTERIOR OD (RIGHT EYE) Right 2007     LUMPECTOMY BREAST WITH SENTINEL NODE, COMBINED Left 10/17/2022    Procedure: LEFT seed localized lumpectomy with sentinel node biopsy;  Surgeon: Alphonso Cadet MD;  [place] [facility] [place]     REPOSITION INTRAOCULAR LENS Right 11/18/2014    Procedure: REPOSITION INTRAOCULAR LENS;  Surgeon: Vickie Guerra MD;  Location: [facility]     VITRECTOMY PARSPLANA WITH 23 GAUGE SYSTEM Right 11/18/2014    Procedure: VITRECTOMY PARSPLANA WITH 23 GAUGE SYSTEM;  Surgeon: Vickie Guerra MD;  Location: [facility]     Allergies:  Allergies as of 11/29/2022 - Reviewed 11/29/2022   Allergen Reaction Noted     Bactrim [sulfamethoxazole w/trimethoprim] Nausea 11/01/2017     Seasonal allergies  11/13/2014     Typhoid vaccines Nausea and Vomiting 05/05/2011     Current Medications:  Current Outpatient Medications   Medication Sig Dispense Refill     alendronate  (FOSAMAX) 70 MG tablet Take 1 tablet (70 mg) by mouth every 7 days 12 tablet 3     calcium carb-cholecalciferol 600-500 MG-UNIT CAPS        Lactobacillus (PROBIOTIC CHILDRENS) CHEW        loratadine (CLARITIN) 10 MG tablet Take 10 mg by mouth daily Prn, seasonal for ragweed and lilacs       metoprolol succinate ER (TOPROL XL) 25 MG 24 hr tablet Take 1 tablet (25 mg) by mouth daily 90 tablet 3     Omega-3 Fatty Acids (FISH OIL ADULT GUMMIES PO) Take 250 mg by mouth       ondansetron (ZOFRAN) 8 MG tablet Take 1 tablet (8 mg) by mouth every 8 hours as needed for nausea 30 tablet 3     prochlorperazine (COMPAZINE) 10 MG tablet Take 0.5 tablets (5 mg) by mouth every 6 hours as needed for nausea or vomiting 30 tablet 2     simvastatin (ZOCOR) 40 MG tablet Take 1 tablet (40 mg) by mouth At Bedtime 90 tablet 3        Physical Exam:  /78 (BP Location: Right arm, Patient Position: Chair, Cuff Size: Adult Regular)   Pulse 62   Temp 97.1  F (36.2  C) (Oral)   Resp 18   Wt 86.7 kg (191 lb 3.2 oz)   LMP 05/17/2011   SpO2 98%   BMI 31.10 kg/m    General:  Well appearing adult female in NAD.  Alert and oriented x 3.  HEENT:  Normocephalic.  Sclera anicteric.    Lymph:  No palpable cervical, supraclavicular, or axillary LAD.  Chest:  CTA bilaterally.  No wheezes or crackles.  CV: Regular heart rhythm with some early beats  Breast:  Left breast upper incision appears to be healing well.  There is an 4 x 4 cm seroma underneath. No tenderness to palpation, no induration, or erythema.   Abd:  Soft/ND/NT +BS   Ext:  No pitting edema of the bilateral lower extremities.   Neuro:  Cranial nerves grossly intact.  Psych:  Mood and affect appear normal.    Laboratory/Imaging Studies   11/29/22 06:47   Sodium 144   Potassium 3.7   Chloride 106   Carbon Dioxide (CO2) 26   Urea Nitrogen 13.5   Creatinine 0.66   GFR Estimate >90   Calcium 9.6   Anion Gap 12   Albumin 4.0   Protein Total 6.7   Alkaline Phosphatase 53   ALT 8 (L)   AST  20   Bilirubin Total 0.3   Glucose 123 (H)   WBC 5.7   Hemoglobin 13.4   Hematocrit 40.8   Platelet Count 197   RBC Count 4.50   MCV 91   MCH 29.8   MCHC 32.8   RDW 13.1   % Neutrophils 76   % Lymphocytes 15   % Monocytes 7   % Eosinophils 2   % Basophils 0   Absolute Basophils 0.0   Absolute Eosinophils 0.1   Absolute Immature Granulocytes 0.0   Absolute Lymphocytes 0.9   Absolute Monocytes 0.4   % Immature Granulocytes 0   Absolute Neutrophils 4.3   Absolute NRBCs 0.0   NRBCs per 100 WBC 0       ASSESSMENT/PLAN:  Ms. Alvarado is a 67 year old woman with a clinical stage II, T2N0M0, ER+/NM+/HER2 low IDC of her left breast.  She is s/p 4 cycles of neoadjuvant TC chemotherapy followed by left breast lumpectomy and sentinel lymph node biopsy.  [id number], grade 2 disease.  Repeat receptor testing on the residual tumor demonstrates ER/NM/HER-2 positive disease.    1. Left breast carcinoma: Plan to start adjuvant Kadcyla today every 3 weeks for 17 cycles. She has previously received chemotherapy teaching. Clinically well to start today. She has recurrent seroma which is okay to monitor.   -Follow-up in 3 weeks prior to cycle 2 and then every 6-9 weeks depending on tolerance.   -Adjuvant radiation will start on Friday this week.   -Upon completion of radiation, we will plan to initiate endocrine therapy with letrozole.  Plan will be to treat for a total of 7-10 years.    2.  Family history of breast and ovarian neoplasm:  Hereditary Comprehensive 40 Gene Panel was negative for pathogenic mutation.    3.  Personal history of colon polyps:  She has a h/o colon polyps.  Last colonoscopy 2/23/2022 with removal of 3 polyps, each 2-3 mm (2 tubular adenomas, 1 hyperplastic).  Repeat colonoscopy in 02/2027 is recommended.    4.  At risk for cardiomyopathy: Baseline echocardiogram showed normal EF with increase in aortic pressure gradient. Cardiology has reviewed and is okay seeing her at routine follow-up in March.     5. Hx of  dizziness/PVCs: If she has repeat episodes, follow up with Cardiologist and consider w/u with Holter monitor.     Greater than 40 minutes was spent with this patient with greater than 20 minutes spent in counseling and coordination of care.    Jessy Molina PA-C

## 2022-11-29 ENCOUNTER — APPOINTMENT (OUTPATIENT)
Dept: LAB | Facility: CLINIC | Age: 67
End: 2022-11-29
Attending: INTERNAL MEDICINE
Payer: MEDICARE

## 2022-11-29 ENCOUNTER — ONCOLOGY VISIT (OUTPATIENT)
Dept: ONCOLOGY | Facility: CLINIC | Age: 67
End: 2022-11-29
Attending: INTERNAL MEDICINE
Payer: MEDICARE

## 2022-11-29 ENCOUNTER — MYC MEDICAL ADVICE (OUTPATIENT)
Dept: CARDIOLOGY | Facility: CLINIC | Age: 67
End: 2022-11-29

## 2022-11-29 ENCOUNTER — TELEPHONE (OUTPATIENT)
Dept: CARDIOLOGY | Facility: CLINIC | Age: 67
End: 2022-11-29

## 2022-11-29 VITALS
TEMPERATURE: 97.1 F | DIASTOLIC BLOOD PRESSURE: 78 MMHG | OXYGEN SATURATION: 98 % | HEART RATE: 62 BPM | RESPIRATION RATE: 18 BRPM | SYSTOLIC BLOOD PRESSURE: 131 MMHG | BODY MASS INDEX: 31.1 KG/M2 | WEIGHT: 191.2 LBS

## 2022-11-29 DIAGNOSIS — Z17.0 MALIGNANT NEOPLASM OF UPPER-INNER QUADRANT OF LEFT BREAST IN FEMALE, ESTROGEN RECEPTOR POSITIVE (H): ICD-10-CM

## 2022-11-29 DIAGNOSIS — Z17.0 MALIGNANT NEOPLASM OF UPPER-INNER QUADRANT OF LEFT BREAST IN FEMALE, ESTROGEN RECEPTOR POSITIVE (H): Primary | ICD-10-CM

## 2022-11-29 DIAGNOSIS — C50.212 MALIGNANT NEOPLASM OF UPPER-INNER QUADRANT OF LEFT BREAST IN FEMALE, ESTROGEN RECEPTOR POSITIVE (H): ICD-10-CM

## 2022-11-29 DIAGNOSIS — C50.212 MALIGNANT NEOPLASM OF UPPER-INNER QUADRANT OF LEFT BREAST IN FEMALE, ESTROGEN RECEPTOR POSITIVE (H): Primary | ICD-10-CM

## 2022-11-29 DIAGNOSIS — Z51.11 ENCOUNTER FOR ANTINEOPLASTIC CHEMOTHERAPY: Primary | ICD-10-CM

## 2022-11-29 DIAGNOSIS — E78.2 MIXED HYPERLIPIDEMIA: Primary | Chronic | ICD-10-CM

## 2022-11-29 LAB
ALBUMIN SERPL BCG-MCNC: 4 G/DL (ref 3.5–5.2)
ALP SERPL-CCNC: 53 U/L (ref 35–104)
ALT SERPL W P-5'-P-CCNC: 8 U/L (ref 10–35)
ANION GAP SERPL CALCULATED.3IONS-SCNC: 12 MMOL/L (ref 7–15)
AST SERPL W P-5'-P-CCNC: 20 U/L (ref 10–35)
BASOPHILS # BLD AUTO: 0 10E3/UL (ref 0–0.2)
BASOPHILS NFR BLD AUTO: 0 %
BILIRUB SERPL-MCNC: 0.3 MG/DL
BUN SERPL-MCNC: 13.5 MG/DL (ref 8–23)
CALCIUM SERPL-MCNC: 9.6 MG/DL (ref 8.8–10.2)
CHLORIDE SERPL-SCNC: 106 MMOL/L (ref 98–107)
CREAT SERPL-MCNC: 0.66 MG/DL (ref 0.51–0.95)
DEPRECATED HCO3 PLAS-SCNC: 26 MMOL/L (ref 22–29)
EOSINOPHIL # BLD AUTO: 0.1 10E3/UL (ref 0–0.7)
EOSINOPHIL NFR BLD AUTO: 2 %
ERYTHROCYTE [DISTWIDTH] IN BLOOD BY AUTOMATED COUNT: 13.1 % (ref 10–15)
GFR SERPL CREATININE-BSD FRML MDRD: >90 ML/MIN/1.73M2
GLUCOSE SERPL-MCNC: 123 MG/DL (ref 70–99)
HCT VFR BLD AUTO: 40.8 % (ref 35–47)
HGB BLD-MCNC: 13.4 G/DL (ref 11.7–15.7)
IMM GRANULOCYTES # BLD: 0 10E3/UL
IMM GRANULOCYTES NFR BLD: 0 %
LYMPHOCYTES # BLD AUTO: 0.9 10E3/UL (ref 0.8–5.3)
LYMPHOCYTES NFR BLD AUTO: 15 %
MCH RBC QN AUTO: 29.8 PG (ref 26.5–33)
MCHC RBC AUTO-ENTMCNC: 32.8 G/DL (ref 31.5–36.5)
MCV RBC AUTO: 91 FL (ref 78–100)
MONOCYTES # BLD AUTO: 0.4 10E3/UL (ref 0–1.3)
MONOCYTES NFR BLD AUTO: 7 %
NEUTROPHILS # BLD AUTO: 4.3 10E3/UL (ref 1.6–8.3)
NEUTROPHILS NFR BLD AUTO: 76 %
NRBC # BLD AUTO: 0 10E3/UL
NRBC BLD AUTO-RTO: 0 /100
PLATELET # BLD AUTO: 197 10E3/UL (ref 150–450)
POTASSIUM SERPL-SCNC: 3.7 MMOL/L (ref 3.4–5.3)
PROT SERPL-MCNC: 6.7 G/DL (ref 6.4–8.3)
RBC # BLD AUTO: 4.5 10E6/UL (ref 3.8–5.2)
SODIUM SERPL-SCNC: 144 MMOL/L (ref 136–145)
WBC # BLD AUTO: 5.7 10E3/UL (ref 4–11)

## 2022-11-29 PROCEDURE — 85025 COMPLETE CBC W/AUTO DIFF WBC: CPT | Performed by: INTERNAL MEDICINE

## 2022-11-29 PROCEDURE — 96413 CHEMO IV INFUSION 1 HR: CPT

## 2022-11-29 PROCEDURE — 80053 COMPREHEN METABOLIC PANEL: CPT | Performed by: INTERNAL MEDICINE

## 2022-11-29 PROCEDURE — 250N000011 HC RX IP 250 OP 636: Performed by: INTERNAL MEDICINE

## 2022-11-29 PROCEDURE — 36415 COLL VENOUS BLD VENIPUNCTURE: CPT | Performed by: INTERNAL MEDICINE

## 2022-11-29 PROCEDURE — 258N000003 HC RX IP 258 OP 636: Performed by: INTERNAL MEDICINE

## 2022-11-29 PROCEDURE — 99215 OFFICE O/P EST HI 40 MIN: CPT | Mod: 24 | Performed by: PHYSICIAN ASSISTANT

## 2022-11-29 PROCEDURE — 96375 TX/PRO/DX INJ NEW DRUG ADDON: CPT

## 2022-11-29 PROCEDURE — G0463 HOSPITAL OUTPT CLINIC VISIT: HCPCS

## 2022-11-29 PROCEDURE — 96415 CHEMO IV INFUSION ADDL HR: CPT

## 2022-11-29 RX ORDER — ONDANSETRON 2 MG/ML
8 INJECTION INTRAMUSCULAR; INTRAVENOUS ONCE
Status: COMPLETED | OUTPATIENT
Start: 2022-11-29 | End: 2022-11-29

## 2022-11-29 RX ADMIN — ADO-TRASTUZUMAB EMTANSINE 300 MG: 20 INJECTION, POWDER, LYOPHILIZED, FOR SOLUTION INTRAVENOUS at 09:02

## 2022-11-29 RX ADMIN — SODIUM CHLORIDE 250 ML: 9 INJECTION, SOLUTION INTRAVENOUS at 08:48

## 2022-11-29 RX ADMIN — ONDANSETRON 8 MG: 2 INJECTION INTRAMUSCULAR; INTRAVENOUS at 08:48

## 2022-11-29 ASSESSMENT — PAIN SCALES - GENERAL: PAINLEVEL: NO PAIN (0)

## 2022-11-29 NOTE — TELEPHONE ENCOUNTER
Checked patient chart; patient returned call and is scheduled for a follow-up in March with Dr. Brooke. Pt has not scheduled echo yet; separate telephone encounter was created. Patient recently had echo done and is wondering if another is necessary. RN is reviewing.    KENNEDY Patel

## 2022-11-29 NOTE — LETTER
11/29/2022         RE: Vickie Alvarado  [address]  [facility] [address]      Oncology visit:  Date on this visit: Nov 29, 2022    Diagnosis: left breast cancer.    Primary Physician: Gaby Lynn     History Of Present Illness:  Ms. Alvarado is a 67 year old female with a left breast cancer.  She self palpated a mass in her mid left breast.  Bilateral diagnostic mammograms showed a mass in the upper inner left breast.  Ultrasound of the left breast showed a mass at 11:00, 8 cm from the nipple measuring 3.1 cm.  There were no suspicious lymph nodes in the left axilla.  Biopsy of the left breast mass was c/w a grade 3 invasive ductal carcinoma, ER strong in 98%, NJ moderate in 70%, and HER2 low by IHC (score 1+).   Oncotype DX recurrence score was 24.  RSClin predicted a 23% risk of distant recurrence in 10 years if treated with endocrine therapy alone and a 9% absolute risk reduction with chemotherapy.  Based on this, she elected to proceed with chemotherapy.    She received neoadjuvant Taxotere and cyclophosphamide chemotherapy from 7/6/2022 - 9/8/2022.  She underwent left breast lumpectomy and left axillary sentinel lymph node procedure under the care of Dr. Cadet on 10/17/2022.  Pathology showed residual grade 2 invasive breast carcinoma (60% ductal and 40% micropapillary) measuring 2.2 cm.  Treatment related changes were present and invasive tumor cellularity was 30%.  There was associated DCIS.  Surgical margins for both invasive carcinoma and DCIS were close, but negative.  There was no lymphovascular invasion and a single sentinel lymph node was negative and without signs of prior involvement (i.e. no tumor bed or fibrotic changes in the lymph node).  Receptors were repeated on the residual invasive malignancy and were found to be ER positive (98%), NJ positive (70%), HER-2 equivocal by IHC (2+), and HER-2 positive by FISH.    Adjuvant kadcyla was recommended.     Interval History: Marni gao  feeling well. She notes her seroma returned after it was aspirated. Dr. Murphy states this is not an issue for radiation. Seroma is not bothersome to her. No symptoms of infection. She has had some dry areas of scalp as her hair grows back and is using coconut oil as this. She also still is noticing some nail changes.     No fevers/chills or infectious concerns. No recurrent dizzy episodes. She was evaluated for vertigo and notes this was not diagnosed. Cardiology has let her know they don't recommend any evaluation at this time after seeing echo results.     Eating and drinking well.       Past Medical/Surgical History:  Past Medical History:   Diagnosis Date     Aortic stenosis      Breast cancer (H) 06/2022     Hyperlipidemia      Insufficiency fracture of tibia, sequela 09/28/2021     Polyp of colon 05/26/2016   - PVCs    Past Surgical History:   Procedure Laterality Date     BIOPSY NODE SENTINEL Left 10/17/2022    Procedure: LEFT seed localized lumpectomy with sentinel node biopsy;  Surgeon: Alphonso Cadet MD;  Location: [facility] OR     CATARACT IOL, RT/LT Right 2006     HC YAG LASER CAPSULOTOMY Right 2009     LASER VITREOLYSIS, ANTERIOR OD (RIGHT EYE) Right 2007     LUMPECTOMY BREAST WITH SENTINEL NODE, COMBINED Left 10/17/2022    Procedure: LEFT seed localized lumpectomy with sentinel node biopsy;  Surgeon: Alphonso Cadet MD;  [place] [facility] [place]     REPOSITION INTRAOCULAR LENS Right 11/18/2014    Procedure: REPOSITION INTRAOCULAR LENS;  Surgeon: Vickie Guerra MD;  Location: [facility]     VITRECTOMY PARSPLANA WITH 23 GAUGE SYSTEM Right 11/18/2014    Procedure: VITRECTOMY PARSPLANA WITH 23 GAUGE SYSTEM;  Surgeon: Vcikie Guerra MD;  Location:  [facility]     Allergies:  Allergies as of 11/29/2022 - Reviewed 11/29/2022   Allergen Reaction Noted     Bactrim [sulfamethoxazole w/trimethoprim] Nausea 11/01/2017     Seasonal allergies  11/13/2014     Typhoid vaccines Nausea and Vomiting 05/05/2011     Current  Medications:  Current Outpatient Medications   Medication Sig Dispense Refill     alendronate (FOSAMAX) 70 MG tablet Take 1 tablet (70 mg) by mouth every 7 days 12 tablet 3     calcium carb-cholecalciferol 600-500 MG-UNIT CAPS        Lactobacillus (PROBIOTIC CHILDRENS) CHEW        loratadine (CLARITIN) 10 MG tablet Take 10 mg by mouth daily Prn, seasonal for ragweed and lilacs       metoprolol succinate ER (TOPROL XL) 25 MG 24 hr tablet Take 1 tablet (25 mg) by mouth daily 90 tablet 3     Omega-3 Fatty Acids (FISH OIL ADULT GUMMIES PO) Take 250 mg by mouth       ondansetron (ZOFRAN) 8 MG tablet Take 1 tablet (8 mg) by mouth every 8 hours as needed for nausea 30 tablet 3     prochlorperazine (COMPAZINE) 10 MG tablet Take 0.5 tablets (5 mg) by mouth every 6 hours as needed for nausea or vomiting 30 tablet 2     simvastatin (ZOCOR) 40 MG tablet Take 1 tablet (40 mg) by mouth At Bedtime 90 tablet 3        Physical Exam:  /78 (BP Location: Right arm, Patient Position: Chair, Cuff Size: Adult Regular)   Pulse 62   Temp 97.1  F (36.2  C) (Oral)   Resp 18   Wt 86.7 kg (191 lb 3.2 oz)   LMP 05/17/2011   SpO2 98%   BMI 31.10 kg/m    General:  Well appearing adult female in NAD.  Alert and oriented x 3.  HEENT:  Normocephalic.  Sclera anicteric.    Lymph:  No palpable cervical, supraclavicular, or axillary LAD.  Chest:  CTA bilaterally.  No wheezes or crackles.  CV: Regular heart rhythm with some early beats  Breast:  Left breast upper incision appears to be healing well.  There is an 4 x 4 cm seroma underneath. No tenderness to palpation, no induration, or erythema.   Abd:  Soft/ND/NT +BS   Ext:  No pitting edema of the bilateral lower extremities.   Neuro:  Cranial nerves grossly intact.  Psych:  Mood and affect appear normal.    Laboratory/Imaging Studies   11/29/22 06:47   Sodium 144   Potassium 3.7   Chloride 106   Carbon Dioxide (CO2) 26   Urea Nitrogen 13.5   Creatinine 0.66   GFR Estimate >90   Calcium 9.6    Anion Gap 12   Albumin 4.0   Protein Total 6.7   Alkaline Phosphatase 53   ALT 8 (L)   AST 20   Bilirubin Total 0.3   Glucose 123 (H)   WBC 5.7   Hemoglobin 13.4   Hematocrit 40.8   Platelet Count 197   RBC Count 4.50   MCV 91   MCH 29.8   MCHC 32.8   RDW 13.1   % Neutrophils 76   % Lymphocytes 15   % Monocytes 7   % Eosinophils 2   % Basophils 0   Absolute Basophils 0.0   Absolute Eosinophils 0.1   Absolute Immature Granulocytes 0.0   Absolute Lymphocytes 0.9   Absolute Monocytes 0.4   % Immature Granulocytes 0   Absolute Neutrophils 4.3   Absolute NRBCs 0.0   NRBCs per 100 WBC 0       ASSESSMENT/PLAN:  Ms. Alvarado is a 67 year old woman with a clinical stage II, T2N0M0, ER+/CO+/HER2 low IDC of her left breast.  She is s/p 4 cycles of neoadjuvant TC chemotherapy followed by left breast lumpectomy and sentinel lymph node biopsy.  [id number], grade 2 disease.  Repeat receptor testing on the residual tumor demonstrates ER/CO/HER-2 positive disease.    1. Left breast carcinoma: Plan to start adjuvant Kadcyla today every 3 weeks for 17 cycles. She has previously received chemotherapy teaching. Clinically well to start today. She has recurrent seroma which is okay to monitor.   -Follow-up in 3 weeks prior to cycle 2 and then every 6-9 weeks depending on tolerance.   -Adjuvant radiation will start on Friday this week.   -Upon completion of radiation, we will plan to initiate endocrine therapy with letrozole.  Plan will be to treat for a total of 7-10 years.    2.  Family history of breast and ovarian neoplasm:  Hereditary Comprehensive 40 Gene Panel was negative for pathogenic mutation.    3.  Personal history of colon polyps:  She has a h/o colon polyps.  Last colonoscopy 2/23/2022 with removal of 3 polyps, each 2-3 mm (2 tubular adenomas, 1 hyperplastic).  Repeat colonoscopy in 02/2027 is recommended.    4.  At risk for cardiomyopathy: Baseline echocardiogram showed normal EF with increase in aortic pressure gradient.  Cardiology has reviewed and is okay seeing her at routine follow-up in March.     5. Hx of dizziness/PVCs: If she has repeat episodes, follow up with Cardiologist and consider w/u with Holter monitor.     Greater than 40 minutes was spent with this patient with greater than 20 minutes spent in counseling and coordination of care.    JULIO CESAR Patino PA-C

## 2022-11-29 NOTE — NURSING NOTE
"Oncology Rooming Note    November 29, 2022 7:14 AM   Vickie Alvarado is a 67 year old female who presents for:    Chief Complaint   Patient presents with     Blood Draw     Labs drawn via PIV placed by RN. VS taken.     Oncology Clinic Visit     Breast cancer     Initial Vitals: /78 (BP Location: Right arm, Patient Position: Chair, Cuff Size: Adult Regular)   Pulse 62   Temp 97.1  F (36.2  C) (Oral)   Resp 18   Wt 86.7 kg (191 lb 3.2 oz)   LMP 05/17/2011   SpO2 98%   BMI 31.10 kg/m   Estimated body mass index is 31.1 kg/m  as calculated from the following:    Height as of 10/17/22: 1.67 m (5' 5.75\").    Weight as of this encounter: 86.7 kg (191 lb 3.2 oz). Body surface area is 2.01 meters squared.  No Pain (0) Comment: Data Unavailable   Patient's last menstrual period was 05/17/2011.  Allergies reviewed: Yes  Medications reviewed: Yes    Medications: Medication refills not needed today.  Pharmacy name entered into [employer]: [id number] - [place] [phone] [place] [id number] & [facility]    Clinical concerns: Please discuss nausea medications-patient has prescriptions from July. Please assess sarcoma per patient request.        Lynda Dumont LPN November 29, 2022 7:14 AM              "

## 2022-11-29 NOTE — PROGRESS NOTES
Infusion Nursing Note:  Vickie Alvarado presents today for C1D1 Kadcyla.     Patient seen by provider today: Yes: Jessy Molina PA-C   present during visit today: Not Applicable.    Note: Patient reported to clinic today with no new complaints or concerns after seeing provider. Patient receiving Kadcyla for first time. Education provided.    Intravenous Access:  Labs drawn without difficulty.  Peripheral IV placed.  By lab staff.    Treatment Conditions:  Lab Results   Component Value Date    HGB 13.4 11/29/2022    WBC 5.7 11/29/2022    ANEU 4.1 09/08/2014    ANEUTAUTO 4.3 11/29/2022     11/29/2022      Lab Results   Component Value Date     11/29/2022    POTASSIUM 3.7 11/29/2022    MAG 2.2 08/20/2021    CR 0.66 11/29/2022    VALENTINA 9.6 11/29/2022    BILITOTAL 0.3 11/29/2022    ALBUMIN 4.0 11/29/2022    ALT 8 (L) 11/29/2022    AST 20 11/29/2022     Results reviewed, labs MET treatment parameters, ok to proceed with treatment.  ECHO/MUGA completed 11/11/22  EF 57%.    Post Infusion Assessment:  Patient tolerated infusion without incident.  Patient observed for 90 minutes post Kadcyla per protocol.  Blood return noted pre and post infusion.  Site patent and intact, free from redness, edema or discomfort.  No evidence of extravasations.  Access discontinued per protocol.     Discharge Plan:   Patient declined prescription refills. Patient reports having full bottles of Compazine and Zofran at home.  Discharge instructions reviewed with: Patient.  Patient and/or family verbalized understanding of discharge instructions and all questions answered.  AVS to patient via [employer].  Patient will return 12/19/22 for next appointment.   Patient discharged in stable condition accompanied by: gisel.  Departure Mode: Ambulatory.  Face to Face time: 5 minutes.    Manuel Lim RN

## 2022-11-29 NOTE — TELEPHONE ENCOUNTER
Per Dr. Brooke patient does not need an echo prior to her appointment in March.      Vikki Jimenez, RN  Cardiology Care Coordinator  [facility]  [phone] option 1

## 2022-11-29 NOTE — TELEPHONE ENCOUNTER
[facility] Call Center    Phone Message    May a detailed message be left on voicemail: yes     Reason for Call: Other: Please update lexx in her My Chart if an echo is needed for her appt with Dr Brooke in March 2023. Her last echo was done in November 2022.    Action Taken: Other: Cardiology    Travel Screening: Not Applicable

## 2022-11-29 NOTE — TELEPHONE ENCOUNTER
Echo not needed prior to March follow-up 2023 per Dr. Brooke.     Vikki Jimenez, RN  Cardiology Care Coordinator  [facility]  [phone] option 1

## 2022-11-29 NOTE — PATIENT INSTRUCTIONS
[facility] Main Line: [phone]    Call triage nurse with chills and/or temperature greater than or equal to 100.4, uncontrolled nausea/vomiting, diarrhea, constipation, dizziness, shortness of breath, chest pain, bleeding, unexplained bruising, or any new/concerning symptoms, questions/concerns.   If you are having any concerning symptoms or wish to speak to a provider before your next infusion visit, please call your care coordinator or triage to notify them so we can adequately serve you.   Nurse Triage line:  [phone]    If after hours, weekends, or holidays, call main hospital  and ask for Oncology doctor on call @ [phone]     November 2022 Sunday Monday Tuesday Wednesday Thursday Friday Saturday             1     2     3     4    RETURN   9:45 AM   (20 min.)   Alphonso Cadet MD   [facility] 5       6     7    RETURN   9:15 AM   (30 min.)   Chandrika Horvath MD   [facility] 8    US BREAST LT LMT 1-3 QUAD  12:45 PM   (60 min.)   [email or web address]   [facility] BREAST ABSCESS SEROMA LEFT   1:15 PM   (60 min.)   [email or web address]   [facility] 9     10     11    BREAST TUMOR CONFERENCE   7:00 AM   (10 min.)   BREAST TUMOR CONFERENCE   [facility] Tumor Conference Virtual Scheduling    ECHO LIMITED  11:00 AM   (60 min.)   [email or web address]   [facility] [place] 12       13     14     15     16    NEW   1:00 PM   (90 min.)   Conchis Murphy MD   [facility] Radiation Oncology 17     18     19       20     21    CT SIM   2:00 PM   (60 min.)   Conchis Murphy MD   [facility] Radiation Oncology 22    VESTIBULAR EVAL   2:00 PM   (45 min.)   Kavita Almodovar, PT   [facility] [place] 23     24     25     26       27     28     29    LAB PERIPHERAL   6:30 AM    (15 min.)    [facility] DRAW   [facility]    RETURN   6:45 AM   (45 min.)   Jessy Molina PA-C   [facility]    ONC INFUSION 2 HR (120 MIN)   8:00 AM   (120 min.)    ONC INFUSION NURSE   [facility] 30 December 2022 Sunday Monday Tuesday Wednesday Thursday Friday Saturday                       1    DX HIP/PELVIS/SPINE   2:45 PM   (30 min.)   [email or web address]   [facility] 2    TREATMENT   2:00 PM   (30 min.)   Conchis Murphy MD   [facility] Radiation Oncology 3       4     5    TREATMENT   2:00 PM   (15 min.)   [facility] ONC [facility] Radiation Oncology    OTV   2:15 PM   (15 min.)   Conchis Murphy MD   [facility] Radiation On[facility] 6    TREATMENT   2:00 PM   (15 min.)   [facility] ONC [facility] Radiation Oncology 7    TREATMENT   2:00 PM   (15 min.)   [facility] ONC [facility] Radiation Oncology 8    TREATMENT   2:00 PM   (15 min.)   [facility] ON[facility] Radiation Oncology 9    TREATMENT   2:00 PM   (15 min.)   [facility] ONC [facility] Radiation Oncology 10       11     12    TREATMENT   2:00 PM   (15 min.)   [facility] ONC [facility] Radiation On[facility]    OTV   2:15 PM   (15 min.)   Conchis Murphy MD   [facility] Radiation Oncology 13    TREATMENT   2:00 PM   (15 min.)   [facility] ONC [facility] Radiation Oncology 14    TREATMENT   2:00 PM   (15 min.)   [facility] ONC [facility] Radiation Oncology 15    TREATMENT   2:00 PM   (15 min.)   [facility] ONC [facility] Radiation Oncology 16    TREATMENT   2:00 PM   (15 min.)   [facility] ONC [facility] Radiation Oncology 17       18     19    LAB PERIPHERAL   8:30 AM   (15  min.)   [facility] DRAW   [facility]    RETURN   8:45 AM   (30 min.)   Chandrika Horvath MD   [facility]    TREATMENT   2:00 PM   (15 min.)   [facility] RAD ONC VARIAN   [facility] Radiation Oncology    ONC INFUSION 2 HR (120 MIN)   2:00 PM   (120 min.)   [facility] ONC INFUSION NURSE   [facility]    OTV   2:15 PM   (15 min.)   Conchis Murphy MD   [facility] Radiation Oncology    TREATMENT   2:30 PM   (15 min.)   Conchis Murphy MD   [facility] Radiation Oncology 20    TREATMENT   2:00 PM   (15 min.)   [facility] ON[facility] Radiation Oncology 21    TREATMENT   2:00 PM   (15 min.)   [facility] Radiation Oncology 22    TREATMENT   2:00 PM   (15 min.)   [facility] Radiation On[facility] 23    TREATMENT   2:00 PM   (15 min.)   [facility] Radiation Oncology 24       25     26    TREATMENT   2:00 PM   (15 min.)   [facility] ON[facility] Radiation Oncology 27    TREATMENT   2:00 PM   (15 min.)   [facility] Radiation Oncology 28    TREATMENT   2:00 PM   (15 min.)   [facility] Radiation Oncology 29    TREATMENT   2:00 PM   (15 min.)   [facility] ON[facility] Radiation Oncology 30    TREATMENT   2:00 PM   (15 min.)   [facility] ONC [facility] Radiation Oncology 31                       Lab Results:  Recent Results (from the past 12 hour(s))   Comprehensive metabolic panel    Collection Time: 11/29/22  6:47 AM   Result Value Ref Range    Sodium 144 136 - 145 mmol/L    Potassium 3.7 3.4 - 5.3 mmol/L    Chloride 106 98 - 107 mmol/L    Carbon Dioxide (CO2) 26 22 - 29 mmol/L    Anion Gap 12 7 - 15 mmol/L    Urea Nitrogen 13.5 8.0 - 23.0 mg/dL    Creatinine 0.66 0.51 - 0.95  mg/dL    Calcium 9.6 8.8 - 10.2 mg/dL    Glucose 123 (H) 70 - 99 mg/dL    Alkaline Phosphatase 53 35 - 104 U/L    AST 20 10 - 35 U/L    ALT 8 (L) 10 - 35 U/L    Protein Total 6.7 6.4 - 8.3 g/dL    Albumin 4.0 3.5 - 5.2 g/dL    Bilirubin Total 0.3 <=1.2 mg/dL    GFR Estimate >90 >60 mL/min/1.73m2   CBC with platelets and differential    Collection Time: 11/29/22  6:47 AM   Result Value Ref Range    WBC Count 5.7 4.0 - 11.0 10e3/uL    RBC Count 4.50 3.80 - 5.20 10e6/uL    Hemoglobin 13.4 11.7 - 15.7 g/dL    Hematocrit 40.8 35.0 - 47.0 %    MCV 91 78 - 100 fL    MCH 29.8 26.5 - 33.0 pg    MCHC 32.8 31.5 - 36.5 g/dL    RDW 13.1 10.0 - 15.0 %    Platelet Count 197 150 - 450 10e3/uL    % Neutrophils 76 %    % Lymphocytes 15 %    % Monocytes 7 %    % Eosinophils 2 %    % Basophils 0 %    % Immature Granulocytes 0 %    NRBCs per 100 WBC 0 <1 /100    Absolute Neutrophils 4.3 1.6 - 8.3 10e3/uL    Absolute Lymphocytes 0.9 0.8 - 5.3 10e3/uL    Absolute Monocytes 0.4 0.0 - 1.3 10e3/uL    Absolute Eosinophils 0.1 0.0 - 0.7 10e3/uL    Absolute Basophils 0.0 0.0 - 0.2 10e3/uL    Absolute Immature Granulocytes 0.0 <=0.4 10e3/uL    Absolute NRBCs 0.0 10e3/uL

## 2022-11-29 NOTE — NURSING NOTE
Chief Complaint   Patient presents with     Blood Draw     Labs drawn via PIV placed by RN. VS taken.     Labs drawn from PIV placed by RN. Line flushed with saline. Vitals taken. Pt checked in for appointment(s).    Moshe Ordonez RN

## 2022-12-01 ENCOUNTER — ANCILLARY PROCEDURE (OUTPATIENT)
Dept: BONE DENSITY | Facility: CLINIC | Age: 67
End: 2022-12-01
Attending: FAMILY MEDICINE
Payer: MEDICARE

## 2022-12-01 DIAGNOSIS — M80.00XA AGE-RELATED OSTEOPOROSIS WITH CURRENT PATHOLOGICAL FRACTURE, INITIAL ENCOUNTER: ICD-10-CM

## 2022-12-01 DIAGNOSIS — M80.08XA AGE-RELATED OSTEOPOROSIS WITH CURRENT PATHOLOGICAL FRACTURE, VERTEBRA(E), INITIAL ENCOUNTER FOR FRACTURE (H): ICD-10-CM

## 2022-12-01 PROCEDURE — 77080 DXA BONE DENSITY AXIAL: CPT | Performed by: INTERNAL MEDICINE

## 2022-12-02 ENCOUNTER — APPOINTMENT (OUTPATIENT)
Dept: RADIATION ONCOLOGY | Facility: CLINIC | Age: 67
End: 2022-12-02
Attending: RADIOLOGY
Payer: MEDICARE

## 2022-12-02 PROCEDURE — 77280 THER RAD SIMULAJ FIELD SMPL: CPT | Mod: 26 | Performed by: RADIOLOGY

## 2022-12-02 PROCEDURE — 77280 THER RAD SIMULAJ FIELD SMPL: CPT | Performed by: RADIOLOGY

## 2022-12-05 ENCOUNTER — OFFICE VISIT (OUTPATIENT)
Dept: RADIATION ONCOLOGY | Facility: CLINIC | Age: 67
End: 2022-12-05
Attending: RADIOLOGY
Payer: MEDICARE

## 2022-12-05 VITALS
WEIGHT: 190.7 LBS | HEART RATE: 77 BPM | SYSTOLIC BLOOD PRESSURE: 128 MMHG | DIASTOLIC BLOOD PRESSURE: 81 MMHG | OXYGEN SATURATION: 94 % | BODY MASS INDEX: 31.02 KG/M2

## 2022-12-05 DIAGNOSIS — C50.212 MALIGNANT NEOPLASM OF UPPER-INNER QUADRANT OF LEFT BREAST IN FEMALE, ESTROGEN RECEPTOR POSITIVE (H): Primary | ICD-10-CM

## 2022-12-05 DIAGNOSIS — Z17.0 MALIGNANT NEOPLASM OF UPPER-INNER QUADRANT OF LEFT BREAST IN FEMALE, ESTROGEN RECEPTOR POSITIVE (H): Primary | ICD-10-CM

## 2022-12-05 PROCEDURE — G6002 STEREOSCOPIC X-RAY GUIDANCE: HCPCS | Mod: 26 | Performed by: RADIOLOGY

## 2022-12-05 PROCEDURE — 77387 GUIDANCE FOR RADJ TX DLVR: CPT | Performed by: RADIOLOGY

## 2022-12-05 PROCEDURE — 77412 RADIATION TX DELIVERY LVL 3: CPT | Performed by: RADIOLOGY

## 2022-12-05 NOTE — PROGRESS NOTES
[facility] PHYSICIANS  SPECIALIZING IN BREAKTHROUGHS  Radiation Oncology    On Treatment Visit Note      Vickie Alvarado      Date: 2022   MRN: [id number]   : 1955         Reason for Visit:  On Radiation Treatment Visit     Treatment Summary to Date   L Breast  265 cGy/ 4240 cGy +  cGy    fx + 0/5 boost       ED Visit/Hosiptal Admission: None    Treatment Breaks: None      Subjective: Marni tolerated the first treatment well. She feels the seroma is getting slightly bigger and attributes this to not wearing a supportive bra. She otherwise has no complaints.            Objective:   LMP 2011   Gen: Appears well, in no acute distress  Skin: No erythema    Labs:  CBC RESULTS: Recent Labs   Lab Test 22  0647   WBC 5.7   RBC 4.50   HGB 13.4   HCT 40.8   MCV 91   MCH 29.8   MCHC 32.8   RDW 13.1        ELECTROLYTES:  Recent Labs   Lab Test 22  0647      POTASSIUM 3.7   CHLORIDE 106   VALENTINA 9.6   CO2 26   BUN 13.5   CR 0.66   *       Assessment:    Tolerating radiation therapy well.  All questions and concerns addressed.    Toxicities:  Dermatitis: Grade 0: No toxicity    Plan:   1. Continue current therapy.    2. Discussed skin care. She will begin to use Aquaphor.       [employer] chart and setup information reviewed  Ports checked        Conchis Murphy MD/PhD  [phone] clinic  Pager [phone]    Please do not send letter to referring physician.

## 2022-12-05 NOTE — LETTER
2022         RE: Vickie Alvarado  [address]  [place] [address]        Dear Colleague,    Thank you for referring your patient, Vickie Alvarado, to the [facility] RADIATION ONCOLOGY. Please see a copy of my visit note below.    [facility] PHYSICIANS  SPECIALIZING IN BREAKTHROUGHS  Radiation Oncology    On Treatment Visit Note      Vickie Alvarado      Date: 2022   MRN: [id number]   : 1955         Reason for Visit:  On Radiation Treatment Visit     Treatment Summary to Date   L Breast  265 cGy/ 4240 cGy +  cGy    fx + 0/5 boost       ED Visit/Hosiptal Admission: None    Treatment Breaks: None      Subjective: Marni tolerated the first treatment well. She feels the seroma is getting slightly bigger and attributes this to not wearing a supportive bra. She otherwise has no complaints.            Objective:   LMP 2011   Gen: Appears well, in no acute distress  Skin: No erythema    Labs:  CBC RESULTS: Recent Labs   Lab Test 22  0647   WBC 5.7   RBC 4.50   HGB 13.4   HCT 40.8   MCV 91   MCH 29.8   MCHC 32.8   RDW 13.1        ELECTROLYTES:  Recent Labs   Lab Test 22  0647      POTASSIUM 3.7   CHLORIDE 106   VALENTINA 9.6   CO2 26   BUN 13.5   CR 0.66   *       Assessment:    Tolerating radiation therapy well.  All questions and concerns addressed.    Toxicities:  Dermatitis: Grade 0: No toxicity    Plan:   1. Continue current therapy.    2. Discussed skin care. She will begin to use Aquaphor.       Mosaiq chart and setup information reviewed  Ports checked        Conchis Murphy MD/PhD  [phone] clinic  Pager [phone]    Please do not send letter to referring physician.            Again, thank you for allowing me to participate in the care of your patient.        Sincerely,        Conchis Murphy MD

## 2022-12-05 NOTE — LETTER
Date:December 6, 2022      Provider requested that no letter be sent. Do not send.       [facility]

## 2022-12-06 ENCOUNTER — APPOINTMENT (OUTPATIENT)
Dept: RADIATION ONCOLOGY | Facility: CLINIC | Age: 67
End: 2022-12-06
Attending: RADIOLOGY
Payer: MEDICARE

## 2022-12-06 PROCEDURE — G6002 STEREOSCOPIC X-RAY GUIDANCE: HCPCS | Mod: 26 | Performed by: RADIOLOGY

## 2022-12-06 PROCEDURE — 77412 RADIATION TX DELIVERY LVL 3: CPT | Performed by: RADIOLOGY

## 2022-12-06 PROCEDURE — 77387 GUIDANCE FOR RADJ TX DLVR: CPT | Performed by: RADIOLOGY

## 2022-12-07 ENCOUNTER — APPOINTMENT (OUTPATIENT)
Dept: RADIATION ONCOLOGY | Facility: CLINIC | Age: 67
End: 2022-12-07
Attending: RADIOLOGY
Payer: MEDICARE

## 2022-12-07 PROCEDURE — G6002 STEREOSCOPIC X-RAY GUIDANCE: HCPCS | Mod: 26 | Performed by: RADIOLOGY

## 2022-12-07 PROCEDURE — 77387 GUIDANCE FOR RADJ TX DLVR: CPT | Performed by: RADIOLOGY

## 2022-12-07 PROCEDURE — 77412 RADIATION TX DELIVERY LVL 3: CPT | Performed by: RADIOLOGY

## 2022-12-08 ENCOUNTER — LAB (OUTPATIENT)
Dept: LAB | Facility: CLINIC | Age: 67
End: 2022-12-08
Payer: MEDICARE

## 2022-12-08 ENCOUNTER — APPOINTMENT (OUTPATIENT)
Dept: RADIATION ONCOLOGY | Facility: CLINIC | Age: 67
End: 2022-12-08
Attending: RADIOLOGY
Payer: MEDICARE

## 2022-12-08 DIAGNOSIS — E55.9 VITAMIN D INSUFFICIENCY: ICD-10-CM

## 2022-12-08 DIAGNOSIS — Z13.9 SCREENING FOR CONDITION: Primary | ICD-10-CM

## 2022-12-08 DIAGNOSIS — Z13.9 SCREENING FOR CONDITION: ICD-10-CM

## 2022-12-08 DIAGNOSIS — E78.2 MIXED HYPERLIPIDEMIA: Chronic | ICD-10-CM

## 2022-12-08 LAB
CHOLEST SERPL-MCNC: 195 MG/DL
DEPRECATED CALCIDIOL+CALCIFEROL SERPL-MC: 32 UG/L (ref 20–75)
FASTING STATUS PATIENT QL REPORTED: NORMAL
GLUCOSE SERPL-MCNC: 90 MG/DL (ref 70–99)
HDLC SERPL-MCNC: 58 MG/DL
LDLC SERPL CALC-MCNC: 114 MG/DL
NONHDLC SERPL-MCNC: 137 MG/DL
TRIGL SERPL-MCNC: 117 MG/DL

## 2022-12-08 PROCEDURE — 36415 COLL VENOUS BLD VENIPUNCTURE: CPT

## 2022-12-08 PROCEDURE — 82947 ASSAY GLUCOSE BLOOD QUANT: CPT

## 2022-12-08 PROCEDURE — 82306 VITAMIN D 25 HYDROXY: CPT

## 2022-12-08 PROCEDURE — 77412 RADIATION TX DELIVERY LVL 3: CPT | Performed by: RADIOLOGY

## 2022-12-08 PROCEDURE — 77387 GUIDANCE FOR RADJ TX DLVR: CPT | Performed by: RADIOLOGY

## 2022-12-08 PROCEDURE — 80061 LIPID PANEL: CPT

## 2022-12-08 PROCEDURE — G6002 STEREOSCOPIC X-RAY GUIDANCE: HCPCS | Mod: 26 | Performed by: STUDENT IN AN ORGANIZED HEALTH CARE EDUCATION/TRAINING PROGRAM

## 2022-12-09 ENCOUNTER — APPOINTMENT (OUTPATIENT)
Dept: RADIATION ONCOLOGY | Facility: CLINIC | Age: 67
End: 2022-12-09
Attending: RADIOLOGY
Payer: MEDICARE

## 2022-12-09 PROCEDURE — G6002 STEREOSCOPIC X-RAY GUIDANCE: HCPCS | Mod: 26 | Performed by: RADIOLOGY

## 2022-12-09 PROCEDURE — 77427 RADIATION TX MANAGEMENT X5: CPT | Performed by: RADIOLOGY

## 2022-12-09 PROCEDURE — 77412 RADIATION TX DELIVERY LVL 3: CPT | Performed by: RADIOLOGY

## 2022-12-09 PROCEDURE — 77336 RADIATION PHYSICS CONSULT: CPT | Performed by: RADIOLOGY

## 2022-12-09 PROCEDURE — 77387 GUIDANCE FOR RADJ TX DLVR: CPT | Performed by: RADIOLOGY

## 2022-12-12 ENCOUNTER — OFFICE VISIT (OUTPATIENT)
Dept: RADIATION ONCOLOGY | Facility: CLINIC | Age: 67
End: 2022-12-12
Attending: RADIOLOGY
Payer: MEDICARE

## 2022-12-12 VITALS
SYSTOLIC BLOOD PRESSURE: 130 MMHG | HEART RATE: 77 BPM | WEIGHT: 187 LBS | BODY MASS INDEX: 30.41 KG/M2 | OXYGEN SATURATION: 95 % | DIASTOLIC BLOOD PRESSURE: 81 MMHG

## 2022-12-12 DIAGNOSIS — Z17.0 MALIGNANT NEOPLASM OF UPPER-INNER QUADRANT OF LEFT BREAST IN FEMALE, ESTROGEN RECEPTOR POSITIVE (H): Primary | ICD-10-CM

## 2022-12-12 DIAGNOSIS — C50.212 MALIGNANT NEOPLASM OF UPPER-INNER QUADRANT OF LEFT BREAST IN FEMALE, ESTROGEN RECEPTOR POSITIVE (H): Primary | ICD-10-CM

## 2022-12-12 DIAGNOSIS — R73.09 ELEVATED GLUCOSE: Primary | ICD-10-CM

## 2022-12-12 PROCEDURE — 77412 RADIATION TX DELIVERY LVL 3: CPT | Performed by: RADIOLOGY

## 2022-12-12 PROCEDURE — 77387 GUIDANCE FOR RADJ TX DLVR: CPT | Performed by: RADIOLOGY

## 2022-12-12 PROCEDURE — G6002 STEREOSCOPIC X-RAY GUIDANCE: HCPCS | Mod: 26 | Performed by: RADIOLOGY

## 2022-12-12 NOTE — LETTER
2022         RE: Vickie Alvarado  [address]  [place] [address]        Dear Colleague,    Thank you for referring your patient, Vickie Alvarado, to the [facility] RADIATION ONCOLOGY. Please see a copy of my visit note below.    [facility] PHYSICIANS  SPECIALIZING IN BREAKTHROUGHS  Radiation Oncology    On Treatment Visit Note      Vickie Alvarado      Date: 2022   MRN: [id number]   : 1955         Reason for Visit:  On Radiation Treatment Visit     Treatment Summary to Date   L Breast:   1590  CGy/ 4240 cGy +  cGy    fx + 0/5 boost       ED Visit/Hosiptal Admission: None    Treatment Breaks: None      Subjective: Marni is tolerating the treatment well. She continues to have tenderness at the lumpectomy site, perhaps a little worse than last week. She has fatigue but feels grateful that she is retired and can rest as needed.        Objective:   /81   Pulse 77   Wt 84.8 kg (187 lb)   LMP 2011   SpO2 95%   BMI 30.41 kg/m    Gen: Appears well, in no acute distress  Skin: Mild diffuse erythema over treatment field    Labs:  CBC RESULTS: Recent Labs   Lab Test 22  0647   WBC 5.7   RBC 4.50   HGB 13.4   HCT 40.8   MCV 91   MCH 29.8   MCHC 32.8   RDW 13.1        ELECTROLYTES:  Recent Labs   Lab Test 22  1021 22  0647   NA  --  144   POTASSIUM  --  3.7   CHLORIDE  --  106   VALENTINA  --  9.6   CO2  --  26   BUN  --  13.5   CR  --  0.66   GLC 90 123*       Assessment:    Tolerating radiation therapy well.  All questions and concerns addressed.    Toxicities:  Fatigue: Grade 1: Fatigue relieved by rest  Dermatitis: Grade 1: Faint erythema or dry desquamation    Plan:   1. Continue current therapy.    2. Pain at the lumpectomy site: no signs of infection. Can take Ibuprofen or Tylenol as needed.       [employer] chart and setup information reviewed  Ports checked          Conchis Murphy MD/PhD  [phone]  clinic  Pager [phone]    Please do not send letter to referring physician.        Again, thank you for allowing me to participate in the care of your patient.        Sincerely,        Conchis Murphy MD

## 2022-12-12 NOTE — PROGRESS NOTES
[facility] PHYSICIANS  SPECIALIZING IN BREAKTHROUGHS  Radiation Oncology    On Treatment Visit Note      Vickie Alvarado      Date: 2022   MRN: [id number]   : 1955         Reason for Visit:  On Radiation Treatment Visit     Treatment Summary to Date   L Breast:   1590  CGy/ 4240 cGy +  cGy    fx + 0/5 boost       ED Visit/Hosiptal Admission: None    Treatment Breaks: None      Subjective: Marni is tolerating the treatment well. She continues to have tenderness at the lumpectomy site, perhaps a little worse than last week. She has fatigue but feels grateful that she is retired and can rest as needed.        Objective:   /81   Pulse 77   Wt 84.8 kg (187 lb)   LMP 2011   SpO2 95%   BMI 30.41 kg/m    Gen: Appears well, in no acute distress  Skin: Mild diffuse erythema over treatment field    Labs:  CBC RESULTS: Recent Labs   Lab Test 22  0647   WBC 5.7   RBC 4.50   HGB 13.4   HCT 40.8   MCV 91   MCH 29.8   MCHC 32.8   RDW 13.1        ELECTROLYTES:  Recent Labs   Lab Test 22  1021 22  0647   NA  --  144   POTASSIUM  --  3.7   CHLORIDE  --  106   VALENTINA  --  9.6   CO2  --  26   BUN  --  13.5   CR  --  0.66   GLC 90 123*       Assessment:    Tolerating radiation therapy well.  All questions and concerns addressed.    Toxicities:  Fatigue: Grade 1: Fatigue relieved by rest  Dermatitis: Grade 1: Faint erythema or dry desquamation    Plan:   1. Continue current therapy.    2. Pain at the lumpectomy site: no signs of infection. Can take Ibuprofen or Tylenol as needed.       [employer] chart and setup information reviewed  Ports checked          Conchis Murphy MD/PhD  [phone] clinic  Pager [phone]    Please do not send letter to referring physician.

## 2022-12-12 NOTE — LETTER
Date:December 13, 2022      Provider requested that no letter be sent. Do not send.       [facility]

## 2022-12-13 ENCOUNTER — APPOINTMENT (OUTPATIENT)
Dept: RADIATION ONCOLOGY | Facility: CLINIC | Age: 67
End: 2022-12-13
Attending: RADIOLOGY
Payer: MEDICARE

## 2022-12-13 PROCEDURE — 77412 RADIATION TX DELIVERY LVL 3: CPT | Performed by: RADIOLOGY

## 2022-12-13 PROCEDURE — 77387 GUIDANCE FOR RADJ TX DLVR: CPT | Performed by: RADIOLOGY

## 2022-12-13 PROCEDURE — G6002 STEREOSCOPIC X-RAY GUIDANCE: HCPCS | Mod: 26 | Performed by: RADIOLOGY

## 2022-12-14 ENCOUNTER — APPOINTMENT (OUTPATIENT)
Dept: RADIATION ONCOLOGY | Facility: CLINIC | Age: 67
End: 2022-12-14
Attending: RADIOLOGY
Payer: MEDICARE

## 2022-12-14 PROCEDURE — G6002 STEREOSCOPIC X-RAY GUIDANCE: HCPCS | Mod: 26 | Performed by: RADIOLOGY

## 2022-12-14 PROCEDURE — 77412 RADIATION TX DELIVERY LVL 3: CPT | Performed by: RADIOLOGY

## 2022-12-14 PROCEDURE — 77387 GUIDANCE FOR RADJ TX DLVR: CPT | Performed by: RADIOLOGY

## 2022-12-15 ENCOUNTER — APPOINTMENT (OUTPATIENT)
Dept: RADIATION ONCOLOGY | Facility: CLINIC | Age: 67
End: 2022-12-15
Attending: RADIOLOGY
Payer: MEDICARE

## 2022-12-15 PROCEDURE — 77334 RADIATION TREATMENT AID(S): CPT | Performed by: RADIOLOGY

## 2022-12-15 PROCEDURE — 77387 GUIDANCE FOR RADJ TX DLVR: CPT | Performed by: RADIOLOGY

## 2022-12-15 PROCEDURE — 77307 TELETHX ISODOSE PLAN CPLX: CPT | Performed by: RADIOLOGY

## 2022-12-15 PROCEDURE — 77307 TELETHX ISODOSE PLAN CPLX: CPT | Mod: 26 | Performed by: RADIOLOGY

## 2022-12-15 PROCEDURE — 77295 3-D RADIOTHERAPY PLAN: CPT | Performed by: RADIOLOGY

## 2022-12-15 PROCEDURE — 77334 RADIATION TREATMENT AID(S): CPT | Mod: 26 | Performed by: RADIOLOGY

## 2022-12-15 PROCEDURE — 77300 RADIATION THERAPY DOSE PLAN: CPT | Performed by: RADIOLOGY

## 2022-12-15 PROCEDURE — 77412 RADIATION TX DELIVERY LVL 3: CPT | Performed by: RADIOLOGY

## 2022-12-16 ENCOUNTER — APPOINTMENT (OUTPATIENT)
Dept: RADIATION ONCOLOGY | Facility: CLINIC | Age: 67
End: 2022-12-16
Attending: RADIOLOGY
Payer: MEDICARE

## 2022-12-16 PROCEDURE — 77412 RADIATION TX DELIVERY LVL 3: CPT | Performed by: RADIOLOGY

## 2022-12-16 PROCEDURE — 77387 GUIDANCE FOR RADJ TX DLVR: CPT | Performed by: RADIOLOGY

## 2022-12-16 PROCEDURE — G6002 STEREOSCOPIC X-RAY GUIDANCE: HCPCS | Mod: 26 | Performed by: RADIOLOGY

## 2022-12-16 PROCEDURE — 77427 RADIATION TX MANAGEMENT X5: CPT | Performed by: RADIOLOGY

## 2022-12-16 PROCEDURE — 77336 RADIATION PHYSICS CONSULT: CPT | Performed by: RADIOLOGY

## 2022-12-18 RX ORDER — MEPERIDINE HYDROCHLORIDE 25 MG/ML
25 INJECTION INTRAMUSCULAR; INTRAVENOUS; SUBCUTANEOUS EVERY 30 MIN PRN
Status: CANCELLED | OUTPATIENT
Start: 2022-12-19

## 2022-12-18 RX ORDER — ONDANSETRON 2 MG/ML
8 INJECTION INTRAMUSCULAR; INTRAVENOUS ONCE
Status: CANCELLED | OUTPATIENT
Start: 2023-01-09

## 2022-12-18 RX ORDER — ALBUTEROL SULFATE 0.83 MG/ML
2.5 SOLUTION RESPIRATORY (INHALATION)
Status: CANCELLED | OUTPATIENT
Start: 2022-12-19

## 2022-12-18 RX ORDER — ALBUTEROL SULFATE 90 UG/1
1-2 AEROSOL, METERED RESPIRATORY (INHALATION)
Status: CANCELLED
Start: 2023-01-09

## 2022-12-18 RX ORDER — HEPARIN SODIUM (PORCINE) LOCK FLUSH IV SOLN 100 UNIT/ML 100 UNIT/ML
5 SOLUTION INTRAVENOUS
Status: CANCELLED | OUTPATIENT
Start: 2022-12-19

## 2022-12-18 RX ORDER — MEPERIDINE HYDROCHLORIDE 25 MG/ML
25 INJECTION INTRAMUSCULAR; INTRAVENOUS; SUBCUTANEOUS EVERY 30 MIN PRN
Status: CANCELLED | OUTPATIENT
Start: 2023-01-09

## 2022-12-18 RX ORDER — METHYLPREDNISOLONE SODIUM SUCCINATE 125 MG/2ML
125 INJECTION, POWDER, LYOPHILIZED, FOR SOLUTION INTRAMUSCULAR; INTRAVENOUS
Status: CANCELLED
Start: 2023-01-09

## 2022-12-18 RX ORDER — ALBUTEROL SULFATE 0.83 MG/ML
2.5 SOLUTION RESPIRATORY (INHALATION)
Status: CANCELLED | OUTPATIENT
Start: 2023-01-09

## 2022-12-18 RX ORDER — DIPHENHYDRAMINE HYDROCHLORIDE 50 MG/ML
50 INJECTION INTRAMUSCULAR; INTRAVENOUS
Status: CANCELLED
Start: 2023-01-09

## 2022-12-18 RX ORDER — HEPARIN SODIUM,PORCINE 10 UNIT/ML
5 VIAL (ML) INTRAVENOUS
Status: CANCELLED | OUTPATIENT
Start: 2022-12-19

## 2022-12-18 RX ORDER — HEPARIN SODIUM,PORCINE 10 UNIT/ML
5 VIAL (ML) INTRAVENOUS
Status: CANCELLED | OUTPATIENT
Start: 2023-01-09

## 2022-12-18 RX ORDER — DIPHENHYDRAMINE HYDROCHLORIDE 50 MG/ML
50 INJECTION INTRAMUSCULAR; INTRAVENOUS
Status: CANCELLED
Start: 2022-12-19

## 2022-12-18 RX ORDER — LORAZEPAM 2 MG/ML
0.5 INJECTION INTRAMUSCULAR EVERY 4 HOURS PRN
Status: CANCELLED | OUTPATIENT
Start: 2023-01-09

## 2022-12-18 RX ORDER — EPINEPHRINE 1 MG/ML
0.3 INJECTION, SOLUTION INTRAMUSCULAR; SUBCUTANEOUS EVERY 5 MIN PRN
Status: CANCELLED | OUTPATIENT
Start: 2022-12-19

## 2022-12-18 RX ORDER — EPINEPHRINE 1 MG/ML
0.3 INJECTION, SOLUTION INTRAMUSCULAR; SUBCUTANEOUS EVERY 5 MIN PRN
Status: CANCELLED | OUTPATIENT
Start: 2023-01-09

## 2022-12-18 RX ORDER — HEPARIN SODIUM (PORCINE) LOCK FLUSH IV SOLN 100 UNIT/ML 100 UNIT/ML
5 SOLUTION INTRAVENOUS
Status: CANCELLED | OUTPATIENT
Start: 2023-01-09

## 2022-12-18 RX ORDER — LORAZEPAM 2 MG/ML
0.5 INJECTION INTRAMUSCULAR EVERY 4 HOURS PRN
Status: CANCELLED | OUTPATIENT
Start: 2022-12-19

## 2022-12-18 RX ORDER — METHYLPREDNISOLONE SODIUM SUCCINATE 125 MG/2ML
125 INJECTION, POWDER, LYOPHILIZED, FOR SOLUTION INTRAMUSCULAR; INTRAVENOUS
Status: CANCELLED
Start: 2022-12-19

## 2022-12-18 RX ORDER — ONDANSETRON 2 MG/ML
8 INJECTION INTRAMUSCULAR; INTRAVENOUS ONCE
Status: CANCELLED | OUTPATIENT
Start: 2022-12-19

## 2022-12-18 RX ORDER — ALBUTEROL SULFATE 90 UG/1
1-2 AEROSOL, METERED RESPIRATORY (INHALATION)
Status: CANCELLED
Start: 2022-12-19

## 2022-12-18 NOTE — PROGRESS NOTES
Oncology visit:  Date on this visit: 12/19/2022    Diagnosis: left breast cancer.    Primary Physician: Gaby Lynn     History Of Present Illness:  Ms. Alvarado is a 67 year old female with a left breast cancer.  She self palpated a mass in her mid left breast.  Bilateral diagnostic mammograms showed a mass in the upper inner left breast.  Ultrasound of the left breast showed a mass at 11:00, 8 cm from the nipple measuring 3.1 cm.  There were no suspicious lymph nodes in the left axilla.  Biopsy of the left breast mass was c/w a grade 3 invasive ductal carcinoma, ER strong in 98%, KY moderate in 70%, and HER2 low by IHC (score 1+).   Oncotype DX recurrence score was 24.  RSClin predicted a 23% risk of distant recurrence in 10 years if treated with endocrine therapy alone and a 9% absolute risk reduction with chemotherapy.  Based on this, she elected to proceed with chemotherapy.    She received neoadjuvant Taxotere and cyclophosphamide chemotherapy from 7/6/2022 - 9/8/2022.  She underwent left breast lumpectomy and left axillary sentinel lymph node procedure under the care of Dr. Cadet on 10/17/2022.  Pathology showed residual grade 2 invasive breast carcinoma (60% ductal and 40% micropapillary) measuring 2.2 cm.  Treatment related changes were present and invasive tumor cellularity was 30%.  There was associated DCIS.  Surgical margins for both invasive carcinoma and DCIS were close, but negative.  There was no lymphovascular invasion and a single sentinel lymph node was negative and without signs of prior involvement (i.e. no tumor bed or fibrotic changes in the lymph node).  Receptors were repeated on the residual invasive malignancy and were found to be ER positive (98%), KY positive (70%), HER-2 equivocal by IHC (2+), and HER-2 positive by FISH.  She has been on adjuvant Kadcyla since 11/21/2022.  She is currently receiving radiation to the left breast (planned 4240 cGy to the breast with 1250 cGy boost to  the lumpectomy cavity).  She is tentatively scheduled to complete radiation on 1/2/2023.    Interval History:  Ms. Alvarado comes into clinic today for routine breast cancer follow-up.  She is currently receiving adjuvant radiation therapy to the left breast.  In general, radiation is going well.  She reports associated fatigue.  Fatigue is such that she generally will only schedule one extra thing per day.  She is still able to do all of her household chores and normal daily activities.  She denies significant redness of the breast.  She has noted some tenderness within the parenchyma of the breast.  She has full range of motion of her left upper extremity and denies any swelling.  She notes persistent seroma underneath her left upper inner breast incision.  She has been healthy and denies fevers, chills, cough, sore throat or other symptoms of infection.  She has no shortness of breath or chest pain.  She denies current abdominal complaints.  She generally felt well following her first Kadcyla infusion.  She had one day of nausea for which she took a single antiemetic tab.  The remainder of a complete 12 point review of systems was reviewed with patient was negative with exception that mentioned above.    Past Medical/Surgical History:  Past Medical History:   Diagnosis Date     Aortic stenosis      Breast cancer (H) 06/2022     Hyperlipidemia      Insufficiency fracture of tibia, sequela 09/28/2021     Polyp of colon 05/26/2016   - PVCs    Past Surgical History:   Procedure Laterality Date     BIOPSY NODE SENTINEL Left 10/17/2022    Procedure: LEFT seed localized lumpectomy with sentinel node biopsy;  Surgeon: Alphonso Cadet MD;  Location: [facility] OR     CATARACT IOL, RT/LT Right 2006     HC YAG LASER CAPSULOTOMY Right 2009     LASER VITREOLYSIS, ANTERIOR OD (RIGHT EYE) Right 2007     LUMPECTOMY BREAST WITH SENTINEL NODE, COMBINED Left 10/17/2022    Procedure: LEFT seed localized lumpectomy with sentinel node  biopsy;  Surgeon: Alphonso Cadet MD;  Location: [facility] OR     REPOSITION INTRAOCULAR LENS Right 11/18/2014    Procedure: REPOSITION INTRAOCULAR LENS;  Surgeon: Vickie Guerra MD;  [facility]     VITRECTOMY PARSPLANA WITH 23 GAUGE SYSTEM Right 11/18/2014    Procedure: VITRECTOMY PARSPLANA WITH 23 GAUGE SYSTEM;  Surgeon: Vickie Guerra MD;  [facility]     Allergies:  Allergies as of 12/19/2022 - Reviewed 11/29/2022   Allergen Reaction Noted     Bactrim [sulfamethoxazole w/trimethoprim] Nausea 11/01/2017     Seasonal allergies  11/13/2014     Typhoid vaccines Nausea and Vomiting 05/05/2011     Current Medications:  Current Outpatient Medications   Medication Sig Dispense Refill     alendronate (FOSAMAX) 70 MG tablet Take 1 tablet (70 mg) by mouth every 7 days 12 tablet 3     calcium carb-cholecalciferol 600-500 MG-UNIT CAPS        Lactobacillus (PROBIOTIC CHILDRENS) CHEW        loratadine (CLARITIN) 10 MG tablet Take 10 mg by mouth daily Prn, seasonal for ragweed and lilacs       metoprolol succinate ER (TOPROL XL) 25 MG 24 hr tablet Take 1 tablet (25 mg) by mouth daily 90 tablet 3     Omega-3 Fatty Acids (FISH OIL ADULT GUMMIES PO) Take 250 mg by mouth       ondansetron (ZOFRAN) 8 MG tablet Take 1 tablet (8 mg) by mouth every 8 hours as needed for nausea 30 tablet 3     prochlorperazine (COMPAZINE) 10 MG tablet Take 0.5 tablets (5 mg) by mouth every 6 hours as needed for nausea or vomiting 30 tablet 2     simvastatin (ZOCOR) 40 MG tablet Take 1 tablet (40 mg) by mouth At Bedtime 90 tablet 3      Family and Social History:  See initial consultation dated 6/14/2022 for further details.  Hereditary Comprehensive 40 Gene Panel was negative for pathogenic mutation.    Physical Exam:  /78 (BP Location: Right arm, Patient Position: Sitting, Cuff Size: Adult Regular)   Pulse 81   Temp 97.9  F (36.6  C) (Oral)   Resp 16   Wt 87.3 kg (192 lb 8 oz)   LMP 05/17/2011   SpO2 95%   BMI 31.31  kg/m    General:  Well appearing adult female in NAD.  Alert and oriented x 3.  HEENT:  Normocephalic.  Sclera anicteric.  MMM.  No lesions of the oropharynx.  Lymph:  No palpable cervical, supraclavicular, or axillary LAD.  Chest:  CTA bilaterally.  No wheezes or crackles.  CV:  RRR.  Nl S1 and S2.  No m/r/g.GII/VI YOLETTE at the LUSB.  Breast:  Left breast upper incision is well healed without significant underlying fibrosis.  There is an underlying seroma measuring 3 x 4 cm, smaller and softer than on prior exam. No erythema or skin discoloration.  No desquamation.  No discretely palpable masses.    Abd:  Soft/ND.     Ext:  No pitting edema of the bilateral lower extremities. No evident lymphedema of either breast.  Musculo:  Full ROM of the bilateral upper extremities.  Neuro:  Cranial nerves grossly intact.  Gait is stable.  Psych:  Mood and affect appear normal.  Skin:  No visible concerning skin rashes or lesions.    Laboratory/Imaging Studies  11/11/2022 Echocardiogram:  Interpretation Summary  3D LVEF volumetric analysis is 57%.  Global peak LV longitudinal strain is averaged at -22.1%. This is within  reported normal limits (normal <-18%).  Right ventricular function, chamber size, wall motion, and thickness are  normal.  Moderate aortic stenosis is present.  Pulmonary artery systolic pressure cannot be assessed.  The inferior vena cava is normal.  No pericardial effusion is present.    12/1/2022 DEXA bone density scan (lowest T-scores):  Lumbar spine = -2.2  Left femoral neck = -2  Right femoral neck = -2  Left total femur = -1.5  Right total femur = -1.9    ASSESSMENT/PLAN:  Ms. Alvarado is a 67 year old woman with a clinical stage II, T2N0M0, ER+/OH+/HER2+ IDC of her left breast.  She is s/p 4 cycles of neoadjuvant TC chemotherapy (initial biopsy was HER2 negative) followed by left breast lumpectomy and sentinel lymph node biopsy ([id number], residual tumor HER2 positive).  She is receiving adjuvant radiation  and has been on adjuvant Kadcyla since 11/21/2022.    1. Left breast carcinoma:   - She is receiving radiation.  Is thus far tolerating well.  She is tentatively scheduled to complete radiation on 1/2/2023.  We reviewed anticipated side effects towards the end of radiation including possible desquamation in areas of skin on skin rubbing such as the inframammary fold and the axilla.  She is currently wearing a wireless, soft cotton bra at all times.  - I reassured her that left breast seroma will continue to resolve without intervention.  - receiving adjuvant Kadcyla.  She is tolerating this well.  She will receive C2 of 17 total planned cycles today.  - She inquired about a port, stating she seems to be doing fine without it. We discussed Kadcyla is an irritant, not a vesicant, therefore okay to proceed without a port.  If there is difficulty drawing blood or establishing an IV, would consider a port at that time.  - I recommend starting endocrine therapy two weeks after completion of radiation.  My recommendation is for treatment with letrozole.  We reviewed the potential side effects of letrozole including myalgias, arthralgias, hot flashes, vaginal dryness, mood disorder, and thinning of the bones.  She is agreeable to starting the medication mid-January.  - I recommend provider visits every 9 weeks while on Kadcyla.  - Recommend bilateral diagnostic mammograms in early 07/2023 and then annually thereafter.    2.  Personal history of colon polyps:  She has a h/o colon polyps.  Colonoscopy 2/23/2022 with removal of 3 polyps, each 2-3 mm (2 tubular adenomas, 1 hyperplastic).  Repeat colonoscopy in 02/2027 is recommended.    3.  At risk for cardiomyopathy:  Echocardiogram performed on 11/11 was reviewed and shows LVEF within normal limits.  Plan to repeat an echocardiogram prior to Kadcyla infusion on 2/20.    4.  Bone Health:  She is at risk of bone loss on aromatase inhibitor therapy.  DEXA bone density scan on  12/1/2022 with a lowest T-score of -2.2 c/w osteopenia.  I recommend treatment with Zometa 4 mg IV once every 6 months for both prevention of osteoporosis and prevention of breast cancer bone metastases.  We reviewed the potential side effects of this including myalgias, arthralgias, fever, chills, and risk of osteonecrosis of the jaw.  She states in the last 20 years she has required minimal dental work (a single crown).  She last saw her dentist in 10/2022 and no work or impending work was needed.  Therefore okay to proceed with scheduling of zometa infusion.    5.  Follow Up:    - Kadcyla infusion every 3 weeks x 6 starting 1/9/2023.  - Zometa to coincide with a future Kadcyla infusion.  - Visit with Jessy Molina prior to 1/31 Kadcyla infusion as already scheduled.  - echocardiogram prior to 2/20 Kadcyla infusion  - visit with me prior to 4/3/2023 Kadcyla infusion.    50 minutes spent on the date of the encounter doing chart review, review of test results, interpretation of tests, patient visit and documentation.

## 2022-12-19 ENCOUNTER — OFFICE VISIT (OUTPATIENT)
Dept: RADIATION ONCOLOGY | Facility: CLINIC | Age: 67
End: 2022-12-19
Attending: RADIOLOGY
Payer: MEDICARE

## 2022-12-19 ENCOUNTER — INFUSION THERAPY VISIT (OUTPATIENT)
Dept: ONCOLOGY | Facility: CLINIC | Age: 67
End: 2022-12-19
Attending: INTERNAL MEDICINE
Payer: MEDICARE

## 2022-12-19 ENCOUNTER — APPOINTMENT (OUTPATIENT)
Dept: LAB | Facility: CLINIC | Age: 67
End: 2022-12-19
Attending: INTERNAL MEDICINE
Payer: MEDICARE

## 2022-12-19 VITALS
SYSTOLIC BLOOD PRESSURE: 137 MMHG | BODY MASS INDEX: 31.31 KG/M2 | RESPIRATION RATE: 16 BRPM | TEMPERATURE: 97.9 F | OXYGEN SATURATION: 95 % | HEART RATE: 81 BPM | WEIGHT: 192.5 LBS | DIASTOLIC BLOOD PRESSURE: 78 MMHG

## 2022-12-19 VITALS
OXYGEN SATURATION: 99 % | SYSTOLIC BLOOD PRESSURE: 143 MMHG | HEART RATE: 59 BPM | DIASTOLIC BLOOD PRESSURE: 70 MMHG | BODY MASS INDEX: 31.54 KG/M2 | WEIGHT: 193.9 LBS

## 2022-12-19 DIAGNOSIS — C50.212 MALIGNANT NEOPLASM OF UPPER-INNER QUADRANT OF LEFT BREAST IN FEMALE, ESTROGEN RECEPTOR POSITIVE (H): ICD-10-CM

## 2022-12-19 DIAGNOSIS — Z17.0 MALIGNANT NEOPLASM OF UPPER-INNER QUADRANT OF LEFT BREAST IN FEMALE, ESTROGEN RECEPTOR POSITIVE (H): Primary | ICD-10-CM

## 2022-12-19 DIAGNOSIS — M85.89 OSTEOPENIA OF MULTIPLE SITES: ICD-10-CM

## 2022-12-19 DIAGNOSIS — C50.212 MALIGNANT NEOPLASM OF UPPER-INNER QUADRANT OF LEFT BREAST IN FEMALE, ESTROGEN RECEPTOR POSITIVE (H): Primary | ICD-10-CM

## 2022-12-19 DIAGNOSIS — Z51.11 ENCOUNTER FOR ANTINEOPLASTIC CHEMOTHERAPY: ICD-10-CM

## 2022-12-19 DIAGNOSIS — Z91.89 AT RISK FOR CARDIOMYOPATHY: ICD-10-CM

## 2022-12-19 DIAGNOSIS — Z51.11 ENCOUNTER FOR ANTINEOPLASTIC CHEMOTHERAPY: Primary | ICD-10-CM

## 2022-12-19 DIAGNOSIS — Z17.0 MALIGNANT NEOPLASM OF UPPER-INNER QUADRANT OF LEFT BREAST IN FEMALE, ESTROGEN RECEPTOR POSITIVE (H): ICD-10-CM

## 2022-12-19 DIAGNOSIS — Z71.89 ENCOUNTER FOR MEDICATION COUNSELING: ICD-10-CM

## 2022-12-19 LAB
ALBUMIN SERPL BCG-MCNC: 4 G/DL (ref 3.5–5.2)
ALP SERPL-CCNC: 57 U/L (ref 35–104)
ALT SERPL W P-5'-P-CCNC: 10 U/L (ref 10–35)
ANION GAP SERPL CALCULATED.3IONS-SCNC: 11 MMOL/L (ref 7–15)
AST SERPL W P-5'-P-CCNC: 26 U/L (ref 10–35)
BASOPHILS # BLD AUTO: 0 10E3/UL (ref 0–0.2)
BASOPHILS NFR BLD AUTO: 1 %
BILIRUB SERPL-MCNC: 0.3 MG/DL
BUN SERPL-MCNC: 9.2 MG/DL (ref 8–23)
CALCIUM SERPL-MCNC: 9.4 MG/DL (ref 8.8–10.2)
CHLORIDE SERPL-SCNC: 105 MMOL/L (ref 98–107)
CREAT SERPL-MCNC: 0.65 MG/DL (ref 0.51–0.95)
DEPRECATED HCO3 PLAS-SCNC: 26 MMOL/L (ref 22–29)
EOSINOPHIL # BLD AUTO: 0.1 10E3/UL (ref 0–0.7)
EOSINOPHIL NFR BLD AUTO: 2 %
ERYTHROCYTE [DISTWIDTH] IN BLOOD BY AUTOMATED COUNT: 13.1 % (ref 10–15)
GFR SERPL CREATININE-BSD FRML MDRD: >90 ML/MIN/1.73M2
GLUCOSE SERPL-MCNC: 108 MG/DL (ref 70–99)
HCT VFR BLD AUTO: 41.1 % (ref 35–47)
HGB BLD-MCNC: 13.6 G/DL (ref 11.7–15.7)
IMM GRANULOCYTES # BLD: 0 10E3/UL
IMM GRANULOCYTES NFR BLD: 0 %
LYMPHOCYTES # BLD AUTO: 0.5 10E3/UL (ref 0.8–5.3)
LYMPHOCYTES NFR BLD AUTO: 11 %
MCH RBC QN AUTO: 29.5 PG (ref 26.5–33)
MCHC RBC AUTO-ENTMCNC: 33.1 G/DL (ref 31.5–36.5)
MCV RBC AUTO: 89 FL (ref 78–100)
MONOCYTES # BLD AUTO: 0.3 10E3/UL (ref 0–1.3)
MONOCYTES NFR BLD AUTO: 7 %
NEUTROPHILS # BLD AUTO: 3.6 10E3/UL (ref 1.6–8.3)
NEUTROPHILS NFR BLD AUTO: 79 %
NRBC # BLD AUTO: 0 10E3/UL
NRBC BLD AUTO-RTO: 0 /100
PLATELET # BLD AUTO: 228 10E3/UL (ref 150–450)
POTASSIUM SERPL-SCNC: 3.9 MMOL/L (ref 3.4–5.3)
PROT SERPL-MCNC: 6.9 G/DL (ref 6.4–8.3)
RBC # BLD AUTO: 4.61 10E6/UL (ref 3.8–5.2)
SODIUM SERPL-SCNC: 142 MMOL/L (ref 136–145)
WBC # BLD AUTO: 4.6 10E3/UL (ref 4–11)

## 2022-12-19 PROCEDURE — 258N000003 HC RX IP 258 OP 636: Performed by: INTERNAL MEDICINE

## 2022-12-19 PROCEDURE — 77280 THER RAD SIMULAJ FIELD SMPL: CPT | Performed by: RADIOLOGY

## 2022-12-19 PROCEDURE — 36415 COLL VENOUS BLD VENIPUNCTURE: CPT | Performed by: INTERNAL MEDICINE

## 2022-12-19 PROCEDURE — 99215 OFFICE O/P EST HI 40 MIN: CPT | Performed by: INTERNAL MEDICINE

## 2022-12-19 PROCEDURE — 77280 THER RAD SIMULAJ FIELD SMPL: CPT | Mod: 26 | Performed by: RADIOLOGY

## 2022-12-19 PROCEDURE — 250N000011 HC RX IP 250 OP 636: Performed by: INTERNAL MEDICINE

## 2022-12-19 PROCEDURE — 80053 COMPREHEN METABOLIC PANEL: CPT | Performed by: INTERNAL MEDICINE

## 2022-12-19 PROCEDURE — G0463 HOSPITAL OUTPT CLINIC VISIT: HCPCS

## 2022-12-19 PROCEDURE — 77412 RADIATION TX DELIVERY LVL 3: CPT | Performed by: RADIOLOGY

## 2022-12-19 PROCEDURE — 96375 TX/PRO/DX INJ NEW DRUG ADDON: CPT

## 2022-12-19 PROCEDURE — 82040 ASSAY OF SERUM ALBUMIN: CPT | Performed by: INTERNAL MEDICINE

## 2022-12-19 PROCEDURE — 96413 CHEMO IV INFUSION 1 HR: CPT

## 2022-12-19 PROCEDURE — 85025 COMPLETE CBC W/AUTO DIFF WBC: CPT | Performed by: INTERNAL MEDICINE

## 2022-12-19 PROCEDURE — G0463 HOSPITAL OUTPT CLINIC VISIT: HCPCS | Mod: 25

## 2022-12-19 RX ORDER — PROCHLORPERAZINE MALEATE 5 MG
TABLET ORAL
COMMUNITY
Start: 2022-11-28 | End: 2023-02-20

## 2022-12-19 RX ORDER — VITAMIN B COMPLEX
50 TABLET ORAL EVERY EVENING
COMMUNITY
Start: 2022-12-14

## 2022-12-19 RX ORDER — ONDANSETRON 2 MG/ML
8 INJECTION INTRAMUSCULAR; INTRAVENOUS ONCE
Status: COMPLETED | OUTPATIENT
Start: 2022-12-19 | End: 2022-12-19

## 2022-12-19 RX ORDER — LETROZOLE 2.5 MG/1
2.5 TABLET, FILM COATED ORAL DAILY
Qty: 30 TABLET | Refills: 11 | Status: SHIPPED | OUTPATIENT
Start: 2022-12-19 | End: 2024-01-30

## 2022-12-19 RX ADMIN — ADO-TRASTUZUMAB EMTANSINE 300 MG: 20 INJECTION, POWDER, LYOPHILIZED, FOR SOLUTION INTRAVENOUS at 11:29

## 2022-12-19 RX ADMIN — ONDANSETRON 8 MG: 2 INJECTION INTRAMUSCULAR; INTRAVENOUS at 10:46

## 2022-12-19 RX ADMIN — SODIUM CHLORIDE 250 ML: 9 INJECTION, SOLUTION INTRAVENOUS at 10:46

## 2022-12-19 ASSESSMENT — PAIN SCALES - GENERAL: PAINLEVEL: NO PAIN (0)

## 2022-12-19 NOTE — NURSING NOTE
"Oncology Rooming Note    December 19, 2022 9:16 AM   Vickie Alvarado is a 67 year old female who presents for:    Chief Complaint   Patient presents with     Blood Draw     Labs drawn with PIV start by RN. Vitals taken.     Oncology Clinic Visit     Breast cancer     Initial Vitals: /78 (BP Location: Right arm, Patient Position: Sitting, Cuff Size: Adult Regular)   Pulse 81   Temp 97.9  F (36.6  C) (Oral)   Resp 16   Wt 87.3 kg (192 lb 8 oz)   LMP 05/17/2011   SpO2 95%   BMI 31.31 kg/m   Estimated body mass index is 31.31 kg/m  as calculated from the following:    Height as of 10/17/22: 1.67 m (5' 5.75\").    Weight as of this encounter: 87.3 kg (192 lb 8 oz). Body surface area is 2.01 meters squared.  No Pain (0) Comment: Data Unavailable   Patient's last menstrual period was 05/17/2011.  Allergies reviewed: Yes  Medications reviewed: Yes    Medications: Medication refills not needed today.  Pharmacy name entered into [employer] [facility]: [id number] - [facility] [address] [address] & [facility]    Clinical concerns: NONE       Maria Luisa Remy EMT            "

## 2022-12-19 NOTE — LETTER
12/19/2022         RE: Vickie Alvarado  [address]  [place] [place] [address]        Dear Colleague,    Thank you for referring your patient, Vickie Alvarado, to the [facility]. Please see a copy of my visit note below.    Oncology visit:  Date on this visit: 12/19/2022    Diagnosis: left breast cancer.    Primary Physician: Gaby Lynn     History Of Present Illness:  Ms. Alvarado is a 67 year old female with a left breast cancer.  She self palpated a mass in her mid left breast.  Bilateral diagnostic mammograms showed a mass in the upper inner left breast.  Ultrasound of the left breast showed a mass at 11:00, 8 cm from the nipple measuring 3.1 cm.  There were no suspicious lymph nodes in the left axilla.  Biopsy of the left breast mass was c/w a grade 3 invasive ductal carcinoma, ER strong in 98%, OH moderate in 70%, and HER2 low by IHC (score 1+).   Oncotype DX recurrence score was 24.  RSClin predicted a 23% risk of distant recurrence in 10 years if treated with endocrine therapy alone and a 9% absolute risk reduction with chemotherapy.  Based on this, she elected to proceed with chemotherapy.    She received neoadjuvant Taxotere and cyclophosphamide chemotherapy from 7/6/2022 - 9/8/2022.  She underwent left breast lumpectomy and left axillary sentinel lymph node procedure under the care of Dr. Cadet on 10/17/2022.  Pathology showed residual grade 2 invasive breast carcinoma (60% ductal and 40% micropapillary) measuring 2.2 cm.  Treatment related changes were present and invasive tumor cellularity was 30%.  There was associated DCIS.  Surgical margins for both invasive carcinoma and DCIS were close, but negative.  There was no lymphovascular invasion and a single sentinel lymph node was negative and without signs of prior involvement (i.e. no tumor bed or fibrotic changes in the lymph node).  Receptors were repeated on the residual invasive malignancy and were found  to be ER positive (98%), ND positive (70%), HER-2 equivocal by IHC (2+), and HER-2 positive by FISH.  She has been on adjuvant Kadcyla since 11/21/2022.  She is currently receiving radiation to the left breast (planned 4240 cGy to the breast with 1250 cGy boost to the lumpectomy cavity).  She is tentatively scheduled to complete radiation on 1/2/2023.    Interval History:  Ms. Alvarado comes into clinic today for routine breast cancer follow-up.  She is currently receiving adjuvant radiation therapy to the left breast.  In general, radiation is going well.  She reports associated fatigue.  Fatigue is such that she generally will only schedule one extra thing per day.  She is still able to do all of her household chores and normal daily activities.  She denies significant redness of the breast.  She has noted some tenderness within the parenchyma of the breast.  She has full range of motion of her left upper extremity and denies any swelling.  She notes persistent seroma underneath her left upper inner breast incision.  She has been healthy and denies fevers, chills, cough, sore throat or other symptoms of infection.  She has no shortness of breath or chest pain.  She denies current abdominal complaints.  She generally felt well following her first Kadcyla infusion.  She had one day of nausea for which she took a single antiemetic tab.  The remainder of a complete 12 point review of systems was reviewed with patient was negative with exception that mentioned above.    Past Medical/Surgical History:  Past Medical History:   Diagnosis Date     Aortic stenosis      Breast cancer (H) 06/2022     Hyperlipidemia      Insufficiency fracture of tibia, sequela 09/28/2021     Polyp of colon 05/26/2016   - PVCs    Past Surgical History:   Procedure Laterality Date     BIOPSY NODE SENTINEL Left 10/17/2022    Procedure: LEFT seed localized lumpectomy with sentinel node biopsy;  Surgeon: Alphonso Cadet MD;  Location: [facility] OR      CATARACT IOL, RT/LT Right 2006     HC YAG LASER CAPSULOTOMY Right 2009     LASER VITREOLYSIS, ANTERIOR OD (RIGHT EYE) Right 2007     LUMPECTOMY BREAST WITH SENTINEL NODE, COMBINED Left 10/17/2022    Procedure: LEFT seed localized lumpectomy with sentinel node biopsy;  Surgeon: Alphonso Cadet MD;  Location: [facility] OR     REPOSITION INTRAOCULAR LENS Right 11/18/2014    Procedure: REPOSITION INTRAOCULAR LENS;  Surgeon: Vickie Guerra MD;  [facility]     VITRECTOMY PARSPLANA WITH 23 GAUGE SYSTEM Right 11/18/2014    Procedure: VITRECTOMY PARSPLANA WITH 23 GAUGE SYSTEM;  Surgeon: Vickie Guerra MD;  Location: [facility]     Allergies:  Allergies as of 12/19/2022 - Reviewed 11/29/2022   Allergen Reaction Noted     Bactrim [sulfamethoxazole w/trimethoprim] Nausea 11/01/2017     Seasonal allergies  11/13/2014     Typhoid vaccines Nausea and Vomiting 05/05/2011     Current Medications:  Current Outpatient Medications   Medication Sig Dispense Refill     alendronate (FOSAMAX) 70 MG tablet Take 1 tablet (70 mg) by mouth every 7 days 12 tablet 3     calcium carb-cholecalciferol 600-500 MG-UNIT CAPS        Lactobacillus (PROBIOTIC CHILDRENS) CHEW        loratadine (CLARITIN) 10 MG tablet Take 10 mg by mouth daily Prn, seasonal for ragweed and lilacs       metoprolol succinate ER (TOPROL XL) 25 MG 24 hr tablet Take 1 tablet (25 mg) by mouth daily 90 tablet 3     Omega-3 Fatty Acids (FISH OIL ADULT GUMMIES PO) Take 250 mg by mouth       ondansetron (ZOFRAN) 8 MG tablet Take 1 tablet (8 mg) by mouth every 8 hours as needed for nausea 30 tablet 3     prochlorperazine (COMPAZINE) 10 MG tablet Take 0.5 tablets (5 mg) by mouth every 6 hours as needed for nausea or vomiting 30 tablet 2     simvastatin (ZOCOR) 40 MG tablet Take 1 tablet (40 mg) by mouth At Bedtime 90 tablet 3      Family and Social History:  See initial consultation dated 6/14/2022 for further details.  Hereditary Comprehensive 40 Gene Panel was  negative for pathogenic mutation.    Physical Exam:  /78 (BP Location: Right arm, Patient Position: Sitting, Cuff Size: Adult Regular)   Pulse 81   Temp 97.9  F (36.6  C) (Oral)   Resp 16   Wt 87.3 kg (192 lb 8 oz)   LMP 05/17/2011   SpO2 95%   BMI 31.31 kg/m    General:  Well appearing adult female in NAD.  Alert and oriented x 3.  HEENT:  Normocephalic.  Sclera anicteric.  MMM.  No lesions of the oropharynx.  Lymph:  No palpable cervical, supraclavicular, or axillary LAD.  Chest:  CTA bilaterally.  No wheezes or crackles.  CV:  RRR.  Nl S1 and S2.  No m/r/g.GII/VI YOLETTE at the LUSB.  Breast:  Left breast upper incision is well healed without significant underlying fibrosis.  There is an underlying seroma measuring 3 x 4 cm, smaller and softer than on prior exam. No erythema or skin discoloration.  No desquamation.  No discretely palpable masses.    Abd:  Soft/ND.     Ext:  No pitting edema of the bilateral lower extremities. No evident lymphedema of either breast.  Musculo:  Full ROM of the bilateral upper extremities.  Neuro:  Cranial nerves grossly intact.  Gait is stable.  Psych:  Mood and affect appear normal.  Skin:  No visible concerning skin rashes or lesions.    Laboratory/Imaging Studies  11/11/2022 Echocardiogram:  Interpretation Summary  3D LVEF volumetric analysis is 57%.  Global peak LV longitudinal strain is averaged at -22.1%. This is within  reported normal limits (normal <-18%).  Right ventricular function, chamber size, wall motion, and thickness are  normal.  Moderate aortic stenosis is present.  Pulmonary artery systolic pressure cannot be assessed.  The inferior vena cava is normal.  No pericardial effusion is present.    12/1/2022 DEXA bone density scan (lowest T-scores):  Lumbar spine = -2.2  Left femoral neck = -2  Right femoral neck = -2  Left total femur = -1.5  Right total femur = -1.9    ASSESSMENT/PLAN:  Ms. Alvarado is a 67 year old woman with a clinical stage II, T2N0M0,  ER+/WV+/HER2+ IDC of her left breast.  She is s/p 4 cycles of neoadjuvant TC chemotherapy (initial biopsy was HER2 negative) followed by left breast lumpectomy and sentinel lymph node biopsy ([id number], residual tumor HER2 positive).  She is receiving adjuvant radiation and has been on adjuvant Kadcyla since 11/21/2022.    1. Left breast carcinoma:   - She is receiving radiation.  Is thus far tolerating well.  She is tentatively scheduled to complete radiation on 1/2/2023.  We reviewed anticipated side effects towards the end of radiation including possible desquamation in areas of skin on skin rubbing such as the inframammary fold and the axilla.  She is currently wearing a wireless, soft cotton bra at all times.  - I reassured her that left breast seroma will continue to resolve without intervention.  - receiving adjuvant Kadcyla.  She is tolerating this well.  She will receive C2 of 17 total planned cycles today.  - She inquired about a port, stating she seems to be doing fine without it. We discussed Kadcyla is an irritant, not a vesicant, therefore okay to proceed without a port.  If there is difficulty drawing blood or establishing an IV, would consider a port at that time.  - I recommend starting endocrine therapy two weeks after completion of radiation.  My recommendation is for treatment with letrozole.  We reviewed the potential side effects of letrozole including myalgias, arthralgias, hot flashes, vaginal dryness, mood disorder, and thinning of the bones.  She is agreeable to starting the medication mid-January.  - I recommend provider visits every 9 weeks while on Kadcyla.  - Recommend bilateral diagnostic mammograms in early 07/2023 and then annually thereafter.    2.  Personal history of colon polyps:  She has a h/o colon polyps.  Colonoscopy 2/23/2022 with removal of 3 polyps, each 2-3 mm (2 tubular adenomas, 1 hyperplastic).  Repeat colonoscopy in 02/2027 is recommended.    3.  At risk for  cardiomyopathy:  Echocardiogram performed on 11/11 was reviewed and shows LVEF within normal limits.  Plan to repeat an echocardiogram prior to Kadcyla infusion on 2/20.    4.  Bone Health:  She is at risk of bone loss on aromatase inhibitor therapy.  DEXA bone density scan on 12/1/2022 with a lowest T-score of -2.2 c/w osteopenia.  I recommend treatment with Zometa 4 mg IV once every 6 months for both prevention of osteoporosis and prevention of breast cancer bone metastases.  We reviewed the potential side effects of this including myalgias, arthralgias, fever, chills, and risk of osteonecrosis of the jaw.  She states in the last 20 years she has required minimal dental work (a single crown).  She last saw her dentist in 10/2022 and no work or impending work was needed.  Therefore okay to proceed with scheduling of zometa infusion.    5.  Follow Up:    - Kadcyla infusion every 3 weeks x 6 starting 1/9/2023.  - Zometa to coincide with a future Kadcyla infusion.  - Visit with Jessy Molina prior to 1/31 Kadcyla infusion as already scheduled.  - echocardiogram prior to 2/20 Kadcyla infusion  - visit with me prior to 4/3/2023 Kadcyla infusion.    50 minutes spent on the date of the encounter doing chart review, review of test results, interpretation of tests, patient visit and documentation.         Again, thank you for allowing me to participate in the care of your patient.      Sincerely,    Chandrika Horvath MD

## 2022-12-19 NOTE — PROGRESS NOTES
[facility] PHYSICIANS  SPECIALIZING IN BREAKTHROUGHS  Radiation Oncology    On Treatment Visit Note      Vickie Alvarado      Date: 2022   MRN: [id number]   : 1955  Diagnosis: Breast cancer      Reason for Visit:  On Radiation Treatment Visit     Treatment Summary to Date  Treatment Site: Lt breast Current Dose: 2915/5490 cGy Fractions:  fx + 0/5 boost     Chemotherapy  Chemo concurrent with radx?: No    ED Visit/Hosiptal Admission: None    Treatment Breaks: None    Subjective:  Ms. Alvarado continues to tolerate the treatment well. She has mild fatigue. She denies any skin irritations. She has been using Aquaphor for skin care.      Nursing ROS:   Nutrition Alteration  Diet Type: Patient's Preference  Nutrition Note: appetite good  Skin  Skin Reaction: 1 - Faint erythema or dry desquamation  Skin Intervention: Using aquaohor  Skin Note: Continues to have discomfort at the seroma, aware of S&S of infection to watch for.        Cardiovascular  Respiratory effort: 1 - Normal - without distress  Gastrointestinal  GI Note: WNL     Psychosocial  Psychosocial Note: Fatigue, some r/t seasonal affective disorder  Pain Assessment  Pain Note: see above      Objective:   BP (!) 143/70   Pulse 59   Wt 88 kg (193 lb 14.4 oz)   LMP 2011   SpO2 99%   BMI 31.54 kg/m    Gen: Appears well, in no acute distress  Skin: Mild diffuse erythema over treatment field    Labs:  CBC RESULTS: Recent Labs   Lab Test 22  0859   WBC 4.6   RBC 4.61   HGB 13.6   HCT 41.1   MCV 89   MCH 29.5   MCHC 33.1   RDW 13.1        ELECTROLYTES:  Recent Labs   Lab Test 22  0859      POTASSIUM 3.9   CHLORIDE 105   VALENTINA 9.4   CO2 26   BUN 9.2   CR 0.65   *       Assessment:    Tolerating radiation therapy well.  All questions and concerns addressed.    Toxicities:  Fatigue: Grade 1: Fatigue relieved by rest  Dermatitis: Grade 1: Faint erythema or dry desquamation    Plan:   1. Continue  current therapy.        Mosaiq chart and setup information reviewed  Ports checked    Medication Review  Med Note: No changes per pt    Educational Topic Discussed  Education Instructions: Reviewed        Conchis Murphy MD/PhD  [phone] clinic  Pager [phone]    Please do not send letter to referring physician.

## 2022-12-19 NOTE — LETTER
Date:December 20, 2022      Provider requested that no letter be sent. Do not send.       [facility]

## 2022-12-19 NOTE — PATIENT INSTRUCTIONS
[facility] Triage and after hours / weekends / holidays:  [phone]    Please call the triage or after hours line if you experience a temperature greater than or equal to 100.4, shaking chills, have uncontrolled nausea, vomiting and/or diarrhea, dizziness, shortness of breath, chest pain, bleeding, unexplained bruising, or if you have any other new/concerning symptoms, questions or concerns.      If you are having any concerning symptoms or wish to speak to a provider before your next infusion visit, please call your care coordinator or triage to notify them so we can adequately serve you.     If you need a refill on a narcotic prescription or other medication, please call before your infusion appointment.

## 2022-12-19 NOTE — PROGRESS NOTES
Infusion Nursing Note:  Vickie Alvarado presents today for Cycle 2 Day 1 ADO-trastuzumab emtansine.    Patient seen by provider today: Yes: Dr. Horvath   present during visit today: Not Applicable.    Note: Marni presents today feeling well. Denies pain or nausea/vomiting. Offers no concerns since visit with Dr. Horvath prior to infusion.    Intravenous Access:  Peripheral IV placed.    Treatment Conditions:     Latest Reference Range & Units 12/19/22 08:59   Sodium 136 - 145 mmol/L 142   Potassium 3.4 - 5.3 mmol/L 3.9   Chloride 98 - 107 mmol/L 105   Carbon Dioxide (CO2) 22 - 29 mmol/L 26   Urea Nitrogen 8.0 - 23.0 mg/dL 9.2   Creatinine 0.51 - 0.95 mg/dL 0.65   GFR Estimate >60 mL/min/1.73m2 >90   Calcium 8.8 - 10.2 mg/dL 9.4   Anion Gap 7 - 15 mmol/L 11   Albumin 3.5 - 5.2 g/dL 4.0   Protein Total 6.4 - 8.3 g/dL 6.9   Alkaline Phosphatase 35 - 104 U/L 57   ALT 10 - 35 U/L 10   AST 10 - 35 U/L 26   Bilirubin Total <=1.2 mg/dL 0.3   Glucose 70 - 99 mg/dL 108 (H)   WBC 4.0 - 11.0 10e3/uL 4.6   Hemoglobin 11.7 - 15.7 g/dL 13.6   Hematocrit 35.0 - 47.0 % 41.1   Platelet Count 150 - 450 10e3/uL 228   RBC Count 3.80 - 5.20 10e6/uL 4.61   MCV 78 - 100 fL 89   MCH 26.5 - 33.0 pg 29.5   MCHC 31.5 - 36.5 g/dL 33.1   RDW 10.0 - 15.0 % 13.1   % Neutrophils % 79   % Lymphocytes % 11   % Monocytes % 7   % Eosinophils % 2   % Basophils % 1   Absolute Basophils 0.0 - 0.2 10e3/uL 0.0   Absolute Eosinophils 0.0 - 0.7 10e3/uL 0.1   Absolute Immature Granulocytes <=0.4 10e3/uL 0.0   Absolute Lymphocytes 0.8 - 5.3 10e3/uL 0.5 (L)   Absolute Monocytes 0.0 - 1.3 10e3/uL 0.3   % Immature Granulocytes % 0   Absolute Neutrophils 1.6 - 8.3 10e3/uL 3.6   Absolute NRBCs 10e3/uL 0.0   NRBCs per 100 WBC <1 /100 0     Results reviewed, labs MET treatment parameters, ok to proceed with treatment.  ECHO/MUGA completed 11/11/22  EF 57%.    Post Infusion Assessment:  Patient tolerated infusion without incident.  Patient observed for  30 minutes post kadcyla per protocol.  Blood return noted pre and post infusion.  Site patent and intact, free from redness, edema or discomfort.  No evidence of extravasations.  Access discontinued per protocol.     Discharge Plan:   Patient declined prescription refills.  Discharge instructions reviewed with: Patient.  Patient and/or family verbalized understanding of discharge instructions and all questions answered.  AVS to patient via [employer].  Patient will return 01/10/23 for next infusion appointment.   Patient discharged in stable condition accompanied by: self.  Departure Mode: Ambulatory.      Karlene Hernandes RN

## 2022-12-19 NOTE — LETTER
2022         RE: Vickie Alvarado  [address]  [place] [address]        Dear Colleague,    Thank you for referring your patient, Vickie Alvarado, to the [facility] RADIATION ONCOLOGY. Please see a copy of my visit note below.    [facility] PHYSICIANS  SPECIALIZING IN BREAKTHROUGHS  Radiation Oncology    On Treatment Visit Note      Vickie Alvarado      Date: 2022   MRN: [id number]   : 1955  Diagnosis: Breast cancer      Reason for Visit:  On Radiation Treatment Visit     Treatment Summary to Date  Treatment Site: Lt breast Current Dose: 2915/5490 cGy Fractions:  fx + 0/5 boost     Chemotherapy  Chemo concurrent with radx?: No    ED Visit/Hosiptal Admission: None    Treatment Breaks: None    Subjective:  Ms. Alvarado continues to tolerate the treatment well. She has mild fatigue. She denies any skin irritations. She has been using Aquaphor for skin care.      Nursing ROS:   Nutrition Alteration  Diet Type: Patient's Preference  Nutrition Note: appetite good  Skin  Skin Reaction: 1 - Faint erythema or dry desquamation  Skin Intervention: Using aquaohor  Skin Note: Continues to have discomfort at the seroma, aware of S&S of infection to watch for.        Cardiovascular  Respiratory effort: 1 - Normal - without distress  Gastrointestinal  GI Note: WNL     Psychosocial  Psychosocial Note: Fatigue, some r/t seasonal affective disorder  Pain Assessment  Pain Note: see above      Objective:   BP (!) 143/70   Pulse 59   Wt 88 kg (193 lb 14.4 oz)   LMP 2011   SpO2 99%   BMI 31.54 kg/m    Gen: Appears well, in no acute distress  Skin: Mild diffuse erythema over treatment field    Labs:  CBC RESULTS: Recent Labs   Lab Test 22  0859   WBC 4.6   RBC 4.61   HGB 13.6   HCT 41.1   MCV 89   MCH 29.5   MCHC 33.1   RDW 13.1        ELECTROLYTES:  Recent Labs   Lab Test 22  0859      POTASSIUM 3.9   CHLORIDE 105   VALENTINA 9.4   CO2 26    BUN 9.2   CR 0.65   *       Assessment:    Tolerating radiation therapy well.  All questions and concerns addressed.    Toxicities:  Fatigue: Grade 1: Fatigue relieved by rest  Dermatitis: Grade 1: Faint erythema or dry desquamation    Plan:   1. Continue current therapy.        Mosaiq chart and setup information reviewed  Ports checked    Medication Review  Med Note: No changes per pt    Educational Topic Discussed  Education Instructions: Reviewed        Conchis Murphy MD/PhD  [phone] clinic  Pager [phone]    Please do not send letter to referring physician.        Again, thank you for allowing me to participate in the care of your patient.        Sincerely,        Conchis Murphy MD

## 2022-12-20 ENCOUNTER — APPOINTMENT (OUTPATIENT)
Dept: RADIATION ONCOLOGY | Facility: CLINIC | Age: 67
End: 2022-12-20
Attending: RADIOLOGY
Payer: MEDICARE

## 2022-12-20 PROCEDURE — 77387 GUIDANCE FOR RADJ TX DLVR: CPT | Performed by: RADIOLOGY

## 2022-12-20 PROCEDURE — G6002 STEREOSCOPIC X-RAY GUIDANCE: HCPCS | Mod: 26 | Performed by: RADIOLOGY

## 2022-12-20 PROCEDURE — 77412 RADIATION TX DELIVERY LVL 3: CPT | Performed by: RADIOLOGY

## 2022-12-21 ENCOUNTER — APPOINTMENT (OUTPATIENT)
Dept: RADIATION ONCOLOGY | Facility: CLINIC | Age: 67
End: 2022-12-21
Attending: RADIOLOGY
Payer: MEDICARE

## 2022-12-21 PROCEDURE — G6002 STEREOSCOPIC X-RAY GUIDANCE: HCPCS | Mod: 26 | Performed by: RADIOLOGY

## 2022-12-21 PROCEDURE — 77412 RADIATION TX DELIVERY LVL 3: CPT | Performed by: RADIOLOGY

## 2022-12-21 PROCEDURE — 77387 GUIDANCE FOR RADJ TX DLVR: CPT | Performed by: RADIOLOGY

## 2022-12-22 ENCOUNTER — APPOINTMENT (OUTPATIENT)
Dept: RADIATION ONCOLOGY | Facility: CLINIC | Age: 67
End: 2022-12-22
Attending: RADIOLOGY
Payer: MEDICARE

## 2022-12-22 PROCEDURE — 77387 GUIDANCE FOR RADJ TX DLVR: CPT | Performed by: RADIOLOGY

## 2022-12-22 PROCEDURE — 77412 RADIATION TX DELIVERY LVL 3: CPT | Performed by: RADIOLOGY

## 2022-12-22 PROCEDURE — G6002 STEREOSCOPIC X-RAY GUIDANCE: HCPCS | Mod: 26 | Performed by: RADIOLOGY

## 2022-12-23 ENCOUNTER — APPOINTMENT (OUTPATIENT)
Dept: RADIATION ONCOLOGY | Facility: CLINIC | Age: 67
End: 2022-12-23
Attending: RADIOLOGY
Payer: MEDICARE

## 2022-12-23 PROCEDURE — 77387 GUIDANCE FOR RADJ TX DLVR: CPT | Performed by: RADIOLOGY

## 2022-12-23 PROCEDURE — G6002 STEREOSCOPIC X-RAY GUIDANCE: HCPCS | Mod: 26 | Performed by: RADIOLOGY

## 2022-12-23 PROCEDURE — 77336 RADIATION PHYSICS CONSULT: CPT | Performed by: RADIOLOGY

## 2022-12-23 PROCEDURE — 77412 RADIATION TX DELIVERY LVL 3: CPT | Performed by: RADIOLOGY

## 2022-12-23 PROCEDURE — 77427 RADIATION TX MANAGEMENT X5: CPT | Performed by: RADIOLOGY

## 2022-12-26 ENCOUNTER — APPOINTMENT (OUTPATIENT)
Dept: RADIATION ONCOLOGY | Facility: CLINIC | Age: 67
End: 2022-12-26
Attending: RADIOLOGY
Payer: MEDICARE

## 2022-12-26 PROCEDURE — 77412 RADIATION TX DELIVERY LVL 3: CPT | Performed by: RADIOLOGY

## 2022-12-27 ENCOUNTER — APPOINTMENT (OUTPATIENT)
Dept: RADIATION ONCOLOGY | Facility: CLINIC | Age: 67
End: 2022-12-27
Attending: RADIOLOGY
Payer: MEDICARE

## 2022-12-27 VITALS
SYSTOLIC BLOOD PRESSURE: 130 MMHG | OXYGEN SATURATION: 95 % | BODY MASS INDEX: 31.23 KG/M2 | WEIGHT: 192 LBS | DIASTOLIC BLOOD PRESSURE: 77 MMHG | HEART RATE: 82 BPM

## 2022-12-27 DIAGNOSIS — Z17.0 MALIGNANT NEOPLASM OF UPPER-INNER QUADRANT OF LEFT BREAST IN FEMALE, ESTROGEN RECEPTOR POSITIVE (H): Primary | ICD-10-CM

## 2022-12-27 DIAGNOSIS — C50.212 MALIGNANT NEOPLASM OF UPPER-INNER QUADRANT OF LEFT BREAST IN FEMALE, ESTROGEN RECEPTOR POSITIVE (H): Primary | ICD-10-CM

## 2022-12-27 PROCEDURE — 77412 RADIATION TX DELIVERY LVL 3: CPT | Performed by: RADIOLOGY

## 2022-12-27 NOTE — LETTER
Date:January 3, 2023      Provider requested that no letter be sent. Do not send.       [facility]

## 2022-12-27 NOTE — PATIENT INSTRUCTIONS
Continuing Management of the Effects of Radiation Treatment    The side effects of radiation therapy should gradually decrease in 2 to 3 weeks after you have finished radiation.  Some effects take longer to resolve.    Skin reactions:  Skin changes (such as redness or irritation) should begin to get better gradually.  Some people will have a permanent change in skin color.  Their skin may be more pink or  tan  than the untreated skin.  The skin may be thinner or more fragile than before treatment.  Continue to use a gentle moisturizing lotion for several months.  You should always protect the skin in the area that was treated by using sunscreen of spf 30 or higher.      Other skin care instructions:    Fatigue caused by radiation therapy will decrease and your energy will improve.    For concerns or questions call Department of Therapeutic Radiology [phone]

## 2022-12-27 NOTE — LETTER
2022         RE: Vickie Alvarado  [address]  [place] [address]        Dear Colleague,    Thank you for referring your patient, Vickie Alvarado, to the [facility] RADIATION ONCOLOGY. Please see a copy of my visit note below.    [facility] PHYSICIANS  SPECIALIZING IN BREAKTHROUGHS  Radiation Oncology     On Treatment Visit Note          Vickie Alvarado                                                                              Date: 2022              MRN: [id number]   : 1955  Diagnosis: Breast cancer        Reason for Visit:  On Radiation Treatment Visit      Treatment Summary to Date  Treatment Site: Lt breast Current Dose: 4490/5490 cGy Fractions: 16 / 16 fx + 1/5 boost      Chemotherapy  Chemo concurrent with radx?: No     ED Visit/Hosiptal Admission: None     Treatment Breaks: None     Subjective:  Ms. Alvarado continues to tolerate the treatment well. She has mild fatigue and mild skin irritations. She has been using Aquaphor for skin care.       Nursing ROS:   Nutrition Alteration  Diet Type: Patient's Preference  Nutrition Note: appetite good  Skin  Skin Reaction: 1 - Faint erythema or dry desquamation  Skin Intervention: Using aquaohor  Skin Note: Continues to have discomfort at the seroma, aware of S&S of infection to watch for.        Cardiovascular  Respiratory effort: 1 - Normal - without distress  Gastrointestinal  GI Note: WNL     Psychosocial  Psychosocial Note: Fatigue, some r/t seasonal affective disorder  Pain Assessment  Pain Note: see above        Objective:   /77   Pulse 82   Wt 87.1 kg (192 lb)   LMP 2011   SpO2 95%   BMI 31.23 kg/m    Gen: Appears well, in no acute distress  Skin: Mild diffuse erythema over treatment field     Labs:  Most Recent 3 CBC's:Recent Labs   Lab Test 22  0859 22  0647 10/06/22  1129   WBC 4.6 5.7 3.7*   HGB 13.6 13.4 12.0   MCV 89 91 100    197 235     Most Recent 3  BMP's:Recent Labs   Lab Test 12/19/22  0859 12/08/22  1021 11/29/22  0647 10/06/22  1129     --  144 140   POTASSIUM 3.9  --  3.7 3.9   CHLORIDE 105  --  106 104   CO2 26  --  26 26   BUN 9.2  --  13.5 10.2   CR 0.65  --  0.66 0.60   ANIONGAP 11  --  12 10   VALENTINA 9.4  --  9.6 9.1   * 90 123* 100*      Assessment:    Tolerating radiation therapy well.  All questions and concerns addressed.     Toxicities:  Fatigue: Grade 1: Fatigue relieved by rest  Dermatitis: Grade 1: Faint erythema or dry desquamation     Plan:   1. Continue current therapy.          Mosaiq chart and setup information reviewed  Ports checked     Medication Review  Med Note: No changes per pt     Educational Topic Discussed  Education Instructions: Reviewed    Savi Keating MD MPH PhD    Department of Radiation Oncology          Again, thank you for allowing me to participate in the care of your patient.        Sincerely,        Savi Keating

## 2022-12-28 ENCOUNTER — APPOINTMENT (OUTPATIENT)
Dept: RADIATION ONCOLOGY | Facility: CLINIC | Age: 67
End: 2022-12-28
Attending: RADIOLOGY
Payer: MEDICARE

## 2022-12-28 PROCEDURE — 77412 RADIATION TX DELIVERY LVL 3: CPT | Performed by: RADIOLOGY

## 2022-12-29 ENCOUNTER — APPOINTMENT (OUTPATIENT)
Dept: RADIATION ONCOLOGY | Facility: CLINIC | Age: 67
End: 2022-12-29
Attending: RADIOLOGY
Payer: MEDICARE

## 2022-12-29 PROCEDURE — 77412 RADIATION TX DELIVERY LVL 3: CPT | Performed by: RADIOLOGY

## 2022-12-30 ENCOUNTER — APPOINTMENT (OUTPATIENT)
Dept: RADIATION ONCOLOGY | Facility: CLINIC | Age: 67
End: 2022-12-30
Attending: RADIOLOGY
Payer: MEDICARE

## 2022-12-30 PROCEDURE — 77412 RADIATION TX DELIVERY LVL 3: CPT | Performed by: RADIOLOGY

## 2022-12-30 PROCEDURE — 77427 RADIATION TX MANAGEMENT X5: CPT | Performed by: RADIOLOGY

## 2022-12-30 PROCEDURE — 77336 RADIATION PHYSICS CONSULT: CPT | Performed by: RADIOLOGY

## 2023-01-02 ENCOUNTER — APPOINTMENT (OUTPATIENT)
Dept: RADIATION ONCOLOGY | Facility: CLINIC | Age: 68
End: 2023-01-02
Attending: RADIOLOGY
Payer: MEDICARE

## 2023-01-02 VITALS
OXYGEN SATURATION: 94 % | DIASTOLIC BLOOD PRESSURE: 80 MMHG | WEIGHT: 192.9 LBS | HEART RATE: 83 BPM | BODY MASS INDEX: 31.37 KG/M2 | SYSTOLIC BLOOD PRESSURE: 131 MMHG

## 2023-01-02 DIAGNOSIS — Z17.0 MALIGNANT NEOPLASM OF UPPER-INNER QUADRANT OF LEFT BREAST IN FEMALE, ESTROGEN RECEPTOR POSITIVE (H): Primary | ICD-10-CM

## 2023-01-02 DIAGNOSIS — C50.212 MALIGNANT NEOPLASM OF UPPER-INNER QUADRANT OF LEFT BREAST IN FEMALE, ESTROGEN RECEPTOR POSITIVE (H): Primary | ICD-10-CM

## 2023-01-02 PROCEDURE — 77412 RADIATION TX DELIVERY LVL 3: CPT | Performed by: RADIOLOGY

## 2023-01-02 NOTE — LETTER
Date:January 3, 2023      Provider requested that no letter be sent. Do not send.       [facility]

## 2023-01-02 NOTE — PROGRESS NOTES
[facility] PHYSICIANS  SPECIALIZING IN BREAKTHROUGHS  Radiation Oncology     On Treatment Visit Note          Vickie Alvarado                                                                              Date: 2022              MRN: [id number]   : 1955  Diagnosis: Breast cancer        Reason for Visit:  On Radiation Treatment Visit      Treatment Summary to Date  Treatment Site: Lt breast Current Dose: 4490/5490 cGy Fractions: 16 / 16 fx + 1/5 boost      Chemotherapy  Chemo concurrent with radx?: No     ED Visit/Hosiptal Admission: None     Treatment Breaks: None     Subjective:  Ms. Alvarado continues to tolerate the treatment well. She has mild fatigue and mild skin irritations. She has been using Aquaphor for skin care.       Nursing ROS:   Nutrition Alteration  Diet Type: Patient's Preference  Nutrition Note: appetite good  Skin  Skin Reaction: 1 - Faint erythema or dry desquamation  Skin Intervention: Using aquaohor  Skin Note: Continues to have discomfort at the seroma, aware of S&S of infection to watch for.        Cardiovascular  Respiratory effort: 1 - Normal - without distress  Gastrointestinal  GI Note: WNL     Psychosocial  Psychosocial Note: Fatigue, some r/t seasonal affective disorder  Pain Assessment  Pain Note: see above        Objective:   /77   Pulse 82   Wt 87.1 kg (192 lb)   LMP 2011   SpO2 95%   BMI 31.23 kg/m    Gen: Appears well, in no acute distress  Skin: Mild diffuse erythema over treatment field     Labs:  Most Recent 3 CBC's:Recent Labs   Lab Test 22  0859 22  0647 10/06/22  1129   WBC 4.6 5.7 3.7*   HGB 13.6 13.4 12.0   MCV 89 91 100    197 235     Most Recent 3 BMP's:Recent Labs   Lab Test 22  0859 22  1021 22  0647 10/06/22  1129     --  144 140   POTASSIUM 3.9  --  3.7 3.9   CHLORIDE 105  --  106 104   CO2 26  --  26 26   BUN 9.2  --  13.5 10.2   CR 0.65  --  0.66 0.60   ANIONGAP 11  --  12 10    VALENTINA 9.4  --  9.6 9.1   * 90 123* 100*      Assessment:    Tolerating radiation therapy well.  All questions and concerns addressed.     Toxicities:  Fatigue: Grade 1: Fatigue relieved by rest  Dermatitis: Grade 1: Faint erythema or dry desquamation     Plan:   1. Continue current therapy.          Mosaiq chart and setup information reviewed  Ports checked     Medication Review  Med Note: No changes per pt     Educational Topic Discussed  Education Instructions: Reviewed    Savi Keating MD MPH PhD    Department of Radiation Oncology

## 2023-01-02 NOTE — PROGRESS NOTES
[facility] PHYSICIANS  SPECIALIZING IN BREAKTHROUGHS  Radiation Oncology    On Treatment Visit Note      Vickie Alvarado      Date: 2023   MRN: [id number]   : 1955  Diagnosis: Breast cancer      Reason for Visit:  On Radiation Treatment Visit     Treatment Summary to Date  Treatment Site: Lt breast Current Dose: 4490/5490 cGy Fractions:       Chemotherapy  Chemo concurrent with radx?: No    ED Visit/Hosiptal Admission: None    Treatment Breaks: None      Subjective:  Marni completed the radiation therapy today. She has developed more reaction in her axilla, which is tender. She also continues to have discomfort at the lumpectomy site. She describes being mildly fatigued, but since she is retired, she can just take it easy.     Nursing ROS:   Nutrition Alteration  Diet Type: Patient's Preference  Nutrition Note: appetite good  Skin  Skin Reaction: 2 - Moderate to brisk erythema, patchy moist desquamation, mostly confined to skin folds and creases, moderate edema  Skin Intervention: Using aquaohor        Cardiovascular  Respiratory effort: 1 - Normal - without distress  Gastrointestinal  GI Note: WNL     Psychosocial  Psychosocial Note: Fatigue, some r/t seasonal affective disorder  Pain Assessment  Pain Note: see above      Objective:   /80   Pulse 83   Wt 87.5 kg (192 lb 14.4 oz)   LMP 2011   SpO2 94%   BMI 31.37 kg/m    Gen: Appears well, in no acute distress  Skin: Brisk erythema in the left axilla. Erythema in the left breast. No overt breakdown yet.     Labs:  CBC RESULTS: Recent Labs   Lab Test 22  0859   WBC 4.6   RBC 4.61   HGB 13.6   HCT 41.1   MCV 89   MCH 29.5   MCHC 33.1   RDW 13.1        ELECTROLYTES:  Recent Labs   Lab Test 22  0859      POTASSIUM 3.9   CHLORIDE 105   VALENTINA 9.4   CO2 26   BUN 9.2   CR 0.65   *       Assessment:    Tolerating radiation therapy well.  All questions and concerns  addressed.    Toxicities:  Fatigue: Grade 1: Fatigue relieved by rest  Pain: Grade 1: Mild pain  Dermatitis: Grade 2: Moderate to brisk erythema; patchy moist desquamation, mostly confined to skin folds and creases; moderate erythema    Plan:   1. Discussed skin care and expectations. Mepilex dressings given.   2. Follow up in 1 month with Ms. Latha Oh NP.       Mosaiq chart and setup information reviewed  Ports checked    Medication Review  Med Note: No changes per pt    Educational Topic Discussed  Additional Instructions: D/C instructions reviewed with pt  Education Instructions: Reviewed        Conchis Murphy MD/PhD  [phone] clinic  Pager [phone]    Please do not send letter to referring physician.

## 2023-01-02 NOTE — LETTER
2023         RE: Vickie Alvarado  [address]  [place] [address]        Dear Colleague,    Thank you for referring your patient, Vickie Alvarado, to the [facility] RADIATION ONCOLOGY. Please see a copy of my visit note below.    [facility] PHYSICIANS  SPECIALIZING IN BREAKTHROUGHS  Radiation Oncology    On Treatment Visit Note      Vickie Alvarado      Date: 2023   MRN: [id number]   : 1955  Diagnosis: Breast cancer      Reason for Visit:  On Radiation Treatment Visit     Treatment Summary to Date  Treatment Site: Lt breast Current Dose: 4490/5490 cGy Fractions:       Chemotherapy  Chemo concurrent with radx?: No    ED Visit/Hosiptal Admission: None    Treatment Breaks: None      Subjective:  Marni completed the radiation therapy today. She has developed more reaction in her axilla, which is tender. She also continues to have discomfort at the lumpectomy site. She describes being mildly fatigued, but since she is retired, she can just take it easy.     Nursing ROS:   Nutrition Alteration  Diet Type: Patient's Preference  Nutrition Note: appetite good  Skin  Skin Reaction: 2 - Moderate to brisk erythema, patchy moist desquamation, mostly confined to skin folds and creases, moderate edema  Skin Intervention: Using aquaohor        Cardiovascular  Respiratory effort: 1 - Normal - without distress  Gastrointestinal  GI Note: WNL     Psychosocial  Psychosocial Note: Fatigue, some r/t seasonal affective disorder  Pain Assessment  Pain Note: see above      Objective:   /80   Pulse 83   Wt 87.5 kg (192 lb 14.4 oz)   LMP 2011   SpO2 94%   BMI 31.37 kg/m    Gen: Appears well, in no acute distress  Skin: Brisk erythema in the left axilla. Erythema in the left breast. No overt breakdown yet.     Labs:  CBC RESULTS: Recent Labs   Lab Test 22  0859   WBC 4.6   RBC 4.61   HGB 13.6   HCT 41.1   MCV 89   MCH 29.5   MCHC 33.1   RDW 13.1         ELECTROLYTES:  Recent Labs   Lab Test 12/19/22  0859      POTASSIUM 3.9   CHLORIDE 105   VALENTINA 9.4   CO2 26   BUN 9.2   CR 0.65   *       Assessment:    Tolerating radiation therapy well.  All questions and concerns addressed.    Toxicities:  Fatigue: Grade 1: Fatigue relieved by rest  Pain: Grade 1: Mild pain  Dermatitis: Grade 2: Moderate to brisk erythema; patchy moist desquamation, mostly confined to skin folds and creases; moderate erythema    Plan:   1. Discussed skin care and expectations. Mepilex dressings given.   2. Follow up in 1 month with Ms. Latha Oh NP.       Mosaiq chart and setup information reviewed  Ports checked    Medication Review  Med Note: No changes per pt    Educational Topic Discussed  Additional Instructions: D/C instructions reviewed with pt  Education Instructions: Reviewed        Conchis Murphy MD/PhD  [phone] clinic  Pager [phone]    Please do not send letter to referring physician.               Again, thank you for allowing me to participate in the care of your patient.        Sincerely,        Conchis Murphy MD

## 2023-01-04 ENCOUNTER — PATIENT OUTREACH (OUTPATIENT)
Dept: ONCOLOGY | Facility: CLINIC | Age: 68
End: 2023-01-04

## 2023-01-04 DIAGNOSIS — M85.89 OSTEOPENIA OF MULTIPLE SITES: ICD-10-CM

## 2023-01-04 DIAGNOSIS — Z17.0 MALIGNANT NEOPLASM OF UPPER-INNER QUADRANT OF LEFT BREAST IN FEMALE, ESTROGEN RECEPTOR POSITIVE (H): ICD-10-CM

## 2023-01-04 DIAGNOSIS — C50.212 MALIGNANT NEOPLASM OF UPPER-INNER QUADRANT OF LEFT BREAST IN FEMALE, ESTROGEN RECEPTOR POSITIVE (H): ICD-10-CM

## 2023-01-04 DIAGNOSIS — Z79.811 LONG TERM (CURRENT) USE OF AROMATASE INHIBITORS: Primary | ICD-10-CM

## 2023-01-04 RX ORDER — HEPARIN SODIUM,PORCINE 10 UNIT/ML
5 VIAL (ML) INTRAVENOUS
Status: CANCELLED | OUTPATIENT
Start: 2023-01-10

## 2023-01-04 RX ORDER — HEPARIN SODIUM (PORCINE) LOCK FLUSH IV SOLN 100 UNIT/ML 100 UNIT/ML
5 SOLUTION INTRAVENOUS
Status: CANCELLED | OUTPATIENT
Start: 2023-01-10

## 2023-01-04 RX ORDER — ZOLEDRONIC ACID 0.04 MG/ML
4 INJECTION, SOLUTION INTRAVENOUS ONCE
Status: CANCELLED | OUTPATIENT
Start: 2023-01-10 | End: 2023-01-10

## 2023-01-04 NOTE — PROCEDURES
Radiotherapy Treatment Summary          Date of Report: January  3, 2023     PATIENT: FRANDY ALVARADO  MEDICAL RECORD NO: [id number]  : 1955     DIAGNOSIS: C50.212 Malignant neoplasm of upper-inner quadrant of left female breast  INTENT OF RADIOTHERAPY: Cure  PATHOLOGY:    Invasive ductal carcinoma, grade 3, ER+/MT+/HER2-                               STAGE: cT2N0 -> ypT2N0(sn)  CONCURRENT SYSTEMIC THERAPY: None                Details of the treatments summarized below are found in records kept in the Department of Radiation Oncology at [facility].     Treatment Summary:  Radiation Oncology - Course: 1 Protocol:   Treatment Site Current Dose Modality From To Elapsed Days Fx.  1 L Breast  4,240 cGy 6X/10X/18X 12/05/2022 2022  21 16  1 L Breast Boost  1,250 cGy 6X/10X/18X 12/27/2022  2023   6  5             Dose per Fraction:  265 cGy/250 cGy  Total Dose:      5490 cGy        COMMENTS:    Ms. Alvarado is a 66 yo female with a left breast cancer.  She self-palpated a mass in her   superior mid left breast. Workup confirmed a 3.3 cm mass, biopsy confirmed to be invasive ductal carcinoma,   Terry grade 3, ER 98%, MT 70%, HER2 1+ and group 4 by FISH, interpreted as negative. She had an   Oncotype Dx score of 24. However, RSClin predicted her distant recurrence to be 23% with a absolute chemo   benefit of 9%. She was thus recommended neoadjuvant chemotherapy.Hereditary comprehensive 40 gene panel   was negative for deleterious mutations.      She received TC x 4. Anthracycline was avoided given her underlying PVC and aortic stenosis along with   strong family history of cardiac disease. She proceeded with seed localized lumpectomy and SLN biopsy by Dr. Cadet on 10/17/2022. 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 Associated DCIS was nuclear grade 3, cribriform and   solid types, spanning 12 mm. No  definite LVSI was identified. Invasive margin was 0.07 mm superiorly and > 3   mm elsewhere. Closest margin for DCIS was 3 mm. One SLN biopsy was negative. Final pathologic stage   ypT2N0(sn). Repeat markers show ER 95% positive, MS 20% positive, HER2 2+ by IHC, and amplified by   FISH (copy number 6.8, ratio 2.2).      Given finding of amplified HER2, Dr. Horvath recommended adjuvant Kadcycla.        Ms. Alvarado then proceeded with adjuvant radiation therapy. She received 4240 cGy in 16 fractions to the left   breast, followed by 1250 cGy boost in 5 fractions to the lumpectomy cavity. She was treated with deep   inspiration breath hold to minimize dose to the heart. She tolerated the treatment well with the expected fatigue   and dermatitis. She was given Mepilex dressing due to impending desquamation in the axilla.         ED visits/hospitalizations: None     Missed treatments: None     Acute Toxicity Profile by CTC v5.0:  Fatigue: Grade 1: Fatigue relieved by rest  Pain: Grade 1: Mild pain  Dermatitis: Grade 2: Moderate to brisk erythema; patchy moist desquamation, mostly confined to skin folds and creases;   moderate erythema     PAIN MANAGEMENT: Not required                         FOLLOW UP PLAN:      1. Follow up with Dr. Horvath as scheduled  2. Follow up in our department in 1 month.                      Staff Physician: Conchis Murphy M.D.     CC:    Chandrika Horvath MD                 Radiation Oncology:  [facility] [address] [place] [address]

## 2023-01-04 NOTE — PROGRESS NOTES
[facility]: Cancer Care                                                                                          Called pt to let her know Dr Horvath is going to start her on Zometa and discontinue the alendronate to treat low bone density and prevent bone metastasis. Pt will start the Zometa on 1/10 when she is here for Kadcyla. She is then suppose to start the letrozole 2 weeks after her last radiation, approximately 1/16.  Pt verbalized understanding with no other questions.     Signature:  Sadie Roberto RN

## 2023-01-10 ENCOUNTER — INFUSION THERAPY VISIT (OUTPATIENT)
Dept: ONCOLOGY | Facility: CLINIC | Age: 68
End: 2023-01-10
Attending: PHYSICIAN ASSISTANT
Payer: MEDICARE

## 2023-01-10 ENCOUNTER — APPOINTMENT (OUTPATIENT)
Dept: LAB | Facility: CLINIC | Age: 68
End: 2023-01-10
Attending: PHYSICIAN ASSISTANT
Payer: MEDICARE

## 2023-01-10 VITALS
BODY MASS INDEX: 31.59 KG/M2 | OXYGEN SATURATION: 94 % | SYSTOLIC BLOOD PRESSURE: 130 MMHG | TEMPERATURE: 97.8 F | RESPIRATION RATE: 16 BRPM | DIASTOLIC BLOOD PRESSURE: 78 MMHG | HEART RATE: 86 BPM | WEIGHT: 194.2 LBS

## 2023-01-10 DIAGNOSIS — Z51.11 ENCOUNTER FOR ANTINEOPLASTIC CHEMOTHERAPY: Primary | ICD-10-CM

## 2023-01-10 DIAGNOSIS — C50.212 MALIGNANT NEOPLASM OF UPPER-INNER QUADRANT OF LEFT BREAST IN FEMALE, ESTROGEN RECEPTOR POSITIVE (H): ICD-10-CM

## 2023-01-10 DIAGNOSIS — Z79.811 LONG TERM (CURRENT) USE OF AROMATASE INHIBITORS: ICD-10-CM

## 2023-01-10 DIAGNOSIS — Z17.0 MALIGNANT NEOPLASM OF UPPER-INNER QUADRANT OF LEFT BREAST IN FEMALE, ESTROGEN RECEPTOR POSITIVE (H): ICD-10-CM

## 2023-01-10 DIAGNOSIS — M85.89 OSTEOPENIA OF MULTIPLE SITES: ICD-10-CM

## 2023-01-10 LAB
ALBUMIN SERPL BCG-MCNC: 4.1 G/DL (ref 3.5–5.2)
ALP SERPL-CCNC: 66 U/L (ref 35–104)
ALT SERPL W P-5'-P-CCNC: 15 U/L (ref 10–35)
ANION GAP SERPL CALCULATED.3IONS-SCNC: 10 MMOL/L (ref 7–15)
AST SERPL W P-5'-P-CCNC: 28 U/L (ref 10–35)
BASOPHILS # BLD AUTO: 0 10E3/UL (ref 0–0.2)
BASOPHILS NFR BLD AUTO: 1 %
BILIRUB SERPL-MCNC: 0.4 MG/DL
BUN SERPL-MCNC: 13.3 MG/DL (ref 8–23)
CALCIUM SERPL-MCNC: 9.4 MG/DL (ref 8.8–10.2)
CHLORIDE SERPL-SCNC: 104 MMOL/L (ref 98–107)
CREAT SERPL-MCNC: 0.6 MG/DL (ref 0.51–0.95)
DEPRECATED HCO3 PLAS-SCNC: 26 MMOL/L (ref 22–29)
EOSINOPHIL # BLD AUTO: 0.1 10E3/UL (ref 0–0.7)
EOSINOPHIL NFR BLD AUTO: 2 %
ERYTHROCYTE [DISTWIDTH] IN BLOOD BY AUTOMATED COUNT: 13.3 % (ref 10–15)
GFR SERPL CREATININE-BSD FRML MDRD: >90 ML/MIN/1.73M2
GLUCOSE SERPL-MCNC: 127 MG/DL (ref 70–99)
HCT VFR BLD AUTO: 40.1 % (ref 35–47)
HGB BLD-MCNC: 13.7 G/DL (ref 11.7–15.7)
IMM GRANULOCYTES # BLD: 0 10E3/UL
IMM GRANULOCYTES NFR BLD: 0 %
LYMPHOCYTES # BLD AUTO: 0.7 10E3/UL (ref 0.8–5.3)
LYMPHOCYTES NFR BLD AUTO: 12 %
MCH RBC QN AUTO: 29.8 PG (ref 26.5–33)
MCHC RBC AUTO-ENTMCNC: 34.2 G/DL (ref 31.5–36.5)
MCV RBC AUTO: 87 FL (ref 78–100)
MONOCYTES # BLD AUTO: 0.4 10E3/UL (ref 0–1.3)
MONOCYTES NFR BLD AUTO: 7 %
NEUTROPHILS # BLD AUTO: 4.6 10E3/UL (ref 1.6–8.3)
NEUTROPHILS NFR BLD AUTO: 78 %
NRBC # BLD AUTO: 0 10E3/UL
NRBC BLD AUTO-RTO: 0 /100
PLATELET # BLD AUTO: 216 10E3/UL (ref 150–450)
POTASSIUM SERPL-SCNC: 3.7 MMOL/L (ref 3.4–5.3)
PROT SERPL-MCNC: 7.1 G/DL (ref 6.4–8.3)
RBC # BLD AUTO: 4.59 10E6/UL (ref 3.8–5.2)
SODIUM SERPL-SCNC: 140 MMOL/L (ref 136–145)
WBC # BLD AUTO: 5.8 10E3/UL (ref 4–11)

## 2023-01-10 PROCEDURE — 250N000011 HC RX IP 250 OP 636: Performed by: INTERNAL MEDICINE

## 2023-01-10 PROCEDURE — 96375 TX/PRO/DX INJ NEW DRUG ADDON: CPT

## 2023-01-10 PROCEDURE — 85014 HEMATOCRIT: CPT | Performed by: INTERNAL MEDICINE

## 2023-01-10 PROCEDURE — 80053 COMPREHEN METABOLIC PANEL: CPT | Performed by: INTERNAL MEDICINE

## 2023-01-10 PROCEDURE — 96367 TX/PROPH/DG ADDL SEQ IV INF: CPT

## 2023-01-10 PROCEDURE — 96413 CHEMO IV INFUSION 1 HR: CPT

## 2023-01-10 PROCEDURE — 258N000003 HC RX IP 258 OP 636: Performed by: INTERNAL MEDICINE

## 2023-01-10 PROCEDURE — 36415 COLL VENOUS BLD VENIPUNCTURE: CPT | Performed by: INTERNAL MEDICINE

## 2023-01-10 RX ORDER — ZOLEDRONIC ACID 0.04 MG/ML
4 INJECTION, SOLUTION INTRAVENOUS ONCE
Status: COMPLETED | OUTPATIENT
Start: 2023-01-10 | End: 2023-01-10

## 2023-01-10 RX ORDER — ONDANSETRON 2 MG/ML
8 INJECTION INTRAMUSCULAR; INTRAVENOUS ONCE
Status: COMPLETED | OUTPATIENT
Start: 2023-01-10 | End: 2023-01-10

## 2023-01-10 RX ADMIN — SODIUM CHLORIDE 250 ML: 9 INJECTION, SOLUTION INTRAVENOUS at 15:09

## 2023-01-10 RX ADMIN — ADO-TRASTUZUMAB EMTANSINE 300 MG: 20 INJECTION, POWDER, LYOPHILIZED, FOR SOLUTION INTRAVENOUS at 15:31

## 2023-01-10 RX ADMIN — ZOLEDRONIC ACID 4 MG: 0.04 INJECTION, SOLUTION INTRAVENOUS at 16:07

## 2023-01-10 RX ADMIN — ONDANSETRON 8 MG: 2 INJECTION INTRAMUSCULAR; INTRAVENOUS at 15:14

## 2023-01-10 ASSESSMENT — ENCOUNTER SYMPTOMS
DIZZINESS: 0
PALPITATIONS: 0
ARTHRALGIAS: 1
SHORTNESS OF BREATH: 0
HEADACHES: 0
WEAKNESS: 0
CHILLS: 0
CONSTIPATION: 0
FEVER: 0
NERVOUS/ANXIOUS: 0
MYALGIAS: 0
NAUSEA: 0
DIARRHEA: 0
BREAST MASS: 0
PARESTHESIAS: 1
HEMATOCHEZIA: 0
SORE THROAT: 0
FREQUENCY: 1
HEARTBURN: 1
DYSURIA: 0
HEMATURIA: 0
ABDOMINAL PAIN: 0
COUGH: 0
EYE PAIN: 0
JOINT SWELLING: 1

## 2023-01-10 ASSESSMENT — ACTIVITIES OF DAILY LIVING (ADL): CURRENT_FUNCTION: NO ASSISTANCE NEEDED

## 2023-01-10 ASSESSMENT — PAIN SCALES - GENERAL: PAINLEVEL: MILD PAIN (3)

## 2023-01-10 NOTE — PROGRESS NOTES
Infusion Nursing Note:  Vickie Alvarado presents today for cycle 3 day 1 kadcyla, new zometa (q 6 months).    Patient seen by provider today: No   present during visit today: Not Applicable.    Note:   Marni reports ongoing fatigue but continues to be able to complete everything at home. She had more peeling from radiation this weekend which was painful - she currently rates her pain as 3/10 and declines interventions.    She is receiving zometa for the 1st time today. She is taking calcium and denies any dental concerns or jaw pain. Side effects reviewed with patient, she has previously discussed this with Dr. Horvath. Questions answered.    She denies fevers, chills, SOB, chest pain, nausea, and diarrhea.    Intravenous Access:  Peripheral IV placed in lab.    Treatment Conditions:  Lab Results   Component Value Date    HGB 13.7 01/10/2023    WBC 5.8 01/10/2023    ANEU 4.1 09/08/2014    ANEUTAUTO 4.6 01/10/2023     01/10/2023      Lab Results   Component Value Date     01/10/2023    POTASSIUM 3.7 01/10/2023    MAG 2.2 08/20/2021    CR 0.60 01/10/2023    [place] 9.4 01/10/2023    BILITOTAL 0.4 01/10/2023    ALBUMIN 4.1 01/10/2023    ALT 15 01/10/2023    AST 28 01/10/2023     Results reviewed, labs MET treatment parameters, ok to proceed with treatment.  ECHO/MUGA completed 11/11/22  EF 57%.  Calcium 9.4 prior to correction.    Post Infusion Assessment:  Patient tolerated infusion without incident.  Blood return noted pre and post infusion.  Site patent and intact, free from redness, edema or discomfort.  No evidence of extravasations.  Access discontinued per protocol.     Discharge Plan:   Patient declined prescription refills.  Discharge instructions reviewed with: Patient.  Patient and/or family verbalized understanding of discharge instructions and all questions answered.  AVS to patient via [employer].  Patient will return 1/31 for next appointment.   Patient discharged in stable condition  accompanied by: self.  Departure Mode: Ambulatory.      Chen Lawler RN

## 2023-01-10 NOTE — PATIENT INSTRUCTIONS
[facility] Triage and after hours / weekends / holidays:  [phone]    Please call the triage or after hours line if you experience a temperature greater than or equal to 100.5, shaking chills, have uncontrolled nausea, vomiting and/or diarrhea, dizziness, shortness of breath, chest pain, bleeding, unexplained bruising, or if you have any other new/concerning symptoms, questions or concerns.      If you are having any concerning symptoms or wish to speak to a provider before your next infusion visit, please call your care coordinator or triage to notify them so we can adequately serve you.     If you need a refill on a narcotic prescription or other medication, please call before your infusion appointment.

## 2023-01-10 NOTE — PROGRESS NOTES
Chief Complaint   Patient presents with     Blood Draw     Blood Draw, Vitals and PIV Done by RONI FREEMAN. Pt has checked in for the next appt.          Ale Matthews MA

## 2023-01-17 ENCOUNTER — OFFICE VISIT (OUTPATIENT)
Dept: FAMILY MEDICINE | Facility: CLINIC | Age: 68
End: 2023-01-17
Payer: MEDICARE

## 2023-01-17 VITALS
DIASTOLIC BLOOD PRESSURE: 80 MMHG | HEIGHT: 66 IN | SYSTOLIC BLOOD PRESSURE: 149 MMHG | TEMPERATURE: 97.9 F | WEIGHT: 189 LBS | OXYGEN SATURATION: 96 % | BODY MASS INDEX: 30.37 KG/M2 | HEART RATE: 87 BPM

## 2023-01-17 DIAGNOSIS — Z86.0100 HISTORY OF COLONIC POLYPS: Chronic | ICD-10-CM

## 2023-01-17 DIAGNOSIS — R73.09 ELEVATED GLUCOSE: Primary | ICD-10-CM

## 2023-01-17 DIAGNOSIS — M85.89 OSTEOPENIA OF MULTIPLE SITES: ICD-10-CM

## 2023-01-17 DIAGNOSIS — R03.0 ELEVATED BP WITHOUT DIAGNOSIS OF HYPERTENSION: ICD-10-CM

## 2023-01-17 DIAGNOSIS — Z17.0 MALIGNANT NEOPLASM OF UPPER-INNER QUADRANT OF LEFT BREAST IN FEMALE, ESTROGEN RECEPTOR POSITIVE (H): ICD-10-CM

## 2023-01-17 DIAGNOSIS — E55.9 VITAMIN D INSUFFICIENCY: ICD-10-CM

## 2023-01-17 DIAGNOSIS — C50.212 MALIGNANT NEOPLASM OF UPPER-INNER QUADRANT OF LEFT BREAST IN FEMALE, ESTROGEN RECEPTOR POSITIVE (H): ICD-10-CM

## 2023-01-17 DIAGNOSIS — Z00.00 ENCOUNTER FOR MEDICARE ANNUAL WELLNESS EXAM: ICD-10-CM

## 2023-01-17 PROCEDURE — G0439 PPPS, SUBSEQ VISIT: HCPCS | Performed by: FAMILY MEDICINE

## 2023-01-17 NOTE — PATIENT INSTRUCTIONS
[facility] PERSONAL PREVENTIVE SERVICES PLAN - SERVICES     Review these tests with your doctor then decide which ones you want and take this page home for your reference  Immunization History   Administered Date(s) Administered    COVID-19 Vaccine 12+ (Pfizer 2022) 04/12/2022    COVID-19 Vaccine 12+ (Pfizer) 02/25/2021, 03/18/2021, 10/25/2021    COVID-19 Vaccine Bivalent Booster 12+ (Pfizer) 10/03/2022    Flu, Unspecified 11/24/2009    HEPA 05/05/2011, 11/17/2011    Hep B, Peds or Adolescent 02/11/2009    HepA-Adult 05/05/2011, 11/17/2011    HepB 11/19/2008, 02/11/2009, 11/24/2009    HepB, Unspecified 11/24/2009    HepB-Adult 11/19/2008    Influenza (IIV3) PF 11/07/2007, 11/24/2009, 11/04/2010, 10/13/2011, 10/11/2012, 09/24/2013, 09/23/2014    Influenza Not Indicated - By Hx 09/27/2020    Influenza Vaccine 65+ (Fluzone HD) 09/27/2020, 09/28/2021    Influenza Vaccine >6 months (Alfuria,Fluzone) 09/24/2013, 11/25/2015, 01/31/2017, 10/12/2017, 10/07/2018    Influenza Vaccine, 6+MO IM (QUADRIVALENT W/PRESERVATIVES) 03/06/2020    MMR Not Indicated - By Titer 01[birth year]01/1957    Measles 1955    Pneumo Conj 13-V (2010&after) 12/11/2018    Pneumococcal 23 valent 08/05/2020    TD (ADULT, 7+) 09/24/1996    TDAP Vaccine (Boostrix) 10/12/2017    Td (Adult), Adsorbed 09/24/1996    Tdap (Adacel,Boostrix) 08/28/2007, 10/12/2017    Varicella 1955    Varicella Pt Report Hx of Varicella/Chicken Pox 01/01/1957, 05/05/2011    Zoster Vaccine, Unspecified (historical) 08/15/2020, 10/01/2020, 10/21/2020    Zoster vaccine recombinant adjuvanted (SHINGRIX) 08/15/2020, 10/21/2020         Health Maintenance   Topic Date Due    FALL RISK ASSESSMENT  today    MEDICARE ANNUAL WELLNESS VISIT  01/17/2024    MAMMO SCREENING  05/31/2024    ADVANCE CARE PLANNING  08/11/2025    DTAP/TDAP/TD IMMUNIZATION (6 - Td or Tdap) 10/12/2027    LIPID  12/08/2027    COLORECTAL CANCER SCREENING  02/23/2032    DEXA  12/01/2037    HEPATITIS C SCREENING   Completed    PHQ-2 (once per calendar year)  Completed    INFLUENZA VACCINE  Completed    Pneumococcal Vaccine: 65+ Years  Completed    COVID-19 Vaccine  Completed    IPV IMMUNIZATION  Aged Out    MENINGITIS IMMUNIZATION  Aged Out    PAP  Discontinued    ZOSTER IMMUNIZATION  Discontinued     MEDICARE WELLNESS EXAM PATIENT INSTRUCTIONS  Yearly exam:   See your health care provider every year in order to review changes in your health, review medicines that you take, and discuss preventive care needs such as immunizations and cancer screening.  Get a flu shot each year.     Advance Directive:  If you have not done so, you are encouraged to complete an advance directive. If you would like support with this, please contact the clinic  through the main clinic line. More information about advance directives can be found at:   [email or web address]    Nutrition:   Eat at least 5 servings of fruits and vegetables each day.   Eat whole-grain bread, whole-wheat pasta and brown rice instead of white grains and rice.   Talk to your doctor about Calcium and Vitamin D.     Lifestyle:  Exercise for at least 150 minutes a week (30 minutes a day, 5 days a week). This will help you control your weight and prevent disease.   Limit alcohol to one drink per day.   If you smoke, try to quit - your doctor will be happy to help.   Wear sunscreen to prevent skin cancer.   See your dentist every six months for an exam and cleaning.   See your eye doctor every 1 to 2 years to screen for conditions such as glaucoma, macular degeneration and cataracts.

## 2023-01-17 NOTE — PROGRESS NOTES
"Answers for HPI/ROS submitted by the patient on 1/10/2023  In general, how would you rate your overall physical health?: good  Frequency of exercise:: 2-3 days/week  Do you usually eat at least 4 servings of fruit and vegetables a day, include whole grains & fiber, and avoid regularly eating high fat or \"junk\" foods? : Yes  Taking medications regularly:: Yes  Medication side effects:: None  Activities of Daily Living: no assistance needed  Home safety: no safety concerns identified  Hearing Impairment:: no hearing concerns  In the past 6 months, have you been bothered by leaking of urine?: Yes  abdominal pain: No  Blood in stool: No  Blood in urine: No  chest pain: No  chills: No  congestion: No  constipation: No  cough: No  diarrhea: No  dizziness: No  ear pain: No  eye pain: No  nervous/anxious: No  fever: No  frequency: Yes  genital sores: No  headaches: No  hearing loss: No  heartburn: Yes  arthralgias: Yes  joint swelling: Yes  peripheral edema: No  mood changes: No  myalgias: No  nausea: No  dysuria: No  palpitations: No  Skin sensation changes: Yes  sore throat: No  urgency: No  rash: No  shortness of breath: No  visual disturbance: No  weakness: No  pelvic pain: No  vaginal bleeding: No  vaginal discharge: No  tenderness: No  breast mass: No  breast discharge: No  In general, how would you rate your overall mental or emotional health?: excellent  Additional concerns today:: Yes  Duration of exercise:: Less than 15 minutes    >65 year old Wellness Visit ( Medicare etc)         HPI       This 67 year old female presents as an established patient  Gaby Lynn who presents for a  Annual Wellness Exam.    Other issues patient wants to address today:    yes, Pt will discuss with provider    Visual Acuity: Testing not done; patient has seen eye doctor in the past 12 months.    Patient Active Problem List   Diagnosis     Senile nuclear sclerosis     Tear film insufficiency     Hyperlipidemia     Regular " astigmatism     Myopia     After-cataract     Presbyopia     Pupillary abnormalities     Diplopia     Visual field defect     Vitreous membranes and strands     [facility]     Tinnitus     Family history of malignant neoplasm of breast     Family history of cardiomyopathy     History of colonic polyps     Toenail fungus     Age-related osteoporosis with current pathological fracture, initial encounter     Aortic stenosis     Diverticular disease of large intestine     PVC's (premature ventricular contractions)     Class 1 obesity due to excess calories without serious comorbidity with body mass index (BMI) of 30.0 to 30.9 in adult     Malignant neoplasm of upper-inner quadrant of left breast in female, estrogen receptor positive (H)     Encounter for antineoplastic chemotherapy     Long term (current) use of aromatase inhibitors     Osteopenia of multiple sites     Past Medical History:   Diagnosis Date     Aortic stenosis      Breast cancer (H) 06/2022     Hyperlipidemia      Insufficiency fracture of tibia, sequela 09/28/2021     Polyp of colon 05/26/2016     Family History   Problem Relation Age of Onset     C.A.D. Mother      Diabetes Mother      Arthritis Mother      Thyroid Disease Mother      C.A.D. Father      Breast Cancer Sister      Arthritis Sister      Breast Cancer Sister      Genetic Disorder Other         nephew       Problem List, Family History and past Medical History reviewed and unchanged/updated.       Health risk Assessment/ Review of Systems:     Constitutional:   Fevers or night sweats?  NO      Eyes:   Vision problems?  NO            Hearing:  Do you feel you have hearing loss?  NO  Cardiovascular:  Chest pain, palpitations, or pain with walking?  NO        Respiratory:   Breathing problems or cough?  NO    :   Difficulty controlling urination?  NO        Musculoskeletal:   Stiffness or sore joints?  NO            Skin:   concerning lesions or moles?  NO           Nervous System:    loss of strength or sensation,  numbness or tingling,  tremor,  dizziness,  headache?  NO         Mental Health:   depression or anxiety,  sleep problems?  NO   Cognition:  Memory problems?  NO       Weight Loss: Have you lost 10 or more pounds unintentionally in the previous year?  NO  Energy: How much of the time during the past 3 weeks did you feel tired?   (check one of the following):   ?  All of the time  ? Most of the time  ? Some of the time  ? A little of the time  ? None of the Time    PHQ-2 Score:   PHQ-2 ( 1999 Pfizer) 1/10/2023 10/6/2022   Q1: Little interest or pleasure in doing things 0 0   Q2: Feeling down, depressed or hopeless 0 0   PHQ-2 Score 0 0   PHQ-2 Total Score (12-17 Years)- Positive if 3 or more points; Administer PHQ-A if positive - -   Q1: Little interest or pleasure in doing things Not at all -   Q2: Feeling down, depressed or hopeless Not at all -   PHQ-2 Score 0 -       PHQ-9 Score:   No flowsheet data found.  Medical Care:     What other specialists or organizations are involved in your medical care?      Patient Care Team       Relationship Specialty Notifications Start End    Gaby Lynn MD PCP - General   10/3/12     Referred to Eye    Phone: [phone] Fax: [phone] [address] [address]    Gaby Lynn MD Referring Physician Family Practice  6/2/15     MAMMOGRAM REFERRAL    Phone: [phone] Fax: [phone] [address] [address]    Hiro Christian MD MD Ophthalmology  7/14/15     Phone: [phone] Fax: [phone]         [address] [place] [address]    Yuri Sarah MD MD Ophthalmology  7/27/20     Phone: [phone] Fax: [phone]         [address] [place] [address]    Gaby Lynn MD Assigned PCP   8/15/21     Phone: [phone] Fax: [phone] [address] [address]    Nicole Brooke MD MD  Cardiovascular Disease  11/2/21     Phone: [phone] Fax: [phone]         [address] [facility] [address] [facility] [address]    Nicole Brooke MD Assigned Heart and Vascular Provider   12/12/21     Phone: [phone] Fax: [phone]         [address] [facility] [address] [facility] [address]    Joey Kinney MD Assigned Sleep Provider   2/20/22     Phone: [phone] Pager: [phone] Fax: [phone] [address] [facility] [address]    Alphonso Cadet MD MD Surgery All results, Admissions 6/8/22     Phone: [phone] Fax: [phone]         [address]  [facility] [address] [facility] [address]    Chandrika Horvath MD MD Hematology & Oncology All results, Admissions 6/8/22     Phone: [phone] Fax: [phone]         [facility] [address]    Carol King, RN Specialty Care Coordinator Hematology & Oncology Admissions 6/14/22     Phone: [phone] Pager: [phone]        Shanthi Gordon RN Specialty Care Coordinator Hematology & Oncology Admissions 6/17/22 6/17/24    Phone: [phone] Fax: [phone]        Alphonso Cadet MD Assigned Surgical Provider   6/18/22     Phone: [phone] Fax: [phone]         [address]  [facility] [address] [facility] [address]    Conchis Murphy MD MD Radiation Oncology Admissions 11/9/22     Phone: [phone] Fax: [phone]         [address] [facility] [address] [facility] [address]    Conchis Murphy MD Assigned Cancer Care Provider   12/10/22     Phone: [phone] Fax: [phone]         [address] [facility] [address] [facility] [address]        Do you have an advanced directive? No-ACP Packet given to patient.      Social History / Home Safety     Social History     Tobacco Use     Smoking status: Never     Smokeless tobacco: Never   Substance Use Topics     Alcohol use: Not Currently     Comment: 4 drinks per month     Marital Status: Single  Who lives in your household? Self  Does your home have any of the following safety  concerns? Loose rugs in the hallway, no grab bars in the bathroom, no handrails on the stairs or have poorly lit areas?  No   Do you feel threatened or controlled by a partner, ex-partner or anyone in your life? No   Has anyone hurt you physically, for example by pushing, hitting, slapping or kicking you   or forcing you to have sex? No       Functional Status     Do you need help with dressing yourself, bathing, or walking?No   Do you need help with the phone, transportation, shopping, preparing meals, housework, laundry, medications or managing money?No   By yourself and not using aids, do you have any difficulty walking up 10 steps  without resting? No   By yourself and not using aids, do you have any difficulty walking several hundred yards? No                Risk Behaviors and Healthy Habits     History   Smoking Status     Never   Smokeless Tobacco     Never     How many servings of fruits and vegetables do you eat a day? 3-4  Exercise:Sitting Elliptical 2-3 days/week for an average of 15-30 minutes  Do you frequently drive without a seatbelt? No   History   Smoking Status     Never   Smokeless Tobacco     Never     Do you use any other drugs? No       Do you use alcohol?Yes  Number of drinks per day Less than 1 a month  Number of drinking days a week Lss than 1 a month      Functional Ability   Was the patient's timed Up & Go test (Get up from chair walk, 10 feet turn, return to chair and sit down) unsteady or longer than 10 seconds? No     Fall risk:     1. Have you fallen two or more times in the past year? No   2. Have you fallen and had an injury in the past year?  No       Evaulation of Cognitive Function     1) Repeat 3 items (Leader, Season, Table)    2) Clock draw: NORMAL  3) 3 item recall: Recalls 3 objects  Results: 3 items recalled: COGNITIVE IMPAIRMENT LESS LIKELY    Mini-CogTM Copyright TOMMY Jha. Licensed by the author for use in [facility]; reprinted with permission ([email or web address]@.[email or web address] [place] [email or web address]).  All rights reserved.        Other Assessments:     CV Risk based on Pooled Cohort Risk (consider assessing every 4-6 years; consider statin in patients with 10-yr risk > 7.5%):   The 10-year ASCVD risk score (Josiane BILLINGS, et al., 2019) is: 6.9%    Values used to calculate the score:      Age: 67 years      Sex: Female      Is Non- : No      Diabetic: No      Tobacco smoker: No      Systolic Blood Pressure: 130 mmHg      Is BP treated: No      HDL Cholesterol: 58 mg/dL      Total Cholesterol: 195 mg/dL    BP Readings from Last 6 Encounters:   01/17/23 (!) 149/80   01/10/23 130/78   01/02/23 131/80   12/27/22 130/77   12/19/22 (!) 143/70   12/19/22 137/78       Advance Directives: Discussed with patient and family as appropriate.  Has patient completed advance directives? No, advance care planning information given to patient to review.  Patient plans to discuss their wishes with loved ones or provider.      Immunization History   Administered Date(s) Administered     COVID-19 Vaccine 12+ (Pfizer 2022) 04/12/2022     COVID-19 Vaccine 12+ (Pfizer) 02/25/2021, 03/18/2021, 10/25/2021     COVID-19 Vaccine Bivalent Booster 12+ (Pfizer) 10/03/2022     Flu, Unspecified 11/24/2009     HEPA 05/05/2011, 11/17/2011     Hep B, Peds or Adolescent 02/11/2009     HepA-Adult 05/05/2011, 11/17/2011     HepB 11/19/2008, 02/11/2009, 11/24/2009     HepB, Unspecified 11/24/2009     HepB-Adult 11/19/2008     Influenza (IIV3) PF 11/07/2007, 11/24/2009, 11/04/2010, 10/13/2011, 10/11/2012, 09/24/2013, 09/23/2014     Influenza Not Indicated - By Hx 09/27/2020     Influenza Vaccine 65+ (Fluzone HD) 09/27/2020, 09/28/2021     Influenza Vaccine >6 months (Alfuria,Fluzone) 09/24/2013, 11/25/2015, 01/31/2017, 10/12/2017, 10/07/2018     Influenza Vaccine, 6+MO IM (QUADRIVALENT W/PRESERVATIVES) 03/06/2020     MMR Not Indicated - By Titer 01[birth year]1957     Measles 1955     Pneumo Conj 13-V (2010&after) 12/11/2018      "Pneumococcal 23 valent 08/05/2020     TD (ADULT, 7+) 09/24/1996     TDAP Vaccine (Boostrix) 10/12/2017     Td (Adult), Adsorbed 09/24/1996     Tdap (Adacel,Boostrix) 08/28/2007, 10/12/2017     Varicella 1955     Varicella Pt Report Hx of Varicella/Chicken Pox 01/01/1957, 05/05/2011     Zoster Vaccine, Unspecified (historical) 08/15/2020, 10/01/2020, 10/21/2020     Zoster vaccine recombinant adjuvanted (SHINGRIX) 08/15/2020, 10/21/2020     Reviewed Immunization Record Today    Physical Exam     Vitals: BP (!) 149/80   Pulse 87   Temp 97.9  F (36.6  C) (Oral)   Ht 1.672 m (5' 5.83\")   Wt 85.7 kg (189 lb)   LMP 05/17/2011   SpO2 96%   BMI 30.67 kg/m       GENERAL APPEARANCE: alert and no distress  HENT: ear canals patent and TM's normal; mouth without ulcers or lesions; and oral mucous membranes moist  Hearing loss screen:   Not Examined   DENTITION:  Good repair?  yes  RESP: lungs clear to auscultation - no rales, rhonchi or wheezes  CV: regular rates and rhythm, no murmur, click or rub, no peripheral edema and peripheral pulses strong  SKIN: radiation changes on R anterior chest wall, breast and extending to axilla  NEURO: Normal strength and tone  MENTAL STATUS EXAM  Appearance: appropriate  Attitude: cooperative  Behavior: normal  Eye Contact: normal  Speech: normal  Orientation: oreinted to person , place, time and situation  Mood:  Positive, upbeat  Affect: Mood Congruent  Thought Process: clear        Assessment and Plan     Welcome to Medicare Preventive Visit / Initial Medicare Annual Wellness Exam - reviewed Preventive Services and Plan form with patient as specified in Patient Instructions.    Marni was seen today for physical.  Diagnoses and all orders for this visit:    Encounter for Medicare annual wellness exam    Malignant neoplasm of upper-inner quadrant of left breast in female, estrogen receptor positive (H)  - in active treatment    Elevated glucose  -     Hemoglobin A1c; Future - to be " added to next lab draw    Elevated BP without diagnosis of hypertension  Marni would like to monitor home BP and then follow-up to review log. Renal function is normal. Open to medication start if indicated.  -     Home Blood Pressure Monitor Order    History of colonic polyps  - most recent colonoscopy in 2022 was read as normal, with recommendation to return to 10 year follow-up (2032)    Osteopenia of multiple sites  Vitamin D insufficiency  - repeat Dexa 12/1/22 consistent with low bone density, with T score increase of +3.7 in L spine on Fosamax. Discontinued by Oncology.  - 10 year FRAX risk for all fractures 17.8% and for a hip fracture is 3%  - continue Ca and vit d supplementation  - close follow-up of bone health    Options for treatment and follow-up care were reviewed with the Vickie Alvarado and/or guardian engaged in the decision making process and verbalized understanding of the options discussed and agreed with the final plan.      Gaby Lynn MD      DME (Durable Medical Equipment) Orders and Documentation  Orders Placed This Encounter   Procedures     Home Blood Pressure Monitor Order      The patient was assessed and it was determined the patient is in need of the following listed DME Supplies/Equipment. Please complete supporting documentation below to demonstrate medical necessity.      DME All Other Item(s) Documentation    List reason for need and supporting documentation for medical necessity below for each DME item.     1. Elevated BP without diagnosis of HTN

## 2023-01-30 ENCOUNTER — OFFICE VISIT (OUTPATIENT)
Dept: OPHTHALMOLOGY | Facility: CLINIC | Age: 68
End: 2023-01-30
Attending: OPHTHALMOLOGY
Payer: MEDICARE

## 2023-01-30 DIAGNOSIS — H40.003 GLAUCOMA SUSPECT OF BOTH EYES: Primary | ICD-10-CM

## 2023-01-30 DIAGNOSIS — H04.123 DRY EYES, BILATERAL: ICD-10-CM

## 2023-01-30 PROCEDURE — G0463 HOSPITAL OUTPT CLINIC VISIT: HCPCS

## 2023-01-30 PROCEDURE — 68761 CLOSE TEAR DUCT OPENING: CPT | Performed by: OPHTHALMOLOGY

## 2023-01-30 PROCEDURE — G0463 HOSPITAL OUTPT CLINIC VISIT: HCPCS | Mod: 25

## 2023-01-30 PROCEDURE — 92133 CPTRZD OPH DX IMG PST SGM ON: CPT | Performed by: OPHTHALMOLOGY

## 2023-01-30 PROCEDURE — 99214 OFFICE O/P EST MOD 30 MIN: CPT | Mod: 25 | Performed by: OPHTHALMOLOGY

## 2023-01-30 ASSESSMENT — VISUAL ACUITY
METHOD: SNELLEN - LINEAR
OS_CC+: -1
OD_CC+: -2+1
CORRECTION_TYPE: GLASSES
OD_CC: 20/25
OS_CC: 20/25

## 2023-01-30 ASSESSMENT — SLIT LAMP EXAM - LIDS
COMMENTS: DERMATOCHALASIS
COMMENTS: DERMATOCHALSIS

## 2023-01-30 ASSESSMENT — TONOMETRY
OD_IOP_MMHG: 13
OS_IOP_MMHG: 13
IOP_METHOD: TONOPEN

## 2023-01-30 ASSESSMENT — REFRACTION_WEARINGRX
OD_AXIS: 055
OS_AXIS: 074
OD_ADD: +2.50
OS_ADD: +2.50
SPECS_TYPE: PAL
OS_CYLINDER: +1.25
OD_CYLINDER: +1.25
OD_SPHERE: -1.00
OS_SPHERE: -3.50

## 2023-01-30 ASSESSMENT — CONF VISUAL FIELD
OD_SUPERIOR_TEMPORAL_RESTRICTION: 0
OS_SUPERIOR_TEMPORAL_RESTRICTION: 0
OD_INFERIOR_TEMPORAL_RESTRICTION: 0
OD_SUPERIOR_NASAL_RESTRICTION: 0
OS_NORMAL: 1
OS_INFERIOR_TEMPORAL_RESTRICTION: 0
OD_INFERIOR_NASAL_RESTRICTION: 0
OD_NORMAL: 1
OS_SUPERIOR_NASAL_RESTRICTION: 0
METHOD: COUNTING FINGERS
OS_INFERIOR_NASAL_RESTRICTION: 0

## 2023-01-30 ASSESSMENT — REFRACTION_MANIFEST
OD_CYLINDER: +0.50
OS_CYLINDER: +1.50
OD_AXIS: 070
OD_ADD: +2.50
OS_ADD: +2.50
OD_SPHERE: -1.00
OS_AXIS: 082
OS_SPHERE: -3.00

## 2023-01-30 ASSESSMENT — CUP TO DISC RATIO
OD_RATIO: 0.5
OS_RATIO: 0.6

## 2023-01-30 ASSESSMENT — EXTERNAL EXAM - LEFT EYE: OS_EXAM: NORMAL

## 2023-01-30 ASSESSMENT — EXTERNAL EXAM - RIGHT EYE: OD_EXAM: NORMAL

## 2023-01-30 NOTE — PROGRESS NOTES
Oncology visit:  Date on this visit: 01/31/2023      Diagnosis: left breast cancer.    Primary Physician: Gaby Lynn     History Of Present Illness:  Ms. Alvarado is a 67 year old female with a left breast cancer.  She self palpated a mass in her mid left breast.  Bilateral diagnostic mammograms showed a mass in the upper inner left breast.  Ultrasound of the left breast showed a mass at 11:00, 8 cm from the nipple measuring 3.1 cm.  There were no suspicious lymph nodes in the left axilla.  Biopsy of the left breast mass was c/w a grade 3 invasive ductal carcinoma, ER strong in 98%, TX moderate in 70%, and HER2 low by IHC (score 1+).   Oncotype DX recurrence score was 24.  RSClin predicted a 23% risk of distant recurrence in 10 years if treated with endocrine therapy alone and a 9% absolute risk reduction with chemotherapy.  Based on this, she elected to proceed with chemotherapy.    She received neoadjuvant Taxotere and cyclophosphamide chemotherapy from 7/6/2022 - 9/8/2022.  She underwent left breast lumpectomy and left axillary sentinel lymph node procedure under the care of Dr. Cadet on 10/17/2022.  Pathology showed residual grade 2 invasive breast carcinoma (60% ductal and 40% micropapillary) measuring 2.2 cm.  Treatment related changes were present and invasive tumor cellularity was 30%.  There was associated DCIS.  Surgical margins for both invasive carcinoma and DCIS were close, but negative.  There was no lymphovascular invasion and a single sentinel lymph node was negative and without signs of prior involvement (i.e. no tumor bed or fibrotic changes in the lymph node).  Receptors were repeated on the residual invasive malignancy and were found to be ER positive (98%), TX positive (70%), HER-2 equivocal by IHC (2+), and HER-2 positive by FISH.  She has been on adjuvant Kadcyla since 11/21/2022.  She is currently receiving radiation to the left breast (planned 4240 cGy to the breast with 1250 cGy boost  to the lumpectomy cavity).  She is tentatively scheduled to complete radiation on 1/2/2023.    Interval History:  Today, Marni is seen in person.  She states that she overall has been doing well.  She did have some peeling from radiation, and this is now healing.  She continues to have a seroma, but this is slowly getting better.  She has been tolerating the Kadcyla without any side effects.  She denies any chest pain, shortness of breath, lower extremity swelling.  She did get joint aches and fatigue with the Zometa infusion that lasted a few days.  She started the letrozole, and notes that she is a little bit tired, but denies any joint aches, or hot flashes.  She denies any recent fevers, body aches, chills.  She is taking her calcium and vitamin D.  She is trying to remain active.    ROS:  Patient denies the following except if noted above: fevers, body aches, chills, headaches, vision changes, dizziness, chest pain, shortness of breath, nausea, vomiting, diarrhea, constipation, abdominal pain, rashes, bruising/bleeding, mouth sores, swelling or pain in the legs.         Past Medical/Surgical History:  Past Medical History:   Diagnosis Date     Aortic stenosis      Breast cancer (H) 06/2022     Hyperlipidemia      Insufficiency fracture of tibia, sequela 09/28/2021     Polyp of colon 05/26/2016   - PVCs    Past Surgical History:   Procedure Laterality Date     BIOPSY NODE SENTINEL Left 10/17/2022    Procedure: LEFT seed localized lumpectomy with sentinel node biopsy;  Surgeon: Alphonso Cadet MD;  Location: [facility] OR     CATARACT IOL, RT/LT Right 2006     HC YAG LASER CAPSULOTOMY Right 2009     LASER VITREOLYSIS, ANTERIOR OD (RIGHT EYE) Right 2007     LUMPECTOMY BREAST WITH SENTINEL NODE, COMBINED Left 10/17/2022    Procedure: LEFT seed localized lumpectomy with sentinel node biopsy;  Surgeon: Alphonso Cadet MD;  Location: [facility] OR     REPOSITION INTRAOCULAR LENS Right 11/18/2014    Procedure: REPOSITION  INTRAOCULAR LENS;  Surgeon: Vickie Guerra MD;  [facility]     VITRECTOMY PARSPLANA WITH 23 GAUGE SYSTEM Right 11/18/2014    Procedure: VITRECTOMY PARSPLANA WITH 23 GAUGE SYSTEM;  Surgeon: Vickie Guerra MD;  Location:  [facility]     Allergies:  Allergies as of 01/31/2023 - Reviewed 01/31/2023   Allergen Reaction Noted     Bactrim [sulfamethoxazole w/trimethoprim] Nausea 11/01/2017     Seasonal allergies  11/13/2014     Typhoid vaccines Nausea and Vomiting 05/05/2011     Current Medications:  Current Outpatient Medications   Medication Sig Dispense Refill     calcium carb-cholecalciferol 600-500 MG-UNIT CAPS        Lactobacillus (PROBIOTIC CHILDRENS) CHEW        letrozole (FEMARA) 2.5 MG tablet Take 1 tablet (2.5 mg) by mouth daily 30 tablet 11     metoprolol succinate ER (TOPROL XL) 25 MG 24 hr tablet Take 1 tablet (25 mg) by mouth daily 90 tablet 3     Omega-3 Fatty Acids (FISH OIL ADULT GUMMIES PO) Take 250 mg by mouth       simvastatin (ZOCOR) 40 MG tablet Take 1 tablet (40 mg) by mouth At Bedtime 90 tablet 3     vitamin D3 (CHOLECALCIFEROL) 50 mcg (2000 units) tablet        loratadine (CLARITIN) 10 MG tablet Take 10 mg by mouth daily Prn, seasonal for ragweed and lilacs (Patient not taking: Reported on 1/31/2023)       ondansetron (ZOFRAN) 8 MG tablet Take 1 tablet (8 mg) by mouth every 8 hours as needed for nausea (Patient not taking: Reported on 1/17/2023) 30 tablet 3     prochlorperazine (COMPAZINE) 10 MG tablet Take 0.5 tablets (5 mg) by mouth every 6 hours as needed for nausea or vomiting (Patient not taking: Reported on 1/17/2023) 30 tablet 2     prochlorperazine (COMPAZINE) 5 MG tablet  (Patient not taking: Reported on 1/17/2023)        Family and Social History:  See initial consultation dated 6/14/2022 for further details.  Hereditary Comprehensive 40 Gene Panel was negative for pathogenic mutation.    Physical Exam:  /77   Pulse 94   Temp 97.2  F (36.2  C)   Resp 16   Ht  "1.675 m (5' 5.95\")   Wt 88.3 kg (194 lb 9.6 oz)   LMP 05/17/2011   SpO2 97%   BMI 31.46 kg/m      Wt Readings from Last 4 Encounters:   01/31/23 88.3 kg (194 lb 9.6 oz)   01/17/23 85.7 kg (189 lb)   01/10/23 88.1 kg (194 lb 3.2 oz)   01/02/23 87.5 kg (192 lb 14.4 oz)       VS: Above vitals reviewed, stable without concerns   General: Resting comfortably in no acute distress  Head: Normocephalic, atraumatic   EENT: No scleral icteris, EOMS intact. Mucous membranes are moist, no mucositis or oral lesions  Lung: Normal respiratory effort. Clear to auscultation bilaterally.   Neuro: AAO x3. Gait is steady. Moving all extremities   Extremities: No lower extremity edema  Skin: Warm, dry, intact. No rashes, no suspicious lesions. No petechia or bruising noted  Breast: Well healed incisions. No overlying skin changes.       Laboratory/Imaging Studies  Most Recent 3 CBC's:  Recent Labs   Lab Test 01/31/23  0940 01/10/23  1359 12/19/22  0859   WBC 4.6 5.8 4.6   HGB 13.3 13.7 13.6   MCV 88 87 89    216 228    Most Recent 3 BMP's:  Recent Labs   Lab Test 01/31/23  0940 01/10/23  1359 12/19/22  0859    140 142   POTASSIUM 3.9 3.7 3.9   CHLORIDE 104 104 105   CO2 26 26 26   BUN 12.5 13.3 9.2   CR 0.70 0.60 0.65   ANIONGAP 11 10 11   VALENTINA 9.3  9.4 9.4 9.4   * 127* 108*    Most Recent 2 LFT's:  Recent Labs   Lab Test 01/31/23  0940 01/10/23  1359   AST 34 28   ALT 13 15   ALKPHOS 58 66   BILITOTAL 0.4 0.4      I reviewed the above labs today.      ASSESSMENT/PLAN:  Ms. Alvarado is a 67 year old woman with a clinical stage II, T2N0M0, ER+/MN+/HER2+ IDC of her left breast.  She is s/p 4 cycles of neoadjuvant TC chemotherapy (initial biopsy was HER2 negative) followed by left breast lumpectomy and sentinel lymph node biopsy ([id number], residual tumor HER2 positive). She is s/p adjuvant radiation and has been on adjuvant Kadcyla since 11/21/2022.     1. Left breast carcinoma:   - receiving adjuvant Kadcyla.  She " is tolerating this well.  She will receive C4 of 17 total planned cycles today. Labs and exam are appropriate   - She inquired about a port, stating she seems to be doing fine without it. We discussed Kadcyla is an irritant, not a vesicant, therefore okay to proceed without a port.  If there is difficulty drawing blood or establishing an IV, would consider a port at that time.  - Continue letrozole, tolerating well   - Recommend provider visits every 9 weeks while on Kadcyla.  - Recommend bilateral diagnostic mammograms in early 07/2023 and then annually thereafter.    2.  Personal history of colon polyps:  She has a h/o colon polyps.  Colonoscopy 2/23/2022 with removal of 3 polyps, each 2-3 mm (2 tubular adenomas, 1 hyperplastic).  Repeat colonoscopy in 02/2027 is recommended.    3.  At risk for cardiomyopathy:  Echocardiogram performed on 11/11 was reviewed and shows LVEF within normal limits.  Plan to repeat an echocardiogram prior to Kadcyla infusion on 2/20.    4.  Bone Health:  She is at risk of bone loss on aromatase inhibitor therapy.  DEXA bone density scan on 12/1/2022 with a lowest T-score of -2.2 c/w osteopenia. recommend treatment with Zometa 4 mg IV once every 6 months for both prevention of osteoporosis and prevention of breast cancer bone metastases. S/P cycle one given 1/10 with some joint aches and fatigue. Next due 7/2023      Patient will call in the interim with any questions or concerns. They voice understanding and agree with the above plan.     35 minutes spent on the date of the encounter doing chart review, review of test results, interpretation of tests, patient visit and documentation     Kate Quintero PA-C
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
## 2023-01-31 ENCOUNTER — INFUSION THERAPY VISIT (OUTPATIENT)
Dept: ONCOLOGY | Facility: CLINIC | Age: 68
End: 2023-01-31
Attending: PHYSICIAN ASSISTANT
Payer: MEDICARE

## 2023-01-31 ENCOUNTER — APPOINTMENT (OUTPATIENT)
Dept: LAB | Facility: CLINIC | Age: 68
End: 2023-01-31
Attending: PHYSICIAN ASSISTANT
Payer: MEDICARE

## 2023-01-31 ENCOUNTER — OFFICE VISIT (OUTPATIENT)
Dept: RADIATION ONCOLOGY | Facility: CLINIC | Age: 68
End: 2023-01-31
Attending: RADIOLOGY
Payer: MEDICARE

## 2023-01-31 VITALS
WEIGHT: 194.6 LBS | SYSTOLIC BLOOD PRESSURE: 121 MMHG | HEIGHT: 66 IN | DIASTOLIC BLOOD PRESSURE: 77 MMHG | OXYGEN SATURATION: 97 % | TEMPERATURE: 97.2 F | RESPIRATION RATE: 16 BRPM | HEART RATE: 94 BPM | BODY MASS INDEX: 31.27 KG/M2

## 2023-01-31 DIAGNOSIS — M80.00XA AGE-RELATED OSTEOPOROSIS WITH CURRENT PATHOLOGICAL FRACTURE, INITIAL ENCOUNTER: ICD-10-CM

## 2023-01-31 DIAGNOSIS — Z17.0 MALIGNANT NEOPLASM OF UPPER-INNER QUADRANT OF LEFT BREAST IN FEMALE, ESTROGEN RECEPTOR POSITIVE (H): ICD-10-CM

## 2023-01-31 DIAGNOSIS — C50.212 MALIGNANT NEOPLASM OF UPPER-INNER QUADRANT OF LEFT BREAST IN FEMALE, ESTROGEN RECEPTOR POSITIVE (H): ICD-10-CM

## 2023-01-31 DIAGNOSIS — C50.212 MALIGNANT NEOPLASM OF UPPER-INNER QUADRANT OF LEFT BREAST IN FEMALE, ESTROGEN RECEPTOR POSITIVE (H): Primary | ICD-10-CM

## 2023-01-31 DIAGNOSIS — Z51.11 ENCOUNTER FOR ANTINEOPLASTIC CHEMOTHERAPY: Primary | ICD-10-CM

## 2023-01-31 DIAGNOSIS — Z17.0 MALIGNANT NEOPLASM OF UPPER-INNER QUADRANT OF LEFT BREAST IN FEMALE, ESTROGEN RECEPTOR POSITIVE (H): Primary | ICD-10-CM

## 2023-01-31 LAB
ALBUMIN SERPL BCG-MCNC: 4 G/DL (ref 3.5–5.2)
ALP SERPL-CCNC: 58 U/L (ref 35–104)
ALT SERPL W P-5'-P-CCNC: 13 U/L (ref 10–35)
ANION GAP SERPL CALCULATED.3IONS-SCNC: 11 MMOL/L (ref 7–15)
AST SERPL W P-5'-P-CCNC: 34 U/L (ref 10–35)
BASOPHILS # BLD AUTO: 0.1 10E3/UL (ref 0–0.2)
BASOPHILS NFR BLD AUTO: 1 %
BILIRUB SERPL-MCNC: 0.4 MG/DL
BUN SERPL-MCNC: 12.5 MG/DL (ref 8–23)
CALCIUM SERPL-MCNC: 9.3 MG/DL (ref 8.8–10.2)
CALCIUM SERPL-MCNC: 9.4 MG/DL (ref 8.8–10.2)
CHLORIDE SERPL-SCNC: 104 MMOL/L (ref 98–107)
CREAT SERPL-MCNC: 0.7 MG/DL (ref 0.51–0.95)
DEPRECATED HCO3 PLAS-SCNC: 26 MMOL/L (ref 22–29)
EOSINOPHIL # BLD AUTO: 0.1 10E3/UL (ref 0–0.7)
EOSINOPHIL NFR BLD AUTO: 2 %
ERYTHROCYTE [DISTWIDTH] IN BLOOD BY AUTOMATED COUNT: 14 % (ref 10–15)
GFR SERPL CREATININE-BSD FRML MDRD: >90 ML/MIN/1.73M2
GLUCOSE SERPL-MCNC: 108 MG/DL (ref 70–99)
HBA1C MFR BLD: 5.7 %
HCT VFR BLD AUTO: 39.2 % (ref 35–47)
HGB BLD-MCNC: 13.3 G/DL (ref 11.7–15.7)
IMM GRANULOCYTES # BLD: 0.1 10E3/UL
IMM GRANULOCYTES NFR BLD: 1 %
LYMPHOCYTES # BLD AUTO: 0.7 10E3/UL (ref 0.8–5.3)
LYMPHOCYTES NFR BLD AUTO: 14 %
MCH RBC QN AUTO: 29.8 PG (ref 26.5–33)
MCHC RBC AUTO-ENTMCNC: 33.9 G/DL (ref 31.5–36.5)
MCV RBC AUTO: 88 FL (ref 78–100)
MONOCYTES # BLD AUTO: 0.4 10E3/UL (ref 0–1.3)
MONOCYTES NFR BLD AUTO: 8 %
NEUTROPHILS # BLD AUTO: 3.4 10E3/UL (ref 1.6–8.3)
NEUTROPHILS NFR BLD AUTO: 74 %
NRBC # BLD AUTO: 0 10E3/UL
NRBC BLD AUTO-RTO: 0 /100
PLATELET # BLD AUTO: 222 10E3/UL (ref 150–450)
POTASSIUM SERPL-SCNC: 3.9 MMOL/L (ref 3.4–5.3)
PROT SERPL-MCNC: 7.1 G/DL (ref 6.4–8.3)
RBC # BLD AUTO: 4.47 10E6/UL (ref 3.8–5.2)
SODIUM SERPL-SCNC: 141 MMOL/L (ref 136–145)
WBC # BLD AUTO: 4.6 10E3/UL (ref 4–11)

## 2023-01-31 PROCEDURE — 999N000248 HC STATISTIC IV INSERT WITH US BY RN

## 2023-01-31 PROCEDURE — 36415 COLL VENOUS BLD VENIPUNCTURE: CPT | Performed by: PHYSICIAN ASSISTANT

## 2023-01-31 PROCEDURE — 82310 ASSAY OF CALCIUM: CPT | Performed by: PHYSICIAN ASSISTANT

## 2023-01-31 PROCEDURE — 258N000003 HC RX IP 258 OP 636: Performed by: PHYSICIAN ASSISTANT

## 2023-01-31 PROCEDURE — 83036 HEMOGLOBIN GLYCOSYLATED A1C: CPT | Performed by: PHYSICIAN ASSISTANT

## 2023-01-31 PROCEDURE — 99214 OFFICE O/P EST MOD 30 MIN: CPT | Performed by: PHYSICIAN ASSISTANT

## 2023-01-31 PROCEDURE — 99024 POSTOP FOLLOW-UP VISIT: CPT | Performed by: NURSE PRACTITIONER

## 2023-01-31 PROCEDURE — 250N000011 HC RX IP 250 OP 636: Performed by: PHYSICIAN ASSISTANT

## 2023-01-31 PROCEDURE — G0463 HOSPITAL OUTPT CLINIC VISIT: HCPCS | Mod: 25

## 2023-01-31 PROCEDURE — G0463 HOSPITAL OUTPT CLINIC VISIT: HCPCS

## 2023-01-31 PROCEDURE — 85025 COMPLETE CBC W/AUTO DIFF WBC: CPT | Performed by: PHYSICIAN ASSISTANT

## 2023-01-31 PROCEDURE — 96375 TX/PRO/DX INJ NEW DRUG ADDON: CPT

## 2023-01-31 PROCEDURE — 96413 CHEMO IV INFUSION 1 HR: CPT

## 2023-01-31 RX ORDER — LORAZEPAM 2 MG/ML
0.5 INJECTION INTRAMUSCULAR EVERY 4 HOURS PRN
Status: CANCELLED | OUTPATIENT
Start: 2023-02-20

## 2023-01-31 RX ORDER — METHYLPREDNISOLONE SODIUM SUCCINATE 125 MG/2ML
125 INJECTION, POWDER, LYOPHILIZED, FOR SOLUTION INTRAMUSCULAR; INTRAVENOUS
Status: CANCELLED
Start: 2023-02-20

## 2023-01-31 RX ORDER — ONDANSETRON 2 MG/ML
8 INJECTION INTRAMUSCULAR; INTRAVENOUS ONCE
Status: COMPLETED | OUTPATIENT
Start: 2023-01-31 | End: 2023-01-31

## 2023-01-31 RX ORDER — HEPARIN SODIUM,PORCINE 10 UNIT/ML
5 VIAL (ML) INTRAVENOUS
Status: CANCELLED | OUTPATIENT
Start: 2023-01-31

## 2023-01-31 RX ORDER — HEPARIN SODIUM (PORCINE) LOCK FLUSH IV SOLN 100 UNIT/ML 100 UNIT/ML
5 SOLUTION INTRAVENOUS
Status: CANCELLED | OUTPATIENT
Start: 2023-02-20

## 2023-01-31 RX ORDER — DIPHENHYDRAMINE HYDROCHLORIDE 50 MG/ML
50 INJECTION INTRAMUSCULAR; INTRAVENOUS
Status: CANCELLED
Start: 2023-02-20

## 2023-01-31 RX ORDER — ALBUTEROL SULFATE 0.83 MG/ML
2.5 SOLUTION RESPIRATORY (INHALATION)
Status: CANCELLED | OUTPATIENT
Start: 2023-02-20

## 2023-01-31 RX ORDER — LORAZEPAM 2 MG/ML
0.5 INJECTION INTRAMUSCULAR EVERY 4 HOURS PRN
Status: CANCELLED | OUTPATIENT
Start: 2023-01-31

## 2023-01-31 RX ORDER — HEPARIN SODIUM (PORCINE) LOCK FLUSH IV SOLN 100 UNIT/ML 100 UNIT/ML
5 SOLUTION INTRAVENOUS
Status: CANCELLED | OUTPATIENT
Start: 2023-01-31

## 2023-01-31 RX ORDER — ALBUTEROL SULFATE 90 UG/1
1-2 AEROSOL, METERED RESPIRATORY (INHALATION)
Status: CANCELLED
Start: 2023-01-31

## 2023-01-31 RX ORDER — EPINEPHRINE 1 MG/ML
0.3 INJECTION, SOLUTION INTRAMUSCULAR; SUBCUTANEOUS EVERY 5 MIN PRN
Status: CANCELLED | OUTPATIENT
Start: 2023-02-20

## 2023-01-31 RX ORDER — METHYLPREDNISOLONE SODIUM SUCCINATE 125 MG/2ML
125 INJECTION, POWDER, LYOPHILIZED, FOR SOLUTION INTRAMUSCULAR; INTRAVENOUS
Status: CANCELLED
Start: 2023-01-31

## 2023-01-31 RX ORDER — ONDANSETRON 2 MG/ML
8 INJECTION INTRAMUSCULAR; INTRAVENOUS ONCE
Status: CANCELLED | OUTPATIENT
Start: 2023-02-20

## 2023-01-31 RX ORDER — MEPERIDINE HYDROCHLORIDE 25 MG/ML
25 INJECTION INTRAMUSCULAR; INTRAVENOUS; SUBCUTANEOUS EVERY 30 MIN PRN
Status: CANCELLED | OUTPATIENT
Start: 2023-02-20

## 2023-01-31 RX ORDER — DIPHENHYDRAMINE HYDROCHLORIDE 50 MG/ML
50 INJECTION INTRAMUSCULAR; INTRAVENOUS
Status: CANCELLED
Start: 2023-01-31

## 2023-01-31 RX ORDER — MEPERIDINE HYDROCHLORIDE 25 MG/ML
25 INJECTION INTRAMUSCULAR; INTRAVENOUS; SUBCUTANEOUS EVERY 30 MIN PRN
Status: CANCELLED | OUTPATIENT
Start: 2023-01-31

## 2023-01-31 RX ORDER — HEPARIN SODIUM,PORCINE 10 UNIT/ML
5 VIAL (ML) INTRAVENOUS
Status: CANCELLED | OUTPATIENT
Start: 2023-02-20

## 2023-01-31 RX ORDER — EPINEPHRINE 1 MG/ML
0.3 INJECTION, SOLUTION INTRAMUSCULAR; SUBCUTANEOUS EVERY 5 MIN PRN
Status: CANCELLED | OUTPATIENT
Start: 2023-01-31

## 2023-01-31 RX ORDER — ALBUTEROL SULFATE 90 UG/1
1-2 AEROSOL, METERED RESPIRATORY (INHALATION)
Status: CANCELLED
Start: 2023-02-20

## 2023-01-31 RX ORDER — ALBUTEROL SULFATE 0.83 MG/ML
2.5 SOLUTION RESPIRATORY (INHALATION)
Status: CANCELLED | OUTPATIENT
Start: 2023-01-31

## 2023-01-31 RX ORDER — ONDANSETRON 2 MG/ML
8 INJECTION INTRAMUSCULAR; INTRAVENOUS ONCE
Status: CANCELLED | OUTPATIENT
Start: 2023-01-31

## 2023-01-31 RX ADMIN — ONDANSETRON 8 MG: 2 INJECTION INTRAMUSCULAR; INTRAVENOUS at 11:36

## 2023-01-31 RX ADMIN — ADO-TRASTUZUMAB EMTANSINE 300 MG: 20 INJECTION, POWDER, LYOPHILIZED, FOR SOLUTION INTRAVENOUS at 12:05

## 2023-01-31 RX ADMIN — SODIUM CHLORIDE 250 ML: 9 INJECTION, SOLUTION INTRAVENOUS at 11:37

## 2023-01-31 ASSESSMENT — PAIN SCALES - GENERAL: PAINLEVEL: MODERATE PAIN (5)

## 2023-01-31 NOTE — PROGRESS NOTES
Radiation Oncology Follow-up Visit  2023        Vickie Alvarado  MRN: [id number]   : 1955     DISEASE TREATED:   Invasive ductal carcinoma, grade 3, ER+/MN+/HER2+ STAGE: cT2N0 -> ypT2N0(sn)    RADIATION THERAPY DELIVERED:   5490 cGy completed 2023    INTERVAL SINCE COMPLETION OF RADIATION THERAPY:   1 month    SUBJECTIVE:   Vickie Alvarado is a 67 year old female who is here today for routine 1 month follow up after completing radiation therapy.  She has seen healing of her skin reaction and continues to moisturize.. Doesn't notice any stiffness. Denies any pain or swelling. Fatigue has improved.  No significant pain.  She has seen medical oncology.    ROS:  Complete review of systems is negative except for symptoms discussed in subjective above.    Current Outpatient Medications   Medication     calcium carb-cholecalciferol 600-500 MG-UNIT CAPS     Lactobacillus (PROBIOTIC CHILDRENS) CHEW     letrozole (FEMARA) 2.5 MG tablet     loratadine (CLARITIN) 10 MG tablet     metoprolol succinate ER (TOPROL XL) 25 MG 24 hr tablet     Omega-3 Fatty Acids (FISH OIL ADULT GUMMIES PO)     ondansetron (ZOFRAN) 8 MG tablet     prochlorperazine (COMPAZINE) 10 MG tablet     prochlorperazine (COMPAZINE) 5 MG tablet     simvastatin (ZOCOR) 40 MG tablet     vitamin D3 (CHOLECALCIFEROL) 50 mcg (2000 units) tablet     No current facility-administered medications for this visit.          Allergies   Allergen Reactions     Bactrim [Sulfamethoxazole W/Trimethoprim] Nausea     Headache     Seasonal Allergies      Typhoid Vaccines Nausea and Vomiting     Fever, vice- h/a, body aches.  Was hospitalized for 2d.       Past Medical History:   Diagnosis Date     Aortic stenosis      Breast cancer (H) 2022     Hyperlipidemia      Insufficiency fracture of tibia, sequela 2021     Polyp of colon 2016         PHYSICAL EXAM:  LMP 2011   Gen: Alert, in NAD  Breast with slight hyperpigmentation. No  lymphedema noted.  Psychiatric: Appropriate mood and affect      LABS AND IMAGING:  Reviewed.    IMPRESSION:   Ms. Alvarado is a 67 year old female with an invasive ductal carcinoma s/p 1 month out from radiation and doing well.    PLAN:   Patient has recovered nicely from acute side effects of radiation therapy.  Discussed long term care with continued moisturizing of the treated breast, stretching and range of motion exercises, and sun screen.  Patient will follow up with medical oncology for continued care and imaging.  Discussed to come in or call with any concerns or questions that may develop.          Latha Oh NP  Radiation Oncology  [facility]

## 2023-01-31 NOTE — LETTER
2023         RE: Vickie Alvarado  [address]  [facility] [address]        Dear Colleague,    Thank you for referring your patient, Vickie Alvarado, to the [facility] RADIATION ONCOLOGY. Please see a copy of my visit note below.    Radiation Oncology Follow-up Visit  2023        Vickie Alvarado  MRN: [id number]   : 1955     DISEASE TREATED:   Invasive ductal carcinoma, grade 3, ER+/IN+/HER2+ STAGE: cT2N0 -> ypT2N0(sn)    RADIATION THERAPY DELIVERED:   5490 cGy completed 2023    INTERVAL SINCE COMPLETION OF RADIATION THERAPY:   1 month    SUBJECTIVE:   Vickie Alvarado is a 67 year old female who is here today for routine 1 month follow up after completing radiation therapy.  She has seen healing of her skin reaction and continues to moisturize.. Doesn't notice any stiffness. Denies any pain or swelling. Fatigue has improved.  No significant pain.  She has seen medical oncology.    ROS:  Complete review of systems is negative except for symptoms discussed in subjective above.    Current Outpatient Medications   Medication     calcium carb-cholecalciferol 600-500 MG-UNIT CAPS     Lactobacillus (PROBIOTIC CHILDRENS) CHEW     letrozole (FEMARA) 2.5 MG tablet     loratadine (CLARITIN) 10 MG tablet     metoprolol succinate ER (TOPROL XL) 25 MG 24 hr tablet     Omega-3 Fatty Acids (FISH OIL ADULT GUMMIES PO)     ondansetron (ZOFRAN) 8 MG tablet     prochlorperazine (COMPAZINE) 10 MG tablet     prochlorperazine (COMPAZINE) 5 MG tablet     simvastatin (ZOCOR) 40 MG tablet     vitamin D3 (CHOLECALCIFEROL) 50 mcg (2000 units) tablet     No current facility-administered medications for this visit.          Allergies   Allergen Reactions     Bactrim [Sulfamethoxazole W/Trimethoprim] Nausea     Headache     Seasonal Allergies      Typhoid Vaccines Nausea and Vomiting     Fever, vice- h/a, body aches.  Was hospitalized for 2d.       Past Medical History:   Diagnosis  Date     Aortic stenosis      Breast cancer (H) 06/2022     Hyperlipidemia      Insufficiency fracture of tibia, sequela 09/28/2021     Polyp of colon 05/26/2016         PHYSICAL EXAM:  LMP 05/17/2011   Gen: Alert, in NAD  Breast with slight hyperpigmentation. No lymphedema noted.  Psychiatric: Appropriate mood and affect      LABS AND IMAGING:  Reviewed.    IMPRESSION:   Ms. Alvarado is a 67 year old female with an invasive ductal carcinoma s/p 1 month out from radiation and doing well.    PLAN:   Patient has recovered nicely from acute side effects of radiation therapy.  Discussed long term care with continued moisturizing of the treated breast, stretching and range of motion exercises, and sun screen.  Patient will follow up with medical oncology for continued care and imaging.  Discussed to come in or call with any concerns or questions that may develop.          Latha Oh NP  Radiation Oncology  [facility]

## 2023-01-31 NOTE — NURSING NOTE
"Oncology Rooming Note    January 31, 2023 9:52 AM   Vickie Alvarado is a 67 year old female who presents for:    Chief Complaint   Patient presents with     Blood Draw     Labs drawn via piv by rn in lab. VS taken.     Oncology Clinic Visit     [facility] RETURN - BREAST  CANCER     Initial Vitals: /77   Pulse 94   Temp 97.2  F (36.2  C)   Resp 16   Ht 1.675 m (5' 5.95\")   Wt 88.3 kg (194 lb 9.6 oz)   LMP 05/17/2011   SpO2 97%   BMI 31.46 kg/m   Estimated body mass index is 31.46 kg/m  as calculated from the following:    Height as of this encounter: 1.675 m (5' 5.95\").    Weight as of this encounter: 88.3 kg (194 lb 9.6 oz). Body surface area is 2.03 meters squared.  Moderate Pain (5) Comment: Data Unavailable   Patient's last menstrual period was 05/17/2011.  Allergies reviewed: Yes  Medications reviewed: Yes    Medications: Medication refills not needed today.  Pharmacy name entered into NEBOTRADE: [id number] - [facility] [address] [address] & [facility]              "

## 2023-01-31 NOTE — PROGRESS NOTES
Infusion Nursing Note:  Vickie Alvarado presents today for Cycle 4 Day 1 Kadcyla.    Patient seen by provider today: Yes: IVETTE Hall   present during visit today: Not Applicable.    Note: Marni presents to infusion today following her provider appointment and denies any questions or concerns.    Intravenous Access:  Peripheral IV placed.    Treatment Conditions:  Lab Results   Component Value Date    HGB 13.3 01/31/2023    WBC 4.6 01/31/2023    ANEU 4.1 09/08/2014    ANEUTAUTO 3.4 01/31/2023     01/31/2023      Lab Results   Component Value Date     01/31/2023    POTASSIUM 3.9 01/31/2023    MAG 2.2 08/20/2021    CR 0.70 01/31/2023    VALENTINA 9.4 01/31/2023    VALENTINA 9.3 01/31/2023    BILITOTAL 0.4 01/31/2023    ALBUMIN 4.0 01/31/2023    ALT 13 01/31/2023    AST 34 01/31/2023     Results reviewed, labs MET treatment parameters, ok to proceed with treatment.  ECHO/MUGA completed 11/11/22  EF 57%.    Post Infusion Assessment:  Patient tolerated infusion without incident.  Blood return noted pre and post infusion.  Site patent and intact, free from redness, edema or discomfort.  No evidence of extravasations.  Access discontinued per protocol.     Discharge Plan:   Patient declined prescription refills.  Discharge instructions reviewed with: Patient.  Patient and/or family verbalized understanding of discharge instructions and all questions answered.  AVS to patient via [employer]Parents[employer].  Patient will return 2/20/23 for next appointment.   Patient discharged in stable condition accompanied by: self.  Departure Mode: Ambulatory.      Kiya Currie RN

## 2023-01-31 NOTE — NURSING NOTE
Chief Complaint   Patient presents with     Blood Draw     Labs drawn via piv by rn in lab. VS taken.     Labs drawn from PIV placed by RN. Line flushed with saline. Vitals taken. PIV removed due to spasm pain on flush. Patient requested to have infusion put in PIV at time of infusion. Pt checked in for appointment(s).    Juancarlos Stapleton RN

## 2023-02-17 ENCOUNTER — MYC MEDICAL ADVICE (OUTPATIENT)
Dept: FAMILY MEDICINE | Facility: CLINIC | Age: 68
End: 2023-02-17
Payer: MEDICARE

## 2023-02-17 DIAGNOSIS — E78.5 HYPERLIPIDEMIA LDL GOAL <130: ICD-10-CM

## 2023-02-17 RX ORDER — SIMVASTATIN 40 MG
40 TABLET ORAL AT BEDTIME
Qty: 90 TABLET | Refills: 3 | Status: SHIPPED | OUTPATIENT
Start: 2023-02-17 | End: 2024-02-27

## 2023-02-17 NOTE — TELEPHONE ENCOUNTER
"Request for medication refill:  simvastatin (ZOCOR) 40 MG tablet  Providers if patient needs an appointment and you are willing to give a one month supply please refill for one month and  send a letter/MyChart using \".SMILLIMITEDREFILL\" .smillimited and route chart to \"P [facility] \" (Giving one month refill in non controlled medications is strongly recommended before denial)    If refill has been denied, meaning absolutely no refills without visit, please complete the smart phrase \".smirxrefuse\" and route it to the \"P SMI MED REFILLS\"  pool to inform the patient and the pharmacy.    Jessy Dodd RN        "

## 2023-02-20 ENCOUNTER — APPOINTMENT (OUTPATIENT)
Dept: LAB | Facility: CLINIC | Age: 68
End: 2023-02-20
Attending: INTERNAL MEDICINE
Payer: MEDICARE

## 2023-02-20 ENCOUNTER — INFUSION THERAPY VISIT (OUTPATIENT)
Dept: ONCOLOGY | Facility: CLINIC | Age: 68
End: 2023-02-20
Attending: INTERNAL MEDICINE
Payer: MEDICARE

## 2023-02-20 ENCOUNTER — MYC MEDICAL ADVICE (OUTPATIENT)
Dept: CARDIOLOGY | Facility: CLINIC | Age: 68
End: 2023-02-20

## 2023-02-20 ENCOUNTER — ANCILLARY PROCEDURE (OUTPATIENT)
Dept: CARDIOLOGY | Facility: CLINIC | Age: 68
End: 2023-02-20
Attending: INTERNAL MEDICINE
Payer: MEDICARE

## 2023-02-20 VITALS
HEART RATE: 93 BPM | OXYGEN SATURATION: 95 % | WEIGHT: 190.6 LBS | DIASTOLIC BLOOD PRESSURE: 75 MMHG | SYSTOLIC BLOOD PRESSURE: 125 MMHG | TEMPERATURE: 98 F | BODY MASS INDEX: 30.81 KG/M2 | RESPIRATION RATE: 18 BRPM

## 2023-02-20 DIAGNOSIS — Z51.11 ENCOUNTER FOR ANTINEOPLASTIC CHEMOTHERAPY: Primary | ICD-10-CM

## 2023-02-20 DIAGNOSIS — Z17.0 MALIGNANT NEOPLASM OF UPPER-INNER QUADRANT OF LEFT BREAST IN FEMALE, ESTROGEN RECEPTOR POSITIVE (H): ICD-10-CM

## 2023-02-20 DIAGNOSIS — Z51.11 ENCOUNTER FOR ANTINEOPLASTIC CHEMOTHERAPY: ICD-10-CM

## 2023-02-20 DIAGNOSIS — C50.212 MALIGNANT NEOPLASM OF UPPER-INNER QUADRANT OF LEFT BREAST IN FEMALE, ESTROGEN RECEPTOR POSITIVE (H): ICD-10-CM

## 2023-02-20 LAB
3D LVEF ECHO: NORMAL
ALBUMIN SERPL BCG-MCNC: 4.2 G/DL (ref 3.5–5.2)
ALP SERPL-CCNC: 57 U/L (ref 35–104)
ALT SERPL W P-5'-P-CCNC: 16 U/L (ref 10–35)
ANION GAP SERPL CALCULATED.3IONS-SCNC: 12 MMOL/L (ref 7–15)
AST SERPL W P-5'-P-CCNC: 37 U/L (ref 10–35)
BASOPHILS # BLD AUTO: 0.1 10E3/UL (ref 0–0.2)
BASOPHILS NFR BLD AUTO: 1 %
BILIRUB SERPL-MCNC: 0.4 MG/DL
BUN SERPL-MCNC: 16.7 MG/DL (ref 8–23)
CALCIUM SERPL-MCNC: 10 MG/DL (ref 8.8–10.2)
CHLORIDE SERPL-SCNC: 103 MMOL/L (ref 98–107)
CREAT SERPL-MCNC: 0.65 MG/DL (ref 0.51–0.95)
DEPRECATED HCO3 PLAS-SCNC: 24 MMOL/L (ref 22–29)
EOSINOPHIL # BLD AUTO: 0.1 10E3/UL (ref 0–0.7)
EOSINOPHIL NFR BLD AUTO: 1 %
ERYTHROCYTE [DISTWIDTH] IN BLOOD BY AUTOMATED COUNT: 13.8 % (ref 10–15)
GFR SERPL CREATININE-BSD FRML MDRD: >90 ML/MIN/1.73M2
GLUCOSE SERPL-MCNC: 105 MG/DL (ref 70–99)
HCT VFR BLD AUTO: 40.9 % (ref 35–47)
HGB BLD-MCNC: 13.9 G/DL (ref 11.7–15.7)
IMM GRANULOCYTES # BLD: 0 10E3/UL
IMM GRANULOCYTES NFR BLD: 0 %
LYMPHOCYTES # BLD AUTO: 0.8 10E3/UL (ref 0.8–5.3)
LYMPHOCYTES NFR BLD AUTO: 14 %
MCH RBC QN AUTO: 30 PG (ref 26.5–33)
MCHC RBC AUTO-ENTMCNC: 34 G/DL (ref 31.5–36.5)
MCV RBC AUTO: 88 FL (ref 78–100)
MONOCYTES # BLD AUTO: 0.4 10E3/UL (ref 0–1.3)
MONOCYTES NFR BLD AUTO: 7 %
NEUTROPHILS # BLD AUTO: 4.4 10E3/UL (ref 1.6–8.3)
NEUTROPHILS NFR BLD AUTO: 77 %
NRBC # BLD AUTO: 0 10E3/UL
NRBC BLD AUTO-RTO: 0 /100
PLATELET # BLD AUTO: 248 10E3/UL (ref 150–450)
POTASSIUM SERPL-SCNC: 3.9 MMOL/L (ref 3.4–5.3)
PROT SERPL-MCNC: 7.6 G/DL (ref 6.4–8.3)
RBC # BLD AUTO: 4.63 10E6/UL (ref 3.8–5.2)
SODIUM SERPL-SCNC: 139 MMOL/L (ref 136–145)
WBC # BLD AUTO: 5.7 10E3/UL (ref 4–11)

## 2023-02-20 PROCEDURE — 258N000003 HC RX IP 258 OP 636: Performed by: PHYSICIAN ASSISTANT

## 2023-02-20 PROCEDURE — 80053 COMPREHEN METABOLIC PANEL: CPT | Performed by: PHYSICIAN ASSISTANT

## 2023-02-20 PROCEDURE — 36415 COLL VENOUS BLD VENIPUNCTURE: CPT | Performed by: PHYSICIAN ASSISTANT

## 2023-02-20 PROCEDURE — 85025 COMPLETE CBC W/AUTO DIFF WBC: CPT | Performed by: PHYSICIAN ASSISTANT

## 2023-02-20 PROCEDURE — 96375 TX/PRO/DX INJ NEW DRUG ADDON: CPT

## 2023-02-20 PROCEDURE — 99207 PR NO BILLABLE SERVICE THIS VISIT: CPT

## 2023-02-20 PROCEDURE — 93306 TTE W/DOPPLER COMPLETE: CPT | Performed by: INTERNAL MEDICINE

## 2023-02-20 PROCEDURE — 250N000011 HC RX IP 250 OP 636: Performed by: PHYSICIAN ASSISTANT

## 2023-02-20 PROCEDURE — 96413 CHEMO IV INFUSION 1 HR: CPT

## 2023-02-20 PROCEDURE — 76376 3D RENDER W/INTRP POSTPROCES: CPT | Performed by: INTERNAL MEDICINE

## 2023-02-20 RX ORDER — ONDANSETRON 2 MG/ML
8 INJECTION INTRAMUSCULAR; INTRAVENOUS ONCE
Status: COMPLETED | OUTPATIENT
Start: 2023-02-20 | End: 2023-02-20

## 2023-02-20 RX ADMIN — ONDANSETRON 8 MG: 2 INJECTION INTRAMUSCULAR; INTRAVENOUS at 13:10

## 2023-02-20 RX ADMIN — ADO-TRASTUZUMAB EMTANSINE 300 MG: 20 INJECTION, POWDER, LYOPHILIZED, FOR SOLUTION INTRAVENOUS at 13:53

## 2023-02-20 RX ADMIN — SODIUM CHLORIDE 250 ML: 9 INJECTION, SOLUTION INTRAVENOUS at 13:09

## 2023-02-20 ASSESSMENT — PAIN SCALES - GENERAL: PAINLEVEL: MILD PAIN (3)

## 2023-02-20 NOTE — NURSING NOTE
Chief Complaint   Patient presents with     Blood Draw     Labs drawn from PIV placed by vascular RN. Line flushed with saline. Vitals taken. Pt checked in for appointment(s).      Carole So RN

## 2023-02-20 NOTE — PATIENT INSTRUCTIONS
February 2023 Sunday Monday Tuesday Wednesday Thursday Friday Saturday                  1     2     3     4       5     6     7     8     9     10     11       12     13     14     15     16     17     18       19     20    ECHO LIMITED  11:00 AM   (60 min.)   [id number]   [facility]    LAB PERIPHERAL  12:15 PM   (15 min.)    [facility] LAB DRAW   [facility]    ONC INFUSION 1 HR (60 MIN)   1:00 PM   (60 min.)   [facility] ONC INFUSION NURSE   [facility] 21    [facility] RETURN ORTHO  10:40 AM   (20 min.)   Josie Jacob MD   [facility] 22     23     24     25       26     27     28                                       March 2023 Sunday Monday Tuesday Wednesday Thursday Friday Saturday                  1     2     3     4       5     6    RETURN CARDIOLOGY   2:45 PM   (30 min.)   Nicole Brooke MD   [facility] 7     8     9     10     11       12     13    LAB PERIPHERAL   1:30 PM   (15 min.)    [facility] LAB DRAW   [facility]    ONC INFUSION 1 HR (60 MIN)   2:00 PM   (60 min.)    ONC INFUSION NURSE   [facility] 14     15     16     17     18       19     20     21     22     23     24     25       26     27     28     29     30     31                            Lab Results:  Recent Results (from the past 12 hour(s))   Echocardiogram Complete    Collection Time: 02/20/23 11:43 AM   Result Value Ref Range    3D LVEF 57.7%    Comprehensive metabolic panel    Collection Time: 02/20/23 12:20 PM   Result Value Ref Range    Sodium 139 136 - 145 mmol/L    Potassium 3.9 3.4 - 5.3 mmol/L    Chloride 103 98 - 107 mmol/L    Carbon Dioxide (CO2) 24 22 - 29 mmol/L    Anion Gap 12 7 - 15 mmol/L    Urea Nitrogen 16.7 8.0 - 23.0 mg/dL    Creatinine 0.65 0.51 - 0.95 mg/dL    Calcium 10.0 8.8 - 10.2 mg/dL    Glucose 105 (H) 70 - 99  mg/dL    Alkaline Phosphatase 57 35 - 104 U/L    AST 37 (H) 10 - 35 U/L    ALT 16 10 - 35 U/L    Protein Total 7.6 6.4 - 8.3 g/dL    Albumin 4.2 3.5 - 5.2 g/dL    Bilirubin Total 0.4 <=1.2 mg/dL    GFR Estimate >90 >60 mL/min/1.73m2   CBC with platelets and differential    Collection Time: 02/20/23 12:20 PM   Result Value Ref Range    WBC Count 5.7 4.0 - 11.0 10e3/uL    RBC Count 4.63 3.80 - 5.20 10e6/uL    Hemoglobin 13.9 11.7 - 15.7 g/dL    Hematocrit 40.9 35.0 - 47.0 %    MCV 88 78 - 100 fL    MCH 30.0 26.5 - 33.0 pg    MCHC 34.0 31.5 - 36.5 g/dL    RDW 13.8 10.0 - 15.0 %    Platelet Count 248 150 - 450 10e3/uL    % Neutrophils 77 %    % Lymphocytes 14 %    % Monocytes 7 %    % Eosinophils 1 %    % Basophils 1 %    % Immature Granulocytes 0 %    NRBCs per 100 WBC 0 <1 /100    Absolute Neutrophils 4.4 1.6 - 8.3 10e3/uL    Absolute Lymphocytes 0.8 0.8 - 5.3 10e3/uL    Absolute Monocytes 0.4 0.0 - 1.3 10e3/uL    Absolute Eosinophils 0.1 0.0 - 0.7 10e3/uL    Absolute Basophils 0.1 0.0 - 0.2 10e3/uL    Absolute Immature Granulocytes 0.0 <=0.4 10e3/uL    Absolute NRBCs 0.0 10e3/uL

## 2023-02-20 NOTE — PROGRESS NOTES
"  Assessment & Plan     Chronic pain of right knee  Primary localized osteoarthrosis of right lower leg  patient with known osteoarthritis of right knee, provided the injection for pain relief. Encourage patient to continue with physical therapy and pull therapy as she is already doing. Follow-up for recurrent pain. Continue with primary care follow-up.  - Large Joint/Bursa injection and/or drainage - Unilateral (Knee) [20610]  - triamcinolone (KENALOG-40) injection 40 mg  - moderate activity level in coming days after injection  - seek care for concerns for infection: fevers, chills or skin changs    56}  BMI:   Estimated body mass index is 30.67 kg/m  as calculated from the following:    Height as of this encounter: 1.676 m (5' 6\").    Weight as of this encounter: 86.2 kg (190 lb).       See Patient Instructions    No follow-ups on file.  Patient seen and discussed with attending physician MD Manisha Garcia MD  [facility] MELISSA Grider is a 67 year old, presenting for the following health issues:  Rt knee (Cortisone inj)      HPI   Patient presents with chief complaint of right knee pain and desire for steroid injection.    Pain  Patient now stating has more acute pain on and off lately.  Pain is predominantly in the knee although does rate around the back of her knee to popliteal fossa. Does not radiate up or down.  At night has\"throbbing pain\" with arthritis  Takes ibuprofen. Previously aleve. Not sure which is better.    Previous shots:  Last two cortisone - last shot was 7 months ago. Significant relief with first shot for 2-2.5 months. Even more relief (lasted longer) after the second shot  (saw in July).    Previous Knee therapy:   Had both land and pool therapy. Pool was especially beneficial. She has missed pool therapy for time due to COVID. A while back had insufficiency fracture.    Other Medical History:  Chemo, lumpectomy now doing some ongoing " "infusions (yest had infusion and ECHO - now severe aortic stenosis in Dec 2022 ECHO)      Previous Imaging  DEXA scan 12/01/22: \"Based on BMD diagnosis is consistent with low bone density based on WHO criteria Ref. 1\"    XRAY RIGHT KNEE 2/1/22: \"Impression:  1. No acute osseous abnormality.  2. Moderate medial compartment joint space loss bilaterally.\"    Review of Systems         Objective    /81   Pulse 86   Resp 18   Ht 1.676 m (5' 6\")   Wt 86.2 kg (190 lb)   LMP 05/17/2011   SpO2 93%   BMI 30.67 kg/m    Body mass index is 30.67 kg/m .  Physical Exam   BACK:   Bony tenderness: None   Paraspinal tenderness: None.   Sensation: intact in b/l lower extremities.   Strength: 5/5 w/ dorsiflexion/ plantarflexion/ inversion/ eversion/ knee flexion/ extension.   Neuro: DTR's 2+. Babinski's downgoing. Neg Clonus       PROCEDURE:  JOINT ASPIRATION/INJECTION    After a discussion of risks, benefits and side effects of procedure, informed patient consent was obtained.  The right  was prepped and draped in the usual clean fashion (sterile not required for this procedure).  4 cc of 1 % lidocaine was used for local analgesia. Procedure was completed without Ultrasound Guidance.    ASPIRATION: Not performed.    INJECTION:  Using 4 cc of 1 % lidocaine mixed with 40 mg of kenalog, the knee was successfully injected without complication.  Patient did experience some pain relief following injection.    Invasive Procedure Safety Checklist completed by nurse? Yes        Patient seen, evaluated and discussed with the resident. I have verified the content of the note, which accurately reflects my assessment of the patient and the plan of care. Knee injection- I was present for injection performed.   Supervising Physician:  Josie Jacob MD     "

## 2023-02-20 NOTE — PROGRESS NOTES
Infusion Nursing Note:  Vickie Alvarado presents today for C5D1 Kadcyla.    Patient seen by provider today: No   present during visit today: Not Applicable.    Note: Patient reports at her baseline. Denies fever/chills. No new concern made. Otherwise well.    Intravenous Access:  Peripheral IV placed.    Treatment Conditions:  Lab Results   Component Value Date    HGB 13.9 02/20/2023    WBC 5.7 02/20/2023    ANEU 4.1 09/08/2014    ANEUTAUTO 4.4 02/20/2023     02/20/2023      Lab Results   Component Value Date     02/20/2023    POTASSIUM 3.9 02/20/2023    MAG 2.2 08/20/2021    CR 0.65 02/20/2023    VALENTINA 10.0 02/20/2023    BILITOTAL 0.4 02/20/2023    ALBUMIN 4.2 02/20/2023    ALT 16 02/20/2023    AST 37 (H) 02/20/2023     Results reviewed, labs MET treatment parameters, ok to proceed with treatment.  ECHO/MUGA completed 2/20/23  EF 57%.    Post Infusion Assessment:  Patient tolerated infusion without incident.  Blood return noted pre and post infusion.  Site patent and intact, free from redness, edema or discomfort.  No evidence of extravasations.  Access discontinued per protocol.     Discharge Plan:   Patient declined prescription refills.  Discharge instructions reviewed with: Patient.  Patient and/or family verbalized understanding of discharge instructions and all questions answered.  AVS to patient via [employer].  Patient will return 3/13/23 for next appointment.   Patient discharged in stable condition accompanied by: self.  Departure Mode: Ambulatory.      MARIAM GAINES RN

## 2023-02-21 ENCOUNTER — OFFICE VISIT (OUTPATIENT)
Dept: FAMILY MEDICINE | Facility: CLINIC | Age: 68
End: 2023-02-21
Payer: MEDICARE

## 2023-02-21 VITALS
HEART RATE: 86 BPM | BODY MASS INDEX: 30.53 KG/M2 | RESPIRATION RATE: 18 BRPM | SYSTOLIC BLOOD PRESSURE: 129 MMHG | WEIGHT: 190 LBS | HEIGHT: 66 IN | OXYGEN SATURATION: 93 % | DIASTOLIC BLOOD PRESSURE: 81 MMHG

## 2023-02-21 DIAGNOSIS — M25.561 CHRONIC PAIN OF RIGHT KNEE: ICD-10-CM

## 2023-02-21 DIAGNOSIS — G89.29 CHRONIC PAIN OF RIGHT KNEE: ICD-10-CM

## 2023-02-21 DIAGNOSIS — M17.11 PRIMARY LOCALIZED OSTEOARTHROSIS OF RIGHT LOWER LEG: Primary | ICD-10-CM

## 2023-02-21 PROCEDURE — 20610 DRAIN/INJ JOINT/BURSA W/O US: CPT | Mod: RT | Performed by: FAMILY MEDICINE

## 2023-02-21 PROCEDURE — 99213 OFFICE O/P EST LOW 20 MIN: CPT | Mod: 25 | Performed by: FAMILY MEDICINE

## 2023-02-21 RX ORDER — TRIAMCINOLONE ACETONIDE 40 MG/ML
40 INJECTION, SUSPENSION INTRA-ARTICULAR; INTRAMUSCULAR ONCE
Status: COMPLETED | OUTPATIENT
Start: 2023-02-21 | End: 2023-02-21

## 2023-02-21 RX ADMIN — TRIAMCINOLONE ACETONIDE 40 MG: 40 INJECTION, SUSPENSION INTRA-ARTICULAR; INTRAMUSCULAR at 11:20

## 2023-02-21 NOTE — PATIENT INSTRUCTIONS
Patient Education   Here is the plan from today's visit    1. Chronic pain of right knee  - today we did a right knee injection  - you should continue with pool exercises   - moderate activity level in coming days after injection  - seek care for concerns for infection: fevers, chills or skin changs    2. Muscle Spasms  - use foam roller - can purchase at [facility] or elsewhere          Please call or return to clinic if your symptoms don't go away.    Follow up plan  No follow-ups on file.    Thank you for coming to [facility] today.  Lab Testing:  **If you had lab testing today and your results are reassuring or normal they will be mailed to you or sent through [employer] within 7 days.   **If the lab tests need quick action we will call you with the results.  **If you are having labs done on a different day, please call [phone] to schedule at [facility] or [phone] for other [facility]patient [facility] locations. Labs do not offer walk-in appointments.  The phone number we will call with results is # [phone] (home) . If this is not the best number please call our clinic and change the number.  Medication Refills:  If you need any refills please call your pharmacy and they will contact us.   If you need to  your refill at a new pharmacy, please contact the new pharmacy directly. The new pharmacy will help you get your medications transferred faster.   Scheduling:  If you have any concerns about today's visit or wish to schedule another appointment please call our office during normal business hours [phone] (8-5:00 M-F). If you can no longer make a scheduled visit, please cancel via [employer] or call us to cancel.   If a referral was made to an [facility] specialty provider and you do not get a call from central scheduling, please refer to directions on your visit summary or call our office during normal business hours for assistance.   If a Mammogram was ordered for you  at the [facility] call [phone] to schedule or change your appointment.  If you had an XRay/CT/Ultrasound/MRI ordered the number is [phone] to schedule or change your radiology appointment.   [facility] has limited ultrasound appointments available on Wednesdays, if you would like your ultrasound at [facility], please call [phone] to schedule.   Medical Concerns:  If you have urgent medical concerns please call [phone] at any time of the day.    Josie Jacob MD

## 2023-03-05 NOTE — PROGRESS NOTES
General Cardiology Clinic       Referring provider:Gaby Lynn MD    Chief complaint: Palpitations, fatigue and chest pressure    HPI: Ms. Vickie Alvarado is a 66 year old  female with PMH significant for    -Hyperlipidemia  -Obesity    Patient is being seen today at the request of  for moderate aortic stenosis recently detected on echocardiogram.  Patient was seen on 9/28/2021 and found to have systolic murmur which was apparently not previously heard.  She was also found to have irregular cardiac rhythm on physical exam.  This was not demonstrated on EKG.  She was recommended echocardiogram and Zio patch.  I reviewed the echocardiogram from 11/3/2021 which shows calcification of the aortic valve with moderate aortic stenosis.  Zio patch showed 9.9% PVCs corresponding to patient's symptoms.  Patient reports feeling fatigued, chest pressure and heart flutters when she lies down at night.  Does not happen every day.  She tells me that she is not aware of the onset of the symptoms or the total duration of the episodes.  She cannot tell me how long this has been going on but definitely not too long.  No apparent trigger.  She is doing physical therapy in the pool.  No limitation of physical activity during the day.    Lifetime non-smoker.  Alcohol rarely.  No drug abuse.   The patient's risk factor profile is: (-) HTN, (-) diabetes, (+) hyperlipidemia, (-) tobacco use, (-) family Hx CAD.  Patient tells me that there is family members on his dad's side with hypertrophic cardiomyopathy.  His nephew was diagnosed with HCM as well.  No premature CAD or sudden cardiac death in the family.    Medications, personal, family, and social history reviewed with patient and revised.    Interval history 3/15/2022:  Patient is being seen today to discuss results of cardiac MRI which showed trileaflet calcific aortic  stenosis.  Normal biventricular size and function.  No infiltrative disease.  Ascending aorta is normal size.      When I saw her last time 3 months ago I started her on metoprolol 25 mg for frequent PVCs and palpitations.  She tells me that she no longer feels heart flutters.  She is very happy with the metoprolol.    Interval history 3/6/2023:  Since last being seen patient is diagnosed with invasive ductal carcinoma of the breast.  She underwent chemotherapy and radiation treatment.  Patient tells me that she is overall feeling well.  Tries to stay active.  She has lost 15 pounds (intentional).  No chest pain, shortness of breath, syncope or lower extremity edema.  She has been feeling fatigued since chemo and radiation.  Most recent echocardiogram on 2/20/2023 showed LVEF 57%, severe aortic stenosis, normal RV function.    PAST MEDICAL HISTORY:  Past Medical History:   Diagnosis Date     Aortic stenosis      Breast cancer (H) 06/2022     Hyperlipidemia      Insufficiency fracture of tibia, sequela 09/28/2021     Polyp of colon 05/26/2016       CURRENT MEDICATIONS:  Current Outpatient Medications   Medication Sig Dispense Refill     calcium carb-cholecalciferol 600-500 MG-UNIT CAPS        Lactobacillus (PROBIOTIC CHILDRENS) CHEW        letrozole (FEMARA) 2.5 MG tablet Take 1 tablet (2.5 mg) by mouth daily 30 tablet 11     loratadine (CLARITIN) 10 MG tablet Take 10 mg by mouth daily Prn, seasonal for ragweed and lilacs (Patient not taking: Reported on 1/31/2023)       metoprolol succinate ER (TOPROL XL) 25 MG 24 hr tablet Take 1 tablet (25 mg) by mouth daily 90 tablet 3     Omega-3 Fatty Acids (FISH OIL ADULT GUMMIES PO) Take 250 mg by mouth       ondansetron (ZOFRAN) 8 MG tablet Take 1 tablet (8 mg) by mouth every 8 hours as needed for nausea (Patient not taking: Reported on 1/17/2023) 30 tablet 3     prochlorperazine (COMPAZINE) 10 MG tablet Take 0.5 tablets (5 mg) by mouth every 6 hours as needed for nausea or  vomiting (Patient not taking: Reported on 2/20/2023) 30 tablet 2     simvastatin (ZOCOR) 40 MG tablet Take 1 tablet (40 mg) by mouth At Bedtime 90 tablet 3     vitamin D3 (CHOLECALCIFEROL) 50 mcg (2000 units) tablet          PAST SURGICAL HISTORY:  Past Surgical History:   Procedure Laterality Date     BIOPSY NODE SENTINEL Left 10/17/2022    Procedure: LEFT seed localized lumpectomy with sentinel node biopsy;  Surgeon: Alphonso Cadet MD;  Location: [facility] OR     CATARACT IOL, RT/LT Right 2006     HC YAG LASER CAPSULOTOMY Right 2009     LASER VITREOLYSIS, ANTERIOR OD (RIGHT EYE) Right 2007     LUMPECTOMY BREAST WITH SENTINEL NODE, COMBINED Left 10/17/2022    Procedure: LEFT seed localized lumpectomy with sentinel node biopsy;  Surgeon: Alphonso Cadet MD;  Location: [facility] OR     REPOSITION INTRAOCULAR LENS Right 11/18/2014    Procedure: REPOSITION INTRAOCULAR LENS;  Surgeon: Vickie Guerra MD;  [facility]     VITRECTOMY PARSPLANA WITH 23 GAUGE SYSTEM Right 11/18/2014    Procedure: VITRECTOMY PARSPLANA WITH 23 GAUGE SYSTEM;  Surgeon: Vickie Guerra MD;  Location:  [facility]       ALLERGIES:     Allergies   Allergen Reactions     Bactrim [Sulfamethoxazole W/Trimethoprim] Nausea     Headache     Seasonal Allergies      Typhoid Vaccines Nausea and Vomiting     Fever, vice- h/a, body aches.  Was hospitalized for 2d.       FAMILY HISTORY:  Family History   Problem Relation Age of Onset     C.A.D. Mother      Diabetes Mother      Arthritis Mother      Thyroid Disease Mother      C.A.D. Father      Breast Cancer Sister      Arthritis Sister      Breast Cancer Sister      Genetic Disorder Other         nephew         SOCIAL HISTORY:  Social History     Tobacco Use     Smoking status: Never     Smokeless tobacco: Never   Vaping Use     Vaping Use: Never used   Substance Use Topics     Alcohol use: Not Currently     Comment: 4 drinks per month     Drug use: No                        ROS:   Constitutional:  "No fever, chills, or sweats. Weight stable.   Cardiovascular: As per HPI.       Exam:  /84 (BP Location: Right arm, Patient Position: Chair, Cuff Size: Adult Regular)   Pulse 81   Ht 1.695 m (5' 6.73\")   Wt 87 kg (191 lb 11.2 oz)   LMP 05/17/2011   SpO2 95%   BMI 30.27 kg/m    GENERAL APPEARANCE: alert and no distress  HEENT: no icterus, no central cyanosis  LYMPH/NECK: no adenopathy, no asymmetry, JVP not elevated  RESPIRATORY: lungs clear to auscultation - no rales, rhonchi or wheezes, no use of accessory muscles, no retractions, respirations are unlabored, normal respiratory rate  CARDIOVASCULAR: regular rhythm, normal S1, S2, no S3 or S4, left parasternal 3/6 systolic ejection murmur  EXTREMITIES: no edema  NEURO: alert, normal speech,and affect  SKIN: no ecchymoses, no rashes     I have reviewed the labs and personally reviewed the imaging below and made my comment in the assessment and plan.      Labs:  CBC RESULTS:   Lab Results   Component Value Date    WBC 5.7 02/20/2023    WBC 6.1 09/08/2014    RBC 4.63 02/20/2023    RBC 4.60 09/08/2014    HGB 13.9 02/20/2023    HGB 14.2 10/13/2017    HCT 40.9 02/20/2023    HCT 45.5 10/13/2017    MCV 88 02/20/2023    MCV 95.9 10/13/2017    MCH 30.0 02/20/2023    MCH 30.0 10/13/2017    MCHC 34.0 02/20/2023    MCHC 31.2 (L) 10/13/2017    RDW 13.8 02/20/2023    RDW 12.7 09/08/2014     02/20/2023     09/08/2014       BMP RESULTS:  Lab Results   Component Value Date     02/20/2023    .4 07/29/2020    POTASSIUM 3.9 02/20/2023    POTASSIUM 3.5 08/19/2022    POTASSIUM 4.2 07/29/2020    CHLORIDE 103 02/20/2023    CHLORIDE 106 08/19/2022    CHLORIDE 99.3 07/29/2020    CO2 24 02/20/2023    CO2 27 08/19/2022    CO2 28.7 07/29/2020    ANIONGAP 12 02/20/2023    ANIONGAP 7 08/19/2022    ANIONGAP 8 09/08/2014     (H) 02/20/2023     (H) 08/19/2022    .0 (H) 07/29/2020    BUN 16.7 02/20/2023    BUN 12 08/19/2022    BUN 14.3 " 07/29/2020    CR 0.65 02/20/2023    CR 0.7 07/29/2020    GFRESTIMATED >90 02/20/2023    GFRESTIMATED 86.8 07/29/2020    GFRESTBLACK >90 07/29/2020    VALENTINA 10.0 02/20/2023    VALENTINA 9.8 07/29/2020     Transthoracic echocardiogram 2/20/2023  3D LVEF volumetric analysis is 57.7%.  Global peak LV longitudinal strain is averaged at -19%. This is within  reported normal limits (normal <-18%).  Right ventricular function, chamber size, wall motion, and thickness are  normal.  Severe aortic stenosis is present.  The inferior vena cava is normal.  No pericardial effusion is present.    Cardiac MRI January 27, 2022:  1. The LV is normal in cavity size and wall thickness. The global systolic function is normal. The LVEF is  63%. There are no regional wall motion abnormalities. 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      2. The RV is normal in cavity size. The global systolic function is normal. The RVEF is 64%.      3. Both atria are normal in size.     4. The aortic valve is calcified and likely tricuspid. There is restricted opening of NCC and RCC. Aortic  stenosis is present, not quantified.     5. Late gadolinium enhancement imaging shows no MI, fibrosis or infiltrative disease.      6. Regadenoson stress perfusion imaging shows no ischemia.     7. There is no pericardial effusion or thickening.     8. There is no intracardiac thrombus.     MRA Aorta     1. The aortic root, ascending aorta, transverse arch and descending thoracic aorta are normal in size  without an aneurysm or dissection.     2. The aortic arch is left sided. There is normal branching of the arch vessels. There is no coarctation.     3. The main and proximal branch pulmonary arteries are normal in size.      4. The systemic venous connections are normal.      CONCLUSIONS: No myocardial ischemia or infarction, with normal cardiac function, and no evidence of infiltrative disease. Tricuspid aortic valve, stenosis present (not quantified),  with normal thoracic  aorta.   Echocardiogram 11/3/2021  Moderate, paradoxical low-flow, aortic stenosis is present.  Global and regional left ventricular function is normal with an EF of 60-65%.  Global right ventricular function is normal.  IVC diameter <2.1 cm collapsing >50% with sniff suggests a normal RA pressure  of 3 mmHg.  There is no prior study for direct comparison.  Moderate aortic valve calcification is present. Moderate aortic stenosis is  present. The calculated aortic valve are is 1.2 cm^2. The mean AoV pressure  gradient is 17.8 mmHg.    EKG 12/6/2021 personally reviewed in clinic shows sinus rhythm otherwise normal      Zio patch              Assessment and Plan:     #Frequent monomorphic PVCs detected on Zio patch (9.9%) corresponding the patient's symptoms  -Patient has been on metoprolol 25 mg which resolved her symptoms.  Cardiac MRI showed normal LV size and function.    #Asymptomatic, calcific severe aortic stenosis  -Most recent echocardiogram on 2/20/2023 showed progression of moderate aortic stenosis to severe stage.  Biventricular size and function is normal.  No other valve disease.  Patient is currently asymptomatic.  Euvolemic on exam.  -Return to clinic 1 year with prior echocardiogram.    #History of breast cancer  -Status post chemo and radiation treatment.    Total time spent today for this visit is 22 minutes including precharting, face-to-face clinic visit, review of labs/imaging and medical documentation.    Please donot hesitate to contact me if you have any questions or concerns. Again, thank you for allowing me to participate in the care of your patient.    Nicole BACA MD  [facility] Division of Cardiology  Pager [phone]

## 2023-03-06 ENCOUNTER — OFFICE VISIT (OUTPATIENT)
Dept: CARDIOLOGY | Facility: CLINIC | Age: 68
End: 2023-03-06
Attending: INTERNAL MEDICINE
Payer: MEDICARE

## 2023-03-06 VITALS
BODY MASS INDEX: 30.09 KG/M2 | WEIGHT: 191.7 LBS | DIASTOLIC BLOOD PRESSURE: 84 MMHG | OXYGEN SATURATION: 95 % | SYSTOLIC BLOOD PRESSURE: 138 MMHG | HEIGHT: 67 IN | HEART RATE: 81 BPM

## 2023-03-06 DIAGNOSIS — I49.3 PVC'S (PREMATURE VENTRICULAR CONTRACTIONS): ICD-10-CM

## 2023-03-06 DIAGNOSIS — I35.0 SEVERE AORTIC STENOSIS BY PRIOR ECHOCARDIOGRAM: Primary | ICD-10-CM

## 2023-03-06 PROCEDURE — 99213 OFFICE O/P EST LOW 20 MIN: CPT | Performed by: INTERNAL MEDICINE

## 2023-03-06 PROCEDURE — G0463 HOSPITAL OUTPT CLINIC VISIT: HCPCS | Performed by: INTERNAL MEDICINE

## 2023-03-06 ASSESSMENT — PAIN SCALES - GENERAL: PAINLEVEL: NO PAIN (0)

## 2023-03-06 NOTE — LETTER
3/6/2023      RE: Vickie Alvarado  [address]  [facility] [address]       Dear Colleague,    Thank you for the opportunity to participate in the care of your patient, Vickie Alvarado, at the [facility] [place] at [facility]. Please see a copy of my visit note below.                                                                                                        General Cardiology Clinic       Referring provider:Gaby Lynn MD    Chief complaint: Palpitations, fatigue and chest pressure    HPI: Ms. Vickie Alvarado is a 66 year old  female with PMH significant for    -Hyperlipidemia  -Obesity    Patient is being seen today at the request of  for moderate aortic stenosis recently detected on echocardiogram.  Patient was seen on 9/28/2021 and found to have systolic murmur which was apparently not previously heard.  She was also found to have irregular cardiac rhythm on physical exam.  This was not demonstrated on EKG.  She was recommended echocardiogram and Zio patch.  I reviewed the echocardiogram from 11/3/2021 which shows calcification of the aortic valve with moderate aortic stenosis.  Zio patch showed 9.9% PVCs corresponding to patient's symptoms.  Patient reports feeling fatigued, chest pressure and heart flutters when she lies down at night.  Does not happen every day.  She tells me that she is not aware of the onset of the symptoms or the total duration of the episodes.  She cannot tell me how long this has been going on but definitely not too long.  No apparent trigger.  She is doing physical therapy in the pool.  No limitation of physical activity during the day.    Lifetime non-smoker.  Alcohol rarely.  No drug abuse.   The patient's risk factor profile is: (-) HTN, (-) diabetes, (+) hyperlipidemia, (-) tobacco use, (-) family Hx CAD.  Patient tells me that there is family members on his dad's side with hypertrophic  cardiomyopathy.  His nephew was diagnosed with HCM as well.  No premature CAD or sudden cardiac death in the family.    Medications, personal, family, and social history reviewed with patient and revised.    Interval history 3/15/2022:  Patient is being seen today to discuss results of cardiac MRI which showed trileaflet calcific aortic stenosis.  Normal biventricular size and function.  No infiltrative disease.  Ascending aorta is normal size.      When I saw her last time 3 months ago I started her on metoprolol 25 mg for frequent PVCs and palpitations.  She tells me that she no longer feels heart flutters.  She is very happy with the metoprolol.    Interval history 3/6/2023:  Since last being seen patient is diagnosed with invasive ductal carcinoma of the breast.  She underwent chemotherapy and radiation treatment.  Patient tells me that she is overall feeling well.  Tries to stay active.  She has lost 15 pounds (intentional).  No chest pain, shortness of breath, syncope or lower extremity edema.  She has been feeling fatigued since chemo and radiation.  Most recent echocardiogram on 2/20/2023 showed LVEF 57%, severe aortic stenosis, normal RV function.    PAST MEDICAL HISTORY:  Past Medical History:   Diagnosis Date     Aortic stenosis      Breast cancer (H) 06/2022     Hyperlipidemia      Insufficiency fracture of tibia, sequela 09/28/2021     Polyp of colon 05/26/2016       CURRENT MEDICATIONS:  Current Outpatient Medications   Medication Sig Dispense Refill     calcium carb-cholecalciferol 600-500 MG-UNIT CAPS        Lactobacillus (PROBIOTIC CHILDRENS) CHEW        letrozole (FEMARA) 2.5 MG tablet Take 1 tablet (2.5 mg) by mouth daily 30 tablet 11     loratadine (CLARITIN) 10 MG tablet Take 10 mg by mouth daily Prn, seasonal for ragweed and lilacs (Patient not taking: Reported on 1/31/2023)       metoprolol succinate ER (TOPROL XL) 25 MG 24 hr tablet Take 1 tablet (25 mg) by mouth daily 90 tablet 3     Omega-3  Fatty Acids (FISH OIL ADULT GUMMIES PO) Take 250 mg by mouth       ondansetron (ZOFRAN) 8 MG tablet Take 1 tablet (8 mg) by mouth every 8 hours as needed for nausea (Patient not taking: Reported on 1/17/2023) 30 tablet 3     prochlorperazine (COMPAZINE) 10 MG tablet Take 0.5 tablets (5 mg) by mouth every 6 hours as needed for nausea or vomiting (Patient not taking: Reported on 2/20/2023) 30 tablet 2     simvastatin (ZOCOR) 40 MG tablet Take 1 tablet (40 mg) by mouth At Bedtime 90 tablet 3     vitamin D3 (CHOLECALCIFEROL) 50 mcg (2000 units) tablet          PAST SURGICAL HISTORY:  Past Surgical History:   Procedure Laterality Date     BIOPSY NODE SENTINEL Left 10/17/2022    Procedure: LEFT seed localized lumpectomy with sentinel node biopsy;  Surgeon: Alphonso Cadet MD;  Location: [facility] OR     CATARACT IOL, RT/LT Right 2006     HC YAG LASER CAPSULOTOMY Right 2009     LASER VITREOLYSIS, ANTERIOR OD (RIGHT EYE) Right 2007     LUMPECTOMY BREAST WITH SENTINEL NODE, COMBINED Left 10/17/2022    Procedure: LEFT seed localized lumpectomy with sentinel node biopsy;  Surgeon: Alphonso Cadet MD;  [place] [facility] [place]     REPOSITION INTRAOCULAR LENS Right 11/18/2014    Procedure: REPOSITION INTRAOCULAR LENS;  Surgeon: Vickie Guerra MD;  Location:  [facility]     VITRECTOMY PARSPLANA WITH 23 GAUGE SYSTEM Right 11/18/2014    Procedure: VITRECTOMY PARSPLANA WITH 23 GAUGE SYSTEM;  Surgeon: Vickie Guerra MD;  Location:  [facility]       ALLERGIES:     Allergies   Allergen Reactions     Bactrim [Sulfamethoxazole W/Trimethoprim] Nausea     Headache     Seasonal Allergies      Typhoid Vaccines Nausea and Vomiting     Fever, vice- h/a, body aches.  Was hospitalized for 2d.       FAMILY HISTORY:  Family History   Problem Relation Age of Onset     C.A.D. Mother      Diabetes Mother      Arthritis Mother      Thyroid Disease Mother      C.A.D. Father      Breast Cancer Sister      Arthritis Sister      Breast Cancer Sister       "Genetic Disorder Other         nephew         SOCIAL HISTORY:  Social History     Tobacco Use     Smoking status: Never     Smokeless tobacco: Never   Vaping Use     Vaping Use: Never used   Substance Use Topics     Alcohol use: Not Currently     Comment: 4 drinks per month     Drug use: No                        ROS:   Constitutional: No fever, chills, or sweats. Weight stable.   Cardiovascular: As per HPI.       Exam:  /84 (BP Location: Right arm, Patient Position: Chair, Cuff Size: Adult Regular)   Pulse 81   Ht 1.695 m (5' 6.73\")   Wt 87 kg (191 lb 11.2 oz)   LMP 05/17/2011   SpO2 95%   BMI 30.27 kg/m    GENERAL APPEARANCE: alert and no distress  HEENT: no icterus, no central cyanosis  LYMPH/NECK: no adenopathy, no asymmetry, JVP not elevated  RESPIRATORY: lungs clear to auscultation - no rales, rhonchi or wheezes, no use of accessory muscles, no retractions, respirations are unlabored, normal respiratory rate  CARDIOVASCULAR: regular rhythm, normal S1, S2, no S3 or S4, left parasternal 3/6 systolic ejection murmur  EXTREMITIES: no edema  NEURO: alert, normal speech,and affect  SKIN: no ecchymoses, no rashes     I have reviewed the labs and personally reviewed the imaging below and made my comment in the assessment and plan.      Labs:  CBC RESULTS:   Lab Results   Component Value Date    WBC 5.7 02/20/2023    WBC 6.1 09/08/2014    RBC 4.63 02/20/2023    RBC 4.60 09/08/2014    HGB 13.9 02/20/2023    HGB 14.2 10/13/2017    HCT 40.9 02/20/2023    HCT 45.5 10/13/2017    MCV 88 02/20/2023    MCV 95.9 10/13/2017    MCH 30.0 02/20/2023    MCH 30.0 10/13/2017    MCHC 34.0 02/20/2023    MCHC 31.2 (L) 10/13/2017    RDW 13.8 02/20/2023    RDW 12.7 09/08/2014     02/20/2023     09/08/2014       BMP RESULTS:  Lab Results   Component Value Date     02/20/2023    .4 07/29/2020    POTASSIUM 3.9 02/20/2023    POTASSIUM 3.5 08/19/2022    POTASSIUM 4.2 07/29/2020    CHLORIDE 103 02/20/2023    " CHLORIDE 106 08/19/2022    CHLORIDE 99.3 07/29/2020    CO2 24 02/20/2023    CO2 27 08/19/2022    CO2 28.7 07/29/2020    ANIONGAP 12 02/20/2023    ANIONGAP 7 08/19/2022    ANIONGAP 8 09/08/2014     (H) 02/20/2023     (H) 08/19/2022    .0 (H) 07/29/2020    BUN 16.7 02/20/2023    BUN 12 08/19/2022    BUN 14.3 07/29/2020    CR 0.65 02/20/2023    CR 0.7 07/29/2020    GFRESTIMATED >90 02/20/2023    GFRESTIMATED 86.8 07/29/2020    GFRESTBLACK >90 07/29/2020    VALENTINA 10.0 02/20/2023    VALENTINA 9.8 07/29/2020     Transthoracic echocardiogram 2/20/2023  3D LVEF volumetric analysis is 57.7%.  Global peak LV longitudinal strain is averaged at -19%. This is within  reported normal limits (normal <-18%).  Right ventricular function, chamber size, wall motion, and thickness are  normal.  Severe aortic stenosis is present.  The inferior vena cava is normal.  No pericardial effusion is present.    Cardiac MRI January 27, 2022:  1. The LV is normal in cavity size and wall thickness. The global systolic function is normal. The LVEF is  63%. There are no regional wall motion abnormalities. 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      2. The RV is normal in cavity size. The global systolic function is normal. The RVEF is 64%.      3. Both atria are normal in size.     4. The aortic valve is calcified and likely tricuspid. There is restricted opening of NCC and RCC. Aortic  stenosis is present, not quantified.     5. Late gadolinium enhancement imaging shows no MI, fibrosis or infiltrative disease.      6. Regadenoson stress perfusion imaging shows no ischemia.     7. There is no pericardial effusion or thickening.     8. There is no intracardiac thrombus.     MRA Aorta     1. The aortic root, ascending aorta, transverse arch and descending thoracic aorta are normal in size  without an aneurysm or dissection.     2. The aortic arch is left sided. There is normal branching of the arch vessels. There is  no coarctation.     3. The main and proximal branch pulmonary arteries are normal in size.      4. The systemic venous connections are normal.      CONCLUSIONS: No myocardial ischemia or infarction, with normal cardiac function, and no evidence of infiltrative disease. Tricuspid aortic valve, stenosis present (not quantified), with normal thoracic  aorta.   Echocardiogram 11/3/2021  Moderate, paradoxical low-flow, aortic stenosis is present.  Global and regional left ventricular function is normal with an EF of 60-65%.  Global right ventricular function is normal.  IVC diameter <2.1 cm collapsing >50% with sniff suggests a normal RA pressure  of 3 mmHg.  There is no prior study for direct comparison.  Moderate aortic valve calcification is present. Moderate aortic stenosis is  present. The calculated aortic valve are is 1.2 cm^2. The mean AoV pressure  gradient is 17.8 mmHg.    EKG 12/6/2021 personally reviewed in clinic shows sinus rhythm otherwise normal      Zio patch              Assessment and Plan:     #Frequent monomorphic PVCs detected on Zio patch (9.9%) corresponding the patient's symptoms  -Patient has been on metoprolol 25 mg which resolved her symptoms.  Cardiac MRI showed normal LV size and function.    #Asymptomatic, calcific severe aortic stenosis  -Most recent echocardiogram on 2/20/2023 showed progression of moderate aortic stenosis to severe stage.  Biventricular size and function is normal.  No other valve disease.  Patient is currently asymptomatic.  Euvolemic on exam.  -Return to clinic 1 year with prior echocardiogram.    #History of breast cancer  -Status post chemo and radiation treatment.    Total time spent today for this visit is 22 minutes including precharting, face-to-face clinic visit, review of labs/imaging and medical documentation.    Please donot hesitate to contact me if you have any questions or concerns. Again, thank you for allowing me to participate in the care of your  patient.    Nicole BACA MD  [facility] Division of Cardiology  Pager [phone]

## 2023-03-06 NOTE — PATIENT INSTRUCTIONS
In one year : echocardiogram and follow up visit with Dr. Brooke.  Please let us know at any time via [employer] message if your symptoms significantly worsen, per your discussion with Dr. Brooke.

## 2023-03-06 NOTE — NURSING NOTE
Chief Complaint   Patient presents with     Follow Up     Return Cardiology- annual follow up with echo     Vitals were taken and medications reconciled.    Suleman Castillo, ELISHA  2:52 PM

## 2023-03-10 DIAGNOSIS — Z51.11 ENCOUNTER FOR ANTINEOPLASTIC CHEMOTHERAPY: Primary | ICD-10-CM

## 2023-03-10 DIAGNOSIS — C50.212 MALIGNANT NEOPLASM OF UPPER-INNER QUADRANT OF LEFT BREAST IN FEMALE, ESTROGEN RECEPTOR POSITIVE (H): ICD-10-CM

## 2023-03-10 DIAGNOSIS — Z17.0 MALIGNANT NEOPLASM OF UPPER-INNER QUADRANT OF LEFT BREAST IN FEMALE, ESTROGEN RECEPTOR POSITIVE (H): ICD-10-CM

## 2023-03-10 RX ORDER — METHYLPREDNISOLONE SODIUM SUCCINATE 125 MG/2ML
125 INJECTION, POWDER, LYOPHILIZED, FOR SOLUTION INTRAMUSCULAR; INTRAVENOUS
Status: CANCELLED
Start: 2023-03-13

## 2023-03-10 RX ORDER — ALBUTEROL SULFATE 90 UG/1
1-2 AEROSOL, METERED RESPIRATORY (INHALATION)
Status: CANCELLED
Start: 2023-03-13

## 2023-03-10 RX ORDER — ONDANSETRON 2 MG/ML
8 INJECTION INTRAMUSCULAR; INTRAVENOUS ONCE
Status: CANCELLED | OUTPATIENT
Start: 2023-03-13

## 2023-03-10 RX ORDER — LORAZEPAM 2 MG/ML
0.5 INJECTION INTRAMUSCULAR EVERY 4 HOURS PRN
Status: CANCELLED | OUTPATIENT
Start: 2023-03-13

## 2023-03-10 RX ORDER — ALBUTEROL SULFATE 0.83 MG/ML
2.5 SOLUTION RESPIRATORY (INHALATION)
Status: CANCELLED | OUTPATIENT
Start: 2023-03-13

## 2023-03-10 RX ORDER — DIPHENHYDRAMINE HYDROCHLORIDE 50 MG/ML
50 INJECTION INTRAMUSCULAR; INTRAVENOUS
Status: CANCELLED
Start: 2023-03-13

## 2023-03-10 RX ORDER — MEPERIDINE HYDROCHLORIDE 25 MG/ML
25 INJECTION INTRAMUSCULAR; INTRAVENOUS; SUBCUTANEOUS EVERY 30 MIN PRN
Status: CANCELLED | OUTPATIENT
Start: 2023-03-13

## 2023-03-10 RX ORDER — EPINEPHRINE 1 MG/ML
0.3 INJECTION, SOLUTION INTRAMUSCULAR; SUBCUTANEOUS EVERY 5 MIN PRN
Status: CANCELLED | OUTPATIENT
Start: 2023-03-13

## 2023-03-10 RX ORDER — HEPARIN SODIUM,PORCINE 10 UNIT/ML
5 VIAL (ML) INTRAVENOUS
Status: CANCELLED | OUTPATIENT
Start: 2023-03-13

## 2023-03-10 RX ORDER — HEPARIN SODIUM (PORCINE) LOCK FLUSH IV SOLN 100 UNIT/ML 100 UNIT/ML
5 SOLUTION INTRAVENOUS
Status: CANCELLED | OUTPATIENT
Start: 2023-03-13

## 2023-03-13 ENCOUNTER — INFUSION THERAPY VISIT (OUTPATIENT)
Dept: ONCOLOGY | Facility: CLINIC | Age: 68
End: 2023-03-13
Attending: INTERNAL MEDICINE
Payer: MEDICARE

## 2023-03-13 ENCOUNTER — APPOINTMENT (OUTPATIENT)
Dept: LAB | Facility: CLINIC | Age: 68
End: 2023-03-13
Attending: INTERNAL MEDICINE
Payer: MEDICARE

## 2023-03-13 VITALS
DIASTOLIC BLOOD PRESSURE: 79 MMHG | TEMPERATURE: 97.8 F | OXYGEN SATURATION: 96 % | HEART RATE: 84 BPM | RESPIRATION RATE: 18 BRPM | BODY MASS INDEX: 30.57 KG/M2 | WEIGHT: 193.6 LBS | SYSTOLIC BLOOD PRESSURE: 118 MMHG

## 2023-03-13 DIAGNOSIS — Z51.11 ENCOUNTER FOR ANTINEOPLASTIC CHEMOTHERAPY: Primary | ICD-10-CM

## 2023-03-13 DIAGNOSIS — Z17.0 MALIGNANT NEOPLASM OF UPPER-INNER QUADRANT OF LEFT BREAST IN FEMALE, ESTROGEN RECEPTOR POSITIVE (H): ICD-10-CM

## 2023-03-13 DIAGNOSIS — C50.212 MALIGNANT NEOPLASM OF UPPER-INNER QUADRANT OF LEFT BREAST IN FEMALE, ESTROGEN RECEPTOR POSITIVE (H): ICD-10-CM

## 2023-03-13 LAB
ALBUMIN SERPL BCG-MCNC: 4 G/DL (ref 3.5–5.2)
ALP SERPL-CCNC: 61 U/L (ref 35–104)
ALT SERPL W P-5'-P-CCNC: 16 U/L (ref 10–35)
ANION GAP SERPL CALCULATED.3IONS-SCNC: 9 MMOL/L (ref 7–15)
AST SERPL W P-5'-P-CCNC: NORMAL U/L
BASOPHILS # BLD AUTO: 0 10E3/UL (ref 0–0.2)
BASOPHILS NFR BLD AUTO: 1 %
BILIRUB SERPL-MCNC: 0.3 MG/DL
BUN SERPL-MCNC: 10.7 MG/DL (ref 8–23)
CALCIUM SERPL-MCNC: 9.2 MG/DL (ref 8.8–10.2)
CHLORIDE SERPL-SCNC: 103 MMOL/L (ref 98–107)
CREAT SERPL-MCNC: 0.68 MG/DL (ref 0.51–0.95)
DEPRECATED HCO3 PLAS-SCNC: 27 MMOL/L (ref 22–29)
EOSINOPHIL # BLD AUTO: 0.1 10E3/UL (ref 0–0.7)
EOSINOPHIL NFR BLD AUTO: 2 %
ERYTHROCYTE [DISTWIDTH] IN BLOOD BY AUTOMATED COUNT: 13.6 % (ref 10–15)
GFR SERPL CREATININE-BSD FRML MDRD: >90 ML/MIN/1.73M2
GLUCOSE SERPL-MCNC: 99 MG/DL (ref 70–99)
HCT VFR BLD AUTO: 37.6 % (ref 35–47)
HGB BLD-MCNC: 13 G/DL (ref 11.7–15.7)
IMM GRANULOCYTES # BLD: 0 10E3/UL
IMM GRANULOCYTES NFR BLD: 0 %
LYMPHOCYTES # BLD AUTO: 0.8 10E3/UL (ref 0.8–5.3)
LYMPHOCYTES NFR BLD AUTO: 14 %
MCH RBC QN AUTO: 30.7 PG (ref 26.5–33)
MCHC RBC AUTO-ENTMCNC: 34.6 G/DL (ref 31.5–36.5)
MCV RBC AUTO: 89 FL (ref 78–100)
MONOCYTES # BLD AUTO: 0.4 10E3/UL (ref 0–1.3)
MONOCYTES NFR BLD AUTO: 7 %
NEUTROPHILS # BLD AUTO: 4.2 10E3/UL (ref 1.6–8.3)
NEUTROPHILS NFR BLD AUTO: 76 %
NRBC # BLD AUTO: 0 10E3/UL
NRBC BLD AUTO-RTO: 0 /100
PLATELET # BLD AUTO: 211 10E3/UL (ref 150–450)
POTASSIUM SERPL-SCNC: 4.1 MMOL/L (ref 3.4–5.3)
PROT SERPL-MCNC: 6.9 G/DL (ref 6.4–8.3)
RBC # BLD AUTO: 4.23 10E6/UL (ref 3.8–5.2)
SODIUM SERPL-SCNC: 139 MMOL/L (ref 136–145)
WBC # BLD AUTO: 5.5 10E3/UL (ref 4–11)

## 2023-03-13 PROCEDURE — 258N000003 HC RX IP 258 OP 636: Performed by: INTERNAL MEDICINE

## 2023-03-13 PROCEDURE — 84155 ASSAY OF PROTEIN SERUM: CPT | Performed by: INTERNAL MEDICINE

## 2023-03-13 PROCEDURE — 36415 COLL VENOUS BLD VENIPUNCTURE: CPT | Performed by: INTERNAL MEDICINE

## 2023-03-13 PROCEDURE — 999N000128 HC STATISTIC PERIPHERAL IV START W/O US GUIDANCE

## 2023-03-13 PROCEDURE — 250N000011 HC RX IP 250 OP 636: Performed by: INTERNAL MEDICINE

## 2023-03-13 PROCEDURE — 96413 CHEMO IV INFUSION 1 HR: CPT

## 2023-03-13 PROCEDURE — 96375 TX/PRO/DX INJ NEW DRUG ADDON: CPT

## 2023-03-13 PROCEDURE — 85025 COMPLETE CBC W/AUTO DIFF WBC: CPT | Performed by: INTERNAL MEDICINE

## 2023-03-13 RX ORDER — ONDANSETRON 2 MG/ML
8 INJECTION INTRAMUSCULAR; INTRAVENOUS ONCE
Status: COMPLETED | OUTPATIENT
Start: 2023-03-13 | End: 2023-03-13

## 2023-03-13 RX ADMIN — SODIUM CHLORIDE 250 ML: 9 INJECTION, SOLUTION INTRAVENOUS at 14:25

## 2023-03-13 RX ADMIN — ADO-TRASTUZUMAB EMTANSINE 300 MG: 20 INJECTION, POWDER, LYOPHILIZED, FOR SOLUTION INTRAVENOUS at 14:54

## 2023-03-13 RX ADMIN — ONDANSETRON 8 MG: 2 INJECTION INTRAMUSCULAR; INTRAVENOUS at 14:25

## 2023-03-13 NOTE — PATIENT INSTRUCTIONS
[facility] Triage and after hours / weekends / holidays:  [phone]    Please call the triage or after hours line if you experience a temperature greater than or equal to 100.4, shaking chills, have uncontrolled nausea, vomiting and/or diarrhea, dizziness, shortness of breath, chest pain, bleeding, unexplained bruising, or if you have any other new/concerning symptoms, questions or concerns.      If you are having any concerning symptoms or wish to speak to a provider before your next infusion visit, please call your care coordinator or triage to notify them so we can adequately serve you.     If you need a refill on a narcotic prescription or other medication, please call before your infusion appointment.           March 2023 Sunday Monday Tuesday Wednesday Thursday Friday Saturday                  1     2     3     4       5     6    RETURN CARDIOLOGY   2:45 PM   (30 min.)   Nicole Brooke MD   [facility] 7     8     9     10     11       12     13    LAB PERIPHERAL   1:30 PM   (15 min.)   [facility]   [facility]    ONC INFUSION 1 HR (60 MIN)   2:00 PM   (60 min.)    ONC INFUSION NURSE   [facility] 14     15     16     17     18       19     20     21     22     23     24     25       26     27     28     29     30     31                        April 2023 Sunday Monday Tuesday Wednesday Thursday Friday Saturday                                 1       2     3    LAB PERIPHERAL   8:30 AM   (15 min.)   [facility]   [facility]    RETURN   8:45 AM   (30 min.)   Chandrika Horvath MD   [facility]    ONC INFUSION 1 HR (60 MIN)   9:30 AM   (60 min.)    ONC INFUSION NURSE   [facility] 4     5     6     7     8       9     10     11     12     13     14     15       16     17     18     19     20     21     22        23     24    LAB PERIPHERAL   1:30 PM   (15 min.)   [facility] DRAW   [facility]    ONC INFUSION 1 HR (60 MIN)   2:00 PM   (60 min.)    ONC INFUSION NURSE   [facility] 25     26     27    NEW ADULT GLAUCOMA   1:00 PM   (15 min.)   Hector Choi MD   [facility] 28     29       30                                                      Recent Results (from the past 24 hour(s))   Comprehensive metabolic panel    Collection Time: 03/13/23  1:59 PM   Result Value Ref Range    Sodium 139 136 - 145 mmol/L    Potassium 4.1 3.4 - 5.3 mmol/L    Chloride 103 98 - 107 mmol/L    Carbon Dioxide (CO2) 27 22 - 29 mmol/L    Anion Gap 9 7 - 15 mmol/L    Urea Nitrogen 10.7 8.0 - 23.0 mg/dL    Creatinine 0.68 0.51 - 0.95 mg/dL    Calcium 9.2 8.8 - 10.2 mg/dL    Glucose 99 70 - 99 mg/dL    Alkaline Phosphatase 61 35 - 104 U/L    AST      ALT 16 10 - 35 U/L    Protein Total 6.9 6.4 - 8.3 g/dL    Albumin 4.0 3.5 - 5.2 g/dL    Bilirubin Total 0.3 <=1.2 mg/dL    GFR Estimate >90 >60 mL/min/1.73m2   CBC with platelets and differential    Collection Time: 03/13/23  1:59 PM   Result Value Ref Range    WBC Count 5.5 4.0 - 11.0 10e3/uL    RBC Count 4.23 3.80 - 5.20 10e6/uL    Hemoglobin 13.0 11.7 - 15.7 g/dL    Hematocrit 37.6 35.0 - 47.0 %    MCV 89 78 - 100 fL    MCH 30.7 26.5 - 33.0 pg    MCHC 34.6 31.5 - 36.5 g/dL    RDW 13.6 10.0 - 15.0 %    Platelet Count 211 150 - 450 10e3/uL    % Neutrophils 76 %    % Lymphocytes 14 %    % Monocytes 7 %    % Eosinophils 2 %    % Basophils 1 %    % Immature Granulocytes 0 %    NRBCs per 100 WBC 0 <1 /100    Absolute Neutrophils 4.2 1.6 - 8.3 10e3/uL    Absolute Lymphocytes 0.8 0.8 - 5.3 10e3/uL    Absolute Monocytes 0.4 0.0 - 1.3 10e3/uL    Absolute Eosinophils 0.1 0.0 - 0.7 10e3/uL    Absolute Basophils 0.0 0.0 - 0.2 10e3/uL    Absolute Immature Granulocytes 0.0 <=0.4 10e3/uL    Absolute NRBCs 0.0 10e3/uL

## 2023-03-13 NOTE — PROGRESS NOTES
Infusion Nursing Note:  Vickie Alvarado presents today for cycle 6, day 1 kadcyla.    Patient seen by provider today: No   present during visit today: Not Applicable.    Note: Patient states she is feeling well today with no new concerns.  Denies any fevers or chills at home. States her energy level continues to improve.  Had a recent cortisone injection in her knee so pain is currently well controlled.  Recently seen by cardiology for aortic stenosis, remains asymptomatic.      Intravenous Access:  Peripheral IV placed by vascular access     Treatment Conditions:  Lab Results   Component Value Date    HGB 13.0 03/13/2023    WBC 5.5 03/13/2023    ANEU 4.1 09/08/2014    ANEUTAUTO 4.2 03/13/2023     03/13/2023      Lab Results   Component Value Date     03/13/2023    POTASSIUM 4.1 03/13/2023    MAG 2.2 08/20/2021    CR 0.68 03/13/2023    VALENTINA 9.2 03/13/2023    BILITOTAL 0.3 03/13/2023    ALBUMIN 4.0 03/13/2023    ALT 16 03/13/2023    AST  03/13/2023      Comment:      Unsatisfactory specimen - hemolyzed   Specimen is hemolyzed which can falsely elevate AST. Analysis of a non-hemolyzed specimen may result in a lower value.     Results reviewed, labs MET treatment parameters, ok to proceed with treatment.  ECHO/MUGA completed 2/20/23  EF 57.7%.    Post Infusion Assessment:  Patient tolerated infusion without incident.  Blood return noted pre and post infusion.  Site patent and intact, free from redness, edema or discomfort.  No evidence of extravasations.  Access discontinued per protocol.     Discharge Plan:   Patient declined prescription refills.  Discharge instructions reviewed with: Patient.  Patient and/or family verbalized understanding of discharge instructions and all questions answered.  AVS to patient via Retrieve[employer].  Patient will return 4/3/23 for next appointment.   Patient discharged in stable condition accompanied by: self.  Departure Mode: Ambulatory.      Candi Taylor,  RN

## 2023-03-14 ENCOUNTER — PATIENT OUTREACH (OUTPATIENT)
Dept: ONCOLOGY | Facility: CLINIC | Age: 68
End: 2023-03-14
Payer: MEDICARE

## 2023-03-14 NOTE — PROGRESS NOTES
[facility]: Cancer Care                                                                                          Pt called and left message requesting phone call re: three questions she had. Returned the pts call.  Pt wanting to understand what it meant that the blood sample hemolyzed for her AST. Value slightly high, noted on lab report that maybe falsely elevated due to sample being hemolyzed- discussed what this means.   Pt also asking about only having two appts on schedule left and wondering if should have more, will check last note and send request to add appts to schedule. Lastly wondering what to do if she should happen to get covid- if she should let us know right away. Discussed that informing us is appropriate, if she was symptomatic or otherwise compromised that it may delay having treatment. Encouraged her to continue to mask in public and be cautious about being around people who may be sick.   Answered questions, pt verbalized understanding information.     Signature:  Sadie Roberto RN

## 2023-04-02 RX ORDER — MEPERIDINE HYDROCHLORIDE 25 MG/ML
25 INJECTION INTRAMUSCULAR; INTRAVENOUS; SUBCUTANEOUS EVERY 30 MIN PRN
Status: CANCELLED | OUTPATIENT
Start: 2023-04-24

## 2023-04-02 RX ORDER — HEPARIN SODIUM (PORCINE) LOCK FLUSH IV SOLN 100 UNIT/ML 100 UNIT/ML
5 SOLUTION INTRAVENOUS
Status: CANCELLED | OUTPATIENT
Start: 2023-07-17

## 2023-04-02 RX ORDER — MEPERIDINE HYDROCHLORIDE 25 MG/ML
25 INJECTION INTRAMUSCULAR; INTRAVENOUS; SUBCUTANEOUS EVERY 30 MIN PRN
Status: CANCELLED | OUTPATIENT
Start: 2023-05-15

## 2023-04-02 RX ORDER — ONDANSETRON 2 MG/ML
8 INJECTION INTRAMUSCULAR; INTRAVENOUS ONCE
Status: CANCELLED | OUTPATIENT
Start: 2023-04-03

## 2023-04-02 RX ORDER — DIPHENHYDRAMINE HYDROCHLORIDE 50 MG/ML
50 INJECTION INTRAMUSCULAR; INTRAVENOUS
Status: CANCELLED
Start: 2023-04-24

## 2023-04-02 RX ORDER — HEPARIN SODIUM (PORCINE) LOCK FLUSH IV SOLN 100 UNIT/ML 100 UNIT/ML
5 SOLUTION INTRAVENOUS
Status: CANCELLED | OUTPATIENT
Start: 2023-05-15

## 2023-04-02 RX ORDER — ALBUTEROL SULFATE 0.83 MG/ML
2.5 SOLUTION RESPIRATORY (INHALATION)
Status: CANCELLED | OUTPATIENT
Start: 2023-04-24

## 2023-04-02 RX ORDER — MEPERIDINE HYDROCHLORIDE 25 MG/ML
25 INJECTION INTRAMUSCULAR; INTRAVENOUS; SUBCUTANEOUS EVERY 30 MIN PRN
Status: CANCELLED | OUTPATIENT
Start: 2023-04-03

## 2023-04-02 RX ORDER — METHYLPREDNISOLONE SODIUM SUCCINATE 125 MG/2ML
125 INJECTION, POWDER, LYOPHILIZED, FOR SOLUTION INTRAMUSCULAR; INTRAVENOUS
Status: CANCELLED
Start: 2023-05-15

## 2023-04-02 RX ORDER — METHYLPREDNISOLONE SODIUM SUCCINATE 125 MG/2ML
125 INJECTION, POWDER, LYOPHILIZED, FOR SOLUTION INTRAMUSCULAR; INTRAVENOUS
Status: CANCELLED
Start: 2023-04-03

## 2023-04-02 RX ORDER — DIPHENHYDRAMINE HYDROCHLORIDE 50 MG/ML
50 INJECTION INTRAMUSCULAR; INTRAVENOUS
Status: CANCELLED
Start: 2023-05-15

## 2023-04-02 RX ORDER — DIPHENHYDRAMINE HYDROCHLORIDE 50 MG/ML
50 INJECTION INTRAMUSCULAR; INTRAVENOUS
Status: CANCELLED
Start: 2023-04-03

## 2023-04-02 RX ORDER — ALBUTEROL SULFATE 0.83 MG/ML
2.5 SOLUTION RESPIRATORY (INHALATION)
Status: CANCELLED | OUTPATIENT
Start: 2023-05-15

## 2023-04-02 RX ORDER — EPINEPHRINE 1 MG/ML
0.3 INJECTION, SOLUTION INTRAMUSCULAR; SUBCUTANEOUS EVERY 5 MIN PRN
Status: CANCELLED | OUTPATIENT
Start: 2023-04-03

## 2023-04-02 RX ORDER — EPINEPHRINE 1 MG/ML
0.3 INJECTION, SOLUTION INTRAMUSCULAR; SUBCUTANEOUS EVERY 5 MIN PRN
Status: CANCELLED | OUTPATIENT
Start: 2023-05-15

## 2023-04-02 RX ORDER — LORAZEPAM 2 MG/ML
0.5 INJECTION INTRAMUSCULAR EVERY 4 HOURS PRN
Status: CANCELLED | OUTPATIENT
Start: 2023-04-03

## 2023-04-02 RX ORDER — LORAZEPAM 2 MG/ML
0.5 INJECTION INTRAMUSCULAR EVERY 4 HOURS PRN
Status: CANCELLED | OUTPATIENT
Start: 2023-04-24

## 2023-04-02 RX ORDER — LORAZEPAM 2 MG/ML
0.5 INJECTION INTRAMUSCULAR EVERY 4 HOURS PRN
Status: CANCELLED | OUTPATIENT
Start: 2023-05-15

## 2023-04-02 RX ORDER — HEPARIN SODIUM (PORCINE) LOCK FLUSH IV SOLN 100 UNIT/ML 100 UNIT/ML
5 SOLUTION INTRAVENOUS
Status: CANCELLED | OUTPATIENT
Start: 2023-04-24

## 2023-04-02 RX ORDER — HEPARIN SODIUM,PORCINE 10 UNIT/ML
5 VIAL (ML) INTRAVENOUS
Status: CANCELLED | OUTPATIENT
Start: 2023-07-17

## 2023-04-02 RX ORDER — ALBUTEROL SULFATE 90 UG/1
1-2 AEROSOL, METERED RESPIRATORY (INHALATION)
Status: CANCELLED
Start: 2023-04-03

## 2023-04-02 RX ORDER — EPINEPHRINE 1 MG/ML
0.3 INJECTION, SOLUTION INTRAMUSCULAR; SUBCUTANEOUS EVERY 5 MIN PRN
Status: CANCELLED | OUTPATIENT
Start: 2023-04-24

## 2023-04-02 RX ORDER — ZOLEDRONIC ACID 0.04 MG/ML
4 INJECTION, SOLUTION INTRAVENOUS ONCE
Status: CANCELLED | OUTPATIENT
Start: 2023-07-17 | End: 2023-07-17

## 2023-04-02 RX ORDER — HEPARIN SODIUM,PORCINE 10 UNIT/ML
5 VIAL (ML) INTRAVENOUS
Status: CANCELLED | OUTPATIENT
Start: 2023-05-15

## 2023-04-02 RX ORDER — HEPARIN SODIUM,PORCINE 10 UNIT/ML
5 VIAL (ML) INTRAVENOUS
Status: CANCELLED | OUTPATIENT
Start: 2023-04-24

## 2023-04-02 RX ORDER — HEPARIN SODIUM (PORCINE) LOCK FLUSH IV SOLN 100 UNIT/ML 100 UNIT/ML
5 SOLUTION INTRAVENOUS
Status: CANCELLED | OUTPATIENT
Start: 2023-04-03

## 2023-04-02 RX ORDER — METHYLPREDNISOLONE SODIUM SUCCINATE 125 MG/2ML
125 INJECTION, POWDER, LYOPHILIZED, FOR SOLUTION INTRAMUSCULAR; INTRAVENOUS
Status: CANCELLED
Start: 2023-04-24

## 2023-04-02 RX ORDER — ONDANSETRON 2 MG/ML
8 INJECTION INTRAMUSCULAR; INTRAVENOUS ONCE
Status: CANCELLED | OUTPATIENT
Start: 2023-05-15

## 2023-04-02 RX ORDER — ALBUTEROL SULFATE 90 UG/1
1-2 AEROSOL, METERED RESPIRATORY (INHALATION)
Status: CANCELLED
Start: 2023-04-24

## 2023-04-02 RX ORDER — ALBUTEROL SULFATE 90 UG/1
1-2 AEROSOL, METERED RESPIRATORY (INHALATION)
Status: CANCELLED
Start: 2023-05-15

## 2023-04-02 RX ORDER — HEPARIN SODIUM,PORCINE 10 UNIT/ML
5 VIAL (ML) INTRAVENOUS
Status: CANCELLED | OUTPATIENT
Start: 2023-04-03

## 2023-04-02 RX ORDER — ALBUTEROL SULFATE 0.83 MG/ML
2.5 SOLUTION RESPIRATORY (INHALATION)
Status: CANCELLED | OUTPATIENT
Start: 2023-04-03

## 2023-04-02 RX ORDER — ONDANSETRON 2 MG/ML
8 INJECTION INTRAMUSCULAR; INTRAVENOUS ONCE
Status: CANCELLED | OUTPATIENT
Start: 2023-04-24

## 2023-04-03 ENCOUNTER — APPOINTMENT (OUTPATIENT)
Dept: LAB | Facility: CLINIC | Age: 68
End: 2023-04-03
Attending: INTERNAL MEDICINE
Payer: MEDICARE

## 2023-04-03 ENCOUNTER — ONCOLOGY VISIT (OUTPATIENT)
Dept: ONCOLOGY | Facility: CLINIC | Age: 68
End: 2023-04-03
Attending: INTERNAL MEDICINE
Payer: MEDICARE

## 2023-04-03 VITALS
RESPIRATION RATE: 18 BRPM | SYSTOLIC BLOOD PRESSURE: 147 MMHG | DIASTOLIC BLOOD PRESSURE: 86 MMHG | BODY MASS INDEX: 30.44 KG/M2 | TEMPERATURE: 98.5 F | WEIGHT: 192.8 LBS | HEART RATE: 80 BPM | OXYGEN SATURATION: 95 %

## 2023-04-03 VITALS — HEART RATE: 74 BPM | DIASTOLIC BLOOD PRESSURE: 72 MMHG | SYSTOLIC BLOOD PRESSURE: 132 MMHG

## 2023-04-03 DIAGNOSIS — C50.212 MALIGNANT NEOPLASM OF UPPER-INNER QUADRANT OF LEFT BREAST IN FEMALE, ESTROGEN RECEPTOR POSITIVE (H): ICD-10-CM

## 2023-04-03 DIAGNOSIS — C50.212 MALIGNANT NEOPLASM OF UPPER-INNER QUADRANT OF LEFT BREAST IN FEMALE, ESTROGEN RECEPTOR POSITIVE (H): Primary | ICD-10-CM

## 2023-04-03 DIAGNOSIS — Z51.11 ENCOUNTER FOR ANTINEOPLASTIC CHEMOTHERAPY: ICD-10-CM

## 2023-04-03 DIAGNOSIS — D72.810 LYMPHOPENIA: ICD-10-CM

## 2023-04-03 DIAGNOSIS — Z17.0 MALIGNANT NEOPLASM OF UPPER-INNER QUADRANT OF LEFT BREAST IN FEMALE, ESTROGEN RECEPTOR POSITIVE (H): ICD-10-CM

## 2023-04-03 DIAGNOSIS — Z79.811 LONG TERM (CURRENT) USE OF AROMATASE INHIBITORS: ICD-10-CM

## 2023-04-03 DIAGNOSIS — Z17.0 MALIGNANT NEOPLASM OF UPPER-INNER QUADRANT OF LEFT BREAST IN FEMALE, ESTROGEN RECEPTOR POSITIVE (H): Primary | ICD-10-CM

## 2023-04-03 DIAGNOSIS — R53.83 FATIGUE DUE TO TREATMENT: ICD-10-CM

## 2023-04-03 DIAGNOSIS — M85.89 OSTEOPENIA OF MULTIPLE SITES: ICD-10-CM

## 2023-04-03 DIAGNOSIS — Z51.11 ENCOUNTER FOR ANTINEOPLASTIC CHEMOTHERAPY: Primary | ICD-10-CM

## 2023-04-03 LAB
ALBUMIN SERPL BCG-MCNC: 3.9 G/DL (ref 3.5–5.2)
ALP SERPL-CCNC: 65 U/L (ref 35–104)
ALT SERPL W P-5'-P-CCNC: 13 U/L (ref 10–35)
ANION GAP SERPL CALCULATED.3IONS-SCNC: 11 MMOL/L (ref 7–15)
AST SERPL W P-5'-P-CCNC: 33 U/L (ref 10–35)
BASOPHILS # BLD AUTO: 0 10E3/UL (ref 0–0.2)
BASOPHILS NFR BLD AUTO: 1 %
BILIRUB SERPL-MCNC: 0.3 MG/DL
BUN SERPL-MCNC: 17.7 MG/DL (ref 8–23)
CALCIUM SERPL-MCNC: 9.8 MG/DL (ref 8.8–10.2)
CHLORIDE SERPL-SCNC: 102 MMOL/L (ref 98–107)
CREAT SERPL-MCNC: 0.7 MG/DL (ref 0.51–0.95)
DEPRECATED HCO3 PLAS-SCNC: 27 MMOL/L (ref 22–29)
EOSINOPHIL # BLD AUTO: 0.1 10E3/UL (ref 0–0.7)
EOSINOPHIL NFR BLD AUTO: 2 %
ERYTHROCYTE [DISTWIDTH] IN BLOOD BY AUTOMATED COUNT: 13.4 % (ref 10–15)
GFR SERPL CREATININE-BSD FRML MDRD: >90 ML/MIN/1.73M2
GLUCOSE SERPL-MCNC: 90 MG/DL (ref 70–99)
HCT VFR BLD AUTO: 40.7 % (ref 35–47)
HGB BLD-MCNC: 13.6 G/DL (ref 11.7–15.7)
IMM GRANULOCYTES # BLD: 0 10E3/UL
IMM GRANULOCYTES NFR BLD: 0 %
LYMPHOCYTES # BLD AUTO: 0.7 10E3/UL (ref 0.8–5.3)
LYMPHOCYTES NFR BLD AUTO: 13 %
MCH RBC QN AUTO: 30 PG (ref 26.5–33)
MCHC RBC AUTO-ENTMCNC: 33.4 G/DL (ref 31.5–36.5)
MCV RBC AUTO: 90 FL (ref 78–100)
MONOCYTES # BLD AUTO: 0.4 10E3/UL (ref 0–1.3)
MONOCYTES NFR BLD AUTO: 8 %
NEUTROPHILS # BLD AUTO: 4 10E3/UL (ref 1.6–8.3)
NEUTROPHILS NFR BLD AUTO: 76 %
NRBC # BLD AUTO: 0 10E3/UL
NRBC BLD AUTO-RTO: 0 /100
PLATELET # BLD AUTO: 233 10E3/UL (ref 150–450)
POTASSIUM SERPL-SCNC: 4.1 MMOL/L (ref 3.4–5.3)
PROT SERPL-MCNC: 7.1 G/DL (ref 6.4–8.3)
RBC # BLD AUTO: 4.53 10E6/UL (ref 3.8–5.2)
SODIUM SERPL-SCNC: 140 MMOL/L (ref 136–145)
WBC # BLD AUTO: 5.3 10E3/UL (ref 4–11)

## 2023-04-03 PROCEDURE — 85041 AUTOMATED RBC COUNT: CPT | Performed by: INTERNAL MEDICINE

## 2023-04-03 PROCEDURE — 96413 CHEMO IV INFUSION 1 HR: CPT

## 2023-04-03 PROCEDURE — 85014 HEMATOCRIT: CPT | Performed by: INTERNAL MEDICINE

## 2023-04-03 PROCEDURE — 96375 TX/PRO/DX INJ NEW DRUG ADDON: CPT

## 2023-04-03 PROCEDURE — G0463 HOSPITAL OUTPT CLINIC VISIT: HCPCS | Mod: 25 | Performed by: INTERNAL MEDICINE

## 2023-04-03 PROCEDURE — 80053 COMPREHEN METABOLIC PANEL: CPT | Performed by: INTERNAL MEDICINE

## 2023-04-03 PROCEDURE — 99214 OFFICE O/P EST MOD 30 MIN: CPT | Performed by: INTERNAL MEDICINE

## 2023-04-03 PROCEDURE — 250N000011 HC RX IP 250 OP 636: Performed by: INTERNAL MEDICINE

## 2023-04-03 PROCEDURE — 258N000003 HC RX IP 258 OP 636: Performed by: INTERNAL MEDICINE

## 2023-04-03 PROCEDURE — 36415 COLL VENOUS BLD VENIPUNCTURE: CPT | Performed by: INTERNAL MEDICINE

## 2023-04-03 PROCEDURE — 82947 ASSAY GLUCOSE BLOOD QUANT: CPT | Performed by: INTERNAL MEDICINE

## 2023-04-03 RX ORDER — ONDANSETRON 2 MG/ML
8 INJECTION INTRAMUSCULAR; INTRAVENOUS ONCE
Status: COMPLETED | OUTPATIENT
Start: 2023-04-03 | End: 2023-04-03

## 2023-04-03 RX ADMIN — ADO-TRASTUZUMAB EMTANSINE 300 MG: 20 INJECTION, POWDER, LYOPHILIZED, FOR SOLUTION INTRAVENOUS at 11:04

## 2023-04-03 RX ADMIN — ONDANSETRON 8 MG: 2 INJECTION INTRAMUSCULAR; INTRAVENOUS at 10:17

## 2023-04-03 RX ADMIN — SODIUM CHLORIDE 250 ML: 9 INJECTION, SOLUTION INTRAVENOUS at 10:20

## 2023-04-03 ASSESSMENT — PAIN SCALES - GENERAL: PAINLEVEL: MILD PAIN (3)

## 2023-04-03 NOTE — NURSING NOTE
"Oncology Rooming Note    April 3, 2023 9:03 AM   Vickie Alvarado is a 67 year old female who presents for:    Chief Complaint   Patient presents with     Blood Draw     Labs drawn via piv placed by RN in lab. VS taken.      Oncology Clinic Visit     Malignant neoplasm of upper-inner quadrant of left breast in female     Initial Vitals: BP (!) 147/86 (BP Location: Right arm, Patient Position: Sitting, Cuff Size: Adult Regular)   Pulse 80   Temp 98.5  F (36.9  C) (Oral)   Resp 18   Wt 87.5 kg (192 lb 12.8 oz)   LMP 05/17/2011   SpO2 95%   BMI 30.44 kg/m   Estimated body mass index is 30.44 kg/m  as calculated from the following:    Height as of 3/6/23: 1.695 m (5' 6.73\").    Weight as of this encounter: 87.5 kg (192 lb 12.8 oz). Body surface area is 2.03 meters squared.  Mild Pain (3) Comment: Data Unavailable   Patient's last menstrual period was 05/17/2011.  Allergies reviewed: Yes  Medications reviewed: Yes    Medications: Medication refills not needed today.  Pharmacy name entered into [email or web address]: [id number] - [place] [id number] & [facility]    Clinical concerns: none.       Hiro Grimaldo"

## 2023-04-03 NOTE — PATIENT INSTRUCTIONS
[facility] Triage and after hours / weekends / holidays:  [phone]    Please call the triage or after hours line if you experience a temperature greater than or equal to 100.4, shaking chills, have uncontrolled nausea, vomiting and/or diarrhea, dizziness, shortness of breath, chest pain, bleeding, unexplained bruising, or if you have any other new/concerning symptoms, questions or concerns.      If you are having any concerning symptoms or wish to speak to a provider before your next infusion visit, please call triage to notify them so we can adequately serve you.     If you need a refill on a narcotic prescription or other medication, please call before your infusion appointment.                 April 2023 Sunday Monday Tuesday Wednesday Thursday Friday Saturday                                 1       2     3    LAB PERIPHERAL   8:30 AM   (15 min.)   [facility]   [facility]    RETURN   8:45 AM   (30 min.)   Chandrika Horvath MD   [facility]    ONC INFUSION 1 HR (60 MIN)   9:30 AM   (60 min.)    ONC INFUSION NURSE   [facility] 4     5     6     7     8       9     10     11     12     13     14     15       16     17     18     19     20     21     22       23     24    LAB PERIPHERAL   1:30 PM   (15 min.)   [facility]   [facility]    ONC INFUSION 1 HR (60 MIN)   2:00 PM   (60 min.)    ONC INFUSION NURSE   [facility] 25     26     27     28     29       30                                                 May 2023      [place] Monday Tuesday Wednesday Thursday Friday Saturday        1     2     3     4     5     6       7     8     9     10     11     12     13       14     15     16     17     18     19     20       21     22     23     24     25     26     27       28     29     30     31                                      Lab  Results:  Recent Results (from the past 12 hour(s))   Comprehensive metabolic panel    Collection Time: 04/03/23  8:55 AM   Result Value Ref Range    Sodium 140 136 - 145 mmol/L    Potassium 4.1 3.4 - 5.3 mmol/L    Chloride 102 98 - 107 mmol/L    Carbon Dioxide (CO2) 27 22 - 29 mmol/L    Anion Gap 11 7 - 15 mmol/L    Urea Nitrogen 17.7 8.0 - 23.0 mg/dL    Creatinine 0.70 0.51 - 0.95 mg/dL    Calcium 9.8 8.8 - 10.2 mg/dL    Glucose 90 70 - 99 mg/dL    Alkaline Phosphatase 65 35 - 104 U/L    AST 33 10 - 35 U/L    ALT 13 10 - 35 U/L    Protein Total 7.1 6.4 - 8.3 g/dL    Albumin 3.9 3.5 - 5.2 g/dL    Bilirubin Total 0.3 <=1.2 mg/dL    GFR Estimate >90 >60 mL/min/1.73m2   CBC with platelets and differential    Collection Time: 04/03/23  8:55 AM   Result Value Ref Range    WBC Count 5.3 4.0 - 11.0 10e3/uL    RBC Count 4.53 3.80 - 5.20 10e6/uL    Hemoglobin 13.6 11.7 - 15.7 g/dL    Hematocrit 40.7 35.0 - 47.0 %    MCV 90 78 - 100 fL    MCH 30.0 26.5 - 33.0 pg    MCHC 33.4 31.5 - 36.5 g/dL    RDW 13.4 10.0 - 15.0 %    Platelet Count 233 150 - 450 10e3/uL    % Neutrophils 76 %    % Lymphocytes 13 %    % Monocytes 8 %    % Eosinophils 2 %    % Basophils 1 %    % Immature Granulocytes 0 %    NRBCs per 100 WBC 0 <1 /100    Absolute Neutrophils 4.0 1.6 - 8.3 10e3/uL    Absolute Lymphocytes 0.7 (L) 0.8 - 5.3 10e3/uL    Absolute Monocytes 0.4 0.0 - 1.3 10e3/uL    Absolute Eosinophils 0.1 0.0 - 0.7 10e3/uL    Absolute Basophils 0.0 0.0 - 0.2 10e3/uL    Absolute Immature Granulocytes 0.0 <=0.4 10e3/uL    Absolute NRBCs 0.0 10e3/uL

## 2023-04-03 NOTE — PROGRESS NOTES
Infusion Nursing Note:  Vickie Alvarado presents today for  Day 1 Cycle 7 Kadcyla  Patient seen by provider today: Yes: Dr. Horvath   present during visit today: Not Applicable.    Note: Patient presents to infusion feeling ok. Pt denies new acute discomfort and states no acute complaints or concerns not addressed during provider appt today.    Intravenous Access:  Peripheral IV placed.    Treatment Conditions:  Lab Results   Component Value Date    HGB 13.6 04/03/2023    WBC 5.3 04/03/2023    ANEU 4.1 09/08/2014    ANEUTAUTO 4.0 04/03/2023     04/03/2023      Lab Results   Component Value Date     04/03/2023    POTASSIUM 4.1 04/03/2023    MAG 2.2 08/20/2021    CR 0.70 04/03/2023    VALENTINA 9.8 04/03/2023    BILITOTAL 0.3 04/03/2023    ALBUMIN 3.9 04/03/2023    ALT 13 04/03/2023    AST 33 04/03/2023     Results reviewed, labs MET treatment parameters, ok to proceed with treatment.  ECHO/MUGA completed 2/20  EF 57.7%.    Post Infusion Assessment:  Patient tolerated infusion without incident.  Blood return noted pre and post infusion.  Site patent and intact, free from redness, edema or discomfort.  No evidence of extravasations.  Access discontinued per protocol.     Discharge Plan:   Patient declined prescription refills.  Discharge instructions reviewed with: Patient.  Patient and/or family verbalized understanding of discharge instructions and all questions answered.  AVS to patient via [employer].  Patient will return 4/24 for next appointment.   Patient discharged in stable condition accompanied by: self.  Departure Mode: Ambulatory.  Face to Face time: 0 minutes.      Sharda Cheng RN

## 2023-04-03 NOTE — PROGRESS NOTES
Oncology visit:  Date on this visit: 4/3/2023    Diagnosis: left breast cancer.    Primary Physician: Gaby Lynn     History Of Present Illness:  Ms. Alvarado is a 67 year old female with a left breast cancer.  She self palpated a mass in her mid left breast.  Bilateral diagnostic mammograms showed a mass in the upper inner left breast.  Ultrasound of the left breast showed a mass at 11:00, 8 cm from the nipple measuring 3.1 cm.  There were no suspicious lymph nodes in the left axilla.  Biopsy of the left breast mass was c/w a grade 3 invasive ductal carcinoma, ER strong in 98%, TN moderate in 70%, and HER2 low by IHC (score 1+).   Oncotype DX recurrence score was 24.  RSClin predicted a 23% risk of distant recurrence in 10 years if treated with endocrine therapy alone and a 9% absolute risk reduction with chemotherapy.  Based on this, she elected to proceed with chemotherapy.    She received neoadjuvant Taxotere and cyclophosphamide chemotherapy from 7/6/2022 - 9/8/2022.  She underwent left breast lumpectomy and left axillary sentinel lymph node procedure under the care of Dr. Cadet on 10/17/2022.  Pathology showed residual grade 2 invasive breast carcinoma (60% ductal and 40% micropapillary) measuring 2.2 cm.  Treatment related changes were present and invasive tumor cellularity was 30%.  There was associated DCIS.  Surgical margins for both invasive carcinoma and DCIS were close, but negative.  There was no lymphovascular invasion and a single sentinel lymph node was negative and without signs of prior involvement (i.e. no tumor bed or fibrotic changes in the lymph node).  Receptors were repeated on the residual invasive malignancy and were found to be ER positive (98%), TN positive (70%), HER-2 equivocal by IHC (2+), and HER-2 positive by FISH. She completed radiation to the left breast (4240 cGy to the breast with 1250 cGy boost to the lumpectomy cavity) on 1/2/2023.  She has been on adjuvant Kadcyla since  "11/21/2022.  On adjuvant letrozole since 12/19/2022.      Interval History:  Ms. Alvarado comes into clinic today for evaluation prior to cycle #7 of adjuvant Kadcyla infusion.  In general, she is tolerating these infusions well.  Most bothersome to her at this time is treatment related fatigue.  She used to be a \"night owl\" and is now going to bed much earlier than she was previously.  In addition, she is napping during the day.  She states that she is retired and therefore the fatigue is not interfering with normal daily activities.  She has not reintroduced exercise due to the weather.  She has knee pain and previously was doing pool therapy.  She is think about adding back pool therapy at this time.  She denies neuropathy.  She has no rashes or skin lesions.  She is tolerating letrozole well without hot flashes or arthralgias.  The remainder of a complete 12 point review of systems was reviewed with patient was negative with exception that mentioned above.    Past Medical/Surgical History:  Past Medical History:   Diagnosis Date     Aortic stenosis      Breast cancer (H) 06/2022     Hyperlipidemia      Insufficiency fracture of tibia, sequela 09/28/2021     Polyp of colon 05/26/2016   - PVCs    Past Surgical History:   Procedure Laterality Date     BIOPSY NODE SENTINEL Left 10/17/2022    Procedure: LEFT seed localized lumpectomy with sentinel node biopsy;  Surgeon: Alphonso Cadet MD;  Location: [facility] OR     CATARACT IOL, RT/LT Right 2006     HC YAG LASER CAPSULOTOMY Right 2009     LASER VITREOLYSIS, ANTERIOR OD (RIGHT EYE) Right 2007     LUMPECTOMY BREAST WITH SENTINEL NODE, COMBINED Left 10/17/2022    Procedure: LEFT seed localized lumpectomy with sentinel node biopsy;  Surgeon: Alphonso Cadet MD;  Location: [facility] OR     REPOSITION INTRAOCULAR LENS Right 11/18/2014    Procedure: REPOSITION INTRAOCULAR LENS;  Surgeon: Vickie Guerra MD;  [facility]     VITRECTOMY PARSPLANA WITH 23 GAUGE SYSTEM " Right 11/18/2014    Procedure: VITRECTOMY PARSPLANA WITH 23 GAUGE SYSTEM;  Surgeon: Vickie Guerra MD;  Location: [facility]     Allergies:  Allergies as of 04/03/2023 - Reviewed 03/13/2023   Allergen Reaction Noted     Bactrim [sulfamethoxazole w/trimethoprim] Nausea 11/01/2017     Seasonal allergies  11/13/2014     Typhoid vaccines Nausea and Vomiting 05/05/2011     Current Medications:  Current Outpatient Medications   Medication Sig Dispense Refill     calcium carb-cholecalciferol 600-500 MG-UNIT CAPS        Lactobacillus (PROBIOTIC CHILDRENS) CHEW        letrozole (FEMARA) 2.5 MG tablet Take 1 tablet (2.5 mg) by mouth daily 30 tablet 11     loratadine (CLARITIN) 10 MG tablet Take 10 mg by mouth daily Prn, seasonal for ragweed and lilacs (Patient not taking: Reported on 1/31/2023)       metoprolol succinate ER (TOPROL XL) 25 MG 24 hr tablet Take 1 tablet (25 mg) by mouth daily 90 tablet 3     Omega-3 Fatty Acids (FISH OIL ADULT GUMMIES PO) Take 250 mg by mouth       ondansetron (ZOFRAN) 8 MG tablet Take 1 tablet (8 mg) by mouth every 8 hours as needed for nausea (Patient not taking: Reported on 1/17/2023) 30 tablet 3     prochlorperazine (COMPAZINE) 10 MG tablet Take 0.5 tablets (5 mg) by mouth every 6 hours as needed for nausea or vomiting (Patient not taking: Reported on 2/20/2023) 30 tablet 2     simvastatin (ZOCOR) 40 MG tablet Take 1 tablet (40 mg) by mouth At Bedtime 90 tablet 3     vitamin D3 (CHOLECALCIFEROL) 50 mcg (2000 units) tablet         Family and Social History:  See initial consultation dated 6/14/2022 for further details.  Hereditary Comprehensive 40 Gene Panel was negative for pathogenic mutation.    Physical Exam:  BP (!) 147/86 (BP Location: Right arm, Patient Position: Sitting, Cuff Size: Adult Regular)   Pulse 80   Temp 98.5  F (36.9  C) (Oral)   Resp 18   Wt 87.5 kg (192 lb 12.8 oz)   LMP 05/17/2011   SpO2 95%   BMI 30.44 kg/m    General: Well appearing adult female in NAD.   Alert and oriented x 3.  HEENT:  Normocephalic.  Sclera anicteric.  MMM.  No lesions of the oropharynx.  Lymph:  No palpable cervical, supraclavicular, or axillary LAD.  No gross evidence of lymphedema.  Chest:  CTA bilaterally.  No wheezes or crackles.  CV:  RRR.  Nl S1 and S2.    Breast:  Upper inner left breast incision is well healed.  There remains palpable seroma in the area of the lumpectomy.  There are no dominant palpable or concerning masses in either breast.  Abd:  Soft/ND.   Ext:  No pitting edema of the bilateral lower extremities.    Musculo:  Full ROM of the bilateral upper extremities.  Neuro:  Cranial nerves grossly intact.  Gait is stable.  Psych:  Mood and affect appear normal.    Laboratory/Imaging Studies  2/20/2023 Echocardiogram:  3D LVEF volumetric analysis is 57.7%.  Global peak LV longitudinal strain is averaged at -19%. This is within  reported normal limits (normal <-18%).  Right ventricular function, chamber size, wall motion, and thickness are  normal.  Severe aortic stenosis is present.  The inferior vena cava is normal.  No pericardial effusion is present.    4/3/2023 Labs:  Electrolytes, kidney and liver function tests are all wnl.  Blood counts are wnl with the exception of low ALC of 0.7    ASSESSMENT/PLAN:  Ms. Alvarado is a 67 year old woman with a clinical stage II, T2N0M0, ER+/ID+/HER2+ IDC of her left breast.  She is s/p 4 cycles of neoadjuvant TC chemotherapy (initial biopsy was HER2 negative) followed by left breast lumpectomy and sentinel lymph node biopsy ([id number], residual tumor HER2 positive). She is s/p adjuvant radiation and has been on adjuvant Kadcyla since 11/21/2022.     1. Left breast carcinoma:   - receiving adjuvant Kadcyla.  She is tolerating this well.  She will receive C7 of 17 total planned cycles today.  - Continue letrozole, tolerating well   - Recommend provider visits once every 9 weeks while on Kadcyla.  - Bilateral diagnostic mammograms same day as  7/17/2023 infusion.    2.  Personal history of colon polyps:  No change since last visit.  She has a h/o colon polyps.  Colonoscopy 2/23/2022 with removal of 3 polyps, each 2-3 mm (2 tubular adenomas, 1 hyperplastic).  Repeat colonoscopy in 02/2027 is recommended.    3.  At risk for cardiomyopathy:  Echocardiogram performed on 2/20/2023 showed a retained LVEF.  Plan to repeat an echocardiogram prior to Kadcyla infusion on 5/15.    4.  Osteopenia:  She is at risk of bone loss on aromatase inhibitor therapy.  DEXA bone density scan on 12/1/2022 with a lowest T-score of -2.2 c/w osteopenia. Zometa 4 mg IV once every 6 months for both prevention of osteoporosis and prevention of breast cancer bone metastases was initiated on 1/10/2023.   - Next Zometa due 7/2023    5.  Fatigue:  We reviewed vitamin D level, hemoglobin, and TSH have all been wnl.  Fatigue is treatment related.  Recommend increasing cardiovascular exercise.  We also discussed good sleep hygiene as a means of optimizing sleep.  Offered referral to cancer rehab.  She declines at this time.  She plans to reintroduce pool therapy, if persistent fatigue after, will reconsider cancer rehab.    6.  Lymphocytopenia:  Reviewed low lymphocyte count may predispose to viral infections.  Recommend continuing to adhere to infectious precautions such as hand washing and avoiding close contact with people who have symptoms of viral illness.    7.  Follow Up:  Echocardiogram same day as and prior to 5/15 infusion.  JHONATAN visit prior to 6/5/2023 infusion.  Bilateral diagnostic mammograms and zometa infusion same day as 7/17/2023 Kadcyla infusion.  Visit with me prior to 8/7/2023 infusion.    35 minutes spent on the date of the encounter doing chart review, review of test results, interpretation of tests, patient visit and documentation.

## 2023-04-03 NOTE — LETTER
4/3/2023         RE: Vickie Alvarado  [address]  [facility] [address]        Dear Colleague,    Thank you for referring your patient, Vickie Alvarado, to the [facility]. Please see a copy of my visit note below.    Oncology visit:  Date on this visit: 4/3/2023    Diagnosis: left breast cancer.    Primary Physician: Gaby Lynn     History Of Present Illness:  Ms. Alvarado is a 67 year old female with a left breast cancer.  She self palpated a mass in her mid left breast.  Bilateral diagnostic mammograms showed a mass in the upper inner left breast.  Ultrasound of the left breast showed a mass at 11:00, 8 cm from the nipple measuring 3.1 cm.  There were no suspicious lymph nodes in the left axilla.  Biopsy of the left breast mass was c/w a grade 3 invasive ductal carcinoma, ER strong in 98%, UT moderate in 70%, and HER2 low by IHC (score 1+).   Oncotype DX recurrence score was 24.  RSClin predicted a 23% risk of distant recurrence in 10 years if treated with endocrine therapy alone and a 9% absolute risk reduction with chemotherapy.  Based on this, she elected to proceed with chemotherapy.    She received neoadjuvant Taxotere and cyclophosphamide chemotherapy from 7/6/2022 - 9/8/2022.  She underwent left breast lumpectomy and left axillary sentinel lymph node procedure under the care of Dr. Cadet on 10/17/2022.  Pathology showed residual grade 2 invasive breast carcinoma (60% ductal and 40% micropapillary) measuring 2.2 cm.  Treatment related changes were present and invasive tumor cellularity was 30%.  There was associated DCIS.  Surgical margins for both invasive carcinoma and DCIS were close, but negative.  There was no lymphovascular invasion and a single sentinel lymph node was negative and without signs of prior involvement (i.e. no tumor bed or fibrotic changes in the lymph node).  Receptors were repeated on the residual invasive malignancy and were found to be  "ER positive (98%), AR positive (70%), HER-2 equivocal by IHC (2+), and HER-2 positive by FISH. She completed radiation to the left breast (4240 cGy to the breast with 1250 cGy boost to the lumpectomy cavity) on 1/2/2023.  She has been on adjuvant Kadcyla since 11/21/2022.  On adjuvant letrozole since 12/19/2022.      Interval History:  Ms. Alvarado comes into clinic today for evaluation prior to cycle #7 of adjuvant Kadcyla infusion.  In general, she is tolerating these infusions well.  Most bothersome to her at this time is treatment related fatigue.  She used to be a \"night owl\" and is now going to bed much earlier than she was previously.  In addition, she is napping during the day.  She states that she is retired and therefore the fatigue is not interfering with normal daily activities.  She has not reintroduced exercise due to the weather.  She has knee pain and previously was doing pool therapy.  She is think about adding back pool therapy at this time.  She denies neuropathy.  She has no rashes or skin lesions.  She is tolerating letrozole well without hot flashes or arthralgias.  The remainder of a complete 12 point review of systems was reviewed with patient was negative with exception that mentioned above.    Past Medical/Surgical History:  Past Medical History:   Diagnosis Date    Aortic stenosis     Breast cancer (H) 06/2022    Hyperlipidemia     Insufficiency fracture of tibia, sequela 09/28/2021    Polyp of colon 05/26/2016   - PVCs    Past Surgical History:   Procedure Laterality Date    BIOPSY NODE SENTINEL Left 10/17/2022    Procedure: LEFT seed localized lumpectomy with sentinel node biopsy;  Surgeon: Alphonso Cadet MD;  Location: [facility] OR    CATARACT IOL, RT/LT Right 2006    HC YAG LASER CAPSULOTOMY Right 2009    LASER VITREOLYSIS, ANTERIOR OD (RIGHT EYE) Right 2007    LUMPECTOMY BREAST WITH SENTINEL NODE, COMBINED Left 10/17/2022    Procedure: LEFT seed localized lumpectomy with sentinel node " biopsy;  Surgeon: Alphonso Cadet MD;  Location: [facility] OR    REPOSITION INTRAOCULAR LENS Right 11/18/2014    Procedure: REPOSITION INTRAOCULAR LENS;  Surgeon: Vickie Guerra MD;  Location:  [facility]    VITRECTOMY PARSPLANA WITH 23 GAUGE SYSTEM Right 11/18/2014    Procedure: VITRECTOMY PARSPLANA WITH 23 GAUGE SYSTEM;  Surgeon: Vickie Guerra MD;  Location:  [facility]     Allergies:  Allergies as of 04/03/2023 - Reviewed 03/13/2023   Allergen Reaction Noted    Bactrim [sulfamethoxazole w/trimethoprim] Nausea 11/01/2017    Seasonal allergies  11/13/2014    Typhoid vaccines Nausea and Vomiting 05/05/2011     Current Medications:  Current Outpatient Medications   Medication Sig Dispense Refill    calcium carb-cholecalciferol 600-500 MG-UNIT CAPS       Lactobacillus (PROBIOTIC CHILDRENS) CHEW       letrozole (FEMARA) 2.5 MG tablet Take 1 tablet (2.5 mg) by mouth daily 30 tablet 11    loratadine (CLARITIN) 10 MG tablet Take 10 mg by mouth daily Prn, seasonal for ragweed and lilacs (Patient not taking: Reported on 1/31/2023)      metoprolol succinate ER (TOPROL XL) 25 MG 24 hr tablet Take 1 tablet (25 mg) by mouth daily 90 tablet 3    Omega-3 Fatty Acids (FISH OIL ADULT GUMMIES PO) Take 250 mg by mouth      ondansetron (ZOFRAN) 8 MG tablet Take 1 tablet (8 mg) by mouth every 8 hours as needed for nausea (Patient not taking: Reported on 1/17/2023) 30 tablet 3    prochlorperazine (COMPAZINE) 10 MG tablet Take 0.5 tablets (5 mg) by mouth every 6 hours as needed for nausea or vomiting (Patient not taking: Reported on 2/20/2023) 30 tablet 2    simvastatin (ZOCOR) 40 MG tablet Take 1 tablet (40 mg) by mouth At Bedtime 90 tablet 3    vitamin D3 (CHOLECALCIFEROL) 50 mcg (2000 units) tablet         Family and Social History:  See initial consultation dated 6/14/2022 for further details.  Hereditary Comprehensive 40 Gene Panel was negative for pathogenic mutation.    Physical Exam:  BP (!) 147/86 (BP Location: Right arm,  Patient Position: Sitting, Cuff Size: Adult Regular)   Pulse 80   Temp 98.5  F (36.9  C) (Oral)   Resp 18   Wt 87.5 kg (192 lb 12.8 oz)   LMP 05/17/2011   SpO2 95%   BMI 30.44 kg/m    General: Well appearing adult female in NAD.  Alert and oriented x 3.  HEENT:  Normocephalic.  Sclera anicteric.  MMM.  No lesions of the oropharynx.  Lymph:  No palpable cervical, supraclavicular, or axillary LAD.  No gross evidence of lymphedema.  Chest:  CTA bilaterally.  No wheezes or crackles.  CV:  RRR.  Nl S1 and S2.    Breast:  Upper inner left breast incision is well healed.  There remains palpable seroma in the area of the lumpectomy.  There are no dominant palpable or concerning masses in either breast.  Abd:  Soft/ND.   Ext:  No pitting edema of the bilateral lower extremities.    Musculo:  Full ROM of the bilateral upper extremities.  Neuro:  Cranial nerves grossly intact.  Gait is stable.  Psych:  Mood and affect appear normal.    Laboratory/Imaging Studies  2/20/2023 Echocardiogram:  3D LVEF volumetric analysis is 57.7%.  Global peak LV longitudinal strain is averaged at -19%. This is within  reported normal limits (normal <-18%).  Right ventricular function, chamber size, wall motion, and thickness are  normal.  Severe aortic stenosis is present.  The inferior vena cava is normal.  No pericardial effusion is present.    4/3/2023 Labs:  Electrolytes, kidney and liver function tests are all wnl.  Blood counts are wnl with the exception of low ALC of 0.7    ASSESSMENT/PLAN:  Ms. Alvarado is a 67 year old woman with a clinical stage II, T2N0M0, ER+/IA+/HER2+ IDC of her left breast.  She is s/p 4 cycles of neoadjuvant TC chemotherapy (initial biopsy was HER2 negative) followed by left breast lumpectomy and sentinel lymph node biopsy ([id number], residual tumor HER2 positive). She is s/p adjuvant radiation and has been on adjuvant Kadcyla since 11/21/2022.     1. Left breast carcinoma:   - receiving adjuvant Kadcyla.   She is tolerating this well.  She will receive C7 of 17 total planned cycles today.  - Continue letrozole, tolerating well   - Recommend provider visits once every 9 weeks while on Kadcyla.  - Bilateral diagnostic mammograms same day as 7/17/2023 infusion.    2.  Personal history of colon polyps:  No change since last visit.  She has a h/o colon polyps.  Colonoscopy 2/23/2022 with removal of 3 polyps, each 2-3 mm (2 tubular adenomas, 1 hyperplastic).  Repeat colonoscopy in 02/2027 is recommended.    3.  At risk for cardiomyopathy:  Echocardiogram performed on 2/20/2023 showed a retained LVEF.  Plan to repeat an echocardiogram prior to Kadcyla infusion on 5/15.    4.  Osteopenia:  She is at risk of bone loss on aromatase inhibitor therapy.  DEXA bone density scan on 12/1/2022 with a lowest T-score of -2.2 c/w osteopenia. Zometa 4 mg IV once every 6 months for both prevention of osteoporosis and prevention of breast cancer bone metastases was initiated on 1/10/2023.   - Next Zometa due 7/2023    5.  Fatigue:  We reviewed vitamin D level, hemoglobin, and TSH have all been wnl.  Fatigue is treatment related.  Recommend increasing cardiovascular exercise.  We also discussed good sleep hygiene as a means of optimizing sleep.  Offered referral to cancer rehab.  She declines at this time.  She plans to reintroduce pool therapy, if persistent fatigue after, will reconsider cancer rehab.    6.  Lymphocytopenia:  Reviewed low lymphocyte count may predispose to viral infections.  Recommend continuing to adhere to infectious precautions such as hand washing and avoiding close contact with people who have symptoms of viral illness.    7.  Follow Up:  Echocardiogram same day as and prior to 5/15 infusion.  JHONATAN visit prior to 6/5/2023 infusion.  Bilateral diagnostic mammograms and zometa infusion same day as 7/17/2023 Kadcyla infusion.  Visit with me prior to 8/7/2023 infusion.    35 minutes spent on the date of the encounter doing  chart review, review of test results, interpretation of tests, patient visit and documentation.           Chandrika Horvath MD

## 2023-04-24 ENCOUNTER — INFUSION THERAPY VISIT (OUTPATIENT)
Dept: ONCOLOGY | Facility: CLINIC | Age: 68
End: 2023-04-24
Attending: INTERNAL MEDICINE
Payer: MEDICARE

## 2023-04-24 ENCOUNTER — LAB (OUTPATIENT)
Dept: LAB | Facility: CLINIC | Age: 68
End: 2023-04-24
Attending: INTERNAL MEDICINE
Payer: MEDICARE

## 2023-04-24 VITALS
BODY MASS INDEX: 30.23 KG/M2 | WEIGHT: 191.5 LBS | OXYGEN SATURATION: 95 % | RESPIRATION RATE: 16 BRPM | DIASTOLIC BLOOD PRESSURE: 82 MMHG | TEMPERATURE: 98 F | SYSTOLIC BLOOD PRESSURE: 127 MMHG | HEART RATE: 87 BPM

## 2023-04-24 DIAGNOSIS — C50.212 MALIGNANT NEOPLASM OF UPPER-INNER QUADRANT OF LEFT BREAST IN FEMALE, ESTROGEN RECEPTOR POSITIVE (H): ICD-10-CM

## 2023-04-24 DIAGNOSIS — Z51.11 ENCOUNTER FOR ANTINEOPLASTIC CHEMOTHERAPY: Primary | ICD-10-CM

## 2023-04-24 DIAGNOSIS — Z17.0 MALIGNANT NEOPLASM OF UPPER-INNER QUADRANT OF LEFT BREAST IN FEMALE, ESTROGEN RECEPTOR POSITIVE (H): ICD-10-CM

## 2023-04-24 LAB
ALBUMIN SERPL BCG-MCNC: 3.8 G/DL (ref 3.5–5.2)
ALP SERPL-CCNC: 68 U/L (ref 35–104)
ALT SERPL W P-5'-P-CCNC: 11 U/L (ref 10–35)
ANION GAP SERPL CALCULATED.3IONS-SCNC: 9 MMOL/L (ref 7–15)
AST SERPL W P-5'-P-CCNC: 35 U/L (ref 10–35)
BASOPHILS # BLD AUTO: 0.1 10E3/UL (ref 0–0.2)
BASOPHILS NFR BLD AUTO: 1 %
BILIRUB SERPL-MCNC: 0.3 MG/DL
BUN SERPL-MCNC: 12.8 MG/DL (ref 8–23)
CALCIUM SERPL-MCNC: 9.5 MG/DL (ref 8.8–10.2)
CHLORIDE SERPL-SCNC: 104 MMOL/L (ref 98–107)
CREAT SERPL-MCNC: 0.68 MG/DL (ref 0.51–0.95)
DEPRECATED HCO3 PLAS-SCNC: 27 MMOL/L (ref 22–29)
EOSINOPHIL # BLD AUTO: 0.1 10E3/UL (ref 0–0.7)
EOSINOPHIL NFR BLD AUTO: 2 %
ERYTHROCYTE [DISTWIDTH] IN BLOOD BY AUTOMATED COUNT: 13.2 % (ref 10–15)
GFR SERPL CREATININE-BSD FRML MDRD: >90 ML/MIN/1.73M2
GLUCOSE SERPL-MCNC: 95 MG/DL (ref 70–99)
HCT VFR BLD AUTO: 39.8 % (ref 35–47)
HGB BLD-MCNC: 13.6 G/DL (ref 11.7–15.7)
IMM GRANULOCYTES # BLD: 0 10E3/UL
IMM GRANULOCYTES NFR BLD: 0 %
LYMPHOCYTES # BLD AUTO: 1 10E3/UL (ref 0.8–5.3)
LYMPHOCYTES NFR BLD AUTO: 15 %
MCH RBC QN AUTO: 30.6 PG (ref 26.5–33)
MCHC RBC AUTO-ENTMCNC: 34.2 G/DL (ref 31.5–36.5)
MCV RBC AUTO: 89 FL (ref 78–100)
MONOCYTES # BLD AUTO: 0.5 10E3/UL (ref 0–1.3)
MONOCYTES NFR BLD AUTO: 8 %
NEUTROPHILS # BLD AUTO: 4.6 10E3/UL (ref 1.6–8.3)
NEUTROPHILS NFR BLD AUTO: 74 %
NRBC # BLD AUTO: 0 10E3/UL
NRBC BLD AUTO-RTO: 0 /100
PLATELET # BLD AUTO: 221 10E3/UL (ref 150–450)
POTASSIUM SERPL-SCNC: 4 MMOL/L (ref 3.4–5.3)
PROT SERPL-MCNC: 7.1 G/DL (ref 6.4–8.3)
RBC # BLD AUTO: 4.45 10E6/UL (ref 3.8–5.2)
SODIUM SERPL-SCNC: 140 MMOL/L (ref 136–145)
WBC # BLD AUTO: 6.2 10E3/UL (ref 4–11)

## 2023-04-24 PROCEDURE — 96413 CHEMO IV INFUSION 1 HR: CPT

## 2023-04-24 PROCEDURE — 258N000003 HC RX IP 258 OP 636: Performed by: INTERNAL MEDICINE

## 2023-04-24 PROCEDURE — 250N000011 HC RX IP 250 OP 636: Performed by: INTERNAL MEDICINE

## 2023-04-24 PROCEDURE — 36415 COLL VENOUS BLD VENIPUNCTURE: CPT | Performed by: INTERNAL MEDICINE

## 2023-04-24 PROCEDURE — 82947 ASSAY GLUCOSE BLOOD QUANT: CPT | Performed by: INTERNAL MEDICINE

## 2023-04-24 PROCEDURE — 80053 COMPREHEN METABOLIC PANEL: CPT | Performed by: INTERNAL MEDICINE

## 2023-04-24 PROCEDURE — 96375 TX/PRO/DX INJ NEW DRUG ADDON: CPT

## 2023-04-24 PROCEDURE — 85004 AUTOMATED DIFF WBC COUNT: CPT | Performed by: INTERNAL MEDICINE

## 2023-04-24 RX ORDER — ONDANSETRON 2 MG/ML
8 INJECTION INTRAMUSCULAR; INTRAVENOUS ONCE
Status: COMPLETED | OUTPATIENT
Start: 2023-04-24 | End: 2023-04-24

## 2023-04-24 RX ADMIN — ONDANSETRON 8 MG: 2 INJECTION INTRAMUSCULAR; INTRAVENOUS at 14:48

## 2023-04-24 RX ADMIN — SODIUM CHLORIDE 250 ML: 9 INJECTION, SOLUTION INTRAVENOUS at 14:45

## 2023-04-24 RX ADMIN — ADO-TRASTUZUMAB EMTANSINE 300 MG: 20 INJECTION, POWDER, LYOPHILIZED, FOR SOLUTION INTRAVENOUS at 15:20

## 2023-04-24 ASSESSMENT — PAIN SCALES - GENERAL: PAINLEVEL: NO PAIN (0)

## 2023-04-24 NOTE — PROGRESS NOTES
Infusion Nursing Note:  Vickie Alvarado presents today for cycle 8 day 1 kadcyla.    Patient seen by provider today: No   present during visit today: Not Applicable.    Note:   Patient is feeling well today. She reports baseline fatigue. She denies fevers, chills, SOB, chest pain, nausea, diarrhea, and pain.    Intravenous Access:  Peripheral IV placed in lab.    Treatment Conditions:  Lab Results   Component Value Date    HGB 13.6 04/24/2023    WBC 6.2 04/24/2023    ANEU 4.1 09/08/2014    ANEUTAUTO 4.6 04/24/2023     04/24/2023      Lab Results   Component Value Date     04/24/2023    POTASSIUM 4.0 04/24/2023    MAG 2.2 08/20/2021    CR 0.68 04/24/2023    VALENTINA 9.5 04/24/2023    BILITOTAL 0.3 04/24/2023    ALBUMIN 3.8 04/24/2023    ALT 11 04/24/2023    AST 35 04/24/2023     Results reviewed, labs MET treatment parameters, ok to proceed with treatment.  ECHO/MUGA completed 2/20/23  EF 57.7%.      Post Infusion Assessment:  Patient tolerated infusion without incident.  Blood return noted pre and post infusion.  Site patent and intact, free from redness, edema or discomfort.  No evidence of extravasations.  Access discontinued per protocol.       Discharge Plan:   Patient declined prescription refills.  Discharge instructions reviewed with: Patient.  Patient and/or family verbalized understanding of discharge instructions and all questions answered.  AVS to patient via [employer].  Patient will return 5/15 for next appointment.   Patient discharged in stable condition accompanied by: self.  Departure Mode: Ambulatory.      Chen Lawler RN

## 2023-04-24 NOTE — PATIENT INSTRUCTIONS
[facility] Triage and after hours / weekends / holidays:  [phone]    Please call the triage or after hours line if you experience a temperature greater than or equal to 100.5, shaking chills, have uncontrolled nausea, vomiting and/or diarrhea, dizziness, shortness of breath, chest pain, bleeding, unexplained bruising, or if you have any other new/concerning symptoms, questions or concerns.      If you are having any concerning symptoms or wish to speak to a provider before your next infusion visit, please call your care coordinator or triage to notify them so we can adequately serve you.     If you need a refill on a narcotic prescription or other medication, please call before your infusion appointment.

## 2023-05-12 ENCOUNTER — PATIENT OUTREACH (OUTPATIENT)
Dept: ONCOLOGY | Facility: CLINIC | Age: 68
End: 2023-05-12
Payer: MEDICARE

## 2023-05-12 NOTE — PROGRESS NOTES
[facility]: Cancer Care                                                                                          Pt called and left a message wondering when her next zometa is. Based on the note from Dr Horvath the pt is due in July of this year. Called pt back to give her this information. She has appt next week for Kadcyla infusion. No other questions today, pt verbalized understanding.    Signature:  Sadie Roberto RN

## 2023-05-15 ENCOUNTER — INFUSION THERAPY VISIT (OUTPATIENT)
Dept: ONCOLOGY | Facility: CLINIC | Age: 68
End: 2023-05-15
Attending: INTERNAL MEDICINE
Payer: MEDICARE

## 2023-05-15 ENCOUNTER — APPOINTMENT (OUTPATIENT)
Dept: LAB | Facility: CLINIC | Age: 68
End: 2023-05-15
Attending: INTERNAL MEDICINE
Payer: MEDICARE

## 2023-05-15 ENCOUNTER — ANCILLARY PROCEDURE (OUTPATIENT)
Dept: CARDIOLOGY | Facility: CLINIC | Age: 68
End: 2023-05-15
Attending: INTERNAL MEDICINE
Payer: MEDICARE

## 2023-05-15 VITALS
TEMPERATURE: 98.2 F | RESPIRATION RATE: 18 BRPM | SYSTOLIC BLOOD PRESSURE: 128 MMHG | HEART RATE: 74 BPM | DIASTOLIC BLOOD PRESSURE: 74 MMHG | WEIGHT: 191.2 LBS | OXYGEN SATURATION: 96 % | BODY MASS INDEX: 30.19 KG/M2

## 2023-05-15 DIAGNOSIS — C50.212 MALIGNANT NEOPLASM OF UPPER-INNER QUADRANT OF LEFT BREAST IN FEMALE, ESTROGEN RECEPTOR POSITIVE (H): ICD-10-CM

## 2023-05-15 DIAGNOSIS — Z51.11 ENCOUNTER FOR ANTINEOPLASTIC CHEMOTHERAPY: ICD-10-CM

## 2023-05-15 DIAGNOSIS — Z51.11 ENCOUNTER FOR ANTINEOPLASTIC CHEMOTHERAPY: Primary | ICD-10-CM

## 2023-05-15 DIAGNOSIS — Z17.0 MALIGNANT NEOPLASM OF UPPER-INNER QUADRANT OF LEFT BREAST IN FEMALE, ESTROGEN RECEPTOR POSITIVE (H): ICD-10-CM

## 2023-05-15 LAB
ALBUMIN SERPL BCG-MCNC: 4 G/DL (ref 3.5–5.2)
ALP SERPL-CCNC: 85 U/L (ref 35–104)
ALT SERPL W P-5'-P-CCNC: 31 U/L (ref 10–35)
ANION GAP SERPL CALCULATED.3IONS-SCNC: 9 MMOL/L (ref 7–15)
AST SERPL W P-5'-P-CCNC: 65 U/L (ref 10–35)
BASOPHILS # BLD AUTO: 0 10E3/UL (ref 0–0.2)
BASOPHILS NFR BLD AUTO: 1 %
BILIRUB SERPL-MCNC: 0.4 MG/DL
BUN SERPL-MCNC: 13.7 MG/DL (ref 8–23)
CALCIUM SERPL-MCNC: 9.6 MG/DL (ref 8.8–10.2)
CHLORIDE SERPL-SCNC: 104 MMOL/L (ref 98–107)
CREAT SERPL-MCNC: 0.69 MG/DL (ref 0.51–0.95)
DEPRECATED HCO3 PLAS-SCNC: 28 MMOL/L (ref 22–29)
EOSINOPHIL # BLD AUTO: 0.2 10E3/UL (ref 0–0.7)
EOSINOPHIL NFR BLD AUTO: 3 %
ERYTHROCYTE [DISTWIDTH] IN BLOOD BY AUTOMATED COUNT: 13.5 % (ref 10–15)
GFR SERPL CREATININE-BSD FRML MDRD: >90 ML/MIN/1.73M2
GLUCOSE SERPL-MCNC: 131 MG/DL (ref 70–99)
HCT VFR BLD AUTO: 40.1 % (ref 35–47)
HGB BLD-MCNC: 13.6 G/DL (ref 11.7–15.7)
IMM GRANULOCYTES # BLD: 0 10E3/UL
IMM GRANULOCYTES NFR BLD: 0 %
LVEF ECHO: NORMAL
LYMPHOCYTES # BLD AUTO: 0.9 10E3/UL (ref 0.8–5.3)
LYMPHOCYTES NFR BLD AUTO: 16 %
MCH RBC QN AUTO: 30.2 PG (ref 26.5–33)
MCHC RBC AUTO-ENTMCNC: 33.9 G/DL (ref 31.5–36.5)
MCV RBC AUTO: 89 FL (ref 78–100)
MONOCYTES # BLD AUTO: 0.4 10E3/UL (ref 0–1.3)
MONOCYTES NFR BLD AUTO: 8 %
NEUTROPHILS # BLD AUTO: 3.8 10E3/UL (ref 1.6–8.3)
NEUTROPHILS NFR BLD AUTO: 72 %
NRBC # BLD AUTO: 0 10E3/UL
NRBC BLD AUTO-RTO: 0 /100
PLATELET # BLD AUTO: 191 10E3/UL (ref 150–450)
POTASSIUM SERPL-SCNC: 3.9 MMOL/L (ref 3.4–5.3)
PROT SERPL-MCNC: 7.2 G/DL (ref 6.4–8.3)
RBC # BLD AUTO: 4.5 10E6/UL (ref 3.8–5.2)
SODIUM SERPL-SCNC: 141 MMOL/L (ref 136–145)
WBC # BLD AUTO: 5.3 10E3/UL (ref 4–11)

## 2023-05-15 PROCEDURE — 80053 COMPREHEN METABOLIC PANEL: CPT | Performed by: INTERNAL MEDICINE

## 2023-05-15 PROCEDURE — 93308 TTE F-UP OR LMTD: CPT | Performed by: INTERNAL MEDICINE

## 2023-05-15 PROCEDURE — 93325 DOPPLER ECHO COLOR FLOW MAPG: CPT | Performed by: INTERNAL MEDICINE

## 2023-05-15 PROCEDURE — 258N000003 HC RX IP 258 OP 636: Performed by: INTERNAL MEDICINE

## 2023-05-15 PROCEDURE — 85025 COMPLETE CBC W/AUTO DIFF WBC: CPT | Performed by: INTERNAL MEDICINE

## 2023-05-15 PROCEDURE — 96375 TX/PRO/DX INJ NEW DRUG ADDON: CPT

## 2023-05-15 PROCEDURE — 96413 CHEMO IV INFUSION 1 HR: CPT

## 2023-05-15 PROCEDURE — 250N000011 HC RX IP 250 OP 636: Performed by: INTERNAL MEDICINE

## 2023-05-15 PROCEDURE — 93321 DOPPLER ECHO F-UP/LMTD STD: CPT | Performed by: INTERNAL MEDICINE

## 2023-05-15 PROCEDURE — 36415 COLL VENOUS BLD VENIPUNCTURE: CPT | Performed by: INTERNAL MEDICINE

## 2023-05-15 RX ORDER — ONDANSETRON 2 MG/ML
8 INJECTION INTRAMUSCULAR; INTRAVENOUS ONCE
Status: COMPLETED | OUTPATIENT
Start: 2023-05-15 | End: 2023-05-15

## 2023-05-15 RX ADMIN — ONDANSETRON 8 MG: 2 INJECTION INTRAMUSCULAR; INTRAVENOUS at 13:02

## 2023-05-15 RX ADMIN — ADO-TRASTUZUMAB EMTANSINE 300 MG: 20 INJECTION, POWDER, LYOPHILIZED, FOR SOLUTION INTRAVENOUS at 13:29

## 2023-05-15 RX ADMIN — SODIUM CHLORIDE 250 ML: 9 INJECTION, SOLUTION INTRAVENOUS at 13:01

## 2023-05-15 ASSESSMENT — PAIN SCALES - GENERAL: PAINLEVEL: MILD PAIN (2)

## 2023-05-15 NOTE — NURSING NOTE
Chief Complaint   Patient presents with     Blood Draw     Labs drawn via piv placed by EMT in lab. VS Taken.      Labs drawn from PIV placed by EMT. Line flushed with saline. Vitals taken. Pt checked in for appointment(s).    Candi Hernandez RN

## 2023-05-15 NOTE — PROGRESS NOTES
Infusion Nursing Note:  Vickie Alvarado presents today for Cycle 9 Kadcyla.      Note: Marni comes to infusion today with no questions or concerns.  She  has knee pain that she rates at 2/10 today and denies any need for intervention at this appointment.  She has been afebrile and denies signs and symptoms of infection including: cough, SOB, sore throat, diarrhea, vomiting, rash, or pain with urination.  She wishes to proceed with today's planned treatment    She has noticed some tingling in her fingers but only when she is driving a long distance.  She will discuss this with Jessy Molina at their visit planned for 6/5    Intravenous Access:  Peripheral IV placed in lab  Positive blood return pre and post kadcyla.      Treatment Conditions:  Component      Latest Ref Rng 5/15/2023  12:22 PM   WBC      4.0 - 11.0 10e3/uL 5.3    RBC Count      3.80 - 5.20 10e6/uL 4.50    Hemoglobin      11.7 - 15.7 g/dL 13.6    Hematocrit      35.0 - 47.0 % 40.1    MCV      78 - 100 fL 89    MCH      26.5 - 33.0 pg 30.2    MCHC      31.5 - 36.5 g/dL 33.9    RDW      10.0 - 15.0 % 13.5    Platelet Count      150 - 450 10e3/uL 191    % Neutrophils      % 72    % Lymphocytes      % 16    % Monocytes      % 8    % Eosinophils      % 3    % Basophils      % 1    % Immature Granulocytes      % 0    NRBCs per 100 WBC      <1 /100 0    Absolute Neutrophils      1.6 - 8.3 10e3/uL 3.8    Absolute Lymphocytes      0.8 - 5.3 10e3/uL 0.9    Absolute Monocytes      0.0 - 1.3 10e3/uL 0.4    Absolute Eosinophils      0.0 - 0.7 10e3/uL 0.2    Absolute Basophils      0.0 - 0.2 10e3/uL 0.0    Absolute Immature Granulocytes      <=0.4 10e3/uL 0.0    Absolute NRBCs      10e3/uL 0.0    Sodium      136 - 145 mmol/L 141    Potassium      3.4 - 5.3 mmol/L 3.9    Chloride      98 - 107 mmol/L 104    Carbon Dioxide (CO2)      22 - 29 mmol/L 28    Anion Gap      7 - 15 mmol/L 9    Urea Nitrogen      8.0 - 23.0 mg/dL 13.7    Creatinine      0.51 - 0.95  mg/dL 0.69    Calcium      8.8 - 10.2 mg/dL 9.6    Glucose      70 - 99 mg/dL 131 (H)    Alkaline Phosphatase      35 - 104 U/L 85    AST      10 - 35 U/L 65 (H)    ALT      10 - 35 U/L 31    Protein Total      6.4 - 8.3 g/dL 7.2    Albumin      3.5 - 5.2 g/dL 4.0    Bilirubin Total      <=1.2 mg/dL 0.4    GFR Estimate      >60 mL/min/1.73m2 >90     EF: 60-65% on 5/15/23  Results reviewed, labs MET treatment parameters, ok to proceed with treatment.      Post Infusion Assessment:  Patient tolerated infusion without incident.       Discharge Plan:   Patient declined prescription refills.  AVS to patient via [employer].  Patient will return 6/5/23 for cycle 10  Patient discharged in stable condition accompanied by: self.  Departure Mode: Ambulatory.      Dionna Quiroz RN

## 2023-05-22 ENCOUNTER — PATIENT OUTREACH (OUTPATIENT)
Dept: ONCOLOGY | Facility: CLINIC | Age: 68
End: 2023-05-22
Payer: MEDICARE

## 2023-05-22 NOTE — PROGRESS NOTES
[facility]: Cancer Care                                                                                          Pt called and left a message reporting she has some questions. Returned call, left a VM with contact information for a return call.     Signature:  Sadie Roberto RN

## 2023-05-23 ENCOUNTER — PATIENT OUTREACH (OUTPATIENT)
Dept: ONCOLOGY | Facility: CLINIC | Age: 68
End: 2023-05-23
Payer: MEDICARE

## 2023-05-23 NOTE — PROGRESS NOTES
[facility]: Cancer Care                                                                                          Pt having sx of popping of the thumb joints- pops in and out. Does have arthritis. Using her hands more, to write with her right hand in particular. Also having some numbness in both hands.   Having waves of Nausea, takes deep breaths through the waves. Saturday vomited had a lot of pressure and it broke the vessel in face. Nothing since.   Increased fatigue has been there throughout whole treatment. This past week feels like more. Discussed fatigue and the cumulative effect from treatment as well as some of the things she can try to offset the fatigue like activity.   Lastly she is asking about the latest Covid booster and if she can get it. She will be going to a reunion in July. Dr Horvath has ok'd pts going forward with the covid booster and let the pt know about that. Let pt know this writer will ask about the issues with her hands and thumbs and update re: the nausea, fatigue and the booster and call her back. Pt verbalized understanding.  .  Called pt to let her know that Dr Horvath did not feel her sxs are related to her breast cancer treatment. Pt verbalized understanding of the information with no further questions, she said she would follow up with PCP.    Signature:  Sadie Roberto, FANNYCC

## 2023-06-02 NOTE — PROGRESS NOTES
Oncology visit:  Date on this visit: Jun 5, 2023    Diagnosis: left breast cancer.    Primary Physician: Gaby Lynn     History Of Present Illness:  Ms. Alvarado is a 67 year old female with a left breast cancer.  She self palpated a mass in her mid left breast.  Bilateral diagnostic mammograms showed a mass in the upper inner left breast.  Ultrasound of the left breast showed a mass at 11:00, 8 cm from the nipple measuring 3.1 cm.  There were no suspicious lymph nodes in the left axilla.  Biopsy of the left breast mass was c/w a grade 3 invasive ductal carcinoma, ER strong in 98%, OR moderate in 70%, and HER2 low by IHC (score 1+).   Oncotype DX recurrence score was 24.  RSClin predicted a 23% risk of distant recurrence in 10 years if treated with endocrine therapy alone and a 9% absolute risk reduction with chemotherapy.  Based on this, she elected to proceed with chemotherapy.    She received neoadjuvant Taxotere and cyclophosphamide chemotherapy from 7/6/2022 - 9/8/2022.  She underwent left breast lumpectomy and left axillary sentinel lymph node procedure under the care of Dr. Cadet on 10/17/2022.  Pathology showed residual grade 2 invasive breast carcinoma (60% ductal and 40% micropapillary) measuring 2.2 cm.  Treatment related changes were present and invasive tumor cellularity was 30%.  There was associated DCIS.  Surgical margins for both invasive carcinoma and DCIS were close, but negative.  There was no lymphovascular invasion and a single sentinel lymph node was negative and without signs of prior involvement (i.e. no tumor bed or fibrotic changes in the lymph node).  Receptors were repeated on the residual invasive malignancy and were found to be ER positive (98%), OR positive (70%), HER-2 equivocal by IHC (2+), and HER-2 positive by FISH. She completed radiation to the left breast (4240 cGy to the breast with 1250 cGy boost to the lumpectomy cavity) on 1/2/2023.  She has been on adjuvant Kadcyla  since 11/21/2022.  On adjuvant letrozole since 12/19/2022.      Interval History:  Ms. Alvarado comes into clinic today for evaluation prior to cycle #10 of adjuvant Kadcyla infusion. She is feeling well. She did have more fatigue the week after C9 as well as mild waves of nausea. She had one episode of emesis. She also has noticed with long car rides driving she has n/t in her hands. This has occurred 3x but with driving only. Notices sometimes her feet feel numb with standing in her cold bathtub.     She has noticed her thumb joints locking up at times. Has history of OA and has lots of nodules on her DIP joints.     No fevers/chills or infectious concerns. No cough, SOB, CP, or edema. No rash.     Past Medical/Surgical History:  Past Medical History:   Diagnosis Date     Aortic stenosis      Breast cancer (H) 06/2022     Hyperlipidemia      Insufficiency fracture of tibia, sequela 09/28/2021     Polyp of colon 05/26/2016   - PVCs    Past Surgical History:   Procedure Laterality Date     BIOPSY NODE SENTINEL Left 10/17/2022    Procedure: LEFT seed localized lumpectomy with sentinel node biopsy;  Surgeon: Alphonso Cadet MD;  Location: [facility] OR     CATARACT IOL, RT/LT Right 2006     HC YAG LASER CAPSULOTOMY Right 2009     LASER VITREOLYSIS, ANTERIOR OD (RIGHT EYE) Right 2007     LUMPECTOMY BREAST WITH SENTINEL NODE, COMBINED Left 10/17/2022    Procedure: LEFT seed localized lumpectomy with sentinel node biopsy;  Surgeon: Alphonso Cadet MD;  Location: [facility] OR     REPOSITION INTRAOCULAR LENS Right 11/18/2014    Procedure: REPOSITION INTRAOCULAR LENS;  Surgeon: Vickie Guerra MD;  Location:  [facility]     VITRECTOMY PARSPLANA WITH 23 GAUGE SYSTEM Right 11/18/2014    Procedure: VITRECTOMY PARSPLANA WITH 23 GAUGE SYSTEM;  Surgeon: Vickie Guerra MD;  Location:  [facility]     Allergies:  Allergies as of 06/05/2023 - Reviewed 06/05/2023   Allergen Reaction Noted     Bactrim [sulfamethoxazole-trimethoprim] Nausea  11/01/2017     Seasonal allergies  11/13/2014     Typhoid vaccines Nausea and Vomiting 05/05/2011     Current Medications:  Current Outpatient Medications   Medication Sig Dispense Refill     calcium carb-cholecalciferol 600-500 MG-UNIT CAPS        Lactobacillus (PROBIOTIC CHILDRENS) CHEW        letrozole (FEMARA) 2.5 MG tablet Take 1 tablet (2.5 mg) by mouth daily 30 tablet 11     loratadine (CLARITIN) 10 MG tablet Take 10 mg by mouth daily Prn, seasonal for ragweed and lilacs       metoprolol succinate ER (TOPROL XL) 25 MG 24 hr tablet Take 1 tablet (25 mg) by mouth daily 90 tablet 3     Omega-3 Fatty Acids (FISH OIL ADULT GUMMIES PO) Take 250 mg by mouth       vitamin D3 (CHOLECALCIFEROL) 50 mcg (2000 units) tablet        ondansetron (ZOFRAN) 8 MG tablet Take 1 tablet (8 mg) by mouth every 8 hours as needed for nausea (Patient not taking: Reported on 1/17/2023) 30 tablet 3     prochlorperazine (COMPAZINE) 10 MG tablet Take 0.5 tablets (5 mg) by mouth every 6 hours as needed for nausea or vomiting (Patient not taking: Reported on 2/20/2023) 30 tablet 2     simvastatin (ZOCOR) 40 MG tablet Take 1 tablet (40 mg) by mouth At Bedtime 90 tablet 3      Family and Social History:  See initial consultation dated 6/14/2022 for further details.  Hereditary Comprehensive 40 Gene Panel was negative for pathogenic mutation.    Physical Exam:  /80   Pulse 71   Temp 98  F (36.7  C) (Oral)   Resp 18   Wt 86 kg (189 lb 9.6 oz)   LMP 05/17/2011   SpO2 95%   BMI 29.93 kg/m    General: Well appearing adult female in NAD.  Alert and oriented x 3.  HEENT:  Normocephalic.  Sclera anicteric.  MMM.  No lesions of the oropharynx.  Lymph:  No palpable cervical, supraclavicular, or axillary LAD.    Chest:  CTA bilaterally.  No wheezes or crackles.  CV:  RRR.   Breast: Deferred   Abd:  Soft/ND/NT +BS   Ext:  No pitting edema of the bilateral lower extremities.    Neuro:  Cranial nerves grossly intact.  Gait is stable.  Psych:   Mood and affect appear normal.    Laboratory/Imaging Studies  ECHO 5/15/23  Interpretation Summary  Left ventricular size, wall motion and function are normal. The ejection  fraction is 60-65%. Global peak LV longitudinal strain is averaged at -17.3%.  This suggests abnormal strain (normal <-18%).  Right ventricular function, chamber size, wall motion, and thickness are  normal.  Trace aortic insufficiency is present. AV leaflets are severely calcified. The  peak velocity across the valve is 3.84cm/s, the peak gradient is 59mmHg with a  mean of 34mmHg. Severe AS is present.  The aorta root is normal. The inferior vena cava cannot be assessed. No  pericardial effusion is present.     Compared to prior study global longitudinal strain is slightly lower.  Otherwise no significant change     06/05/23 09:04   Sodium 138   Potassium 4.1   Chloride 104   Carbon Dioxide (CO2) 26   Urea Nitrogen 14.2   Creatinine 0.72   GFR Estimate >90   Calcium 9.5   Anion Gap 8   Albumin 3.9   Protein Total 7.2   Alkaline Phosphatase 84   ALT 19   AST 44 (H)   Bilirubin Total 0.4   Glucose 98   WBC 5.1   Hemoglobin 13.0   Hematocrit 38.7   Platelet Count 199   RBC Count 4.36   MCV 89   MCH 29.8   MCHC 33.6   RDW 13.7   % Neutrophils 74   % Lymphocytes 14   % Monocytes 8   % Eosinophils 3   % Basophils 1   Absolute Basophils 0.1   Absolute Eosinophils 0.1   Absolute Immature Granulocytes 0.0   Absolute Lymphocytes 0.7 (L)   Absolute Monocytes 0.4   % Immature Granulocytes 0   Absolute Neutrophils 3.8   Absolute NRBCs 0.0   NRBCs per 100 WBC 0         ASSESSMENT/PLAN:  Ms. Alvarado is a 67 year old woman with a clinical stage II, T2N0M0, ER+/MD+/HER2+ IDC of her left breast.  She is s/p 4 cycles of neoadjuvant TC chemotherapy (initial biopsy was HER2 negative) followed by left breast lumpectomy and sentinel lymph node biopsy ([id number], residual tumor HER2 positive). She is s/p adjuvant radiation and has been on adjuvant Kadcyla since  11/21/2022.     1. Left breast carcinoma:   - Receiving adjuvant Kadcyla.  She is tolerating this well.  She will receive C10 of 17 total planned cycles today.  - Continue letrozole, tolerating well. May have some influence in joint affects but hard to say with OA history.   - Recommend provider visits once every 9 weeks while on Kadcyla.  - Bilateral diagnostic mammograms same day as 7/17/2023 infusion.    2.  Personal history of colon polyps:  No change since last visit.  She has a h/o colon polyps.  Colonoscopy 2/23/2022 with removal of 3 polyps, each 2-3 mm (2 tubular adenomas, 1 hyperplastic).  Repeat colonoscopy in 02/2027 is recommended.    3.  At risk for cardiomyopathy:  Echocardiogram performed on 5/15/2023 showed a retained LVEF but a slight change in strain. Will reach out to her cardiologist about starting low dose ACEi    4.  Osteopenia:  She is at risk of bone loss on aromatase inhibitor therapy.  DEXA bone density scan on 12/1/2022 with a lowest T-score of -2.2 c/w osteopenia. Zometa 4 mg IV once every 6 months for both prevention of osteoporosis and prevention of breast cancer bone metastases was initiated on 1/10/2023.   - Next Zometa due 7/2023    5.  Fatigue:  Was a little worse last cycle. Reviewed fatigue can be cumulative. She is working on getting more exercise.     6. Transient n/t: Hand symptoms are not kadcyla related. Positional impingement. Will monitor feet.     7.  Lymphocytopenia:  Reviewed low lymphocyte count may predispose to viral infections.  Recommend continuing to adhere to infectious precautions such as hand washing and avoiding close contact with people who have symptoms of viral illness.    8. Finger OA: Follow-up with PCP for hand xrays. Trial of Voltaren.     Greater than 60 minutes was spent with this patient with greater than 40 minutes spent in counseling and coordination of care.    Jessy Molina PA-C

## 2023-06-05 ENCOUNTER — APPOINTMENT (OUTPATIENT)
Dept: LAB | Facility: CLINIC | Age: 68
End: 2023-06-05
Attending: INTERNAL MEDICINE
Payer: MEDICARE

## 2023-06-05 ENCOUNTER — ONCOLOGY VISIT (OUTPATIENT)
Dept: ONCOLOGY | Facility: CLINIC | Age: 68
End: 2023-06-05
Attending: INTERNAL MEDICINE
Payer: MEDICARE

## 2023-06-05 VITALS
WEIGHT: 189.6 LBS | OXYGEN SATURATION: 95 % | DIASTOLIC BLOOD PRESSURE: 80 MMHG | TEMPERATURE: 98 F | SYSTOLIC BLOOD PRESSURE: 123 MMHG | HEART RATE: 71 BPM | BODY MASS INDEX: 29.93 KG/M2 | RESPIRATION RATE: 18 BRPM

## 2023-06-05 DIAGNOSIS — C50.212 MALIGNANT NEOPLASM OF UPPER-INNER QUADRANT OF LEFT BREAST IN FEMALE, ESTROGEN RECEPTOR POSITIVE (H): Primary | ICD-10-CM

## 2023-06-05 DIAGNOSIS — Z17.0 MALIGNANT NEOPLASM OF UPPER-INNER QUADRANT OF LEFT BREAST IN FEMALE, ESTROGEN RECEPTOR POSITIVE (H): Primary | ICD-10-CM

## 2023-06-05 DIAGNOSIS — Z51.11 ENCOUNTER FOR ANTINEOPLASTIC CHEMOTHERAPY: Primary | ICD-10-CM

## 2023-06-05 DIAGNOSIS — C50.212 MALIGNANT NEOPLASM OF UPPER-INNER QUADRANT OF LEFT BREAST IN FEMALE, ESTROGEN RECEPTOR POSITIVE (H): ICD-10-CM

## 2023-06-05 DIAGNOSIS — Z17.0 MALIGNANT NEOPLASM OF UPPER-INNER QUADRANT OF LEFT BREAST IN FEMALE, ESTROGEN RECEPTOR POSITIVE (H): ICD-10-CM

## 2023-06-05 LAB
ALBUMIN SERPL BCG-MCNC: 3.9 G/DL (ref 3.5–5.2)
ALP SERPL-CCNC: 84 U/L (ref 35–104)
ALT SERPL W P-5'-P-CCNC: 19 U/L (ref 10–35)
ANION GAP SERPL CALCULATED.3IONS-SCNC: 8 MMOL/L (ref 7–15)
AST SERPL W P-5'-P-CCNC: 44 U/L (ref 10–35)
BASOPHILS # BLD AUTO: 0.1 10E3/UL (ref 0–0.2)
BASOPHILS NFR BLD AUTO: 1 %
BILIRUB SERPL-MCNC: 0.4 MG/DL
BUN SERPL-MCNC: 14.2 MG/DL (ref 8–23)
CALCIUM SERPL-MCNC: 9.5 MG/DL (ref 8.8–10.2)
CHLORIDE SERPL-SCNC: 104 MMOL/L (ref 98–107)
CREAT SERPL-MCNC: 0.72 MG/DL (ref 0.51–0.95)
DEPRECATED HCO3 PLAS-SCNC: 26 MMOL/L (ref 22–29)
EOSINOPHIL # BLD AUTO: 0.1 10E3/UL (ref 0–0.7)
EOSINOPHIL NFR BLD AUTO: 3 %
ERYTHROCYTE [DISTWIDTH] IN BLOOD BY AUTOMATED COUNT: 13.7 % (ref 10–15)
GFR SERPL CREATININE-BSD FRML MDRD: >90 ML/MIN/1.73M2
GLUCOSE SERPL-MCNC: 98 MG/DL (ref 70–99)
HCT VFR BLD AUTO: 38.7 % (ref 35–47)
HGB BLD-MCNC: 13 G/DL (ref 11.7–15.7)
IMM GRANULOCYTES # BLD: 0 10E3/UL
IMM GRANULOCYTES NFR BLD: 0 %
LYMPHOCYTES # BLD AUTO: 0.7 10E3/UL (ref 0.8–5.3)
LYMPHOCYTES NFR BLD AUTO: 14 %
MCH RBC QN AUTO: 29.8 PG (ref 26.5–33)
MCHC RBC AUTO-ENTMCNC: 33.6 G/DL (ref 31.5–36.5)
MCV RBC AUTO: 89 FL (ref 78–100)
MONOCYTES # BLD AUTO: 0.4 10E3/UL (ref 0–1.3)
MONOCYTES NFR BLD AUTO: 8 %
NEUTROPHILS # BLD AUTO: 3.8 10E3/UL (ref 1.6–8.3)
NEUTROPHILS NFR BLD AUTO: 74 %
NRBC # BLD AUTO: 0 10E3/UL
NRBC BLD AUTO-RTO: 0 /100
PLATELET # BLD AUTO: 199 10E3/UL (ref 150–450)
POTASSIUM SERPL-SCNC: 4.1 MMOL/L (ref 3.4–5.3)
PROT SERPL-MCNC: 7.2 G/DL (ref 6.4–8.3)
RBC # BLD AUTO: 4.36 10E6/UL (ref 3.8–5.2)
SODIUM SERPL-SCNC: 138 MMOL/L (ref 136–145)
WBC # BLD AUTO: 5.1 10E3/UL (ref 4–11)

## 2023-06-05 PROCEDURE — 96375 TX/PRO/DX INJ NEW DRUG ADDON: CPT

## 2023-06-05 PROCEDURE — 258N000003 HC RX IP 258 OP 636: Performed by: PHYSICIAN ASSISTANT

## 2023-06-05 PROCEDURE — 80053 COMPREHEN METABOLIC PANEL: CPT | Performed by: PHYSICIAN ASSISTANT

## 2023-06-05 PROCEDURE — 36415 COLL VENOUS BLD VENIPUNCTURE: CPT | Performed by: PHYSICIAN ASSISTANT

## 2023-06-05 PROCEDURE — 85025 COMPLETE CBC W/AUTO DIFF WBC: CPT | Performed by: PHYSICIAN ASSISTANT

## 2023-06-05 PROCEDURE — 99215 OFFICE O/P EST HI 40 MIN: CPT | Performed by: PHYSICIAN ASSISTANT

## 2023-06-05 PROCEDURE — G0463 HOSPITAL OUTPT CLINIC VISIT: HCPCS | Performed by: PHYSICIAN ASSISTANT

## 2023-06-05 PROCEDURE — 96413 CHEMO IV INFUSION 1 HR: CPT

## 2023-06-05 PROCEDURE — 250N000011 HC RX IP 250 OP 636: Performed by: PHYSICIAN ASSISTANT

## 2023-06-05 RX ORDER — EPINEPHRINE 1 MG/ML
0.3 INJECTION, SOLUTION INTRAMUSCULAR; SUBCUTANEOUS EVERY 5 MIN PRN
Status: CANCELLED | OUTPATIENT
Start: 2023-06-05

## 2023-06-05 RX ORDER — LORAZEPAM 2 MG/ML
0.5 INJECTION INTRAMUSCULAR EVERY 4 HOURS PRN
Status: CANCELLED | OUTPATIENT
Start: 2023-06-05

## 2023-06-05 RX ORDER — ONDANSETRON 2 MG/ML
8 INJECTION INTRAMUSCULAR; INTRAVENOUS ONCE
Status: COMPLETED | OUTPATIENT
Start: 2023-06-05 | End: 2023-06-05

## 2023-06-05 RX ORDER — HEPARIN SODIUM (PORCINE) LOCK FLUSH IV SOLN 100 UNIT/ML 100 UNIT/ML
5 SOLUTION INTRAVENOUS
Status: CANCELLED | OUTPATIENT
Start: 2023-06-05

## 2023-06-05 RX ORDER — METHYLPREDNISOLONE SODIUM SUCCINATE 125 MG/2ML
125 INJECTION, POWDER, LYOPHILIZED, FOR SOLUTION INTRAMUSCULAR; INTRAVENOUS
Status: CANCELLED
Start: 2023-06-05

## 2023-06-05 RX ORDER — ALBUTEROL SULFATE 90 UG/1
1-2 AEROSOL, METERED RESPIRATORY (INHALATION)
Status: CANCELLED
Start: 2023-06-05

## 2023-06-05 RX ORDER — MEPERIDINE HYDROCHLORIDE 25 MG/ML
25 INJECTION INTRAMUSCULAR; INTRAVENOUS; SUBCUTANEOUS EVERY 30 MIN PRN
Status: CANCELLED | OUTPATIENT
Start: 2023-06-05

## 2023-06-05 RX ORDER — HEPARIN SODIUM,PORCINE 10 UNIT/ML
5 VIAL (ML) INTRAVENOUS
Status: CANCELLED | OUTPATIENT
Start: 2023-06-05

## 2023-06-05 RX ORDER — ALBUTEROL SULFATE 0.83 MG/ML
2.5 SOLUTION RESPIRATORY (INHALATION)
Status: CANCELLED | OUTPATIENT
Start: 2023-06-05

## 2023-06-05 RX ORDER — DIPHENHYDRAMINE HYDROCHLORIDE 50 MG/ML
50 INJECTION INTRAMUSCULAR; INTRAVENOUS
Status: CANCELLED
Start: 2023-06-05

## 2023-06-05 RX ORDER — ONDANSETRON 2 MG/ML
8 INJECTION INTRAMUSCULAR; INTRAVENOUS ONCE
Status: CANCELLED | OUTPATIENT
Start: 2023-06-05

## 2023-06-05 RX ADMIN — ADO-TRASTUZUMAB EMTANSINE 300 MG: 20 INJECTION, POWDER, LYOPHILIZED, FOR SOLUTION INTRAVENOUS at 11:01

## 2023-06-05 RX ADMIN — SODIUM CHLORIDE 250 ML: 9 INJECTION, SOLUTION INTRAVENOUS at 10:21

## 2023-06-05 RX ADMIN — ONDANSETRON 8 MG: 2 INJECTION INTRAMUSCULAR; INTRAVENOUS at 10:21

## 2023-06-05 ASSESSMENT — PAIN SCALES - GENERAL
PAINLEVEL: NO PAIN (0)
PAINLEVEL: NO PAIN (0)

## 2023-06-05 NOTE — LETTER
6/5/2023         RE: Vickie Alvarado  [address]  [facility] [address]        Dear Colleague,    Thank you for referring your patient, Vickie Alvarado, to the [facility]. Please see a copy of my visit note below.    Oncology visit:  Date on this visit: Jun 5, 2023    Diagnosis: left breast cancer.    Primary Physician: Gaby Lynn     History Of Present Illness:  Ms. Alvarado is a 67 year old female with a left breast cancer.  She self palpated a mass in her mid left breast.  Bilateral diagnostic mammograms showed a mass in the upper inner left breast.  Ultrasound of the left breast showed a mass at 11:00, 8 cm from the nipple measuring 3.1 cm.  There were no suspicious lymph nodes in the left axilla.  Biopsy of the left breast mass was c/w a grade 3 invasive ductal carcinoma, ER strong in 98%, IL moderate in 70%, and HER2 low by IHC (score 1+).   Oncotype DX recurrence score was 24.  RSClin predicted a 23% risk of distant recurrence in 10 years if treated with endocrine therapy alone and a 9% absolute risk reduction with chemotherapy.  Based on this, she elected to proceed with chemotherapy.    She received neoadjuvant Taxotere and cyclophosphamide chemotherapy from 7/6/2022 - 9/8/2022.  She underwent left breast lumpectomy and left axillary sentinel lymph node procedure under the care of Dr. Cadet on 10/17/2022.  Pathology showed residual grade 2 invasive breast carcinoma (60% ductal and 40% micropapillary) measuring 2.2 cm.  Treatment related changes were present and invasive tumor cellularity was 30%.  There was associated DCIS.  Surgical margins for both invasive carcinoma and DCIS were close, but negative.  There was no lymphovascular invasion and a single sentinel lymph node was negative and without signs of prior involvement (i.e. no tumor bed or fibrotic changes in the lymph node).  Receptors were repeated on the residual invasive malignancy and were found to  be ER positive (98%), ND positive (70%), HER-2 equivocal by IHC (2+), and HER-2 positive by FISH. She completed radiation to the left breast (4240 cGy to the breast with 1250 cGy boost to the lumpectomy cavity) on 1/2/2023.  She has been on adjuvant Kadcyla since 11/21/2022.  On adjuvant letrozole since 12/19/2022.      Interval History:  Ms. Alvarado comes into clinic today for evaluation prior to cycle #10 of adjuvant Kadcyla infusion. She is feeling well. She did have more fatigue the week after C9 as well as mild waves of nausea. She had one episode of emesis. She also has noticed with long car rides driving she has n/t in her hands. This has occurred 3x but with driving only. Notices sometimes her feet feel numb with standing in her cold bathtub.     She has noticed her thumb joints locking up at times. Has history of OA and has lots of nodules on her DIP joints.     No fevers/chills or infectious concerns. No cough, SOB, CP, or edema. No rash.     Past Medical/Surgical History:  Past Medical History:   Diagnosis Date    Aortic stenosis     Breast cancer (H) 06/2022    Hyperlipidemia     Insufficiency fracture of tibia, sequela 09/28/2021    Polyp of colon 05/26/2016   - PVCs    Past Surgical History:   Procedure Laterality Date    BIOPSY NODE SENTINEL Left 10/17/2022    Procedure: LEFT seed localized lumpectomy with sentinel node biopsy;  Surgeon: Alphonso Cadet MD;  Location: [facility] OR    CATARACT IOL, RT/LT Right 2006    HC YAG LASER CAPSULOTOMY Right 2009    LASER VITREOLYSIS, ANTERIOR OD (RIGHT EYE) Right 2007    LUMPECTOMY BREAST WITH SENTINEL NODE, COMBINED Left 10/17/2022    Procedure: LEFT seed localized lumpectomy with sentinel node biopsy;  Surgeon: Alphonso Cadet MD;  [place] [facility]    REPOSITION INTRAOCULAR LENS Right 11/18/2014    Procedure: REPOSITION INTRAOCULAR LENS;  Surgeon: Vickie Guerra MD;  Location:  [facility]    VITRECTOMY PARSPLANA WITH 23 GAUGE SYSTEM Right 11/18/2014     Procedure: VITRECTOMY PARSPLANA WITH 23 GAUGE SYSTEM;  Surgeon: Vickie Guerra MD;  Location: [facility]     Allergies:  Allergies as of 06/05/2023 - Reviewed 06/05/2023   Allergen Reaction Noted    Bactrim [sulfamethoxazole-trimethoprim] Nausea 11/01/2017    Seasonal allergies  11/13/2014    Typhoid vaccines Nausea and Vomiting 05/05/2011     Current Medications:  Current Outpatient Medications   Medication Sig Dispense Refill    calcium carb-cholecalciferol 600-500 MG-UNIT CAPS       Lactobacillus (PROBIOTIC CHILDRENS) CHEW       letrozole (FEMARA) 2.5 MG tablet Take 1 tablet (2.5 mg) by mouth daily 30 tablet 11    loratadine (CLARITIN) 10 MG tablet Take 10 mg by mouth daily Prn, seasonal for ragweed and lilacs      metoprolol succinate ER (TOPROL XL) 25 MG 24 hr tablet Take 1 tablet (25 mg) by mouth daily 90 tablet 3    Omega-3 Fatty Acids (FISH OIL ADULT GUMMIES PO) Take 250 mg by mouth      vitamin D3 (CHOLECALCIFEROL) 50 mcg (2000 units) tablet       ondansetron (ZOFRAN) 8 MG tablet Take 1 tablet (8 mg) by mouth every 8 hours as needed for nausea (Patient not taking: Reported on 1/17/2023) 30 tablet 3    prochlorperazine (COMPAZINE) 10 MG tablet Take 0.5 tablets (5 mg) by mouth every 6 hours as needed for nausea or vomiting (Patient not taking: Reported on 2/20/2023) 30 tablet 2    simvastatin (ZOCOR) 40 MG tablet Take 1 tablet (40 mg) by mouth At Bedtime 90 tablet 3      Family and Social History:  See initial consultation dated 6/14/2022 for further details.  Hereditary Comprehensive 40 Gene Panel was negative for pathogenic mutation.    Physical Exam:  /80   Pulse 71   Temp 98  F (36.7  C) (Oral)   Resp 18   Wt 86 kg (189 lb 9.6 oz)   LMP 05/17/2011   SpO2 95%   BMI 29.93 kg/m    General: Well appearing adult female in NAD.  Alert and oriented x 3.  HEENT:  Normocephalic.  Sclera anicteric.  MMM.  No lesions of the oropharynx.  Lymph:  No palpable cervical, supraclavicular, or axillary  LAD.    Chest:  CTA bilaterally.  No wheezes or crackles.  CV:  RRR.   Breast: Deferred   Abd:  Soft/ND/NT +BS   Ext:  No pitting edema of the bilateral lower extremities.    Neuro:  Cranial nerves grossly intact.  Gait is stable.  Psych:  Mood and affect appear normal.    Laboratory/Imaging Studies  ECHO 5/15/23  Interpretation Summary  Left ventricular size, wall motion and function are normal. The ejection  fraction is 60-65%. Global peak LV longitudinal strain is averaged at -17.3%.  This suggests abnormal strain (normal <-18%).  Right ventricular function, chamber size, wall motion, and thickness are  normal.  Trace aortic insufficiency is present. AV leaflets are severely calcified. The  peak velocity across the valve is 3.84cm/s, the peak gradient is 59mmHg with a  mean of 34mmHg. Severe AS is present.  The aorta root is normal. The inferior vena cava cannot be assessed. No  pericardial effusion is present.     Compared to prior study global longitudinal strain is slightly lower.  Otherwise no significant change     06/05/23 09:04   Sodium 138   Potassium 4.1   Chloride 104   Carbon Dioxide (CO2) 26   Urea Nitrogen 14.2   Creatinine 0.72   GFR Estimate >90   Calcium 9.5   Anion Gap 8   Albumin 3.9   Protein Total 7.2   Alkaline Phosphatase 84   ALT 19   AST 44 (H)   Bilirubin Total 0.4   Glucose 98   WBC 5.1   Hemoglobin 13.0   Hematocrit 38.7   Platelet Count 199   RBC Count 4.36   MCV 89   MCH 29.8   MCHC 33.6   RDW 13.7   % Neutrophils 74   % Lymphocytes 14   % Monocytes 8   % Eosinophils 3   % Basophils 1   Absolute Basophils 0.1   Absolute Eosinophils 0.1   Absolute Immature Granulocytes 0.0   Absolute Lymphocytes 0.7 (L)   Absolute Monocytes 0.4   % Immature Granulocytes 0   Absolute Neutrophils 3.8   Absolute NRBCs 0.0   NRBCs per 100 WBC 0         ASSESSMENT/PLAN:  Ms. Alvarado is a 67 year old woman with a clinical stage II, T2N0M0, ER+/PA+/HER2+ IDC of her left breast.  She is s/p 4 cycles of  neoadjuvant TC chemotherapy (initial biopsy was HER2 negative) followed by left breast lumpectomy and sentinel lymph node biopsy ([id number], residual tumor HER2 positive). She is s/p adjuvant radiation and has been on adjuvant Kadcyla since 11/21/2022.     1. Left breast carcinoma:   - Receiving adjuvant Kadcyla.  She is tolerating this well.  She will receive C10 of 17 total planned cycles today.  - Continue letrozole, tolerating well. May have some influence in joint affects but hard to say with OA history.   - Recommend provider visits once every 9 weeks while on Kadcyla.  - Bilateral diagnostic mammograms same day as 7/17/2023 infusion.    2.  Personal history of colon polyps:  No change since last visit.  She has a h/o colon polyps.  Colonoscopy 2/23/2022 with removal of 3 polyps, each 2-3 mm (2 tubular adenomas, 1 hyperplastic).  Repeat colonoscopy in 02/2027 is recommended.    3.  At risk for cardiomyopathy:  Echocardiogram performed on 5/15/2023 showed a retained LVEF but a slight change in strain. Will reach out to her cardiologist about starting low dose ACEi    4.  Osteopenia:  She is at risk of bone loss on aromatase inhibitor therapy.  DEXA bone density scan on 12/1/2022 with a lowest T-score of -2.2 c/w osteopenia. Zometa 4 mg IV once every 6 months for both prevention of osteoporosis and prevention of breast cancer bone metastases was initiated on 1/10/2023.   - Next Zometa due 7/2023    5.  Fatigue:  Was a little worse last cycle. Reviewed fatigue can be cumulative. She is working on getting more exercise.     6. Transient n/t: Hand symptoms are not kadcyla related. Positional impingement. Will monitor feet.     7.  Lymphocytopenia:  Reviewed low lymphocyte count may predispose to viral infections.  Recommend continuing to adhere to infectious precautions such as hand washing and avoiding close contact with people who have symptoms of viral illness.    8. Finger OA: Follow-up with PCP for hand xrays.  Trial of Voltaren.     Greater than 60 minutes was spent with this patient with greater than 40 minutes spent in counseling and coordination of care.    Jessy Molina PA-C

## 2023-06-05 NOTE — PATIENT INSTRUCTIONS
Contact Numbers  [facility]: [phone] (for symptom and scheduling needs)    Please call the [facility] Triage line if you experience a temperature greater than or equal to 100.4, shaking chills, have uncontrolled nausea, vomiting and/or diarrhea, dizziness, shortness of breath, chest pain, bleeding, unexplained bruising, or if you have any other new/concerning symptoms, questions or concerns.     If you are having any concerning symptoms or wish to speak to a provider before your next infusion visit, please call your care coordinator or triage to notify them so we can adequately serve you.     If you need a refill on a narcotic prescription or other medication, please call triage before your infusion appointment.           June 2023 Sunday Monday Tuesday Wednesday Thursday Friday Saturday                       1     2     3       4     5    LAB PERIPHERAL   8:30 AM   (15 min.)   [facility] LAB DRAW   [facility]    RETURN CCSL   8:45 AM   (45 min.)   Jessy Molina PA-C   [facility]    ONC INFUSION 1 HR (60 MIN)  10:00 AM   (60 min.)    ONC INFUSION NURSE   [facility] 6     7     8     9     10       11     12     13     14     15     16     17       18     19     20     21     22     23     24       25     26    LAB PERIPHERAL   1:30 PM   (15 min.)   [facility] LAB DRAW   [facility]    ONC INFUSION 1 HR (60 MIN)   2:00 PM   (60 min.)    ONC INFUSION NURSE   [facility] 27     28     29     30                        July 2023 Sunday Monday Tuesday Wednesday Thursday Friday Saturday                                 1       2     3     4     5     6     7     8       9     10     11  Happy Birthday!     12     13     14     15       16     17    MA DIAGNOSTIC DIGITAL BILAT   9:15 AM   (30 min.)   [id number]   [facility]  [place]    LAB PERIPHERAL  10:00 AM   (15 min.)   [facility] LAB DRAW   [facility]    ONC INFUSION 1.5 HR (90 MIN)  10:30 AM   (90 min.)    ONC INFUSION NURSE   [facility] 18     19    [facility] RETURN  11:00 AM   (20 min.)   Gaby Lynn MD   [facility] [place] 20     21     22       23     24     25    NEW ADULT GLAUCOMA  12:55 PM   (10 min.)   Steve Vargas MD   [facility] [place] 26     27     28     29       30     31                                                Lab Results:  Recent Results (from the past 12 hour(s))   Comprehensive metabolic panel    Collection Time: 06/05/23  9:04 AM   Result Value Ref Range    Sodium 138 136 - 145 mmol/L    Potassium 4.1 3.4 - 5.3 mmol/L    Chloride 104 98 - 107 mmol/L    Carbon Dioxide (CO2) 26 22 - 29 mmol/L    Anion Gap 8 7 - 15 mmol/L    Urea Nitrogen 14.2 8.0 - 23.0 mg/dL    Creatinine 0.72 0.51 - 0.95 mg/dL    Calcium 9.5 8.8 - 10.2 mg/dL    Glucose 98 70 - 99 mg/dL    Alkaline Phosphatase 84 35 - 104 U/L    AST 44 (H) 10 - 35 U/L    ALT 19 10 - 35 U/L    Protein Total 7.2 6.4 - 8.3 g/dL    Albumin 3.9 3.5 - 5.2 g/dL    Bilirubin Total 0.4 <=1.2 mg/dL    GFR Estimate >90 >60 mL/min/1.73m2   CBC with platelets and differential    Collection Time: 06/05/23  9:04 AM   Result Value Ref Range    WBC Count 5.1 4.0 - 11.0 10e3/uL    RBC Count 4.36 3.80 - 5.20 10e6/uL    Hemoglobin 13.0 11.7 - 15.7 g/dL    Hematocrit 38.7 35.0 - 47.0 %    MCV 89 78 - 100 fL    MCH 29.8 26.5 - 33.0 pg    MCHC 33.6 31.5 - 36.5 g/dL    RDW 13.7 10.0 - 15.0 %    Platelet Count 199 150 - 450 10e3/uL    % Neutrophils 74 %    % Lymphocytes 14 %    % Monocytes 8 %    % Eosinophils 3 %    % Basophils 1 %    % Immature Granulocytes 0 %    NRBCs per 100 WBC 0 <1 /100    Absolute Neutrophils 3.8 1.6 - 8.3 10e3/uL    Absolute Lymphocytes 0.7 (L) 0.8 - 5.3 10e3/uL    Absolute Monocytes 0.4 0.0 - 1.3 10e3/uL     Absolute Eosinophils 0.1 0.0 - 0.7 10e3/uL    Absolute Basophils 0.1 0.0 - 0.2 10e3/uL    Absolute Immature Granulocytes 0.0 <=0.4 10e3/uL    Absolute NRBCs 0.0 10e3/uL

## 2023-06-05 NOTE — NURSING NOTE
Chief Complaint   Patient presents with     Oncology Clinic Visit     Breast CA     Blood Draw     Vitals, blood drawn and PIV placed by LPN. Pt checked into [facility].     NEDRA Del Rosario LPN

## 2023-06-05 NOTE — PROGRESS NOTES
Infusion Nursing Note:  Vickie Alvarado presents today for Cycle 10 Day 1 Kadcyla.    Patient seen by provider today: Yes: IVETTE Patino   present during visit today: Not Applicable.    Note: Patient presents to infusion today doing well. No new questions or concerns following her visit with IVETTE Patino.     Intravenous Access:  Peripheral IV placed.    Treatment Conditions:  Lab Results   Component Value Date    HGB 13.0 06/05/2023    WBC 5.1 06/05/2023    ANEU 4.1 09/08/2014    ANEUTAUTO 3.8 06/05/2023     06/05/2023      Lab Results   Component Value Date     06/05/2023    POTASSIUM 4.1 06/05/2023    MAG 2.2 08/20/2021    CR 0.72 06/05/2023    VALENTINA 9.5 06/05/2023    BILITOTAL 0.4 06/05/2023    ALBUMIN 3.9 06/05/2023    ALT 19 06/05/2023    AST 44 (H) 06/05/2023     Results reviewed, labs MET treatment parameters, ok to proceed with treatment.  ECHO/MUGA completed 5/15/2023  EF 60-65%.    Post Infusion Assessment:  Patient tolerated infusion without incident.  Blood return noted pre and post infusion.  Site patent and intact, free from redness, edema or discomfort.  No evidence of extravasations.  Access discontinued per protocol.     Discharge Plan:   Patient declined prescription refills.  Discharge instructions reviewed with: Patient.  Patient and/or family verbalized understanding of discharge instructions and all questions answered.  AVS to patient via [employer].  Patient will return 6/26 for next appointment.   Patient discharged in stable condition accompanied by: self.  Departure Mode: Ambulatory.      Stephy Harris RN

## 2023-06-23 RX ORDER — LORAZEPAM 2 MG/ML
0.5 INJECTION INTRAMUSCULAR EVERY 4 HOURS PRN
Status: CANCELLED | OUTPATIENT
Start: 2023-06-26

## 2023-06-23 RX ORDER — ALBUTEROL SULFATE 0.83 MG/ML
2.5 SOLUTION RESPIRATORY (INHALATION)
Status: CANCELLED | OUTPATIENT
Start: 2023-06-26

## 2023-06-23 RX ORDER — ONDANSETRON 2 MG/ML
8 INJECTION INTRAMUSCULAR; INTRAVENOUS ONCE
Status: CANCELLED | OUTPATIENT
Start: 2023-06-26

## 2023-06-23 RX ORDER — METHYLPREDNISOLONE SODIUM SUCCINATE 125 MG/2ML
125 INJECTION, POWDER, LYOPHILIZED, FOR SOLUTION INTRAMUSCULAR; INTRAVENOUS
Status: CANCELLED
Start: 2023-06-26

## 2023-06-23 RX ORDER — ALBUTEROL SULFATE 90 UG/1
1-2 AEROSOL, METERED RESPIRATORY (INHALATION)
Status: CANCELLED
Start: 2023-06-26

## 2023-06-23 RX ORDER — DIPHENHYDRAMINE HYDROCHLORIDE 50 MG/ML
50 INJECTION INTRAMUSCULAR; INTRAVENOUS
Status: CANCELLED
Start: 2023-06-26

## 2023-06-23 RX ORDER — HEPARIN SODIUM (PORCINE) LOCK FLUSH IV SOLN 100 UNIT/ML 100 UNIT/ML
5 SOLUTION INTRAVENOUS
Status: CANCELLED | OUTPATIENT
Start: 2023-06-26

## 2023-06-23 RX ORDER — HEPARIN SODIUM,PORCINE 10 UNIT/ML
5 VIAL (ML) INTRAVENOUS
Status: CANCELLED | OUTPATIENT
Start: 2023-06-26

## 2023-06-23 RX ORDER — MEPERIDINE HYDROCHLORIDE 25 MG/ML
25 INJECTION INTRAMUSCULAR; INTRAVENOUS; SUBCUTANEOUS EVERY 30 MIN PRN
Status: CANCELLED | OUTPATIENT
Start: 2023-06-26

## 2023-06-23 RX ORDER — EPINEPHRINE 1 MG/ML
0.3 INJECTION, SOLUTION INTRAMUSCULAR; SUBCUTANEOUS EVERY 5 MIN PRN
Status: CANCELLED | OUTPATIENT
Start: 2023-06-26

## 2023-06-26 ENCOUNTER — INFUSION THERAPY VISIT (OUTPATIENT)
Dept: ONCOLOGY | Facility: CLINIC | Age: 68
End: 2023-06-26
Attending: INTERNAL MEDICINE
Payer: MEDICARE

## 2023-06-26 ENCOUNTER — APPOINTMENT (OUTPATIENT)
Dept: LAB | Facility: CLINIC | Age: 68
End: 2023-06-26
Attending: INTERNAL MEDICINE
Payer: MEDICARE

## 2023-06-26 VITALS
SYSTOLIC BLOOD PRESSURE: 138 MMHG | TEMPERATURE: 97.6 F | BODY MASS INDEX: 29.7 KG/M2 | HEART RATE: 87 BPM | WEIGHT: 188.1 LBS | RESPIRATION RATE: 16 BRPM | DIASTOLIC BLOOD PRESSURE: 87 MMHG | OXYGEN SATURATION: 96 %

## 2023-06-26 DIAGNOSIS — Z51.11 ENCOUNTER FOR ANTINEOPLASTIC CHEMOTHERAPY: Primary | ICD-10-CM

## 2023-06-26 DIAGNOSIS — C50.212 MALIGNANT NEOPLASM OF UPPER-INNER QUADRANT OF LEFT BREAST IN FEMALE, ESTROGEN RECEPTOR POSITIVE (H): ICD-10-CM

## 2023-06-26 DIAGNOSIS — Z17.0 MALIGNANT NEOPLASM OF UPPER-INNER QUADRANT OF LEFT BREAST IN FEMALE, ESTROGEN RECEPTOR POSITIVE (H): ICD-10-CM

## 2023-06-26 LAB
ALBUMIN SERPL BCG-MCNC: 3.9 G/DL (ref 3.5–5.2)
ALP SERPL-CCNC: 81 U/L (ref 35–104)
ALT SERPL W P-5'-P-CCNC: 24 U/L (ref 0–50)
ANION GAP SERPL CALCULATED.3IONS-SCNC: 10 MMOL/L (ref 7–15)
AST SERPL W P-5'-P-CCNC: 54 U/L (ref 0–45)
BASOPHILS # BLD AUTO: 0.1 10E3/UL (ref 0–0.2)
BASOPHILS NFR BLD AUTO: 1 %
BILIRUB SERPL-MCNC: 0.4 MG/DL
BUN SERPL-MCNC: 9.6 MG/DL (ref 8–23)
CALCIUM SERPL-MCNC: 9.4 MG/DL (ref 8.8–10.2)
CHLORIDE SERPL-SCNC: 104 MMOL/L (ref 98–107)
CREAT SERPL-MCNC: 0.76 MG/DL (ref 0.51–0.95)
DEPRECATED HCO3 PLAS-SCNC: 26 MMOL/L (ref 22–29)
EOSINOPHIL # BLD AUTO: 0.2 10E3/UL (ref 0–0.7)
EOSINOPHIL NFR BLD AUTO: 4 %
ERYTHROCYTE [DISTWIDTH] IN BLOOD BY AUTOMATED COUNT: 13.9 % (ref 10–15)
GFR SERPL CREATININE-BSD FRML MDRD: 85 ML/MIN/1.73M2
GLUCOSE SERPL-MCNC: 106 MG/DL (ref 70–99)
HCT VFR BLD AUTO: 38.5 % (ref 35–47)
HGB BLD-MCNC: 13 G/DL (ref 11.7–15.7)
IMM GRANULOCYTES # BLD: 0 10E3/UL
IMM GRANULOCYTES NFR BLD: 0 %
LYMPHOCYTES # BLD AUTO: 1 10E3/UL (ref 0.8–5.3)
LYMPHOCYTES NFR BLD AUTO: 20 %
MCH RBC QN AUTO: 30.1 PG (ref 26.5–33)
MCHC RBC AUTO-ENTMCNC: 33.8 G/DL (ref 31.5–36.5)
MCV RBC AUTO: 89 FL (ref 78–100)
MONOCYTES # BLD AUTO: 0.5 10E3/UL (ref 0–1.3)
MONOCYTES NFR BLD AUTO: 9 %
NEUTROPHILS # BLD AUTO: 3.4 10E3/UL (ref 1.6–8.3)
NEUTROPHILS NFR BLD AUTO: 66 %
NRBC # BLD AUTO: 0 10E3/UL
NRBC BLD AUTO-RTO: 0 /100
PLATELET # BLD AUTO: 185 10E3/UL (ref 150–450)
POTASSIUM SERPL-SCNC: 3.8 MMOL/L (ref 3.4–5.3)
PROT SERPL-MCNC: 7.1 G/DL (ref 6.4–8.3)
RBC # BLD AUTO: 4.32 10E6/UL (ref 3.8–5.2)
SODIUM SERPL-SCNC: 140 MMOL/L (ref 136–145)
WBC # BLD AUTO: 5.1 10E3/UL (ref 4–11)

## 2023-06-26 PROCEDURE — 80053 COMPREHEN METABOLIC PANEL: CPT | Performed by: INTERNAL MEDICINE

## 2023-06-26 PROCEDURE — 96375 TX/PRO/DX INJ NEW DRUG ADDON: CPT

## 2023-06-26 PROCEDURE — 96413 CHEMO IV INFUSION 1 HR: CPT

## 2023-06-26 PROCEDURE — 36415 COLL VENOUS BLD VENIPUNCTURE: CPT | Performed by: INTERNAL MEDICINE

## 2023-06-26 PROCEDURE — 85025 COMPLETE CBC W/AUTO DIFF WBC: CPT | Performed by: INTERNAL MEDICINE

## 2023-06-26 PROCEDURE — 250N000011 HC RX IP 250 OP 636: Mod: JZ | Performed by: INTERNAL MEDICINE

## 2023-06-26 PROCEDURE — 258N000003 HC RX IP 258 OP 636: Performed by: INTERNAL MEDICINE

## 2023-06-26 RX ORDER — ONDANSETRON 2 MG/ML
8 INJECTION INTRAMUSCULAR; INTRAVENOUS ONCE
Status: COMPLETED | OUTPATIENT
Start: 2023-06-26 | End: 2023-06-26

## 2023-06-26 RX ADMIN — ADO-TRASTUZUMAB EMTANSINE 300 MG: 20 INJECTION, POWDER, LYOPHILIZED, FOR SOLUTION INTRAVENOUS at 14:54

## 2023-06-26 RX ADMIN — SODIUM CHLORIDE 250 ML: 9 INJECTION, SOLUTION INTRAVENOUS at 14:48

## 2023-06-26 RX ADMIN — ONDANSETRON 8 MG: 2 INJECTION INTRAMUSCULAR; INTRAVENOUS at 14:48

## 2023-06-26 ASSESSMENT — PAIN SCALES - GENERAL: PAINLEVEL: MODERATE PAIN (5)

## 2023-06-26 NOTE — NURSING NOTE
Chief Complaint   Patient presents with     Blood Draw     Vitals, blood drawn and PIV placed by Kg, VAT. Pt checked into appt.      NEDRA Del Rosario LPN

## 2023-06-26 NOTE — PROGRESS NOTES
Infusion Nursing Note:  Vickie Alvarado presents today for Cycle 11 Day 1 Kadcyla    Patient seen by provider today: No   present during visit today: Not Applicable.    Note: No acute issues or concerns today. She did struggle with increased fatigue over these past few weeks, but she has been very busy with planning her [facility] reunion.       Intravenous Access:  Peripheral IV placed.    Treatment Conditions:  Lab Results   Component Value Date    HGB 13.0 06/26/2023    WBC 5.1 06/26/2023    ANEU 4.1 09/08/2014    ANEUTAUTO 3.4 06/26/2023     06/26/2023      Results reviewed, labs MET treatment parameters, ok to proceed with treatment.    CMP - was not resulted while in infusion.     Post Infusion Assessment:  Patient tolerated infusion without incident.  Blood return noted pre and post infusion.  No evidence of extravasations.  Access discontinued per protocol.       Discharge Plan:   Discharge instructions reviewed with: Patient.  Patient will return 7/17 for next appointment.       Joann Vega RN

## 2023-07-08 DIAGNOSIS — I49.3 PVC'S (PREMATURE VENTRICULAR CONTRACTIONS): ICD-10-CM

## 2023-07-10 RX ORDER — METOPROLOL SUCCINATE 25 MG/1
25 TABLET, EXTENDED RELEASE ORAL DAILY
Qty: 90 TABLET | Refills: 3 | Status: SHIPPED | OUTPATIENT
Start: 2023-07-10 | End: 2024-08-01

## 2023-07-13 ENCOUNTER — PATIENT OUTREACH (OUTPATIENT)
Dept: ONCOLOGY | Facility: CLINIC | Age: 68
End: 2023-07-13
Payer: MEDICARE

## 2023-07-13 RX ORDER — METHYLPREDNISOLONE SODIUM SUCCINATE 125 MG/2ML
125 INJECTION, POWDER, LYOPHILIZED, FOR SOLUTION INTRAMUSCULAR; INTRAVENOUS
Status: CANCELLED
Start: 2023-07-17

## 2023-07-13 RX ORDER — ALBUTEROL SULFATE 0.83 MG/ML
2.5 SOLUTION RESPIRATORY (INHALATION)
Status: CANCELLED | OUTPATIENT
Start: 2023-07-17

## 2023-07-13 RX ORDER — EPINEPHRINE 1 MG/ML
0.3 INJECTION, SOLUTION INTRAMUSCULAR; SUBCUTANEOUS EVERY 5 MIN PRN
Status: CANCELLED | OUTPATIENT
Start: 2023-07-17

## 2023-07-13 RX ORDER — LORAZEPAM 2 MG/ML
0.5 INJECTION INTRAMUSCULAR EVERY 4 HOURS PRN
Status: CANCELLED | OUTPATIENT
Start: 2023-07-17

## 2023-07-13 RX ORDER — ONDANSETRON 2 MG/ML
8 INJECTION INTRAMUSCULAR; INTRAVENOUS ONCE
Status: CANCELLED | OUTPATIENT
Start: 2023-07-17

## 2023-07-13 RX ORDER — DIPHENHYDRAMINE HYDROCHLORIDE 50 MG/ML
50 INJECTION INTRAMUSCULAR; INTRAVENOUS
Status: CANCELLED
Start: 2023-07-17

## 2023-07-13 RX ORDER — MEPERIDINE HYDROCHLORIDE 25 MG/ML
25 INJECTION INTRAMUSCULAR; INTRAVENOUS; SUBCUTANEOUS EVERY 30 MIN PRN
Status: CANCELLED | OUTPATIENT
Start: 2023-07-17

## 2023-07-13 RX ORDER — HEPARIN SODIUM,PORCINE 10 UNIT/ML
5 VIAL (ML) INTRAVENOUS
Status: CANCELLED | OUTPATIENT
Start: 2023-07-17

## 2023-07-13 RX ORDER — ALBUTEROL SULFATE 90 UG/1
1-2 AEROSOL, METERED RESPIRATORY (INHALATION)
Status: CANCELLED
Start: 2023-07-17

## 2023-07-13 RX ORDER — HEPARIN SODIUM (PORCINE) LOCK FLUSH IV SOLN 100 UNIT/ML 100 UNIT/ML
5 SOLUTION INTRAVENOUS
Status: CANCELLED | OUTPATIENT
Start: 2023-07-17

## 2023-07-13 NOTE — PROGRESS NOTES
[facility]: Cancer Care                                                                                          Received call from Marni.  Reviewed her upcoming appts for labs/Kadcyla and zometa. She was wondering about future infusion appts being scheduled.  Let her know, future Kadcyla infusions will be scheduled after she completed 7/17 infusion.  Also reports significant fatigue while on Kadcyla and is looking forward to being done soon.  She will be completing cycle 12 of Kadcyla on 7/17.     Beatriz Mosqueda RNCC

## 2023-07-17 ENCOUNTER — INFUSION THERAPY VISIT (OUTPATIENT)
Dept: ONCOLOGY | Facility: CLINIC | Age: 68
End: 2023-07-17
Attending: INTERNAL MEDICINE
Payer: MEDICARE

## 2023-07-17 ENCOUNTER — ANCILLARY PROCEDURE (OUTPATIENT)
Dept: MAMMOGRAPHY | Facility: CLINIC | Age: 68
End: 2023-07-17
Attending: INTERNAL MEDICINE
Payer: MEDICARE

## 2023-07-17 ENCOUNTER — APPOINTMENT (OUTPATIENT)
Dept: LAB | Facility: CLINIC | Age: 68
End: 2023-07-17
Attending: INTERNAL MEDICINE
Payer: MEDICARE

## 2023-07-17 VITALS
DIASTOLIC BLOOD PRESSURE: 88 MMHG | SYSTOLIC BLOOD PRESSURE: 143 MMHG | OXYGEN SATURATION: 96 % | HEART RATE: 78 BPM | RESPIRATION RATE: 16 BRPM | TEMPERATURE: 97.8 F | BODY MASS INDEX: 29.49 KG/M2 | WEIGHT: 186.8 LBS

## 2023-07-17 DIAGNOSIS — Z17.0 MALIGNANT NEOPLASM OF UPPER-INNER QUADRANT OF LEFT BREAST IN FEMALE, ESTROGEN RECEPTOR POSITIVE (H): ICD-10-CM

## 2023-07-17 DIAGNOSIS — Z79.811 LONG TERM (CURRENT) USE OF AROMATASE INHIBITORS: ICD-10-CM

## 2023-07-17 DIAGNOSIS — C50.212 MALIGNANT NEOPLASM OF UPPER-INNER QUADRANT OF LEFT BREAST IN FEMALE, ESTROGEN RECEPTOR POSITIVE (H): ICD-10-CM

## 2023-07-17 DIAGNOSIS — Z51.11 ENCOUNTER FOR ANTINEOPLASTIC CHEMOTHERAPY: Primary | ICD-10-CM

## 2023-07-17 DIAGNOSIS — M85.89 OSTEOPENIA OF MULTIPLE SITES: ICD-10-CM

## 2023-07-17 LAB
ALBUMIN SERPL BCG-MCNC: 4 G/DL (ref 3.5–5.2)
ALP SERPL-CCNC: 97 U/L (ref 35–104)
ALT SERPL W P-5'-P-CCNC: 27 U/L (ref 0–50)
ANION GAP SERPL CALCULATED.3IONS-SCNC: 9 MMOL/L (ref 7–15)
AST SERPL W P-5'-P-CCNC: 64 U/L (ref 0–45)
BASOPHILS # BLD AUTO: 0.1 10E3/UL (ref 0–0.2)
BASOPHILS NFR BLD AUTO: 1 %
BILIRUB SERPL-MCNC: 0.4 MG/DL
BUN SERPL-MCNC: 15.7 MG/DL (ref 8–23)
CALCIUM SERPL-MCNC: 9.6 MG/DL (ref 8.8–10.2)
CHLORIDE SERPL-SCNC: 104 MMOL/L (ref 98–107)
CREAT SERPL-MCNC: 0.65 MG/DL (ref 0.51–0.95)
DEPRECATED HCO3 PLAS-SCNC: 25 MMOL/L (ref 22–29)
EOSINOPHIL # BLD AUTO: 0.2 10E3/UL (ref 0–0.7)
EOSINOPHIL NFR BLD AUTO: 4 %
ERYTHROCYTE [DISTWIDTH] IN BLOOD BY AUTOMATED COUNT: 14.3 % (ref 10–15)
GFR SERPL CREATININE-BSD FRML MDRD: >90 ML/MIN/1.73M2
GLUCOSE SERPL-MCNC: 101 MG/DL (ref 70–99)
HCT VFR BLD AUTO: 40.4 % (ref 35–47)
HGB BLD-MCNC: 13.5 G/DL (ref 11.7–15.7)
IMM GRANULOCYTES # BLD: 0 10E3/UL
IMM GRANULOCYTES NFR BLD: 0 %
LYMPHOCYTES # BLD AUTO: 1 10E3/UL (ref 0.8–5.3)
LYMPHOCYTES NFR BLD AUTO: 18 %
MCH RBC QN AUTO: 29.6 PG (ref 26.5–33)
MCHC RBC AUTO-ENTMCNC: 33.4 G/DL (ref 31.5–36.5)
MCV RBC AUTO: 89 FL (ref 78–100)
MONOCYTES # BLD AUTO: 0.5 10E3/UL (ref 0–1.3)
MONOCYTES NFR BLD AUTO: 9 %
NEUTROPHILS # BLD AUTO: 3.7 10E3/UL (ref 1.6–8.3)
NEUTROPHILS NFR BLD AUTO: 68 %
NRBC # BLD AUTO: 0 10E3/UL
NRBC BLD AUTO-RTO: 0 /100
PLATELET # BLD AUTO: 174 10E3/UL (ref 150–450)
POTASSIUM SERPL-SCNC: 3.8 MMOL/L (ref 3.4–5.3)
PROT SERPL-MCNC: 7.2 G/DL (ref 6.4–8.3)
RBC # BLD AUTO: 4.56 10E6/UL (ref 3.8–5.2)
SODIUM SERPL-SCNC: 138 MMOL/L (ref 136–145)
WBC # BLD AUTO: 5.5 10E3/UL (ref 4–11)

## 2023-07-17 PROCEDURE — 80053 COMPREHEN METABOLIC PANEL: CPT | Performed by: INTERNAL MEDICINE

## 2023-07-17 PROCEDURE — 77066 DX MAMMO INCL CAD BI: CPT | Performed by: RADIOLOGY

## 2023-07-17 PROCEDURE — 96413 CHEMO IV INFUSION 1 HR: CPT

## 2023-07-17 PROCEDURE — 96375 TX/PRO/DX INJ NEW DRUG ADDON: CPT

## 2023-07-17 PROCEDURE — 258N000003 HC RX IP 258 OP 636: Performed by: INTERNAL MEDICINE

## 2023-07-17 PROCEDURE — G0279 TOMOSYNTHESIS, MAMMO: HCPCS | Performed by: RADIOLOGY

## 2023-07-17 PROCEDURE — 96367 TX/PROPH/DG ADDL SEQ IV INF: CPT

## 2023-07-17 PROCEDURE — 85025 COMPLETE CBC W/AUTO DIFF WBC: CPT | Performed by: INTERNAL MEDICINE

## 2023-07-17 PROCEDURE — 250N000011 HC RX IP 250 OP 636: Mod: JZ | Performed by: INTERNAL MEDICINE

## 2023-07-17 PROCEDURE — 36415 COLL VENOUS BLD VENIPUNCTURE: CPT | Performed by: INTERNAL MEDICINE

## 2023-07-17 RX ORDER — ZOLEDRONIC ACID 0.04 MG/ML
4 INJECTION, SOLUTION INTRAVENOUS ONCE
Status: COMPLETED | OUTPATIENT
Start: 2023-07-17 | End: 2023-07-17

## 2023-07-17 RX ORDER — ONDANSETRON 2 MG/ML
8 INJECTION INTRAMUSCULAR; INTRAVENOUS ONCE
Status: COMPLETED | OUTPATIENT
Start: 2023-07-17 | End: 2023-07-17

## 2023-07-17 RX ADMIN — ZOLEDRONIC ACID 4 MG: 0.04 INJECTION, SOLUTION INTRAVENOUS at 11:38

## 2023-07-17 RX ADMIN — ADO-TRASTUZUMAB EMTANSINE 300 MG: 20 INJECTION, POWDER, LYOPHILIZED, FOR SOLUTION INTRAVENOUS at 12:04

## 2023-07-17 RX ADMIN — SODIUM CHLORIDE 250 ML: 9 INJECTION, SOLUTION INTRAVENOUS at 11:35

## 2023-07-17 RX ADMIN — ONDANSETRON 8 MG: 2 INJECTION INTRAMUSCULAR; INTRAVENOUS at 11:35

## 2023-07-17 ASSESSMENT — PAIN SCALES - GENERAL: PAINLEVEL: NO PAIN (0)

## 2023-07-17 NOTE — PATIENT INSTRUCTIONS
[facility] Triage and after hours / weekends / holidays:  [phone]    Please call the triage or after hours line if you experience a temperature greater than or equal to 100.5, shaking chills, have uncontrolled nausea, vomiting and/or diarrhea, dizziness, shortness of breath, chest pain, bleeding, unexplained bruising, or if you have any other new/concerning symptoms, questions or concerns.      If you are having any concerning symptoms or wish to speak to a provider before your next infusion visit, please call your care coordinator or triage to notify them so we can adequately serve you.     If you need a refill on a narcotic prescription or other medication, please call before your infusion appointment.

## 2023-07-17 NOTE — PROGRESS NOTES
"Infusion Nursing Note:  Vickie Alvarado presents today for cycle 12 day 1 kadcyla, zometa (q 6 months).    Patient seen by provider today: No   present during visit today: Not Applicable.    Note:   Patient reports ongoing knee and thumb pain, she is seeing her PCP this week. She takes extra strength as needed for this pain.     Patient reports worsening fatigue. She is still able to complete her usual daily tasks, but notices she's \"running out of gas\".    She denies fevers, chills, SOB, chest pain, nausea, and diarrhea. She denies any jaw pain or upcoming dental procedures.    Intravenous Access:  Peripheral IV placed in lab.    Treatment Conditions:  Lab Results   Component Value Date    HGB 13.5 07/17/2023    WBC 5.5 07/17/2023    ANEU 4.1 09/08/2014    ANEUTAUTO 3.7 07/17/2023     07/17/2023      Lab Results   Component Value Date     07/17/2023    POTASSIUM 3.8 07/17/2023    MAG 2.2 08/20/2021    CR 0.65 07/17/2023    VALENTINA 9.6 07/17/2023    BILITOTAL 0.4 07/17/2023    ALBUMIN 4.0 07/17/2023    ALT 27 07/17/2023    AST 64 (H) 07/17/2023     Results reviewed, labs MET treatment parameters, ok to proceed with treatment.  ECHO/MUGA completed 5/15/23  EF 60-65%.      Post Infusion Assessment:  Patient tolerated infusion without incident.  Blood return noted pre and post infusion.  Site patent and intact, free from redness, edema or discomfort.  No evidence of extravasations.  Access discontinued per protocol.       Discharge Plan:   Patient declined prescription refills.  Discharge instructions reviewed with: Patient.  Patient and/or family verbalized understanding of discharge instructions and all questions answered.  AVS to patient via [employer].  Patient will return 8/7 for next appointment.   Patient discharged in stable condition accompanied by: self.  Departure Mode: Ambulatory.      Chen Lawler RN      "

## 2023-07-19 ENCOUNTER — OFFICE VISIT (OUTPATIENT)
Dept: FAMILY MEDICINE | Facility: CLINIC | Age: 68
End: 2023-07-19
Payer: MEDICARE

## 2023-07-19 ENCOUNTER — ANCILLARY PROCEDURE (OUTPATIENT)
Dept: GENERAL RADIOLOGY | Facility: CLINIC | Age: 68
End: 2023-07-19
Attending: FAMILY MEDICINE
Payer: MEDICARE

## 2023-07-19 VITALS
RESPIRATION RATE: 17 BRPM | WEIGHT: 184.4 LBS | DIASTOLIC BLOOD PRESSURE: 80 MMHG | HEART RATE: 101 BPM | SYSTOLIC BLOOD PRESSURE: 117 MMHG | BODY MASS INDEX: 28.94 KG/M2 | HEIGHT: 67 IN | OXYGEN SATURATION: 100 %

## 2023-07-19 DIAGNOSIS — M79.641 BILATERAL HAND PAIN: ICD-10-CM

## 2023-07-19 DIAGNOSIS — M25.562 CHRONIC PAIN OF BOTH KNEES: Primary | ICD-10-CM

## 2023-07-19 DIAGNOSIS — G89.29 CHRONIC PAIN OF BOTH KNEES: Primary | ICD-10-CM

## 2023-07-19 DIAGNOSIS — M79.644 PAIN OF RIGHT THUMB: ICD-10-CM

## 2023-07-19 DIAGNOSIS — M79.642 BILATERAL HAND PAIN: ICD-10-CM

## 2023-07-19 DIAGNOSIS — M25.562 CHRONIC PAIN OF BOTH KNEES: ICD-10-CM

## 2023-07-19 DIAGNOSIS — G89.29 CHRONIC PAIN OF BOTH KNEES: ICD-10-CM

## 2023-07-19 DIAGNOSIS — M25.561 CHRONIC PAIN OF BOTH KNEES: Primary | ICD-10-CM

## 2023-07-19 DIAGNOSIS — C50.212 MALIGNANT NEOPLASM OF UPPER-INNER QUADRANT OF LEFT BREAST IN FEMALE, ESTROGEN RECEPTOR POSITIVE (H): ICD-10-CM

## 2023-07-19 DIAGNOSIS — M25.561 CHRONIC PAIN OF BOTH KNEES: ICD-10-CM

## 2023-07-19 DIAGNOSIS — Z17.0 MALIGNANT NEOPLASM OF UPPER-INNER QUADRANT OF LEFT BREAST IN FEMALE, ESTROGEN RECEPTOR POSITIVE (H): ICD-10-CM

## 2023-07-19 PROBLEM — E66.811 CLASS 1 OBESITY DUE TO EXCESS CALORIES WITHOUT SERIOUS COMORBIDITY WITH BODY MASS INDEX (BMI) OF 30.0 TO 30.9 IN ADULT: Chronic | Status: RESOLVED | Noted: 2022-02-15 | Resolved: 2023-07-19

## 2023-07-19 PROBLEM — E66.09 CLASS 1 OBESITY DUE TO EXCESS CALORIES WITHOUT SERIOUS COMORBIDITY WITH BODY MASS INDEX (BMI) OF 30.0 TO 30.9 IN ADULT: Chronic | Status: RESOLVED | Noted: 2022-02-15 | Resolved: 2023-07-19

## 2023-07-19 PROCEDURE — 73562 X-RAY EXAM OF KNEE 3: CPT | Mod: FY | Performed by: RADIOLOGY

## 2023-07-19 PROCEDURE — 99214 OFFICE O/P EST MOD 30 MIN: CPT | Performed by: FAMILY MEDICINE

## 2023-07-19 PROCEDURE — 73140 X-RAY EXAM OF FINGER(S): CPT | Mod: RT | Performed by: RADIOLOGY

## 2023-07-19 PROCEDURE — 73120 X-RAY EXAM OF HAND: CPT | Mod: FY | Performed by: RADIOLOGY

## 2023-07-19 NOTE — PROGRESS NOTES
Assessment & Plan     69 yo woman with arthralgias. In active treatment for breast cancer with Kadcyla and an aromatase inhibitor which is likely a contributor. OA in her knee predates treatment. Positive FH of RA. Discussed options and decided on imaging and labs to start. Options for pain management with Tylenol and NSAIDs (oral and topical) as well as mentholated products reviewed. Additional information in AVS. Further management options pending results.     Chronic pain of both knees  - XR Knee Bilateral 3 Views    Acute pain of right thumb  - XR Finger Right G/E 2 Views    Acute bilateral hand pain  - XR Hand Bilateral 2 Views    - Rheumatoid factor  - Anti Nuclear Madie IgG by IFA with Reflex  - CRP inflammation  - Erythrocyte sedimentation rate auto  - Cyclic Citrullinated Peptide Antibody IgG    Gaby Lynn MD  [facility]  ---------------------------------------------------------------------------------------  Subjective   Marni is a 68 year old, presenting for the following health issues:  Arthritis (Knees and Thumbs)           Additional Questions   Roomed by Jenny   Accompanied by      HPI   Patient is well known to me. PMH significant for L breast Ca in active treatment, HLD, Aortic stenosis, osteoporosis, OA of knee. She is very mindful of her health and her HCM is up to date.     My Chart message sent by Marni in preparation for today's visit:  Having increased joint PAIN as a side effect of Kadcyla cycles. Oncology providers were thinking it was something more.  In my 6/5 visit summary, hand x-rays are recommended-- especially for my thumbs (right thumb = trigger thumb? impacting daily living).  Plus, both of my knees (set aside to deal with breast cancer for the past year) are once again painful (especially when standing in place; my gate is now impacted & leg bowing).  FYI:  1) I tolerated the first 8 cycles of Kadcyla quite well, however the fatigue with my last 3  "infusions has been accumulating  + I have battled some nausea.  2) My one sister who passed had rheumatoid arthritis.  I've never been evaluated for that.  I will be visiting you 2 days after Kadcycla cycle #12 + an every-six-months infusion of Zometa (and my first mammogram in a year).      Discussed the above in detail today. Pre chemo she was managing pain primarily in her R knee (well documented in Epic). She has developed increased pain and stiffness in B knees as well as B hands and thumbs in particular (R>L) while on chemo. She does have a positive FH of RA. She is trying to minimize medications and is not taking anything regularly for her joint discomfort.     Review of Systems   Constitutional:  Positive for fatigue.        Wt Readings from Last 4 Encounters:  07/19/23 : 83.6 kg (184 lb 6.4 oz)  07/17/23 : 84.7 kg (186 lb 12.8 oz)  06/26/23 : 85.3 kg (188 lb 1.6 oz)  06/05/23 : 86 kg (189 lb 9.6 oz)      Respiratory: Negative.     Cardiovascular:  Negative for chest pain.   Gastrointestinal:         Denies n/v   Genitourinary: Negative.    Musculoskeletal:  Positive for arthralgias. Negative for back pain, neck pain and neck stiffness.   Skin: Negative.    Neurological:  Positive for paresthesias.        Starting to have tingling in hands and feet   Hematological:  Does not bruise/bleed easily.   Psychiatric/Behavioral:  Positive for sleep disturbance.           Objective    /80   Pulse 101   Resp 17   Ht 1.695 m (5' 6.73\")   Wt 83.6 kg (184 lb 6.4 oz)   LMP 05/17/2011   SpO2 100%   BMI 29.12 kg/m       Physical Exam  Constitutional:       General: She is not in acute distress.     Appearance: She is not ill-appearing.   Musculoskeletal:      Comments: Deformities/nodules present on DIPs of both hands. PIPs and MCPs without synovial thickening and nontender except for R thumb which is tender and has decreased ROM.  No warmth or erythema appreciated.   B knees with areas of ttp. R knee with " small superio-lateral effusion, no warmth or erythema. + crepitus B. ROM mildly limited B.  No pain or swelling in wrists, elbows.   Neurological:      Mental Status: She is alert.            Infusion Therapy Visit on 07/17/2023   Component Date Value Ref Range Status    Sodium 07/17/2023 138  136 - 145 mmol/L Final    Potassium 07/17/2023 3.8  3.4 - 5.3 mmol/L Final    Chloride 07/17/2023 104  98 - 107 mmol/L Final    Carbon Dioxide (CO2) 07/17/2023 25  22 - 29 mmol/L Final    Anion Gap 07/17/2023 9  7 - 15 mmol/L Final    Urea Nitrogen 07/17/2023 15.7  8.0 - 23.0 mg/dL Final    Creatinine 07/17/2023 0.65  0.51 - 0.95 mg/dL Final    Calcium 07/17/2023 9.6  8.8 - 10.2 mg/dL Final    Glucose 07/17/2023 101 (H)  70 - 99 mg/dL Final    Alkaline Phosphatase 07/17/2023 97  35 - 104 U/L Final    AST 07/17/2023 64 (H)  0 - 45 U/L Final    Reference intervals for this test were updated on 6/12/2023 to more accurately reflect our healthy population. There may be differences in the flagging of prior results with similar values performed with this method. Interpretation of those prior results can be made in the context of the updated reference intervals.    ALT 07/17/2023 27  0 - 50 U/L Final    Reference intervals for this test were updated on 6/12/2023 to more accurately reflect our healthy population. There may be differences in the flagging of prior results with similar values performed with this method. Interpretation of those prior results can be made in the context of the updated reference intervals.      Protein Total 07/17/2023 7.2  6.4 - 8.3 g/dL Final    Albumin 07/17/2023 4.0  3.5 - 5.2 g/dL Final    Bilirubin Total 07/17/2023 0.4  <=1.2 mg/dL Final    GFR Estimate 07/17/2023 >90  >60 mL/min/1.73m2 Final    WBC Count 07/17/2023 5.5  4.0 - 11.0 10e3/uL Final    RBC Count 07/17/2023 4.56  3.80 - 5.20 10e6/uL Final    Hemoglobin 07/17/2023 13.5  11.7 - 15.7 g/dL Final    Hematocrit 07/17/2023 40.4  35.0 - 47.0 % Final     MCV 07/17/2023 89  78 - 100 fL Final    MCH 07/17/2023 29.6  26.5 - 33.0 pg Final    MCHC 07/17/2023 33.4  31.5 - 36.5 g/dL Final    RDW 07/17/2023 14.3  10.0 - 15.0 % Final    Platelet Count 07/17/2023 174  150 - 450 10e3/uL Final    % Neutrophils 07/17/2023 68  % Final    % Lymphocytes 07/17/2023 18  % Final    % Monocytes 07/17/2023 9  % Final    % Eosinophils 07/17/2023 4  % Final    % Basophils 07/17/2023 1  % Final    % Immature Granulocytes 07/17/2023 0  % Final    NRBCs per 100 WBC 07/17/2023 0  <1 /100 Final    Absolute Neutrophils 07/17/2023 3.7  1.6 - 8.3 10e3/uL Final    Absolute Lymphocytes 07/17/2023 1.0  0.8 - 5.3 10e3/uL Final    Absolute Monocytes 07/17/2023 0.5  0.0 - 1.3 10e3/uL Final    Absolute Eosinophils 07/17/2023 0.2  0.0 - 0.7 10e3/uL Final    Absolute Basophils 07/17/2023 0.1  0.0 - 0.2 10e3/uL Final    Absolute Immature Granulocytes 07/17/2023 0.0  <=0.4 10e3/uL Final    Absolute NRBCs 07/17/2023 0.0  10e3/uL Final

## 2023-07-25 ENCOUNTER — MYC MEDICAL ADVICE (OUTPATIENT)
Dept: ONCOLOGY | Facility: CLINIC | Age: 68
End: 2023-07-25

## 2023-07-25 ASSESSMENT — ENCOUNTER SYMPTOMS
BRUISES/BLEEDS EASILY: 0
PARESTHESIAS: 1
RESPIRATORY NEGATIVE: 1
NECK STIFFNESS: 0
NECK PAIN: 0
FATIGUE: 1
SLEEP DISTURBANCE: 1
ARTHRALGIAS: 1
BACK PAIN: 0

## 2023-07-25 NOTE — TELEPHONE ENCOUNTER
Called patient to follow up on symptoms sent through a DocRun message on 07/25/23. Left a voicemail message requesting the patient call [phone], option 5, option 2 and speak with any Triage nurse available.

## 2023-07-26 NOTE — TELEPHONE ENCOUNTER
Oncology Nurse Triage    Situation:   Vickie reporting the following symptoms:  - Fatigue  -Joint Knee Pain    Background:   Treating Provider:   Dr. Horvath    Date of last office visit: 6/5/23 Dr. Horvath    Recent Treatments: 7/17/23 D1C12 ADO-trastuzumab emtansine    Assessment:     Onset: continued Fatigue since Cycles #9-11   Pt states mainly sending MyChart to update provider that will have questions at next provider visit on 8/7/23.     Is using antinausea pills managing nausea alright. No vomiting.     Pt is still able to do ADL's with current pain levels, and also has a village who she can call upon if needs assistant with IADL's.     Denies active nausea/vomiting, fever, chest pain, SOB.     Pt states was able to go out for a hair cut yesterday so missed triage's call yesterday.     Marni is mainly sending MyChart to give Dr. Horvath and care team an  FYI for future discussion at next provider visit with questions such as:    Wondering if knee pain could be excerbated from treatment? (Pt has not had energy to pursue physical therapy and other therapy to support knee improvement such as pool therapy etc.)    Pt has difficulty walking 20minutes during day to manage fatigue and any other ideas to help fatigue?    Recommendations:   This writer discussed okay to feel the way pt's is feeling, to also not be hard on self if not feeling as productive, encourage clear fluids (which pt states drinks water next to her daily), and keep cool as increase hot weather increases fatigue.    Pt states [place] temperatures and thanked this writer for support and follow up call.     This writer informed pt will route MyC update to care team so they are aware.

## 2023-08-01 ENCOUNTER — OFFICE VISIT (OUTPATIENT)
Dept: URGENT CARE | Facility: URGENT CARE | Age: 68
End: 2023-08-01
Payer: MEDICARE

## 2023-08-01 ENCOUNTER — PATIENT OUTREACH (OUTPATIENT)
Dept: ONCOLOGY | Facility: CLINIC | Age: 68
End: 2023-08-01
Payer: MEDICARE

## 2023-08-01 VITALS
TEMPERATURE: 98.4 F | DIASTOLIC BLOOD PRESSURE: 79 MMHG | SYSTOLIC BLOOD PRESSURE: 116 MMHG | HEART RATE: 88 BPM | OXYGEN SATURATION: 98 %

## 2023-08-01 DIAGNOSIS — R59.0 CERVICAL LYMPHADENOPATHY: ICD-10-CM

## 2023-08-01 DIAGNOSIS — J02.9 PHARYNGITIS, UNSPECIFIED ETIOLOGY: Primary | ICD-10-CM

## 2023-08-01 LAB — DEPRECATED S PYO AG THROAT QL EIA: NEGATIVE

## 2023-08-01 PROCEDURE — 87637 SARSCOV2&INF A&B&RSV AMP PRB: CPT | Performed by: EMERGENCY MEDICINE

## 2023-08-01 PROCEDURE — 87651 STREP A DNA AMP PROBE: CPT | Performed by: EMERGENCY MEDICINE

## 2023-08-01 PROCEDURE — 99213 OFFICE O/P EST LOW 20 MIN: CPT | Performed by: EMERGENCY MEDICINE

## 2023-08-01 NOTE — PROGRESS NOTES
Assessment & Plan     Diagnosis:    ICD-10-CM    1. Pharyngitis, unspecified etiology  J02.9 Streptococcus A Rapid Screen w/Reflex to PCR - Clinic Collect     Group A Streptococcus PCR Throat Swab     Symptomatic Influenza A/B, RSV, & SARS-CoV2 PCR (COVID-19) Nasopharyngeal     Symptomatic Influenza A/B, RSV, & SARS-CoV2 PCR (COVID-19) Nasopharyngeal     CANCELED: Symptomatic Influenza A/B, RSV, & SARS-CoV2 PCR (COVID-19)      2. Cervical lymphadenopathy  R59.0           Medical Decision Making:  Vickie Alvarado is a 68 year old female presented with sore throat and clinical evidence of pharyngitis.  The rapid strep test is negative, and formal culture has been set up in the lab. There is no clinical evidence of  peritonsillar abscess, retropharyngeal abscess, epiglottis, or Rex's angina. COVID-19/RSV/Influenza testing is pending at this time.  The patient's symptoms are consistent with viral pharyngitis.  I have recommended treatment with analgesics, and we will await formal culture results.  If the culture is positive, a provider will call the patient to initiate anti-microbial therapy. Go to the ER if increasing pain, change in voice, neck pain, vomiting, fever, or shortness of breath. Follow-up with primary physician and oncology team if not improving in 2-3 days. All questions answered. Patient verbalized understanding and agreement with the plan.    Ranjeet Major PA-C  [facility]    Subjective     Vickie Alvarado is a 68 year old female who presents to clinic today for the following health issues:  Chief Complaint   Patient presents with    Pharyngitis     Right side with swollen gland since yesterday, COVID test was negative at home FYI       HPI    Patient states that yesterday she had experiencing a sore throat and swollen lymph gland on the right side of her neck.  She did home COVID testing which was negative.  She has not had any fevers, cough, ear pain, chest pain,  difficulties breathing or swallowing, or other symptoms.  She is currently being treated for breast cancer with chemotherapy, is adamant she is not having any chills or fevers, but wanted to be checked out nonetheless.  She was able to get with her oncology team, but will follow-up closely with them.  She notes no other symptoms including abdominal pain, diarrhea, nausea, vomiting, neck pain or stiffness or other concerns.      Review of Systems    See HPI    Objective      Vitals: /79 (BP Location: Left arm, Patient Position: Sitting, Cuff Size: Adult Large)   Pulse 88   Temp 98.4  F (36.9  C) (Tympanic)   LMP 05/17/2011   SpO2 98%   Resp: 16    Patient Vitals for the past 24 hrs:   BP Temp Temp src Pulse SpO2   08/01/23 1750 116/79 98.4  F (36.9  C) Tympanic 88 98 %       Vital signs reviewed by: Ranjeet Major PA-C    Physical Exam   Constitutional: Alert and active. No acute distress.  Non-toxic appearing.  HENT:   Right Ear: External ear normal. TM: gray/pale appearing  Left Ear: External ear normal. TM: gray/pale appearing.  Nose: Nose normal.    Eyes: Conjunctivae, EOM and lids are normal.   Mouth: Mucous membranes are moist. Posterior oropharynx is erythematous. Exudates not present. Tonsils are not  enlarged. Uvula is midline. No submandibular swelling or erythema.  Neck: Normal ROM. No meningismus. Anterior cervical lymphadenopathy noted on the right. No posterior chain cervical lymphadenopathy noted.  Cardiovascular: Regular rate and rhythm  Pulmonary/Chest: Effort normal. No respiratory distress. Lungs are clear to auscultation throughout.  GI: Abdomen is soft and non-tender throughout.   Musculoskeletal: Normal range of motion.   Neurological: Alert and oriented x3.  Skin: No rash noted on visualized skin.       Labs/Imaging:  Results for orders placed or performed in visit on 08/01/23   Streptococcus A Rapid Screen w/Reflex to PCR - Clinic Collect     Status: Normal    Specimen: Throat; Swab    Result Value Ref Range    Group A Strep antigen Negative Negative     RSV/Influenza/COVID: Pending    Ranjeet Major PA-C, August 1, 2023

## 2023-08-02 LAB
FLUAV RNA SPEC QL NAA+PROBE: NEGATIVE
FLUBV RNA RESP QL NAA+PROBE: NEGATIVE
GROUP A STREP BY PCR: NOT DETECTED
RSV RNA SPEC NAA+PROBE: NEGATIVE
SARS-COV-2 RNA RESP QL NAA+PROBE: NEGATIVE

## 2023-08-06 RX ORDER — DIPHENHYDRAMINE HYDROCHLORIDE 50 MG/ML
50 INJECTION INTRAMUSCULAR; INTRAVENOUS
Status: CANCELLED
Start: 2023-08-21

## 2023-08-06 RX ORDER — HEPARIN SODIUM (PORCINE) LOCK FLUSH IV SOLN 100 UNIT/ML 100 UNIT/ML
5 SOLUTION INTRAVENOUS
Status: CANCELLED | OUTPATIENT
Start: 2023-09-11

## 2023-08-06 RX ORDER — HEPARIN SODIUM,PORCINE 10 UNIT/ML
5 VIAL (ML) INTRAVENOUS
Status: CANCELLED | OUTPATIENT
Start: 2023-09-11

## 2023-08-06 RX ORDER — ALBUTEROL SULFATE 0.83 MG/ML
2.5 SOLUTION RESPIRATORY (INHALATION)
Status: CANCELLED | OUTPATIENT
Start: 2023-08-21

## 2023-08-06 RX ORDER — MEPERIDINE HYDROCHLORIDE 25 MG/ML
25 INJECTION INTRAMUSCULAR; INTRAVENOUS; SUBCUTANEOUS EVERY 30 MIN PRN
Status: CANCELLED | OUTPATIENT
Start: 2023-09-11

## 2023-08-06 RX ORDER — METHYLPREDNISOLONE SODIUM SUCCINATE 125 MG/2ML
125 INJECTION, POWDER, LYOPHILIZED, FOR SOLUTION INTRAMUSCULAR; INTRAVENOUS
Status: CANCELLED
Start: 2023-08-21

## 2023-08-06 RX ORDER — MEPERIDINE HYDROCHLORIDE 25 MG/ML
25 INJECTION INTRAMUSCULAR; INTRAVENOUS; SUBCUTANEOUS EVERY 30 MIN PRN
Status: CANCELLED | OUTPATIENT
Start: 2023-08-21

## 2023-08-06 RX ORDER — ZOLEDRONIC ACID 0.04 MG/ML
4 INJECTION, SOLUTION INTRAVENOUS ONCE
Status: CANCELLED | OUTPATIENT
Start: 2024-01-13 | End: 2024-01-13

## 2023-08-06 RX ORDER — LORAZEPAM 2 MG/ML
0.5 INJECTION INTRAMUSCULAR EVERY 4 HOURS PRN
Status: CANCELLED | OUTPATIENT
Start: 2023-09-11

## 2023-08-06 RX ORDER — ALBUTEROL SULFATE 0.83 MG/ML
2.5 SOLUTION RESPIRATORY (INHALATION)
Status: CANCELLED | OUTPATIENT
Start: 2023-09-11

## 2023-08-06 RX ORDER — EPINEPHRINE 1 MG/ML
0.3 INJECTION, SOLUTION INTRAMUSCULAR; SUBCUTANEOUS EVERY 5 MIN PRN
Status: CANCELLED | OUTPATIENT
Start: 2023-09-11

## 2023-08-06 RX ORDER — HEPARIN SODIUM,PORCINE 10 UNIT/ML
5 VIAL (ML) INTRAVENOUS
Status: CANCELLED | OUTPATIENT
Start: 2024-01-13

## 2023-08-06 RX ORDER — METHYLPREDNISOLONE SODIUM SUCCINATE 125 MG/2ML
125 INJECTION, POWDER, LYOPHILIZED, FOR SOLUTION INTRAMUSCULAR; INTRAVENOUS
Status: CANCELLED
Start: 2023-09-11

## 2023-08-06 RX ORDER — ALBUTEROL SULFATE 90 UG/1
1-2 AEROSOL, METERED RESPIRATORY (INHALATION)
Status: CANCELLED
Start: 2023-09-11

## 2023-08-06 RX ORDER — HEPARIN SODIUM,PORCINE 10 UNIT/ML
5 VIAL (ML) INTRAVENOUS
Status: CANCELLED | OUTPATIENT
Start: 2023-08-21

## 2023-08-06 RX ORDER — HEPARIN SODIUM (PORCINE) LOCK FLUSH IV SOLN 100 UNIT/ML 100 UNIT/ML
5 SOLUTION INTRAVENOUS
Status: CANCELLED | OUTPATIENT
Start: 2024-01-13

## 2023-08-06 RX ORDER — ONDANSETRON 2 MG/ML
8 INJECTION INTRAMUSCULAR; INTRAVENOUS ONCE
Status: CANCELLED | OUTPATIENT
Start: 2023-09-11

## 2023-08-06 RX ORDER — EPINEPHRINE 1 MG/ML
0.3 INJECTION, SOLUTION INTRAMUSCULAR; SUBCUTANEOUS EVERY 5 MIN PRN
Status: CANCELLED | OUTPATIENT
Start: 2023-08-21

## 2023-08-06 RX ORDER — DIPHENHYDRAMINE HYDROCHLORIDE 50 MG/ML
50 INJECTION INTRAMUSCULAR; INTRAVENOUS
Status: CANCELLED
Start: 2023-09-11

## 2023-08-06 RX ORDER — HEPARIN SODIUM (PORCINE) LOCK FLUSH IV SOLN 100 UNIT/ML 100 UNIT/ML
5 SOLUTION INTRAVENOUS
Status: CANCELLED | OUTPATIENT
Start: 2023-08-21

## 2023-08-06 RX ORDER — LORAZEPAM 2 MG/ML
0.5 INJECTION INTRAMUSCULAR EVERY 4 HOURS PRN
Status: CANCELLED | OUTPATIENT
Start: 2023-08-21

## 2023-08-06 RX ORDER — ONDANSETRON 2 MG/ML
8 INJECTION INTRAMUSCULAR; INTRAVENOUS ONCE
Status: CANCELLED | OUTPATIENT
Start: 2023-08-21

## 2023-08-06 RX ORDER — ALBUTEROL SULFATE 90 UG/1
1-2 AEROSOL, METERED RESPIRATORY (INHALATION)
Status: CANCELLED
Start: 2023-08-21

## 2023-08-06 NOTE — PROGRESS NOTES
Oncology visit:  Date on this visit: Aug 7, 2023    Diagnosis: left breast cancer.    Primary Physician: Gaby Lynn     History Of Present Illness:  Ms. Alvarado is a 68 year old female with a left breast cancer.  She self palpated a mass in her mid left breast.  Bilateral diagnostic mammograms showed a mass in the upper inner left breast.  Ultrasound of the left breast showed a mass at 11:00, 8 cm from the nipple measuring 3.1 cm.  There were no suspicious lymph nodes in the left axilla.  Biopsy of the left breast mass was c/w a grade 3 invasive ductal carcinoma, ER strong in 98%, IA moderate in 70%, and HER2 low by IHC (score 1+).   Oncotype DX recurrence score was 24.  RSClin predicted a 23% risk of distant recurrence in 10 years if treated with endocrine therapy alone and a 9% absolute risk reduction with chemotherapy.  Based on this, she elected to proceed with chemotherapy.    She received neoadjuvant Taxotere and cyclophosphamide chemotherapy from 7/6/2022 - 9/8/2022.  She underwent left breast lumpectomy and left axillary sentinel lymph node procedure under the care of Dr. Cadet on 10/17/2022.  Pathology showed residual grade 2 invasive breast carcinoma (60% ductal and 40% micropapillary) measuring 2.2 cm.  Treatment related changes were present and invasive tumor cellularity was 30%.  There was associated DCIS.  Surgical margins for both invasive carcinoma and DCIS were close, but negative.  There was no lymphovascular invasion and a single sentinel lymph node was negative and without signs of prior involvement (i.e. no tumor bed or fibrotic changes in the lymph node).  Receptors were repeated on the residual invasive malignancy and were found to be ER positive (98%), IA positive (70%), HER-2 equivocal by IHC (2+), and HER-2 positive by FISH. She completed radiation to the left breast (4240 cGy to the breast with 1250 cGy boost to the lumpectomy cavity) on 1/2/2023.  She has been on adjuvant Kadcyla  "since 11/21/2022.  On adjuvant letrozole since 12/19/2022.      Interval History:  Ms. Alvarado comes into clinic today for routine breast cancer follow-up.  She is on treatment with letrozole and Kadcyla.  She is really struggling with what she is calling \"significant fatigue\".  She has no motivation to perform normal activities.  For example, her family usually does a fantasy football league and she is usually very excited about researching the players.  She has had no motivation to do this and, in fact, has told her family she does not want to participate in the football league.  Likewise, normally she will make herself berry bowls and cut up fruit before she comes in for her infusions, she had no motivation to do this today.  She states that she is leaving her home much less than she usually does.  She was planning on attending a birthday party last week and it got canceled, she was relieved and ended up watching TV all day.  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     She denies ever having symptoms of depression in the past and states she does not know what that would feel like.  She denies feeling sad or increased tearfulness.  She does report that she seems to be sleeping less than she was previously.  She attributes this to laying around more during the day.  She has lost approximately 20 pounds in the past year, but attributes this to healthier eating habits.  She has intermittent nausea for which she is taking antinausea medications with relief.  She has joint pain.  This mainly involves the bilateral hands, worse in the thumbs, and her bilateral knees.  She recently had x-rays which showed significant osteoarthritis.  She reports 2 episodes where she was doing some long distance driving and developed tingling in her hands after leaning on her elbows.  She otherwise denies symptoms of peripheral " neuropathy.    She denies dyspnea, heart palpitations or chest pain.  She has no current abdominal complaints.  No fevers, chills, or symptoms of infection.    Past Medical/Surgical History:  Past Medical History:   Diagnosis Date     Aortic stenosis      Breast cancer (H) 06/2022     Hyperlipidemia      Insufficiency fracture of tibia, sequela 09/28/2021     Polyp of colon 05/26/2016   - PVCs    Past Surgical History:   Procedure Laterality Date     BIOPSY NODE SENTINEL Left 10/17/2022    Procedure: LEFT seed localized lumpectomy with sentinel node biopsy;  Surgeon: Alphonso Cadet MD;  Location: [facility] OR     CATARACT IOL, RT/LT Right 2006     HC YAG LASER CAPSULOTOMY Right 2009     LASER VITREOLYSIS, ANTERIOR OD (RIGHT EYE) Right 2007     LUMPECTOMY BREAST WITH SENTINEL NODE, COMBINED Left 10/17/2022    Procedure: LEFT seed localized lumpectomy with sentinel node biopsy;  Surgeon: Alphonso Cadet MD;  Location: [facility] OR     REPOSITION INTRAOCULAR LENS Right 11/18/2014    Procedure: REPOSITION INTRAOCULAR LENS;  Surgeon: Vickie Guerra MD;  Location:  [facility]     VITRECTOMY PARSPLANA WITH 23 GAUGE SYSTEM Right 11/18/2014    Procedure: VITRECTOMY PARSPLANA WITH 23 GAUGE SYSTEM;  Surgeon: Vickie Guerra MD;  Location:  [facility]     Allergies:  Allergies as of 08/07/2023 - Reviewed 08/01/2023   Allergen Reaction Noted     Bactrim [sulfamethoxazole-trimethoprim] Nausea 11/01/2017     Seasonal allergies  11/13/2014     Typhoid vaccines Nausea and Vomiting 05/05/2011     Current Medications:  Current Outpatient Medications   Medication Sig Dispense Refill     calcium carb-cholecalciferol 600-500 MG-UNIT CAPS        Lactobacillus (PROBIOTIC CHILDRENS) CHEW        letrozole (FEMARA) 2.5 MG tablet Take 1 tablet (2.5 mg) by mouth daily 30 tablet 11     loratadine (CLARITIN) 10 MG tablet Take 10 mg by mouth daily Prn, seasonal for ragweed and lilacs       metoprolol succinate ER (TOPROL XL) 25 MG 24 hr tablet Take  1 tablet (25 mg) by mouth daily 90 tablet 3     Omega-3 Fatty Acids (FISH OIL ADULT GUMMIES PO) Take 250 mg by mouth       ondansetron (ZOFRAN) 8 MG tablet Take 1 tablet (8 mg) by mouth every 8 hours as needed for nausea 30 tablet 3     prochlorperazine (COMPAZINE) 10 MG tablet Take 0.5 tablets (5 mg) by mouth every 6 hours as needed for nausea or vomiting 30 tablet 2     simvastatin (ZOCOR) 40 MG tablet Take 1 tablet (40 mg) by mouth At Bedtime 90 tablet 3     vitamin D3 (CHOLECALCIFEROL) 50 mcg (2000 units) tablet         Family and Social History:  See initial consultation dated 6/14/2022 for further details.  Hereditary Comprehensive 40 Gene Panel was negative for pathogenic mutation.    Physical Exam:  LMP 05/17/2011   BP (!) 144/87   Pulse 101   Temp 98.8  F (37.1  C) (Oral)   Resp 16   Wt 83.3 kg (183 lb 11.2 oz)   LMP 05/17/2011   SpO2 95%   BMI 29.00 kg/m    General: Well appearing adult female in NAD.  Alert and oriented x 3.  HEENT:  Normocephalic.  Sclera anicteric.  MMM.  No lesions of the oropharynx.  Lymph:  No palpable cervical, supraclavicular, or axillary LAD.    Chest:  CTA bilaterally.  No wheezes or crackles.  CV:  Tachycardic.  RR.  Nl S1 and S2.  GIV/VI YOLETTE at the LUSB.  Breast: Upper inner left breast incision is well healed and without significant underlying fibrosis.  There are no dominant or discretely palpable masses in either breast.  Bilateral nipples are everted and are without discharge.  Abd:  Soft/ND  Ext:  No pitting edema of the bilateral lower extremities.  She shows me her knees which do appear to be symmetrically swollen.  Neuro:  Cranial nerves grossly intact.  Gait is stable.  Able to climb on the exam table unaided.  Psych:  Mood and affect appear normal.  Skin:  No visible concerning skin rashes or lesions.    Laboratory/Imaging Studies  I personally reviewed the below laboratories and images:    5/15/2023 Echocardiogram:  Interpretation Summary  Left ventricular  size, wall motion and function are normal. The ejection  fraction is 60-65%. Global peak LV longitudinal strain is averaged at -17.3%.  This suggests abnormal strain (normal <-18%).  Right ventricular function, chamber size, wall motion, and thickness are  normal.  Trace aortic insufficiency is present. AV leaflets are severely calcified. The  peak velocity across the valve is 3.84cm/s, the peak gradient is 59mmHg with a  mean of 34mmHg. Severe AS is present.  The aorta root is normal. The inferior vena cava cannot be assessed. No  pericardial effusion is present.     Compared to prior study global longitudinal strain is slightly lower.  Otherwise no significant change    7/17/2023 Bilateral diagnostic mammograms:  Findings:     Mammogram:  Bilateral MLO and CC views were performed with tomosynthesis.     Posttreatment changes of conservation therapy in the left breast.  There is no suspicious abnormality in either breast.                                                                      IMPRESSION: BI-RADS CATEGORY: 2 - Benign.    7/19/2023 Bilateral knee X-rays:  Impression:  1. No acute osseous abnormality.  2. Moderate to severe degenerative change to the medial patellofemoral  compartments of the left knee.  3. Moderate medial and mild to moderate patellofemoral compartment  degenerative change to the right knee.    7/19/2023 Bilateral hand X-rays:  Impression:  1. No acute osseous abnormality.  2. Bilaterally, joint space narrowing of the distal interphalangeal joints, consistent this moderate grade osteoarthritis. The appearance of central erosion most pronounced in the left hand surgery digit can  be seen in erosive osteoarthritis.  3. Moderate grade degenerative changes with joint space narrowing and osteophytosis of the interphalangeal joint of the right thumb.    8/7/2023 Labs:  Electrolytes are wnl.  Kidney function is wnl.  AST is elevated at 60 U/L; ALT is wnl.  Alkaline phosphatase is elevated at  110.    CRP is <3    Blood counts are wnl.    9/8/2022 TSH is wnl.    ASSESSMENT/PLAN:  Ms. Alvarado is a 68 year old woman with a clinical stage II, T2N0M0, ER+/KS+/HER2+ IDC of her left breast.  She is s/p 4 cycles of neoadjuvant TC chemotherapy (initial biopsy was HER2 negative) followed by left breast lumpectomy and sentinel lymph node biopsy ([id number], residual tumor HER2 positive). She is s/p adjuvant radiation and has been on adjuvant Kadcyla since 11/21/2022.     1. Left breast carcinoma:   - Receiving adjuvant Kadcyla.  She is scheduled to receive C13 of 17 total planned cycles today.She reports symptoms of fatigue and lack of motivation on history taken today.  These symptoms are quite significant for her and are interfering with normal activities of daily living.  It is not clear to me whether this is malaise from her treatment, worsening aortic stenosis, depression, or due to another medical condition altogether.  We agreed on a plan to hold today's infusion and reschedule for 2 weeks for now.  Will schedule for an echocardiogram to re-evaluate her aortic stenosis as well.  If she has had improvement in her symptoms in two weeks and there are no new changes on her echocardiogram will proceed with planned C13 Kadcyla at that time, but dose reduce to 3 mg/kg.  If no improvement, would consider initiating an antidepressant.  - Continue letrozole, tolerating well with the exception of arthralgias.  - Reschedule today's Kadcyla infusion for 2 weeks from now.  - Reduce dose of Kadcyle from 3.6 mg/kg to 3 mg/kg  - J[facility]ON[facility] visit in 9 weeks.  - I reviewed the images of bilateral diagnostic mammograms performed on 7/17/2023 which are without concerning findings.  Plan for screening mammograms in 07/2024.    2.  Fatigue/lack of motivation:  Malaise from treatment vs depression vs other medical condition (known severe aortic sclerosis).  - Reviewed multiple prior TSH studies have been wnl.  - Offered referral to cancer  rehab, specifically discussed how cardiovascular exercise can improve fatigue and through cancer rehab can work with occupational therapy on ways to manage fatigue; she declined the referral.  - repeat echocardiogram prior to next Kadcyla infusion, this will re-evaluate her LVEF as well as her aortic stenosis  - I suspect given lack of motivation and alteration in sleeping habits, there may be a component of depression contributing.  If no improvement in fatigue with holding Kadcyla for two weeks, recommend trial of venlafaxine XR 37.5 mg PO daily.    3.  Arthralgias:  Thumbs, hands, knees.  Discussed swollen joints are c/w osteoarthritis.  X-rays are also c/w osteoarthritis, however, joint pain may be exacerbated by letrozole.  - ongoing follow up with PCP  - Okay to use NSAIDs given currently normal blood counts on Kadcyla.  Okay to use topical anesthetics such as Voltaren gel or Biofreeze as well.  - Notes some improvement with weight loss and would like to lose further weight.  Plans to resume pool therapy as this has been most helpful.      4.  Personal history of colon polyps:  No change since last visit.  She has a h/o colon polyps.  Colonoscopy 2/23/2022 with removal of 3 polyps, each 2-3 mm (2 tubular adenomas, 1 hyperplastic).  Repeat colonoscopy in 02/2027 is recommended.    5.  At risk for cardiomyopathy:  Echocardiogram performed on 5/15/2023 showed a retained LVEF but a slight change in strain. She is on metoprolol 25 mg daily.    - repeat echocardiogram prior to next Kadcyla infusion.    6.  Osteopenia:  She is at risk of bone loss on aromatase inhibitor therapy.  DEXA bone density scan on 12/1/2022 with a lowest T-score of -2.2 c/w osteopenia. Zometa 4 mg IV once every 6 months for both prevention of osteoporosis and prevention of breast cancer bone metastases was initiated on 1/10/2023.   - C3 Zometa is due around 1/13/2024    7. 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  Positional impingement. Will monitor feet.     8.  Follow Up:  Cancel today's Kadcyla infusion.  Reschedule labs and Kadcyla infusion for 8/21/2023.  Echocardiogram prior to the 8/21/2023 Kadcyla infusion.  [facility] visit prior to 10/23/2023 Kadcyla infusion.  Labs, visit with me, and Zometa infusion around 01/13/2024.    I spent 65 minutes on the date of the encounter doing chart review, review of test results, interpretation of tests, patient visit and documentation.

## 2023-08-07 ENCOUNTER — APPOINTMENT (OUTPATIENT)
Dept: LAB | Facility: CLINIC | Age: 68
End: 2023-08-07
Attending: INTERNAL MEDICINE
Payer: MEDICARE

## 2023-08-07 ENCOUNTER — ONCOLOGY VISIT (OUTPATIENT)
Dept: ONCOLOGY | Facility: CLINIC | Age: 68
End: 2023-08-07
Attending: INTERNAL MEDICINE
Payer: MEDICARE

## 2023-08-07 VITALS
HEART RATE: 101 BPM | OXYGEN SATURATION: 95 % | TEMPERATURE: 98.8 F | DIASTOLIC BLOOD PRESSURE: 87 MMHG | SYSTOLIC BLOOD PRESSURE: 144 MMHG | WEIGHT: 183.7 LBS | RESPIRATION RATE: 16 BRPM | BODY MASS INDEX: 29 KG/M2

## 2023-08-07 DIAGNOSIS — Z51.11 ENCOUNTER FOR ANTINEOPLASTIC CHEMOTHERAPY: ICD-10-CM

## 2023-08-07 DIAGNOSIS — C50.212 MALIGNANT NEOPLASM OF UPPER-INNER QUADRANT OF LEFT BREAST IN FEMALE, ESTROGEN RECEPTOR POSITIVE (H): Primary | ICD-10-CM

## 2023-08-07 DIAGNOSIS — M79.641 BILATERAL HAND PAIN: ICD-10-CM

## 2023-08-07 DIAGNOSIS — Z17.0 MALIGNANT NEOPLASM OF UPPER-INNER QUADRANT OF LEFT BREAST IN FEMALE, ESTROGEN RECEPTOR POSITIVE (H): Primary | ICD-10-CM

## 2023-08-07 DIAGNOSIS — R53.83 FATIGUE DUE TO TREATMENT: ICD-10-CM

## 2023-08-07 DIAGNOSIS — M85.89 OSTEOPENIA OF MULTIPLE SITES: ICD-10-CM

## 2023-08-07 DIAGNOSIS — M79.642 BILATERAL HAND PAIN: ICD-10-CM

## 2023-08-07 DIAGNOSIS — Z79.811 LONG TERM (CURRENT) USE OF AROMATASE INHIBITORS: ICD-10-CM

## 2023-08-07 DIAGNOSIS — I35.0 SEVERE AORTIC STENOSIS: ICD-10-CM

## 2023-08-07 LAB
CRP SERPL-MCNC: <3 MG/L
ERYTHROCYTE [SEDIMENTATION RATE] IN BLOOD BY WESTERGREN METHOD: 25 MM/HR (ref 0–30)
RHEUMATOID FACT SER NEPH-ACNC: 6 IU/ML

## 2023-08-07 PROCEDURE — G0463 HOSPITAL OUTPT CLINIC VISIT: HCPCS | Performed by: INTERNAL MEDICINE

## 2023-08-07 PROCEDURE — 86140 C-REACTIVE PROTEIN: CPT | Performed by: INTERNAL MEDICINE

## 2023-08-07 PROCEDURE — 36415 COLL VENOUS BLD VENIPUNCTURE: CPT | Performed by: INTERNAL MEDICINE

## 2023-08-07 PROCEDURE — 86431 RHEUMATOID FACTOR QUANT: CPT | Performed by: INTERNAL MEDICINE

## 2023-08-07 PROCEDURE — 86038 ANTINUCLEAR ANTIBODIES: CPT | Performed by: INTERNAL MEDICINE

## 2023-08-07 PROCEDURE — 99215 OFFICE O/P EST HI 40 MIN: CPT | Performed by: INTERNAL MEDICINE

## 2023-08-07 PROCEDURE — 86200 CCP ANTIBODY: CPT | Performed by: INTERNAL MEDICINE

## 2023-08-07 PROCEDURE — 85652 RBC SED RATE AUTOMATED: CPT | Performed by: INTERNAL MEDICINE

## 2023-08-07 ASSESSMENT — PAIN SCALES - GENERAL: PAINLEVEL: MODERATE PAIN (5)

## 2023-08-07 NOTE — NURSING NOTE
"Oncology Rooming Note    August 7, 2023 9:57 AM   Vickie Alvarado is a 68 year old female who presents for:    Chief Complaint   Patient presents with    Blood Draw     Vitals, blood drawn and PIV placed by LPN. Pt checked into appt.     Oncology Clinic Visit     Breast Ca     Initial Vitals: BP (!) 144/87   Pulse 101   Temp 98.8  F (37.1  C) (Oral)   Resp 16   Wt 83.3 kg (183 lb 11.2 oz)   LMP 05/17/2011   SpO2 95%   BMI 29.00 kg/m   Estimated body mass index is 29 kg/m  as calculated from the following:    Height as of 7/19/23: 1.695 m (5' 6.73\").    Weight as of this encounter: 83.3 kg (183 lb 11.2 oz). Body surface area is 1.98 meters squared.  Moderate Pain (5) Comment: Data Unavailable   Patient's last menstrual period was 05/17/2011.  Allergies reviewed: Yes  Medications reviewed: Yes    Medications: Medication refills not needed today.  Pharmacy name entered into [facility]: [id number] - [place] [id number] [place] [id number] & [facility]    Clinical concerns:          Kristina Munguia CMA              "

## 2023-08-07 NOTE — LETTER
8/7/2023         RE: Vickie Alvarado  [address]  [facility] [address]        Dear Colleague,    Thank you for referring your patient, Vickie Alvarado, to the [facility]. Please see a copy of my visit note below.    Oncology visit:  Date on this visit: Aug 7, 2023    Diagnosis: left breast cancer.    Primary Physician: Gaby Lynn     History Of Present Illness:  Ms. Alvarado is a 68 year old female with a left breast cancer.  She self palpated a mass in her mid left breast.  Bilateral diagnostic mammograms showed a mass in the upper inner left breast.  Ultrasound of the left breast showed a mass at 11:00, 8 cm from the nipple measuring 3.1 cm.  There were no suspicious lymph nodes in the left axilla.  Biopsy of the left breast mass was c/w a grade 3 invasive ductal carcinoma, ER strong in 98%, PA moderate in 70%, and HER2 low by IHC (score 1+).   Oncotype DX recurrence score was 24.  RSClin predicted a 23% risk of distant recurrence in 10 years if treated with endocrine therapy alone and a 9% absolute risk reduction with chemotherapy.  Based on this, she elected to proceed with chemotherapy.    She received neoadjuvant Taxotere and cyclophosphamide chemotherapy from 7/6/2022 - 9/8/2022.  She underwent left breast lumpectomy and left axillary sentinel lymph node procedure under the care of Dr. Cadet on 10/17/2022.  Pathology showed residual grade 2 invasive breast carcinoma (60% ductal and 40% micropapillary) measuring 2.2 cm.  Treatment related changes were present and invasive tumor cellularity was 30%.  There was associated DCIS.  Surgical margins for both invasive carcinoma and DCIS were close, but negative.  There was no lymphovascular invasion and a single sentinel lymph node was negative and without signs of prior involvement (i.e. no tumor bed or fibrotic changes in the lymph node).  Receptors were repeated on the residual invasive malignancy and were found to  "be ER positive (98%), WY positive (70%), HER-2 equivocal by IHC (2+), and HER-2 positive by FISH. She completed radiation to the left breast (4240 cGy to the breast with 1250 cGy boost to the lumpectomy cavity) on 1/2/2023.  She has been on adjuvant Kadcyla since 11/21/2022.  On adjuvant letrozole since 12/19/2022.      Interval History:  Ms. Alvarado comes into clinic today for routine breast cancer follow-up.  She is on treatment with letrozole and Kadcyla.  She is really struggling with what she is calling \"significant fatigue\".  She has no motivation to perform normal activities.  For example, her family usually does a fantasy football league and she is usually very excited about researching the players.  She has had no motivation to do this and, in fact, has told her family she does not want to participate in the football league.  Likewise, normally she will make herself berry bowls and cut up fruit before she comes in for her infusions, she had no motivation to do this today.  She states that she is leaving her home much less than she usually does.  She was planning on attending a birthday party last week and it got canceled, she was relieved and ended up watching TV all day.  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     She denies ever having symptoms of depression in the past and states she does not know what that would feel like.  She denies feeling sad or increased tearfulness.  She does report that she seems to be sleeping less than she was previously.  She attributes this to laying around more during the day.  She has lost approximately 20 pounds in the past year, but attributes this to healthier eating habits.  She has intermittent nausea for which she is taking antinausea medications with relief.  She has joint pain.  This mainly involves the bilateral hands, worse in the thumbs, and her bilateral " knees.  She recently had x-rays which showed significant osteoarthritis.  She reports 2 episodes where she was doing some long distance driving and developed tingling in her hands after leaning on her elbows.  She otherwise denies symptoms of peripheral neuropathy.    She denies dyspnea, heart palpitations or chest pain.  She has no current abdominal complaints.  No fevers, chills, or symptoms of infection.    Past Medical/Surgical History:  Past Medical History:   Diagnosis Date    Aortic stenosis     Breast cancer (H) 06/2022    Hyperlipidemia     Insufficiency fracture of tibia, sequela 09/28/2021    Polyp of colon 05/26/2016   - PVCs    Past Surgical History:   Procedure Laterality Date    BIOPSY NODE SENTINEL Left 10/17/2022    Procedure: LEFT seed localized lumpectomy with sentinel node biopsy;  Surgeon: Alphonso Cadet MD;  Location: [facility] OR    CATARACT IOL, RT/LT Right 2006    HC YAG LASER CAPSULOTOMY Right 2009    LASER VITREOLYSIS, ANTERIOR OD (RIGHT EYE) Right 2007    LUMPECTOMY BREAST WITH SENTINEL NODE, COMBINED Left 10/17/2022    Procedure: LEFT seed localized lumpectomy with sentinel node biopsy;  Surgeon: Alphonso Cadet MD;  [place] [facility]    REPOSITION INTRAOCULAR LENS Right 11/18/2014    Procedure: REPOSITION INTRAOCULAR LENS;  Surgeon: Vickie Guerra MD;  Location:  [facility]    VITRECTOMY PARSPLANA WITH 23 GAUGE SYSTEM Right 11/18/2014    Procedure: VITRECTOMY PARSPLANA WITH 23 GAUGE SYSTEM;  Surgeon: Vickie Guerra MD;  Location:  [facility]     Allergies:  Allergies as of 08/07/2023 - Reviewed 08/01/2023   Allergen Reaction Noted    Bactrim [sulfamethoxazole-trimethoprim] Nausea 11/01/2017    Seasonal allergies  11/13/2014    Typhoid vaccines Nausea and Vomiting 05/05/2011     Current Medications:  Current Outpatient Medications   Medication Sig Dispense Refill    calcium carb-cholecalciferol 600-500 MG-UNIT CAPS       Lactobacillus (PROBIOTIC CHILDRENS) CHEW       letrozole  (FEMARA) 2.5 MG tablet Take 1 tablet (2.5 mg) by mouth daily 30 tablet 11    loratadine (CLARITIN) 10 MG tablet Take 10 mg by mouth daily Prn, seasonal for ragweed and lilacs      metoprolol succinate ER (TOPROL XL) 25 MG 24 hr tablet Take 1 tablet (25 mg) by mouth daily 90 tablet 3    Omega-3 Fatty Acids (FISH OIL ADULT GUMMIES PO) Take 250 mg by mouth      ondansetron (ZOFRAN) 8 MG tablet Take 1 tablet (8 mg) by mouth every 8 hours as needed for nausea 30 tablet 3    prochlorperazine (COMPAZINE) 10 MG tablet Take 0.5 tablets (5 mg) by mouth every 6 hours as needed for nausea or vomiting 30 tablet 2    simvastatin (ZOCOR) 40 MG tablet Take 1 tablet (40 mg) by mouth At Bedtime 90 tablet 3    vitamin D3 (CHOLECALCIFEROL) 50 mcg (2000 units) tablet         Family and Social History:  See initial consultation dated 6/14/2022 for further details.  Hereditary Comprehensive 40 Gene Panel was negative for pathogenic mutation.    Physical Exam:  LMP 05/17/2011   BP (!) 144/87   Pulse 101   Temp 98.8  F (37.1  C) (Oral)   Resp 16   Wt 83.3 kg (183 lb 11.2 oz)   LMP 05/17/2011   SpO2 95%   BMI 29.00 kg/m    General: Well appearing adult female in NAD.  Alert and oriented x 3.  HEENT:  Normocephalic.  Sclera anicteric.  MMM.  No lesions of the oropharynx.  Lymph:  No palpable cervical, supraclavicular, or axillary LAD.    Chest:  CTA bilaterally.  No wheezes or crackles.  CV:  Tachycardic.  RR.  Nl S1 and S2.  GIV/VI YOLETTE at the S.  Breast: Upper inner left breast incision is well healed and without significant underlying fibrosis.  There are no dominant or discretely palpable masses in either breast.  Bilateral nipples are everted and are without discharge.  Abd:  Soft/ND  Ext:  No pitting edema of the bilateral lower extremities.  She shows me her knees which do appear to be symmetrically swollen.  Neuro:  Cranial nerves grossly intact.  Gait is stable.  Able to climb on the exam table unaided.  Psych:  Mood and  affect appear normal.  Skin:  No visible concerning skin rashes or lesions.    Laboratory/Imaging Studies  I personally reviewed the below laboratories and images:    5/15/2023 Echocardiogram:  Interpretation Summary  Left ventricular size, wall motion and function are normal. The ejection  fraction is 60-65%. Global peak LV longitudinal strain is averaged at -17.3%.  This suggests abnormal strain (normal <-18%).  Right ventricular function, chamber size, wall motion, and thickness are  normal.  Trace aortic insufficiency is present. AV leaflets are severely calcified. The  peak velocity across the valve is 3.84cm/s, the peak gradient is 59mmHg with a  mean of 34mmHg. Severe AS is present.  The aorta root is normal. The inferior vena cava cannot be assessed. No  pericardial effusion is present.     Compared to prior study global longitudinal strain is slightly lower.  Otherwise no significant change    7/17/2023 Bilateral diagnostic mammograms:  Findings:     Mammogram:  Bilateral MLO and CC views were performed with tomosynthesis.     Posttreatment changes of conservation therapy in the left breast.  There is no suspicious abnormality in either breast.                                                                      IMPRESSION: BI-RADS CATEGORY: 2 - Benign.    7/19/2023 Bilateral knee X-rays:  Impression:  1. No acute osseous abnormality.  2. Moderate to severe degenerative change to the medial patellofemoral  compartments of the left knee.  3. Moderate medial and mild to moderate patellofemoral compartment  degenerative change to the right knee.    7/19/2023 Bilateral hand X-rays:  Impression:  1. No acute osseous abnormality.  2. Bilaterally, joint space narrowing of the distal interphalangeal joints, consistent this moderate grade osteoarthritis. The appearance of central erosion most pronounced in the left hand surgery digit can  be seen in erosive osteoarthritis.  3. Moderate grade degenerative changes  with joint space narrowing and osteophytosis of the interphalangeal joint of the right thumb.    8/7/2023 Labs:  Electrolytes are wnl.  Kidney function is wnl.  AST is elevated at 60 U/L; ALT is wnl.  Alkaline phosphatase is elevated at 110.    CRP is <3    Blood counts are wnl.    9/8/2022 TSH is wnl.    ASSESSMENT/PLAN:  Ms. Alvarado is a 68 year old woman with a clinical stage II, T2N0M0, ER+/SC+/HER2+ IDC of her left breast.  She is s/p 4 cycles of neoadjuvant TC chemotherapy (initial biopsy was HER2 negative) followed by left breast lumpectomy and sentinel lymph node biopsy ([id number], residual tumor HER2 positive). She is s/p adjuvant radiation and has been on adjuvant Kadcyla since 11/21/2022.     1. Left breast carcinoma:   - Receiving adjuvant Kadcyla.  She is scheduled to receive C13 of 17 total planned cycles today.She reports symptoms of fatigue and lack of motivation on history taken today.  These symptoms are quite significant for her and are interfering with normal activities of daily living.  It is not clear to me whether this is malaise from her treatment, worsening aortic stenosis, depression, or due to another medical condition altogether.  We agreed on a plan to hold today's infusion and reschedule for 2 weeks for now.  Will schedule for an echocardiogram to re-evaluate her aortic stenosis as well.  If she has had improvement in her symptoms in two weeks and there are no new changes on her echocardiogram will proceed with planned C13 Kadcyla at that time, but dose reduce to 3 mg/kg.  If no improvement, would consider initiating an antidepressant.  - Continue letrozole, tolerating well with the exception of arthralgias.  - Reschedule today's Kadcyla infusion for 2 weeks from now.  - Reduce dose of Kadcyle from 3.6 mg/kg to 3 mg/kg  - JHONATAN visit in 9 weeks.  - I reviewed the images of bilateral diagnostic mammograms performed on 7/17/2023 which are without concerning findings.  Plan for screening  mammograms in 07/2024.    2.  Fatigue/lack of motivation:  Malaise from treatment vs depression vs other medical condition (known severe aortic sclerosis).  - Reviewed multiple prior TSH studies have been wnl.  - Offered referral to cancer rehab, specifically discussed how cardiovascular exercise can improve fatigue and through cancer rehab can work with occupational therapy on ways to manage fatigue; she declined the referral.  - repeat echocardiogram prior to next Kadcyla infusion, this will re-evaluate her LVEF as well as her aortic stenosis  - I suspect given lack of motivation and alteration in sleeping habits, there may be a component of depression contributing.  If no improvement in fatigue with holding Kadcyla for two weeks, recommend trial of venlafaxine XR 37.5 mg PO daily.    3.  Arthralgias:  Thumbs, hands, knees.  Discussed swollen joints are c/w osteoarthritis.  X-rays are also c/w osteoarthritis, however, joint pain may be exacerbated by letrozole.  - ongoing follow up with PCP  - Okay to use NSAIDs given currently normal blood counts on Kadcyla.  Okay to use topical anesthetics such as Voltaren gel or Biofreeze as well.  - Notes some improvement with weight loss and would like to lose further weight.  Plans to resume pool therapy as this has been most helpful.      4.  Personal history of colon polyps:  No change since last visit.  She has a h/o colon polyps.  Colonoscopy 2/23/2022 with removal of 3 polyps, each 2-3 mm (2 tubular adenomas, 1 hyperplastic).  Repeat colonoscopy in 02/2027 is recommended.    5.  At risk for cardiomyopathy:  Echocardiogram performed on 5/15/2023 showed a retained LVEF but a slight change in strain. She is on metoprolol 25 mg daily.    - repeat echocardiogram prior to next Kadcyla infusion.    6.  Osteopenia:  She is at risk of bone loss on aromatase inhibitor therapy.  DEXA bone density scan on 12/1/2022 with a lowest T-score of -2.2 c/w osteopenia. Zometa 4 mg IV once  every 6 months for both prevention of osteoporosis and prevention of breast cancer bone metastases was initiated on 1/10/2023.   - C3 Zometa is due around 1/13/2024    7. 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 Positional impingement. Will monitor feet.     8.  Follow Up:  Cancel today's Kadcyla infusion.  Reschedule labs and Kadcyla infusion for 8/21/2023.  Echocardiogram prior to the 8/21/2023 Kadcyla infusion.  JHONATAN visit prior to 10/23/2023 Kadcyla infusion.  Labs, visit with me, and Zometa infusion around 01/13/2024.    I spent 65 minutes on the date of the encounter doing chart review, review of test results, interpretation of tests, patient visit and documentation.        Again, thank you for allowing me to participate in the care of your patient.        Sincerely,        Chandrika Horvath MD

## 2023-08-07 NOTE — NURSING NOTE
IV removed per provider's request.    Patient tolerated well and had no issues.    Refugio Dixon LPN

## 2023-08-08 LAB
ANA PAT SER IF-IMP: ABNORMAL
ANA SER QL IF: ABNORMAL
ANA TITR SER IF: ABNORMAL {TITER}

## 2023-08-09 LAB — CCP AB SER IA-ACNC: <0.4 U/ML

## 2023-08-14 ENCOUNTER — PATIENT OUTREACH (OUTPATIENT)
Dept: ONCOLOGY | Facility: CLINIC | Age: 68
End: 2023-08-14
Payer: MEDICARE

## 2023-08-14 NOTE — PROGRESS NOTES
"[facility]: Cancer Care                                                                                          Called patient to check in with her about how she is feeling. Dr Horvath wanting to know if still feels extreme fatigue, and if so she would like the ECHO to be moved up. Pt on call with our scheduling - will let her finish with them and will call back in 15 min.  Called pt back -pt reporting she is still very fatigued, she only sets up one thing per day to prevent her from getting over depleted, she does sometimes wake up really fatigued. She is currently scheduled for an ECHO on Monday 8/21. However, Ghanshyam, Clinical coordinator is working on trying to move ECHO to this week.   Pt brought up that depression was discussed with Dr Horvath and reported that she \"does not feel blue\" and isn't sure about adding a medication right now. Discussed some of the other symptoms of depression which she reported the extreme fatigue is the only one she really feels at this time. Discussed starting with the ECHO and then seeing what is needed from there. Pt verbalized understanding with no further questions.  Signature:  Sadie Roberto RNCC  "

## 2023-08-18 ENCOUNTER — HOSPITAL ENCOUNTER (OUTPATIENT)
Dept: CARDIOLOGY | Facility: CLINIC | Age: 68
Discharge: HOME | End: 2023-08-18
Attending: INTERNAL MEDICINE | Admitting: INTERNAL MEDICINE
Payer: MEDICARE

## 2023-08-18 DIAGNOSIS — Z51.11 ENCOUNTER FOR ANTINEOPLASTIC CHEMOTHERAPY: ICD-10-CM

## 2023-08-18 LAB — LVEF ECHO: NORMAL

## 2023-08-18 PROCEDURE — 93306 TTE W/DOPPLER COMPLETE: CPT | Mod: 26 | Performed by: INTERNAL MEDICINE

## 2023-08-18 PROCEDURE — 93306 TTE W/DOPPLER COMPLETE: CPT

## 2023-08-21 ENCOUNTER — INFUSION THERAPY VISIT (OUTPATIENT)
Dept: ONCOLOGY | Facility: CLINIC | Age: 68
End: 2023-08-21
Attending: INTERNAL MEDICINE
Payer: MEDICARE

## 2023-08-21 ENCOUNTER — APPOINTMENT (OUTPATIENT)
Dept: LAB | Facility: CLINIC | Age: 68
End: 2023-08-21
Attending: INTERNAL MEDICINE
Payer: MEDICARE

## 2023-08-21 VITALS
TEMPERATURE: 97.9 F | OXYGEN SATURATION: 95 % | DIASTOLIC BLOOD PRESSURE: 80 MMHG | WEIGHT: 184.2 LBS | RESPIRATION RATE: 18 BRPM | HEART RATE: 93 BPM | BODY MASS INDEX: 29.08 KG/M2 | SYSTOLIC BLOOD PRESSURE: 138 MMHG

## 2023-08-21 DIAGNOSIS — Z51.11 ENCOUNTER FOR ANTINEOPLASTIC CHEMOTHERAPY: ICD-10-CM

## 2023-08-21 DIAGNOSIS — Z17.0 MALIGNANT NEOPLASM OF UPPER-INNER QUADRANT OF LEFT BREAST IN FEMALE, ESTROGEN RECEPTOR POSITIVE (H): Primary | ICD-10-CM

## 2023-08-21 DIAGNOSIS — C50.212 MALIGNANT NEOPLASM OF UPPER-INNER QUADRANT OF LEFT BREAST IN FEMALE, ESTROGEN RECEPTOR POSITIVE (H): Primary | ICD-10-CM

## 2023-08-21 LAB
ALBUMIN SERPL BCG-MCNC: 4.1 G/DL (ref 3.5–5.2)
ALP SERPL-CCNC: 92 U/L (ref 35–104)
ALT SERPL W P-5'-P-CCNC: 19 U/L (ref 0–50)
ANION GAP SERPL CALCULATED.3IONS-SCNC: 11 MMOL/L (ref 7–15)
AST SERPL W P-5'-P-CCNC: 46 U/L (ref 0–45)
BASOPHILS # BLD AUTO: 0.1 10E3/UL (ref 0–0.2)
BASOPHILS NFR BLD AUTO: 1 %
BILIRUB SERPL-MCNC: 0.5 MG/DL
BUN SERPL-MCNC: 15.8 MG/DL (ref 8–23)
CALCIUM SERPL-MCNC: 9.5 MG/DL (ref 8.8–10.2)
CHLORIDE SERPL-SCNC: 104 MMOL/L (ref 98–107)
CREAT SERPL-MCNC: 0.71 MG/DL (ref 0.51–0.95)
DEPRECATED HCO3 PLAS-SCNC: 24 MMOL/L (ref 22–29)
EOSINOPHIL # BLD AUTO: 0.1 10E3/UL (ref 0–0.7)
EOSINOPHIL NFR BLD AUTO: 1 %
ERYTHROCYTE [DISTWIDTH] IN BLOOD BY AUTOMATED COUNT: 14.1 % (ref 10–15)
GFR SERPL CREATININE-BSD FRML MDRD: >90 ML/MIN/1.73M2
GLUCOSE SERPL-MCNC: 133 MG/DL (ref 70–99)
HCT VFR BLD AUTO: 42.7 % (ref 35–47)
HGB BLD-MCNC: 14.3 G/DL (ref 11.7–15.7)
IMM GRANULOCYTES # BLD: 0 10E3/UL
IMM GRANULOCYTES NFR BLD: 0 %
LYMPHOCYTES # BLD AUTO: 0.7 10E3/UL (ref 0.8–5.3)
LYMPHOCYTES NFR BLD AUTO: 15 %
MCH RBC QN AUTO: 30.2 PG (ref 26.5–33)
MCHC RBC AUTO-ENTMCNC: 33.5 G/DL (ref 31.5–36.5)
MCV RBC AUTO: 90 FL (ref 78–100)
MONOCYTES # BLD AUTO: 0.4 10E3/UL (ref 0–1.3)
MONOCYTES NFR BLD AUTO: 7 %
NEUTROPHILS # BLD AUTO: 3.7 10E3/UL (ref 1.6–8.3)
NEUTROPHILS NFR BLD AUTO: 76 %
NRBC # BLD AUTO: 0 10E3/UL
NRBC BLD AUTO-RTO: 0 /100
PLATELET # BLD AUTO: 181 10E3/UL (ref 150–450)
POTASSIUM SERPL-SCNC: 3.9 MMOL/L (ref 3.4–5.3)
PROT SERPL-MCNC: 7.3 G/DL (ref 6.4–8.3)
RBC # BLD AUTO: 4.74 10E6/UL (ref 3.8–5.2)
SODIUM SERPL-SCNC: 139 MMOL/L (ref 136–145)
WBC # BLD AUTO: 4.9 10E3/UL (ref 4–11)

## 2023-08-21 PROCEDURE — 80053 COMPREHEN METABOLIC PANEL: CPT | Performed by: INTERNAL MEDICINE

## 2023-08-21 PROCEDURE — 96413 CHEMO IV INFUSION 1 HR: CPT

## 2023-08-21 PROCEDURE — 36415 COLL VENOUS BLD VENIPUNCTURE: CPT | Performed by: INTERNAL MEDICINE

## 2023-08-21 PROCEDURE — 250N000011 HC RX IP 250 OP 636: Mod: JZ | Performed by: INTERNAL MEDICINE

## 2023-08-21 PROCEDURE — 96375 TX/PRO/DX INJ NEW DRUG ADDON: CPT

## 2023-08-21 PROCEDURE — 85025 COMPLETE CBC W/AUTO DIFF WBC: CPT | Performed by: INTERNAL MEDICINE

## 2023-08-21 PROCEDURE — 258N000003 HC RX IP 258 OP 636: Performed by: INTERNAL MEDICINE

## 2023-08-21 RX ORDER — ONDANSETRON 2 MG/ML
8 INJECTION INTRAMUSCULAR; INTRAVENOUS ONCE
Status: COMPLETED | OUTPATIENT
Start: 2023-08-21 | End: 2023-08-21

## 2023-08-21 RX ADMIN — SODIUM CHLORIDE 250 ML: 9 INJECTION, SOLUTION INTRAVENOUS at 11:15

## 2023-08-21 RX ADMIN — ONDANSETRON 8 MG: 2 INJECTION INTRAMUSCULAR; INTRAVENOUS at 11:17

## 2023-08-21 RX ADMIN — ADO-TRASTUZUMAB EMTANSINE 260 MG: 20 INJECTION, POWDER, LYOPHILIZED, FOR SOLUTION INTRAVENOUS at 11:55

## 2023-08-21 ASSESSMENT — PAIN SCALES - GENERAL: PAINLEVEL: EXTREME PAIN (9)

## 2023-08-21 NOTE — PATIENT INSTRUCTIONS
[facility] Triage and after hours / weekends / holidays:  [phone] option #5, then option #2    Please call the triage or after hours line if you experience a temperature greater than or equal to 100.4, shaking chills, have uncontrolled nausea, vomiting and/or diarrhea, dizziness, shortness of breath, chest pain, bleeding, unexplained bruising, or if you have any other new/concerning symptoms, questions or concerns.      If you are having any concerning symptoms or wish to speak to a provider before your next infusion visit, please call triage to notify them so we can adequately serve you.     If you need a refill on a narcotic prescription or other medication, please call before your infusion appointment.         August 2023 Sunday Monday Tuesday Wednesday Thursday Friday Saturday             1    SHORT   6:00 PM   (20 min.)   Ranjeet Major PA-C   [facility] 2     3     4     5       6     7    LAB PERIPHERAL   9:30 AM   (15 min.)   [facility] LAB DRAW   [facility]    RETURN CCSL   9:45 AM   (30 min.)   Chandrika Horvath MD   [facility] 8     9     10     11     12       13     14     15     16     17     18    ECHO LIMITED   3:15 PM   (60 min.)   [id number]   [facility] 19       20     21    LAB PERIPHERAL  10:00 AM   (15 min.)   [facility] LAB DRAW   [facility]    ONC INFUSION 1 HR (60 MIN)  10:30 AM   (60 min.)    ONC INFUSION NURSE   [facility] 22     23     24     25     26       27     28     29    NEW ADULT GLAUCOMA   7:30 AM   (15 min.)   Hector Choi MD   [facility] 30 31 September 2023 Sunday Monday Tuesday Wednesday Thursday Friday Saturday                            1     2       3     4     5     6     7     8     9        10     11    LAB PERIPHERAL  11:30 AM   (15 min.)   [facility] LAB DRAW   [facility]    ONC INFUSION 1 HR (60 MIN)  12:00 PM   (60 min.)    ONC INFUSION NURSE   [facility] 12     13     14     15     16       17     18     19     20     21     22     23       24     25     26     27     28     29     30                    Recent Results (from the past 24 hour(s))   Comprehensive metabolic panel    Collection Time: 08/21/23 10:25 AM   Result Value Ref Range    Sodium 139 136 - 145 mmol/L    Potassium 3.9 3.4 - 5.3 mmol/L    Chloride 104 98 - 107 mmol/L    Carbon Dioxide (CO2) 24 22 - 29 mmol/L    Anion Gap 11 7 - 15 mmol/L    Urea Nitrogen 15.8 8.0 - 23.0 mg/dL    Creatinine 0.71 0.51 - 0.95 mg/dL    Calcium 9.5 8.8 - 10.2 mg/dL    Glucose 133 (H) 70 - 99 mg/dL    Alkaline Phosphatase 92 35 - 104 U/L    AST 46 (H) 0 - 45 U/L    ALT 19 0 - 50 U/L    Protein Total 7.3 6.4 - 8.3 g/dL    Albumin 4.1 3.5 - 5.2 g/dL    Bilirubin Total 0.5 <=1.2 mg/dL    GFR Estimate >90 >60 mL/min/1.73m2   CBC with platelets and differential    Collection Time: 08/21/23 10:25 AM   Result Value Ref Range    WBC Count 4.9 4.0 - 11.0 10e3/uL    RBC Count 4.74 3.80 - 5.20 10e6/uL    Hemoglobin 14.3 11.7 - 15.7 g/dL    Hematocrit 42.7 35.0 - 47.0 %    MCV 90 78 - 100 fL    MCH 30.2 26.5 - 33.0 pg    MCHC 33.5 31.5 - 36.5 g/dL    RDW 14.1 10.0 - 15.0 %    Platelet Count 181 150 - 450 10e3/uL    % Neutrophils 76 %    % Lymphocytes 15 %    % Monocytes 7 %    % Eosinophils 1 %    % Basophils 1 %    % Immature Granulocytes 0 %    NRBCs per 100 WBC 0 <1 /100    Absolute Neutrophils 3.7 1.6 - 8.3 10e3/uL    Absolute Lymphocytes 0.7 (L) 0.8 - 5.3 10e3/uL    Absolute Monocytes 0.4 0.0 - 1.3 10e3/uL    Absolute Eosinophils 0.1 0.0 - 0.7 10e3/uL    Absolute Basophils 0.1 0.0 - 0.2 10e3/uL    Absolute Immature Granulocytes 0.0 <=0.4 10e3/uL    Absolute NRBCs 0.0 10e3/uL

## 2023-08-21 NOTE — PROGRESS NOTES
"Infusion Nursing Note:  Vickie Alvarado presents today for Cycle 13, Day 1- Kadcyla.    Patient seen by provider today: No   present during visit today: Not Applicable.    Note: Patient reports feeling OK on arrival to infusion suite today. Reports her fatigue, lack of motivation, and joint pain are much improved after taking 2 weeks off of Kadcyla. Patient reports she was deep cleaning her house on Wednesday and Thursday morning, no specific injury, but she reports she \"thru out her back.\" Pain is 9/10 if she has to bend over but 4 or 5/10 when sitting or lying down. Patient declines pain intervention while in infusion. She is wearing her own back brace, using a crutch, and reports she is seeing the chiropractor this afternoon. Updated provider on this. Denies signs of infection: no fever, cough, chills, chest pain, rash, or shortness of breath. No signs of bleeding. Reports intermittent nausea at home, does not interfere with her appetite, controlled with PRN Zofran. No other concerns or questions reported. Feels ready to proceed with treatment today.       Intravenous Access:  Peripheral IV placed.    Treatment Conditions:  Lab Results   Component Value Date    HGB 14.3 08/21/2023    WBC 4.9 08/21/2023    ANEU 4.1 09/08/2014    ANEUTAUTO 3.7 08/21/2023     08/21/2023        Lab Results   Component Value Date     08/21/2023    POTASSIUM 3.9 08/21/2023    MAG 2.2 08/20/2021    CR 0.71 08/21/2023    VALENTINA 9.5 08/21/2023    BILITOTAL 0.5 08/21/2023    ALBUMIN 4.1 08/21/2023    ALT 19 08/21/2023    AST 46 (H) 08/21/2023       Results reviewed, labs MET treatment parameters, ok to proceed with treatment.  ECHO/MUGA completed 8/18/23  EF 70%.  TORB 8/21/23 @ 1048: Dr. Voss (covering for Dr. Horvath)/Verena Chen, FANNY   -OK to proceed with treatment today with \"Hyperkinetic LVEF\" noted in ECHO results. Patient reports hydrating well, no need for fluids.     Post Infusion Assessment:  Patient " tolerated infusion without incident.  Blood return noted pre and post infusion.  Site patent and intact, free from redness, edema or discomfort.  No evidence of extravasations.  Access discontinued per protocol.       Discharge Plan:   Patient declined prescription refills.  Discharge instructions reviewed with: Patient.  Patient and/or family verbalized understanding of discharge instructions and all questions answered.  AVS to patient via [employer].  Patient will return 9/11/23 for next appointment.   Patient discharged in stable condition accompanied by: self.  Departure Mode: Ambulatory with crutch.      Verena Chen RN

## 2023-08-28 ENCOUNTER — APPOINTMENT (OUTPATIENT)
Dept: GENERAL RADIOLOGY | Facility: CLINIC | Age: 68
End: 2023-08-28
Attending: EMERGENCY MEDICINE
Payer: MEDICARE

## 2023-08-28 ENCOUNTER — HOSPITAL ENCOUNTER (EMERGENCY)
Facility: CLINIC | Age: 68
Discharge: HOME | End: 2023-08-28
Attending: PHYSICIAN ASSISTANT | Admitting: PHYSICIAN ASSISTANT
Payer: MEDICARE

## 2023-08-28 ENCOUNTER — NURSE TRIAGE (OUTPATIENT)
Dept: FAMILY MEDICINE | Facility: CLINIC | Age: 68
End: 2023-08-28
Payer: MEDICARE

## 2023-08-28 ENCOUNTER — PATIENT OUTREACH (OUTPATIENT)
Dept: ONCOLOGY | Facility: CLINIC | Age: 68
End: 2023-08-28
Payer: MEDICARE

## 2023-08-28 ENCOUNTER — APPOINTMENT (OUTPATIENT)
Dept: CT IMAGING | Facility: CLINIC | Age: 68
End: 2023-08-28
Attending: PHYSICIAN ASSISTANT
Payer: MEDICARE

## 2023-08-28 VITALS
SYSTOLIC BLOOD PRESSURE: 128 MMHG | RESPIRATION RATE: 15 BRPM | TEMPERATURE: 98.6 F | HEART RATE: 112 BPM | DIASTOLIC BLOOD PRESSURE: 75 MMHG | OXYGEN SATURATION: 95 %

## 2023-08-28 DIAGNOSIS — M54.50 ACUTE RIGHT-SIDED LOW BACK PAIN WITHOUT SCIATICA: ICD-10-CM

## 2023-08-28 PROCEDURE — 72100 X-RAY EXAM L-S SPINE 2/3 VWS: CPT

## 2023-08-28 PROCEDURE — 72170 X-RAY EXAM OF PELVIS: CPT

## 2023-08-28 PROCEDURE — 99285 EMERGENCY DEPT VISIT HI MDM: CPT | Mod: 25

## 2023-08-28 PROCEDURE — 72131 CT LUMBAR SPINE W/O DYE: CPT | Mod: MA

## 2023-08-28 PROCEDURE — 250N000013 HC RX MED GY IP 250 OP 250 PS 637: Performed by: PHYSICIAN ASSISTANT

## 2023-08-28 PROCEDURE — 250N000013 HC RX MED GY IP 250 OP 250 PS 637: Performed by: EMERGENCY MEDICINE

## 2023-08-28 RX ORDER — CYCLOBENZAPRINE HCL 10 MG
10 TABLET ORAL ONCE
Status: COMPLETED | OUTPATIENT
Start: 2023-08-28 | End: 2023-08-28

## 2023-08-28 RX ORDER — LIDOCAINE 50 MG/G
1 PATCH TOPICAL EVERY 24 HOURS
Qty: 5 PATCH | Refills: 0 | Status: SHIPPED | OUTPATIENT
Start: 2023-08-28 | End: 2023-09-11

## 2023-08-28 RX ORDER — CYCLOBENZAPRINE HCL 10 MG
10 TABLET ORAL 3 TIMES DAILY PRN
Qty: 15 TABLET | Refills: 0 | Status: SHIPPED | OUTPATIENT
Start: 2023-08-28 | End: 2024-02-22

## 2023-08-28 RX ORDER — LIDOCAINE 4 G/G
1 PATCH TOPICAL ONCE
Status: DISCONTINUED | OUTPATIENT
Start: 2023-08-28 | End: 2023-08-28 | Stop reason: HOSPADM

## 2023-08-28 RX ADMIN — LIDOCAINE 1 PATCH: 560 PATCH PERCUTANEOUS; TOPICAL; TRANSDERMAL at 18:34

## 2023-08-28 RX ADMIN — CYCLOBENZAPRINE 10 MG: 10 TABLET, FILM COATED ORAL at 19:01

## 2023-08-28 ASSESSMENT — ACTIVITIES OF DAILY LIVING (ADL)
ADLS_ACUITY_SCORE: 35
ADLS_ACUITY_SCORE: 35

## 2023-08-28 NOTE — ED TRIAGE NOTES
12 days of pain to right lower back pain. Pain is at the top of right buttock. Patient denies radiation of pain. Patient has been going to chiropractor the last couple weeks and got a massage today.    Patient has hx of back issues.     Denies falls or injuries to the area.   Triage Assessment       Row Name 08/28/23 [id number]       Triage Assessment (Adult)    Airway WDL WDL       Respiratory WDL    Respiratory WDL WDL       Skin Circulation/Temperature WDL    Skin Circulation/Temperature WDL WDL       Cardiac WDL    Cardiac WDL WDL       Peripheral/Neurovascular WDL    Peripheral Neurovascular WDL WDL       Cognitive/Neuro/Behavioral WDL    Cognitive/Neuro/Behavioral WDL WDL

## 2023-08-28 NOTE — TELEPHONE ENCOUNTER
"Chronic back pain. Usually sees chiropractor. Pt teary eyed at time of phone call. Chiropractor is recommending to see PCP and discuss imaging regarding worsening back pain. Pt has been having severe, intermittent, right lower back pain x 12 days. Back pain started after pt did strenuous work in her home. Due to chemo, pt reports she has had a sedentary lifestyle recently.  Pt had a massage today at her chiropractors and was recommended to call her Dr.   Pt is normally seen at [facility] and will follow up with her PCP as needed.  Pt triaged and dispo'd to ED for eval of severe back pain. Agrees. Will arrange ride.     1. ONSET: \"When did the pain begin?\"       8/17  2. LOCATION: \"Where does it hurt?\" (upper, mid or lower back)      Lower right back   3. SEVERITY: \"How bad is the pain?\"  (e.g., Scale 1-10; mild, moderate, or severe)    - MILD (1-3): doesn't interfere with normal activities     - MODERATE (4-7): interferes with normal activities or awakens from sleep     - SEVERE (8-10): excruciating pain, unable to do any normal activities       8/10. Pain with activity. Minimal at rest.   4. PATTERN: \"Is the pain constant?\" (e.g., yes, no; constant, intermittent)       Pain is intermittent; Hurts to sit/stand or get up   5. RADIATION: \"Does the pain shoot into your legs or elsewhere?\"      Denies   6. CAUSE:  \"What do you think is causing the back pain?\"       Unsure   7. BACK OVERUSE:  \"Any recent lifting of heavy objects, strenuous work or exercise?\"      Over use of back. Pt was very sedentary during chemo.   8. MEDICATIONS: \"What have you taken so far for the pain?\" (e.g., nothing, acetaminophen, NSAIDS)      Tylenol only; some relief 1000mg every 6 hours   9. NEUROLOGIC SYMPTOMS: \"Do you have any weakness, numbness, or problems with bowel/bladder control?\"      Denies   10. OTHER SYMPTOMS: \"Do you have any other symptoms?\" (e.g., fever, abdominal pain, burning with urination, blood in urine)        Denies   11. " "PREGNANCY: \"Is there any chance you are pregnant?\" (e.g., yes, no; LMP)        [facility] 2011    Reason for Disposition   SEVERE back pain of sudden onset and age > 60 years    Additional Information   Negative: Passed out (i.e., fainted, collapsed and was not responding)   Negative: Shock suspected (e.g., cold/pale/clammy skin, too weak to stand, low BP, rapid pulse)   Negative: Sounds like a life-threatening emergency to the triager   Negative: Major injury to the back (e.g., MVA, fall > 10 feet or 3 meters, penetrating injury, etc.)   Negative: Pain in the upper back over the ribs (rib cage) that radiates (travels) into the chest   Negative: Pain in the upper back over the ribs (rib cage) and worsened by coughing (or clearly increases with breathing)   Negative: Back pain during pregnancy    Protocols used: Back Pain-A-[place]    "

## 2023-08-28 NOTE — ED NOTES
Tele-PIT/Intake Evaluation      Video-Visit Details    Type of service:  Video Visit    Video Start Time (time video started): 5:20 PM  Video End Time (time video stopped): 5:24 PM   Originating Location (pt. Location):  [facility]  Distant Location (provider location):  [facility] ED office    Mode of Communication:  Video Conference via [facility]  Patient verbally consented to [employer] televisit.    History:  12 days R lower back pain, history of similar flares over decades but not responding to chiropractor this time so referred here for xrays.  No fever, no urinary symptoms, no anticoagulant.    Exam:  Sitting upright in chair.  Patient Vitals for the past 24 hrs:   BP Temp Temp src Pulse Resp SpO2   08/28/23 1718 -- 98.6  F (37  C) Temporal -- -- --   08/28/23 1714 128/75 -- -- 112 15 95 %       Appropriate interventions for symptom management were initiated if applicable.  Appropriate diagnostic tests were initiated if indicated.    Important information for subsequent clinician:  Xrays ordered, also lidoderm.  No obvious indication for emergent MRI but unable to fully assess via tele.    I briefly evaluated the patient and developed an initial plan of care. I discussed this plan and explained that this brief interaction does not constitute a full evaluation. Patient/family understands that they should wait to be fully evaluated and discuss any test results with another clinician prior to leaving the hospital.       Jeff Malave MD  08/28/23 8526

## 2023-08-28 NOTE — PROGRESS NOTES
[facility]: Cancer Care                                                                                          Pt called re: an incident from 8/17, pt was feeling really good and was cleaning and seems to have significantly overexerted herself and threw out her back. She had infusion after this occurred and is note in the infusion note.   Pt reporting pain 9/10 in low back area, left side. She has gone to chiropractor 4 times now and right after feels good (doing  heat treatment and then adjustment) but by the time she gets home is back to being really painful. Concerned about using aspirin or NSAIDs because she is on Kadcyla, discussed tylenol. Pt feels she needs to be seen both to find out what's wrong and get something to help with the pain. Discussed going to [facility] which [facility] an [facility], discussed that they use Epic and should be able to access her history via care everywhere, though she may need to sign consent.   Due to pts history let her know Dr Horvath would be updated and if she has anything to add would let pt know. Pt going to call [facility] to discuss access to Epic and wait time as she is not sure she can tolerate sitting for long. Pt verbalized understanding of discussion and will keep us update.    Update: Pt called [facility] and they reported that even though they had Epic they would not be able to access her information for up to 72 hrs (?). Discussed with pt that she will need to go to ED or Urgent care.    Signature:  Sadie Roberto, RNCC

## 2023-08-28 NOTE — ED PROVIDER NOTES
"  History     Chief Complaint:  Back Pain (Non traumtic)       The history is provided by the patient.      Vickie Alvarado is a 68 year old female with history of breast cancer and hyperlipidemia who presents with lower right sided back pain and increasing fatigue for the last 12 days. She notes that she recently was cleaning her kitchen for about an hour before throwing her back out the next morning. Vickie could not identify a particular inducing action. Vickie reports that her pain is sharp and located slightly off the midline, and increases with movement especially bending over or reaching. The patient has been seeing a chiropractor for adjustments and massages, and visited the day of the episode, and was too stiff to get any relief from that session. She felt better over the weekend, but then the pain returned on 08/21. She visited the chiropractor again this morning, and the massage provided some relief but her back \"grabbed\" again the moment she stood up. Usually, her chiropractor visits go well, but this visit did not. Due to this, her chiropractor recommended that she be seen and get some X-ray imaging. Vickie has been using 2 Tylenol every 6 hours last week with no dosage at night. She then increased that dosage after 08/25, which has provided some relief. Additionally, she has attempted using ice and heat in combination for 20 minutes at a time, which has provided some relief. Outside of this, patient reports that she has been on a 17 infusion cycle of Kadcyla for her breast cancer since November. Her first 8 infusions went well, but she developed increased fatigue with cycles 9-12. She was in a 2 week period of rest between infusions recommended by her oncologist when her back pain began The patient denies any pain radiating down her legs, direct midline pain, abdominal pain, falls, other injuries, shortness of breath, fever, rash,urinary symptoms, nausea, vomiting, numbness, incontinence, tingling, " IV drug use, chest pain, or diarrhea. Outside of this, the patient mentions that she has had low back pain issues in the past. She has not had a bowel movement yet today.    Independent Historian:   None - Patient Only    Review of External Notes:   None    Medications:    Femara  Claritin  Toprol XL  Zofran  Compazine  Zocor  Guaifenesin/codeine     Past Medical History:    Aortic stenosis  Breast cancer  Hyperlipidemia  Insufficiency fracture of tibia, sequela  Polyp of colon  Senile nuclear sclerosis  Tear film insufficiency  Regular astigmatism  Myopia  Presbyopia  Pupillary abnormalities  Diplopia  Vitreous membranes and strands  Tinnitus  Toenail fungus  Osteoporosis with current pathological fracture  Diverticular disease of large intestine  PVC's  Osteopenia of multiple sites  Seasonal allergies  Cataracts    Past Surgical History:    [place] node biopsy, left  Cataract IOL, right  YAG laser capsulotomy  Laser vitreolysis, anterior, right   Breast lumpectomy, left  Intraocular lens repositioning  Vitrectomy parsplana, right    Physical Exam   Patient Vitals for the past 24 hrs:   BP Temp Temp src Pulse Resp SpO2   08/28/23 1718 -- 98.6  F (37  C) Temporal -- -- --   08/28/23 1714 128/75 -- -- 112 15 95 %      Physical Exam  Constitutional: Pleasant. Cooperative.   Eyes: Pupils equally round and reactive  HENT: Head is normal in appearance. Oropharynx is normal with moist mucus membranes.  Cardiovascular: Regular rate and rhythm and without murmurs.  Respiratory: Normal respiratory effort, lungs are clear bilaterally.  GI: Abdomen is soft, non-tender, non-distended. No guarding, rebound, or rigidity.  Musculoskeletal: No midline spinal tenderness. TTP to right superior paraspinal region in lumbar spine. 5/5 strength with hip flexion, knee flexion, knee extension, dorsiflexion, and plantarflexion bilaterally.   Skin: Normal, without rash.  Neurologic: Cranial nerves grossly intact, normal cognition, no focal  deficits. Alert and oriented x 3. Sensation to light touch intact in bilateral lower extremities.   Psychiatric: Normal affect.  Nursing notes and vital signs reviewed.      Emergency Department Course     Imaging:  Lumbar spine CT w/o contrast   Final Result   IMPRESSION:     1.  No evidence of acute fracture or subluxation of the lumbar spine by CT imaging.   2.  Degenerative lumbar spondylosis as described above.      XR Pelvis 1/2 Views   Final Result   IMPRESSION:   1.  Normal bilateral hip joint spacing and alignment.   2.  No fracture.   3.  Degenerative narrowing of the lumbosacral interspace.      XR Lumbar Spine 2/3 Views   Final Result   IMPRESSION:    5 lumbar-type vertebral bodies. The vertebral bodies of the lumbar spine have normal stature and alignment without evidence of compression fracture. Degenerative disc disease with disc height loss at L5/S1. The remaining disc spaces are well-maintained.    No other significant degenerative changes. Soft tissues unremarkable.         Report per radiology    Laboratory:  Labs Ordered and Resulted from Time of ED Arrival to Time of ED Departure - No data to display     Emergency Department Course & Assessments:       Interventions:  Medications   Lidocaine (LIDOCARE) 4 % Patch 1 patch (1 patch Transdermal $Patch/Med Applied 8/28/23 1834)   cyclobenzaprine (FLEXERIL) tablet 10 mg (10 mg Oral $Given 8/28/23 1901)      Assessments:  1820 I obtained history and examined the patient as noted above.     Independent Interpretation (X-rays, CTs, rhythm strip):  None    Consultations/Discussion of Management or Tests:  None        Social Determinants of Health affecting care:   None    Disposition:  The patient was discharged to home.     Impression & Plan      Medical Decision Making:  Vickie Alvarado is a 68 year old female who presents to the ED for evaluation of back pain. This is atraumatic.  Patient notes that she was doing some increased activity including  cleaning her home when she shortly thereafter developed a back pain.  She has been doing multiple outpatient interventions, including chiropractic, massage, Tylenol, without significant improvement of her symptoms.  See HPI as above for additional details.  Vitals and physical exam as above.  Differential is broad include cauda equina, fracture, strain, disc pathology, kidney stone, pyelonephritis, amongst others.  No red flag signs or symptoms to suggest for cauda equina.  Imaging of the back is negative for bony abnormality.  Patient has no urinary symptoms to suggest for kidney stone or pyelonephritis.  No rash to suggest shingles.  Discussed with patient unclear etiology of her symptoms at this time.  Suspect soft tissue cause such as disc pathology vs strain. Rx sent for lidocaine patches as well as Flexeril. Discussed ongoing Tylenol. Advised follow up with PCP with persistent symptoms. Discussed reasons to return. All questions answered. Patient discharged to home in stable condition.    Diagnosis:    ICD-10-CM    1. Acute right-sided low back pain without sciatica  M54.50            Discharge Medications:  New Prescriptions    CYCLOBENZAPRINE (FLEXERIL) 10 MG TABLET    Take 1 tablet (10 mg) by mouth 3 times daily as needed for muscle spasms    LIDOCAINE (LIDODERM) 5 % PATCH    Place 1 patch onto the skin every 24 hours To prevent lidocaine toxicity, patient should be patch free for 12 hrs daily.      Scribe Disclosure:  IShantelle, am serving as a scribe at 5:44 PM on 8/28/2023    Randy ACUÑA, am serving as a scribe at 5:44 PM on 8/28/2023 to document services personally performed by Dewayne Paredes PA-C, based on my observations and the provider's statements to me.   8/28/2023   Dewayne Paredes PA-C     This record was created at least in part using electronic voice recognition software, so please excuse any typographical errors.       Dewayne Paredes PA-C  08/28/23 2038

## 2023-08-29 NOTE — DISCHARGE INSTRUCTIONS
The cause of your back pain is unclear at this time. There is no evidence for bony abnormality as per your imaging. As per our discussion, I have lower suspicion at this time for other causes, such as kidney infection, kidney stone, shingles. We will trial a course of muscle relaxants to see if this helps. Continue with Tylenol. Follow up with your primary doctor with persistent symptoms.

## 2023-09-08 NOTE — PATIENT INSTRUCTIONS
Here is the plan from today's visit    1. Acute cystitis without hematuria - Resolved  2. Dysuria  - Urinalysis, Micro If (UA) ([facility])    Please call or return to clinic if your symptoms don't go away or return    Thank you for coming to [facility] today.  Lab Testing:  **If you had lab testing today and your results are reassuring or normal they will be mailed to you or sent through [employer] within 7 days.   **If the lab tests need quick action we will call you with the results.  The phone number we will call with results is # [phone] (home) [phone] (work). If this is not the best number please call our clinic and change the number.  Medication Refills:  If you need any refills please call your pharmacy and they will contact us.   If you need to  your refill at a new pharmacy, please contact the new pharmacy directly. The new pharmacy will help you get your medications transferred faster.   Scheduling:  If you have any concerns about today's visit or wish to schedule another appointment please call our office during normal business hours [phone] (8-5:00 M-F)  If a referral was made to a [facility] and you don't get a call from central scheduling please call [phone].  If a Mammogram was ordered for you at [facility] call [phone] to schedule or change your appointment.  If you had an XRay/CT/Ultrasound/MRI ordered the number is [phone] to schedule or change your radiology appointment.   Medical Concerns:  If you have urgent medical concerns please call [phone] at any time of the day.     Normal rate, regular rhythm.  Heart sounds S1, S2.  No murmurs, rubs or gallops.

## 2023-09-11 ENCOUNTER — APPOINTMENT (OUTPATIENT)
Dept: LAB | Facility: CLINIC | Age: 68
End: 2023-09-11
Attending: INTERNAL MEDICINE
Payer: MEDICARE

## 2023-09-11 ENCOUNTER — INFUSION THERAPY VISIT (OUTPATIENT)
Dept: ONCOLOGY | Facility: CLINIC | Age: 68
End: 2023-09-11
Attending: INTERNAL MEDICINE
Payer: MEDICARE

## 2023-09-11 VITALS
BODY MASS INDEX: 28.94 KG/M2 | OXYGEN SATURATION: 94 % | WEIGHT: 183.3 LBS | RESPIRATION RATE: 18 BRPM | DIASTOLIC BLOOD PRESSURE: 86 MMHG | SYSTOLIC BLOOD PRESSURE: 137 MMHG | HEART RATE: 104 BPM | TEMPERATURE: 98 F

## 2023-09-11 DIAGNOSIS — Z17.0 MALIGNANT NEOPLASM OF UPPER-INNER QUADRANT OF LEFT BREAST IN FEMALE, ESTROGEN RECEPTOR POSITIVE (H): Primary | ICD-10-CM

## 2023-09-11 DIAGNOSIS — Z51.11 ENCOUNTER FOR ANTINEOPLASTIC CHEMOTHERAPY: ICD-10-CM

## 2023-09-11 DIAGNOSIS — C50.212 MALIGNANT NEOPLASM OF UPPER-INNER QUADRANT OF LEFT BREAST IN FEMALE, ESTROGEN RECEPTOR POSITIVE (H): Primary | ICD-10-CM

## 2023-09-11 LAB
ALBUMIN SERPL BCG-MCNC: 3.9 G/DL (ref 3.5–5.2)
ALP SERPL-CCNC: 106 U/L (ref 35–104)
ALT SERPL W P-5'-P-CCNC: 23 U/L (ref 0–50)
ANION GAP SERPL CALCULATED.3IONS-SCNC: 11 MMOL/L (ref 7–15)
AST SERPL W P-5'-P-CCNC: 69 U/L (ref 0–45)
BASOPHILS # BLD AUTO: 0.1 10E3/UL (ref 0–0.2)
BASOPHILS NFR BLD AUTO: 1 %
BILIRUB SERPL-MCNC: 0.5 MG/DL
BUN SERPL-MCNC: 13.3 MG/DL (ref 8–23)
CALCIUM SERPL-MCNC: 10 MG/DL (ref 8.8–10.2)
CHLORIDE SERPL-SCNC: 102 MMOL/L (ref 98–107)
CREAT SERPL-MCNC: 0.66 MG/DL (ref 0.51–0.95)
DEPRECATED HCO3 PLAS-SCNC: 24 MMOL/L (ref 22–29)
EGFRCR SERPLBLD CKD-EPI 2021: >90 ML/MIN/1.73M2
EOSINOPHIL # BLD AUTO: 0.1 10E3/UL (ref 0–0.7)
EOSINOPHIL NFR BLD AUTO: 2 %
ERYTHROCYTE [DISTWIDTH] IN BLOOD BY AUTOMATED COUNT: 13.9 % (ref 10–15)
GLUCOSE SERPL-MCNC: 116 MG/DL (ref 70–99)
HCT VFR BLD AUTO: 41.2 % (ref 35–47)
HGB BLD-MCNC: 14.2 G/DL (ref 11.7–15.7)
IMM GRANULOCYTES # BLD: 0 10E3/UL
IMM GRANULOCYTES NFR BLD: 1 %
LYMPHOCYTES # BLD AUTO: 1 10E3/UL (ref 0.8–5.3)
LYMPHOCYTES NFR BLD AUTO: 18 %
MCH RBC QN AUTO: 30.5 PG (ref 26.5–33)
MCHC RBC AUTO-ENTMCNC: 34.5 G/DL (ref 31.5–36.5)
MCV RBC AUTO: 88 FL (ref 78–100)
MONOCYTES # BLD AUTO: 0.6 10E3/UL (ref 0–1.3)
MONOCYTES NFR BLD AUTO: 10 %
NEUTROPHILS # BLD AUTO: 4 10E3/UL (ref 1.6–8.3)
NEUTROPHILS NFR BLD AUTO: 68 %
NRBC # BLD AUTO: 0 10E3/UL
NRBC BLD AUTO-RTO: 0 /100
PLATELET # BLD AUTO: 194 10E3/UL (ref 150–450)
POTASSIUM SERPL-SCNC: 4.1 MMOL/L (ref 3.4–5.3)
PROT SERPL-MCNC: 7.7 G/DL (ref 6.4–8.3)
RBC # BLD AUTO: 4.66 10E6/UL (ref 3.8–5.2)
SODIUM SERPL-SCNC: 137 MMOL/L (ref 136–145)
WBC # BLD AUTO: 5.8 10E3/UL (ref 4–11)

## 2023-09-11 PROCEDURE — 258N000003 HC RX IP 258 OP 636: Performed by: INTERNAL MEDICINE

## 2023-09-11 PROCEDURE — 96413 CHEMO IV INFUSION 1 HR: CPT

## 2023-09-11 PROCEDURE — 80053 COMPREHEN METABOLIC PANEL: CPT | Performed by: INTERNAL MEDICINE

## 2023-09-11 PROCEDURE — 36415 COLL VENOUS BLD VENIPUNCTURE: CPT | Performed by: INTERNAL MEDICINE

## 2023-09-11 PROCEDURE — 96375 TX/PRO/DX INJ NEW DRUG ADDON: CPT

## 2023-09-11 PROCEDURE — 85025 COMPLETE CBC W/AUTO DIFF WBC: CPT | Performed by: INTERNAL MEDICINE

## 2023-09-11 PROCEDURE — 250N000011 HC RX IP 250 OP 636: Mod: JZ | Performed by: INTERNAL MEDICINE

## 2023-09-11 RX ORDER — ONDANSETRON 2 MG/ML
8 INJECTION INTRAMUSCULAR; INTRAVENOUS ONCE
Status: COMPLETED | OUTPATIENT
Start: 2023-09-11 | End: 2023-09-11

## 2023-09-11 RX ADMIN — SODIUM CHLORIDE 250 ML: 9 INJECTION, SOLUTION INTRAVENOUS at 12:29

## 2023-09-11 RX ADMIN — ONDANSETRON 8 MG: 2 INJECTION INTRAMUSCULAR; INTRAVENOUS at 12:29

## 2023-09-11 RX ADMIN — ADO-TRASTUZUMAB EMTANSINE 260 MG: 20 INJECTION, POWDER, LYOPHILIZED, FOR SOLUTION INTRAVENOUS at 12:57

## 2023-09-11 ASSESSMENT — PAIN SCALES - GENERAL: PAINLEVEL: MILD PAIN (2)

## 2023-09-11 NOTE — PROGRESS NOTES
Infusion Nursing Note:  Vickie Alvarado presents today for Cycle 14 Day 1 Kadcyla.    Patient seen by provider today: No   present during visit today: Not Applicable.    Note: Marni comes into the clinic today doing well overall and has no concerns to offer at this visit. She does not attest to any SOB/chest tightness/signs of infection/dizziness/sensation changes/bruising/swelling/diarrhea/constipation/nausea/urinary issues/vision or hearing changes/fatigue. Pain rated 2/10 of low back, no intervention needed.      Intravenous Access:  Peripheral IV placed.    Treatment Conditions:  Lab Results   Component Value Date    HGB 14.2 09/11/2023    WBC 5.8 09/11/2023    ANEU 4.1 09/08/2014    ANEUTAUTO 4.0 09/11/2023     09/11/2023        Lab Results   Component Value Date     09/11/2023    POTASSIUM 4.1 09/11/2023    MAG 2.2 08/20/2021    CR 0.66 09/11/2023    VALENTINA 10.0 09/11/2023    BILITOTAL 0.5 09/11/2023    ALBUMIN 3.9 09/11/2023    ALT 23 09/11/2023    AST 69 (H) 09/11/2023       Results reviewed, labs MET treatment parameters, ok to proceed with treatment.      Post Infusion Assessment:  Patient tolerated infusion without incident.  No obs needed per protocol.  Blood return noted pre and post infusion.  Site patent and intact, free from redness, edema or discomfort.  No evidence of extravasations.  Access discontinued per protocol.       Discharge Plan:   Patient declined prescription refills.  Discharge instructions reviewed with: Patient.  Patient and/or family verbalized understanding of discharge instructions and all questions answered.  AVS to patient via [employer].  Patient will return 10/2 for next appointment.   Patient discharged in stable condition accompanied by: self.  Departure Mode: Ambulatory.      Lourdes Hobbs RN

## 2023-09-11 NOTE — PATIENT INSTRUCTIONS
[facility] Triage and after hours / weekends / holidays:  [phone]    Please call the triage or after hours line if you experience a temperature greater than or equal to 100.4, shaking chills, have uncontrolled nausea, vomiting and/or diarrhea, dizziness, shortness of breath, chest pain, bleeding, unexplained bruising, or if you have any other new/concerning symptoms, questions or concerns.      If you are having any concerning symptoms or wish to speak to a provider before your next infusion visit, please call triage to notify them so we can adequately serve you.     If you need a refill on a narcotic prescription or other medication, please call before your infusion appointment.

## 2023-09-11 NOTE — NURSING NOTE
Chief Complaint   Patient presents with    Blood Draw     Labs drawn with PIV start by RN. Pt tolerated well. Vitals taken. Patient checked into next appointment.       Crista Randhawa RN

## 2023-09-18 ENCOUNTER — PATIENT OUTREACH (OUTPATIENT)
Dept: ONCOLOGY | Facility: CLINIC | Age: 68
End: 2023-09-18
Payer: MEDICARE

## 2023-09-18 NOTE — PROGRESS NOTES
[facility]: Cancer Care                                                                                          Pt calling about when she should get the different vaccines. Pt thinking she was told to get the flu shot closer to the fall season and if ok to get covid before her next infusion of Kadcyla. And also is wondering when to get the RSV vaccine. Will check with Dr Horvath and let pt know how to proceed. Pt verbalized understanding with no further questions.   Per Dr Horvath: Recommend she get the COVID vaccine at this time. Would get the influenza vaccine in about 2-3 weeks.  She should discuss the RSV vaccine with her PCP. Updated the pt via [employer].    Signature:  Sadie Roberto, FANNYCC

## 2023-09-28 RX ORDER — HEPARIN SODIUM,PORCINE 10 UNIT/ML
5 VIAL (ML) INTRAVENOUS
Status: CANCELLED | OUTPATIENT
Start: 2023-10-02

## 2023-09-28 RX ORDER — DIPHENHYDRAMINE HYDROCHLORIDE 50 MG/ML
50 INJECTION INTRAMUSCULAR; INTRAVENOUS
Status: CANCELLED
Start: 2023-10-02

## 2023-09-28 RX ORDER — EPINEPHRINE 1 MG/ML
0.3 INJECTION, SOLUTION, CONCENTRATE INTRAVENOUS EVERY 5 MIN PRN
Status: CANCELLED | OUTPATIENT
Start: 2023-10-02

## 2023-09-28 RX ORDER — MEPERIDINE HYDROCHLORIDE 25 MG/ML
25 INJECTION INTRAMUSCULAR; INTRAVENOUS; SUBCUTANEOUS EVERY 30 MIN PRN
Status: CANCELLED | OUTPATIENT
Start: 2023-10-02

## 2023-09-28 RX ORDER — ONDANSETRON 2 MG/ML
8 INJECTION INTRAMUSCULAR; INTRAVENOUS ONCE
Status: CANCELLED | OUTPATIENT
Start: 2023-10-02

## 2023-09-28 RX ORDER — HEPARIN SODIUM (PORCINE) LOCK FLUSH IV SOLN 100 UNIT/ML 100 UNIT/ML
5 SOLUTION INTRAVENOUS
Status: CANCELLED | OUTPATIENT
Start: 2023-10-02

## 2023-09-28 RX ORDER — METHYLPREDNISOLONE SODIUM SUCCINATE 125 MG/2ML
125 INJECTION, POWDER, LYOPHILIZED, FOR SOLUTION INTRAMUSCULAR; INTRAVENOUS
Status: CANCELLED
Start: 2023-10-02

## 2023-09-28 RX ORDER — LORAZEPAM 2 MG/ML
0.5 INJECTION INTRAMUSCULAR EVERY 4 HOURS PRN
Status: CANCELLED | OUTPATIENT
Start: 2023-10-02

## 2023-09-28 RX ORDER — ALBUTEROL SULFATE 0.83 MG/ML
2.5 SOLUTION RESPIRATORY (INHALATION)
Status: CANCELLED | OUTPATIENT
Start: 2023-10-02

## 2023-09-28 RX ORDER — ALBUTEROL SULFATE 90 UG/1
1-2 AEROSOL, METERED RESPIRATORY (INHALATION)
Status: CANCELLED
Start: 2023-10-02

## 2023-09-29 DIAGNOSIS — Z17.0 MALIGNANT NEOPLASM OF UPPER-INNER QUADRANT OF LEFT BREAST IN FEMALE, ESTROGEN RECEPTOR POSITIVE (H): Primary | ICD-10-CM

## 2023-09-29 DIAGNOSIS — C50.212 MALIGNANT NEOPLASM OF UPPER-INNER QUADRANT OF LEFT BREAST IN FEMALE, ESTROGEN RECEPTOR POSITIVE (H): Primary | ICD-10-CM

## 2023-09-29 DIAGNOSIS — Z51.11 ENCOUNTER FOR ANTINEOPLASTIC CHEMOTHERAPY: ICD-10-CM

## 2023-10-02 ENCOUNTER — INFUSION THERAPY VISIT (OUTPATIENT)
Dept: ONCOLOGY | Facility: CLINIC | Age: 68
End: 2023-10-02
Attending: INTERNAL MEDICINE
Payer: MEDICARE

## 2023-10-02 ENCOUNTER — APPOINTMENT (OUTPATIENT)
Dept: LAB | Facility: CLINIC | Age: 68
End: 2023-10-02
Attending: INTERNAL MEDICINE
Payer: MEDICARE

## 2023-10-02 VITALS
SYSTOLIC BLOOD PRESSURE: 130 MMHG | DIASTOLIC BLOOD PRESSURE: 81 MMHG | HEART RATE: 87 BPM | RESPIRATION RATE: 16 BRPM | BODY MASS INDEX: 28.67 KG/M2 | WEIGHT: 181.6 LBS | TEMPERATURE: 98.1 F | OXYGEN SATURATION: 98 %

## 2023-10-02 DIAGNOSIS — C50.212 MALIGNANT NEOPLASM OF UPPER-INNER QUADRANT OF LEFT BREAST IN FEMALE, ESTROGEN RECEPTOR POSITIVE (H): Primary | ICD-10-CM

## 2023-10-02 DIAGNOSIS — Z17.0 MALIGNANT NEOPLASM OF UPPER-INNER QUADRANT OF LEFT BREAST IN FEMALE, ESTROGEN RECEPTOR POSITIVE (H): Primary | ICD-10-CM

## 2023-10-02 DIAGNOSIS — Z51.11 ENCOUNTER FOR ANTINEOPLASTIC CHEMOTHERAPY: ICD-10-CM

## 2023-10-02 LAB
ALBUMIN SERPL BCG-MCNC: 4 G/DL (ref 3.5–5.2)
ALP SERPL-CCNC: 93 U/L (ref 35–104)
ALT SERPL W P-5'-P-CCNC: 20 U/L (ref 0–50)
ANION GAP SERPL CALCULATED.3IONS-SCNC: 12 MMOL/L (ref 7–15)
AST SERPL W P-5'-P-CCNC: 58 U/L (ref 0–45)
BASO+EOS+MONOS # BLD AUTO: NORMAL 10*3/UL
BASO+EOS+MONOS NFR BLD AUTO: NORMAL %
BASOPHILS # BLD AUTO: 0 10E3/UL (ref 0–0.2)
BASOPHILS NFR BLD AUTO: 1 %
BILIRUB SERPL-MCNC: 0.5 MG/DL
BUN SERPL-MCNC: 11 MG/DL (ref 8–23)
CALCIUM SERPL-MCNC: 9.5 MG/DL (ref 8.8–10.2)
CHLORIDE SERPL-SCNC: 103 MMOL/L (ref 98–107)
CREAT SERPL-MCNC: 0.63 MG/DL (ref 0.51–0.95)
DEPRECATED HCO3 PLAS-SCNC: 24 MMOL/L (ref 22–29)
EGFRCR SERPLBLD CKD-EPI 2021: >90 ML/MIN/1.73M2
EOSINOPHIL # BLD AUTO: 0.1 10E3/UL (ref 0–0.7)
EOSINOPHIL NFR BLD AUTO: 2 %
ERYTHROCYTE [DISTWIDTH] IN BLOOD BY AUTOMATED COUNT: 14.1 % (ref 10–15)
GLUCOSE SERPL-MCNC: 103 MG/DL (ref 70–99)
HCT VFR BLD AUTO: 40.6 % (ref 35–47)
HGB BLD-MCNC: 13.6 G/DL (ref 11.7–15.7)
IMM GRANULOCYTES # BLD: 0 10E3/UL
IMM GRANULOCYTES NFR BLD: 0 %
LYMPHOCYTES # BLD AUTO: 1.3 10E3/UL (ref 0.8–5.3)
LYMPHOCYTES NFR BLD AUTO: 21 %
MCH RBC QN AUTO: 30.1 PG (ref 26.5–33)
MCHC RBC AUTO-ENTMCNC: 33.5 G/DL (ref 31.5–36.5)
MCV RBC AUTO: 90 FL (ref 78–100)
MONOCYTES # BLD AUTO: 0.6 10E3/UL (ref 0–1.3)
MONOCYTES NFR BLD AUTO: 9 %
NEUTROPHILS # BLD AUTO: 4.1 10E3/UL (ref 1.6–8.3)
NEUTROPHILS NFR BLD AUTO: 67 %
NRBC # BLD AUTO: 0 10E3/UL
NRBC BLD AUTO-RTO: 0 /100
PLATELET # BLD AUTO: 174 10E3/UL (ref 150–450)
POTASSIUM SERPL-SCNC: 3.8 MMOL/L (ref 3.4–5.3)
PROT SERPL-MCNC: 7.4 G/DL (ref 6.4–8.3)
RBC # BLD AUTO: 4.52 10E6/UL (ref 3.8–5.2)
SODIUM SERPL-SCNC: 139 MMOL/L (ref 135–145)
WBC # BLD AUTO: 6.1 10E3/UL (ref 4–11)

## 2023-10-02 PROCEDURE — 36415 COLL VENOUS BLD VENIPUNCTURE: CPT

## 2023-10-02 PROCEDURE — 250N000011 HC RX IP 250 OP 636: Mod: JZ | Performed by: INTERNAL MEDICINE

## 2023-10-02 PROCEDURE — 80053 COMPREHEN METABOLIC PANEL: CPT

## 2023-10-02 PROCEDURE — 96413 CHEMO IV INFUSION 1 HR: CPT

## 2023-10-02 PROCEDURE — 258N000003 HC RX IP 258 OP 636: Performed by: INTERNAL MEDICINE

## 2023-10-02 PROCEDURE — 96375 TX/PRO/DX INJ NEW DRUG ADDON: CPT

## 2023-10-02 PROCEDURE — 85004 AUTOMATED DIFF WBC COUNT: CPT

## 2023-10-02 RX ORDER — ONDANSETRON 2 MG/ML
8 INJECTION INTRAMUSCULAR; INTRAVENOUS ONCE
Status: COMPLETED | OUTPATIENT
Start: 2023-10-02 | End: 2023-10-02

## 2023-10-02 RX ADMIN — ONDANSETRON 8 MG: 2 INJECTION INTRAMUSCULAR; INTRAVENOUS at 14:16

## 2023-10-02 RX ADMIN — ADO-TRASTUZUMAB EMTANSINE 260 MG: 20 INJECTION, POWDER, LYOPHILIZED, FOR SOLUTION INTRAVENOUS at 15:05

## 2023-10-02 RX ADMIN — SODIUM CHLORIDE 250 ML: 9 INJECTION, SOLUTION INTRAVENOUS at 14:16

## 2023-10-02 ASSESSMENT — PAIN SCALES - GENERAL: PAINLEVEL: MILD PAIN (3)

## 2023-10-02 NOTE — NURSING NOTE
Chief Complaint   Patient presents with    Labs Only     Venipuncture, vitals checked     PIV placed by RONI Mora RN on 10/2/2023 at 1:28 PM

## 2023-10-02 NOTE — PATIENT INSTRUCTIONS
[facility] Triage and after hours / weekends / holidays:  [phone]    Please call the triage or after hours line if you experience a temperature greater than or equal to 100.4, shaking chills, have uncontrolled nausea, vomiting and/or diarrhea, dizziness, shortness of breath, chest pain, bleeding, unexplained bruising, or if you have any other new/concerning symptoms, questions or concerns.      If you are having any concerning symptoms or wish to speak to a provider before your next infusion visit, please call triage to notify them so we can adequately serve you.     If you need a refill on a narcotic prescription or other medication, please call before your infusion appointment.                October 2023 Sunday Monday Tuesday Wednesday Thursday Friday Saturday   1     2    LAB PERIPHERAL   1:00 PM   (15 min.)   [facility]   [facility]    ONC INFUSION 1 HR (60 MIN)   1:30 PM   (60 min.)    ONC INFUSION NURSE   [facility] 3     4     5     6     7       8     9     10     11     12     13     14       15     16     17     18     19    NEW ADULT GLAUCOMA   7:45 AM   (15 min.)   Hector Choi MD   [facility] 20     21       22     23     24    LAB PERIPHERAL  10:30 AM   (15 min.)   [facility]   [facility]    RETURN CCSL  10:45 AM   (45 min.)   Jessy Rowan PA-C   [facility]    ONC INFUSION 1 HR (60 MIN)  12:00 PM   (60 min.)    ONC INFUSION NURSE   [facility] 25     26     27     28       29     30     31                                     November 2023 Sunday Monday Tuesday Wednesday Thursday Friday Saturday                  1     2     3     4       5     6     7     8     9     10     11       12     13    LAB PERIPHERAL  12:30 PM   (15 min.)    [facility]   [facility]    ONC INFUSION 1 HR (60 MIN)   1:00 PM   (60 min.)    ONC INFUSION NURSE   [facility] 14     15     16     17     18       19     20     21     22     23     24     25       26     27     28     29     30                              Recent Results (from the past 24 hour(s))   CBC with platelets and differential    Collection Time: 10/02/23  1:24 PM   Result Value Ref Range    WBC Count 6.1 4.0 - 11.0 10e3/uL    RBC Count 4.52 3.80 - 5.20 10e6/uL    Hemoglobin 13.6 11.7 - 15.7 g/dL    Hematocrit 40.6 35.0 - 47.0 %    MCV 90 78 - 100 fL    MCH 30.1 26.5 - 33.0 pg    MCHC 33.5 31.5 - 36.5 g/dL    RDW 14.1 10.0 - 15.0 %    Platelet Count 174 150 - 450 10e3/uL    % Neutrophils 67 %    % Lymphocytes 21 %    % Monocytes 9 %    Mids % (Monos, Eos, Basos)      % Eosinophils 2 %    % Basophils 1 %    % Immature Granulocytes 0 %    NRBCs per 100 WBC 0 <1 /100    Absolute Neutrophils 4.1 1.6 - 8.3 10e3/uL    Absolute Lymphocytes 1.3 0.8 - 5.3 10e3/uL    Absolute Monocytes 0.6 0.0 - 1.3 10e3/uL    Mids Abs (Monos, Eos, Basos)      Absolute Eosinophils 0.1 0.0 - 0.7 10e3/uL    Absolute Basophils 0.0 0.0 - 0.2 10e3/uL    Absolute Immature Granulocytes 0.0 <=0.4 10e3/uL    Absolute NRBCs 0.0 10e3/uL   Comprehensive metabolic panel - NOW    Collection Time: 10/02/23  1:25 PM   Result Value Ref Range    Sodium 139 135 - 145 mmol/L    Potassium 3.8 3.4 - 5.3 mmol/L    Carbon Dioxide (CO2) 24 22 - 29 mmol/L    Anion Gap 12 7 - 15 mmol/L    Urea Nitrogen 11.0 8.0 - 23.0 mg/dL    Creatinine 0.63 0.51 - 0.95 mg/dL    GFR Estimate >90 >60 mL/min/1.73m2    Calcium 9.5 8.8 - 10.2 mg/dL    Chloride 103 98 - 107 mmol/L    Glucose 103 (H) 70 - 99 mg/dL    Alkaline Phosphatase 93 35 - 104 U/L    AST 58 (H) 0 - 45 U/L    ALT 20 0 - 50 U/L    Protein Total 7.4 6.4 - 8.3 g/dL    Albumin 4.0 3.5 - 5.2 g/dL    Bilirubin Total 0.5 <=1.2 mg/dL

## 2023-10-02 NOTE — PROGRESS NOTES
Infusion Nursing Note:  Vickie Alvarado presents today for Cycle 15 Day 1 Kadcyla.    Patient seen by provider today: No   present during visit today: Not Applicable.    Note: Pt presents to infusion today feeling well. Pt denies having any fevers/chills, chest pain, sob, nausea/vomiting, diarrhea/constipation or other concerns. Pt reports chronic low back pain that she is managing at home with tylenol and lidocaine patches. Hot pack applied during infusion. Pt declines any further intervention at this time.      Intravenous Access:  Peripheral IV placed.    Treatment Conditions:  Lab Results   Component Value Date    HGB 13.6 10/02/2023    WBC 6.1 10/02/2023    ANEU 4.1 09/08/2014    ANEUTAUTO 4.1 10/02/2023     10/02/2023        Lab Results   Component Value Date     09/11/2023    POTASSIUM 4.1 09/11/2023    MAG 2.2 08/20/2021    CR 0.66 09/11/2023    VALENTINA 10.0 09/11/2023    BILITOTAL 0.5 09/11/2023    ALBUMIN 3.9 09/11/2023    ALT 23 09/11/2023    AST 69 (H) 09/11/2023       Results reviewed, labs MET treatment parameters, ok to proceed with treatment.      Post Infusion Assessment:  Patient tolerated infusion without incident.  Blood return noted pre and post infusion.  Site patent and intact, free from redness, edema or discomfort.  No evidence of extravasations.  Access discontinued per protocol.       Discharge Plan:   Prescription refills given for Zofran.  Discharge instructions reviewed with: Patient.  Patient and/or family verbalized understanding of discharge instructions and all questions answered.  Copy of AVS reviewed with patient and/or family.  Patient will return 10/24/23 for next appointment.  Patient discharged in stable condition accompanied by: self.  Departure Mode: Ambulatory.      Kamala Pimentel RN

## 2023-10-18 DIAGNOSIS — H40.003 GLAUCOMA SUSPECT OF BOTH EYES: Primary | ICD-10-CM
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
## 2023-10-19 ENCOUNTER — OFFICE VISIT (OUTPATIENT)
Dept: OPHTHALMOLOGY | Facility: CLINIC | Age: 68
End: 2023-10-19
Attending: OPHTHALMOLOGY
Payer: MEDICARE

## 2023-10-19 DIAGNOSIS — H40.003 GLAUCOMA SUSPECT OF BOTH EYES: ICD-10-CM

## 2023-10-19 PROCEDURE — 99214 OFFICE O/P EST MOD 30 MIN: CPT | Mod: GC | Performed by: OPHTHALMOLOGY

## 2023-10-19 PROCEDURE — 92083 EXTENDED VISUAL FIELD XM: CPT | Performed by: OPHTHALMOLOGY

## 2023-10-19 PROCEDURE — G0463 HOSPITAL OUTPT CLINIC VISIT: HCPCS | Performed by: OPHTHALMOLOGY

## 2023-10-19 PROCEDURE — 76514 ECHO EXAM OF EYE THICKNESS: CPT | Performed by: OPHTHALMOLOGY

## 2023-10-19 RX ORDER — LATANOPROST 50 UG/ML
1 SOLUTION/ DROPS OPHTHALMIC AT BEDTIME
Qty: 7.5 ML | Refills: 4 | Status: SHIPPED | OUTPATIENT
Start: 2023-10-19 | End: 2024-01-14

## 2023-10-19 ASSESSMENT — CONF VISUAL FIELD
OD_NORMAL: 1
OD_SUPERIOR_TEMPORAL_RESTRICTION: 0
OS_INFERIOR_TEMPORAL_RESTRICTION: 0
METHOD: COUNTING FINGERS
OS_INFERIOR_NASAL_RESTRICTION: 0
OS_NORMAL: 1
OD_INFERIOR_NASAL_RESTRICTION: 0
OD_INFERIOR_TEMPORAL_RESTRICTION: 0
OS_SUPERIOR_NASAL_RESTRICTION: 0
OS_SUPERIOR_TEMPORAL_RESTRICTION: 0
OD_SUPERIOR_NASAL_RESTRICTION: 0

## 2023-10-19 ASSESSMENT — REFRACTION_WEARINGRX
OD_AXIS: 070
OS_AXIS: 082
OS_CYLINDER: +1.50
OD_SPHERE: -1.00
OS_ADD: +2.50
OD_CYLINDER: +0.50
OD_ADD: +2.50
OS_SPHERE: -3.00

## 2023-10-19 ASSESSMENT — TONOMETRY
IOP_METHOD: TONOPEN
IOP_METHOD: APPLANATION
OS_IOP_MMHG: 13
OS_IOP_MMHG: 11
OD_IOP_MMHG: 12
OD_IOP_MMHG: 11

## 2023-10-19 ASSESSMENT — VISUAL ACUITY
OD_CC: 20/20
METHOD: SNELLEN - LINEAR
OS_CC+: +2
CORRECTION_TYPE: GLASSES
OS_CC: 20/25

## 2023-10-19 ASSESSMENT — PACHYMETRY
OS_CT(UM): 547
OD_CT(UM): 553

## 2023-10-19 ASSESSMENT — SLIT LAMP EXAM - LIDS
COMMENTS: DERMATOCHALSIS
COMMENTS: DERMATOCHALASIS

## 2023-10-19 ASSESSMENT — EXTERNAL EXAM - LEFT EYE: OS_EXAM: NORMAL

## 2023-10-19 ASSESSMENT — CUP TO DISC RATIO
OS_RATIO: 0.6
OD_RATIO: 0.5

## 2023-10-19 ASSESSMENT — EXTERNAL EXAM - RIGHT EYE: OD_EXAM: NORMAL

## 2023-10-19 NOTE — NURSING NOTE
"Chief Complaints and History of Present Illnesses   Patient presents with    Glaucoma Suspect Evaluation     Referral from Dr. Christian for evaluation of possible glaucoma each eye.     Patient states vision is good each eye with glasses. Denies changes in the last several months. Patient notes she still occasionally will miss part of her vision. \"It looks like a crescent moon shape, and it is not a defined line.\" She cannot tell which eye is it coming from or if both. \"It will last less than 30 minutes.\" Denies any headaches that occur with it or afterwards. She attributes it to not drinking enough water.      Chief Complaint(s) and History of Present Illness(es)       Glaucoma Suspect Evaluation              Laterality: both eyes    Associated symptoms: dryness (worse at night).  Negative for eye pain, flashes and floaters    Pain scale: 0/10    Comments: Referral from Dr. Christian for evaluation of possible glaucoma each eye.     Patient states vision is good each eye with glasses. Denies changes in the last several months. Patient notes she still occasionally will miss part of her vision. \"It looks like a crescent moon shape, and it is not a defined line.\" She cannot tell which eye is it coming from or if both. \"It will last less than 30 minutes.\" Denies any headaches that occur with it or afterwards. She attributes it to not drinking enough water.               Comments    Patient states she has a family history of Glaucoma (Brother, on drops - Diagnosed within 2-5 years ago). No glaucoma drops used. Patient uses all Refresh drops and gel several times.     Breanna Joel, GINA 8:09 AM 10/19/2023                     "

## 2023-10-24 ENCOUNTER — ONCOLOGY VISIT (OUTPATIENT)
Dept: ONCOLOGY | Facility: CLINIC | Age: 68
End: 2023-10-24
Attending: PHYSICIAN ASSISTANT
Payer: MEDICARE

## 2023-10-24 ENCOUNTER — APPOINTMENT (OUTPATIENT)
Dept: LAB | Facility: CLINIC | Age: 68
End: 2023-10-24
Attending: PHYSICIAN ASSISTANT
Payer: MEDICARE

## 2023-10-24 VITALS
SYSTOLIC BLOOD PRESSURE: 138 MMHG | WEIGHT: 185.2 LBS | TEMPERATURE: 97.7 F | DIASTOLIC BLOOD PRESSURE: 84 MMHG | OXYGEN SATURATION: 94 % | HEART RATE: 87 BPM | RESPIRATION RATE: 16 BRPM | BODY MASS INDEX: 29.24 KG/M2

## 2023-10-24 DIAGNOSIS — Z51.11 ENCOUNTER FOR ANTINEOPLASTIC CHEMOTHERAPY: ICD-10-CM

## 2023-10-24 DIAGNOSIS — I35.0 SEVERE AORTIC STENOSIS: ICD-10-CM

## 2023-10-24 DIAGNOSIS — C50.212 MALIGNANT NEOPLASM OF UPPER-INNER QUADRANT OF LEFT BREAST IN FEMALE, ESTROGEN RECEPTOR POSITIVE (H): Primary | ICD-10-CM

## 2023-10-24 DIAGNOSIS — Z17.0 MALIGNANT NEOPLASM OF UPPER-INNER QUADRANT OF LEFT BREAST IN FEMALE, ESTROGEN RECEPTOR POSITIVE (H): Primary | ICD-10-CM

## 2023-10-24 DIAGNOSIS — R53.83 FATIGUE DUE TO TREATMENT: ICD-10-CM

## 2023-10-24 LAB
ALBUMIN SERPL BCG-MCNC: 3.6 G/DL (ref 3.5–5.2)
ALP SERPL-CCNC: 112 U/L (ref 35–104)
ALT SERPL W P-5'-P-CCNC: 22 U/L (ref 0–50)
ANION GAP SERPL CALCULATED.3IONS-SCNC: 11 MMOL/L (ref 7–15)
AST SERPL W P-5'-P-CCNC: ABNORMAL U/L
BASOPHILS # BLD AUTO: 0.1 10E3/UL (ref 0–0.2)
BASOPHILS NFR BLD AUTO: 1 %
BILIRUB SERPL-MCNC: 0.5 MG/DL
BUN SERPL-MCNC: 13 MG/DL (ref 8–23)
CALCIUM SERPL-MCNC: 9.4 MG/DL (ref 8.8–10.2)
CHLORIDE SERPL-SCNC: 105 MMOL/L (ref 98–107)
CREAT SERPL-MCNC: 0.6 MG/DL (ref 0.51–0.95)
DEPRECATED HCO3 PLAS-SCNC: 23 MMOL/L (ref 22–29)
EGFRCR SERPLBLD CKD-EPI 2021: >90 ML/MIN/1.73M2
EOSINOPHIL # BLD AUTO: 0.1 10E3/UL (ref 0–0.7)
EOSINOPHIL NFR BLD AUTO: 2 %
ERYTHROCYTE [DISTWIDTH] IN BLOOD BY AUTOMATED COUNT: 14.1 % (ref 10–15)
GLUCOSE SERPL-MCNC: 131 MG/DL (ref 70–99)
HCT VFR BLD AUTO: 40.6 % (ref 35–47)
HGB BLD-MCNC: 13.8 G/DL (ref 11.7–15.7)
IMM GRANULOCYTES # BLD: 0 10E3/UL
IMM GRANULOCYTES NFR BLD: 0 %
LYMPHOCYTES # BLD AUTO: 0.8 10E3/UL (ref 0.8–5.3)
LYMPHOCYTES NFR BLD AUTO: 17 %
MCH RBC QN AUTO: 30.5 PG (ref 26.5–33)
MCHC RBC AUTO-ENTMCNC: 34 G/DL (ref 31.5–36.5)
MCV RBC AUTO: 90 FL (ref 78–100)
MONOCYTES # BLD AUTO: 0.4 10E3/UL (ref 0–1.3)
MONOCYTES NFR BLD AUTO: 9 %
NEUTROPHILS # BLD AUTO: 3.3 10E3/UL (ref 1.6–8.3)
NEUTROPHILS NFR BLD AUTO: 71 %
NRBC # BLD AUTO: 0 10E3/UL
NRBC BLD AUTO-RTO: 0 /100
PLATELET # BLD AUTO: 146 10E3/UL (ref 150–450)
POTASSIUM SERPL-SCNC: 4.3 MMOL/L (ref 3.4–5.3)
PROT SERPL-MCNC: 7.3 G/DL (ref 6.4–8.3)
RBC # BLD AUTO: 4.52 10E6/UL (ref 3.8–5.2)
SODIUM SERPL-SCNC: 139 MMOL/L (ref 135–145)
WBC # BLD AUTO: 4.6 10E3/UL (ref 4–11)

## 2023-10-24 PROCEDURE — 36415 COLL VENOUS BLD VENIPUNCTURE: CPT | Performed by: PHYSICIAN ASSISTANT

## 2023-10-24 PROCEDURE — 96375 TX/PRO/DX INJ NEW DRUG ADDON: CPT

## 2023-10-24 PROCEDURE — 84155 ASSAY OF PROTEIN SERUM: CPT | Performed by: PHYSICIAN ASSISTANT

## 2023-10-24 PROCEDURE — 258N000003 HC RX IP 258 OP 636

## 2023-10-24 PROCEDURE — G0463 HOSPITAL OUTPT CLINIC VISIT: HCPCS

## 2023-10-24 PROCEDURE — 250N000011 HC RX IP 250 OP 636: Mod: JZ

## 2023-10-24 PROCEDURE — 96413 CHEMO IV INFUSION 1 HR: CPT

## 2023-10-24 PROCEDURE — 99215 OFFICE O/P EST HI 40 MIN: CPT

## 2023-10-24 PROCEDURE — 85025 COMPLETE CBC W/AUTO DIFF WBC: CPT | Performed by: PHYSICIAN ASSISTANT

## 2023-10-24 RX ORDER — LORAZEPAM 2 MG/ML
0.5 INJECTION INTRAMUSCULAR EVERY 4 HOURS PRN
Status: CANCELLED | OUTPATIENT
Start: 2023-10-24

## 2023-10-24 RX ORDER — HEPARIN SODIUM,PORCINE 10 UNIT/ML
5 VIAL (ML) INTRAVENOUS
Status: CANCELLED | OUTPATIENT
Start: 2023-10-24

## 2023-10-24 RX ORDER — ONDANSETRON 2 MG/ML
8 INJECTION INTRAMUSCULAR; INTRAVENOUS ONCE
Status: COMPLETED | OUTPATIENT
Start: 2023-10-24 | End: 2023-10-24

## 2023-10-24 RX ORDER — EPINEPHRINE 1 MG/ML
0.3 INJECTION, SOLUTION INTRAMUSCULAR; SUBCUTANEOUS EVERY 5 MIN PRN
Status: CANCELLED | OUTPATIENT
Start: 2023-10-24

## 2023-10-24 RX ORDER — DIPHENHYDRAMINE HYDROCHLORIDE 50 MG/ML
50 INJECTION INTRAMUSCULAR; INTRAVENOUS
Status: CANCELLED
Start: 2023-10-24

## 2023-10-24 RX ORDER — MEPERIDINE HYDROCHLORIDE 25 MG/ML
25 INJECTION INTRAMUSCULAR; INTRAVENOUS; SUBCUTANEOUS EVERY 30 MIN PRN
Status: CANCELLED | OUTPATIENT
Start: 2023-10-24

## 2023-10-24 RX ORDER — ONDANSETRON 2 MG/ML
8 INJECTION INTRAMUSCULAR; INTRAVENOUS ONCE
Status: CANCELLED | OUTPATIENT
Start: 2023-10-24

## 2023-10-24 RX ORDER — ALBUTEROL SULFATE 0.83 MG/ML
2.5 SOLUTION RESPIRATORY (INHALATION)
Status: CANCELLED | OUTPATIENT
Start: 2023-10-24

## 2023-10-24 RX ORDER — METHYLPREDNISOLONE SODIUM SUCCINATE 125 MG/2ML
125 INJECTION, POWDER, LYOPHILIZED, FOR SOLUTION INTRAMUSCULAR; INTRAVENOUS
Status: CANCELLED
Start: 2023-10-24

## 2023-10-24 RX ORDER — HEPARIN SODIUM (PORCINE) LOCK FLUSH IV SOLN 100 UNIT/ML 100 UNIT/ML
5 SOLUTION INTRAVENOUS
Status: CANCELLED | OUTPATIENT
Start: 2023-10-24

## 2023-10-24 RX ORDER — ALBUTEROL SULFATE 90 UG/1
1-2 AEROSOL, METERED RESPIRATORY (INHALATION)
Status: CANCELLED
Start: 2023-10-24

## 2023-10-24 RX ADMIN — SODIUM CHLORIDE 250 ML: 9 INJECTION, SOLUTION INTRAVENOUS at 12:35

## 2023-10-24 RX ADMIN — ONDANSETRON 8 MG: 2 INJECTION INTRAMUSCULAR; INTRAVENOUS at 12:35

## 2023-10-24 RX ADMIN — ADO-TRASTUZUMAB EMTANSINE 260 MG: 20 INJECTION, POWDER, LYOPHILIZED, FOR SOLUTION INTRAVENOUS at 12:52

## 2023-10-24 ASSESSMENT — PAIN SCALES - GENERAL: PAINLEVEL: MILD PAIN (2)

## 2023-10-24 NOTE — PATIENT INSTRUCTIONS
[facility] Triage and after hours / weekends / holidays:  [phone]    Please call the triage or after hours line if you experience a temperature greater than or equal to 100.4, shaking chills, have uncontrolled nausea, vomiting and/or diarrhea, dizziness, shortness of breath, chest pain, bleeding, unexplained bruising, or if you have any other new/concerning symptoms, questions or concerns.      If you are having any concerning symptoms or wish to speak to a provider before your next infusion visit, please call triage to notify them so we can adequately serve you.     If you need a refill on a narcotic prescription or other medication, please call before your infusion appointment.                October 2023 Sunday Monday Tuesday Wednesday Thursday Friday Saturday   1     2    LAB PERIPHERAL   1:00 PM   (15 min.)   [facility] DRAW   [facility]    ONC INFUSION 1 HR (60 MIN)   1:30 PM   (60 min.)    ONC INFUSION NURSE   [facility] 3     4     5     6     7       8     9     10     11     12     13     14       15     16     17     18     19    NEW ADULT GLAUCOMA   7:45 AM   (15 min.)   Hector Choi MD   [facility] 20     21       22     23     24    LAB PERIPHERAL   9:30 AM   (15 min.)   [facility]RAW   [facility]    RETURN CCSL   9:45 AM   (45 min.)   Paty Oakes PA-C   [facility]    ONC INFUSION 1 HR (60 MIN)  12:00 PM   (60 min.)    ONC INFUSION NURSE   [facility] 25     26     27     28       29     30     31                                     November 2023 Sunday Monday Tuesday Wednesday Thursday Friday Saturday                  1     2     3     4       5     6     7     8     9     10     11       12     13    LAB PERIPHERAL  12:30 PM   (15 min.)   [facility]RAW   [facility]    ONC INFUSION 1 HR (60 MIN)   1:00 PM   (60 min.)    ONC INFUSION NURSE   [facility] 14     15     16     17     18       19     20     21     22     23     24     25       26     27     28     29     30                               Lab Results:  Recent Results (from the past 12 hour(s))   Comprehensive metabolic panel    Collection Time: 10/24/23 10:15 AM   Result Value Ref Range    Sodium 139 135 - 145 mmol/L    Potassium 4.3 3.4 - 5.3 mmol/L    Carbon Dioxide (CO2) 23 22 - 29 mmol/L    Anion Gap 11 7 - 15 mmol/L    Urea Nitrogen 13.0 8.0 - 23.0 mg/dL    Creatinine 0.60 0.51 - 0.95 mg/dL    GFR Estimate >90 >60 mL/min/1.73m2    Calcium 9.4 8.8 - 10.2 mg/dL    Chloride 105 98 - 107 mmol/L    Glucose 131 (H) 70 - 99 mg/dL    Alkaline Phosphatase 112 (H) 35 - 104 U/L    AST      ALT 22 0 - 50 U/L    Protein Total 7.3 6.4 - 8.3 g/dL    Albumin 3.6 3.5 - 5.2 g/dL    Bilirubin Total 0.5 <=1.2 mg/dL   CBC with platelets and differential    Collection Time: 10/24/23 10:15 AM   Result Value Ref Range    WBC Count 4.6 4.0 - 11.0 10e3/uL    RBC Count 4.52 3.80 - 5.20 10e6/uL    Hemoglobin 13.8 11.7 - 15.7 g/dL    Hematocrit 40.6 35.0 - 47.0 %    MCV 90 78 - 100 fL    MCH 30.5 26.5 - 33.0 pg    MCHC 34.0 31.5 - 36.5 g/dL    RDW 14.1 10.0 - 15.0 %    Platelet Count 146 (L) 150 - 450 10e3/uL    % Neutrophils 71 %    % Lymphocytes 17 %    % Monocytes 9 %    % Eosinophils 2 %    % Basophils 1 %    % Immature Granulocytes 0 %    NRBCs per 100 WBC 0 <1 /100    Absolute Neutrophils 3.3 1.6 - 8.3 10e3/uL    Absolute Lymphocytes 0.8 0.8 - 5.3 10e3/uL    Absolute Monocytes 0.4 0.0 - 1.3 10e3/uL    Absolute Eosinophils 0.1 0.0 - 0.7 10e3/uL    Absolute Basophils 0.1 0.0 - 0.2 10e3/uL    Absolute Immature Granulocytes 0.0 <=0.4 10e3/uL    Absolute NRBCs 0.0 10e3/uL

## 2023-10-24 NOTE — NURSING NOTE
"Oncology Rooming Note    October 24, 2023 10:35 AM   Vickie Alvarado is a 68 year old female who presents for:    Chief Complaint   Patient presents with    Blood Draw     Labs drawn with PIV start by vascular access. Pt tolerated well. Vitals taken. Patient checked into next appointment.      Oncology Clinic Visit     Malignant neoplasm of upper-inner quadrant of left breast in female, estrogen receptor positive (H)     Initial Vitals: /84 (BP Location: Right arm, Patient Position: Sitting, Cuff Size: Adult Regular)   Pulse 87   Temp 97.7  F (36.5  C) (Oral)   Resp 16   Wt 84 kg (185 lb 3.2 oz)   LMP 05/17/2011   SpO2 94%   BMI 29.24 kg/m   Estimated body mass index is 29.24 kg/m  as calculated from the following:    Height as of 7/19/23: 1.695 m (5' 6.73\").    Weight as of this encounter: 84 kg (185 lb 3.2 oz). Body surface area is 1.99 meters squared.  Mild Pain (2) Comment: Data Unavailable   Patient's last menstrual period was 05/17/2011.  Allergies reviewed: Yes  Medications reviewed: Yes    Medications: Medication refills not needed today.  Pharmacy [facility] entered into [employer]: [id number] - [address] AT [place] [address] & [facility]    Clinical concerns: none      Jana Menjivar, EMT  10/24/2023              "

## 2023-10-24 NOTE — PROGRESS NOTES
Infusion Nursing Note:  Vickie Alvarado presents today for Cycle 16 Day 1 Kadcyla.    Patient seen by provider today: Yes: IVETTE Chapman   present during visit today: Not Applicable.    Note: Pt presents to infusion feeling well. She offers no new concerns following her provider appointment today.      Intravenous Access:  Peripheral IV placed.    Treatment Conditions:  Lab Results   Component Value Date    HGB 13.8 10/24/2023    WBC 4.6 10/24/2023    ANEU 4.1 09/08/2014    ANEUTAUTO 3.3 10/24/2023     (L) 10/24/2023        Lab Results   Component Value Date     10/24/2023    POTASSIUM 4.3 10/24/2023    MAG 2.2 08/20/2021    CR 0.60 10/24/2023    VALENTINA 9.4 10/24/2023    BILITOTAL 0.5 10/24/2023    ALBUMIN 3.6 10/24/2023    ALT 22 10/24/2023    AST  10/24/2023      Comment:      Unsatisfactory specimen - hemolyzed.  Reference intervals for this test were updated on 6/12/2023 to more accurately reflect our healthy population. There may be differences in the flagging of prior results with similar values performed with this method. Interpretation of those prior results can be made in the context of the updated reference intervals.       Results reviewed, labs MET treatment parameters, ok to proceed with treatment.  ECHO/MUGA completed 8/18/23  EF >70%.      Post Infusion Assessment:  Patient tolerated infusion without incident.  Blood return noted pre and post infusion.  Site patent and intact, free from redness, edema or discomfort.  No evidence of extravasations.  Access discontinued per protocol.       Discharge Plan:   Patient declined prescription refills.  Discharge instructions reviewed with: Patient.  Patient and/or family verbalized understanding of discharge instructions and all questions answered.  AVS to patient via [employer].  Patient will return 11/13/23 for next appointment.   Patient discharged in stable condition accompanied by: self.  Departure Mode: Ambulatory.      Funmi NORRIS  Breann, RN

## 2023-10-24 NOTE — Clinical Note
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      % 17    % Monocytes      % 9    % Eosinophils      % 2    % Basophils      % 1    % Immature Granulocytes      % 0    NRBCs per 100 WBC      <1 /100 0    Absolute Neutrophils      1.6 - 8.3 10e3/uL 3.3    Absolute Lymphocytes      0.8 - 5.3 10e3/uL 0.8    Absolute Monocytes      0.0 - 1.3 10e3/uL 0.4    Absolute Eosinophils      0.0 - 0.7 10e3/uL 0.1    Absolute Basophils      0.0 - 0.2 10e3/uL 0.1    Absolute Immature Granulocytes      <=0.4 10e3/uL 0.0    Absolute NRBCs      10e3/uL 0.0       Component      Latest Ref [facility] 10/24/2023  10:15 AM   Calcium      8.8 - 10.2 mg/dL 9.4    Creatinine      0.51 - 0.95 mg/dL 0.60    Sodium      135 - 145 mmol/L 139    Protein Total      6.4 - 8.3 g/dL 7.3    GFR Estimate      >60 mL/min/1.73m2 >90    ALT      0 - 50 U/L 22    AST --    Bilirubin Total      <=1.2 mg/dL 0.5    Anion Gap      7 - 15 mmol/L 11    Alkaline Phosphatase      35 - 104 U/L 112 (H)    Potassium      3.4 - 5.3 mmol/L 4.3    Urea Nitrogen      8.0 - 23.0 mg/dL 13.0    Chloride      98 - 107 mmol/L 105    Carbon Dioxide (CO2)      22 - 29 mmol/L 23    Glucose      70 - 99 mg/dL 131 (H)    Albumin      3.5 - 5.2 g/dL 3.6          Imaging:   CT Lumbar spine  8/28/2023:  IMPRESSION:    1.  No evidence of acute fracture or subluxation of the lumbar spine by CT imaging.  2.  Degenerative lumbar spondylosis as described above.    ECHO 8/8/2023:  Global and regional left ventricular function is hyperkinetic with an EF >70%.  Right ventricular function, chamber size, wall motion, and thickness are  normal.  Severe aortic stenosis is present.  The inferior vena cava is normal.  No pericardial effusion is present.    Left Ventricle  Global and regional left ventricular function is hyperkinetic with an EF >70%.  Left ventricular wall thickness is normal. Left ventricular size is normal.  Left ventricular diastolic function is normal. No regional wall motion  abnormalities are seen.        Aortic Valve  Severe aortic valve calcification is present. Mild aortic insufficiency is  present. Severe aortic stenosis is present. The mean gradient across the  aortic valve is 43 mmHg. The peak aortic velocity is 4.3 m/sec. The calculated  aortic valve are is 0.96 cm^2.      ASSESSMENT/PLAN:  Ms. 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  She is s/p 4 cycles of neoadjuvant TC chemotherapy (initial biopsy was HER2 negative) followed by left breast lumpectomy and sentinel lymph node biopsy ([id number], residual tumor HER2 positive). She is s/p adjuvant radiation and has been on adjuvant Kadcyla since 11/21/2022.     1. Left breast carcinoma:   - Receiving adjuvant Kadcyla.  She is scheduled to receive C16 of 17 total planned cycles today. Although ongoing fatigue, she has tolerated cycles 13 and 14 of Kadcyla with dose reduction with minimal nausea. Her blood work remains stable. Slight thrombocytopenia 10/24/2023, but not unexpected at this time during her treatment.   - Discussed with her that she could receive Flu vaccine 10/24/2023. She would prefer to wait another week. She can also receive RSV pending PCP recs, but will need to  have it administered with Primary or Pharmacy.  - Follow up with Dr. Horvath 1/15/2024 with Zometa  - Plan for screening mammograms in 07/2024    2.  Fatigue:  She continues to complain of  fatigue, but denies lack of motivation 10/24/2023. She doesn't feel that she is depressed, and attributes fatigue to Kadcyla. She is hopeful that it will resolve once completed. It is possible that her known severe aortic stenosis may be contributory to her fatigue (see below).  - multiple prior TSH studies have been wnl.  - Dr. Horvath previously offered to refer to cancer rehab; she declined the referral.  - Discussed with patient  a trial of venlafaxine XR 37.5 mg PO daily as noted by Dr. Horvath , she doesn't think that she doesn't want to initiate at this time, but would like to think more about it. She will let us know if she changes her mind.   - will continue to monitor    3.  Arthralgias:  Thumbs, hands, knees.  Discussed swollen joints are c/w osteoarthritis.  X-rays are also c/w osteoarthritis, however, joint pain may be exacerbated by letrozole.  - ongoing follow up with PCP  - Okay to use NSAIDs given currently normal blood counts on Kadcyla.  Okay to use topical anesthetics such as Voltaren gel or Biofreeze as well.  - Will continue to monitor    4. Back Pain: ED visit August 2023, imaging negative for bony abnormalities. Felt to be soft tissue injury, discharge with prescription for lidocaine patches and Flexeril. Back pain is not present at today's visit, physical exam negative for paraspinal tenderness or bony tenderness.She is not currently taking Flexeril or using lidocaine patches.       5.  Personal history of colon polyps:  No change since last visit.  She has a h/o colon polyps.  Colonoscopy 2/23/2022 with removal of 3 polyps, each 2-3 mm (2 tubular adenomas, 1 hyperplastic).  Repeat colonoscopy in 02/2027 is recommended.    5.  At risk for cardiomyopathy:  Echocardiogram performed on 8/18/2023  Echocardiogram demonstrated LVF hyperkinetic with EF >70% with severe aortic stenosis with mild aortic insufficiency. She has a follow up with Cardiology in March 2024 and ECHO scheduled March 2024.    6.  Osteopenia:  She is at risk of bone loss on aromatase inhibitor therapy.  DEXA bone density scan on 12/1/2022 with a lowest T-score of -2.2 c/w osteopenia. Zometa 4 mg IV once every 6 months for both prevention of osteoporosis and prevention of breast cancer bone metastases was initiated on 1/10/2023.   - C3 Zometa is due around 1/13/2024  - Endorses regular dental visits q 6 mo    7. 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 Positional impingement. Will monitor feet.     8. Glaucoma  She was seen in the Glaucoma clinic  on 10/19/2023 by Dr. Choi. She was referred by Dr. Christian as she has a family history of glaucoma, but was also noted to have a change in RNFL that had previously been normal in Jan. 2023; examination showed SN and IT thinning of the left eye. Recommended that she start Latanoprost hs. Discussed with pharmacy today if current therapies or past chemotherapy with cytoxan or taxotere may be contributing and it is felt to be unlikely to be related RNFL and VF findings.    9.  Follow Up:  Labs, visit with Dr. Horvath, and Zometa infusion scheduled for  01/13/2024.    IVETTE Portillo Student    I spent 65 minutes on the date of the encounter doing chart review, review of test results, interpretation of tests, patient visit and documentation.    Paty Oakes PA-C        Again, thank you for allowing me to participate in the care of your patient.        Sincerely,        Paty Oakes PA-C
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
## 2023-10-24 NOTE — NURSING NOTE
Chief Complaint   Patient presents with    Blood Draw     Labs drawn with PIV start by vascular access. Pt tolerated well. Vitals taken. Patient checked into next appointment.         Crista Randhawa RN

## 2023-10-26 ENCOUNTER — PATIENT OUTREACH (OUTPATIENT)
Dept: GASTROENTEROLOGY | Facility: CLINIC | Age: 68
End: 2023-10-26
Payer: MEDICARE

## 2023-10-31 ENCOUNTER — NURSE TRIAGE (OUTPATIENT)
Dept: NURSING | Facility: CLINIC | Age: 68
End: 2023-10-31
Payer: MEDICARE

## 2023-10-31 NOTE — TELEPHONE ENCOUNTER
"Reason for Disposition   Eye pain/discomfort and more than mild    Additional Information   Negative: Followed an eye injury   Negative: Eye pain from chemical in the eye   Negative: Eye pain from foreign body in eye   Negative: Has sinus pain or pressure   Negative: Severe eye pain   Negative: Complete loss of vision in one or both eyes   Negative: Eyelids are very swollen (shut or almost) and fever   Negative: Eyelid (outer) is very red and fever   Negative: Foreign body sensation ('feels like something is in there') and irrigation didn't help   Negative: Vomiting   Negative: Ulcer or sore seen on the cornea (clear center part of the eye)   Negative: Recent eye surgery and increasing eye pain   Negative: Blurred vision and new or worsening   Negative: Patient sounds very sick or weak to the triager    Answer Assessment - Initial Assessment Questions  1. ONSET: \"When did the pain start?\" (e.g., minutes, hours, days)      After using lanosoprol eye drops  2. TIMING: \"Does the pain come and go, or has it been constant since it started?\" (e.g., constant, intermittent, fleeting)      Acute pain after drops, then sore for hours  3. SEVERITY: \"How bad is the pain?\"   (Scale 1-10; mild, moderate or severe)    - MILD (1-3): doesn't interfere with normal activities     - MODERATE (4-7): interferes with normal activities or awakens from sleep     - SEVERE (8-10): excruciating pain and patient unable to do normal activities      6-7/10  4. LOCATION: \"Where does it hurt?\"  (e.g., eyelid, eye, cheekbone)      eye  5. CAUSE: \"What do you think is causing the pain?\"      The eye drops  6. VISION: \"Do you have blurred vision or changes in your vision?\"       no  7. EYE DISCHARGE: \"Is there any discharge (pus) from the eye(s)?\"  If yes, ask: \"What color is it?\"       tearing  8. FEVER: \"Do you have a fever?\" If so, ask: \"What is it, how was it measured, and when did it start?\"       no  9. OTHER SYMPTOMS: \"Do you have any other " "symptoms?\" (e.g., headache, nasal discharge, facial rash)      Fatigue from cancer treatment, not sleeping is not helping  10. PREGNANCY: \"Is there any chance you are pregnant?\" \"When was your last menstrual period?\"        na    Protocols used: Eye Pain-A-OH    "
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
## 2023-11-07 ENCOUNTER — MYC MEDICAL ADVICE (OUTPATIENT)
Dept: OPHTHALMOLOGY | Facility: CLINIC | Age: 68
End: 2023-11-07
Payer: MEDICARE

## 2023-11-07 NOTE — TELEPHONE ENCOUNTER
Pt reporting flashing in mychart message    Spoke to pt at 1700    No new floaters  No vision changes    Quick little flashing/crescent shaped in lower part of vision times one week.    Reviewed s/s of vitreous changes and recommended eye exam    Scheduled in acute clinic this Thursday    Pt aware to contact clinic tomorrow for any new floaters/vision changes/concerns    Tyler Somers RN 5:48 PM 11/07/23

## 2023-11-08 DIAGNOSIS — Z51.81 ENCOUNTER FOR MONITORING CARDIOTOXIC DRUG THERAPY: Primary | ICD-10-CM

## 2023-11-08 DIAGNOSIS — Z79.899 ENCOUNTER FOR MONITORING CARDIOTOXIC DRUG THERAPY: Primary | ICD-10-CM

## 2023-11-09 ENCOUNTER — OFFICE VISIT (OUTPATIENT)
Dept: OPHTHALMOLOGY | Facility: CLINIC | Age: 68
End: 2023-11-09
Attending: OPHTHALMOLOGY
Payer: MEDICARE

## 2023-11-09 DIAGNOSIS — H40.003 GLAUCOMA SUSPECT OF BOTH EYES: ICD-10-CM

## 2023-11-09 DIAGNOSIS — H43.811 PVD (POSTERIOR VITREOUS DETACHMENT), RIGHT: Primary | ICD-10-CM

## 2023-11-09 DIAGNOSIS — H04.123 DRY EYES, BILATERAL: ICD-10-CM

## 2023-11-09 PROCEDURE — 99214 OFFICE O/P EST MOD 30 MIN: CPT | Mod: GC | Performed by: OPHTHALMOLOGY

## 2023-11-09 PROCEDURE — G0463 HOSPITAL OUTPT CLINIC VISIT: HCPCS

## 2023-11-09 ASSESSMENT — VISUAL ACUITY
OD_CC: 20/20
OS_CC: 20/25
OD_CC+: +2
CORRECTION_TYPE: GLASSES
METHOD: SNELLEN - LINEAR
OS_CC+: +1

## 2023-11-09 ASSESSMENT — REFRACTION_WEARINGRX
OD_CYLINDER: +0.50
OS_ADD: +2.50
OD_SPHERE: -1.00
OS_CYLINDER: +1.50
OS_AXIS: 082
OD_ADD: +2.50
OD_AXIS: 070
OS_SPHERE: -3.00

## 2023-11-09 ASSESSMENT — TONOMETRY
OD_IOP_MMHG: 10
OS_IOP_MMHG: 14
IOP_METHOD: TONOPEN

## 2023-11-09 ASSESSMENT — EXTERNAL EXAM - RIGHT EYE: OD_EXAM: NORMAL

## 2023-11-09 ASSESSMENT — EXTERNAL EXAM - LEFT EYE: OS_EXAM: NORMAL

## 2023-11-09 ASSESSMENT — SLIT LAMP EXAM - LIDS
COMMENTS: DERMATOCHALASIS
COMMENTS: DERMATOCHALSIS

## 2023-11-09 ASSESSMENT — CUP TO DISC RATIO
OD_RATIO: 0.5
OS_RATIO: 0.6
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
## 2023-11-09 NOTE — NURSING NOTE
"Chief Complaints and History of Present Illnesses   Patient presents with    Eye Pain Right Eye     Chief Complaint(s) and History of Present Illness(es)       Eye Pain Right Eye               Comments    Headache like eye pain in RE since starting Latanoprost at bedtime BE. Pt started Latanoprost 10/23/2023. Pt notes pain in RE after starting medication.  Pt seeing some lights in the lower right part of her RE that comes and goes. Looks like a \"half [place]\" that catches light.    ANUJ Albright November 9, 2023 7:56 AM                         "

## 2023-11-09 NOTE — PATIENT INSTRUCTIONS
Continue artificial tears drops four times per day   Start artificial tears ointment at night  Stop taking the latanoprost  Follow up with Dr. Choi on 12/28/23, please arrive by 8:15 AM

## 2023-11-10 RX ORDER — DIPHENHYDRAMINE HYDROCHLORIDE 50 MG/ML
50 INJECTION INTRAMUSCULAR; INTRAVENOUS
Status: CANCELLED
Start: 2023-11-13

## 2023-11-10 RX ORDER — ALBUTEROL SULFATE 0.83 MG/ML
2.5 SOLUTION RESPIRATORY (INHALATION)
Status: CANCELLED | OUTPATIENT
Start: 2023-11-13

## 2023-11-10 RX ORDER — LORAZEPAM 2 MG/ML
0.5 INJECTION INTRAMUSCULAR EVERY 4 HOURS PRN
Status: CANCELLED | OUTPATIENT
Start: 2023-11-13

## 2023-11-10 RX ORDER — ONDANSETRON 2 MG/ML
8 INJECTION INTRAMUSCULAR; INTRAVENOUS ONCE
Status: CANCELLED | OUTPATIENT
Start: 2023-11-13

## 2023-11-10 RX ORDER — EPINEPHRINE 1 MG/ML
0.3 INJECTION, SOLUTION INTRAMUSCULAR; SUBCUTANEOUS EVERY 5 MIN PRN
Status: CANCELLED | OUTPATIENT
Start: 2023-11-13

## 2023-11-10 RX ORDER — ALBUTEROL SULFATE 90 UG/1
1-2 AEROSOL, METERED RESPIRATORY (INHALATION)
Status: CANCELLED
Start: 2023-11-13

## 2023-11-10 RX ORDER — HEPARIN SODIUM,PORCINE 10 UNIT/ML
5 VIAL (ML) INTRAVENOUS
Status: CANCELLED | OUTPATIENT
Start: 2023-11-13

## 2023-11-10 RX ORDER — HEPARIN SODIUM (PORCINE) LOCK FLUSH IV SOLN 100 UNIT/ML 100 UNIT/ML
5 SOLUTION INTRAVENOUS
Status: CANCELLED | OUTPATIENT
Start: 2023-11-13

## 2023-11-10 RX ORDER — METHYLPREDNISOLONE SODIUM SUCCINATE 125 MG/2ML
125 INJECTION, POWDER, LYOPHILIZED, FOR SOLUTION INTRAMUSCULAR; INTRAVENOUS
Status: CANCELLED
Start: 2023-11-13

## 2023-11-10 RX ORDER — MEPERIDINE HYDROCHLORIDE 25 MG/ML
25 INJECTION INTRAMUSCULAR; INTRAVENOUS; SUBCUTANEOUS EVERY 30 MIN PRN
Status: CANCELLED | OUTPATIENT
Start: 2023-11-13

## 2023-11-13 ENCOUNTER — ANCILLARY PROCEDURE (OUTPATIENT)
Dept: CARDIOLOGY | Facility: CLINIC | Age: 68
End: 2023-11-13
Payer: MEDICARE

## 2023-11-13 ENCOUNTER — APPOINTMENT (OUTPATIENT)
Dept: LAB | Facility: CLINIC | Age: 68
End: 2023-11-13
Attending: INTERNAL MEDICINE
Payer: MEDICARE

## 2023-11-13 ENCOUNTER — INFUSION THERAPY VISIT (OUTPATIENT)
Dept: ONCOLOGY | Facility: CLINIC | Age: 68
End: 2023-11-13
Attending: INTERNAL MEDICINE
Payer: MEDICARE

## 2023-11-13 VITALS
DIASTOLIC BLOOD PRESSURE: 72 MMHG | RESPIRATION RATE: 16 BRPM | TEMPERATURE: 98.4 F | HEART RATE: 72 BPM | SYSTOLIC BLOOD PRESSURE: 129 MMHG | WEIGHT: 185.8 LBS | BODY MASS INDEX: 29.34 KG/M2 | OXYGEN SATURATION: 95 %

## 2023-11-13 DIAGNOSIS — Z51.81 ENCOUNTER FOR MONITORING CARDIOTOXIC DRUG THERAPY: ICD-10-CM

## 2023-11-13 DIAGNOSIS — Z17.0 MALIGNANT NEOPLASM OF UPPER-INNER QUADRANT OF LEFT BREAST IN FEMALE, ESTROGEN RECEPTOR POSITIVE (H): Primary | ICD-10-CM

## 2023-11-13 DIAGNOSIS — Z51.11 ENCOUNTER FOR ANTINEOPLASTIC CHEMOTHERAPY: ICD-10-CM

## 2023-11-13 DIAGNOSIS — C50.212 MALIGNANT NEOPLASM OF UPPER-INNER QUADRANT OF LEFT BREAST IN FEMALE, ESTROGEN RECEPTOR POSITIVE (H): Primary | ICD-10-CM

## 2023-11-13 DIAGNOSIS — Z79.899 ENCOUNTER FOR MONITORING CARDIOTOXIC DRUG THERAPY: ICD-10-CM

## 2023-11-13 LAB
ALBUMIN SERPL BCG-MCNC: 3.7 G/DL (ref 3.5–5.2)
ALP SERPL-CCNC: 98 U/L (ref 35–104)
ALT SERPL W P-5'-P-CCNC: 17 U/L (ref 0–50)
ANION GAP SERPL CALCULATED.3IONS-SCNC: 10 MMOL/L (ref 7–15)
AST SERPL W P-5'-P-CCNC: 59 U/L (ref 0–45)
BASOPHILS # BLD AUTO: 0.1 10E3/UL (ref 0–0.2)
BASOPHILS NFR BLD AUTO: 1 %
BILIRUB SERPL-MCNC: 0.4 MG/DL
BUN SERPL-MCNC: 16.8 MG/DL (ref 8–23)
CALCIUM SERPL-MCNC: 9.5 MG/DL (ref 8.8–10.2)
CHLORIDE SERPL-SCNC: 104 MMOL/L (ref 98–107)
CREAT SERPL-MCNC: 0.62 MG/DL (ref 0.51–0.95)
DEPRECATED HCO3 PLAS-SCNC: 26 MMOL/L (ref 22–29)
EGFRCR SERPLBLD CKD-EPI 2021: >90 ML/MIN/1.73M2
EOSINOPHIL # BLD AUTO: 0.2 10E3/UL (ref 0–0.7)
EOSINOPHIL NFR BLD AUTO: 3 %
ERYTHROCYTE [DISTWIDTH] IN BLOOD BY AUTOMATED COUNT: 13.9 % (ref 10–15)
GLUCOSE SERPL-MCNC: 105 MG/DL (ref 70–99)
HCT VFR BLD AUTO: 39.1 % (ref 35–47)
HGB BLD-MCNC: 13.1 G/DL (ref 11.7–15.7)
IMM GRANULOCYTES # BLD: 0 10E3/UL
IMM GRANULOCYTES NFR BLD: 0 %
LVEF ECHO: NORMAL
LYMPHOCYTES # BLD AUTO: 1 10E3/UL (ref 0.8–5.3)
LYMPHOCYTES NFR BLD AUTO: 20 %
MCH RBC QN AUTO: 30.1 PG (ref 26.5–33)
MCHC RBC AUTO-ENTMCNC: 33.5 G/DL (ref 31.5–36.5)
MCV RBC AUTO: 90 FL (ref 78–100)
MONOCYTES # BLD AUTO: 0.6 10E3/UL (ref 0–1.3)
MONOCYTES NFR BLD AUTO: 12 %
NEUTROPHILS # BLD AUTO: 3.4 10E3/UL (ref 1.6–8.3)
NEUTROPHILS NFR BLD AUTO: 64 %
NRBC # BLD AUTO: 0 10E3/UL
NRBC BLD AUTO-RTO: 0 /100
PLATELET # BLD AUTO: 141 10E3/UL (ref 150–450)
POTASSIUM SERPL-SCNC: 3.8 MMOL/L (ref 3.4–5.3)
PROT SERPL-MCNC: 7.2 G/DL (ref 6.4–8.3)
RBC # BLD AUTO: 4.35 10E6/UL (ref 3.8–5.2)
SODIUM SERPL-SCNC: 140 MMOL/L (ref 135–145)
WBC # BLD AUTO: 5.3 10E3/UL (ref 4–11)

## 2023-11-13 PROCEDURE — 36415 COLL VENOUS BLD VENIPUNCTURE: CPT | Performed by: INTERNAL MEDICINE

## 2023-11-13 PROCEDURE — 85025 COMPLETE CBC W/AUTO DIFF WBC: CPT | Performed by: INTERNAL MEDICINE

## 2023-11-13 PROCEDURE — 96413 CHEMO IV INFUSION 1 HR: CPT

## 2023-11-13 PROCEDURE — 250N000011 HC RX IP 250 OP 636: Mod: JZ | Performed by: INTERNAL MEDICINE

## 2023-11-13 PROCEDURE — 258N000003 HC RX IP 258 OP 636: Performed by: INTERNAL MEDICINE

## 2023-11-13 PROCEDURE — 93306 TTE W/DOPPLER COMPLETE: CPT | Performed by: INTERNAL MEDICINE

## 2023-11-13 PROCEDURE — 96375 TX/PRO/DX INJ NEW DRUG ADDON: CPT

## 2023-11-13 PROCEDURE — 80053 COMPREHEN METABOLIC PANEL: CPT | Performed by: INTERNAL MEDICINE

## 2023-11-13 RX ORDER — ONDANSETRON 2 MG/ML
8 INJECTION INTRAMUSCULAR; INTRAVENOUS ONCE
Status: COMPLETED | OUTPATIENT
Start: 2023-11-13 | End: 2023-11-13

## 2023-11-13 RX ADMIN — SODIUM CHLORIDE 250 ML: 9 INJECTION, SOLUTION INTRAVENOUS at 13:48

## 2023-11-13 RX ADMIN — ONDANSETRON 8 MG: 2 INJECTION INTRAMUSCULAR; INTRAVENOUS at 13:49

## 2023-11-13 RX ADMIN — Medication 6 ML: at 08:16

## 2023-11-13 RX ADMIN — ADO-TRASTUZUMAB EMTANSINE 260 MG: 20 INJECTION, POWDER, LYOPHILIZED, FOR SOLUTION INTRAVENOUS at 14:11

## 2023-11-13 ASSESSMENT — PAIN SCALES - GENERAL: PAINLEVEL: NO PAIN (0)

## 2023-11-13 NOTE — NURSING NOTE
Chief Complaint   Patient presents with    Blood Draw     Vitals, blood drawn off PIV that was already in place. Pt checked into [facility].      NEDRA Del Rosario LPN

## 2023-11-13 NOTE — PATIENT INSTRUCTIONS
[facility] Triage and after hours / weekends / holidays:  [phone]    Please call the triage or after hours line if you experience a temperature greater than or equal to 100.4, shaking chills, have uncontrolled nausea, vomiting and/or diarrhea, dizziness, shortness of breath, chest pain, bleeding, unexplained bruising, or if you have any other new/concerning symptoms, questions or concerns.      If you are having any concerning symptoms or wish to speak to a provider before your next infusion visit, please call triage to notify them so we can adequately serve you.     If you need a refill on a narcotic prescription or other medication, please call before your infusion appointment.           November 2023 Sunday Monday Tuesday Wednesday Thursday Friday Saturday                  1     2     3     4       5     6     7     8     9    NEW ADULT EYE   7:45 AM   (15 min.)   Denver Lu MD   [facility] 10     11       12     13    ECHO COMPLETE LVAD   7:45 AM   (60 min.)   [id number]   [facility]    LAB PERIPHERAL  12:30 PM   (15 min.)   [facility] DRAW   [facility]    ONC INFUSION 1 HR (60 MIN)   1:00 PM   (60 min.)    ONC INFUSION NURSE   [facility] 14     15     16     17     18       19     20     21     22     23     24     25       26     27     28     29     30                           December 2023 Sunday Monday Tuesday Wednesday Thursday Friday Saturday                            1     2       3     4     5     6     7     8     9       10     11     12     13     14     15     16       17     18     19     20     21     22     23       24     25     26     27     28    RETURN ADULT GLAUCOMA   8:30 AM   (15 min.)   Hector Choi MD   [facility] 29     30       31                                                  Recent Results (from the past 24  hour(s))   Echocardiogram Complete    Collection Time: 11/13/23  9:10 AM   Result Value Ref Range    LVEF  55-60%    Comprehensive metabolic panel    Collection Time: 11/13/23  1:21 PM   Result Value Ref Range    Sodium 140 135 - 145 mmol/L    Potassium 3.8 3.4 - 5.3 mmol/L    Carbon Dioxide (CO2) 26 22 - 29 mmol/L    Anion Gap 10 7 - 15 mmol/L    Urea Nitrogen 16.8 8.0 - 23.0 mg/dL    Creatinine 0.62 0.51 - 0.95 mg/dL    GFR Estimate >90 >60 mL/min/1.73m2    Calcium 9.5 8.8 - 10.2 mg/dL    Chloride 104 98 - 107 mmol/L    Glucose 105 (H) 70 - 99 mg/dL    Alkaline Phosphatase 98 35 - 104 U/L    AST 59 (H) 0 - 45 U/L    ALT 17 0 - 50 U/L    Protein Total 7.2 6.4 - 8.3 g/dL    Albumin 3.7 3.5 - 5.2 g/dL    Bilirubin Total 0.4 <=1.2 mg/dL   CBC with platelets and differential    Collection Time: 11/13/23  1:21 PM   Result Value Ref Range    WBC Count 5.3 4.0 - 11.0 10e3/uL    RBC Count 4.35 3.80 - 5.20 10e6/uL    Hemoglobin 13.1 11.7 - 15.7 g/dL    Hematocrit 39.1 35.0 - 47.0 %    MCV 90 78 - 100 fL    MCH 30.1 26.5 - 33.0 pg    MCHC 33.5 31.5 - 36.5 g/dL    RDW 13.9 10.0 - 15.0 %    Platelet Count 141 (L) 150 - 450 10e3/uL    % Neutrophils 64 %    % Lymphocytes 20 %    % Monocytes 12 %    % Eosinophils 3 %    % Basophils 1 %    % Immature Granulocytes 0 %    NRBCs per 100 WBC 0 <1 /100    Absolute Neutrophils 3.4 1.6 - 8.3 10e3/uL    Absolute Lymphocytes 1.0 0.8 - 5.3 10e3/uL    Absolute Monocytes 0.6 0.0 - 1.3 10e3/uL    Absolute Eosinophils 0.2 0.0 - 0.7 10e3/uL    Absolute Basophils 0.1 0.0 - 0.2 10e3/uL    Absolute Immature Granulocytes 0.0 <=0.4 10e3/uL    Absolute NRBCs 0.0 10e3/uL

## 2023-11-13 NOTE — PROGRESS NOTES
Infusion Nursing Note:  Vickie Alvarado presents today for cycle 17, day 1 kadcyla.    Patient seen by provider today: No   present during visit today: Not Applicable.    Note: Patient denies any fevers or chills at home. States she had a little nausea this cycle and vomited x1 in the past 3 weeks. Took antiemetics with relief.  Eating and drinking well. Reports a dry cough intermittently with lying down at night for the past week or two that she attributes to dry air.  Is planning to get her humidifier out of storage to see if it will help.  Working with ophthalmology for recently diagnosed glaucoma.  Feeling a little frustrated today with her schedule, but looking forward to being done with her treatment.        Intravenous Access:  Peripheral IV placed.    Treatment Conditions:  Lab Results   Component Value Date    HGB 13.1 11/13/2023    WBC 5.3 11/13/2023    ANEU 4.1 09/08/2014    ANEUTAUTO 3.4 11/13/2023     (L) 11/13/2023        Lab Results   Component Value Date     11/13/2023    POTASSIUM 3.8 11/13/2023    MAG 2.2 08/20/2021    CR 0.62 11/13/2023    VALENTINA 9.5 11/13/2023    BILITOTAL 0.4 11/13/2023    ALBUMIN 3.7 11/13/2023    ALT 17 11/13/2023    AST 59 (H) 11/13/2023       Results reviewed, labs MET treatment parameters, ok to proceed with treatment.  ECHO/MUGA completed today  EF 55-60%.      Post Infusion Assessment:  Patient tolerated infusion without incident.  Blood return noted pre and post infusion.  Site patent and intact, free from redness, edema or discomfort.  No evidence of extravasations.  Access discontinued per protocol.       Discharge Plan:   Patient declined prescription refills.  Discharge instructions reviewed with: Patient.  Patient and/or family verbalized understanding of discharge instructions and all questions answered.  AVS to patient via [employer].  Patient will return 1/15/24 for next appointment.  Today was last planned chemotherapy   Patient discharged in  stable condition accompanied by: self.  Departure Mode: Ambulatory.      Candi Taylor RN

## 2023-11-14 ENCOUNTER — TELEPHONE (OUTPATIENT)
Dept: CARDIOLOGY | Facility: CLINIC | Age: 68
End: 2023-11-14
Payer: MEDICARE

## 2023-11-14 DIAGNOSIS — I35.0 SEVERE AORTIC STENOSIS: Primary | ICD-10-CM

## 2023-11-14 NOTE — TELEPHONE ENCOUNTER
[facility] Call Center    Phone Message    May a detailed message be left on voicemail: yes     Reason for Call: Other: Patient called , she has been having echocardiograms for her breast cancer with the most recent one being done 11/13/23. She is wondering if it is necessary to have one again in 2/2024 prior to her cardiology visit. Please call the patient back to discuss.    Action Taken: Other: cardiology    Travel Screening: Not Applicable  Thank you!  [facility]

## 2023-11-15 NOTE — TELEPHONE ENCOUNTER
I have ordered a limited echocardiogram to recheck aortic valve.  She has a severe aortic stenosis.  Can you schedule this may be a week before my clinic visit.  Thank you.    DR BACA

## 2023-12-26 DIAGNOSIS — H40.003 GLAUCOMA SUSPECT OF BOTH EYES: Primary | ICD-10-CM

## 2023-12-28 ENCOUNTER — TELEPHONE (OUTPATIENT)
Dept: OPHTHALMOLOGY | Facility: CLINIC | Age: 68
End: 2023-12-28

## 2023-12-28 ENCOUNTER — OFFICE VISIT (OUTPATIENT)
Dept: OPHTHALMOLOGY | Facility: CLINIC | Age: 68
End: 2023-12-28
Attending: OPHTHALMOLOGY
Payer: MEDICARE

## 2023-12-28 DIAGNOSIS — H40.003 GLAUCOMA SUSPECT OF BOTH EYES: ICD-10-CM

## 2023-12-28 DIAGNOSIS — H04.123 DRY EYES, BILATERAL: Primary | ICD-10-CM

## 2023-12-28 PROCEDURE — 99214 OFFICE O/P EST MOD 30 MIN: CPT | Mod: GC | Performed by: OPHTHALMOLOGY

## 2023-12-28 PROCEDURE — G0463 HOSPITAL OUTPT CLINIC VISIT: HCPCS | Performed by: OPHTHALMOLOGY

## 2023-12-28 PROCEDURE — 92083 EXTENDED VISUAL FIELD XM: CPT | Performed by: OPHTHALMOLOGY

## 2023-12-28 RX ORDER — DORZOLAMIDE HYDROCHLORIDE AND TIMOLOL MALEATE PRESERVATIVE FREE 20; 5 MG/ML; MG/ML
1 SOLUTION/ DROPS OPHTHALMIC 2 TIMES DAILY
Qty: 60 EACH | Refills: 3 | Status: SHIPPED | OUTPATIENT
Start: 2023-12-28 | End: 2024-01-03

## 2023-12-28 ASSESSMENT — CONF VISUAL FIELD
OS_NORMAL: 1
OS_INFERIOR_NASAL_RESTRICTION: 0
OD_SUPERIOR_TEMPORAL_RESTRICTION: 0
OD_INFERIOR_TEMPORAL_RESTRICTION: 0
OD_INFERIOR_NASAL_RESTRICTION: 0
OS_SUPERIOR_NASAL_RESTRICTION: 0
METHOD: COUNTING FINGERS
OS_INFERIOR_TEMPORAL_RESTRICTION: 0
OD_NORMAL: 1
OS_SUPERIOR_TEMPORAL_RESTRICTION: 0
OD_SUPERIOR_NASAL_RESTRICTION: 0

## 2023-12-28 ASSESSMENT — TONOMETRY
IOP_METHOD: TONOPEN
OD_IOP_MMHG: 15
OD_IOP_MMHG: 14
IOP_METHOD: APPLANATION
OS_IOP_MMHG: 16
OS_IOP_MMHG: 14

## 2023-12-28 ASSESSMENT — REFRACTION_WEARINGRX
OS_ADD: +2.50
OS_AXIS: 082
OS_SPHERE: -3.00
OS_CYLINDER: +1.50
OD_SPHERE: -1.00
OD_AXIS: 070
SPECS_TYPE: PAL
OD_ADD: +2.50
OD_CYLINDER: +0.50

## 2023-12-28 ASSESSMENT — VISUAL ACUITY
METHOD: SNELLEN - LINEAR
OD_CC: 20/20
CORRECTION_TYPE: GLASSES
OS_CC: 20/25
OD_CC+: -3

## 2023-12-28 ASSESSMENT — SLIT LAMP EXAM - LIDS
COMMENTS: DERMATOCHALASIS
COMMENTS: DERMATOCHALSIS

## 2023-12-28 ASSESSMENT — CUP TO DISC RATIO
OD_RATIO: 0.5
OS_RATIO: 0.6

## 2023-12-28 ASSESSMENT — EXTERNAL EXAM - LEFT EYE: OS_EXAM: NORMAL

## 2023-12-28 ASSESSMENT — EXTERNAL EXAM - RIGHT EYE: OD_EXAM: NORMAL

## 2023-12-28 NOTE — LETTER
Ophthalmology Clinic    January 4, 2024    RE: Vickie Alvarado      This letter is in support of accommodations required by my patient, Vickie Alvarado. She is under the care of Dr. Hector Choi in the [facility].  She was last seen by me on December 28, 2923 where she follows with me for glaucoma. She had to stop her eye drop Latanoprost due to worsening her severe dry eyes.    To appropriately treat this condition it is medically necessary that Ms. Alvarado start Preservative Free Cosopt to treat her glaucoma and prevent progressive irreversible blindness. She will not be able to use alternatives that were suggested including Dorzolamide, Timolol, lumigan, and brimonidine as these are not preservative free and will worsen her severe dry eyes.     We anticipate that these measures will be medically necessary for the foreseeable future. These recommendations may change depending on Ms. Alvarado s clinical progress.    Thank you for your consideration in this matter.  If you have any questions, please do not hesitate to contact us.    Sincerely,     Esther Fletcher MD  Department of Ophthalmology

## 2023-12-28 NOTE — PATIENT INSTRUCTIONS
Start Cosopt PF twice daily each eye   Use preservative free artificial tears 6 times daily   Use Refresh PM ointment at bedtime after drops
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
## 2023-12-28 NOTE — NURSING NOTE
Chief Complaints and History of Present Illnesses   Patient presents with    Glaucoma Follow-Up     Glaucoma suspect of both eyes     Chief Complaint(s) and History of Present Illness(es)       Glaucoma Follow-Up              Laterality: both eyes    Associated symptoms: Negative for eye pain, headache, flashes and floaters    Comments: Glaucoma suspect of both eyes              Comments    Pt states vision is stable.  No pain.  Still seeing a half [place] in vision, thinks it is in RE, 3x in ten days.  No flashes.  No change to floaters.  Stopped Latanoprost because it causes pain.    GINA Hernandez December 28, 2023 8:34 AM

## 2024-01-02 NOTE — TELEPHONE ENCOUNTER
Central Prior Authorization Team  Phone: [phone]    PA Initiation    Medication: DORZOLAMIDE HCL-TIMOLOL MAL PF 2-0.5 % OP SOLN  Insurance Company: [employer] - Phone [phone] Fax [phone]  Pharmacy Filling the Rx: [id number] [place] [address] & [facility]  Filling Pharmacy Phone: [phone]  Filling Pharmacy Fax:    Start Date: 1/2/2024

## 2024-01-02 NOTE — TELEPHONE ENCOUNTER
PRIOR AUTHORIZATION DENIED    Medication: DORZOLAMIDE HCL-TIMOLOL MAL PF 2-0.5 % OP SOLN  Insurance Company: [employer] - Phone [phone] Fax [phone]  Denial Date: 1/2/2024    Denial Reason(s):     Appeal Information: If provider would like to appeal please provide a letter of medical necessity.

## 2024-01-03 RX ORDER — DORZOLAMIDE HYDROCHLORIDE AND TIMOLOL MALEATE PRESERVATIVE FREE 20; 5 MG/ML; MG/ML
1 SOLUTION/ DROPS OPHTHALMIC 2 TIMES DAILY
Qty: 60 EACH | Refills: 3 | Status: SHIPPED | OUTPATIENT
Start: 2024-01-03 | End: 2024-05-20

## 2024-01-08 NOTE — TELEPHONE ENCOUNTER
Medication Appeal Initiation    Medication: DORZOLAMIDE HCL-TIMOLOL MAL PF 2-0.5 % OP SOLN  Appeal Start Date:  1/8/2024  Insurance Company: [employer]  Insurance Phone:   Insurance Fax:   Comments:   Faxed appeal letter to insurance.

## 2024-01-09 NOTE — TELEPHONE ENCOUNTER
MEDICATION APPEAL APPROVED    Medication: DORZOLAMIDE HCL-TIMOLOL MAL PF 2-0.5 % OP SOLN  Authorization Effective Date: 1/2/2024  Authorization Expiration Date:  until further notice  Approved Dose/Quantity:   Reference #:     Appeal Insurance Company:   Expected CoPay: $       CoPay Card Available:    Financial Assistance Needed:   Filling Pharmacy: [facility][id number] [address] [address] & [facility]  Patient Notified: Yes  Comments:   **Instructed pharmacy to notify patient when script is ready to /ship.**
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
## 2024-01-15 ENCOUNTER — LAB (OUTPATIENT)
Dept: LAB | Facility: CLINIC | Age: 69
End: 2024-01-15
Attending: INTERNAL MEDICINE
Payer: MEDICARE

## 2024-01-15 ENCOUNTER — INFUSION THERAPY VISIT (OUTPATIENT)
Dept: ONCOLOGY | Facility: CLINIC | Age: 69
End: 2024-01-15
Attending: INTERNAL MEDICINE
Payer: MEDICARE

## 2024-01-15 VITALS
OXYGEN SATURATION: 99 % | WEIGHT: 181.6 LBS | DIASTOLIC BLOOD PRESSURE: 85 MMHG | RESPIRATION RATE: 18 BRPM | TEMPERATURE: 97.7 F | BODY MASS INDEX: 28.67 KG/M2 | HEART RATE: 62 BPM | SYSTOLIC BLOOD PRESSURE: 132 MMHG

## 2024-01-15 DIAGNOSIS — Z51.89 CONVALESCENCE FOLLOWING CHEMOTHERAPY: ICD-10-CM

## 2024-01-15 DIAGNOSIS — Z79.811 LONG TERM (CURRENT) USE OF AROMATASE INHIBITORS: ICD-10-CM

## 2024-01-15 DIAGNOSIS — C50.212 MALIGNANT NEOPLASM OF UPPER-INNER QUADRANT OF LEFT BREAST IN FEMALE, ESTROGEN RECEPTOR POSITIVE (H): Primary | ICD-10-CM

## 2024-01-15 DIAGNOSIS — M85.89 OSTEOPENIA OF MULTIPLE SITES: ICD-10-CM

## 2024-01-15 DIAGNOSIS — L60.3 NAIL BRITTLENESS: ICD-10-CM

## 2024-01-15 DIAGNOSIS — Z17.0 MALIGNANT NEOPLASM OF UPPER-INNER QUADRANT OF LEFT BREAST IN FEMALE, ESTROGEN RECEPTOR POSITIVE (H): Primary | ICD-10-CM

## 2024-01-15 DIAGNOSIS — T73.2XXD FATIGUE DUE TO EXPOSURE, SUBSEQUENT ENCOUNTER: ICD-10-CM

## 2024-01-15 DIAGNOSIS — M15.0 PRIMARY OSTEOARTHRITIS INVOLVING MULTIPLE JOINTS: ICD-10-CM

## 2024-01-15 LAB
ALBUMIN SERPL BCG-MCNC: 4 G/DL (ref 3.5–5.2)
CALCIUM SERPL-MCNC: 9.8 MG/DL (ref 8.8–10.2)
CREAT SERPL-MCNC: 0.59 MG/DL (ref 0.51–0.95)
EGFRCR SERPLBLD CKD-EPI 2021: >90 ML/MIN/1.73M2

## 2024-01-15 PROCEDURE — G0463 HOSPITAL OUTPT CLINIC VISIT: HCPCS | Mod: 25 | Performed by: INTERNAL MEDICINE

## 2024-01-15 PROCEDURE — 82310 ASSAY OF CALCIUM: CPT | Performed by: INTERNAL MEDICINE

## 2024-01-15 PROCEDURE — 258N000003 HC RX IP 258 OP 636: Performed by: INTERNAL MEDICINE

## 2024-01-15 PROCEDURE — 250N000011 HC RX IP 250 OP 636: Mod: JZ | Performed by: INTERNAL MEDICINE

## 2024-01-15 PROCEDURE — 82040 ASSAY OF SERUM ALBUMIN: CPT | Performed by: INTERNAL MEDICINE

## 2024-01-15 PROCEDURE — 99215 OFFICE O/P EST HI 40 MIN: CPT | Performed by: INTERNAL MEDICINE

## 2024-01-15 PROCEDURE — 36415 COLL VENOUS BLD VENIPUNCTURE: CPT | Performed by: INTERNAL MEDICINE

## 2024-01-15 PROCEDURE — 82565 ASSAY OF CREATININE: CPT | Performed by: INTERNAL MEDICINE

## 2024-01-15 PROCEDURE — 96365 THER/PROPH/DIAG IV INF INIT: CPT

## 2024-01-15 RX ORDER — ZOLEDRONIC ACID 0.04 MG/ML
4 INJECTION, SOLUTION INTRAVENOUS ONCE
Status: COMPLETED | OUTPATIENT
Start: 2024-01-15 | End: 2024-01-15

## 2024-01-15 RX ADMIN — SODIUM CHLORIDE 250 ML: 9 INJECTION, SOLUTION INTRAVENOUS at 13:38

## 2024-01-15 RX ADMIN — ZOLEDRONIC ACID 4 MG: 0.04 INJECTION, SOLUTION INTRAVENOUS at 13:38

## 2024-01-15 ASSESSMENT — PAIN SCALES - GENERAL: PAINLEVEL: NO PAIN (0)
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
## 2024-01-15 NOTE — NURSING NOTE
"Oncology Rooming Note    January 15, 2024 12:19 PM   Vickie Alvarado is a 68 year old female who presents for:    Chief Complaint   Patient presents with    Oncology Clinic Visit     Malignant Neoplasm of Left Breast     Initial Vitals: LMP 05/17/2011  Estimated body mass index is 28.67 kg/m  as calculated from the following:    Height as of 7/19/23: 1.695 m (5' 6.73\").    Weight as of an earlier encounter on 1/15/24: 82.4 kg (181 lb 9.6 oz). There is no height or weight on file to calculate BSA.  Data Unavailable Comment: Data Unavailable   Patient's last menstrual period was 05/17/2011.  Allergies reviewed: Yes  Medications reviewed: Yes    Medications: Medication refills not needed today.  Pharmacy name entered into [employer]: [id number] [id number] [id number] & [facility]    Frailty Screening:   Is the patient here for a new oncology consult visit in cancer care? 2. No      Clinical concerns: None       Gisele Grissom LPN  1/15/2024                "

## 2024-01-15 NOTE — PATIENT INSTRUCTIONS
[facility] Triage and after hours / weekends / holidays:  [phone]    Please call the triage or after hours line if you experience a temperature greater than or equal to 100.4, shaking chills, have uncontrolled nausea, vomiting and/or diarrhea, dizziness, shortness of breath, chest pain, bleeding, unexplained bruising, or if you have any other new/concerning symptoms, questions or concerns.      If you are having any concerning symptoms or wish to speak to a provider before your next infusion visit, please call triage to notify them so we can adequately serve you.     If you need a refill on a narcotic prescription or other medication, please call before your infusion appointment.           January 2024 Sunday Monday Tuesday Wednesday Thursday Friday Saturday        1     2     3     4     5     6       7     8     9     10     11     12     13       14     15    LAB PERIPHERAL  11:30 AM   (15 min.)   [facility] LAB DRAW   [facility]    RETURN CCSL  11:45 AM   (30 min.)   Chandrika Horvath MD   [facility]    ONC INFUSION 1 HR (60 MIN)  12:30 PM   (60 min.)    ONC INFUSION NURSE   [facility] 16     17     18     19     20       21     22     23     24     25     26     27       28     29     30     31                                February 2024 Sunday Monday Tuesday Wednesday Thursday Friday Saturday                       1     2     3       4     5     6     7     8     9     10       11     12     13     14     15     16     17       18     19     20     21     22    [facility] 65+ ANNUAL WELLNESS  11:00 AM   (40 min.)   Gaby Lynn MD   [facility] 23    RETURN ADULT GLAUCOMA   8:45 AM   (15 min.)   Hector Choi MD   [facility] 24       25     26     27     28    ECHO COMPLETE   2:45 PM   (60 min.)   ECHCR2   [facility]  29                              Recent Results (from the past 24 hour(s))   Calcium    Collection Time: 01/15/24 12:02 PM   Result Value Ref Range    Calcium 9.8 8.8 - 10.2 mg/dL   Creatinine    Collection Time: 01/15/24 12:02 PM   Result Value Ref Range    Creatinine 0.59 0.51 - 0.95 mg/dL    GFR Estimate >90 >60 mL/min/1.73m2   Albumin level    Collection Time: 01/15/24 12:02 PM   Result Value Ref Range    Albumin 4.0 3.5 - 5.2 g/dL

## 2024-01-15 NOTE — PROGRESS NOTES
Infusion Nursing Note:  Vickie Alvarado presents today for zometa.    Patient seen by provider today: Yes: Dr Horvath   present during visit today: Not Applicable.    Note: N/A.      Intravenous Access:  Peripheral IV placed in lab    Treatment Conditions:  Lab Results   Component Value Date    HGB 13.1 11/13/2023    WBC 5.3 11/13/2023    ANEU 4.1 09/08/2014    ANEUTAUTO 3.4 11/13/2023     (L) 11/13/2023        Lab Results   Component Value Date     11/13/2023    POTASSIUM 3.8 11/13/2023    MAG 2.2 08/20/2021    CR 0.59 01/15/2024    VALENTINA 9.8 01/15/2024    BILITOTAL 0.4 11/13/2023    ALBUMIN 4.0 01/15/2024    ALT 17 11/13/2023    AST 59 (H) 11/13/2023       Results reviewed, labs MET treatment parameters, ok to proceed with treatment.      Post Infusion Assessment:  Patient tolerated infusion without incident.  Blood return noted pre and post infusion.  Site patent and intact, free from redness, edema or discomfort.  No evidence of extravasations.  Access discontinued per protocol.       Discharge Plan:   Patient declined prescription refills.  Discharge instructions reviewed with: Patient.  Patient and/or family verbalized understanding of discharge instructions and all questions answered.  AVS to patient via Private[employer].  Patient will return 4/16 for next provider appointment.   Patient discharged in stable condition accompanied by: self.  Departure Mode: Ambulatory.      Candi Taylor RN

## 2024-01-30 DIAGNOSIS — Z17.0 MALIGNANT NEOPLASM OF UPPER-INNER QUADRANT OF LEFT BREAST IN FEMALE, ESTROGEN RECEPTOR POSITIVE (H): ICD-10-CM

## 2024-01-30 DIAGNOSIS — C50.212 MALIGNANT NEOPLASM OF UPPER-INNER QUADRANT OF LEFT BREAST IN FEMALE, ESTROGEN RECEPTOR POSITIVE (H): ICD-10-CM

## 2024-01-30 RX ORDER — LETROZOLE 2.5 MG/1
2.5 TABLET, FILM COATED ORAL DAILY
Qty: 30 TABLET | Refills: 11 | Status: SHIPPED | OUTPATIENT
Start: 2024-01-30

## 2024-01-30 NOTE — TELEPHONE ENCOUNTER
"Letrozole 2.5mg tab  Last prescribing provider: Dr. Chandrika Horvath    Last clinic visit date: 1/15/24    Recommendations for requested medication (if none, N/A): 1/15/24, \" Continue letrozole, tolerating well with the exception of arthralgias.  Plan is to treat with a total 10 year course.\"    Any other pertinent information (if none, N/A): fax request    Refilled: Y/N, if NO, why?    " No

## 2024-02-01 ENCOUNTER — THERAPY VISIT (OUTPATIENT)
Dept: PHYSICAL THERAPY | Facility: CLINIC | Age: 69
End: 2024-02-01
Attending: INTERNAL MEDICINE
Payer: MEDICARE

## 2024-02-01 DIAGNOSIS — T73.2XXD FATIGUE DUE TO EXPOSURE, SUBSEQUENT ENCOUNTER: ICD-10-CM

## 2024-02-01 DIAGNOSIS — R68.89 DECREASED FUNCTIONAL ACTIVITY TOLERANCE: ICD-10-CM

## 2024-02-01 DIAGNOSIS — Z51.89 CONVALESCENCE FOLLOWING CHEMOTHERAPY: ICD-10-CM

## 2024-02-01 DIAGNOSIS — R53.81 PHYSICAL DECONDITIONING: Primary | ICD-10-CM

## 2024-02-01 PROCEDURE — 97110 THERAPEUTIC EXERCISES: CPT | Mod: GP | Performed by: PHYSICAL THERAPIST

## 2024-02-01 PROCEDURE — 97162 PT EVAL MOD COMPLEX 30 MIN: CPT | Mod: GP | Performed by: PHYSICAL THERAPIST

## 2024-02-01 NOTE — NURSING NOTE
Chief Complaints and History of Present Illnesses   Patient presents with     Follow Up     Glaucoma suspect of both eyes     Chief Complaint(s) and History of Present Illness(es)     Follow Up            Comments: Glaucoma suspect of both eyes          Comments    Pt states occ has a missing part of vision, pt states she can not tell which eye, last a few minutes   Pt states this has happened 3-4 times in the last 6 months  C/o dryness, using OTC At's   No tearing redness or eye pain    Gisele Mejia COT 8:31 AM January 30, 2023                               Bleeding that does not stop/Swelling that gets worse/Pain not relieved by Medications/Fever greater than (need to indicate Fahrenheit or Celsius)/Wound/Surgical Site with redness, or foul smelling discharge or pus/Numbness, tingling, color or temperature change to extremity/Nausea and vomiting that does not stop/Unable to urinate/Increased irritability or sluggishness
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
## 2024-02-07 ENCOUNTER — THERAPY VISIT (OUTPATIENT)
Dept: PHYSICAL THERAPY | Facility: CLINIC | Age: 69
End: 2024-02-07
Payer: MEDICARE

## 2024-02-07 DIAGNOSIS — R68.89 DECREASED FUNCTIONAL ACTIVITY TOLERANCE: ICD-10-CM

## 2024-02-07 DIAGNOSIS — Z51.89 CONVALESCENCE FOLLOWING CHEMOTHERAPY: Primary | ICD-10-CM

## 2024-02-07 DIAGNOSIS — R53.81 PHYSICAL DECONDITIONING: ICD-10-CM

## 2024-02-07 DIAGNOSIS — T73.2XXD FATIGUE DUE TO EXPOSURE, SUBSEQUENT ENCOUNTER: ICD-10-CM

## 2024-02-07 PROCEDURE — 97110 THERAPEUTIC EXERCISES: CPT | Mod: GP | Performed by: PHYSICAL THERAPIST

## 2024-02-14 ENCOUNTER — TELEPHONE (OUTPATIENT)
Dept: FAMILY MEDICINE | Facility: CLINIC | Age: 69
End: 2024-02-14

## 2024-02-14 NOTE — TELEPHONE ENCOUNTER
[facility] phone call message - order or referral request for patient:     Order or referral being requested: Order (lab)      Additional Comments: The patient states on the 11/27/23 appt, the PCP was supposed to put in an order for some lab work. The patient is requesting the orders so she can schedule an appt.    OK to leave a message on voice mail? Yes      Primary language: English      needed? No    Call taken on February 14, 2024 at 2:31 PM by Joan Resendiz

## 2024-02-15 NOTE — TELEPHONE ENCOUNTER
2/15/24 Forwarding message to  for direction.    Honey Cantor  Lead Care Coordinator  [facility]  [phone]

## 2024-02-19 DIAGNOSIS — R73.9 ELEVATED SERUM GLUCOSE: ICD-10-CM

## 2024-02-19 DIAGNOSIS — E78.2 MIXED HYPERLIPIDEMIA: Primary | Chronic | ICD-10-CM

## 2024-02-19 SDOH — HEALTH STABILITY: PHYSICAL HEALTH: ON AVERAGE, HOW MANY DAYS PER WEEK DO YOU ENGAGE IN MODERATE TO STRENUOUS EXERCISE (LIKE A BRISK WALK)?: 2 DAYS

## 2024-02-19 SDOH — HEALTH STABILITY: PHYSICAL HEALTH: ON AVERAGE, HOW MANY MINUTES DO YOU ENGAGE IN EXERCISE AT THIS LEVEL?: 20 MIN

## 2024-02-19 ASSESSMENT — SOCIAL DETERMINANTS OF HEALTH (SDOH): HOW OFTEN DO YOU GET TOGETHER WITH FRIENDS OR RELATIVES?: TWICE A WEEK

## 2024-02-19 NOTE — TELEPHONE ENCOUNTER
2/19/24  placed order, CC contacted patient and scheduled lab appointment for Tuesday 2/20/24 @ 9:20am.      Honey Cantor  Lead Care Coordinator  [facility]  [phone]

## 2024-02-20 ENCOUNTER — LAB (OUTPATIENT)
Dept: LAB | Facility: CLINIC | Age: 69
End: 2024-02-20
Payer: MEDICARE

## 2024-02-20 DIAGNOSIS — E78.2 MIXED HYPERLIPIDEMIA: Chronic | ICD-10-CM

## 2024-02-20 DIAGNOSIS — R73.9 ELEVATED SERUM GLUCOSE: ICD-10-CM

## 2024-02-20 LAB
CHOLEST SERPL-MCNC: 231 MG/DL
FASTING STATUS PATIENT QL REPORTED: ABNORMAL
HBA1C MFR BLD: 5.6 % (ref 0–5.6)
HDLC SERPL-MCNC: 78 MG/DL
LDLC SERPL CALC-MCNC: 131 MG/DL
NONHDLC SERPL-MCNC: 153 MG/DL
TRIGL SERPL-MCNC: 109 MG/DL

## 2024-02-20 PROCEDURE — 36415 COLL VENOUS BLD VENIPUNCTURE: CPT

## 2024-02-20 PROCEDURE — 80061 LIPID PANEL: CPT

## 2024-02-20 PROCEDURE — 83036 HEMOGLOBIN GLYCOSYLATED A1C: CPT

## 2024-02-21 ENCOUNTER — THERAPY VISIT (OUTPATIENT)
Dept: PHYSICAL THERAPY | Facility: CLINIC | Age: 69
End: 2024-02-21
Payer: MEDICARE

## 2024-02-21 DIAGNOSIS — Z51.89 CONVALESCENCE FOLLOWING CHEMOTHERAPY: Primary | ICD-10-CM

## 2024-02-21 DIAGNOSIS — R53.81 PHYSICAL DECONDITIONING: ICD-10-CM

## 2024-02-21 DIAGNOSIS — R68.89 DECREASED FUNCTIONAL ACTIVITY TOLERANCE: ICD-10-CM

## 2024-02-21 DIAGNOSIS — T73.2XXD FATIGUE DUE TO EXPOSURE, SUBSEQUENT ENCOUNTER: ICD-10-CM

## 2024-02-21 PROCEDURE — 97110 THERAPEUTIC EXERCISES: CPT | Mod: GP | Performed by: PHYSICAL THERAPIST

## 2024-02-22 ENCOUNTER — OFFICE VISIT (OUTPATIENT)
Dept: FAMILY MEDICINE | Facility: CLINIC | Age: 69
End: 2024-02-22
Payer: MEDICARE

## 2024-02-22 VITALS
TEMPERATURE: 97.8 F | WEIGHT: 183 LBS | BODY MASS INDEX: 29.41 KG/M2 | RESPIRATION RATE: 17 BRPM | OXYGEN SATURATION: 96 % | SYSTOLIC BLOOD PRESSURE: 123 MMHG | DIASTOLIC BLOOD PRESSURE: 81 MMHG | HEIGHT: 66 IN | HEART RATE: 76 BPM

## 2024-02-22 DIAGNOSIS — C50.212 MALIGNANT NEOPLASM OF UPPER-INNER QUADRANT OF LEFT BREAST IN FEMALE, ESTROGEN RECEPTOR POSITIVE (H): ICD-10-CM

## 2024-02-22 DIAGNOSIS — R73.9 ELEVATED SERUM GLUCOSE: ICD-10-CM

## 2024-02-22 DIAGNOSIS — I35.0 AORTIC VALVE STENOSIS, ETIOLOGY OF CARDIAC VALVE DISEASE UNSPECIFIED: ICD-10-CM

## 2024-02-22 DIAGNOSIS — Z78.0 POSTMENOPAUSAL STATUS: ICD-10-CM

## 2024-02-22 DIAGNOSIS — E78.2 MIXED HYPERLIPIDEMIA: ICD-10-CM

## 2024-02-22 DIAGNOSIS — Z17.0 MALIGNANT NEOPLASM OF UPPER-INNER QUADRANT OF LEFT BREAST IN FEMALE, ESTROGEN RECEPTOR POSITIVE (H): ICD-10-CM

## 2024-02-22 DIAGNOSIS — Z00.00 ENCOUNTER FOR MEDICARE ANNUAL WELLNESS EXAM: Primary | ICD-10-CM

## 2024-02-22 PROCEDURE — G0439 PPPS, SUBSEQ VISIT: HCPCS | Performed by: FAMILY MEDICINE

## 2024-02-22 NOTE — PATIENT INSTRUCTIONS
Preventive Care Advice   This is general advice given by our system to help you stay healthy. However, your care team may have specific advice just for you. Please talk to your care team about your preventive care needs.  Nutrition  Eat 5 or more servings of fruits and vegetables each day.  Try wheat bread, brown rice and whole grain pasta (instead of white bread, rice, and pasta).  Get enough calcium and vitamin D. Check the label on foods and aim for 100% of the RDA (recommended daily allowance).  Lifestyle  Exercise at least 150 minutes each week  (30 minutes a day, 5 days a week).  Do muscle strengthening activities 2 days a week. These help control your weight and prevent disease.  No smoking.  Wear sunscreen to prevent skin cancer.  Have a dental exam and cleaning every 6 months.  Yearly exams  See your health care team every year to talk about:  Any changes in your health.  Any medicines your care team has prescribed.  Preventive care, family planning, and ways to prevent chronic diseases.  Shots (vaccines)   HPV shots (up to age 26), if you've never had them before.  Hepatitis B shots (up to age 59), if you've never had them before.  COVID-19 shot: Get this shot when it's due.  Flu shot: Get a flu shot every year.  Tetanus shot: Get a tetanus shot every 10 years.  Pneumococcal, hepatitis A, and RSV shots: Ask your care team if you need these based on your risk.  Shingles shot (for age 50 and up)  General health tests  Diabetes screening:  Starting at age 35, Get screened for diabetes at least every 3 years.  If you are younger than age 35, ask your care team if you should be screened for diabetes.  Cholesterol test: At age 39, start having a cholesterol test every 5 years, or more often if advised.  Bone density scan (DEXA): At age 50, ask your care team if you should have this scan for osteoporosis (brittle bones).  Hepatitis C: Get tested at least once in your life.  STIs (sexually transmitted  infections)  Before age 24: Ask your care team if you should be screened for STIs.  After age 24: Get screened for STIs if you're at risk. You are at risk for STIs (including HIV) if:  You are sexually active with more than one person.  You don't use condoms every time.  You or a partner was diagnosed with a sexually transmitted infection.  If you are at risk for HIV, ask about PrEP medicine to prevent HIV.  Get tested for HIV at least once in your life, whether you are at risk for HIV or not.  Cancer screening tests  Cervical cancer screening: If you have a cervix, begin getting regular cervical cancer screening tests starting at age 21.  Breast cancer scan (mammogram): If you've ever had breasts, begin having regular mammograms starting at age 40. This is a scan to check for breast cancer.  Colon cancer screening: It is important to start screening for colon cancer at age 45.  Have a colonoscopy test every 10 years (or more often if you're at risk) Or, ask your provider about stool tests like a FIT test every year or Cologuard test every 3 years.  To learn more about your testing options, visit:   https://www.[employer]/678647.pdf.  For help making a decision, visit:   https://bit.ly/lk51741.  Prostate cancer screening test: If you have a prostate, ask your care team if a prostate cancer screening test (PSA) at age 55 is right for you.  Lung cancer screening: If you are a current or former smoker ages 50 to 80, ask your care team if ongoing lung cancer screenings are right for you.  For informational purposes only. Not to replace the advice of your health care provider. Copyright   2023 [facility]. All rights reserved. Clinically reviewed by the [facility]. [employer] [id number] - REV 01/24.    Bladder Training: Care Instructions  Your Care Instructions     Bladder training is used to treat urge incontinence and stress incontinence. Urge incontinence means that the need to  urinate comes on so fast that you can't get to a toilet in time. Stress incontinence means that you leak urine because of pressure on your bladder. For example, it may happen when you laugh, cough, or lift something heavy.  Bladder training can increase how long you can wait before you have to urinate. It can also help your bladder hold more urine. And it can give you better control over the urge to urinate.  It is important to remember that bladder training takes a few weeks to a few months to make a difference. You may not see results right away, but don't give up.  Follow-up care is a key part of your treatment and safety. Be sure to make and go to all appointments, and call your doctor if you are having problems. It's also a good idea to know your test results and keep a list of the medicines you take.  How can you care for yourself at home?  Work with your doctor to come up with a bladder training program that is right for you. You may use one or more of the following methods.  Delayed urination  In the beginning, try to keep from urinating for 5 minutes after you first feel the need to go.  While you wait, take deep, slow breaths to relax. Kegel exercises can also help you delay the need to go to the bathroom.  After some practice, when you can easily wait 5 minutes to urinate, try to wait 10 minutes before you urinate.  Slowly increase the waiting period until you are able to control when you have to urinate.  Scheduled urination  Empty your bladder when you first wake up in the morning.  Schedule times throughout the day when you will urinate.  Start by going to the bathroom every hour, even if you don't need to go.  Slowly increase the time between trips to the bathroom.  When you have found a schedule that works well for you, keep doing it.  If you wake up during the night and have to urinate, do it. Apply your schedule to waking hours only.  Kegel exercises  These tighten and strengthen pelvic muscles,  "which can help you control the flow of urine. (If doing these exercises causes pain, stop doing them and talk with your doctor.) To do Kegel exercises:  Squeeze your muscles as if you were trying not to pass gas. Or squeeze your muscles as if you were stopping the flow of urine. Your belly, legs, and buttocks shouldn't move.  Hold the squeeze for 3 seconds, then relax for 5 to 10 seconds.  Start with 3 seconds, then add 1 second each week until you are able to squeeze for 10 seconds.  Repeat the exercise 10 times a session. Do 3 to 8 sessions a day.  When should you call for help?  Watch closely for changes in your health, and be sure to contact your doctor if:    Your incontinence is getting worse.     You do not get better as expected.   Where can you learn more?  Go to https://www.[employer].net/patiented  Enter V684 in the search box to learn more about \"Bladder Training: Care Instructions.\"  Current as of: February 28, 2023               Content Version: 13.8    4310-5336 [employer].   Care instructions adapted under license by your healthcare professional. If you have questions about a medical condition or this instruction, always ask your healthcare professional. [employer] disclaims any warranty or liability for your use of this information.      "

## 2024-02-23 ENCOUNTER — OFFICE VISIT (OUTPATIENT)
Dept: OPHTHALMOLOGY | Facility: CLINIC | Age: 69
End: 2024-02-23
Attending: OPHTHALMOLOGY
Payer: MEDICARE

## 2024-02-23 ENCOUNTER — HOSPITAL ENCOUNTER (EMERGENCY)
Facility: CLINIC | Age: 69
Discharge: HOME | End: 2024-02-23
Attending: STUDENT IN AN ORGANIZED HEALTH CARE EDUCATION/TRAINING PROGRAM | Admitting: STUDENT IN AN ORGANIZED HEALTH CARE EDUCATION/TRAINING PROGRAM
Payer: MEDICARE

## 2024-02-23 VITALS
WEIGHT: 183 LBS | OXYGEN SATURATION: 98 % | RESPIRATION RATE: 16 BRPM | BODY MASS INDEX: 29.41 KG/M2 | SYSTOLIC BLOOD PRESSURE: 137 MMHG | TEMPERATURE: 97.2 F | HEIGHT: 66 IN | DIASTOLIC BLOOD PRESSURE: 81 MMHG | HEART RATE: 71 BPM

## 2024-02-23 DIAGNOSIS — H40.003 GLAUCOMA SUSPECT OF BOTH EYES: Primary | ICD-10-CM

## 2024-02-23 DIAGNOSIS — S61.217A LACERATION OF LEFT LITTLE FINGER WITHOUT FOREIGN BODY WITHOUT DAMAGE TO NAIL, INITIAL ENCOUNTER: ICD-10-CM

## 2024-02-23 PROCEDURE — 92012 INTRM OPH EXAM EST PATIENT: CPT | Performed by: OPHTHALMOLOGY

## 2024-02-23 PROCEDURE — G0463 HOSPITAL OUTPT CLINIC VISIT: HCPCS | Performed by: OPHTHALMOLOGY

## 2024-02-23 PROCEDURE — 99283 EMERGENCY DEPT VISIT LOW MDM: CPT

## 2024-02-23 PROCEDURE — 99282 EMERGENCY DEPT VISIT SF MDM: CPT

## 2024-02-23 PROCEDURE — 12001 RPR S/N/AX/GEN/TRNK 2.5CM/<: CPT

## 2024-02-23 ASSESSMENT — COLUMBIA-SUICIDE SEVERITY RATING SCALE - C-SSRS
2. HAVE YOU ACTUALLY HAD ANY THOUGHTS OF KILLING YOURSELF IN THE PAST MONTH?: NO
1. IN THE PAST MONTH, HAVE YOU WISHED YOU WERE DEAD OR WISHED YOU COULD GO TO SLEEP AND NOT WAKE UP?: NO
6. HAVE YOU EVER DONE ANYTHING, STARTED TO DO ANYTHING, OR PREPARED TO DO ANYTHING TO END YOUR LIFE?: NO

## 2024-02-23 ASSESSMENT — REFRACTION_WEARINGRX
OD_AXIS: 070
OS_CYLINDER: +1.50
OD_ADD: +2.50
OS_SPHERE: -3.00
SPECS_TYPE: PAL
OS_ADD: +2.50
OS_AXIS: 082
OD_CYLINDER: +0.50
OD_SPHERE: -1.00

## 2024-02-23 ASSESSMENT — VISUAL ACUITY
METHOD: SNELLEN - LINEAR
OS_PH_CC: 20/25
CORRECTION_TYPE: GLASSES
OS_CC+: -1
OD_CC: 20/20
OS_CC: 20/25
OD_CC+: -1

## 2024-02-23 ASSESSMENT — EXTERNAL EXAM - RIGHT EYE: OD_EXAM: NORMAL

## 2024-02-23 ASSESSMENT — TONOMETRY
OD_IOP_MMHG: 14
IOP_METHOD: ICARE
OS_IOP_MMHG: 14

## 2024-02-23 ASSESSMENT — SLIT LAMP EXAM - LIDS
COMMENTS: DERMATOCHALSIS
COMMENTS: DERMATOCHALASIS

## 2024-02-23 ASSESSMENT — ACTIVITIES OF DAILY LIVING (ADL)
ADLS_ACUITY_SCORE: 33
ADLS_ACUITY_SCORE: 35

## 2024-02-23 ASSESSMENT — CUP TO DISC RATIO
OS_RATIO: 0.6
OD_RATIO: 0.5

## 2024-02-23 ASSESSMENT — EXTERNAL EXAM - LEFT EYE: OS_EXAM: NORMAL

## 2024-02-23 NOTE — NURSING NOTE
"Chief Complaints and History of Present Illnesses   Patient presents with    Glaucoma     Chief Complaint(s) and History of Present Illness(es)       Glaucoma              Laterality: both eyes    Compliance with Treatment: always              Comments    Patient is started on PF Cosopt twice a day both eyes.  Patient is tolerating the drops but they are expensive.   Patient is having visual disturbance that describes water stain on napkin and then moves off to the side of her vision and disrupts her vision.  Will last 15 minutes.  It has been occurring more frequently recently.  Also recently began having disturbance of like \"water puddle\" bottom of right eye.  No associated headache.      Lynn Manzanares on 2/23/2024 at 9:14 AM                     "

## 2024-02-23 NOTE — PATIENT INSTRUCTIONS
Continue Cosopt PF twice daily each eye   Use preservative free artificial tears 6 times daily   Use Refresh PM ointment at bedtime after drops
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
## 2024-02-24 NOTE — DISCHARGE INSTRUCTIONS
Leave bandage in place for 24 hours.  After that she may gently clean the wound once daily with soap and water.  Leave splint in place for 2 to 3 days, and for comfort after that, to help promote healing.  Return to the ED should you develop redness around the wound, white drainage from the wound, fevers, chills, or any further concerns.  Otherwise, follow-up with your primary care provider or an urgent care or here in the ED for suture removal in 10 to 14 days.  Discharge Instructions  Laceration (Cut)    You were seen today for a laceration (cut).  Your provider examined your laceration for any problems such a buried foreign body (like glass, a splinter, or gravel), or injury to blood vessels, tendons, and nerves.  Your provider may have also rinsed and/or scrubbed your laceration to help prevent an infection. It may not be possible to find all problems with your laceration on the first visit; occasionally foreign bodies or a tendon injury can go undetected.    Your laceration may have been closed in one of several ways:  No closure: many wounds will heal just fine without closure.  Stitches: regular stitches that require removal.  Staples: skin staples are often used in the scalp/head.  Wound adhesive (glue): skin glue can be used for certain lacerations and doesn t require removal.  Wound strips (aka Butterfly bandages or steri-strips): these are bandages that help to close a wound.  Absorbable stitches:  dissolving  stitches that go away on their own and usually don t require removal.    A small percentage of wounds will develop an infection regardless of how well the wound is cared for. Antibiotics are generally not indicated to prevent an infection so are only given for a small number of high-risk wounds. Some lacerations are too high risk to close, and are left open to heal because closure can increase the likelihood that an infection will develop.    Remember that all lacerations, no matter how expertly  repaired, will cause scarring. We consider many factors, techniques, and materials, in our efforts to provide the best possible cosmetic outcome.    Generally, every Emergency Department visit should have a follow-up clinic visit with either a primary or a specialty clinic/provider. Please follow-up as instructed by your emergency provider today.     Return to the Emergency Department right away if:  You have more redness, swelling, pain, drainage (pus), a bad smell, or red streaking from your laceration as these symptoms could indicate an infection.  You have a fever of 100.4 F or more.  You have bleeding that you cannot stop at home. If your cut starts to bleed, hold pressure on the bleeding area with a clean cloth or put pressure over the bandage.  If the bleeding does not stop after using constant pressure for 30 minutes, you should return to the Emergency Department for further treatment.  An area past the laceration is cool, pale, or blue compared with the other side, or has a slower return of color when squeezed.  Your dressing seems too tight or starts to get uncomfortable or painful. For children, signs of a problem might be irritability or restlessness.  You have loss of normal function or use of an area, such as being unable to straighten or bend a finger normally.  You have a numb area past the laceration.    Return to the Emergency Department or see your regular provider if:  The laceration starts to come open.   You have something coming out of the cut or a feeling that there is something in the laceration.  Your wound will not heal, or keeps breaking open. There can always be glass, wood, dirt or other things in any wound.  They will not always show up, even on x-rays.  If a wound does not heal, this may be why, and it is important to follow-up with your regular provider.    Home Care:  Take your dressing off in 12-24 hours, or as instructed by your provider, to check your laceration. Remove the  dressing sooner if it seems too tight or painful, or if it is getting numb, tingly, or pale past the dressing.  Gently wash your laceration 1-2 times daily with clean water and mild soap. It is okay to shower or run clean water over the laceration, but do not let the laceration soak in water (no swimming).  If your laceration was closed with wound adhesive or strips: pat it dry and leave it open to the air. For all other repairs: after you wash your laceration, or at least 2 times a day, apply antibiotic ointment (such as Neosporin  or Bacitracin ) to the laceration, then cover it with a Band-Aid  or gauze.  Keep the laceration clean. Wear gloves or other protective clothing if you are around dirt.    Follow-up for removal:  If your wound was closed with staples or regular stitches, they need to be removed according to the instructions and timeline specified by your provider today.  If your wound was closed with absorbable ( dissolving ) sutures, they should fall out, dissolve, or not be visible in about one week. If they are still visible, then they should be removed according to the instructions and timeline specified by your provider today.    Scars:  To help minimize scarring:  Wear sunscreen over the healed laceration when out in the sun.  Massage the area regularly once healed.  You may apply Vitamin E to the healed wound.  Wait. Scars improve in appearance over months and years.    If you were given a prescription for medicine here today, be sure to read all of the information (including the package insert) that comes with your prescription.  This will include important information about the medicine, its side effects, and any warnings that you need to know about.  The pharmacist who fills the prescription can provide more information and answer questions you may have about the medicine.  If you have questions or concerns that the pharmacist cannot address, please call or return to the Emergency Department.        Remember that you can always come back to the Emergency Department if you are not able to see your regular provider in the amount of time listed above, if you get any new symptoms, or if there is anything that worries you.

## 2024-02-24 NOTE — ED PROVIDER NOTES
History     Chief Complaint:  Laceration       HPI   Vickie Alvarado is a 68 year old female who presents to the ED with laceration to her left pinky finger.  Patient was trying to remove a pit from an avocado when the knife went through the avocado and into her left pinky finger.  Patient is not on anticoagulation.  She is right-handed.  Tetanus is up-to-date.    Independent Historian:   None - Patient Only    Review of External Notes:   Last tetanus 2017.    Medications:    calcium carb-cholecalciferol 600-500 MG-UNIT CAPS  dorzolamide-timolol PF (COSOPT PF) 2-0.5 % opthalmic solutionh  Lactobacillus (PROBIOTIC CHILDRENS) CHEW  letrozole (FEMARA) 2.5 MG tablet  loratadine (CLARITIN) 10 MG tablet  metoprolol succinate ER (TOPROL XL) 25 MG 24 hr tablet  Omega-3 Fatty Acids (FISH OIL ADULT GUMMIES PO)  simvastatin (ZOCOR) 40 MG tablet  vitamin D3 (CHOLECALCIFEROL) 50 mcg (2000 units) tablet        Past Medical History:    Past Medical History:   Diagnosis Date    Aortic stenosis     Arthritis in my 50s on hands    Breast cancer (H) 06/2022    Hyperlipidemia     Hypertension merely monitoring    Insufficiency fracture of tibia, sequela 09/28/2021    Nonsenile cataract 1 cataract surgery    Polyp of colon 05/26/2016       Past Surgical History:    Past Surgical History:   Procedure Laterality Date    BIOPSY NODE SENTINEL Left 10/17/2022    Procedure: LEFT seed localized lumpectomy with sentinel node biopsy;  Surgeon: Alphonso Cadet MD;  Location: [facility] OR    CATARACT IOL, RT/LT Right 2006    HC YAG LASER CAPSULOTOMY Right 2009    LASER VITREOLYSIS, ANTERIOR OD (RIGHT EYE) Right 2007    LUMPECTOMY BREAST WITH SENTINEL NODE, COMBINED Left 10/17/2022    Procedure: LEFT seed localized lumpectomy with sentinel node biopsy;  Surgeon: Alphonso Cadet MD;  Location: [facility] OR    REPOSITION INTRAOCULAR LENS Right 11/18/2014    Procedure: REPOSITION INTRAOCULAR LENS;  Surgeon: Vickie Guerra MD;  Location: [facility]     "VITRECTOMY PARSPLANA WITH 23 GAUGE SYSTEM Right 11/18/2014    Procedure: VITRECTOMY PARSPLANA WITH 23 GAUGE SYSTEM;  Surgeon: Vickie Guerra MD;  Location: [facility]        Physical Exam   Patient Vitals for the past 24 hrs:   BP Temp Temp src Pulse Resp SpO2 Height Weight   02/23/24 2206 137/81 -- -- 71 16 98 % -- --   02/23/24 1954 (!) 142/82 97.2  F (36.2  C) Temporal 78 14 97 % 1.676 m (5' 6\") 83 kg (183 lb)        Physical Exam  Vital signs and nursing notes reviewed    General: Alert, appropriate  Cardiovascular: Appears well perfused. Intact radial pulse bilaterally.  Brisk capillary refill at all 5 digits of left hand.  Pulmonary: No respiratory distress  Musculoskeletal: Tendon function normal with full ROM with flexion and extension of the digit at the PIP and DIP.  No tendon laceration appreciated through full range of motion when evaluated in a bloodless field.  Neuro: Normal strength and sensation to the pinky digit  Skin: 1.5 cm laceration to radial aspect of palmar side of her left pinky finger    Emergency Department Course     Imaging:  No orders to display      Laboratory:  Labs Ordered and Resulted from Time of ED Arrival to Time of ED Departure - No data to display     Procedures     Laceration Repair      Procedure: Laceration Repair    Indication: Laceration    Consent: Verbal    Location: Left L fifth (pinky) finger    Length: 1.5 cm    Preparation: Irrigation with Sterile Saline.    Anesthesia/Sedation: Bupivacaine - 0.5%      Treatment/Exploration: Wound explored, no foreign bodies found     Closure: The wound was closed with one layer. Skin/superficial layer was closed with 4 x 5-0 Nylon using Interrupted sutures.     Patient Status: The patient tolerated the procedure well: Yes. There were no complications.      Emergency Department Course & Assessments:    Interventions:  Medications - No data to display     Independent Interpretation (X-rays, CTs, rhythm " strip):  None    Consultations/Discussion of Management or Tests:  None        Social Determinants of Health affecting care:   None    Disposition:  The patient was discharged.     Impression & Plan    CMS Diagnoses: None    Medical Decision Making:  Vickie Alvarado is a 68 year old female who presents to the Emergency Department with injury to the left pinky finger.  See HPI.  Vital signs are normal.  Findings and exam were consistent with uncomplicated laceration of the radial aspect of the palmar side of the left pinky finger overlying the PIP region., which was repaired as noted above. There is no evidence at this time of associated fracture or foreign body, deep space infection, tendon injury, or neurovascular injury. Follow up in 5 days or sooner if concerns over healing. The patient is to follow-up for suture removal in 10-14 days. Patient can discharge home. Indications for immediate return to ER were reviewed and included but are not limited to, redness, fevers, drainage, increasing pain, or other concerns.  She was also placed in an AlumaFoam splint to wear for couple of days to help promote healing given the laceration is near her PIP joint.  Patient agreeable to plan and had questions answered.  Tetanus up-to-date.    Diagnosis:    ICD-10-CM    1. Laceration of left little finger without foreign body without damage to nail, initial encounter  S61.217A            Discharge Medications:  Discharge Medication List as of 2/23/2024  9:59 PM           Esther Koehler PA-C on 2/23/2024 at 11:08 PM         Esther Koehler PA-C  02/23/24 2308

## 2024-02-26 ENCOUNTER — MYC MEDICAL ADVICE (OUTPATIENT)
Dept: FAMILY MEDICINE | Facility: CLINIC | Age: 69
End: 2024-02-26
Payer: MEDICARE

## 2024-02-26 DIAGNOSIS — E78.5 HYPERLIPIDEMIA LDL GOAL <130: ICD-10-CM

## 2024-02-27 ENCOUNTER — TELEPHONE (OUTPATIENT)
Dept: CARDIOLOGY | Facility: CLINIC | Age: 69
End: 2024-02-27
Payer: MEDICARE

## 2024-02-27 RX ORDER — SIMVASTATIN 40 MG
40 TABLET ORAL AT BEDTIME
Qty: 90 TABLET | Refills: 3 | Status: SHIPPED | OUTPATIENT
Start: 2024-02-27 | End: 2024-08-12 | Stop reason: ALTCHOICE

## 2024-02-27 NOTE — TELEPHONE ENCOUNTER
Health Call Center    Phone Message    May a detailed message be left on voicemail: yes     Reason for Call: Other: Patient has follow up appt. 3/4/24. She will be due to have her stiches out from a laceration in her hand around this time. She is wondering by chance if Dr. Brooke could removed these at her follow up appt. Please call her back to discuss.       Action Taken: Other: cardiology    Travel Screening: Not Applicable   Thank you!  [facility]

## 2024-02-27 NOTE — TELEPHONE ENCOUNTER
Pt sent [employer] message requesting a refill due to not seeing cardiologist before they will run out. RN queued up med and sending to provider for review.    Vicky Lyn RN

## 2024-02-28 ENCOUNTER — THERAPY VISIT (OUTPATIENT)
Dept: PHYSICAL THERAPY | Facility: CLINIC | Age: 69
End: 2024-02-28
Payer: MEDICARE

## 2024-02-28 ENCOUNTER — ANCILLARY PROCEDURE (OUTPATIENT)
Dept: CARDIOLOGY | Facility: CLINIC | Age: 69
End: 2024-02-28
Attending: INTERNAL MEDICINE
Payer: MEDICARE

## 2024-02-28 DIAGNOSIS — R53.81 PHYSICAL DECONDITIONING: ICD-10-CM

## 2024-02-28 DIAGNOSIS — R68.89 DECREASED FUNCTIONAL ACTIVITY TOLERANCE: ICD-10-CM

## 2024-02-28 DIAGNOSIS — I49.3 PVC'S (PREMATURE VENTRICULAR CONTRACTIONS): ICD-10-CM

## 2024-02-28 DIAGNOSIS — T73.2XXD FATIGUE DUE TO EXPOSURE, SUBSEQUENT ENCOUNTER: Primary | ICD-10-CM

## 2024-02-28 LAB — LVEF ECHO: NORMAL

## 2024-02-28 PROCEDURE — 93306 TTE W/DOPPLER COMPLETE: CPT | Performed by: INTERNAL MEDICINE

## 2024-02-28 PROCEDURE — 97110 THERAPEUTIC EXERCISES: CPT | Mod: GP | Performed by: PHYSICAL THERAPIST

## 2024-02-28 ASSESSMENT — ENCOUNTER SYMPTOMS
PSYCHIATRIC NEGATIVE: 1
ARTHRALGIAS: 1
FATIGUE: 1
NEUROLOGICAL NEGATIVE: 1
RESPIRATORY NEGATIVE: 1
HEMATOLOGIC/LYMPHATIC NEGATIVE: 1
PALPITATIONS: 0

## 2024-03-04 ENCOUNTER — OFFICE VISIT (OUTPATIENT)
Dept: FAMILY MEDICINE | Facility: CLINIC | Age: 69
End: 2024-03-04
Payer: MEDICARE

## 2024-03-04 ENCOUNTER — OFFICE VISIT (OUTPATIENT)
Dept: CARDIOLOGY | Facility: CLINIC | Age: 69
End: 2024-03-04
Attending: INTERNAL MEDICINE
Payer: MEDICARE

## 2024-03-04 VITALS
HEART RATE: 75 BPM | DIASTOLIC BLOOD PRESSURE: 86 MMHG | BODY MASS INDEX: 30.01 KG/M2 | OXYGEN SATURATION: 95 % | SYSTOLIC BLOOD PRESSURE: 135 MMHG | WEIGHT: 185.9 LBS

## 2024-03-04 VITALS
BODY MASS INDEX: 29.73 KG/M2 | WEIGHT: 185 LBS | HEART RATE: 78 BPM | HEIGHT: 66 IN | SYSTOLIC BLOOD PRESSURE: 117 MMHG | OXYGEN SATURATION: 96 % | DIASTOLIC BLOOD PRESSURE: 76 MMHG | TEMPERATURE: 97.3 F | RESPIRATION RATE: 12 BRPM

## 2024-03-04 DIAGNOSIS — I35.0 SEVERE AORTIC STENOSIS: Primary | ICD-10-CM

## 2024-03-04 DIAGNOSIS — I49.3 PVC'S (PREMATURE VENTRICULAR CONTRACTIONS): ICD-10-CM

## 2024-03-04 DIAGNOSIS — Z48.02 VISIT FOR SUTURE REMOVAL: Primary | ICD-10-CM

## 2024-03-04 PROCEDURE — 15853 REMOVAL SUTR/STAPL XREQ ANES: CPT | Mod: GC

## 2024-03-04 PROCEDURE — G0463 HOSPITAL OUTPT CLINIC VISIT: HCPCS | Performed by: INTERNAL MEDICINE

## 2024-03-04 PROCEDURE — 99213 OFFICE O/P EST LOW 20 MIN: CPT | Performed by: INTERNAL MEDICINE

## 2024-03-04 PROCEDURE — 99212 OFFICE O/P EST SF 10 MIN: CPT | Mod: 25

## 2024-03-04 ASSESSMENT — PAIN SCALES - GENERAL: PAINLEVEL: NO PAIN (0)

## 2024-03-04 NOTE — PATIENT INSTRUCTIONS
In 6 months :    Limited Echocardiogram to reassess aortic stenosis  Follow up with Dr. Brooke afterwards

## 2024-03-04 NOTE — PROGRESS NOTES
Assessment & Plan     Visit for suture removal  [facility]  Procedure Note    Marni Alvarado is a patient of Gaby Concepcion here for suture removal     Consent: Affirmation of informed consent was obtained verbally. Risks, benefits and alternatives were discussed. Patient's questions were elicited and answered.     Technique:   4 simple interrupted 5-0 sutures were removed using suture removal scissors and fine pick-ups. Patient tolerated the procedure well without bleeding, pain, or discomfort.      Resident: iPneda Scott DO  Faculty: Britt Johnson MD present for and supervised this entire procedure.    No follow-ups on file.    Subjective   Marni is a 68 year old, presenting for the following health issues:  Suture Removal (Left pinky finger)        3/4/2024     3:36 PM   Additional Questions   Roomed by edel   Accompanied by self     HPI     Here to remove stitches from L pinky finger needed for laceration following avocado incident, no other concerns.    Review of Systems  Constitutional, eye, ENT, skin, respiratory, cardiac, and GI are normal except as otherwise noted.    Objective    LMP 05/17/2011   There is no height or weight on file to calculate BMI.  Physical Exam   Vitals reviewed.   Constitutional: awake, alert, cooperative, in NAD  Respiratory: Normal pulmonary effort without coughing or wheezing  Neurologic: A&Ox4, without focal deficit, cranial nerves II-XII grossly intact  Psychiatric: Alert & calm with appropriate affect, mood, insight, and thought processes           Signed Electronically by: Pineda Scott DO
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
## 2024-03-06 ENCOUNTER — THERAPY VISIT (OUTPATIENT)
Dept: PHYSICAL THERAPY | Facility: CLINIC | Age: 69
End: 2024-03-06
Payer: MEDICARE

## 2024-03-06 DIAGNOSIS — T73.2XXD FATIGUE DUE TO EXPOSURE, SUBSEQUENT ENCOUNTER: Primary | ICD-10-CM

## 2024-03-06 DIAGNOSIS — R53.81 PHYSICAL DECONDITIONING: ICD-10-CM

## 2024-03-06 DIAGNOSIS — R68.89 DECREASED FUNCTIONAL ACTIVITY TOLERANCE: ICD-10-CM

## 2024-03-06 PROCEDURE — 97110 THERAPEUTIC EXERCISES: CPT | Mod: GP | Performed by: PHYSICAL THERAPIST

## 2024-03-28 ENCOUNTER — THERAPY VISIT (OUTPATIENT)
Dept: PHYSICAL THERAPY | Facility: CLINIC | Age: 69
End: 2024-03-28
Payer: MEDICARE

## 2024-03-28 DIAGNOSIS — R68.89 DECREASED FUNCTIONAL ACTIVITY TOLERANCE: ICD-10-CM

## 2024-03-28 DIAGNOSIS — R53.81 PHYSICAL DECONDITIONING: ICD-10-CM

## 2024-03-28 DIAGNOSIS — T73.2XXD FATIGUE DUE TO EXPOSURE, SUBSEQUENT ENCOUNTER: Primary | ICD-10-CM

## 2024-03-28 PROCEDURE — 97110 THERAPEUTIC EXERCISES: CPT | Mod: GP | Performed by: PHYSICAL THERAPIST

## 2024-04-01 ENCOUNTER — OFFICE VISIT (OUTPATIENT)
Dept: OPHTHALMOLOGY | Facility: CLINIC | Age: 69
End: 2024-04-01
Attending: OPHTHALMOLOGY
Payer: MEDICARE

## 2024-04-01 DIAGNOSIS — H53.8 NEGATIVE DYSPHOTOPSIA: ICD-10-CM

## 2024-04-01 DIAGNOSIS — Z96.1 PSEUDOPHAKIA, RIGHT EYE: ICD-10-CM

## 2024-04-01 DIAGNOSIS — H25.12 AGE-RELATED NUCLEAR CATARACT OF LEFT EYE: Primary | ICD-10-CM

## 2024-04-01 DIAGNOSIS — H59.099 NEGATIVE DYSPHOTOPSIA: ICD-10-CM

## 2024-04-01 PROCEDURE — G0463 HOSPITAL OUTPT CLINIC VISIT: HCPCS | Performed by: OPHTHALMOLOGY

## 2024-04-01 PROCEDURE — 99214 OFFICE O/P EST MOD 30 MIN: CPT | Mod: GC | Performed by: OPHTHALMOLOGY

## 2024-04-01 ASSESSMENT — REFRACTION_WEARINGRX
OS_SPHERE: -3.00
OD_ADD: +2.50
OS_CYLINDER: +1.50
OD_CYLINDER: +0.50
OD_SPHERE: -1.00
SPECS_TYPE: PAL
OS_ADD: +2.50
OD_AXIS: 070
OS_AXIS: 082

## 2024-04-01 ASSESSMENT — EXTERNAL EXAM - LEFT EYE: OS_EXAM: NORMAL

## 2024-04-01 ASSESSMENT — VISUAL ACUITY
OD_CC+: -2
OD_CC: 20/20
CORRECTION_TYPE: GLASSES
OS_CC: 20/30
OS_CC+: +1

## 2024-04-01 ASSESSMENT — REFRACTION_MANIFEST
OD_SPHERE: -1.00
OS_ADD: +2.50
OD_AXIS: 100
OS_AXIS: 080
OS_SPHERE: -3.25
OS_CYLINDER: +1.25
OD_CYLINDER: +0.25
OD_ADD: +2.50

## 2024-04-01 ASSESSMENT — EXTERNAL EXAM - RIGHT EYE: OD_EXAM: NORMAL

## 2024-04-01 ASSESSMENT — SLIT LAMP EXAM - LIDS
COMMENTS: DERMATOCHALSIS
COMMENTS: DERMATOCHALASIS

## 2024-04-01 ASSESSMENT — TONOMETRY
OD_IOP_MMHG: 16
IOP_METHOD: ICARE
OS_IOP_MMHG: 11
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
## 2024-04-01 NOTE — NURSING NOTE
Chief Complaints and History of Present Illnesses   Patient presents with    Follow Up     Resuturing of dislocated IOL OD - complicated by post-op dislocation  Secondary sutured IOL right eye (2/23/17)  Dry eyes       Chief Complaint(s) and History of Present Illness(es)       Follow Up              Comments: Resuturing of dislocated IOL OD - complicated by post-op dislocation  Secondary sutured IOL right eye (2/23/17)  Dry eyes                Comments    Pt here for annual follow up  Pt wants new MR for sunglasses   Know dry eyes,using OTC drops and gel  No flashes , floaters or eye pain    Gisele Mejia COT 8:45 AM April 1, 2024

## 2024-04-09 ENCOUNTER — OFFICE VISIT (OUTPATIENT)
Dept: FAMILY MEDICINE | Facility: CLINIC | Age: 69
End: 2024-04-09
Payer: MEDICARE

## 2024-04-09 VITALS
DIASTOLIC BLOOD PRESSURE: 83 MMHG | HEIGHT: 66 IN | RESPIRATION RATE: 16 BRPM | BODY MASS INDEX: 29.44 KG/M2 | HEART RATE: 82 BPM | OXYGEN SATURATION: 96 % | SYSTOLIC BLOOD PRESSURE: 127 MMHG | WEIGHT: 183.2 LBS | TEMPERATURE: 97.9 F

## 2024-04-09 DIAGNOSIS — R29.898 HIP WEAKNESS: ICD-10-CM

## 2024-04-09 DIAGNOSIS — M25.561 CHRONIC PAIN OF BOTH KNEES: Primary | ICD-10-CM

## 2024-04-09 DIAGNOSIS — M25.562 CHRONIC PAIN OF BOTH KNEES: Primary | ICD-10-CM

## 2024-04-09 DIAGNOSIS — G89.29 CHRONIC PAIN OF BOTH KNEES: Primary | ICD-10-CM

## 2024-04-09 PROCEDURE — 99213 OFFICE O/P EST LOW 20 MIN: CPT | Performed by: FAMILY MEDICINE

## 2024-04-09 NOTE — PATIENT INSTRUCTIONS
What we will do   Continue to the use the knee brace, particularly any time you are up and about    Use the Voltaren gel - but remember if you use this you should not use an Ibuprofen or Naproxen or Motrin (these are the same medications)    You CAN use acetaminophen with those, however    We will send you to PT to they can start focusing shira your hip strength    We will also get you in with an orthopedic surgeon - Dr Vale - for a visit to see what he thinks about surgery along your timeline     We can consider injections if the voltaren gels and the PT isn't super helpful

## 2024-04-09 NOTE — PROGRESS NOTES
Assessment & Plan     Chronic pain of both knees  Hip weakness  Visit today to discuss knee pains. She has a long standing history of knee pain, with x-ray imaging showing us degenerative joint loss in both knees. For some time she was undergoing intensive breast carcinoma therapy, for which she noted deconditioning which was much more evident recently - the point it has been affecting her ADLs. Currently unde-going fatigue rehab PT - but now wants to focus on her knees and restoring function. We had a long discussion today about short and long-term goals, all of which she was open to. For short term relief - we recommended her to use things such as Voltaren gels and Acetaminophen to see if these are effective - and if not we could consider a CSJ down the road. For mid to long-term we discussed the necessity of PT - and we will get her in with them. Finally, for long-term we discussed that meeting with orthopedics is not at all un-reasonable, and that an early meeting can at least let us know where along the time-line she is for surgery appropriateness - so we will get her in with them as well.    Plan to follow up after a few rounds to PT to check in out how conditioning and strengthening is going.     Blaise Thakur,   PGY3 Family Medicine Resident   [facility]    Department of Family Medicine and Community Health     Patient seen, evaluated and discussed with the resident. I have verified the content of the note, which accurately reflects my assessment of the patient and the plan of care.   Supervising Physician:  Josie Jacob MD     Ajay Grider is a 68 year old, presenting for the following health issues:  Knee Pain (Pt here for knee pain)    HPI     Knee Pains  Has had chronic knee pains dating back to at least 2021  Has a history of right knee insufficiency fractures - noted in 7/2021 - was given injection on 2/2022 in sports med clinic  "for this, and continued pool therapy   Hx of 3 steroid injections in her knees   Most recent knee imaging 2023 - x-ray bilaterally - showed moderate to severe degen in the left and mild to moderate compartment degen changes in the right     History  Hx of breast cancer (chemotherapy, surgery, radiation - ended in Jan 2023)  Since that time has been on chemo infusions  HOWEVER during all of this - put her knee pains on the backburner, and since that time pain has progressed  Follows with [facility] PT 2/2 fatigue from her cancer treatment -> when she started this she realized how deconditioned she got between the pandemic and her cancer treatment  This was more evident she lives on her own - but when she started rehab and went on a trip recently - she experienced more pain / weakness / sedentary   Noting walking / standing / sitting is all pain in bilateral knees   Fatigue has improved since stopping her infusions in November 2023  In 2018 and 2021 had both land and pool PT for her right knee (hx of insufficiency fractures as above) through [facility]  Wanted to discuss next steps - PT, surgery, injections, heart valve (hx of asymptomatic aortic stenosis)  Has a knee sleeve that she has been wearing when she is up, and finds it helpful   Going down steps worse than up, takes steps slowly           Objective    /83 (BP Location: Left arm, Patient Position: Sitting, Cuff Size: Adult Regular)   Pulse 82   Temp 97.9  F (36.6  C) (Oral)   Resp 16   Ht 1.676 m (5' 6\")   Wt 83.1 kg (183 lb 3.2 oz)   LMP 05/17/2011   SpO2 96%   BMI 29.57 kg/m    Body mass index is 29.57 kg/m .  Physical Exam   GENERAL: alert and no distress  MS:   - gait ache in knees  - standing still even worse ache  - heel walk - strain in hamstring  - toe walk - feel her core kicking in   - single leg squats discomfort in both knees, says before starting therapy in feb she would feel more unstable  - FADERs and FABERs negative for hip " pain or low back pain  - Hyperflexion and extension at knees both cause pain along the knees - anteriorly   - McMurrys negative B/L  - LCL seems to be a touch more lax on the right as compared to the left  - MCL stressing is equal on both sides  - PCL and ACL stressing is negative
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
## 2024-04-16 ENCOUNTER — ONCOLOGY VISIT (OUTPATIENT)
Dept: ONCOLOGY | Facility: CLINIC | Age: 69
End: 2024-04-16
Attending: PHYSICIAN ASSISTANT
Payer: MEDICARE

## 2024-04-16 VITALS
BODY MASS INDEX: 30.02 KG/M2 | RESPIRATION RATE: 16 BRPM | SYSTOLIC BLOOD PRESSURE: 125 MMHG | TEMPERATURE: 97.2 F | DIASTOLIC BLOOD PRESSURE: 81 MMHG | HEART RATE: 71 BPM | WEIGHT: 186 LBS | OXYGEN SATURATION: 95 %

## 2024-04-16 DIAGNOSIS — Z17.0 MALIGNANT NEOPLASM OF UPPER-INNER QUADRANT OF LEFT BREAST IN FEMALE, ESTROGEN RECEPTOR POSITIVE (H): Primary | ICD-10-CM

## 2024-04-16 DIAGNOSIS — C50.212 MALIGNANT NEOPLASM OF UPPER-INNER QUADRANT OF LEFT BREAST IN FEMALE, ESTROGEN RECEPTOR POSITIVE (H): Primary | ICD-10-CM

## 2024-04-16 PROCEDURE — G0463 HOSPITAL OUTPT CLINIC VISIT: HCPCS | Performed by: PHYSICIAN ASSISTANT

## 2024-04-16 PROCEDURE — 99215 OFFICE O/P EST HI 40 MIN: CPT | Performed by: PHYSICIAN ASSISTANT

## 2024-04-16 PROCEDURE — 99417 PROLNG OP E/M EACH 15 MIN: CPT | Performed by: PHYSICIAN ASSISTANT

## 2024-04-16 ASSESSMENT — PAIN SCALES - GENERAL: PAINLEVEL: NO PAIN (0)

## 2024-04-16 NOTE — NURSING NOTE
"Oncology Rooming Note    April 16, 2024 1:13 PM   Vickie Alvarado is a 68 year old female who presents for:    Chief Complaint   Patient presents with    Oncology Clinic Visit     Malignant neoplasm of upper-inner quadrant of left breast in female, estrogen receptor positive      Initial Vitals: /81   Pulse 71   Temp 97.2  F (36.2  C)   Resp 16   Wt 84.4 kg (186 lb)   LMP 05/17/2011   SpO2 95%   BMI 30.02 kg/m   Estimated body mass index is 30.02 kg/m  as calculated from the following:    Height as of 4/9/24: 1.676 m (5' 6\").    Weight as of this encounter: 84.4 kg (186 lb). Body surface area is 1.98 meters squared.  No Pain (0) Comment: Data Unavailable   Patient's last menstrual period was 05/17/2011.  Allergies reviewed: Yes  Medications reviewed: Yes    Medications: Medication refills not needed today.  Pharmacy name entered into [employer]: [id number] - [place] [id number] & [facility]    Frailty Screening:   Is the patient here for a new oncology consult visit in cancer care? 2. No      Clinical concerns: no other complaints    Kenneth Ray"
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
## 2024-04-18 ENCOUNTER — TELEPHONE (OUTPATIENT)
Dept: CARDIOLOGY | Facility: CLINIC | Age: 69
End: 2024-04-18
Payer: MEDICARE

## 2024-04-18 NOTE — TELEPHONE ENCOUNTER
4/18 Patient confirmed scheduled appointment:  Date: 9/19/24  Time: 11:30 am  Visit type: Return Cardiology  Provider: Edwardo  [place] [facility] [place]  Testing/imaging: n/a  Additional notes: n/a

## 2024-04-29 ENCOUNTER — THERAPY VISIT (OUTPATIENT)
Dept: PHYSICAL THERAPY | Facility: CLINIC | Age: 69
End: 2024-04-29
Payer: MEDICARE

## 2024-04-29 DIAGNOSIS — R53.81 PHYSICAL DECONDITIONING: ICD-10-CM

## 2024-04-29 DIAGNOSIS — R68.89 DECREASED FUNCTIONAL ACTIVITY TOLERANCE: ICD-10-CM

## 2024-04-29 DIAGNOSIS — T73.2XXD FATIGUE DUE TO EXPOSURE, SUBSEQUENT ENCOUNTER: Primary | ICD-10-CM

## 2024-04-29 DIAGNOSIS — M25.561 RIGHT KNEE PAIN: ICD-10-CM

## 2024-04-29 PROCEDURE — 97116 GAIT TRAINING THERAPY: CPT | Mod: GP | Performed by: PHYSICAL THERAPIST

## 2024-04-29 PROCEDURE — 97110 THERAPEUTIC EXERCISES: CPT | Mod: GP | Performed by: PHYSICAL THERAPIST

## 2024-04-29 PROCEDURE — 97750 PHYSICAL PERFORMANCE TEST: CPT | Mod: GP | Performed by: PHYSICAL THERAPIST

## 2024-04-29 ASSESSMENT — ACTIVITIES OF DAILY LIVING (ADL)
STIFFNESS: THE SYMPTOM AFFECTS MY ACTIVITY MODERATELY
SPORTS_HIGHEST_POTENTIAL_SCORE: 32
GO UP STAIRS: ACTIVITY IS MINIMALLY DIFFICULT
GO DOWN STAIRS: ACTIVITY IS SOMEWHAT DIFFICULT
PAIN: THE SYMPTOM AFFECTS MY ACTIVITY MODERATELY
PLEASE_INDICATE_YOR_PRIMARY_REASON_FOR_REFERRAL_TO_THERAPY:: KNEE
SWELLING: I HAVE THE SYMPTOM BUT IT DOES NOT AFFECT MY ACTIVITY
SQUAT: ACTIVITY IS FAIRLY DIFFICULT
LIMPING: THE SYMPTOM AFFECTS MY ACTIVITY MODERATELY
STAND: ACTIVITY IS FAIRLY DIFFICULT
STAND: ACTIVITY IS FAIRLY DIFFICULT
KNEEL ON THE FRONT OF YOUR KNEE: ACTIVITY IS SOMEWHAT DIFFICULT
HOW_WOULD_YOU_RATE_THE_OVERALL_FUNCTION_OF_YOUR_KNEE_DURING_YOUR_USUAL_DAILY_ACTIVITIES?: ABNORMAL
GIVING WAY, BUCKLING OR SHIFTING OF KNEE: THE SYMPTOM AFFECTS MY ACTIVITY SLIGHTLY
STIFFNESS: THE SYMPTOM AFFECTS MY ACTIVITY MODERATELY
HOW_WOULD_YOU_RATE_YOUR_CURRENT_LEVEL_OF_FUNCTION_DURING_YOUR_SPORTS_RELATED_ACTIVITIES_FROM_0_TO_100_WITH_100_BEING_YOUR_LEVEL_OF_FUNCTION_PRIOR_TO_YOUR_HIP_PROBLEM_AND_0_BEING_THE_INABILITY_TO_PERFORM_ANY_OF_YOUR_USUAL_DAILY_ACTIVITIES?: 100
HOW_WOULD_YOU_RATE_THE_OVERALL_FUNCTION_OF_YOUR_KNEE_DURING_YOUR_USUAL_DAILY_ACTIVITIES?: ABNORMAL
LIMPING: THE SYMPTOM AFFECTS MY ACTIVITY MODERATELY
ADL_SCORE(%): 0
WEAKNESS: THE SYMPTOM AFFECTS MY ACTIVITY MODERATELY
PAIN: THE SYMPTOM AFFECTS MY ACTIVITY MODERATELY
ADL_TOTAL_ITEM_SCORE: 0
KNEE_ACTIVITY_OF_DAILY_LIVING_SUM: 37
KNEE_ACTIVITY_OF_DAILY_LIVING_SCORE: 52.86
HOW_WOULD_YOU_RATE_YOUR_CURRENT_LEVEL_OF_FUNCTION?: NORMAL
RISE FROM A CHAIR: ACTIVITY IS FAIRLY DIFFICULT
SPORTS_COUNT: 8
WEAKNESS: THE SYMPTOM AFFECTS MY ACTIVITY MODERATELY
SPORTS_TOTAL_ITEM_SCORE: INCOMPLETE
RISE FROM A CHAIR: ACTIVITY IS FAIRLY DIFFICULT
SWELLING: I HAVE THE SYMPTOM BUT IT DOES NOT AFFECT MY ACTIVITY
ADL_HIGHEST_POTENTIAL_SCORE: 68
HOW_WOULD_YOU_RATE_THE_CURRENT_FUNCTION_OF_YOUR_KNEE_DURING_YOUR_USUAL_DAILY_ACTIVITIES_ON_A_SCALE_FROM_0_TO_100_WITH_100_BEING_YOUR_LEVEL_OF_KNEE_FUNCTION_PRIOR_TO_YOUR_INJURY_AND_0_BEING_THE_INABILITY_TO_PERFORM_ANY_OF_YOUR_USUAL_DAILY_ACTIVITIES?: 40
AS_A_RESULT_OF_YOUR_KNEE_INJURY,_HOW_WOULD_YOU_RATE_YOUR_CURRENT_LEVEL_OF_DAILY_ACTIVITY?: ABNORMAL
RAW_SCORE: 37
SIT WITH YOUR KNEE BENT: ACTIVITY IS MINIMALLY DIFFICULT
SIT WITH YOUR KNEE BENT: ACTIVITY IS MINIMALLY DIFFICULT
HOS_ADL_HIGHEST_POTENTIAL_SCORE: 0
GO DOWN STAIRS: ACTIVITY IS SOMEWHAT DIFFICULT
WALK: ACTIVITY IS FAIRLY DIFFICULT
ADL_COUNT: 17
SPORTS_SCORE(%): INCOMPLETE
KNEEL ON THE FRONT OF YOUR KNEE: ACTIVITY IS SOMEWHAT DIFFICULT
SQUAT: ACTIVITY IS FAIRLY DIFFICULT
WALK: ACTIVITY IS FAIRLY DIFFICULT
GO UP STAIRS: ACTIVITY IS MINIMALLY DIFFICULT
PLEASE_INDICATE_YOR_PRIMARY_REASON_FOR_REFERRAL_TO_THERAPY:: HIP
GIVING WAY, BUCKLING OR SHIFTING OF KNEE: THE SYMPTOM AFFECTS MY ACTIVITY SLIGHTLY
HOS_ADL_ITEM_SCORE_TOTAL: 0
AS_A_RESULT_OF_YOUR_KNEE_INJURY,_HOW_WOULD_YOU_RATE_YOUR_CURRENT_LEVEL_OF_DAILY_ACTIVITY?: ABNORMAL
HOW_WOULD_YOU_RATE_YOUR_CURRENT_LEVEL_OF_FUNCTION_DURING_YOUR_USUAL_ACTIVITIES_OF_DAILY_LIVING_FROM_0_TO_100_WITH_100_BEING_YOUR_LEVEL_OF_FUNCTION_PRIOR_TO_YOUR_HIP_PROBLEM_AND_0_BEING_THE_INABILITY_TO_PERFORM_ANY_OF_YOUR_USUAL_DAILY_ACTIVITIES?: 100
HOW_WOULD_YOU_RATE_YOUR_CURRENT_LEVEL_OF_FUNCTION_DURING_YOUR_USUAL_ACTIVITIES_OF_DAILY_LIVING_FROM_0_TO_100_WITH_100_BEING_YOUR_LEVEL_OF_FUNCTION_PRIOR_TO_YOUR_HIP_PROBLEM_AND_0_BEING_THE_INABILITY_TO_PERFORM_ANY_OF_YOUR_USUAL_DAILY_ACTIVITIES?: 100
HOW_WOULD_YOU_RATE_THE_CURRENT_FUNCTION_OF_YOUR_KNEE_DURING_YOUR_USUAL_DAILY_ACTIVITIES_ON_A_SCALE_FROM_0_TO_100_WITH_100_BEING_YOUR_LEVEL_OF_KNEE_FUNCTION_PRIOR_TO_YOUR_INJURY_AND_0_BEING_THE_INABILITY_TO_PERFORM_ANY_OF_YOUR_USUAL_DAILY_ACTIVITIES?: 40

## 2024-04-29 NOTE — PROGRESS NOTES
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.89)    Fatigue due to exposure, subsequent encounter (T73.2XXD)   PT Tx Diagnosis Cancer related deconditioning, fatigue, and reduced functional activity tolerance   Precautions/Limitations none   Quick Adds Certification   Progress Note/Certification   Start of Care Date 02/01/24   Onset of illness/injury or Date of Surgery 10/17/22  (date of surgery)   Therapy Frequency 1x/wk reducing to 2x/month   Predicted Duration 90 days   Certification date from 02/01/24   Certification date to 04/30/24   Progress Note Due Date 04/30/24   Progress Note Completed Date 02/01/24   PT Goal 1   Goal Identifier 30'' STS   Goal Description Patient will demonstrate 14 or more reps with 30'' STS strength test indicating improvement in functional strength and conditioning necessary for improved functional activity tolerance.   Rationale to maximize safety and independence with performance of ADLs and functional tasks;to maximize safety and independence within the home;to maximize safety and independence within the community   Goal Progress Baseline 12 reps.  No change noted, limited by knee pain.  12 reps 4/29/24.   Target Date 04/30/24   PT Goal 2   Goal Identifier DGI   Goal Description Patient to score 21/24 or greater on the DGI indicating improvement in dynamic gait stability and improved stability navigating community environments.   Rationale to maximize safety and independence with performance of ADLs and functional tasks;to maximize safety and independence within the home;to maximize safety and independence within the community   Goal Progress Baseline impairment of dynamic gait stability, 4-item DGI score 9/12.  Goal met 4/29/24, DGI score 21/24. Mild dynamic impairment yet related to deconditioning but primarily knee pain.   Target Date  04/30/24   Date Met 04/29/24   PT Goal 3   Goal Identifier FACIT   Goal Description Patient will demonstrate continued minimal impact of fatigue within her life/activity tolerance for optimal QOL.   Rationale to maximize safety and independence with performance of ADLs and functional tasks;to maximize safety and independence within the home;to maximize safety and independence within the community   Goal Progress Baseline FACIT score 43/52.  No clinical change noted, 4/29/24 FACIT score 42/52.  Addressing ongoing deficit with HEP.   Target Date 04/30/24   PT Goal 4   Goal Identifier HEP   Goal Description Patient to demonstrate independent carryover of HEP for longterm management of condition.   Rationale to maximize safety and independence with performance of ADLs and functional tasks;to maximize safety and independence within the home;to maximize safety and independence within the community   Goal Progress Goal met/in progress 4/29/24 - addressing longterm deficits/impairments with HEP.  Ortho PT referral for knee pain management.   Target Date 04/30/24   Date Met 04/29/24   Subjective Report   Subjective Report Marni reports ongoing and worsening knee pain (known chronic OA), R>L knee pain.  Limited ambulation and exercise tolerance due to knee pain. Saw initial orthopedic consult, referred for additional orthopedic PT services to address knee pain, considering conservative and surgical options.  Interested in trying PT to see if bracing may help.  Ongoing fatigue and deconditioning, limited progress due to worsening knee pain.   Therapeutic Procedure/Exercise   Therapeutic Procedures: strength, endurance, ROM, flexibility minutes (34205) 20   PTRx Ther Proc 1 Abdominal Brace Transverse Abdominis   PTRx Ther Proc 1 - Details Review of supine TA isometric sets and progressions with exercises for improved proximal core control and pain management.   PTRx Ther Proc 2 Education Sheet General   PTRx Ther Proc 2 -  Details Review of walking program as tolerated and recommended use of trekking pole/SEC as needed for pain management.   PTRx Ther Proc 3 Hooklying Lumbar Rotation   PTRx Ther Proc 3 - Details Review of LTR stretching for lower body pain management and improved mobility/gait mechanics.   PTRx Ther Proc 4 [place] and Arrow Stretch   PTRx Ther Proc 4 - Details Reviewed bow and arrow stretching addressing mid back tightness   PTRx Ther Proc 5 Hip Abduction With Theraband   PTRx Ther Proc 5 - Details Reviewed standing resisted hip ABD with TB at knees > ankles for isolated hip ABD strengthening and minimization of knee pain (OA) reviewed for HEP.  Removed quadruped core/hip exercises due to reported knee pain.   PTRx Ther Proc 6 Standing Push Up at Countertop   PTRx Ther Proc 6 - Details reviewed countertop pushups for UB functional strengthening and core activation.   PTRx Ther Proc 7 Side Stepping With Theraband   PTRx Ther Proc 7 - Details reviewed technique with resisted sidestepping and monster walks with TB at ankles knee OA pain reported advised in technique modifications and deferring to other exercises if too painful.   PTRx Ther Proc 8 Monster Walks   PTRx Ther Proc 8 - Details reviewed technique with resisted sidestepping and monster walks with TB at ankles knee OA pain reported advised in technique modifications and deferring to other exercises if too painful.   PTRx Ther Proc 9 Standing Fire Hydrant   PTRx Ther Proc 9 - Details modified quadruped > standing firehydrants for proximal hip ABD and core strengthening.   Skilled Intervention review/ed and modificaiton of HEP for longterm management of condition   Patient Response/Progress Patient voicing/demo understanding, tolerating well in clinic. R>L knee pain limiting with deeper squats and STS, deferred.  Goals partially met, limited by chronic worsening knee pain/OA.   Gait Training   Gait Training Minutes, includes stair climbing (93568) 10   Gait 1 Gait  training AD   Gait 1 - Details Gait training with/without AD, noting mild trendelenberg pattern specifically with R stance phase due to knee pain and proximal hip weakness. Reduced compensations noted with use of AD, trial with SEC and then unilateral trekking pole, educated on proper sizing and indications with longer distances, uneven terrain, and/or to help reduce fall risk with reported knee buckling.   Skilled Intervention gait/AD training, patient education   Patient Response/Progress tolerating training well and demonstrating understanding/willingness. All goals partially met, limited by chronic worsening knee pain. Addressing with gait AD training to offload knee and to provide improved stability/safety.  Ongoing HEP and ortho PT referral.   Physical Performance Test/measures   Physical Performance Test/Measurement, Minutes (74428) 12   Physical Performance Test/Measurement Details 21/24 score on the DGI indicates mild dynamic gait/balance impairment, although scoring above fall-risk threshold.  Primary factors contributing to mild dynamic balance impairment include deconditioning/weakness and chronic knee OA pain.  Patient advised in SEC or trekking pole to assist with offloading of knee/pain management as well as improved stability when navigating long distances or uneven terrain. Addressing ongoing deficits with HEP and referred to orthopedic PT services for knee pain management.   Skilled Intervention DGI and interpretation, patient education   Patient Response/Progress tolerated testing well, DGI goal met/stable, limited by chronic/worsening knee pain currently.  Patient advised in HEP and AD options for current management of condition.   Education   Learner/Method Patient;Listening;Demonstration;Pictures/Video;No Barriers to Learning   Education Comments HEP modifications, POC, ortho PT referral, AD options   Plan   Home program see PTRx, patient encouraged to continue aquatic exercise as able   Plan  for next session dc from [facility], transition to ortho PT for knee pain management and possible knee OA  bracing trial   Total Session Time   Timed Code Treatment Minutes 42   Total Treatment Time (sum of timed and untimed services) 42       DISCHARGE  Reason for Discharge: No further expectation of progress.  Change in medical status.  Patient has not shown significant change/progress with regard to overall reported fatigue status or strength/conditioning, limited by chronic worsening knee OA pain. She did show improvement in dynamic gait stability and balance.  Patient has been instructed in ongoing HEP and is being referred to ortho PT for knee pain management and possible  bracing trial.    Equipment Issued: none, owns SEC and open to purchasing trekking pole if needed    Discharge Plan: Patient to continue home program.  Other services: ortho PT.    Referring Provider:  Chandrika Menchaca*

## 2024-04-29 NOTE — PROGRESS NOTES
04/29/24 1600   Signing Clinician's Name / Credentials   Signing clinician's name / credentials James Alfaro DPT   Dynamic Gait Index (Alexx and [place], 1995)   Gait Level Surface 3   Change in Gait Speed 3   Gait and Horizontal Head Turns 2   Gait with Vertical Head Turns 2   Gait and Pivot Turns 3   Step Over Obstacle 3   Step Around Obstacles 3   Steps 2   Total Dynamic Gait Index Score  (A score of 19 or less has been correlated to an increased risk of falls in community dwelling older adults, patients with vestibular disorders, and patients with MS.)   Total Score (out of 24) 21     Dynamic Gait Index (DGI):The DGI is a measure of balance during gait that is reliable and valid for the elderly and individuals with Parkinson's disease, MS, vestibular disorders, or s/p stroke. Gait assistive device used: none     Patient score: 21/24  Scores ?19/24 indicate an increased risk for falls according to Raquel et al 2000  Minimal Detectable Change = 2.9 in community dwelling elderly according to Tarun et al 2011    Assessment (rationale for performing, application to patient s function & care plan): 21/24 score on the DGI indicates mild dynamic gait/balance impairment, although scoring above fall-risk threshold.  Primary factors contributing to mild dynamic balance impairment include deconditioning/weakness and chronic knee OA pain.  Patient advised in SEC or trekking pole to assist with offloading of knee/pain management as well as improved stability when navigating long distances or uneven terrain. Addressing ongoing deficits with HEP and referred to orthopedic PT services for knee pain management.  Minutes billed as physical performance test: 12

## 2024-05-01 ENCOUNTER — THERAPY VISIT (OUTPATIENT)
Dept: PHYSICAL THERAPY | Facility: CLINIC | Age: 69
End: 2024-05-01
Payer: MEDICARE

## 2024-05-01 DIAGNOSIS — G89.29 CHRONIC PAIN OF BOTH KNEES: ICD-10-CM

## 2024-05-01 DIAGNOSIS — M17.0 PRIMARY OSTEOARTHRITIS OF BOTH KNEES: Primary | ICD-10-CM

## 2024-05-01 DIAGNOSIS — M25.561 CHRONIC PAIN OF BOTH KNEES: ICD-10-CM

## 2024-05-01 DIAGNOSIS — R29.898 HIP WEAKNESS: ICD-10-CM

## 2024-05-01 DIAGNOSIS — M25.562 CHRONIC PAIN OF BOTH KNEES: ICD-10-CM

## 2024-05-01 PROCEDURE — 97110 THERAPEUTIC EXERCISES: CPT | Mod: GP | Performed by: PHYSICAL THERAPIST

## 2024-05-01 PROCEDURE — 97161 PT EVAL LOW COMPLEX 20 MIN: CPT | Mod: GP | Performed by: PHYSICAL THERAPIST

## 2024-05-01 PROCEDURE — 97530 THERAPEUTIC ACTIVITIES: CPT | Mod: GP | Performed by: PHYSICAL THERAPIST
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
## 2024-05-02 ENCOUNTER — DOCUMENTATION ONLY (OUTPATIENT)
Dept: FAMILY MEDICINE | Facility: CLINIC | Age: 69
End: 2024-05-02
Payer: MEDICARE

## 2024-05-02 DIAGNOSIS — G89.29 CHRONIC PAIN OF BOTH KNEES: Primary | ICD-10-CM

## 2024-05-02 DIAGNOSIS — M25.561 CHRONIC PAIN OF BOTH KNEES: Primary | ICD-10-CM

## 2024-05-02 DIAGNOSIS — M17.0 PRIMARY OSTEOARTHRITIS OF BOTH KNEES: ICD-10-CM

## 2024-05-02 DIAGNOSIS — M25.562 CHRONIC PAIN OF BOTH KNEES: Primary | ICD-10-CM

## 2024-05-02 DIAGNOSIS — R29.898 HIP WEAKNESS: ICD-10-CM

## 2024-05-05 NOTE — CONFIDENTIAL NOTE
Order for un- brace.      Blaise Thakur, DO  PGY3 Family Medicine Resident   [facility]/ [facility]    Department of Family Medicine and [facility]

## 2024-05-09 ENCOUNTER — THERAPY VISIT (OUTPATIENT)
Dept: PHYSICAL THERAPY | Facility: CLINIC | Age: 69
End: 2024-05-09
Payer: MEDICARE

## 2024-05-09 DIAGNOSIS — M25.562 CHRONIC PAIN OF BOTH KNEES: Primary | ICD-10-CM

## 2024-05-09 DIAGNOSIS — M17.0 PRIMARY OSTEOARTHRITIS OF BOTH KNEES: ICD-10-CM

## 2024-05-09 DIAGNOSIS — R29.898 HIP WEAKNESS: ICD-10-CM

## 2024-05-09 DIAGNOSIS — G89.29 CHRONIC PAIN OF BOTH KNEES: Primary | ICD-10-CM

## 2024-05-09 DIAGNOSIS — M25.561 CHRONIC PAIN OF BOTH KNEES: Primary | ICD-10-CM

## 2024-05-09 PROCEDURE — 97110 THERAPEUTIC EXERCISES: CPT | Mod: GP | Performed by: PHYSICAL THERAPIST

## 2024-05-15 ENCOUNTER — THERAPY VISIT (OUTPATIENT)
Dept: PHYSICAL THERAPY | Facility: CLINIC | Age: 69
End: 2024-05-15
Payer: MEDICARE

## 2024-05-15 DIAGNOSIS — M25.562 CHRONIC PAIN OF BOTH KNEES: Primary | ICD-10-CM

## 2024-05-15 DIAGNOSIS — R29.898 HIP WEAKNESS: ICD-10-CM

## 2024-05-15 DIAGNOSIS — G89.29 CHRONIC PAIN OF BOTH KNEES: Primary | ICD-10-CM

## 2024-05-15 DIAGNOSIS — M17.0 PRIMARY OSTEOARTHRITIS OF BOTH KNEES: ICD-10-CM

## 2024-05-15 DIAGNOSIS — M25.561 CHRONIC PAIN OF BOTH KNEES: Primary | ICD-10-CM

## 2024-05-15 PROCEDURE — 97530 THERAPEUTIC ACTIVITIES: CPT | Mod: GP | Performed by: PHYSICAL THERAPIST

## 2024-05-20 DIAGNOSIS — H40.003 GLAUCOMA SUSPECT OF BOTH EYES: ICD-10-CM

## 2024-05-20 RX ORDER — DORZOLAMIDE HYDROCHLORIDE AND TIMOLOL MALEATE PRESERVATIVE FREE 20; 5 MG/ML; MG/ML
1 SOLUTION/ DROPS OPHTHALMIC 2 TIMES DAILY
Qty: 60 EACH | Refills: 5 | Status: SHIPPED | OUTPATIENT
Start: 2024-05-20 | End: 2024-08-27 | Stop reason: ALTCHOICE

## 2024-05-24 ENCOUNTER — TELEPHONE (OUTPATIENT)
Dept: CARDIOLOGY | Facility: CLINIC | Age: 69
End: 2024-05-24
Payer: MEDICARE

## 2024-05-24 NOTE — TELEPHONE ENCOUNTER
5/24 Patient confirmed scheduled appointment:  Date: 10/3/2024  Time: 9:30 am  Visit type: Return Cardiology  Provider: Edwardo  Location: [facility] [place]  Testing/imaging: n/a  Additional notes: n/a

## 2024-06-13 ENCOUNTER — HOSPITAL ENCOUNTER (EMERGENCY)
Facility: CLINIC | Age: 69
Discharge: HOME | End: 2024-06-13
Attending: EMERGENCY MEDICINE | Admitting: EMERGENCY MEDICINE
Payer: MEDICARE

## 2024-06-13 VITALS
SYSTOLIC BLOOD PRESSURE: 149 MMHG | HEART RATE: 76 BPM | DIASTOLIC BLOOD PRESSURE: 65 MMHG | TEMPERATURE: 98.1 F | RESPIRATION RATE: 18 BRPM | OXYGEN SATURATION: 97 %

## 2024-06-13 DIAGNOSIS — R42 DIZZY SPELLS: ICD-10-CM

## 2024-06-13 LAB
ANION GAP SERPL CALCULATED.3IONS-SCNC: 8 MMOL/L (ref 7–15)
BASOPHILS # BLD AUTO: 0.1 10E3/UL (ref 0–0.2)
BASOPHILS NFR BLD AUTO: 1 %
BUN SERPL-MCNC: 14.3 MG/DL (ref 8–23)
CALCIUM SERPL-MCNC: 9.1 MG/DL (ref 8.8–10.2)
CHLORIDE SERPL-SCNC: 104 MMOL/L (ref 98–107)
CREAT SERPL-MCNC: 0.75 MG/DL (ref 0.51–0.95)
DEPRECATED HCO3 PLAS-SCNC: 27 MMOL/L (ref 22–29)
EGFRCR SERPLBLD CKD-EPI 2021: 86 ML/MIN/1.73M2
EOSINOPHIL # BLD AUTO: 0.2 10E3/UL (ref 0–0.7)
EOSINOPHIL NFR BLD AUTO: 3 %
ERYTHROCYTE [DISTWIDTH] IN BLOOD BY AUTOMATED COUNT: 12.7 % (ref 10–15)
GLUCOSE SERPL-MCNC: 107 MG/DL (ref 70–99)
HCT VFR BLD AUTO: 41 % (ref 35–47)
HGB BLD-MCNC: 13.9 G/DL (ref 11.7–15.7)
HOLD SPECIMEN: NORMAL
HOLD SPECIMEN: NORMAL
IMM GRANULOCYTES # BLD: 0 10E3/UL
IMM GRANULOCYTES NFR BLD: 0 %
LYMPHOCYTES # BLD AUTO: 1.7 10E3/UL (ref 0.8–5.3)
LYMPHOCYTES NFR BLD AUTO: 27 %
MCH RBC QN AUTO: 31.4 PG (ref 26.5–33)
MCHC RBC AUTO-ENTMCNC: 33.9 G/DL (ref 31.5–36.5)
MCV RBC AUTO: 93 FL (ref 78–100)
MONOCYTES # BLD AUTO: 0.5 10E3/UL (ref 0–1.3)
MONOCYTES NFR BLD AUTO: 8 %
NEUTROPHILS # BLD AUTO: 4 10E3/UL (ref 1.6–8.3)
NEUTROPHILS NFR BLD AUTO: 61 %
NRBC # BLD AUTO: 0 10E3/UL
NRBC BLD AUTO-RTO: 0 /100
PLATELET # BLD AUTO: 193 10E3/UL (ref 150–450)
POTASSIUM SERPL-SCNC: 3.6 MMOL/L (ref 3.4–5.3)
RBC # BLD AUTO: 4.42 10E6/UL (ref 3.8–5.2)
SODIUM SERPL-SCNC: 139 MMOL/L (ref 135–145)
WBC # BLD AUTO: 6.5 10E3/UL (ref 4–11)

## 2024-06-13 PROCEDURE — 36415 COLL VENOUS BLD VENIPUNCTURE: CPT | Performed by: EMERGENCY MEDICINE

## 2024-06-13 PROCEDURE — 85025 COMPLETE CBC W/AUTO DIFF WBC: CPT | Performed by: EMERGENCY MEDICINE

## 2024-06-13 PROCEDURE — 99283 EMERGENCY DEPT VISIT LOW MDM: CPT

## 2024-06-13 PROCEDURE — 80048 BASIC METABOLIC PNL TOTAL CA: CPT | Performed by: EMERGENCY MEDICINE

## 2024-06-13 RX ORDER — MECLIZINE HYDROCHLORIDE 25 MG/1
25 TABLET ORAL 3 TIMES DAILY PRN
Qty: 21 TABLET | Refills: 0 | Status: SHIPPED | OUTPATIENT
Start: 2024-06-13 | End: 2024-06-13

## 2024-06-13 RX ORDER — MECLIZINE HYDROCHLORIDE 25 MG/1
25 TABLET ORAL 3 TIMES DAILY PRN
Qty: 21 TABLET | Refills: 0 | Status: SHIPPED | OUTPATIENT
Start: 2024-06-13 | End: 2024-07-05

## 2024-06-13 ASSESSMENT — ACTIVITIES OF DAILY LIVING (ADL)
ADLS_ACUITY_SCORE: 35

## 2024-06-13 ASSESSMENT — COLUMBIA-SUICIDE SEVERITY RATING SCALE - C-SSRS
1. IN THE PAST MONTH, HAVE YOU WISHED YOU WERE DEAD OR WISHED YOU COULD GO TO SLEEP AND NOT WAKE UP?: NO
2. HAVE YOU ACTUALLY HAD ANY THOUGHTS OF KILLING YOURSELF IN THE PAST MONTH?: NO
6. HAVE YOU EVER DONE ANYTHING, STARTED TO DO ANYTHING, OR PREPARED TO DO ANYTHING TO END YOUR LIFE?: NO

## 2024-06-14 ENCOUNTER — TELEPHONE (OUTPATIENT)
Dept: FAMILY MEDICINE | Facility: CLINIC | Age: 69
End: 2024-06-14
Payer: MEDICARE

## 2024-06-14 NOTE — TELEPHONE ENCOUNTER
Patient was not admitted to the hospital and does not need a hospital follow-up. [place] message sent to patient offering sooner appointment. Will call patient if message has not been read.   Kate Church RN

## 2024-06-14 NOTE — TELEPHONE ENCOUNTER
[facility] phone call message- general phone call:    Reason for call: Patient was seen in ED 06/13 for dizziness and she was told that she should get an MRI done of her brain. She has hospital follow up scheduled with  on 07/09 but she wants to know if she can be seen sooner and if an order can be placed for MRI. Will schedule patient for MTM, looks like she has never seen MTM before but  did not have any openings for a 40 min visit and MTM visit will only be 30 mins as that is what's available.    Return call needed: Yes    OK to leave a message on voice mail? Yes    Primary language: English      needed? No    Call taken on June 14, 2024 at 12:36 PM by Jerri Jerez

## 2024-06-14 NOTE — ED TRIAGE NOTES
"Patient presents with intermittent episodes of dizziness and right arm tingling/numbness over the past few weeks.  Last Thursday she reports a breif episode of a \"ring shape\" in vision on right eye. She reprots similar episodes of \"ring shape\" in the past and eye doctor referred her to PCP for possible brain MRI, but due to other medical issues she has not been able to get the MRI yet.  Hx of breast cancer and aortic stenosis.     Triage Assessment (Adult)       Row Name 06/13/24 2010          Triage Assessment    Airway WDL WDL        Respiratory WDL    Respiratory WDL WDL        Skin Circulation/Temperature WDL    Skin Circulation/Temperature WDL WDL        Cardiac WDL    Cardiac WDL WDL        Peripheral/Neurovascular WDL    Peripheral Neurovascular WDL WDL        Cognitive/Neuro/Behavioral WDL    Cognitive/Neuro/Behavioral WDL WDL                     "

## 2024-06-14 NOTE — ED PROVIDER NOTES
"  Emergency Department Note      History of Present Illness     Chief Complaint  Dizziness    HPI  Vickie Alvarado is a 68 year old female with a history of hypertension and hyperlipidemia who presents to the ER for dizziness. Patient reports having episodes of dizziness over the past week that come without warning and last for only a few seconds. She describes a sense of motion and that these episodes feel like a \"whoosh\". She states that she becomes unsteady on her feet when these episodes happen and having no change in activity that would bring them on. Vickie recalls that last night, she was even lying down when one of these episodes occurred. She states that after an episode yesterday, she felt a little clammy, but denies feeling like she will fall. Patient also recounts having infrequent vision problems over the last 3 years. She describes a ring like pattern occasionally in her vision. She has a cornea specialist that has seen nothing and recommends an MRI after her new-onset dizziness. Vickie also reports a tingling and numbness in her left arm when sleeping last night that improved a few minutes after waking. She denies pain, shortness of breath, chest pain confusion, nausea, headache, or feeling like losing consciousness. Patient was recently put on glaucoma medicine, but other than that no change in medicine.      Independent Historian  None    Review of External Notes  None    Past Medical History   Medical History and Problem List  Aortic stenosis  Senile nuclear sclerosis  Tinnitus  Diverticulitis  Arthritis  Breast cancer (H)  Hyperlipidemia  Hypertension  Insufficiency fracture of tibia, sequela  Nonsenile cataract  Polyp of colon  Osteoarthritis      Medications  Femara  Toprol  Zocor  Cosopt    Surgical History   Biopsy node sentinel (L)  Cataract removal (B)  Reposition intraocular lens (R)  Intraocular lens implant (R)    Physical Exam   Patient Vitals for the past 24 hrs:   BP Temp Temp src " Pulse Resp SpO2   06/13/24 2007 (!) 149/65 98.1  F (36.7  C) Oral 76 18 97 %     Physical Exam  Gen: well appearing, in no acute distress  Oral : Mucous membranes moist,   Nose: No rhinorhea  Ears: External near normal, without drainage  Eyes: periorbital tissues and sclera normal   Neck: supple, no abnormal swelling  Lungs: Clear bilaterally, no tachypnea or distress, speaks full sentences  CV: Regular rate, regular rhythm. systolic murmur  Abd: soft, nontender, nondistended, no rebound/guarding  Skin: warm, dry, well perfused, no rashes/bruising/lesions on exposed skin  Neuro: alert, no gross motor or sensory deficits,   Psych: pleasant mood, normal affect      Diagnostics   Lab Results   Labs Ordered and Resulted from Time of ED Arrival to Time of ED Departure   BASIC METABOLIC PANEL - Abnormal       Result Value    Sodium 139      Potassium 3.6      Chloride 104      Carbon Dioxide (CO2) 27      Anion Gap 8      Urea Nitrogen 14.3      Creatinine 0.75      GFR Estimate 86      Calcium 9.1      Glucose 107 (*)    CBC WITH PLATELETS AND DIFFERENTIAL    WBC Count 6.5      RBC Count 4.42      Hemoglobin 13.9      Hematocrit 41.0      MCV 93      MCH 31.4      MCHC 33.9      RDW 12.7      Platelet Count 193      % Neutrophils 61      % Lymphocytes 27      % Monocytes 8      % Eosinophils 3      % Basophils 1      % Immature Granulocytes 0      NRBCs per 100 WBC 0      Absolute Neutrophils 4.0      Absolute Lymphocytes 1.7      Absolute Monocytes 0.5      Absolute Eosinophils 0.2      Absolute Basophils 0.1      Absolute Immature Granulocytes 0.0      Absolute NRBCs 0.0         Imaging  No orders to display       Independent Interpretation  None    ED Course    Medications Administered  Medications - No data to display      Discussion of Management  None    Social Determinants of Health adding to complexity of care  None    ED Course  ED Course as of 06/13/24 2306   Thu Jun 13, 2024 2206 I obtained the history and  examined the patient as noted above.      Medical Decision Making / Diagnosis   CMS Diagnoses: None    MIPS  None    MDM  Vickie Alvarado is a 68 year old female presents emergency department with concerns over a couple episodes of very short-lived vertiginous symptoms.  They last just a second or 2 kind of hit her suddenly and then go away suddenly.  She had several episodes in the last week or 2.  She has chronic episodes where she gets what sounds like ocular migraines, it has been going on for couple years and she has been seen by ophthalmologist for that.  Additionally had a couple episodes where her left arm fell asleep when she woke up but then got tingly and normalized over several minutes which sound very consistent with peripheral nerve irritation from impingement from sleep.  I have very low suspicion for acute stroke at this time.  Patient was worried that she needed an MRI we discussed getting 1 done in the emergency department and her decision was to follow-up with PCP and discuss having 1 done as an outpatient.  I do have low suspicion for acute ischemic stroke at this time I think her symptoms are more representative of paroxysmal vertigo will offer a trial of meclizine to see if that helps with her symptoms    Disposition  The patient was discharged.     ICD-10 Codes:    ICD-10-CM    1. Dizzy spells  R42            Discharge Medications  Discharge Medication List as of 6/13/2024 10:22 PM        START taking these medications    Details   meclizine (ANTIVERT) 25 MG tablet Take 1 tablet (25 mg) by mouth 3 times daily as needed for dizziness, Disp-21 tablet, R-0, Local Print               Scribe Disclosure:  IEdin, am serving as a scribe  for Juancarlos Gonzales at 10:55 PM on 6/13/2024  I, Juancarlos Gonzales, am serving as a scribe at 10:55 PM on 6/13/2024 to document services personally performed by José Miguel Mayfield MD based on my observations and the provider's statements to me.         José Miguel Mayfield MD  06/14/24 0002

## 2024-06-27 ENCOUNTER — OFFICE VISIT (OUTPATIENT)
Dept: FAMILY MEDICINE | Facility: CLINIC | Age: 69
End: 2024-06-27
Payer: MEDICARE

## 2024-06-27 VITALS
DIASTOLIC BLOOD PRESSURE: 81 MMHG | BODY MASS INDEX: 30.73 KG/M2 | WEIGHT: 191.2 LBS | TEMPERATURE: 98.2 F | HEIGHT: 66 IN | HEART RATE: 85 BPM | RESPIRATION RATE: 16 BRPM | OXYGEN SATURATION: 96 % | SYSTOLIC BLOOD PRESSURE: 121 MMHG

## 2024-06-27 DIAGNOSIS — R53.83 FATIGUE, UNSPECIFIED TYPE: ICD-10-CM

## 2024-06-27 DIAGNOSIS — R53.0 NEOPLASTIC MALIGNANT RELATED FATIGUE: ICD-10-CM

## 2024-06-27 DIAGNOSIS — R42 DIZZINESS: Primary | ICD-10-CM

## 2024-06-27 DIAGNOSIS — H57.9 EYE SYMPTOMS: ICD-10-CM

## 2024-06-27 DIAGNOSIS — H53.40 VISUAL FIELD DEFECT: ICD-10-CM

## 2024-06-27 DIAGNOSIS — R51.9 PRESSURE IN HEAD: ICD-10-CM

## 2024-06-27 DIAGNOSIS — Z85.3 PERSONAL HISTORY OF MALIGNANT NEOPLASM OF BREAST: ICD-10-CM

## 2024-06-27 DIAGNOSIS — I35.0 AORTIC VALVE STENOSIS, ETIOLOGY OF CARDIAC VALVE DISEASE UNSPECIFIED: ICD-10-CM

## 2024-06-27 LAB — TSH SERPL DL<=0.005 MIU/L-ACNC: 1.61 UIU/ML (ref 0.3–4.2)

## 2024-06-27 PROCEDURE — 99214 OFFICE O/P EST MOD 30 MIN: CPT | Performed by: FAMILY MEDICINE

## 2024-06-27 PROCEDURE — 84443 ASSAY THYROID STIM HORMONE: CPT | Performed by: FAMILY MEDICINE

## 2024-06-27 PROCEDURE — G2211 COMPLEX E/M VISIT ADD ON: HCPCS | Performed by: FAMILY MEDICINE

## 2024-06-27 PROCEDURE — 36415 COLL VENOUS BLD VENIPUNCTURE: CPT | Performed by: FAMILY MEDICINE

## 2024-06-27 NOTE — PROGRESS NOTES
"  Assessment & Plan     Dizziness?  Pressure in head  Visual field defect/Eye symptoms  Personal history of malignant neoplasm of breast  - Patient reports episodes of sensation of \"whooshing\" and pressure across the top/front of her head. Symptoms are difficult for her to describe, but they are increasing in frequency (now daily). She believes the whooshing episodes first became apparent several weeks ago.   - She has had episodes of a bilateral visual deficit that last about 10 minutes at a time; those started 3 years ago but have become more frequent in last few weeks. She is established with Ophthalmology and is being treated for cataracts and glaucoma. Marni confirms that they are aware of these the episodic visual deficits and says that no ocular etiology has been found.   - DDX includes undiagnosed CVA, mass (metastatic breast Ca vs other), demyelinating disease, ocular migraines, other.   - MR Brain w/o Contrast; Future    Severe AS  - denies typical symptoms. ?related    Fatigue  Fatigue symptoms ongoing since cancer treatment. Has seen PT for exercise rehab. Thyroid function has been normal in past, last checked in 2022. Family history of thyroid disease in mother and brother who had goiters.   - TSH with free T4 reflex    Follow-up post imaging.     The longitudinal plan of care for the diagnosis(es)/condition(s) as documented were addressed during this visit. Due to the added complexity in care, I will continue to support Marni in the subsequent management and with ongoing continuity of care.        Subjective   Marni is a 68 year old, presenting for the following health issues:  RECHECK (Follow up from ED, feels like her ears has a swimming. ED requested an MRI, but the wait was 6 hours. Would like a referral)      6/27/2024    10:06 AM   Additional Questions   Roomed by Lena   Accompanied by self         6/27/2024    Information    services provided? No        HPI     ED/UC " "Followup:  Facility:  [facility]  Date of visit: 6/13/24  Reason for visit: Dizziness  Current Status: same      \"Dizziness\" episodes happening daily now - but says it's not really dizziness. No sensation of unbalance or room spinning. No nausea. . She thinks this all started about 3 years but is getting worse. Blocked half ring in her B vision was the first presenting symptom 3 years ago. The sensation symptoms of sense of motion and \"whoosh\" are the new addition. Ophth has not seen concerning signs in eye that point to ocular cause. She is really good about drinking water with light colored urine and normal BMP in ED.  ER note states that she becomes unsteady on feet but she says that is not accurate. She doesn't feel like she is going to fall. Left ER with meclizine prescription, used it one time but it just made her very tired. Had vertigo assessment with PT in March which was negative.     Has struggled with fatigue throughout cancer and deconditioning due to not being able to move as much. Has done rehab PT.  Feels that fatigue is chronic and multifactoral.    Review of Systems   Constitutional:  Negative for activity change, appetite change, chills, diaphoresis and fever.        Wt Readings from Last 4 Encounters:  06/27/24 : 86.7 kg (191 lb 3.2 oz)  04/16/24 : 84.4 kg (186 lb)  04/09/24 : 83.1 kg (183 lb 3.2 oz)  03/04/24 : 83.9 kg (185 lb)      HENT:  Negative for congestion, dental problem, ear pain, facial swelling, hearing loss, postnasal drip, rhinorrhea, sinus pressure, sinus pain, sneezing, sore throat, tinnitus and trouble swallowing.    Eyes:  Negative for photophobia, pain, discharge, redness and itching.   Respiratory:  Negative for cough, chest tightness and shortness of breath.    Cardiovascular:  Negative for chest pain, palpitations and leg swelling.   Gastrointestinal:  Negative for diarrhea, nausea and vomiting.   Endocrine: Negative.    Genitourinary: Negative.    Musculoskeletal:         Chronic " "knee pain. No acute arthralgias or myalgias   Allergic/Immunologic: Negative for immunocompromised state.   Neurological:  Negative for tremors, seizures, syncope, facial asymmetry, speech difficulty, weakness, numbness and headaches.   Hematological:  Negative for adenopathy. Does not bruise/bleed easily.   Psychiatric/Behavioral: Negative.              Objective    /81   Pulse 85   Temp 98.2  F (36.8  C) (Oral)   Resp 16   Ht 1.676 m (5' 6\")   Wt 86.7 kg (191 lb 3.2 oz)   LMP 05/17/2011   SpO2 96%   BMI 30.86 kg/m    Body mass index is 30.86 kg/m .    Physical Exam  Constitutional:       General: She is not in acute distress.  HENT:      Mouth/Throat:      Mouth: No oral lesions.      Tongue: No lesions. Tongue does not deviate from midline.      Pharynx: Oropharynx is clear. Uvula midline.   Cardiovascular:      Rate and Rhythm: Normal rate and regular rhythm.      Heart sounds: Murmur (known murmur of aortic stenosis) heard.   Pulmonary:      Effort: Pulmonary effort is normal. No respiratory distress.      Breath sounds: No wheezing.   Neurological:      Mental Status: She is alert and oriented to person, place, and time. Mental status is at baseline.      Cranial Nerves: Cranial nerves 2-12 are intact. No dysarthria.      Sensory: Sensation is intact.      Motor: No pronator drift.      Coordination: Coordination is intact. Romberg sign negative. Finger-Nose-Finger Test normal.      Gait: Gait is intact.      Deep Tendon Reflexes: Reflexes are normal and symmetric. Babinski sign absent on the right side. Babinski sign absent on the left side.      Reflex Scores:       Patellar reflexes are 2+ on the right side and 2+ on the left side.       Achilles reflexes are 2+ on the right side and 2+ on the left side.  Psychiatric:         Mood and Affect: Mood normal.         Thought Content: Thought content normal.            Admission on 06/13/2024, Discharged on 06/13/2024   Component Date Value Ref " Range Status    Sodium 06/13/2024 139  135 - 145 mmol/L Final    Reference intervals for this test were updated on 09/26/2023 to more accurately reflect our healthy population. There may be differences in the flagging of prior results with similar values performed with this method. Interpretation of those prior results can be made in the context of the updated reference intervals.     Potassium 06/13/2024 3.6  3.4 - 5.3 mmol/L Final    Chloride 06/13/2024 104  98 - 107 mmol/L Final    Carbon Dioxide (CO2) 06/13/2024 27  22 - 29 mmol/L Final    Anion Gap 06/13/2024 8  7 - 15 mmol/L Final    Urea Nitrogen 06/13/2024 14.3  8.0 - 23.0 mg/dL Final    Creatinine 06/13/2024 0.75  0.51 - 0.95 mg/dL Final    GFR Estimate 06/13/2024 86  >60 mL/min/1.73m2 Final    eGFR calculated using 2021 CKD-EPI equation.    Calcium 06/13/2024 9.1  8.8 - 10.2 mg/dL Final    Glucose 06/13/2024 107 (H)  70 - 99 mg/dL Final    WBC Count 06/13/2024 6.5  4.0 - 11.0 10e3/uL Final    RBC Count 06/13/2024 4.42  3.80 - 5.20 10e6/uL Final    Hemoglobin 06/13/2024 13.9  11.7 - 15.7 g/dL Final    Hematocrit 06/13/2024 41.0  35.0 - 47.0 % Final    MCV 06/13/2024 93  78 - 100 fL Final    MCH 06/13/2024 31.4  26.5 - 33.0 pg Final    MCHC 06/13/2024 33.9  31.5 - 36.5 g/dL Final    RDW 06/13/2024 12.7  10.0 - 15.0 % Final    Platelet Count 06/13/2024 193  150 - 450 10e3/uL Final    % Neutrophils 06/13/2024 61  % Final    % Lymphocytes 06/13/2024 27  % Final    % Monocytes 06/13/2024 8  % Final    % Eosinophils 06/13/2024 3  % Final    % Basophils 06/13/2024 1  % Final    % Immature Granulocytes 06/13/2024 0  % Final    NRBCs per 100 WBC 06/13/2024 0  <1 /100 Final    Absolute Neutrophils 06/13/2024 4.0  1.6 - 8.3 10e3/uL Final    Absolute Lymphocytes 06/13/2024 1.7  0.8 - 5.3 10e3/uL Final    Absolute Monocytes 06/13/2024 0.5  0.0 - 1.3 10e3/uL Final    Absolute Eosinophils 06/13/2024 0.2  0.0 - 0.7 10e3/uL Final    Absolute Basophils 06/13/2024 0.1  0.0 -  0.2 10e3/uL Final    Absolute Immature Granulocytes 06/13/2024 0.0  <=0.4 10e3/uL Final    Absolute NRBCs 06/13/2024 0.0  10e3/uL Final    Hold Specimen 06/13/2024 JIC   Final    Hold Specimen 06/13/2024 JI   Final     Adri Eaton MS3    I was present with the medical student who participated in the service and in the documentation of this note. I have verified the history and personally performed the physical exam and medical decision making, and have verified the content of the note, which accurately reflects my assessment of the patient and the plan of care.      Signed Electronically by: Gaby Lynn MD

## 2024-06-30 ASSESSMENT — ENCOUNTER SYMPTOMS
NAUSEA: 0
ADENOPATHY: 0
EYE REDNESS: 0
FACIAL ASYMMETRY: 0
PSYCHIATRIC NEGATIVE: 1
FACIAL SWELLING: 0
NUMBNESS: 0
TREMORS: 0
DIARRHEA: 0
PALPITATIONS: 0
RHINORRHEA: 0
ENDOCRINE NEGATIVE: 1
VOMITING: 0
CHEST TIGHTNESS: 0
PHOTOPHOBIA: 0
FEVER: 0
SPEECH DIFFICULTY: 0
EYE DISCHARGE: 0
SORE THROAT: 0
BRUISES/BLEEDS EASILY: 0
CHILLS: 0
TROUBLE SWALLOWING: 0
SINUS PAIN: 0
COUGH: 0
SHORTNESS OF BREATH: 0
SINUS PRESSURE: 0
EYE ITCHING: 0
HEADACHES: 0
DIAPHORESIS: 0
ACTIVITY CHANGE: 0
EYE PAIN: 0
WEAKNESS: 0
SEIZURES: 0
APPETITE CHANGE: 0

## 2024-07-03 DIAGNOSIS — M25.561 CHRONIC PAIN OF BOTH KNEES: Primary | ICD-10-CM

## 2024-07-03 DIAGNOSIS — G89.29 CHRONIC PAIN OF BOTH KNEES: Primary | ICD-10-CM

## 2024-07-03 DIAGNOSIS — M25.562 CHRONIC PAIN OF BOTH KNEES: Primary | ICD-10-CM

## 2024-07-05 ENCOUNTER — HOSPITAL ENCOUNTER (OUTPATIENT)
Facility: CLINIC | Age: 69
Setting detail: OBSERVATION
Discharge: HOME | End: 2024-07-05
Attending: EMERGENCY MEDICINE | Admitting: STUDENT IN AN ORGANIZED HEALTH CARE EDUCATION/TRAINING PROGRAM
Payer: MEDICARE

## 2024-07-05 ENCOUNTER — APPOINTMENT (OUTPATIENT)
Dept: CT IMAGING | Facility: CLINIC | Age: 69
End: 2024-07-05
Attending: EMERGENCY MEDICINE
Payer: MEDICARE

## 2024-07-05 ENCOUNTER — APPOINTMENT (OUTPATIENT)
Dept: CARDIOLOGY | Facility: CLINIC | Age: 69
End: 2024-07-05
Attending: INTERNAL MEDICINE
Payer: MEDICARE

## 2024-07-05 VITALS
HEART RATE: 61 BPM | RESPIRATION RATE: 16 BRPM | DIASTOLIC BLOOD PRESSURE: 77 MMHG | SYSTOLIC BLOOD PRESSURE: 135 MMHG | OXYGEN SATURATION: 95 % | TEMPERATURE: 97.7 F

## 2024-07-05 DIAGNOSIS — I35.0 SEVERE AORTIC STENOSIS: ICD-10-CM

## 2024-07-05 DIAGNOSIS — R07.9 CHEST PAIN, UNSPECIFIED TYPE: Primary | ICD-10-CM

## 2024-07-05 LAB
ALBUMIN SERPL BCG-MCNC: 3.8 G/DL (ref 3.5–5.2)
ALP SERPL-CCNC: 61 U/L (ref 40–150)
ALT SERPL W P-5'-P-CCNC: 9 U/L (ref 0–50)
ANION GAP SERPL CALCULATED.3IONS-SCNC: 10 MMOL/L (ref 7–15)
AST SERPL W P-5'-P-CCNC: 23 U/L (ref 0–45)
ATRIAL RATE - MUSE: 71 BPM
BASOPHILS # BLD AUTO: 0 10E3/UL (ref 0–0.2)
BASOPHILS NFR BLD AUTO: 1 %
BILIRUB SERPL-MCNC: 0.5 MG/DL
BUN SERPL-MCNC: 13.5 MG/DL (ref 8–23)
CALCIUM SERPL-MCNC: 9.2 MG/DL (ref 8.8–10.2)
CHLORIDE SERPL-SCNC: 105 MMOL/L (ref 98–107)
CREAT SERPL-MCNC: 0.66 MG/DL (ref 0.51–0.95)
DEPRECATED HCO3 PLAS-SCNC: 26 MMOL/L (ref 22–29)
DIASTOLIC BLOOD PRESSURE - MUSE: NORMAL MMHG
EGFRCR SERPLBLD CKD-EPI 2021: >90 ML/MIN/1.73M2
EOSINOPHIL # BLD AUTO: 0.2 10E3/UL (ref 0–0.7)
EOSINOPHIL NFR BLD AUTO: 3 %
ERYTHROCYTE [DISTWIDTH] IN BLOOD BY AUTOMATED COUNT: 12.6 % (ref 10–15)
GLUCOSE SERPL-MCNC: 95 MG/DL (ref 70–99)
HCT VFR BLD AUTO: 38.7 % (ref 35–47)
HGB BLD-MCNC: 13.5 G/DL (ref 11.7–15.7)
IMM GRANULOCYTES # BLD: 0 10E3/UL
IMM GRANULOCYTES NFR BLD: 0 %
INTERPRETATION ECG - MUSE: NORMAL
LVEF ECHO: NORMAL
LYMPHOCYTES # BLD AUTO: 0.9 10E3/UL (ref 0.8–5.3)
LYMPHOCYTES NFR BLD AUTO: 16 %
MCH RBC QN AUTO: 32.5 PG (ref 26.5–33)
MCHC RBC AUTO-ENTMCNC: 34.9 G/DL (ref 31.5–36.5)
MCV RBC AUTO: 93 FL (ref 78–100)
MONOCYTES # BLD AUTO: 0.5 10E3/UL (ref 0–1.3)
MONOCYTES NFR BLD AUTO: 9 %
NEUTROPHILS # BLD AUTO: 3.8 10E3/UL (ref 1.6–8.3)
NEUTROPHILS NFR BLD AUTO: 70 %
NRBC # BLD AUTO: 0 10E3/UL
NRBC BLD AUTO-RTO: 0 /100
P AXIS - MUSE: 22 DEGREES
PLATELET # BLD AUTO: 172 10E3/UL (ref 150–450)
POTASSIUM SERPL-SCNC: 4.2 MMOL/L (ref 3.4–5.3)
PR INTERVAL - MUSE: 142 MS
PROT SERPL-MCNC: 6.6 G/DL (ref 6.4–8.3)
QRS DURATION - MUSE: 74 MS
QT - MUSE: 388 MS
QTC - MUSE: 421 MS
R AXIS - MUSE: -23 DEGREES
RBC # BLD AUTO: 4.15 10E6/UL (ref 3.8–5.2)
SODIUM SERPL-SCNC: 141 MMOL/L (ref 135–145)
SYSTOLIC BLOOD PRESSURE - MUSE: NORMAL MMHG
T AXIS - MUSE: 12 DEGREES
TROPONIN T SERPL HS-MCNC: 6 NG/L
TROPONIN T SERPL HS-MCNC: <6 NG/L
VENTRICULAR RATE- MUSE: 71 BPM
WBC # BLD AUTO: 5.4 10E3/UL (ref 4–11)

## 2024-07-05 PROCEDURE — G0378 HOSPITAL OBSERVATION PER HR: HCPCS

## 2024-07-05 PROCEDURE — 250N000009 HC RX 250: Performed by: EMERGENCY MEDICINE

## 2024-07-05 PROCEDURE — 93306 TTE W/DOPPLER COMPLETE: CPT | Mod: 26 | Performed by: INTERNAL MEDICINE

## 2024-07-05 PROCEDURE — 250N000011 HC RX IP 250 OP 636: Performed by: EMERGENCY MEDICINE

## 2024-07-05 PROCEDURE — 99205 OFFICE O/P NEW HI 60 MIN: CPT | Performed by: INTERNAL MEDICINE

## 2024-07-05 PROCEDURE — 85004 AUTOMATED DIFF WBC COUNT: CPT | Performed by: EMERGENCY MEDICINE

## 2024-07-05 PROCEDURE — 36415 COLL VENOUS BLD VENIPUNCTURE: CPT | Performed by: EMERGENCY MEDICINE

## 2024-07-05 PROCEDURE — 84484 ASSAY OF TROPONIN QUANT: CPT | Performed by: EMERGENCY MEDICINE

## 2024-07-05 PROCEDURE — 93306 TTE W/DOPPLER COMPLETE: CPT

## 2024-07-05 PROCEDURE — 80053 COMPREHEN METABOLIC PANEL: CPT | Performed by: EMERGENCY MEDICINE

## 2024-07-05 PROCEDURE — 99285 EMERGENCY DEPT VISIT HI MDM: CPT | Mod: 25

## 2024-07-05 PROCEDURE — 93005 ELECTROCARDIOGRAM TRACING: CPT

## 2024-07-05 PROCEDURE — 250N000013 HC RX MED GY IP 250 OP 250 PS 637: Performed by: EMERGENCY MEDICINE

## 2024-07-05 PROCEDURE — 71260 CT THORAX DX C+: CPT | Mod: MG

## 2024-07-05 PROCEDURE — 99223 1ST HOSP IP/OBS HIGH 75: CPT | Mod: A1 | Performed by: STUDENT IN AN ORGANIZED HEALTH CARE EDUCATION/TRAINING PROGRAM

## 2024-07-05 RX ORDER — DORZOLAMIDE HYDROCHLORIDE AND TIMOLOL MALEATE 20; 5 MG/ML; MG/ML
1 SOLUTION/ DROPS OPHTHALMIC 2 TIMES DAILY
Status: DISCONTINUED | OUTPATIENT
Start: 2024-07-05 | End: 2024-07-05 | Stop reason: HOSPADM

## 2024-07-05 RX ORDER — ASPIRIN 81 MG/1
81 TABLET ORAL DAILY
Status: DISCONTINUED | OUTPATIENT
Start: 2024-07-06 | End: 2024-07-05 | Stop reason: HOSPADM

## 2024-07-05 RX ORDER — IBUPROFEN 200 MG
1 CAPSULE ORAL EVERY EVENING
COMMUNITY

## 2024-07-05 RX ORDER — METOPROLOL SUCCINATE 25 MG/1
25 TABLET, EXTENDED RELEASE ORAL EVERY EVENING
Status: DISCONTINUED | OUTPATIENT
Start: 2024-07-05 | End: 2024-07-05 | Stop reason: HOSPADM

## 2024-07-05 RX ORDER — ACETAMINOPHEN 650 MG/1
650 SUPPOSITORY RECTAL EVERY 4 HOURS PRN
Status: DISCONTINUED | OUTPATIENT
Start: 2024-07-05 | End: 2024-07-05 | Stop reason: HOSPADM

## 2024-07-05 RX ORDER — IOPAMIDOL 755 MG/ML
72 INJECTION, SOLUTION INTRAVASCULAR ONCE
Status: DISCONTINUED | OUTPATIENT
Start: 2024-07-05 | End: 2024-07-05

## 2024-07-05 RX ORDER — ACETAMINOPHEN 325 MG/1
650 TABLET ORAL EVERY 4 HOURS PRN
Status: DISCONTINUED | OUTPATIENT
Start: 2024-07-05 | End: 2024-07-05 | Stop reason: HOSPADM

## 2024-07-05 RX ORDER — SIMVASTATIN 40 MG
40 TABLET ORAL AT BEDTIME
Status: DISCONTINUED | OUTPATIENT
Start: 2024-07-05 | End: 2024-07-05 | Stop reason: HOSPADM

## 2024-07-05 RX ORDER — NITROGLYCERIN 0.4 MG/1
0.4 TABLET SUBLINGUAL EVERY 5 MIN PRN
Status: DISCONTINUED | OUTPATIENT
Start: 2024-07-05 | End: 2024-07-05 | Stop reason: HOSPADM

## 2024-07-05 RX ORDER — ASPIRIN 325 MG
325 TABLET ORAL ONCE
Status: DISCONTINUED | OUTPATIENT
Start: 2024-07-05 | End: 2024-07-05

## 2024-07-05 RX ORDER — MAGNESIUM HYDROXIDE/ALUMINUM HYDROXICE/SIMETHICONE 120; 1200; 1200 MG/30ML; MG/30ML; MG/30ML
30 SUSPENSION ORAL EVERY 4 HOURS PRN
Status: DISCONTINUED | OUTPATIENT
Start: 2024-07-05 | End: 2024-07-05 | Stop reason: HOSPADM

## 2024-07-05 RX ADMIN — IOPAMIDOL 72 ML: 755 INJECTION, SOLUTION INTRAVENOUS at 11:34

## 2024-07-05 RX ADMIN — SODIUM CHLORIDE 80 ML: 9 INJECTION, SOLUTION INTRAVENOUS at 11:34

## 2024-07-05 RX ADMIN — ASPIRIN 325 MG ORAL TABLET 325 MG: 325 PILL ORAL at 10:24

## 2024-07-05 ASSESSMENT — ACTIVITIES OF DAILY LIVING (ADL)
ADLS_ACUITY_SCORE: 35

## 2024-07-05 NOTE — PHARMACY-ADMISSION MEDICATION HISTORY
Pharmacist Admission Medication History    Admission medication history is complete. The information provided in this note is only as accurate as the sources available at the time of the update.    Information Source(s): Patient and CareEverywhere/SureScripts via in-person    Pertinent Information: none    Changes made to PTA medication list:  Added: calcium  Deleted: meclizine  Changed:   Calcium/vitD BID --> daily  Lactobacillus daily --> BID  Toprol-XL 25mg daily --> every evening at 7pm    Allergies reviewed with patient and updates made in EHR: yes    Medication History Completed By: Lizbet Ramírez RPH 7/5/2024 12:51 PM    Prior to Admission medications    Medication Sig Last Dose Taking? Auth Provider Long Term End Date   calcium 600 MG tablet Take 1 tablet by mouth every evening 7/4/2024 at PM Yes Unknown, Entered By History     Calcium Carb-Cholecalciferol 600-25 MG-MCG CAPS Take 1 capsule by mouth every morning 7/5/2024 at AM Yes Gaby Lynn MD     dorzolamide-timolol PF (COSOPT PF) 2-0.5 % opthalmic solutionh Place 1 drop into both eyes 2 times daily 7/5/2024 at AM Yes Hector Choi MD     Lactobacillus (PROBIOTIC CHILDRENS) CHEW Take 1 chew tab by mouth 2 times daily 7/5/2024 at AM Yes Gaby Lynn MD     letrozole (FEMARA) 2.5 MG tablet Take 1 tablet (2.5 mg) by mouth daily 7/5/2024 at AM Yes Chandrika Horvath MD Yes    loratadine (CLARITIN) 10 MG tablet Take 10 mg by mouth daily as needed for allergies Seasonal for ragweed and lilacs  at PRN Yes Reported, Patient     metoprolol succinate ER (TOPROL XL) 25 MG 24 hr tablet Take 1 tablet (25 mg) by mouth daily  Patient taking differently: Take 25 mg by mouth every evening 7PM 7/4/2024 at PM Yes Nicole Brooke MD Yes    Omega-3 Fatty Acids (FISH OIL ADULT GUMMIES PO) Take 250 mg by mouth daily 7/5/2024 at AM Yes Reported, Patient     simvastatin (ZOCOR) 40 MG tablet Take 1 tablet (40 mg) by mouth at bedtime 7/4/2024 at HS Yes Leah  Gaby ESPINAL MD Yes    Vitamin D3 (CHOLECALCIFEROL) 25 mcg (1000 units) tablet Take 50 mcg by mouth every evening 7/4/2024 at PM Yes Reported, Patient

## 2024-07-05 NOTE — ED NOTES
[facility]  ED Nurse Handoff Report    ED Chief complaint: Chest Pain and Back Pain      ED Diagnosis:   Final diagnoses:   Chest pain, unspecified type       Code Status: not addressed at this time    Allergies:   Allergies   Allergen Reactions    Bactrim [Sulfamethoxazole-Trimethoprim] Nausea     Headache    Seasonal Allergies     Typhoid Vaccines Nausea and Vomiting     Fever, vice- h/a, body aches.  Was hospitalized for 2d.       Patient Story: Pt c/o chest pain intermittently - very mild above her heart . Pt then stated that this morning she has upper bilateral shoulder pains.     Focused Assessment:  patient states has been having some mild pain for a while but today she felt it I her shoulders.    Treatments and/or interventions provided: IV, labs, CT scan, ASA   Patient's response to treatments and/or interventions: No change    To be done/followed up on inpatient unit:  Continue to monitor    Does this patient have any cognitive concerns?:  A/Ox4    Activity level - Baseline/Home:  Independent  Activity Level - Current:   Stand with Assist    Patient's Preferred language: English   Needed?: No    Isolation: None  Infection: Not Applicable  Patient tested for COVID 19 prior to admission: NO  Bariatric?: No    Vital Signs:   Vitals:    07/05/24 1045 07/05/24 1100 07/05/24 1115 07/05/24 1230   BP: 118/63 113/60 104/57 139/75   BP Location:       Pulse: 63 53 54 63   Resp: 11 12 12 13   Temp:       TempSrc:       SpO2: 96% 95% 95%        Cardiac Rhythm:     Was the PSS-3 completed:   Yes  What interventions are required if any?               Family Comments: NA  OBS brochure/video discussed/provided to patient/family: N/A              Name of person given brochure if not patient: NA              Relationship to patient: NA    For the majority of the shift this patient's behavior was Green.   Behavioral interventions performed were None.    ED NURSE PHONE NUMBER: [phone]    2

## 2024-07-05 NOTE — PROGRESS NOTES
RECEIVING UNIT ED HANDOFF REVIEW    ED Nurse Handoff Report was reviewed by: Cielo Newman RN on July 5, 2024 at 3:07 PM

## 2024-07-05 NOTE — ED PROVIDER NOTES
"  Emergency Department Note      History of Present Illness     Chief Complaint   Chest Pain and Back Pain      HPI   Vickie Alvarado is a 68 year old female with a history of hyperlipidemia, severe aortic stenosis, and osteoporosis who presents to the ED for evaluation of chest pain and back pain. The patient states she started to feel a \"tweaking\" pain in her left chest since 1200 yesterday that has been coming and going since. This morning she also became diaphoretic, fatigued, and had an achy pain between her shoulder blades and left arm paraesthesia. States the diaphoresis has resolved. Endorses some nausea. States nothing makes the pain better or worse. Notes a seroma from a left breast lumpectomy in the same area she is feeling the chest pain and thought it may be related. Also notes a history of asymptomatic severe aortic stenosis. States she was recently diagnosed with a migraine with aura due to symptoms of dizziness, no headache, with an upcoming MRI on 7/19/24. Reports a family history of MI before the age of 65 in multiple family members on her paternal side. Denies current dizziness. Denies sharp or tearing pain. Denies shortness of breath, lightheadedness, dizziness, vomiting, abdominal pain, dysuria, hematuria, black or bloody stool, headache, or new lower extremity edema. Denies history of hypertension, smoking, diabetes, or the need for a stress test, calcium test, or coronary artery survey.    Independent Historian   None    Review of External Notes   I reviewed the echocardiogram from 11/13/23 and office visit note from 6/27/24.    Past Medical History     Medical History and Problem List   HLD  Osteoporosis  Aortic stenosis  Nuclear sclerosis  Astigmatism  Myopia  Presbyopia  Diplopia  Tinnitus  Diverticulitis  Breast cancer (L)  Osteopenia  OA, knees (B)    Medications   Femara  Antivert  Toprol  Simvastatin    Surgical History   Cataract IOL  Laser capsulotomy  Laser vitreolysis  Breast " lumpectomy (L)  Intraocular lens repositioning    Physical Exam     Patient Vitals for the past 24 hrs:   BP Temp Temp src Pulse Resp SpO2   07/05/24 1526 135/77 97.7  F (36.5  C) Oral 61 16 95 %   07/05/24 1230 139/75 -- -- 63 13 --   07/05/24 1115 104/57 -- -- 54 12 95 %   07/05/24 1100 113/60 -- -- 53 12 95 %   07/05/24 1045 118/63 -- -- 63 11 96 %   07/05/24 1030 126/66 -- -- 66 13 95 %   07/05/24 0954 128/74 98  F (36.7  C) Oral 71 14 97 %     Physical Exam  Constitutional:       General: Not in acute distress.     Appearance: Normal appearance.   HENT:      Head: Normocephalic and atraumatic.   Eyes:      Extraocular Movements: Extraocular movements intact.      Conjunctiva/sclera: Conjunctivae normal.   Cardiovascular:      Rate and Rhythm: Regular rate and rhythm.  Pulmonary:      Effort: Pulmonary effort is normal. No respiratory distress.      Breath sounds: Normal breath sounds.  Abdominal:      General: Abdomen is flat. There is no distension.      Palpations: Abdomen is soft.      Tenderness: There is no abdominal tenderness.   Musculoskeletal:      Cervical back: Normal range of motion. No rigidity.      Right lower leg: No edema.      Left lower leg: No edema.   Skin:     General: Skin is warm and dry.   Neurological:      General: No focal deficit present.      Mental Status: Alert and oriented to person, place, and time.   Psychiatric:         Mood and Affect: Mood normal.         Behavior: Behavior normal.    Diagnostics     Lab Results   Labs Ordered and Resulted from Time of ED Arrival to Time of ED Departure   COMPREHENSIVE METABOLIC PANEL - Normal       Result Value    Sodium 141      Potassium 4.2      Carbon Dioxide (CO2) 26      Anion Gap 10      Urea Nitrogen 13.5      Creatinine 0.66      GFR Estimate >90      Calcium 9.2      Chloride 105      Glucose 95      Alkaline Phosphatase 61      AST 23      ALT 9      Protein Total 6.6      Albumin 3.8      Bilirubin Total 0.5     TROPONIN T,  HIGH SENSITIVITY - Normal    Troponin T, High Sensitivity 6     TROPONIN T, HIGH SENSITIVITY - Normal    Troponin T, High Sensitivity <6     CBC WITH PLATELETS AND DIFFERENTIAL    WBC Count 5.4      RBC Count 4.15      Hemoglobin 13.5      Hematocrit 38.7      MCV 93      MCH 32.5      MCHC 34.9      RDW 12.6      Platelet Count 172      % Neutrophils 70      % Lymphocytes 16      % Monocytes 9      % Eosinophils 3      % Basophils 1      % Immature Granulocytes 0      NRBCs per 100 WBC 0      Absolute Neutrophils 3.8      Absolute Lymphocytes 0.9      Absolute Monocytes 0.5      Absolute Eosinophils 0.2      Absolute Basophils 0.0      Absolute Immature Granulocytes 0.0      Absolute NRBCs 0.0         Imaging   CT Aortic Survey w Contrast   Final Result   IMPRESSION:   1.  No aortic dissection, aneurysm, or pulmonary embolus.   2.  Mild coronary artery disease and atherosclerotic vascular disease.   3.  Cholelithiasis.   4.  Diverticulosis. No diverticulitis, colitis, or obstruction.      JOHN F BRUNNER, MD            SYSTEM ID:  [id number]          EKG   ECG taken at 0944, ECG read at 0949  Normal sinus rhythm   Cannot rule out anterior infarct, age undetermined  Premature ventricular contractions no longer present as compared to prior, dated 7/11/22.  Rate 71 bpm. OH interval 142 ms. QRS duration 74 ms. QT/QTc 388/421 ms. P-R-T axes 22 -23 12.    See ED course for independent interpretation.    Independent Interpretation   None    ED Course      Medications Administered   Aspirin Tablet 325 mg    Procedures   Procedures     Discussion of Management   Admitting Hospitalist, Dr. Montenegro    ED Course   ED Course as of 07/05/24 1613   Fri Jul 05, 2024   0949 EKG 12-lead, tracing only  Normal sinus rhythm.  Rate of 71.  Normal OH and QRS.  Normal QTc.  No acute ST elevation or depression as compared with 11/7/2022 EKG.  Nonspecific ST depressions in leads III and aVF similar as compared with prior ECG.   0954 I obtained  history and performed a physical exam as noted above.    1218 CT Aortic Survey w Contrast   Mild coronary artery disease and atherosclerotic vascular disease.   1221 I rechecked and updated the patient on admission to the hospital.   1248 I spoke with Dr. Montenegro of the hospitalist service who accepts patient for admission.   1250 Troponin T, High Sensitivity: <6  2 set flat       Optional/Additional Documentation  None    Medical Decision Making / Diagnosis     HEART Score  Background  Calculates the overall risk of adverse event in patient's presenting with chest pain.  Based on 5 criteria (each assigned 0-2 points) including suspiciousness of history, EKG, age, risk factors and troponin.    Data  68 year old female  has Senile nuclear sclerosis; Tear film insufficiency; Hyperlipidemia; Regular astigmatism; Myopia; After-cataract; Presbyopia; Pupillary abnormalities; Diplopia; Visual field defect; Vitreous membranes and strands; Tinnitus; Family history of malignant neoplasm of breast; Family history of cardiomyopathy; History of colonic polyps; Toenail fungus; Age-related osteoporosis with current pathological fracture, initial encounter; Aortic stenosis; Diverticular disease of large intestine; PVC's (premature ventricular contractions); Malignant neoplasm of upper-inner quadrant of left breast in female, estrogen receptor positive (H); Encounter for antineoplastic chemotherapy; Long term (current) use of aromatase inhibitors; Osteopenia of multiple sites; Fatigue due to exposure, subsequent encounter; Chronic pain of both knees; Hip weakness; Primary osteoarthritis of both knees; and Chest pain, unspecified type on their problem list.   reports that she has never smoked. She has never been exposed to tobacco smoke. She has never used smokeless tobacco.  family history includes Arthritis in her mother and sister; Breast Cancer in her sister and sister; C.A.D. in her father and mother; Cancer in her brother,  sister, and sister; Diabetes in her mother; Genetic Disorder in an other family member; Glaucoma in her brother; Hypertension in her brother; Macular Degeneration in her mother; Thyroid Disease in her brother and mother.  Recent Labs   Lab 07/05/24  1226 07/05/24  1021   CTROPT <6 6     Criteria   0-2 points for each of 5 items (maximum of 10 points):  Score 1- History moderately suspicious for coronary syndrome  Score 1- EKG with Non-specific repolarization disturbance  Score 2- Age 65 years or older  Score 2- Three or more risk factors for or history of atherosclerotic disease  Score 0- Within normal limits for troponin levels  Interpretation  Risk of adverse outcome  Heart Score: 6  Total Score 4-6- Adverse Outcome Risk 20.3% - Supports admission with standard rule-out management -serial troponins and stress testing    MIPS       None    BRI   Vickie Alvarado is a 68-year-old female as described above presents to the emergency department for left-sided chest pain with radiation to between scapula, associated diaphoresis, and occasional left arm tingling/numbness sensation.  History of severe aortic stenosis.  Family history of MI before age of 65.  Patient does have a history of hyperlipidemia.  Differential diagnosis considered includes, but not limited to, ACS, aortic dissection/aneurysm, pulmonary embolism.  Cardiac workup ordered.  2 set cardiac enzyme.  EKG does not demonstrate STEMI.  Will give full dose aspirin at this time.  Given history of aortic disease and chest pain plus occasional radiation down left arm, will obtain CT aortic survey for evaluation for acute dissection in addition to indirect evaluation for coronary artery disease.  Patient has a preliminary heart score of 6.  No prior history of cardiac stress testing or CT angiography.  If workup negative, consider observation admission for completion of cardiac workup including potential stress testing.  Discussed care plan with patient who  voiced understanding and agreement with plan.  Answered all questions.  Additional workup and orders as listed in chart.    Please refer to ED course above as part of continuation of MDM for details on the patient's treatment course and any changes or updates in care plan beyond my initial evaluation and MDM creation.      Disposition   The patient was admitted to the hospital.     Diagnosis     ICD-10-CM    1. Chest pain, unspecified type  R07.9         Discharge Medications   Current Discharge Medication List            Scribe Disclosure:  I, Gregoria Weiss, am serving as a scribe at 9:49 AM on 7/5/2024 to document services personally performed by Felipe Cruz DO based on my observations and the provider's statements to me.        Felipe Cruz DO  07/05/24 [id number]

## 2024-07-05 NOTE — ED TRIAGE NOTES
Pt c/o chest pain intermittently - very mild above her heart .  Pt then stated that this morning she has upper bilateral shoulder pains.       Triage Assessment (Adult)       Row Name 07/05/24 0939          Triage Assessment    Airway WDL WDL        Respiratory WDL    Respiratory WDL WDL        Skin Circulation/Temperature WDL    Skin Circulation/Temperature WDL WDL        Cardiac WDL    Cardiac WDL X;chest pain  little bit of chest pain and back pain        Chest Pain Assessment    Chest Pain Location anterior chest, left     Chest Pain Radiation back     Character sharp  once in a while pt has felt little twinges of chest pain in the left chest area directly above heart.  back pain started this morning and that is in the upper shoulders area.     Alleviating Factors nothing        Peripheral/Neurovascular WDL    Peripheral Neurovascular WDL WDL        Cognitive/Neuro/Behavioral WDL    Cognitive/Neuro/Behavioral WDL WDL

## 2024-07-05 NOTE — ED NOTES
Bed: ED02  Expected date:   Expected time:   Means of arrival:   Comments:  Chest pain / back pain  triage

## 2024-07-05 NOTE — PLAN OF CARE
Goal Outcome Evaluation:      Plan of Care Reviewed With: patient    Discharge instructions discussed and questions answered.
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
## 2024-07-06 ENCOUNTER — PATIENT OUTREACH (OUTPATIENT)
Dept: CARE COORDINATION | Facility: CLINIC | Age: 69
End: 2024-07-06
Payer: MEDICARE

## 2024-07-06 NOTE — PROGRESS NOTES
[facility] Transitions of Care Outreach  Chief Complaint   Patient presents with    Clinic Care Coordination - Post Hospital       Most Recent Admission Date: 7/5/2024   Most Recent Admission Diagnosis: Chest pain, unspecified type - R07.9     Most Recent Discharge Date: 7/5/2024   Most Recent Discharge Diagnosis: Chest pain, unspecified type - R07.9  Severe aortic stenosis - I35.0     Transitions of Care Assessment    Discharge Assessment  How are you doing now that you are home?: Fine. Marni isn't happy that a MD nor a PA talked to her about d/c.  An RN  came instead.  The two MDs she did speak with gave her conflicting information.  How are your symptoms? (Red Flag symptoms escalate to triage hotline per guidelines): Improved  Do you know how to contact your clinic care team if you have future questions or changes to your health status? : Yes  Does the patient have their discharge instructions? : Yes  Does the patient have questions regarding their discharge instructions? : No  Were you started on any new medications or were there changes to any of your previous medications? : Yes  Does the patient have all of their medications?: Yes  Do you have questions regarding any of your medications? : No  Do you have all of your needed medical supplies or equipment (DME)?  (i.e. oxygen tank, CPAP, cane, etc.): Yes    Post-op ([facility] CTA Only)  If the patient had a surgery or procedure, do they have any questions for a nurse?: No        Follow up Plan     Discharge Follow-Up  Discharge follow up appointment scheduled in alignment with recommended follow up timeframe or Transitions of Risk Category? (Low = within 30 days; Moderate= within 14 days; High= within 7 days): Yes  Discharge Follow Up Appointment Date: 07/10/24  Discharge Follow Up Appointment Scheduled with?: Specialty Care Provider    Future Appointments   Date Time Provider Department Center   7/10/2024  1:30 PM [id number] UCCXR [facility]    7/10/2024  1:45 PM UCSCORTHOXR2 UCOXR [facility]   7/10/2024  2:00 PM José Miguel Vale MD UOR [facility]   7/18/2024  3:10 PM Isela Ag, PT EDSPPT [address]   7/19/2024  3:45 PM URMR2 URMRI [facility]   7/23/2024 12:15 PM UCSCMA1 UCBTC [facility]   7/23/2024 12:45 PM UC [facility] LAB DRAW UCONL [facility]   7/23/2024  1:15 PM Paty Oakes PA-C ONA [facility]   7/23/2024  3:00 PM  ONC INFUSION NURSE [facility]   8/27/2024 10:45 AM [facility]   9/12/2024  1:30 PM UCECHCR1 UCCVCV [facility]   10/3/2024  9:30 AM Nicole Brooke MD [facility]       Outpatient Plan as outlined on AVS reviewed with patient.    For any urgent concerns, please contact our 24 hour nurse triage line: [phone])       Yanique Malik [facility]  [phone]  [facility]

## 2024-07-10 DIAGNOSIS — I35.0 SEVERE AORTIC STENOSIS: Primary | ICD-10-CM

## 2024-07-10 DIAGNOSIS — I20.89 OTHER FORMS OF ANGINA PECTORIS (H): ICD-10-CM

## 2024-07-10 DIAGNOSIS — E78.5 HYPERLIPIDEMIA, UNSPECIFIED HYPERLIPIDEMIA TYPE: ICD-10-CM
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
## 2024-07-12 ENCOUNTER — OFFICE VISIT (OUTPATIENT)
Dept: CARDIOLOGY | Facility: CLINIC | Age: 69
End: 2024-07-12
Attending: INTERNAL MEDICINE
Payer: MEDICARE

## 2024-07-12 VITALS
OXYGEN SATURATION: 96 % | SYSTOLIC BLOOD PRESSURE: 131 MMHG | HEART RATE: 90 BPM | DIASTOLIC BLOOD PRESSURE: 84 MMHG | BODY MASS INDEX: 31.15 KG/M2 | WEIGHT: 193 LBS

## 2024-07-12 DIAGNOSIS — I35.0 SEVERE AORTIC STENOSIS: Primary | ICD-10-CM

## 2024-07-12 PROCEDURE — 99215 OFFICE O/P EST HI 40 MIN: CPT | Mod: GC | Performed by: INTERNAL MEDICINE

## 2024-07-12 PROCEDURE — 93005 ELECTROCARDIOGRAM TRACING: CPT

## 2024-07-12 ASSESSMENT — PAIN SCALES - GENERAL: PAINLEVEL: NO PAIN (0)

## 2024-07-12 NOTE — NURSING NOTE
[place] Cardiomyopathy Questionnaire  (KCCQ-12)  Date: 7/12/24    STS: 1.5    Pre TAVR      1.  A.  5      B.  6       C.  6  2.  5  3.  4  4.  7  5.  5  6.  3  7.  2  8.  A. 3     B.  4     C.  5       5 Meter/15 foot Walk test  Trial 1  4.6  Trial 2   4.8  Trial 3  4.6      Dental Clearance: Pass      Provided additional education regarding TAVR/MitraClip procedure, after being present for discussion with physician. Explained the work-up process and next steps for patient. Patient provided our direct contact number and instructed to call with any questions.
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
## 2024-07-12 NOTE — PATIENT INSTRUCTIONS
You were seen today in the Cardiovascular Clinic at the [facility].      Cardiology Providers you saw during your visit:  Dr. Nance    Recommendations:    Having Alicia change your TAVR CT and CT coronary to a sooner date  Schedule surgical evaluation    Follow-up:    Once all your testing is complete, I will present your case at valve conference. (Valve conference is held every Thursday. I will contact you either Thursday or Friday with the results)    Nursing questions:  Chandrika ESCOBEDO;  Mom Trusted messages are great! Otherwise [phone] if you don't hear back within 24 hrs please call back.   For emergencies call 911.      Thank you for your visit today!     Chandrika Harley RN  Lead Structural Heart Coordinator  TAVR, [employer] and Watchman

## 2024-07-15 ENCOUNTER — MYC MEDICAL ADVICE (OUTPATIENT)
Dept: CARDIOLOGY | Facility: CLINIC | Age: 69
End: 2024-07-15
Payer: MEDICARE

## 2024-07-19 ENCOUNTER — HOSPITAL ENCOUNTER (OUTPATIENT)
Dept: MRI IMAGING | Facility: CLINIC | Age: 69
Discharge: HOME | End: 2024-07-19
Attending: FAMILY MEDICINE | Admitting: FAMILY MEDICINE
Payer: MEDICARE

## 2024-07-19 DIAGNOSIS — Z85.3 PERSONAL HISTORY OF MALIGNANT NEOPLASM OF BREAST: ICD-10-CM

## 2024-07-19 DIAGNOSIS — R51.9 PRESSURE IN HEAD: ICD-10-CM

## 2024-07-19 DIAGNOSIS — R42 DIZZINESS: ICD-10-CM

## 2024-07-19 DIAGNOSIS — H53.40 VISUAL FIELD DEFECT: ICD-10-CM

## 2024-07-19 PROCEDURE — 70551 MRI BRAIN STEM W/O DYE: CPT | Mod: 26 | Performed by: STUDENT IN AN ORGANIZED HEALTH CARE EDUCATION/TRAINING PROGRAM

## 2024-07-19 PROCEDURE — 70551 MRI BRAIN STEM W/O DYE: CPT | Mod: ME

## 2024-07-19 PROCEDURE — G1010 CDSM STANSON: HCPCS | Performed by: STUDENT IN AN ORGANIZED HEALTH CARE EDUCATION/TRAINING PROGRAM

## 2024-07-22 NOTE — DISCHARGE SUMMARY
"[facility]  Hospitalist Discharge Summary      Date of Admission:  7/5/2024  Date of Discharge:  7/5/2024  7:09 PM  Discharging Provider: Lisa Montenegro MD  Discharge Service: Hospitalist Service    Discharge Diagnoses   Chest pain  Severe aortic stenosis   Hx of breast cancer     Clinically Significant Risk Factors     # Obesity: Estimated body mass index is 31.15 kg/m  as calculated from the following:    Height as of 6/27/24: 1.676 m (5' 6\").    Weight as of 7/12/24: 87.5 kg (193 lb).       Follow-ups Needed After Discharge   Follow-up Appointments     Follow-up and recommended labs and tests       Follow-up with cardiology as recommended            Discharge Disposition   Discharged to home  Condition at discharge: Stable    Hospital Course   Vickie Alvarado is a 68 year old female with past medical history significant for breast cancer, hyperlipidemia, severe aortic stenosis, osteoporosis admitted on 7/5/2024 for evaluation of chest pain.     Chest pain  Severe aortic stenosis   The patient presents to the emergency department with left-sided chest pain for about the past 24 hours.  She also noted some episodes of diaphoresis, fatigue and some radiation of the pain in between her shoulder blades.  *She denies any personal history of coronary artery disease but does note significant family history.  *Vital signs and laboratory evaluation in the emergency department are essentially unremarkable.  Troponin x 2 checks is within normal limits.  EKG shows normal sinus rhythm without any acute ST segment elevation.  *CT aortic survey obtained in the emergency department. This is essentially unremarkable however she does have some mild coronary artery disease and atherosclerotic vascular disease.  * Pt does not have known hx of symptomatic CAD however she does have a hx of severe aortic stenosis. ECHO from 2/28/24 showed EF of 60-65% with normal RV size and function. She has severe " aortic stenosis with peak aortic velocity of 4.5m/s, and AV mean gradient of 47mmHg.   * Cardiology consulted, repeat ECHO here showed worsening of her aortic stenosis  * At the time of discharge pt was asymptomatic. Cleared for discharge by cardiology with plans to undergo outpatient coronary angiogram and TAVR work-up.   * Pt advised against exertion and that she should return to the ED for recurrence of symptoms.   - Continue with daily ASA 81mg   - Continue with PTA simvastatin   - Continue with PTA metoprolol 25mg daily      Hx of breast cancer   Diagnosed in 2022. S/p neoadjuvant chemotherapy, left breast lumpectomy, left axillary sentinel lymph node biopsy and radiation to the left breast. She then ercieved one year of adjuvant Kadcyla (11/2022-11/2023) and remains on adjuvant letrozole.   - Resume PTA letrozole at discharge.     Consultations This Hospital Stay   CARDIOLOGY IP CONSULT    Code Status   Prior    Time Spent on this Encounter   I, Lisa Montenegro MD, personally saw the patient today and spent greater than 30 minutes discharging this patient.       Lisa Montenegro MD  [facility] EXTENDED RECOVERY AND SHORT STAY  [address] [place] [id number]  Phone: [phone]  ______________________________________________________________________    Physical Exam   Vital Signs: /77 (BP Location: Left arm)   Pulse 61   Temp 97.7  F (36.5  C) (Oral)   Resp 16   LMP 05/17/2011   SpO2 95%     Weight: 0 lbs 0 oz  Please see same day H&P for physical examination     Primary Care Physician   Gaby Lynn    Discharge Orders      CT Angiogram TAVR    Administration of IV contrast (contrast agent, dose, and amount) will be tailored to this examination per the appropriate written protocol listed in the Protocol E-Book, or by the interpreting provider. If you do not want your order altered by the radiologist, please state that in the order comments.     Follow-Up with  Cardiology JHONATAN      Reason for your hospital stay    You were admitted for evaluation of chest pain. No clear etiology was identified. You will need follow-up with cardiology for on-going evaluation.     Follow-up and recommended labs and tests     Follow-up with cardiology as recommended     Activity    Your activity upon discharge: activity as tolerated     Diet    Follow this diet upon discharge: Orders Placed This Encounter      Combination Diet Regular Diet Adult; No Caffeine Diet     Case Request Cath Lab: Coronary Angiogram       Significant Results and Procedures   Most Recent 3 CBC's:  Recent Labs   Lab Test 07/05/24  1021 06/13/24 2021 11/13/23  1321   WBC 5.4 6.5 5.3   HGB 13.5 13.9 13.1   MCV 93 93 90    193 141*     Most Recent 3 BMP's:  Recent Labs   Lab Test 07/05/24  1021 06/13/24  2021 01/15/24  1202 11/13/23  1321    139  --  140   POTASSIUM 4.2 3.6  --  3.8   CHLORIDE 105 104  --  104   CO2 26 27  --  26   BUN 13.5 14.3  --  16.8   CR 0.66 0.75 0.59 0.62   ANIONGAP 10 8  --  10   VALENTINA 9.2 9.1 9.8 9.5   GLC 95 107*  --  105*       Discharge Medications   Discharge Medication List as of 7/5/2024  6:37 PM        CONTINUE these medications which have NOT CHANGED    Details   calcium 600 MG tablet Take 1 tablet by mouth every evening, Historical      Calcium Carb-Cholecalciferol 600-25 MG-MCG CAPS Take 1 capsule by mouth every morning, Historical      dorzolamide-timolol PF (COSOPT PF) 2-0.5 % opthalmic solutionh Place 1 drop into both eyes 2 times daily, Disp-60 each, R-5, E-Prescribe      Lactobacillus (PROBIOTIC CHILDRENS) CHEW Take 1 chew tab by mouth 2 times daily, Historical      letrozole (FEMARA) 2.5 MG tablet Take 1 tablet (2.5 mg) by mouth daily, Disp-30 tablet, R-11, E-Prescribe      loratadine (CLARITIN) 10 MG tablet Take 10 mg by mouth daily as needed for allergies Seasonal for ragweed and lilacs, Historical      metoprolol succinate ER (TOPROL XL) 25 MG 24 hr tablet Take 1  tablet (25 mg) by mouth daily, Disp-90 tablet, R-3, E-Prescribe      Omega-3 Fatty Acids (FISH OIL ADULT GUMMIES PO) Take 250 mg by mouth daily, Historical      simvastatin (ZOCOR) 40 MG tablet Take 1 tablet (40 mg) by mouth at bedtime, Disp-90 tablet, R-3, E-Prescribe      Vitamin D3 (CHOLECALCIFEROL) 25 mcg (1000 units) tablet Take 50 mcg by mouth every evening, Historical           Allergies   Allergies   Allergen Reactions    Bactrim [Sulfamethoxazole-Trimethoprim] Headache and Nausea    Seasonal Allergies     Typhoid Vaccines Nausea and Vomiting     Fever, vice- h/a, body aches.  Was hospitalized for 2d.

## 2024-07-23 ENCOUNTER — INFUSION THERAPY VISIT (OUTPATIENT)
Dept: ONCOLOGY | Facility: CLINIC | Age: 69
End: 2024-07-23
Attending: INTERNAL MEDICINE
Payer: MEDICARE

## 2024-07-23 ENCOUNTER — ANCILLARY PROCEDURE (OUTPATIENT)
Dept: MAMMOGRAPHY | Facility: CLINIC | Age: 69
End: 2024-07-23
Attending: INTERNAL MEDICINE
Payer: MEDICARE

## 2024-07-23 ENCOUNTER — ANCILLARY PROCEDURE (OUTPATIENT)
Dept: MAMMOGRAPHY | Facility: CLINIC | Age: 69
End: 2024-07-23
Payer: MEDICARE

## 2024-07-23 ENCOUNTER — APPOINTMENT (OUTPATIENT)
Dept: LAB | Facility: CLINIC | Age: 69
End: 2024-07-23
Attending: INTERNAL MEDICINE
Payer: MEDICARE

## 2024-07-23 VITALS
RESPIRATION RATE: 16 BRPM | WEIGHT: 192.5 LBS | SYSTOLIC BLOOD PRESSURE: 144 MMHG | DIASTOLIC BLOOD PRESSURE: 84 MMHG | OXYGEN SATURATION: 97 % | BODY MASS INDEX: 31.07 KG/M2 | HEART RATE: 77 BPM | TEMPERATURE: 97.7 F

## 2024-07-23 DIAGNOSIS — Z17.0 MALIGNANT NEOPLASM OF UPPER-INNER QUADRANT OF LEFT BREAST IN FEMALE, ESTROGEN RECEPTOR POSITIVE (H): Primary | ICD-10-CM

## 2024-07-23 DIAGNOSIS — Z12.31 VISIT FOR SCREENING MAMMOGRAM: ICD-10-CM

## 2024-07-23 DIAGNOSIS — R59.1 GENERALIZED ENLARGED LYMPH NODES: ICD-10-CM

## 2024-07-23 DIAGNOSIS — C50.212 MALIGNANT NEOPLASM OF UPPER-INNER QUADRANT OF LEFT BREAST IN FEMALE, ESTROGEN RECEPTOR POSITIVE (H): Primary | ICD-10-CM

## 2024-07-23 DIAGNOSIS — Z79.811 LONG TERM (CURRENT) USE OF AROMATASE INHIBITORS: ICD-10-CM

## 2024-07-23 DIAGNOSIS — Z17.0 MALIGNANT NEOPLASM OF UPPER-INNER QUADRANT OF LEFT BREAST IN FEMALE, ESTROGEN RECEPTOR POSITIVE (H): ICD-10-CM

## 2024-07-23 DIAGNOSIS — C50.212 MALIGNANT NEOPLASM OF UPPER-INNER QUADRANT OF LEFT BREAST IN FEMALE, ESTROGEN RECEPTOR POSITIVE (H): ICD-10-CM

## 2024-07-23 DIAGNOSIS — M85.89 OSTEOPENIA OF MULTIPLE SITES: ICD-10-CM

## 2024-07-23 LAB
ALBUMIN SERPL BCG-MCNC: 4.1 G/DL (ref 3.5–5.2)
CALCIUM SERPL-MCNC: 9.4 MG/DL (ref 8.8–10.4)
CREAT SERPL-MCNC: 0.6 MG/DL (ref 0.51–0.95)
EGFRCR SERPLBLD CKD-EPI 2021: >90 ML/MIN/1.73M2

## 2024-07-23 PROCEDURE — 99417 PROLNG OP E/M EACH 15 MIN: CPT

## 2024-07-23 PROCEDURE — 36415 COLL VENOUS BLD VENIPUNCTURE: CPT

## 2024-07-23 PROCEDURE — 96365 THER/PROPH/DIAG IV INF INIT: CPT

## 2024-07-23 PROCEDURE — 82040 ASSAY OF SERUM ALBUMIN: CPT

## 2024-07-23 PROCEDURE — G0463 HOSPITAL OUTPT CLINIC VISIT: HCPCS | Mod: 25

## 2024-07-23 PROCEDURE — 77063 BREAST TOMOSYNTHESIS BI: CPT | Performed by: RADIOLOGY

## 2024-07-23 PROCEDURE — 82310 ASSAY OF CALCIUM: CPT

## 2024-07-23 PROCEDURE — 76882 US LMTD JT/FCL EVL NVASC XTR: CPT | Mod: LT | Performed by: RADIOLOGY

## 2024-07-23 PROCEDURE — 258N000003 HC RX IP 258 OP 636

## 2024-07-23 PROCEDURE — 77067 SCR MAMMO BI INCL CAD: CPT | Performed by: RADIOLOGY

## 2024-07-23 PROCEDURE — 99215 OFFICE O/P EST HI 40 MIN: CPT

## 2024-07-23 PROCEDURE — 250N000011 HC RX IP 250 OP 636

## 2024-07-23 PROCEDURE — 82565 ASSAY OF CREATININE: CPT

## 2024-07-23 RX ORDER — HEPARIN SODIUM,PORCINE 10 UNIT/ML
5 VIAL (ML) INTRAVENOUS
Status: CANCELLED | OUTPATIENT
Start: 2024-07-23

## 2024-07-23 RX ORDER — ZOLEDRONIC ACID 0.04 MG/ML
4 INJECTION, SOLUTION INTRAVENOUS ONCE
Status: COMPLETED | OUTPATIENT
Start: 2024-07-23 | End: 2024-07-23

## 2024-07-23 RX ORDER — ZOLEDRONIC ACID 0.04 MG/ML
4 INJECTION, SOLUTION INTRAVENOUS ONCE
Status: CANCELLED | OUTPATIENT
Start: 2024-07-23 | End: 2024-07-23

## 2024-07-23 RX ORDER — HEPARIN SODIUM (PORCINE) LOCK FLUSH IV SOLN 100 UNIT/ML 100 UNIT/ML
5 SOLUTION INTRAVENOUS
Status: CANCELLED | OUTPATIENT
Start: 2024-07-23

## 2024-07-23 RX ADMIN — SODIUM CHLORIDE 250 ML: 9 INJECTION, SOLUTION INTRAVENOUS at 15:16

## 2024-07-23 RX ADMIN — ZOLEDRONIC ACID 4 MG: 0.04 INJECTION, SOLUTION INTRAVENOUS at 15:25

## 2024-07-23 ASSESSMENT — PAIN SCALES - GENERAL: PAINLEVEL: NO PAIN (0)

## 2024-07-23 NOTE — NURSING NOTE
Chief Complaint   Patient presents with    Blood Draw     IV placement with blood draw by lab RN       Labs drawn via IV placed by lab RN. Vitals taken and appointment arrived.    Lizbet Chappell RN
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
## 2024-07-23 NOTE — PATIENT INSTRUCTIONS
[facility] Triage and after hours / weekends / holidays:  [phone]    Please call the triage or after hours line if you experience a temperature greater than or equal to 100.4, shaking chills, have uncontrolled nausea, vomiting and/or diarrhea, dizziness, shortness of breath, chest pain, bleeding, unexplained bruising, or if you have any other new/concerning symptoms, questions or concerns.      If you are having any concerning symptoms or wish to speak to a provider before your next infusion visit, please call triage to notify them so we can adequately serve you.     If you need a refill on a narcotic prescription or other medication, please call before your infusion appointment.                July 2024 Sunday Monday Tuesday Wednesday Thursday Friday Saturday        1     2     3     4     5    Admission   9:46 AM   Lisa Montenegro MD   [facility] Extended Recovery and Short Stay   (Discharge: 7/5/2024)    CT AORTIC SURVEY W CONTRAST  11:05 AM   (20 min.)   SHCT2   [facility]    ECHO COMPLETE   3:40 PM   (60 min.)   SHECHR1   [facility] 6       7     8     9     10     11  Happy Birthday!     12    NEW TAVR   1:15 PM   (30 min.)   Johnny Nance MD   [facility] 13       14     15     16     17     18     19    MR BRAIN WO   3:15 PM   (45 min.)   URMR2   [facility] 20       21     22     23    MA SCREENING DIGITAL BILATERAL  12:00 PM   (15 min.)   UCSCMA1   [facility]    LAB PERIPHERAL  12:45 PM   (15 min.)    [facility] LAB DRAW   [facility]    RETURN CCSL   1:00 PM   (45 min.)   Paty Oakes PA-C   [facility]    US AXILLARY LEFT   2:25 PM   (30 min.)   [email or web address]   [facility]    ONC INFUSION 1 HR (60 MIN)   3:00 PM   (60  min.)    ONC INFUSION NURSE   [facility] 24     25     26     27       28     29    CT ANGIO TAVR   9:45 AM   (30 min.)   [address] Imaging    CTA ANGIOGRAM CORONARY ARTERY  10:15 AM   (30 min.)   [address] Imaging 30 31 August 2024 Sunday Monday Tuesday Wednesday Thursday Friday Saturday                       1     2     3       4     5     6     7     8     9     10       11     12     13     14     15     16     17       18     19     20     21     22     23     24       25     26     27    RETURN ADULT GLAUCOMA  10:30 AM   (15 min.)   Hector Choi MD   [facility] 28     29     30     31                     Lab Results:  Recent Results (from the past 12 hour(s))   Calcium    Collection Time: 07/23/24  1:18 PM   Result Value Ref Range    Calcium 9.4 8.8 - 10.4 mg/dL   Creatinine    Collection Time: 07/23/24  1:18 PM   Result Value Ref Range    Creatinine 0.60 0.51 - 0.95 mg/dL    GFR Estimate >90 >60 mL/min/1.73m2   Albumin level    Collection Time: 07/23/24  1:18 PM   Result Value Ref Range    Albumin 4.1 3.5 - 5.2 g/dL
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
## 2024-07-23 NOTE — NURSING NOTE
"Oncology Rooming Note    July 23, 2024 1:27 PM   Vickie Alvarado is a 69 year old female who presents for:    Chief Complaint   Patient presents with    Blood Draw     IV placement with blood draw by lab RN    Oncology Clinic Visit     Malignant neoplasm of upper-inner quadrant of left breast in female, estrogen receptor positive      Initial Vitals: BP (!) 144/84   Pulse 77   Temp 97.7  F (36.5  C) (Oral)   Resp 16   Wt 87.3 kg (192 lb 8 oz)   LMP 05/17/2011   SpO2 97%   BMI 31.07 kg/m   Estimated body mass index is 31.07 kg/m  as calculated from the following:    Height as of 6/27/24: 1.676 m (5' 6\").    Weight as of this encounter: 87.3 kg (192 lb 8 oz). Body surface area is 2.02 meters squared.  No Pain (0) Comment: Data Unavailable   Patient's last menstrual period was 05/17/2011.  Allergies reviewed: Yes  Medications reviewed: Yes    Medications: Medication refills not needed today.  Pharmacy name entered into [facility]: [id number] [place] [phone] & [facility]    Frailty Screening:   Is the patient here for a new oncology consult visit in cancer care? 2. No      Clinical concerns: Pt reports that she will be having a heart valve replacement soon. Paty was notified via messages.      Alba Simms, EMT   "

## 2024-07-23 NOTE — PROGRESS NOTES
Infusion Nursing Note:  Vickie Alvarado presents today for Zometa (q6 months).    Patient seen by provider today: Yes: IVETTE Chapman.   present during visit today: Not Applicable.    Note: Pt presents to infusion feeling well. Marni confirms she continues to take Calcium and Vitamin D as prescribed and denies any dental concerns or procedures prior to Zometa today.    Intravenous Access:  Peripheral IV placed.    Treatment Conditions:  Lab Results   Component Value Date     07/05/2024    POTASSIUM 4.2 07/05/2024    MAG 2.2 08/20/2021    CR 0.60 07/23/2024    VALENTINA 9.4 07/23/2024    BILITOTAL 0.5 07/05/2024    ALBUMIN 4.1 07/23/2024    ALT 9 07/05/2024    AST 23 07/05/2024     Results reviewed, labs MET treatment parameters, ok to proceed with treatment.      Post Infusion Assessment:  Patient tolerated infusion without incident.  Blood return noted pre and post infusion.  Site patent and intact, free from redness, edema or discomfort.  No evidence of extravasations.  Access discontinued per protocol.       Discharge Plan:   Patient declined prescription refills.  Discharge instructions reviewed with: Patient.  Patient and/or family verbalized understanding of discharge instructions and all questions answered.  AVS to patient via [employer]. Patient will return ~6 months for next appointment - to be requested with checkout orders.   Patient discharged in stable condition accompanied by: self.  Departure Mode: Ambulatory.      Funmi Crain RN

## 2024-07-25 ENCOUNTER — TELEPHONE (OUTPATIENT)
Dept: CARDIOLOGY | Facility: CLINIC | Age: 69
End: 2024-07-25
Payer: MEDICARE

## 2024-07-25 ENCOUNTER — MYC MEDICAL ADVICE (OUTPATIENT)
Dept: CARDIOLOGY | Facility: CLINIC | Age: 69
End: 2024-07-25
Payer: MEDICARE

## 2024-07-25 DIAGNOSIS — I49.3 PVC'S (PREMATURE VENTRICULAR CONTRACTIONS): ICD-10-CM

## 2024-07-25 NOTE — TELEPHONE ENCOUNTER
Called and review CT instructions. All questioned answered at this time.        Chandrika Harley RN  Lead Structural Heart Coordinator  TAVR, [employer] and Watchman

## 2024-07-25 NOTE — PATIENT INSTRUCTIONS
Pre-procedure instructions - CT Angiogram TAVR/TMVR  Patient Education    Your arrival time is 9:45.  Location is [address]  How do I prepare for my exam? (Food and drink instructions)  **You will have contrast for this exam.**  No Food and Drink Restrictions     The day before your exam, drink extra fluids-at least six 8-ounce glasses (unless your doctor tells you to restrict your fluids).     What should I wear: Please wear loose clothing, such as a sweat suit or jogging clothes.  Avoid snaps, zippers and other metal. We may ask you to undress and put on a hospital gown.     How long does the exam take: Plan to spend up to 1 hour at the hospital     What should I bring: Please bring any scans or X-rays taken at other hospitals, if similar tests were done. It is safest to leave personal items at home.     Do I need a :  No  is needed.     What do I need to tell my doctor?  Be sure to tell your doctor:  * If you have any allergies.  * If there's any chance you are pregnant.  * If you are breastfeeding.  * If you have diabetes as your medication may need to be adjusted for this exam.     What should I do after the exam: No restrictions, You may resume normal activities.     What is this test: A CT (computed tomography) scan is a series of pictures that allows us to look inside your body. The scanner creates images of the body in cross sections, much like slices of bread. This helps us see any problems more clearly. You may receive contrast (X-ray dye) before or during your scan. Contrast is given through an IV (small needle in your arm).     Who should I call with questions: If you have any questions, please call the Imaging Department where you will have your exam. Directions, parking instructions, and other information is available on our website, [email or web address].    Pt reporting nausea and hot flashes x 1mo. LMP end of February. Warm blanket offered, call bell within reach, safety precautions in place, bed locked and in the lowest position. Emergency Department Nursing Plan of Care       The Nursing Plan of Care is developed from the Nursing assessment and Emergency Department Attending provider initial evaluation. The plan of care may be reviewed in the ED Provider note.     The Plan of Care was developed with the following considerations:   Patient / Family readiness to learn indicated by:verbalized understanding  Persons(s) to be included in education: patient  Barriers to Learning/Limitations:No    Signed     Angi Conklin RN    4/14/2022   7:14 PM

## 2024-07-29 ENCOUNTER — HOSPITAL ENCOUNTER (OUTPATIENT)
Dept: CT IMAGING | Facility: CLINIC | Age: 69
Discharge: HOME | End: 2024-07-29
Attending: NURSE PRACTITIONER
Payer: MEDICARE

## 2024-07-29 ENCOUNTER — HOSPITAL ENCOUNTER (OUTPATIENT)
Dept: CT IMAGING | Facility: CLINIC | Age: 69
Discharge: HOME | End: 2024-07-29
Attending: PHYSICIAN ASSISTANT
Payer: MEDICARE

## 2024-07-29 DIAGNOSIS — I35.0 SEVERE AORTIC STENOSIS: ICD-10-CM

## 2024-07-29 DIAGNOSIS — E78.5 HYPERLIPIDEMIA, UNSPECIFIED HYPERLIPIDEMIA TYPE: ICD-10-CM

## 2024-07-29 DIAGNOSIS — I20.89 OTHER FORMS OF ANGINA PECTORIS (H): ICD-10-CM

## 2024-07-29 PROCEDURE — 74174 CTA ABD&PLVS W/CONTRAST: CPT | Mod: 26 | Performed by: STUDENT IN AN ORGANIZED HEALTH CARE EDUCATION/TRAINING PROGRAM

## 2024-07-29 PROCEDURE — 250N000011 HC RX IP 250 OP 636: Performed by: STUDENT IN AN ORGANIZED HEALTH CARE EDUCATION/TRAINING PROGRAM

## 2024-07-29 PROCEDURE — 75574 CT ANGIO HRT W/3D IMAGE: CPT | Mod: MF

## 2024-07-29 PROCEDURE — 75574 CT ANGIO HRT W/3D IMAGE: CPT | Mod: 26 | Performed by: STUDENT IN AN ORGANIZED HEALTH CARE EDUCATION/TRAINING PROGRAM

## 2024-07-29 PROCEDURE — 71275 CT ANGIOGRAPHY CHEST: CPT | Mod: MG

## 2024-07-29 PROCEDURE — G1010 CDSM STANSON: HCPCS | Performed by: STUDENT IN AN ORGANIZED HEALTH CARE EDUCATION/TRAINING PROGRAM

## 2024-07-29 PROCEDURE — 71275 CT ANGIOGRAPHY CHEST: CPT | Mod: 26 | Performed by: STUDENT IN AN ORGANIZED HEALTH CARE EDUCATION/TRAINING PROGRAM

## 2024-07-29 RX ORDER — IOPAMIDOL 755 MG/ML
110 INJECTION, SOLUTION INTRAVASCULAR ONCE
Status: COMPLETED | OUTPATIENT
Start: 2024-07-29 | End: 2024-07-29

## 2024-07-29 RX ADMIN — IOPAMIDOL 110 ML: 755 INJECTION, SOLUTION INTRAVENOUS at 09:52

## 2024-07-31 DIAGNOSIS — R91.8 PULMONARY NODULES: Primary | ICD-10-CM

## 2024-08-01 ENCOUNTER — PATIENT OUTREACH (OUTPATIENT)
Dept: ONCOLOGY | Facility: CLINIC | Age: 69
End: 2024-08-01
Payer: MEDICARE

## 2024-08-01 ENCOUNTER — MYC MEDICAL ADVICE (OUTPATIENT)
Dept: CARDIOLOGY | Facility: CLINIC | Age: 69
End: 2024-08-01
Payer: MEDICARE

## 2024-08-01 DIAGNOSIS — R91.8 PULMONARY NODULES: Primary | ICD-10-CM

## 2024-08-01 RX ORDER — METOPROLOL SUCCINATE 25 MG/1
25 TABLET, EXTENDED RELEASE ORAL EVERY EVENING
Qty: 90 TABLET | Refills: 3 | Status: SHIPPED | OUTPATIENT
Start: 2024-08-01

## 2024-08-01 NOTE — PROGRESS NOTES
[facility]: Cancer Care                                                                                          Called to discuss pulmonary nodules on recent CT scan. Discussed recommendation to have repeat CT scan in 3 months and referral to PN clinic.   She verbalized understanding, answered questions to her stated satisfaction.       Deborah King MSN, RN ,OCN  Lead RN Care Coordinator - [facility]  [phone]

## 2024-08-01 NOTE — PROGRESS NOTES
New Patient Oncology Nurse Navigator Note     Referring provider: Dr. Chandrika Horvath    Referring Clinic/Organization: [facility]  Referred to: Interventional Pulmonology  Requested provider (if applicable): First available - did not specify   Referral Received: 07/31/24       Evaluation for :   Diagnosis   R91.8 (ICD-10-CM) - Pulmonary nodules      Additional Information:  pulmonary nodules recent detected on CT chest performed for TAVR work up, patient with a h/o breast cancer.  Repeat CT chest in 3 months ordered.    Clinical History (per Nurse review of records provided):      07/29/2024 Radiologist Consult for cardiology (bookmarked) showed:   Impression:  1. Scattered pulmonary nodules, largest measuring 8 mm in the right  lower lobe. In this patient with known breast cancer, metastatic  disease is not entirely excluded. This nodule is unchanged from 7/5/24  2. Splenic artery aneurysm measuring up to 1.4 cm.  3. Incidentally noted mildly stenotic origin of the left pulmonary  artery. Recommend clinical correlation for any symptoms.  4. Cholelithiasis.  5. Please see cardiology dictation for detailed findings related to  the heart and mediastinum.    Clinical Assessment / Barriers to Care (Per Nurse):  Tobacco History    Smoking Status  Never   History of breast cancer - please see most recent Oncology note from 07/23/2024 for summary.  Records Location: [facility]   Records Needed: None  Additional testing needed prior to consult: CT chest (ordered by referring provider, PFMARTHA Lee, RN, OCN  [facility] Oncology Nurse Navigator  [phone] / [phone]

## 2024-08-01 NOTE — TELEPHONE ENCOUNTER
Date: 8/1/2024    Time of Call: 2:49 PM     Diagnosis:  A fib     [ VORB ] Ordering provider: Joyce Herrera  Order: reorder Toprol XL 25 mg every day     Order received by: Chandrika     Follow-up/additional notes: n/a

## 2024-08-08 ENCOUNTER — OFFICE VISIT (OUTPATIENT)
Dept: CARDIOLOGY | Facility: CLINIC | Age: 69
End: 2024-08-08
Attending: THORACIC SURGERY (CARDIOTHORACIC VASCULAR SURGERY)
Payer: MEDICARE

## 2024-08-08 ENCOUNTER — CARE COORDINATION (OUTPATIENT)
Dept: CARDIOLOGY | Facility: CLINIC | Age: 69
End: 2024-08-08

## 2024-08-08 VITALS
DIASTOLIC BLOOD PRESSURE: 84 MMHG | OXYGEN SATURATION: 96 % | BODY MASS INDEX: 31.15 KG/M2 | SYSTOLIC BLOOD PRESSURE: 121 MMHG | HEART RATE: 74 BPM | WEIGHT: 193 LBS

## 2024-08-08 DIAGNOSIS — I35.0 AORTIC VALVE STENOSIS: ICD-10-CM

## 2024-08-08 DIAGNOSIS — I50.9 ACUTE CONGESTIVE HEART FAILURE, UNSPECIFIED HEART FAILURE TYPE (H): ICD-10-CM

## 2024-08-08 DIAGNOSIS — I35.0 SEVERE AORTIC STENOSIS: Primary | ICD-10-CM

## 2024-08-08 PROCEDURE — 99204 OFFICE O/P NEW MOD 45 MIN: CPT | Performed by: THORACIC SURGERY (CARDIOTHORACIC VASCULAR SURGERY)

## 2024-08-08 PROCEDURE — G0463 HOSPITAL OUTPT CLINIC VISIT: HCPCS | Performed by: THORACIC SURGERY (CARDIOTHORACIC VASCULAR SURGERY)

## 2024-08-08 ASSESSMENT — PAIN SCALES - GENERAL: PAINLEVEL: NO PAIN (0)

## 2024-08-08 NOTE — NURSING NOTE
Chief Complaint   Patient presents with    New Patient     TAVR Work-up       Vitals were taken, medications reconciled.     Alistair Vincent, Visit Facilitator    1:17 PM
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
## 2024-08-08 NOTE — TELEPHONE ENCOUNTER
Returned telephone call on day of [employer] message. All questions answered.    Electronically filed by Chandrika Harley RN     8/1/2024  5:12 PM

## 2024-08-08 NOTE — PROGRESS NOTES
Patient's case was discussed at Valve conference, attended by structural heart cardiologists, CV surgeons, CNP's, and structural heart care coordinators, on August 8, 2024.    Patient is appropriate to proceed with TAVR Durán 26.      Special considerations:    -will perform diagnostic angiogram on day of TAVR      Patient contacted to review discussion at the Valve Conference. Through shared decision making, patient agrees with the plan as outlined above. All questions answered.       Chandrika Harley RN  Lead Structural Heart Care Coordinator  TAVR, [facility]  [facility]  Office: [phone]

## 2024-08-08 NOTE — PROGRESS NOTES
Attempted telephone call to discuss results of valve conference, left voicemail.    Electronically filed by Chandrika Harley RN     8/8/2024  12:41 PM
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
## 2024-08-08 NOTE — PROGRESS NOTES
Telephone call to Marni to discuss valve conference results. Plan to schedule TAVR on 8/22. All questioned answered at this time.        Chandrika Harley RN  Lead Structural Heart Coordinator  TAVR, [facility]

## 2024-08-11 LAB
ABO/RH(D): NORMAL
ANTIBODY SCREEN: NEGATIVE
SPECIMEN EXPIRATION DATE: NORMAL
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
## 2024-08-12 ENCOUNTER — LAB (OUTPATIENT)
Dept: LAB | Facility: CLINIC | Age: 69
End: 2024-08-12
Attending: NURSE PRACTITIONER
Payer: MEDICARE

## 2024-08-12 ENCOUNTER — DOCUMENTATION ONLY (OUTPATIENT)
Dept: OTHER | Facility: CLINIC | Age: 69
End: 2024-08-12

## 2024-08-12 ENCOUNTER — OFFICE VISIT (OUTPATIENT)
Dept: CARDIOLOGY | Facility: CLINIC | Age: 69
End: 2024-08-12
Attending: NURSE PRACTITIONER
Payer: MEDICARE

## 2024-08-12 VITALS
HEART RATE: 76 BPM | DIASTOLIC BLOOD PRESSURE: 85 MMHG | WEIGHT: 192 LBS | SYSTOLIC BLOOD PRESSURE: 128 MMHG | BODY MASS INDEX: 30.99 KG/M2 | OXYGEN SATURATION: 95 %

## 2024-08-12 DIAGNOSIS — I35.0 SEVERE AORTIC STENOSIS: ICD-10-CM

## 2024-08-12 DIAGNOSIS — Z01.818 PRE-OP EXAM: Primary | ICD-10-CM

## 2024-08-12 DIAGNOSIS — I50.9 ACUTE CONGESTIVE HEART FAILURE, UNSPECIFIED HEART FAILURE TYPE (H): ICD-10-CM

## 2024-08-12 DIAGNOSIS — I35.0 SEVERE AORTIC STENOSIS: Primary | ICD-10-CM

## 2024-08-12 DIAGNOSIS — I25.10 CORONARY ARTERY DISEASE INVOLVING NATIVE CORONARY ARTERY OF NATIVE HEART WITHOUT ANGINA PECTORIS: ICD-10-CM

## 2024-08-12 LAB
ANION GAP SERPL CALCULATED.3IONS-SCNC: 11 MMOL/L (ref 7–15)
BUN SERPL-MCNC: 13.2 MG/DL (ref 8–23)
CALCIUM SERPL-MCNC: 9.8 MG/DL (ref 8.8–10.4)
CHLORIDE SERPL-SCNC: 102 MMOL/L (ref 98–107)
CREAT SERPL-MCNC: 0.72 MG/DL (ref 0.51–0.95)
EGFRCR SERPLBLD CKD-EPI 2021: 90 ML/MIN/1.73M2
ERYTHROCYTE [DISTWIDTH] IN BLOOD BY AUTOMATED COUNT: 12.5 % (ref 10–15)
GLUCOSE SERPL-MCNC: 96 MG/DL (ref 70–99)
HCO3 SERPL-SCNC: 27 MMOL/L (ref 22–29)
HCT VFR BLD AUTO: 43.8 % (ref 35–47)
HGB BLD-MCNC: 14.8 G/DL (ref 11.7–15.7)
MCH RBC QN AUTO: 31.2 PG (ref 26.5–33)
MCHC RBC AUTO-ENTMCNC: 33.8 G/DL (ref 31.5–36.5)
MCV RBC AUTO: 92 FL (ref 78–100)
NT-PROBNP SERPL-MCNC: 104 PG/ML (ref 0–900)
PLATELET # BLD AUTO: 192 10E3/UL (ref 150–450)
POTASSIUM SERPL-SCNC: 4.5 MMOL/L (ref 3.4–5.3)
RBC # BLD AUTO: 4.74 10E6/UL (ref 3.8–5.2)
SODIUM SERPL-SCNC: 140 MMOL/L (ref 135–145)
WBC # BLD AUTO: 7.7 10E3/UL (ref 4–11)

## 2024-08-12 PROCEDURE — 80048 BASIC METABOLIC PNL TOTAL CA: CPT | Performed by: PATHOLOGY

## 2024-08-12 PROCEDURE — 83880 ASSAY OF NATRIURETIC PEPTIDE: CPT | Performed by: PATHOLOGY

## 2024-08-12 PROCEDURE — 85027 COMPLETE CBC AUTOMATED: CPT | Performed by: PATHOLOGY

## 2024-08-12 PROCEDURE — 99214 OFFICE O/P EST MOD 30 MIN: CPT | Performed by: NURSE PRACTITIONER

## 2024-08-12 PROCEDURE — G0463 HOSPITAL OUTPT CLINIC VISIT: HCPCS | Performed by: NURSE PRACTITIONER

## 2024-08-12 PROCEDURE — 36415 COLL VENOUS BLD VENIPUNCTURE: CPT | Performed by: PATHOLOGY

## 2024-08-12 PROCEDURE — 86900 BLOOD TYPING SEROLOGIC ABO: CPT

## 2024-08-12 RX ORDER — LIDOCAINE 40 MG/G
CREAM TOPICAL
Status: CANCELLED | OUTPATIENT
Start: 2024-08-12

## 2024-08-12 RX ORDER — ASPIRIN 325 MG
325 TABLET ORAL DAILY
Status: CANCELLED | OUTPATIENT
Start: 2024-08-12

## 2024-08-12 RX ORDER — CEFAZOLIN SODIUM 2 G/50ML
2 SOLUTION INTRAVENOUS
Status: CANCELLED | OUTPATIENT
Start: 2024-08-12

## 2024-08-12 RX ORDER — SODIUM CHLORIDE 9 MG/ML
INJECTION, SOLUTION INTRAVENOUS CONTINUOUS
Status: CANCELLED | OUTPATIENT
Start: 2024-08-12

## 2024-08-12 RX ORDER — ASPIRIN 81 MG/1
81 TABLET ORAL DAILY
COMMUNITY
Start: 2024-08-12

## 2024-08-12 RX ORDER — ROSUVASTATIN CALCIUM 40 MG/1
40 TABLET, COATED ORAL DAILY
Qty: 90 TABLET | Refills: 3 | Status: SHIPPED | OUTPATIENT
Start: 2024-08-12

## 2024-08-12 ASSESSMENT — PAIN SCALES - GENERAL: PAINLEVEL: NO PAIN (0)
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
## 2024-08-12 NOTE — NURSING NOTE
Chief Complaint   Patient presents with    Follow Up      TAVR H&P       Vitals were taken, medications reconciled.     Alistair Vincent, Visit Facilitator    1:23 PM

## 2024-08-12 NOTE — PATIENT INSTRUCTIONS
Transcatheter aortic valve replacement (TAVR)    Check in to the hospital, at 06:30.      Please disregard any Mychart messages or reminders in regards to ECHO scheduling, this is done as part of procedure.   If you are unsure if your check in time has changed, please call number [phone].    2nd floor (Main Level): [facility],     [facility], [place] [id number]   parking is available in front of the hospital, 24 hours a day  -  Stop at the Information Desk and the admissions desk in the lobby.    -------------------------------------------------------------------------  Nothing to eat after midnight  Clear liquids like water, apple juice or 7up/Sprite is OK up to two hours prior to your scheduled procedure.    You may take your medications in the morning with a sip of water.      *Please use the special cleansing wipes, received at your pre-op History and Physical, the night before and the morning of your procedure.    Please focus the use of these wipes from neck to groin, avoiding the face and genital area.    If you did NOT receive these special cleansing wipes at your pre-op History and Physical, then:   * Please use a special soap such as hibicleanse or chlorhexadine.  (This can be purchased, over the counter, at a pharmacy) If this is not available, please use an antibacterial soap.  * Take a shower, using soap, the night before the procedure, and the morning of the procedure.    Please focus the use of this soap from neck to groin, avoiding the face and genital area.    Contrast Allergy:   No    Medications Instructions:  Please TAKE the following NEW medications:  325 mg Asprin: Please take day before and day of procedure      Please TAKE the following medications morning of procedure as prescribed:    dorzolamide-timolol PF (COSOPT PF) 2-0.5 % opthalmic solution    letrozole (FEMARA)     metoprolol succinate ER (TOPROL XL)      Rosuvastatin 40 mg daily       Please HOLD the following medications morning of procedure:    BIOTIN PO, Take by mouth daily, Disp: , Rfl:     calcium 600 MG tablet    Calcium Carb-Cholecalciferol     Lactobacillus (PROBIOTIC CHILDRENS)     loratadine (CLARITIN)     Omega-3 Fatty Acids (FISH OIL ADULT GUMMIES PO)    Vitamin D3 (CHOLECALCIFEROL)         If any questions please contact:  Chandrika Harley RN  Structural Heart Care Coordinator  [facility]  Office: [phone]

## 2024-08-21 ENCOUNTER — TELEPHONE (OUTPATIENT)
Dept: CARDIOLOGY | Facility: CLINIC | Age: 69
End: 2024-08-21
Payer: MEDICARE

## 2024-08-21 NOTE — TELEPHONE ENCOUNTER
Telephone call to review pre-procedure instructions. All questions answered.    Chandrika Harley, RN, BSN  Lead Structural Heart Coordinator  TAVR, [employer]ra[employer] and Watchman
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
## 2024-08-26 ENCOUNTER — TELEPHONE (OUTPATIENT)
Dept: CARDIOLOGY | Facility: CLINIC | Age: 69
End: 2024-08-26
Payer: MEDICARE

## 2024-08-26 NOTE — TELEPHONE ENCOUNTER
Telephone call to Marni. Answered all questions for upcoming TAVR.                   Chandrika Harley, RN, BSN  Lead Structural Heart Coordinator  TAVR, [facility]

## 2024-08-27 ENCOUNTER — OFFICE VISIT (OUTPATIENT)
Dept: OPHTHALMOLOGY | Facility: CLINIC | Age: 69
End: 2024-08-27
Attending: OPHTHALMOLOGY
Payer: MEDICARE

## 2024-08-27 DIAGNOSIS — H40.003 GLAUCOMA SUSPECT OF BOTH EYES: Primary | ICD-10-CM

## 2024-08-27 DIAGNOSIS — H40.003 GLAUCOMA SUSPECT OF BOTH EYES: ICD-10-CM

## 2024-08-27 PROCEDURE — 92012 INTRM OPH EXAM EST PATIENT: CPT | Mod: GC | Performed by: OPHTHALMOLOGY

## 2024-08-27 PROCEDURE — G0463 HOSPITAL OUTPT CLINIC VISIT: HCPCS | Performed by: OPHTHALMOLOGY

## 2024-08-27 PROCEDURE — 92083 EXTENDED VISUAL FIELD XM: CPT | Performed by: OPHTHALMOLOGY

## 2024-08-27 PROCEDURE — 92133 CPTRZD OPH DX IMG PST SGM ON: CPT | Performed by: OPHTHALMOLOGY

## 2024-08-27 RX ORDER — DORZOLAMIDE HYDROCHLORIDE AND TIMOLOL MALEATE 20; 5 MG/ML; MG/ML
1 SOLUTION/ DROPS OPHTHALMIC 2 TIMES DAILY
Qty: 10 ML | Refills: 11 | Status: SHIPPED | OUTPATIENT
Start: 2024-08-27

## 2024-08-27 ASSESSMENT — VISUAL ACUITY
OS_PH_CC: 20/25
OS_PH_CC+: +1
OS_CC+: -1
OD_CC: 20/20
METHOD: SNELLEN - LINEAR
OS_CC: 20/30

## 2024-08-27 ASSESSMENT — TONOMETRY
OS_IOP_MMHG: 13
IOP_METHOD: APPLANATION
IOP_METHOD: APPLANATION
OD_IOP_MMHG: 10
OS_IOP_MMHG: 11
OD_IOP_MMHG: 14

## 2024-08-27 ASSESSMENT — REFRACTION_WEARINGRX
SPECS_TYPE: PAL
OS_CYLINDER: +1.50
OS_AXIS: 082
OD_SPHERE: -1.00
OD_CYLINDER: +0.50
OS_ADD: +2.50
OS_SPHERE: -3.00
OD_AXIS: 070
OD_ADD: +2.50

## 2024-08-27 ASSESSMENT — CONF VISUAL FIELD
METHOD: COUNTING FINGERS
OD_SUPERIOR_TEMPORAL_RESTRICTION: 0
OD_INFERIOR_TEMPORAL_RESTRICTION: 0
OS_INFERIOR_NASAL_RESTRICTION: 0
OS_SUPERIOR_NASAL_RESTRICTION: 0
OS_SUPERIOR_TEMPORAL_RESTRICTION: 0
OS_NORMAL: 1
OS_INFERIOR_TEMPORAL_RESTRICTION: 0
OD_SUPERIOR_NASAL_RESTRICTION: 0
OD_INFERIOR_NASAL_RESTRICTION: 0
OD_NORMAL: 1

## 2024-08-27 ASSESSMENT — CUP TO DISC RATIO
OD_RATIO: 0.5
OS_RATIO: 0.6

## 2024-08-27 ASSESSMENT — SLIT LAMP EXAM - LIDS
COMMENTS: DERMATOCHALSIS
COMMENTS: DERMATOCHALASIS

## 2024-08-27 ASSESSMENT — EXTERNAL EXAM - LEFT EYE: OS_EXAM: NORMAL

## 2024-08-27 ASSESSMENT — EXTERNAL EXAM - RIGHT EYE: OD_EXAM: NORMAL

## 2024-08-27 NOTE — NURSING NOTE
Chief Complaints and History of Present Illnesses   Patient presents with    Glaucoma Follow-Up     Chief Complaint(s) and History of Present Illness(es)       Glaucoma Follow-Up              Laterality: both eyes    Associated symptoms: dryness.  Negative for eye pain, tearing, flashes, floaters, itching and burning    Pain scale: 0/10              Comments    Marni is here to continue care for being a glaucoma suspect of both eyes. She feels vision is stable.    Jarod Dickerson COT 11:14 AM August 27, 2024
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
## 2024-08-28 ENCOUNTER — DOCUMENTATION ONLY (OUTPATIENT)
Dept: OTHER | Facility: CLINIC | Age: 69
End: 2024-08-28

## 2024-08-28 ENCOUNTER — APPOINTMENT (OUTPATIENT)
Dept: MEDSURG UNIT | Facility: CLINIC | Age: 69
DRG: 267 | End: 2024-08-28
Attending: INTERNAL MEDICINE
Payer: MEDICARE

## 2024-08-28 ENCOUNTER — HOSPITAL ENCOUNTER (OUTPATIENT)
Dept: CARDIOLOGY | Facility: CLINIC | Age: 69
Discharge: HOME | DRG: 267 | End: 2024-08-28
Attending: INTERNAL MEDICINE | Admitting: INTERNAL MEDICINE
Payer: MEDICARE

## 2024-08-28 ENCOUNTER — HOSPITAL ENCOUNTER (INPATIENT)
Facility: CLINIC | Age: 69
LOS: 1 days | Discharge: HOME | DRG: 267 | End: 2024-08-29
Attending: INTERNAL MEDICINE | Admitting: INTERNAL MEDICINE
Payer: MEDICARE

## 2024-08-28 DIAGNOSIS — Z95.2 S/P TAVR (TRANSCATHETER AORTIC VALVE REPLACEMENT): Primary | ICD-10-CM

## 2024-08-28 DIAGNOSIS — Z95.2 HISTORY OF TRANSCATHETER AORTIC VALVE REPLACEMENT (TAVR): Primary | ICD-10-CM

## 2024-08-28 DIAGNOSIS — I35.0 SEVERE AORTIC STENOSIS: ICD-10-CM

## 2024-08-28 LAB
ABO/RH(D): NORMAL
ACT BLD: 111 SECONDS (ref 74–150)
ACT BLD: 262 SECONDS (ref 74–150)
ACT BLD: 391 SECONDS (ref 74–150)
ANION GAP SERPL CALCULATED.3IONS-SCNC: 9 MMOL/L (ref 7–15)
ANTIBODY SCREEN: NEGATIVE
BLD PROD TYP BPU: NORMAL
BLD PROD TYP BPU: NORMAL
BLOOD COMPONENT TYPE: NORMAL
BLOOD COMPONENT TYPE: NORMAL
BUN SERPL-MCNC: 21.1 MG/DL (ref 8–23)
CALCIUM SERPL-MCNC: 9.4 MG/DL (ref 8.8–10.4)
CHLORIDE SERPL-SCNC: 106 MMOL/L (ref 98–107)
CODING SYSTEM: NORMAL
CODING SYSTEM: NORMAL
CREAT SERPL-MCNC: 0.73 MG/DL (ref 0.51–0.95)
CROSSMATCH: NORMAL
CROSSMATCH: NORMAL
EGFRCR SERPLBLD CKD-EPI 2021: 89 ML/MIN/1.73M2
ERYTHROCYTE [DISTWIDTH] IN BLOOD BY AUTOMATED COUNT: 12.9 % (ref 10–15)
GLUCOSE SERPL-MCNC: 89 MG/DL (ref 70–99)
HCO3 SERPL-SCNC: 27 MMOL/L (ref 22–29)
HCT VFR BLD AUTO: 40.4 % (ref 35–47)
HGB BLD-MCNC: 13.6 G/DL (ref 11.7–15.7)
ISSUE DATE AND TIME: NORMAL
ISSUE DATE AND TIME: NORMAL
LVEF ECHO: NORMAL
MCH RBC QN AUTO: 31.7 PG (ref 26.5–33)
MCHC RBC AUTO-ENTMCNC: 33.7 G/DL (ref 31.5–36.5)
MCV RBC AUTO: 94 FL (ref 78–100)
PLATELET # BLD AUTO: 164 10E3/UL (ref 150–450)
POTASSIUM SERPL-SCNC: 3.8 MMOL/L (ref 3.4–5.3)
RBC # BLD AUTO: 4.29 10E6/UL (ref 3.8–5.2)
SODIUM SERPL-SCNC: 142 MMOL/L (ref 135–145)
SPECIMEN EXPIRATION DATE: NORMAL
UNIT ABO/RH: NORMAL
UNIT ABO/RH: NORMAL
UNIT NUMBER: NORMAL
UNIT NUMBER: NORMAL
UNIT STATUS: NORMAL
UNIT STATUS: NORMAL
UNIT TYPE ISBT: 5100
UNIT TYPE ISBT: 5100
WBC # BLD AUTO: 7.1 10E3/UL (ref 4–11)

## 2024-08-28 PROCEDURE — 93308 TTE F-UP OR LMTD: CPT | Mod: 26 | Performed by: INTERNAL MEDICINE

## 2024-08-28 PROCEDURE — 36415 COLL VENOUS BLD VENIPUNCTURE: CPT | Performed by: INTERNAL MEDICINE

## 2024-08-28 PROCEDURE — 93005 ELECTROCARDIOGRAM TRACING: CPT

## 2024-08-28 PROCEDURE — 02RF38Z REPLACEMENT OF AORTIC VALVE WITH ZOOPLASTIC TISSUE, PERCUTANEOUS APPROACH: ICD-10-PCS | Performed by: INTERNAL MEDICINE

## 2024-08-28 PROCEDURE — 999N000054 HC STATISTIC EKG NON-CHARGEABLE

## 2024-08-28 PROCEDURE — 250N000013 HC RX MED GY IP 250 OP 250 PS 637: Performed by: NURSE PRACTITIONER

## 2024-08-28 PROCEDURE — P9016 RBC LEUKOCYTES REDUCED: HCPCS | Performed by: INTERNAL MEDICINE

## 2024-08-28 PROCEDURE — 272N000001 HC OR GENERAL SUPPLY STERILE: Performed by: INTERNAL MEDICINE

## 2024-08-28 PROCEDURE — 999N000142 HC STATISTIC PROCEDURE PREP ONLY

## 2024-08-28 PROCEDURE — 99152 MOD SED SAME PHYS/QHP 5/>YRS: CPT | Mod: GC | Performed by: INTERNAL MEDICINE

## 2024-08-28 PROCEDURE — 250N000011 HC RX IP 250 OP 636: Performed by: INTERNAL MEDICINE

## 2024-08-28 PROCEDURE — 99152 MOD SED SAME PHYS/QHP 5/>YRS: CPT | Performed by: INTERNAL MEDICINE

## 2024-08-28 PROCEDURE — 255N000002 HC RX 255 OP 636: Performed by: INTERNAL MEDICINE

## 2024-08-28 PROCEDURE — 86923 COMPATIBILITY TEST ELECTRIC: CPT | Performed by: INTERNAL MEDICINE

## 2024-08-28 PROCEDURE — B2111ZZ FLUOROSCOPY OF MULTIPLE CORONARY ARTERIES USING LOW OSMOLAR CONTRAST: ICD-10-PCS | Performed by: INTERNAL MEDICINE

## 2024-08-28 PROCEDURE — 4A023N7 MEASUREMENT OF CARDIAC SAMPLING AND PRESSURE, LEFT HEART, PERCUTANEOUS APPROACH: ICD-10-PCS | Performed by: INTERNAL MEDICINE

## 2024-08-28 PROCEDURE — 410N000003 HC PER-PERFUSION 1ST 30 MIN: Performed by: INTERNAL MEDICINE

## 2024-08-28 PROCEDURE — 93454 CORONARY ARTERY ANGIO S&I: CPT | Mod: 26 | Performed by: INTERNAL MEDICINE

## 2024-08-28 PROCEDURE — 250N000009 HC RX 250: Performed by: INTERNAL MEDICINE

## 2024-08-28 PROCEDURE — 86900 BLOOD TYPING SEROLOGIC ABO: CPT | Performed by: INTERNAL MEDICINE

## 2024-08-28 PROCEDURE — 33361 REPLACE AORTIC VALVE PERQ: CPT | Mod: 62 | Performed by: INTERNAL MEDICINE

## 2024-08-28 PROCEDURE — C1769 GUIDE WIRE: HCPCS | Performed by: INTERNAL MEDICINE

## 2024-08-28 PROCEDURE — 250N000013 HC RX MED GY IP 250 OP 250 PS 637: Performed by: INTERNAL MEDICINE

## 2024-08-28 PROCEDURE — C1760 CLOSURE DEV, VASC: HCPCS | Performed by: INTERNAL MEDICINE

## 2024-08-28 PROCEDURE — 120N000005 HC R&B MS OVERFLOW UMMC

## 2024-08-28 PROCEDURE — 85027 COMPLETE CBC AUTOMATED: CPT | Performed by: INTERNAL MEDICINE

## 2024-08-28 PROCEDURE — C1894 INTRO/SHEATH, NON-LASER: HCPCS | Performed by: INTERNAL MEDICINE

## 2024-08-28 PROCEDURE — 85347 COAGULATION TIME ACTIVATED: CPT

## 2024-08-28 PROCEDURE — 33361 REPLACE AORTIC VALVE PERQ: CPT | Mod: 62 | Performed by: SURGERY

## 2024-08-28 PROCEDURE — 999N000133 HC STATISTIC PP CARE STAGE 2

## 2024-08-28 PROCEDURE — C1730 CATH, EP, 19 OR FEW ELECT: HCPCS | Performed by: INTERNAL MEDICINE

## 2024-08-28 PROCEDURE — 93325 DOPPLER ECHO COLOR FLOW MAPG: CPT | Mod: 26 | Performed by: INTERNAL MEDICINE

## 2024-08-28 PROCEDURE — 80048 BASIC METABOLIC PNL TOTAL CA: CPT | Performed by: INTERNAL MEDICINE

## 2024-08-28 PROCEDURE — 93325 DOPPLER ECHO COLOR FLOW MAPG: CPT

## 2024-08-28 PROCEDURE — 258N000003 HC RX IP 258 OP 636: Performed by: INTERNAL MEDICINE

## 2024-08-28 PROCEDURE — 93321 DOPPLER ECHO F-UP/LMTD STD: CPT | Mod: 26 | Performed by: INTERNAL MEDICINE

## 2024-08-28 PROCEDURE — 99153 MOD SED SAME PHYS/QHP EA: CPT | Performed by: INTERNAL MEDICINE

## 2024-08-28 PROCEDURE — C1889 IMPLANT/INSERT DEVICE, NOC: HCPCS | Performed by: INTERNAL MEDICINE

## 2024-08-28 PROCEDURE — 93010 ELECTROCARDIOGRAM REPORT: CPT | Performed by: INTERNAL MEDICINE

## 2024-08-28 PROCEDURE — 33361 REPLACE AORTIC VALVE PERQ: CPT | Performed by: INTERNAL MEDICINE

## 2024-08-28 PROCEDURE — 99223 1ST HOSP IP/OBS HIGH 75: CPT | Mod: 25 | Performed by: NURSE PRACTITIONER

## 2024-08-28 DEVICE — VALVE AORTIC TRANSCATHETER HEART 23MM SAPIEN 3 ULTRA RESILIA: Type: IMPLANTABLE DEVICE | Status: FUNCTIONAL

## 2024-08-28 DEVICE — CLOSURE ANGIOSEAL 6FR 610130: Type: IMPLANTABLE DEVICE | Status: FUNCTIONAL

## 2024-08-28 RX ORDER — FENTANYL CITRATE-0.9 % NACL/PF 10 MCG/ML
PLASTIC BAG, INJECTION (ML) INTRAVENOUS
Status: DISCONTINUED | OUTPATIENT
Start: 2024-08-28 | End: 2024-08-28 | Stop reason: HOSPADM

## 2024-08-28 RX ORDER — HEPARIN SODIUM 1000 [USP'U]/ML
INJECTION, SOLUTION INTRAVENOUS; SUBCUTANEOUS
Status: DISCONTINUED | OUTPATIENT
Start: 2024-08-28 | End: 2024-08-28 | Stop reason: HOSPADM

## 2024-08-28 RX ORDER — ASPIRIN 81 MG/1
81 TABLET ORAL DAILY
Status: DISCONTINUED | OUTPATIENT
Start: 2024-08-28 | End: 2024-08-28

## 2024-08-28 RX ORDER — FENTANYL CITRATE 50 UG/ML
INJECTION, SOLUTION INTRAMUSCULAR; INTRAVENOUS
Status: DISCONTINUED | OUTPATIENT
Start: 2024-08-28 | End: 2024-08-28 | Stop reason: HOSPADM

## 2024-08-28 RX ORDER — ASPIRIN 325 MG
325 TABLET ORAL DAILY
Status: DISCONTINUED | OUTPATIENT
Start: 2024-08-28 | End: 2024-08-28 | Stop reason: HOSPADM

## 2024-08-28 RX ORDER — LETROZOLE 2.5 MG/1
2.5 TABLET, FILM COATED ORAL DAILY
Status: DISCONTINUED | OUTPATIENT
Start: 2024-08-29 | End: 2024-08-29 | Stop reason: HOSPADM

## 2024-08-28 RX ORDER — LORATADINE 10 MG/1
10 TABLET ORAL DAILY PRN
Status: DISCONTINUED | OUTPATIENT
Start: 2024-08-28 | End: 2024-08-29 | Stop reason: HOSPADM

## 2024-08-28 RX ORDER — ACETAMINOPHEN 325 MG/1
650 TABLET ORAL EVERY 4 HOURS PRN
Status: DISCONTINUED | OUTPATIENT
Start: 2024-08-28 | End: 2024-08-29

## 2024-08-28 RX ORDER — PROTAMINE SULFATE 10 MG/ML
INJECTION, SOLUTION INTRAVENOUS
Status: DISCONTINUED | OUTPATIENT
Start: 2024-08-28 | End: 2024-08-28 | Stop reason: HOSPADM

## 2024-08-28 RX ORDER — DORZOLAMIDE HYDROCHLORIDE AND TIMOLOL MALEATE 20; 5 MG/ML; MG/ML
1 SOLUTION/ DROPS OPHTHALMIC 2 TIMES DAILY
Status: DISCONTINUED | OUTPATIENT
Start: 2024-08-28 | End: 2024-08-29 | Stop reason: HOSPADM

## 2024-08-28 RX ORDER — HYDRALAZINE HYDROCHLORIDE 20 MG/ML
10 INJECTION INTRAMUSCULAR; INTRAVENOUS
Status: DISCONTINUED | OUTPATIENT
Start: 2024-08-28 | End: 2024-08-28

## 2024-08-28 RX ORDER — ONDANSETRON 2 MG/ML
INJECTION INTRAMUSCULAR; INTRAVENOUS
Status: DISCONTINUED | OUTPATIENT
Start: 2024-08-28 | End: 2024-08-28 | Stop reason: HOSPADM

## 2024-08-28 RX ORDER — NITROGLYCERIN 0.4 MG/1
0.4 TABLET SUBLINGUAL EVERY 5 MIN PRN
Status: DISCONTINUED | OUTPATIENT
Start: 2024-08-28 | End: 2024-08-29 | Stop reason: HOSPADM

## 2024-08-28 RX ORDER — CEFAZOLIN SODIUM 2 G/100ML
2 INJECTION, SOLUTION INTRAVENOUS
Status: COMPLETED | OUTPATIENT
Start: 2024-08-28 | End: 2024-08-28

## 2024-08-28 RX ORDER — ROSUVASTATIN CALCIUM 40 MG/1
40 TABLET, COATED ORAL EVERY EVENING
Status: DISCONTINUED | OUTPATIENT
Start: 2024-08-28 | End: 2024-08-29 | Stop reason: HOSPADM

## 2024-08-28 RX ORDER — LIDOCAINE 40 MG/G
CREAM TOPICAL
Status: DISCONTINUED | OUTPATIENT
Start: 2024-08-28 | End: 2024-08-28 | Stop reason: HOSPADM

## 2024-08-28 RX ORDER — IOPAMIDOL 510 MG/ML
INJECTION, SOLUTION INTRAVASCULAR
Status: DISCONTINUED | OUTPATIENT
Start: 2024-08-28 | End: 2024-08-28 | Stop reason: HOSPADM

## 2024-08-28 RX ORDER — NOREPINEPHRINE BITARTRATE 0.06 MG/ML
.01-.6 INJECTION, SOLUTION INTRAVENOUS CONTINUOUS PRN
Status: DISCONTINUED | OUTPATIENT
Start: 2024-08-28 | End: 2024-08-28 | Stop reason: HOSPADM

## 2024-08-28 RX ORDER — VITAMIN B COMPLEX
50 TABLET ORAL EVERY EVENING
Status: DISCONTINUED | OUTPATIENT
Start: 2024-08-28 | End: 2024-08-29 | Stop reason: HOSPADM

## 2024-08-28 RX ORDER — SODIUM CHLORIDE 9 MG/ML
INJECTION, SOLUTION INTRAVENOUS CONTINUOUS
Status: DISCONTINUED | OUTPATIENT
Start: 2024-08-28 | End: 2024-08-28 | Stop reason: HOSPADM

## 2024-08-28 RX ORDER — EPINEPHRINE IN 0.9 % SOD CHLOR 5 MG/250ML
.03-.3 PLASTIC BAG, INJECTION (ML) INTRAVENOUS CONTINUOUS PRN
Status: DISCONTINUED | OUTPATIENT
Start: 2024-08-28 | End: 2024-08-28 | Stop reason: HOSPADM

## 2024-08-28 RX ORDER — ROSUVASTATIN CALCIUM 40 MG/1
40 TABLET, COATED ORAL DAILY
Status: DISCONTINUED | OUTPATIENT
Start: 2024-08-28 | End: 2024-08-28

## 2024-08-28 RX ORDER — NITROGLYCERIN 5 MG/ML
VIAL (ML) INTRAVENOUS
Status: DISCONTINUED | OUTPATIENT
Start: 2024-08-28 | End: 2024-08-28 | Stop reason: HOSPADM

## 2024-08-28 RX ORDER — ASPIRIN 81 MG/1
81 TABLET ORAL DAILY
Status: DISCONTINUED | OUTPATIENT
Start: 2024-08-29 | End: 2024-08-29 | Stop reason: HOSPADM

## 2024-08-28 RX ORDER — HYDRALAZINE HYDROCHLORIDE 20 MG/ML
10 INJECTION INTRAMUSCULAR; INTRAVENOUS EVERY 4 HOURS PRN
Status: DISCONTINUED | OUTPATIENT
Start: 2024-08-28 | End: 2024-08-29 | Stop reason: HOSPADM

## 2024-08-28 RX ADMIN — ACETAMINOPHEN 650 MG: 325 TABLET, FILM COATED ORAL at 15:54

## 2024-08-28 RX ADMIN — ACETAMINOPHEN 650 MG: 325 TABLET, FILM COATED ORAL at 20:08

## 2024-08-28 RX ADMIN — ACETAMINOPHEN 650 MG: 325 TABLET, FILM COATED ORAL at 11:12

## 2024-08-28 RX ADMIN — Medication 2 G: at 08:54

## 2024-08-28 RX ADMIN — Medication 50 MCG: at 19:24

## 2024-08-28 RX ADMIN — ROSUVASTATIN CALCIUM 40 MG: 40 TABLET, COATED ORAL at 19:24

## 2024-08-28 RX ADMIN — SODIUM CHLORIDE: 9 INJECTION, SOLUTION INTRAVENOUS at 08:07

## 2024-08-28 RX ADMIN — Medication 600 MG: at 19:24

## 2024-08-28 ASSESSMENT — ACTIVITIES OF DAILY LIVING (ADL)
ADLS_ACUITY_SCORE: 22
ADLS_ACUITY_SCORE: 35
ADLS_ACUITY_SCORE: 22
ADLS_ACUITY_SCORE: 22
ADLS_ACUITY_SCORE: 35
ADLS_ACUITY_SCORE: 22

## 2024-08-28 ASSESSMENT — VISUAL ACUITY
OU: NORMAL ACUITY

## 2024-08-28 NOTE — Clinical Note
Potential access sites were evaluated for patency using ultrasound.   The right femoral artery was selected. Access was obtained under with Sonosite and Fluoroscopic guidance using a micropuncture 21 guage needle with direct visualization of needle entry.

## 2024-08-28 NOTE — PHARMACY-ADMISSION MEDICATION HISTORY
Pharmacist Admission Medication History    Admission medication history is complete. The information provided in this note is only as accurate as the sources available at the time of the update.    Information Source(s): [facility] for med list and dispense records, pre-procedure nurse for times of last doses    Pertinent Information: none additional    Changes made to PTA medication list:  Added: None  Deleted: None  Changed: None    Allergies reviewed with patient and updates made in EHR: no    Medication History Completed By: Manuel Rodrigues [facility] 8/28/2024 12:51 PM    PTA Med List   Medication Sig Last Dose    aspirin 81 MG EC tablet Take 1 tablet (81 mg) by mouth daily 8/28/2024 at 0500    BIOTIN PO Take by mouth daily 8/27/2024 at 1400    Calcium Carb-Cholecalciferol 600-25 MG-MCG CAPS Take 1 capsule by mouth every morning 8/27/2024 at 2100    dorzolamide-timolol (COSOPT) 2-0.5 % ophthalmic solution Place 1 drop into both eyes 2 times daily. 8/28/2024 at 0500    Lactobacillus (PROBIOTIC CHILDRENS) CHEW Take 1 chew tab by mouth 2 times daily 8/27/2024 at 2100    letrozole (FEMARA) 2.5 MG tablet Take 1 tablet (2.5 mg) by mouth daily 8/28/2024 at 0500    loratadine (CLARITIN) 10 MG tablet Take 10 mg by mouth daily as needed for allergies Seasonal for ragweed and lilacs Past Week    metoprolol succinate ER (TOPROL XL) 25 MG 24 hr tablet Take 1 tablet (25 mg) by mouth every evening 7PM 8/27/2024 at 1900    rosuvastatin (CRESTOR) 40 MG tablet Take 1 tablet (40 mg) by mouth daily 8/27/2024 at 2100    Vitamin D3 (CHOLECALCIFEROL) 25 mcg (1000 units) tablet Take 50 mcg by mouth every evening 8/27/2024 at 0800

## 2024-08-28 NOTE — PROGRESS NOTES
D/I/A: Pt roomed on 2A room 12.  Arrived via litter and accompanied by Cath lab RN On/Off: On monitor.  VSSA.  Rhythm upon arrival Sinus rhythm on monitor.  Denies pain or sob.  Reviewed activity restrictions and when to notify RN, ie-changes to breathing or increased chest pressure or chest pain.  CCL access:  Right and left groin, pt on bedrest until 1430.  P: Continue to monitor status.  Discharge to home once meeting criteria.

## 2024-08-28 NOTE — PLAN OF CARE
Transfer from  s/p TAVR    Neuro: a/o x 4, Neuros intact  Cardiac: SR/SB  Respiratory: Room air  GI: Small BM this morning per patient  : Voiding spontaneously   Activity: Up independent  Pain: Bilateral knee pain arthritis.   LDA: Left PIV  Plan: Echo and EKG in the morning.       Groin sites without hematoma/bleeding.  Tenderness on right groin on ambulation. Bilateral extremities EMS intact.

## 2024-08-28 NOTE — DISCHARGE INSTRUCTIONS
Going home after Transcatheter Aortic Valve Replacement (TAVR)  Femoral Access    You have small procedure sites at both groins, the one on the right is slightly bigger. You may have small amounts of bruising or discomfort, this is expected. If you develop worse swelling, redness and warmth that does not go away, fever and chills, Increasing numbness in your legs, worsening pain at the site then you need to contact your nurse coordinator.    You may shower, but do not soak in a bath tub or pool or apply lotions, ointments, powder, etc for 3-5 days or until sites look healed. If your procedure site has a clear dressing, that can removed 2 days after your procedure.    Activity: No lifting, pushing, pulling more than 10 pounds (examples to avoid: groceries, vacuuming, gardening, golfing, driving): For one week with a procedure through the groin.    After the initial healing process of the access site, we recommend cardiac rehabilitation for all TAVR patients. Cardiac rehabilitation will help you:  - Rebuild stamina, strength and balance.  - Learn how to participate in activities safely, as well as help you regain confidence to do so.  - Return to activities of daily living and leisure.  Please contact Cardiac rehabilitation central scheduling to arrange at [phone] or [phone]    We prefer you to follow up with your primary care provider within 2 weeks. You will be arranged to see the TAVR clinic in month. At that time you will have a repeat ultrasound of the heart to look at the valve.     Should you have any questions or concerns during normal business hours please contact your assigned TAVR nurse coordinator: Chandrika or Yolanda  Call [phone] and ask to speak with your coordinator, if you do not know your coordinator's name please ask for Chandrika).    For any concerns after 4:30 pm weekdays or anytime weekend, please call [phone] and ask to speak with the on-call cardiologist.

## 2024-08-28 NOTE — Clinical Note
dry, intact, no bleeding and no hematoma. LFV 6Fr- removed, pressure held to hemostasis, tegaderm.  LFA 6Fr- removed, angioseal deployed, tegaderm.  RFA 14Fr- removed, perclose x2, pressure held, dermabond.

## 2024-08-28 NOTE — PROGRESS NOTES
Pt arrived to 2A from home for Coronary angiogram and TAVR. VSS. Report mild pain on right knee . Blood extention form sent to blood bank.  Consent obtained . Waiting for lab to result. Echo and EKG completed. H&P current. Allergies reviewed with pt. Appropriately NPO.  Prep completed.  Health care directive emailed honoring choices.

## 2024-08-28 NOTE — DISCHARGE SUMMARY
[address] [address]  p: [phone]    Discharge Summary: Cardiology Service    Vickie Alvarado MRN# [id number]   YOB: 1955 Age: 69 year old       Admission Date: 08/28/2024  Discharge Date: 08/29/2024    Discharge Diagnoses:  # Severe aortic stenosis, now s/p Transcatheter Aortic Valve Replacement.   # Chronic Diastolic Heart Failure 2/2 valvular CM    Brief HPI:  Marni Alvarado is a 69 y.o.F with a PMHx of severe aortic stenosis, frequent PVC's, HLD, breast cancer s/p chemo/radiation/lumpectomy, chronic nerve pain LLE, RA, admitted 8/28 for planned TAVR procedure.     Hospital Course by Diagnosis:  # Severe aortic stenosis, now s/p Transcatheter Aortic Valve Replacement.   # Chronic Diastolic Heart Failure 2/2 valvular CM  Patient with severe aortic stenosis characterized with mean gradient 57 mmHG, MARSHALL 0.8 Cm^2, peak velocity  4.9 M/s.  Now s/p TAVR with a 23 mm (+ 2 ml) Durán cely valve with marked improvement in heart failure symptoms. CT TAVR showed possible mild-moderate non-obstructive CAD, coronary angiogram completed at time of TAVR, with clean cors, no CAD. Overnight/early am patient with swelling in L)groin, while pressure held (manual and femstop), possible vasovagal episode, pt hypotensive and bradycardic. She received 1L fluids, with stabilized BP and HR. US negative for pseudoaneursym, CT ABD/Pelvis with no bleed. Site CDI, soft, no swelling this am. No arrhythmias. Neuro's intact. POD 1 echo with mild PVL, MG 14 mm Hg     - ASA for anti-platelet therapy  - Cardiac rehab  - Lifelong antibiotic prophylaxis prior to all dental procedures.  - Structural Cardiology 1-2 weeks and 1 month with repeat echo prior to 1 month appointment for TAVR follow up      # Chronic Conditions  - Freq PVC's: Continue PTA Toprol 25 mg daily  - HLD: Continue PTA Crestor 40 mg daily  - Chronic Nerve LLE pain: PRN Tylenol  - RA: PRN  Tylenol  - Breast CA s/p chemo/radiation/lumpectomy: Continue PTA Letrozole     Pertinent Procedures:  1. TAVR 23 mm ES3 valve 8/28  2. Coronary Angiogram 8/28    Medication Changes:  None    Discharge medications:   Current Discharge Medication List        CONTINUE these medications which have NOT CHANGED    Details   aspirin 81 MG EC tablet Take 1 tablet (81 mg) by mouth daily      BIOTIN PO Take by mouth daily      Calcium Carb-Cholecalciferol 600-25 MG-MCG CAPS Take 1 capsule by mouth every morning  Qty:      Associated Diagnoses: Low bone density      dorzolamide-timolol (COSOPT) 2-0.5 % ophthalmic solution Place 1 drop into both eyes 2 times daily.  Qty: 10 mL, Refills: 11    Associated Diagnoses: Glaucoma suspect of both eyes      Lactobacillus (PROBIOTIC CHILDRENS) CHEW Take 1 chew tab by mouth 2 times daily  Qty:      Associated Diagnoses: Encounter for preventive care      letrozole (FEMARA) 2.5 MG tablet Take 1 tablet (2.5 mg) by mouth daily  Qty: 30 tablet, Refills: 11    Associated Diagnoses: Malignant neoplasm of upper-inner quadrant of left breast in female, estrogen receptor positive (H)      loratadine (CLARITIN) 10 MG tablet Take 10 mg by mouth daily as needed for allergies Seasonal for ragweed and lilacs      metoprolol succinate ER (TOPROL XL) 25 MG 24 hr tablet Take 1 tablet (25 mg) by mouth every evening 7PM  Qty: 90 tablet, Refills: 3    Associated Diagnoses: PVC's (premature ventricular contractions)      rosuvastatin (CRESTOR) 40 MG tablet Take 1 tablet (40 mg) by mouth daily  Qty: 90 tablet, Refills: 3    Associated Diagnoses: Coronary artery disease involving native coronary artery of native heart without angina pectoris      Vitamin D3 (CHOLECALCIFEROL) 25 mcg (1000 units) tablet Take 50 mcg by mouth every evening      calcium 600 MG tablet Take 1 tablet by mouth every evening             Follow-up:  - PCP 7-10 days for post hospitalization visit  - Structural Cardiology 1-2 weeks and 1  month with repeat echo prior to 1 month appointment for TAVR follow up    Labs or imaging requiring follow-up after discharge:  Echocardiogram 1 month    Code status:  Full    Condition on discharge  Temp:  [97.8  F (36.6  C)-98  F (36.7  C)] 97.8  F (36.6  C)  Pulse:  [56-70] 56  Resp:  [10-16] 16  BP: (106-138)/(57-80) 114/57  Cuff Mean (mmHg):  [79] 79  SpO2:  [92 %-97 %] 95 %  General: Alert, interactive, NAD  Eyes: sclera anicteric, EOMI  Neck: no JVD, carotid 2+ bilaterally  Cardiovascular: regular rate and rhythm, normal S1 and S2, no murmurs, gallops, or rubs  Resp: clear to auscultation bilaterally, no rales, wheezes, or rhonchi  GI: Soft, nontender, nondistended. +BS.  No HSM or masses, no rebound or guarding.  Extremities: no edema, no cyanosis or clubbing, dorsalis pedis and posterior tibialis pulses 2+ bilaterally  Skin: Warm and dry, no jaundice or rash, bilateral groin incisions, R>L, CDI, soft, LLE tender to touch, no swelling, mild bruising, no signs of hematoma  Neuro: CN 2-12 intact, moves all extremities equally  Psych: Alert & oriented x 3    Imaging with results:  Echocardiogram 8/28 (intra op):  Interpretation Summary  Intraprocedural TTE for TAVR with 23 mm Durán Danial 3 Ultra Resilia prosthesis.     Preprocedural images show a severely calcified aortic valve with severe AS  (peak velocity 4.5 m/s, mean gradient 46 mmHg) and mild AI.  Postprocedural images show well-seated 23 mm Durán Danial 3 Ultra Resilia  TAVR bioprosthesis, with resolution of aortic stenosis (peak velocity 1.7 m/s,  mean gradient 6 mmHg.) There is mild paravalular AI at 2 o'clock on the short  axis view. There is mild valvular AI associated with a guidewire crossing the  aortic valve at the time of the procedure.  There is no pericardial effusion on the pre- or postprocedural images.     This study was compared with the study from 7/5/24: There has been interval  TAVR with resolution of aortic stenosis on the  postprocedural images.    Echocardiogram 8/29 (POD 1):  Interpretation Summary  S/P TAVR with 23 mm Durán Danial 3 Ultra Resilia prosthesis (8/28/2024) The  prosthetic aortic valve is well-seated. There is trace to mild paravalvular  regurgitation.  The mean gradient is 14 mmHg.     Left ventricular size, wall motion and function are normal. The ejection  fraction is 60-65%.  Right ventricular function, chamber size, wall motion, and thickness are  normal.  The inferior vena cava was normal in size with preserved respiratory  variability. No pericardial effusion is present.     Patient is s/p TAVR    US LLE 8/29/2024:  IMPRESSION: No pseudoaneurysm or arteriovenous fistula demonstrated in  the left groin. No well formed fluid collection.    CT Abdomen/Pelvis 8/29/2024:  Final report pending; prelim readings given to night team with no RP bleed    Coronary Angiogram 8/28/2024:  Coronary Findings    Diagnostic  Dominance: Right  Left Main   The vessel is moderate in size.      Left Anterior Descending   The vessel is moderate in size.      Second Diagonal Branch   The vessel is moderate in size. The vessel exhibits minimal luminal irregularities.      Third Diagonal Branch   The vessel is small. The vessel exhibits minimal luminal irregularities.      Left Circumflex   The vessel is moderate in size.      First Obtuse Marginal Branch   The vessel is small. The vessel exhibits minimal luminal irregularities.      Second Obtuse Marginal Branch   The vessel is moderate in size.      Third Obtuse Marginal Branch   The vessel is small. The vessel exhibits minimal luminal irregularities.      Right Coronary Artery   The vessel is moderate in size.      Acute Marginal Branch   The vessel is moderate in size. The vessel exhibits minimal luminal irregularities.      Right Posterior Descending Artery   The vessel is moderate in size. The vessel exhibits minimal luminal irregularities.      Right Posterior Atrioventricular  Artery   The vessel is small. The vessel exhibits minimal luminal irregularities.         Intervention   No interventions have been documented.       Other imaging studies:  EKG 8/28/2024: SB HR 52      Patient Care Team:  Gaby Lynn MD as PCP - General (Family Medicine)  Gaby Lynn MD as Referring Physician (Family Practice)  Hiro Christian MD as MD (Ophthalmology)  Yuri Sarah MD as MD (Ophthalmology)  Gaby Lynn MD as Assigned PCP  Nicole Brooke MD as MD (Cardiovascular Disease)  Nicole Brooke MD as Assigned Heart and Vascular Provider  Chandrika Horvath MD as MD (Hematology & Oncology)  Conchis Murphy MD as MD (Radiation Oncology)  Hector Choi MD as MD (Ophthalmology)  Hector Choi MD as Assigned Surgical Provider  Carol King, RN as Specialty Care Coordinator (Hematology & Oncology)  Chandrika Horvath MD as Assigned Cancer Care Provider  Chandrika Harley RN as Registered Nurse (Cardiology)  Chandrika Harley RN as Registered Nurse (Cardiology)  Joyce Herrera NP as Nurse Practitioner (Cardiovascular Disease)  Moises Foley MD as MD (Pulmonary Disease)    Karlie MCGINNIS CNP  [facility]  Patient discussed with staff cardiologist, Dr. Tinajero, who agrees with the above documentation and plan. Documentation represents joint decision making.     Time Spent on this Encounter   I, Kavita Alonso CNP, personally saw the patient today and spent greater than 30 minutes discharging this patient.   Physician Attestation   I have reviewed and discussed with the advanced practice provider their history, physical and plan for Vickie Alvarado. I did not participate in a shared visit by interviewing or examining the patient and this should be billed as an advanced practice provider only visit.    Jesús Tinajero MD  Interventional Cardiology

## 2024-08-28 NOTE — Clinical Note
The left DP pulse is 2+. The right DP pulse is detected w/ doppler. The left PT pulse is 2+. The right PT pulse is 1+.

## 2024-08-29 ENCOUNTER — APPOINTMENT (OUTPATIENT)
Dept: CT IMAGING | Facility: CLINIC | Age: 69
DRG: 267 | End: 2024-08-29
Payer: MEDICARE

## 2024-08-29 ENCOUNTER — APPOINTMENT (OUTPATIENT)
Dept: CARDIOLOGY | Facility: CLINIC | Age: 69
DRG: 267 | End: 2024-08-29
Attending: NURSE PRACTITIONER
Payer: MEDICARE

## 2024-08-29 ENCOUNTER — APPOINTMENT (OUTPATIENT)
Dept: OCCUPATIONAL THERAPY | Facility: CLINIC | Age: 69
DRG: 267 | End: 2024-08-29
Attending: NURSE PRACTITIONER
Payer: MEDICARE

## 2024-08-29 ENCOUNTER — APPOINTMENT (OUTPATIENT)
Dept: ULTRASOUND IMAGING | Facility: CLINIC | Age: 69
DRG: 267 | End: 2024-08-29
Payer: MEDICARE

## 2024-08-29 VITALS
TEMPERATURE: 98.2 F | OXYGEN SATURATION: 95 % | WEIGHT: 193 LBS | DIASTOLIC BLOOD PRESSURE: 63 MMHG | HEART RATE: 85 BPM | RESPIRATION RATE: 20 BRPM | BODY MASS INDEX: 31.15 KG/M2 | SYSTOLIC BLOOD PRESSURE: 129 MMHG

## 2024-08-29 LAB
ABO/RH(D): NORMAL
ANION GAP SERPL CALCULATED.3IONS-SCNC: 9 MMOL/L (ref 7–15)
ANION GAP SERPL CALCULATED.3IONS-SCNC: 9 MMOL/L (ref 7–15)
ANTIBODY SCREEN: NEGATIVE
ATRIAL RATE - MUSE: 52 BPM
ATRIAL RATE - MUSE: 61 BPM
ATRIAL RATE - MUSE: 61 BPM
ATRIAL RATE - MUSE: 64 BPM
BUN SERPL-MCNC: 11.1 MG/DL (ref 8–23)
BUN SERPL-MCNC: 12.1 MG/DL (ref 8–23)
CALCIUM SERPL-MCNC: 8.6 MG/DL (ref 8.8–10.4)
CALCIUM SERPL-MCNC: 9.1 MG/DL (ref 8.8–10.4)
CHLORIDE SERPL-SCNC: 108 MMOL/L (ref 98–107)
CHLORIDE SERPL-SCNC: 109 MMOL/L (ref 98–107)
CREAT SERPL-MCNC: 0.55 MG/DL (ref 0.51–0.95)
CREAT SERPL-MCNC: 0.62 MG/DL (ref 0.51–0.95)
DIASTOLIC BLOOD PRESSURE - MUSE: NORMAL MMHG
EGFRCR SERPLBLD CKD-EPI 2021: >90 ML/MIN/1.73M2
EGFRCR SERPLBLD CKD-EPI 2021: >90 ML/MIN/1.73M2
ERYTHROCYTE [DISTWIDTH] IN BLOOD BY AUTOMATED COUNT: 12.7 % (ref 10–15)
ERYTHROCYTE [DISTWIDTH] IN BLOOD BY AUTOMATED COUNT: 12.9 % (ref 10–15)
GLUCOSE BLDC GLUCOMTR-MCNC: 100 MG/DL (ref 70–99)
GLUCOSE SERPL-MCNC: 116 MG/DL (ref 70–99)
GLUCOSE SERPL-MCNC: 97 MG/DL (ref 70–99)
HCO3 SERPL-SCNC: 22 MMOL/L (ref 22–29)
HCO3 SERPL-SCNC: 24 MMOL/L (ref 22–29)
HCT VFR BLD AUTO: 34.4 % (ref 35–47)
HCT VFR BLD AUTO: 35.2 % (ref 35–47)
HGB BLD-MCNC: 11.5 G/DL (ref 11.7–15.7)
HGB BLD-MCNC: 11.9 G/DL (ref 11.7–15.7)
HOLD SPECIMEN: NORMAL
INTERPRETATION ECG - MUSE: NORMAL
LACTATE SERPL-SCNC: 0.8 MMOL/L (ref 0.7–2)
LVEF ECHO: NORMAL
MAGNESIUM SERPL-MCNC: 1.7 MG/DL (ref 1.7–2.3)
MCH RBC QN AUTO: 31.3 PG (ref 26.5–33)
MCH RBC QN AUTO: 31.7 PG (ref 26.5–33)
MCHC RBC AUTO-ENTMCNC: 33.4 G/DL (ref 31.5–36.5)
MCHC RBC AUTO-ENTMCNC: 33.8 G/DL (ref 31.5–36.5)
MCV RBC AUTO: 94 FL (ref 78–100)
MCV RBC AUTO: 94 FL (ref 78–100)
P AXIS - MUSE: 16 DEGREES
P AXIS - MUSE: 41 DEGREES
P AXIS - MUSE: 85 DEGREES
P AXIS - MUSE: 85 DEGREES
PLATELET # BLD AUTO: 105 10E3/UL (ref 150–450)
PLATELET # BLD AUTO: 137 10E3/UL (ref 150–450)
POTASSIUM SERPL-SCNC: 3.5 MMOL/L (ref 3.4–5.3)
POTASSIUM SERPL-SCNC: 3.6 MMOL/L (ref 3.4–5.3)
PR INTERVAL - MUSE: 136 MS
PR INTERVAL - MUSE: 160 MS
QRS DURATION - MUSE: 84 MS
QRS DURATION - MUSE: 86 MS
QT - MUSE: 372 MS
QT - MUSE: 402 MS
QT - MUSE: 402 MS
QT - MUSE: 424 MS
QTC - MUSE: 345 MS
QTC - MUSE: 404 MS
QTC - MUSE: 404 MS
QTC - MUSE: 437 MS
R AXIS - MUSE: -10 DEGREES
R AXIS - MUSE: -12 DEGREES
R AXIS - MUSE: 76 DEGREES
R AXIS - MUSE: 76 DEGREES
RBC # BLD AUTO: 3.67 10E6/UL (ref 3.8–5.2)
RBC # BLD AUTO: 3.75 10E6/UL (ref 3.8–5.2)
SODIUM SERPL-SCNC: 140 MMOL/L (ref 135–145)
SODIUM SERPL-SCNC: 141 MMOL/L (ref 135–145)
SPECIMEN EXPIRATION DATE: NORMAL
SYSTOLIC BLOOD PRESSURE - MUSE: NORMAL MMHG
T AXIS - MUSE: 18 DEGREES
T AXIS - MUSE: 6 DEGREES
T AXIS - MUSE: 75 DEGREES
T AXIS - MUSE: 75 DEGREES
VENTRICULAR RATE- MUSE: 52 BPM
VENTRICULAR RATE- MUSE: 61 BPM
VENTRICULAR RATE- MUSE: 61 BPM
VENTRICULAR RATE- MUSE: 64 BPM
WBC # BLD AUTO: 7.9 10E3/UL (ref 4–11)
WBC # BLD AUTO: 8.8 10E3/UL (ref 4–11)

## 2024-08-29 PROCEDURE — 97535 SELF CARE MNGMENT TRAINING: CPT | Mod: GO

## 2024-08-29 PROCEDURE — 93010 ELECTROCARDIOGRAM REPORT: CPT | Performed by: INTERNAL MEDICINE

## 2024-08-29 PROCEDURE — 74176 CT ABD & PELVIS W/O CONTRAST: CPT | Mod: MG

## 2024-08-29 PROCEDURE — 93306 TTE W/DOPPLER COMPLETE: CPT | Mod: 26 | Performed by: INTERNAL MEDICINE

## 2024-08-29 PROCEDURE — 97110 THERAPEUTIC EXERCISES: CPT | Mod: GO

## 2024-08-29 PROCEDURE — 250N000013 HC RX MED GY IP 250 OP 250 PS 637

## 2024-08-29 PROCEDURE — 258N000003 HC RX IP 258 OP 636

## 2024-08-29 PROCEDURE — 36415 COLL VENOUS BLD VENIPUNCTURE: CPT | Performed by: INTERNAL MEDICINE

## 2024-08-29 PROCEDURE — 74176 CT ABD & PELVIS W/O CONTRAST: CPT | Mod: 26 | Performed by: RADIOLOGY

## 2024-08-29 PROCEDURE — 93926 LOWER EXTREMITY STUDY: CPT | Mod: 26 | Performed by: RADIOLOGY

## 2024-08-29 PROCEDURE — 80048 BASIC METABOLIC PNL TOTAL CA: CPT | Performed by: INTERNAL MEDICINE

## 2024-08-29 PROCEDURE — G1010 CDSM STANSON: HCPCS | Performed by: RADIOLOGY

## 2024-08-29 PROCEDURE — 250N000013 HC RX MED GY IP 250 OP 250 PS 637: Performed by: NURSE PRACTITIONER

## 2024-08-29 PROCEDURE — 80048 BASIC METABOLIC PNL TOTAL CA: CPT

## 2024-08-29 PROCEDURE — 85027 COMPLETE CBC AUTOMATED: CPT | Performed by: INTERNAL MEDICINE

## 2024-08-29 PROCEDURE — 93971 EXTREMITY STUDY: CPT | Mod: 26 | Performed by: RADIOLOGY

## 2024-08-29 PROCEDURE — 999N000285 HC STATISTIC VASC ACCESS LAB DRAW WITH PIV START

## 2024-08-29 PROCEDURE — 250N000013 HC RX MED GY IP 250 OP 250 PS 637: Performed by: INTERNAL MEDICINE

## 2024-08-29 PROCEDURE — 93005 ELECTROCARDIOGRAM TRACING: CPT

## 2024-08-29 PROCEDURE — 85027 COMPLETE CBC AUTOMATED: CPT

## 2024-08-29 PROCEDURE — 999N000127 HC STATISTIC PERIPHERAL IV START W US GUIDANCE

## 2024-08-29 PROCEDURE — 83735 ASSAY OF MAGNESIUM: CPT | Performed by: INTERNAL MEDICINE

## 2024-08-29 PROCEDURE — 99239 HOSP IP/OBS DSCHRG MGMT >30: CPT | Mod: 25 | Performed by: NURSE PRACTITIONER

## 2024-08-29 PROCEDURE — 93306 TTE W/DOPPLER COMPLETE: CPT

## 2024-08-29 PROCEDURE — 93971 EXTREMITY STUDY: CPT

## 2024-08-29 PROCEDURE — 83605 ASSAY OF LACTIC ACID: CPT

## 2024-08-29 PROCEDURE — 86900 BLOOD TYPING SEROLOGIC ABO: CPT

## 2024-08-29 RX ORDER — NALOXONE HYDROCHLORIDE 0.4 MG/ML
0.4 INJECTION, SOLUTION INTRAMUSCULAR; INTRAVENOUS; SUBCUTANEOUS
Status: DISCONTINUED | OUTPATIENT
Start: 2024-08-29 | End: 2024-08-29 | Stop reason: HOSPADM

## 2024-08-29 RX ORDER — HYDROMORPHONE HCL IN WATER/PF 6 MG/30 ML
0.2 PATIENT CONTROLLED ANALGESIA SYRINGE INTRAVENOUS
Status: DISCONTINUED | OUTPATIENT
Start: 2024-08-29 | End: 2024-08-29

## 2024-08-29 RX ORDER — METOPROLOL SUCCINATE 25 MG/1
25 TABLET, EXTENDED RELEASE ORAL EVERY EVENING
Status: DISCONTINUED | OUTPATIENT
Start: 2024-08-29 | End: 2024-08-29 | Stop reason: HOSPADM

## 2024-08-29 RX ORDER — NALOXONE HYDROCHLORIDE 0.4 MG/ML
0.2 INJECTION, SOLUTION INTRAMUSCULAR; INTRAVENOUS; SUBCUTANEOUS
Status: DISCONTINUED | OUTPATIENT
Start: 2024-08-29 | End: 2024-08-29 | Stop reason: HOSPADM

## 2024-08-29 RX ORDER — ACETAMINOPHEN 325 MG/1
975 TABLET ORAL EVERY 6 HOURS PRN
Status: DISCONTINUED | OUTPATIENT
Start: 2024-08-29 | End: 2024-08-29 | Stop reason: HOSPADM

## 2024-08-29 RX ORDER — OXYCODONE HYDROCHLORIDE 5 MG/1
5 TABLET ORAL EVERY 4 HOURS PRN
Status: DISCONTINUED | OUTPATIENT
Start: 2024-08-29 | End: 2024-08-29 | Stop reason: HOSPADM

## 2024-08-29 RX ADMIN — ACETAMINOPHEN 975 MG: 325 TABLET ORAL at 11:34

## 2024-08-29 RX ADMIN — ASPIRIN 81 MG: 81 TABLET ORAL at 09:18

## 2024-08-29 RX ADMIN — ACETAMINOPHEN 650 MG: 325 TABLET, FILM COATED ORAL at 00:14

## 2024-08-29 RX ADMIN — Medication 1 TABLET: at 09:18

## 2024-08-29 RX ADMIN — LETROZOLE 2.5 MG: 2.5 TABLET ORAL at 09:18

## 2024-08-29 RX ADMIN — ACETAMINOPHEN 975 MG: 325 TABLET ORAL at 03:19

## 2024-08-29 RX ADMIN — SODIUM CHLORIDE 1000 ML: 9 INJECTION, SOLUTION INTRAVENOUS at 03:30

## 2024-08-29 ASSESSMENT — ACTIVITIES OF DAILY LIVING (ADL)
PREVIOUS_RESPONSIBILITIES: MEAL PREP;HOUSEKEEPING;LAUNDRY;SHOPPING;MEDICATION MANAGEMENT;FINANCES;DRIVING
ADLS_ACUITY_SCORE: 22
ADLS_ACUITY_SCORE: 21
ADLS_ACUITY_SCORE: 22

## 2024-08-29 NOTE — CODE/RAPID RESPONSE
Rapid Response Team Note    Assessment   A rapid response was called on Vickie Alvarado due to  hypotension  and Left inguinal hematoma in setting of s/p Transcatheter Aortic Valve Replacement for Severe aortic stenosis,  .   BP dropped to 59/30 at lowest, now improving with bolus fluid  Inspection of the area without active external bleeding but palpable hematoma, per RN, new and significant increase  Non occlusive pressure with manual compression, then by FemoStop applied  CV primary team came to the bedside and took over management    Plan   -  Continue Management by cards primary- planning lab, ultrasound and CT imaging for further evaluation  - continue close monitoring   -  Disposition: The patient will remain on the current unit. We will continue to monitor this patient closely.  -  Reassessment and plan follow-up will be performed by the primary team    Jennifer Green PA-C  Rapid Response Team [facility]  Securely message with [employer]

## 2024-08-29 NOTE — PROGRESS NOTES
Shift: [id number]     PMHx of severe aortic stenosis, frequent PVC's, HLD, breast cancer s/p chemo/radiation/lumpectomy, chronic nerve pain LLE, CLAUS, admitted 8/28 for planned TAVR procedure.     VS: /53   Pulse 68   Temp 97.5  F (36.4  C) (Oral)   Resp 22   Wt 86.8 kg (191 lb 5.8 oz)   LMP 05/17/2011   SpO2 95%   BMI 30.89 kg/m     Neuro: A&Ox4; neuro checks q4h   Cardiac: SR  Resp: RA sating >92%  GI/: voiding spontaneously   Skin: R & L groin sites; L groin site hemtoma developed around 0255; FemoStop applied; mostly resolved by 0600; ecchymosis noted at L groin site; see previous note for more details    Pain: reporting pain; tylenol used for pain management   Activity: SBA  LDA's: L antecubital & R forearm PIV; both saline locked    Change/Events: RRT called d/t soft BP's and large L groin hematoma; saline bolus given; BP's normalized   Plan: ECHO needed; possible plan for discharge; contact [facility] for questions/concerns

## 2024-08-29 NOTE — PROGRESS NOTES
Pt called at 0255 stating she was in pain at L groin site. Baseball sized hematoma found by RN, sherie pressure applied. BP 60s/40s, HR 40-50s, RRT called. 1000ml NS bolus given. BP improved to baseline. FemoStop applied with nonocclusive pressure, stayed on x1hr. Labs ordered, hgb 11.9. CT, ultrasound, EKG performed. FemoStop removed at 0440. By 0600 hematoma nearly reduced. L DP pluses palpable. CMS intact.

## 2024-08-29 NOTE — PROGRESS NOTES
Brief cardiology progress note     Briefly 69-year-old with past medical history of severe aortic stenosis status post TAVR.     Called to bedside due to episode of bradycardia and hypotension.    Patient had maps in the 50s.  Patient complained of lightheadedness and dizziness, groin and back pain.  Heart rate improved improved after patient woke up.  Review of telemetry is notable for sinus bradycardia with no AV blocks.  .  Patient was persistently hypotensive for 30 minutes on repeat checks and was symptomatic with lightheadedness and dizziness.  Blood pressure improved with IV fluids.  She received 1 L of fluids.   Exam notable for L groin hematoma. Review of procedure note notable for left access site closed with 6 Divehi Angio-Seal.  nonocclusive pressure was applied with FemoStop for 30 minutes.  No further expansion of hematoma.     Overall suspect cause of hypotension was bleeding from left groin vs hypovolemia. Groin hematoma seems to have improved with pressure. Bradycardia was likely vagal tone while sleeping.     Plan  - Stat CBC and lactate, trend hb  - CT abdomen pelvis without contrast to rule out RP bleed.  - Ultrasound arterial duplex to rule out potential vascular complications.  -1 L LR.  -Trend hemoglobin.  - CTM Left groin  - EKG pending    Nelson Poe MD  Cardiology fellow (PGY5)  [address]

## 2024-08-29 NOTE — PROGRESS NOTES
"Occupational Therapy Discharge Summary    Reason for therapy discharge:    All goals and outcomes met, no further needs identified.    Progress towards therapy goal(s). See goals on Care Plan in [facility] electronic health record for goal details.  Goals met    Therapy recommendation(s):    Continued therapy is recommended.  Rationale/Recommendations:  OP CR to maximize cardiopulmonary health.       08/29/24 0812   Appointment Info   Signing Clinician's Name / Credentials (OT) Olga Daniel, OTR/L   Living Environment   People in Home alone   Current Living Arrangements apartment   Home Accessibility stairs to enter home   Number of Stairs, Main Entrance 7   Transportation Anticipated family or friend will provide   Living Environment Comments Pt lives in mother in law apartment (\"basement walk out\") with separate entrance. Landlord lives upstairs. Pt estimates she has 7 graduated stairs to her apartment entrance with railing on L side. Pt has short tub shower with railing. Pt has toilet safety frame. Laundry is on her level.   Self-Care   Usual Activity Tolerance moderate   Current Activity Tolerance moderate   Regular Exercise   (OP PT for knees)   Equipment Currently Used at Home raised toilet seat;grab bar, tub/shower   Fall history within last six months no   Activity/Exercise/Self-Care Comment Pt retired. Pt was totally I with ADL/IADL. Was having some fatigue with AS. Has a cane and walking stick, does not typically use. Reports walking can be difficult due to B knee pain at baseline.   Instrumental Activities of Daily Living (IADL)   Previous Responsibilities meal prep;housekeeping;laundry;shopping;medication management;finances;driving   IADL Comments Has friends nearby who can assist PRN with IADLs, landlord also offered to help.   General Information   Onset of Illness/Injury or Date of Surgery 08/28/24   Referring Physician Jesús Tinajero MD   Patient/Family Therapy Goal Statement (OT) Hoping to get back " to swimming at [employer]   Additional Occupational Profile Info/Pertinent History of Current Problem Marni Alvarado is a 69 y.o.F with a PMHx of severe aortic stenosis, frequent PVC's, HLD, breast cancer s/p chemo/radiation/lumpectomy, chronic nerve pain LLE, RA, admitted 8/28 for planned TAVR procedure   Existing Precautions/Restrictions other (see comments)  (TAVR)   Left Upper Extremity (Weight-bearing Status)   (< 10# x 1 week)   Right Upper Extremity (Weight-bearing Status)   (< 10# x 1 week)   Cognitive Status Examination   Orientation Status orientation to person, place and time   Cognitive Status Comments Cognition appears grossly intact   Visual Perception   Visual Impairment/Limitations corrective lenses for distance   Sensory   Sensory Comments Pt has some n/t in L hand, occasional   Range of Motion Comprehensive   Comment, General Range of Motion B UE WNL   Strength Comprehensive (MMT)   Comment, General Manual Muscle Testing (MMT) Assessment B UE strength is functional   Coordination   Fine Motor Coordination FMC appears intact   Bed Mobility   Comment (Bed Mobility) IND, HOB flat   Transfers   Transfer Comments Up IND no LOB   Bathing Assessment/Intervention   Comment, (Bathing) Recommend supervision for first shower for safety   Lower Body Dressing Assessment/Training   Comment, (Lower Body Dressing) IND adjust socks   Toileting   Comment, (Toileting) Pt up to BR with writer managing portable tele box only, no LOB noted without AD   Clinical Impression   Criteria for Skilled Therapeutic Interventions Met (OT) Yes, treatment indicated   OT Diagnosis Decreased ADL/IADL I   OT Problem List-Impairments impacting ADL problems related to;activity tolerance impaired;post-surgical precautions   Assessment of Occupational Performance 1-3 Performance Deficits   Identified Performance Deficits IADLs, showering   Planned Therapy Interventions (OT) ADL retraining;home program guidelines;progressive  activity/exercise;risk factor education   Clinical Decision Making Complexity (OT) problem focused assessment/low complexity   Risk & Benefits of therapy have been explained evaluation/treatment results reviewed;care plan/treatment goals reviewed   Clinical Impression Comments Pt to benefit from skilled OT to address above deficits. See daily flowsheet for details regarding tx provided.   OT Goals   Therapy Frequency (OT) One time eval and treatment   OT Predicted Duration/Target Date for Goal Attainment 08/29/24   OT: Toilet Transfer/Toileting Modified independent   OT: Understanding of cardiac education to maximize quality of life, condition management, and health outcomes Patient;Verbalize;Goal Met   OT: Navigation of stairs simulating home set up with stable cardiovascular response in order to return to home and community environment Independent;7 stairs;Rail on left;Goal Met   OT Discharge Planning   OT Plan OT: d/c   OT Discharge Recommendation (DC Rec) home with outpatient cardiac rehab   OT Rationale for DC Rec Pt mobilizing well, up without AD and no LOB, navigated stairs simulating home set up independently. Will have friends available to assit with heavy IADLs PRN. Follow up with OP CR to maximize cardiovascular health following TAVR.   OT Brief overview of current status Ambulating IND

## 2024-08-29 NOTE — CODE/RAPID RESPONSE
08/29/24 0305   Call Information   Date of Call 08/29/24   Time of Call 0305   Name of person requesting the team Saleem ESCOBDEO   Title of person requesting team RN   RRT Arrival time 0305   Time RRT ended 0445   Reason for call   Type of RRT Adult   Primary reason for call Cardiovascular;Nurse is concerned/worried   Cardiovascular SBP less than 90   Was patient transferred from the ED, ICU, or PACU within last 24 hours prior to RRT call? No   SBAR   Situation Patient having hypotension and bradycardia with Left inguinal hematoma post TAVR.   Background Briefly 69-year-old with past medical history of severe aortic stenosis status post TAVR   Notable History/Conditions Cardiac   Assessment Patient is A&Ox4 complaining of some dizziness and back pain. L groin hematoma noted. Pulses dopplered to LLE. LLE cool and pale with some mild mottling w/ notable improvement post femstop removal.   Interventions Fluid bolus;Labs;Meds;Other (describe)  (Fem stop placed, CT Scan and ultrasound done.)   Adjustments to Recommend Fem stop placed and left in place for 30 mins.   Patient Outcome   Patient Outcome Stabilized on unit   RRT Team   Attending/Primary/Covering Physician Dr Poe   Date Attending Physician notified 08/29/24   Time Attending Physician notified 0305   Physician(s) Dr Poe   Lead RN Paty RICCI, Paty Mota RN and Harman Ramon   Other staff Mike ESCOBEDO   Post RRT Intervention Assessment   Post RRT Assessment Stable/Improved   Date Follow Up Done 08/29/24   Time Follow Up Done 0645   Comments VS stable, LLE mottling much improved after femstop removal. L groin hematoma improved, soft/nontender. Ecchymosis around L groin angioseal site but distal pulses dopplerable. No other complaints per patient.

## 2024-08-29 NOTE — PLAN OF CARE
Goal Outcome Evaluation:    I/A: Neuros intact q2h. VSS on RA. SR, 60s-70s. MD Alexandre notified of SBPs 140s-150s, OK'd per provider. PRN tylenol x2 for groin/ knee pain. Bilat groins CDI, no bleeding, soft/flat. CMS intact, pulses present. Ate 100% of dinner. No nausea. Voiding spontaneously. No BM this shift. SBA. Declined getting up in chair.     P: EKG and ECHO in AM. Anticipating discharge to home tomorrow.    Hours of care: [id number]

## 2024-08-29 NOTE — PROGRESS NOTES
DISCHARGE   Discharged to: Home  Via: Automobile  Accompanied by: Family  Discharge Instructions: diet, activity, medications, follow up appointments, when to call the MD, and what to watchout for (i.e. s/s of infection, increasing SOB, palpitations, chest pain,)  Prescriptions: No med changes  Follow Up Appointments: arranged; information given  Belongings: All sent with pt  IV: out  Telemetry: off  Pt exhibits understanding of above discharge instructions; all questions answered.  Discharge Paperwork: given to patient. Post TAVR instructions given to patient and patient understood instructions.

## 2024-09-04 ENCOUNTER — TELEPHONE (OUTPATIENT)
Dept: CARDIOLOGY | Facility: CLINIC | Age: 69
End: 2024-09-04

## 2024-09-04 NOTE — TELEPHONE ENCOUNTER
Patient has the following questions post op-     Wants to know when she can drive. The information from the cardiac team differs from rehab materials please confirm. (7 days is today)  Supposed to contact primary care 7-10 days and she's seeing Joyce on 9/5 she can't get in with PCP that soon. Wants to know the purpose.   She has always taken her statin at night and she recently got her statin switched and now it's saying to take it in the am. She's concerned because she's always taken at night.     Would like a call back today.     Paty Bradley  Periop Electrophysiology   [phone]
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
## 2024-09-05 ENCOUNTER — OFFICE VISIT (OUTPATIENT)
Dept: CARDIOLOGY | Facility: CLINIC | Age: 69
End: 2024-09-05
Attending: NURSE PRACTITIONER
Payer: MEDICARE

## 2024-09-05 VITALS
BODY MASS INDEX: 31.01 KG/M2 | WEIGHT: 192.1 LBS | OXYGEN SATURATION: 96 % | DIASTOLIC BLOOD PRESSURE: 87 MMHG | SYSTOLIC BLOOD PRESSURE: 118 MMHG | HEART RATE: 79 BPM

## 2024-09-05 DIAGNOSIS — Z95.2 HISTORY OF TRANSCATHETER AORTIC VALVE REPLACEMENT (TAVR): Primary | ICD-10-CM

## 2024-09-05 DIAGNOSIS — E78.2 MIXED HYPERLIPIDEMIA: Chronic | ICD-10-CM

## 2024-09-05 DIAGNOSIS — I49.3 PVC'S (PREMATURE VENTRICULAR CONTRACTIONS): Chronic | ICD-10-CM

## 2024-09-05 DIAGNOSIS — H53.40 VISUAL FIELD CUT: ICD-10-CM

## 2024-09-05 PROCEDURE — G0463 HOSPITAL OUTPT CLINIC VISIT: HCPCS | Performed by: NURSE PRACTITIONER

## 2024-09-05 PROCEDURE — 99213 OFFICE O/P EST LOW 20 MIN: CPT | Performed by: NURSE PRACTITIONER

## 2024-09-05 ASSESSMENT — PAIN SCALES - GENERAL: PAINLEVEL: NO PAIN (0)

## 2024-09-05 NOTE — PATIENT INSTRUCTIONS
You were seen today in the Cardiovascular Clinic at the [facility] by:       RAS ARREAGA CNP           Your visit summary and instructions are as follows:    Continue aspirin 81 mg daily lifelong.  Delay any nonurgent dental procedures for the first 6 month post TAVR.  For all future dental cleanings and procedures you will need to take antibiotics prior - see instructions below.   Start cardiac rehab   Recommend neurology consult to evaluate your vision changes   Continue to monitor groin sites - they look bruised but okay - no major concerns. Continue to avoid lifting > 10 lbs for 1 more week  Follow-up in Valve Clinic in 1 month with echo, labs and ECG prior     Prevention of Infective (Bacterial) Endocarditis:  You are at increased risk for developing adverse outcomes from infective endocarditis (IE), also known as bacterial endocarditis (BE) because of the new device in your heart. The guidelines for prevention of IE are to give patients antibiotics prior to any dental procedures that involve manipulation of gingival tissue or the periapical region of teeth, or perforation of the oral mucosa:      It is recommended to take Amoxicillin 2 gm by mouth as a single dose 30 to 60 minutes before procedure.     OR if allergic to Penicillin or Ampicillin:     Cephalexin 2 gm by mouth, or  Clindamycin 600 mg by mouth, or  Azithromycin or Clarithromycin 500 mg PO       Thank you for your visit today!      Please MyChart message me or call my nurse if you have any questions or concerns.     During Business Hours:   [phone], Structural Heart  Alicia Burrell     After hours, weekends or holidays:   [phone], Option #4  Ask to speak to the On-Call Cardiologist. Inform them you are a cardiology patient at the [place].
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
## 2024-09-05 NOTE — NURSING NOTE
Chief Complaint   Patient presents with    Follow Up     TAVR 1 week Follow up       Vitals were taken, medications reconciled.    Elio Sr, Clinic Assistant   9:54 AM

## 2024-09-17 ENCOUNTER — HOSPITAL ENCOUNTER (OUTPATIENT)
Dept: CARDIAC REHAB | Facility: CLINIC | Age: 69
Discharge: HOME | End: 2024-09-17
Attending: NURSE PRACTITIONER
Payer: MEDICARE

## 2024-09-17 DIAGNOSIS — Z95.2 S/P TAVR (TRANSCATHETER AORTIC VALVE REPLACEMENT): ICD-10-CM

## 2024-09-17 PROCEDURE — 93797 PHYS/QHP OP CAR RHAB WO ECG: CPT | Mod: XU

## 2024-09-17 PROCEDURE — 93798 PHYS/QHP OP CAR RHAB W/ECG: CPT

## 2024-09-18 NOTE — OP NOTE
Preop diagnosis: severe aortic stenosis  Postop diagnosis: severe aortic stenosis.     Procedure: Transfemoral transcatheter aortic valve replacement with a 23 mm Durán Danial valve       Surgeon: Stephen Bauer MD  Cardiologist:Jesús Tinajero MD         Procedure:  Access was obtained in the right and left common femoral artery and the left femoral vein by the Seldinger technique..   A 6Fr pigtail catheter was positioned in the right-coronary cusp and angiography was used to confirm the optimal implant angle. The pigtail was then moved to the non-coronary cusp. Transvenous pacing wire was placed in the right ventricle and optimal pacing threshold obtained. Access into the LV was obtained using an AL-1 catheter and a straight wire. The AL-1 catheter was used to exchange the straight wire for a J-wire and a pigtail catheter was then positioned in the left ventricle. The LVEDP was measured.. The pigtail catheter was used to advance a Safari wire into in the LV and the pigtail was removed.    The 23 mm Durán Danial valve  prosthetic valve was then positioned across the native aortic valve in a 80/20 position. Angiography and echocardiography were used to confirm optimal valve position. Rapid pacing  was initiated and the valve was implanted in usual fashion.  Subsequent transthoracic echocardiography and an ascending aortogram showed no paravalvular insufficiency.    The valve sheath access arteriotomy was successfully closed with the double suture closure devices. Patient was transfrred to recovery room in stable condition

## 2024-09-20 ENCOUNTER — HOSPITAL ENCOUNTER (OUTPATIENT)
Dept: CARDIAC REHAB | Facility: CLINIC | Age: 69
Discharge: HOME | End: 2024-09-20
Attending: SURGERY
Payer: MEDICARE

## 2024-09-20 PROCEDURE — 93798 PHYS/QHP OP CAR RHAB W/ECG: CPT

## 2024-09-23 ENCOUNTER — HOSPITAL ENCOUNTER (OUTPATIENT)
Dept: CARDIAC REHAB | Facility: CLINIC | Age: 69
Discharge: HOME | End: 2024-09-23
Attending: SURGERY
Payer: MEDICARE

## 2024-09-23 ENCOUNTER — OFFICE VISIT (OUTPATIENT)
Dept: CARDIOLOGY | Facility: CLINIC | Age: 69
End: 2024-09-23
Attending: NURSE PRACTITIONER
Payer: MEDICARE

## 2024-09-23 ENCOUNTER — LAB (OUTPATIENT)
Dept: LAB | Facility: CLINIC | Age: 69
End: 2024-09-23
Attending: NURSE PRACTITIONER
Payer: MEDICARE

## 2024-09-23 VITALS
DIASTOLIC BLOOD PRESSURE: 87 MMHG | HEART RATE: 68 BPM | BODY MASS INDEX: 31.15 KG/M2 | WEIGHT: 193 LBS | SYSTOLIC BLOOD PRESSURE: 135 MMHG | OXYGEN SATURATION: 97 %

## 2024-09-23 DIAGNOSIS — E78.2 MIXED HYPERLIPIDEMIA: ICD-10-CM

## 2024-09-23 DIAGNOSIS — I35.0 SEVERE AORTIC STENOSIS: ICD-10-CM

## 2024-09-23 DIAGNOSIS — Z95.2 S/P TAVR (TRANSCATHETER AORTIC VALVE REPLACEMENT): Primary | ICD-10-CM

## 2024-09-23 DIAGNOSIS — I49.3 PVC'S (PREMATURE VENTRICULAR CONTRACTIONS): ICD-10-CM

## 2024-09-23 DIAGNOSIS — I35.0 AORTIC VALVE STENOSIS: ICD-10-CM

## 2024-09-23 LAB
ANION GAP SERPL CALCULATED.3IONS-SCNC: 9 MMOL/L (ref 7–15)
BUN SERPL-MCNC: 14.7 MG/DL (ref 8–23)
CALCIUM SERPL-MCNC: 9.6 MG/DL (ref 8.8–10.4)
CHLORIDE SERPL-SCNC: 107 MMOL/L (ref 98–107)
CREAT SERPL-MCNC: 0.65 MG/DL (ref 0.51–0.95)
EGFRCR SERPLBLD CKD-EPI 2021: >90 ML/MIN/1.73M2
ERYTHROCYTE [DISTWIDTH] IN BLOOD BY AUTOMATED COUNT: 13.1 % (ref 10–15)
GLUCOSE SERPL-MCNC: 85 MG/DL (ref 70–99)
HCO3 SERPL-SCNC: 25 MMOL/L (ref 22–29)
HCT VFR BLD AUTO: 38.1 % (ref 35–47)
HGB BLD-MCNC: 13 G/DL (ref 11.7–15.7)
LVEF ECHO: NORMAL
MCH RBC QN AUTO: 31.3 PG (ref 26.5–33)
MCHC RBC AUTO-ENTMCNC: 34.1 G/DL (ref 31.5–36.5)
MCV RBC AUTO: 92 FL (ref 78–100)
PLATELET # BLD AUTO: 127 10E3/UL (ref 150–450)
POTASSIUM SERPL-SCNC: 4.1 MMOL/L (ref 3.4–5.3)
RBC # BLD AUTO: 4.16 10E6/UL (ref 3.8–5.2)
SODIUM SERPL-SCNC: 141 MMOL/L (ref 135–145)
WBC # BLD AUTO: 5.2 10E3/UL (ref 4–11)

## 2024-09-23 PROCEDURE — 36415 COLL VENOUS BLD VENIPUNCTURE: CPT | Performed by: PATHOLOGY

## 2024-09-23 PROCEDURE — 85027 COMPLETE CBC AUTOMATED: CPT | Performed by: PATHOLOGY

## 2024-09-23 PROCEDURE — 80048 BASIC METABOLIC PNL TOTAL CA: CPT | Performed by: PATHOLOGY

## 2024-09-23 PROCEDURE — 93308 TTE F-UP OR LMTD: CPT | Performed by: INTERNAL MEDICINE

## 2024-09-23 PROCEDURE — G0463 HOSPITAL OUTPT CLINIC VISIT: HCPCS | Performed by: NURSE PRACTITIONER

## 2024-09-23 PROCEDURE — 93321 DOPPLER ECHO F-UP/LMTD STD: CPT | Performed by: INTERNAL MEDICINE

## 2024-09-23 PROCEDURE — 93797 PHYS/QHP OP CAR RHAB WO ECG: CPT | Mod: XU

## 2024-09-23 PROCEDURE — 99214 OFFICE O/P EST MOD 30 MIN: CPT | Mod: 25 | Performed by: NURSE PRACTITIONER

## 2024-09-23 PROCEDURE — 93325 DOPPLER ECHO COLOR FLOW MAPG: CPT | Performed by: INTERNAL MEDICINE

## 2024-09-23 PROCEDURE — 93798 PHYS/QHP OP CAR RHAB W/ECG: CPT

## 2024-09-23 PROCEDURE — 93005 ELECTROCARDIOGRAM TRACING: CPT

## 2024-09-23 ASSESSMENT — PAIN SCALES - GENERAL: PAINLEVEL: NO PAIN (0)
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
## 2024-09-23 NOTE — NURSING NOTE
Chief Complaint   Patient presents with    Follow Up     TAVR 23-75 days Follow up       Vitals were taken, medications reconciled and EKG performed.    Alistair Vincent, Clinic Assistant  10:25 AM
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
## 2024-09-23 NOTE — PROGRESS NOTES
Structural Heart Coordinator visit:  Provided additional education regarding TAVR/MitraClip procedure, after being present for discussion with physician. Explained the work-up process and next steps for patient. Patient provided our direct contact number and instructed to call with any questions.         23-75 Day S/P TAVR    KCCQ Results:   1a. 5  1b. 6  1c. 6  2. 5  3. 7  4. 7  5. 5  6. 4  7. 4  8a. 3  8b. 5  8c. 4    Michelle Burrell CMA  Structural Heart   [facility]

## 2024-09-23 NOTE — PATIENT INSTRUCTIONS
You were seen today in the Cardiovascular Clinic at the [facility] by:       RAS ARREAGA CNP     ECHO looks great, EKG shows NSR, Labs are stable. Fasting lipids in 1-2 weeks. Follow-up with Onc regarding borderline low PLT but they do seem to be improving.    Your visit summary and instructions are as follows:    Continue aspirin 81 mg daily lifelong.  Have fasting labs in 1 week  Delay any nonurgent dental procedures for the first 6 month post TAVR.  For all future dental cleanings and procedures you will need to take antibiotics prior - see instructions below.   Continue cardiac rehab   Recommend neurology consult to evaluate your vision changes   Follow-up with Dr. Brooke in March  Follow-up in Valve Clinic in 1 year with echo, labs and ECG prior     Prevention of Infective (Bacterial) Endocarditis:  You are at increased risk for developing adverse outcomes from infective endocarditis (IE), also known as bacterial endocarditis (BE) because of the new device in your heart. The guidelines for prevention of IE are to give patients antibiotics prior to any dental procedures that involve manipulation of gingival tissue or the periapical region of teeth, or perforation of the oral mucosa:      It is recommended to take Amoxicillin 2 gm by mouth as a single dose 30 to 60 minutes before procedure.     OR if allergic to Penicillin or Ampicillin:     Cephalexin 2 gm by mouth, or  Clindamycin 600 mg by mouth, or  Azithromycin or Clarithromycin 500 mg PO       Thank you for your visit today!      Please MyChart message me or call my nurse if you have any questions or concerns.     During Business Hours:   [phone], Structural Heart  Alicia Burrell     After hours, weekends or holidays:   [phone], Option #4  Ask to speak to the On-Call Cardiologist. Inform them you are a cardiology patient at the [facility].

## 2024-09-25 ENCOUNTER — HOSPITAL ENCOUNTER (OUTPATIENT)
Dept: CARDIAC REHAB | Facility: CLINIC | Age: 69
Discharge: HOME | End: 2024-09-25
Attending: SURGERY
Payer: MEDICARE

## 2024-09-25 LAB
ATRIAL RATE - MUSE: 62 BPM
DIASTOLIC BLOOD PRESSURE - MUSE: NORMAL MMHG
INTERPRETATION ECG - MUSE: NORMAL
P AXIS - MUSE: 13 DEGREES
PR INTERVAL - MUSE: 130 MS
QRS DURATION - MUSE: 84 MS
QT - MUSE: 402 MS
QTC - MUSE: 408 MS
R AXIS - MUSE: -13 DEGREES
SYSTOLIC BLOOD PRESSURE - MUSE: NORMAL MMHG
T AXIS - MUSE: 7 DEGREES
VENTRICULAR RATE- MUSE: 62 BPM

## 2024-09-25 PROCEDURE — 93798 PHYS/QHP OP CAR RHAB W/ECG: CPT

## 2024-09-27 ENCOUNTER — HOSPITAL ENCOUNTER (OUTPATIENT)
Dept: CARDIAC REHAB | Facility: CLINIC | Age: 69
Discharge: HOME | End: 2024-09-27
Attending: SURGERY
Payer: MEDICARE

## 2024-09-27 ENCOUNTER — LAB (OUTPATIENT)
Dept: LAB | Facility: CLINIC | Age: 69
End: 2024-09-27
Payer: MEDICARE

## 2024-09-27 DIAGNOSIS — E78.2 MIXED HYPERLIPIDEMIA: ICD-10-CM

## 2024-09-27 LAB
CHOLEST SERPL-MCNC: 215 MG/DL
FASTING STATUS PATIENT QL REPORTED: YES
HDLC SERPL-MCNC: 76 MG/DL
LDLC SERPL CALC-MCNC: 118 MG/DL
NONHDLC SERPL-MCNC: 139 MG/DL
TRIGL SERPL-MCNC: 105 MG/DL

## 2024-09-27 PROCEDURE — 93798 PHYS/QHP OP CAR RHAB W/ECG: CPT

## 2024-09-30 ENCOUNTER — MYC MEDICAL ADVICE (OUTPATIENT)
Dept: CARDIOLOGY | Facility: CLINIC | Age: 69
End: 2024-09-30
Payer: MEDICARE

## 2024-09-30 ENCOUNTER — HOSPITAL ENCOUNTER (OUTPATIENT)
Dept: CARDIAC REHAB | Facility: CLINIC | Age: 69
Discharge: HOME | End: 2024-09-30
Attending: SURGERY
Payer: MEDICARE

## 2024-09-30 DIAGNOSIS — I50.9 ACUTE CONGESTIVE HEART FAILURE, UNSPECIFIED HEART FAILURE TYPE (H): Primary | ICD-10-CM

## 2024-09-30 DIAGNOSIS — I25.10 CORONARY ARTERY DISEASE INVOLVING NATIVE CORONARY ARTERY OF NATIVE HEART WITHOUT ANGINA PECTORIS: ICD-10-CM

## 2024-09-30 DIAGNOSIS — E78.2 MIXED HYPERLIPIDEMIA: ICD-10-CM

## 2024-09-30 PROCEDURE — 93798 PHYS/QHP OP CAR RHAB W/ECG: CPT

## 2024-09-30 RX ORDER — EZETIMIBE 10 MG/1
10 TABLET ORAL DAILY
Qty: 90 TABLET | Refills: 3 | Status: SHIPPED | OUTPATIENT
Start: 2024-09-30
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
## 2024-10-03 ENCOUNTER — OFFICE VISIT (OUTPATIENT)
Dept: CARDIOLOGY | Facility: CLINIC | Age: 69
End: 2024-10-03
Attending: INTERNAL MEDICINE
Payer: MEDICARE

## 2024-10-03 VITALS
SYSTOLIC BLOOD PRESSURE: 133 MMHG | DIASTOLIC BLOOD PRESSURE: 84 MMHG | BODY MASS INDEX: 31.18 KG/M2 | OXYGEN SATURATION: 95 % | WEIGHT: 193.2 LBS | HEART RATE: 82 BPM

## 2024-10-03 DIAGNOSIS — I49.3 PVC'S (PREMATURE VENTRICULAR CONTRACTIONS): ICD-10-CM

## 2024-10-03 DIAGNOSIS — Z95.2 S/P TAVR (TRANSCATHETER AORTIC VALVE REPLACEMENT): Primary | ICD-10-CM

## 2024-10-03 PROCEDURE — G0463 HOSPITAL OUTPT CLINIC VISIT: HCPCS | Performed by: INTERNAL MEDICINE

## 2024-10-03 PROCEDURE — 99213 OFFICE O/P EST LOW 20 MIN: CPT | Performed by: INTERNAL MEDICINE

## 2024-10-03 ASSESSMENT — PAIN SCALES - GENERAL: PAINLEVEL: NO PAIN (0)

## 2024-10-03 NOTE — PATIENT INSTRUCTIONS
October 3, 2024    Cardiology Provider You Saw During Your Visit: Dr. Brooke      Medication Changes: None      Follow Up: With Dr. Brooke in 1 year       Follow the American Heart Association Diet and Lifestyle Recommendations:  -Limit saturated fat, trans fat, sodium, red meat, sweets and sugar-sweetened beverages. If you choose to eat red meat, compare labels and select the leanest cuts available.  -Aim for at least 150 minutes of moderate physical activity or 75 minutes of vigorous physical activity - or an equal combination of both - each week.      To Reach Us:  -During business hours: [phone], press option # 1 to schedule an appointment or to leave a message for your care team.     -After hours, weekends or holidays: [phone], press option #4 and ask to speak to the on-call cardiologist. Inform them you are a patient at the [facility].        **If you have a cardiac device, please make sure you schedule an in-person device check just prior to your cardiology provider appointments**        Ramonita Morales RN  Cardiology Care Coordinator - General Cardiology  [facility]

## 2024-10-03 NOTE — LETTER
10/3/2024      RE: Vickie Alvarado  [address]  [facility] [address]       Dear Colleague,    Thank you for the opportunity to participate in the care of your patient, Vickie Alvarado, at the [facility] [place] [facility]. Please see a copy of my visit note below.                                                                                                        General Cardiology Clinic       Referring provider:Gaby Lynn MD    Chief complaint: Palpitations, fatigue and chest pressure    HPI: Ms. Vickie Alvarado is a 66 year old  female with PMH significant for    -Hyperlipidemia  -Obesity    Patient is being seen today at the request of  for moderate aortic stenosis recently detected on echocardiogram.  Patient was seen on 9/28/2021 and found to have systolic murmur which was apparently not previously heard.  She was also found to have irregular cardiac rhythm on physical exam.  This was not demonstrated on EKG.  She was recommended echocardiogram and Zio patch.  I reviewed the echocardiogram from 11/3/2021 which shows calcification of the aortic valve with moderate aortic stenosis.  Zio patch showed 9.9% PVCs corresponding to patient's symptoms.  Patient reports feeling fatigued, chest pressure and heart flutters when she lies down at night.  Does not happen every day.  She tells me that she is not aware of the onset of the symptoms or the total duration of the episodes.  She cannot tell me how long this has been going on but definitely not too long.  No apparent trigger.  She is doing physical therapy in the pool.  No limitation of physical activity during the day.    Lifetime non-smoker.  Alcohol rarely.  No drug abuse.   The patient's risk factor profile is: (-) HTN, (-) diabetes, (+) hyperlipidemia, (-) tobacco use, (-) family Hx CAD.  Patient tells me that there is family members on his dad's side with hypertrophic  cardiomyopathy.  His nephew was diagnosed with HCM as well.  No premature CAD or sudden cardiac death in the family.    Medications, personal, family, and social history reviewed with patient and revised.    Interval history 3/15/2022:  Patient is being seen today to discuss results of cardiac MRI which showed trileaflet calcific aortic stenosis.  Normal biventricular size and function.  No infiltrative disease.  Ascending aorta is normal size.      When I saw her last time 3 months ago I started her on metoprolol 25 mg for frequent PVCs and palpitations.  She tells me that she no longer feels heart flutters.  She is very happy with the metoprolol.    Interval history 3/6/2023:  Since last being seen patient is diagnosed with invasive ductal carcinoma of the breast.  She underwent chemotherapy and radiation treatment.  Patient tells me that she is overall feeling well.  Tries to stay active.  She has lost 15 pounds (intentional).  No chest pain, shortness of breath, syncope or lower extremity edema.  She has been feeling fatigued since chemo and radiation.  Most recent echocardiogram on 2/20/2023 showed LVEF 57%, severe aortic stenosis, normal RV function.    Interval history 3/4/2024:  Patient is being seen today for severe aortic stenosis.  Most recent echocardiogram on 2/28/2024 showed severe AS with normal LV/RV size and function.  The patient is asymptomatic from cardiac standpoint.  She tells me that she has recently started rehab after completion of breast cancer treatment.  She is feeling well with exercise.  Denies exertional chest pain or shortness of breath.  No lower extremity edema.  No syncope.  Has moved the timing of metoprolol to evening time which has helped with palpitations that she feels when she goes to sleep.  Sinus rhythm in clinic today.    Interval history 10/3/2024:  Patient is following up with me after recent TAVR on 8/28 for severe AS.  She tells me she does not feel any different since  TAVR.  Denies chest discomfort, chest pain, dizziness, syncope, lower extremity edema.  She is overall feeling well except ongoing fatigue since cancer therapy.  She was feeling palpitations until a week ago.  That has resolved now.  She is currently in rehab.    PAST MEDICAL HISTORY:  Past Medical History:   Diagnosis Date     Aortic stenosis      Arthritis in my 50s on hands    acute knee arthritis currently     Breast cancer (H) 06/2022     Hyperlipidemia      Hypertension merely monitoring    not on any meds     Insufficiency fracture of tibia, sequela 09/28/2021     Nonsenile cataract 1 cataract surgery    (apparently from airbaTaaz deployment)     Polyp of colon 05/26/2016       CURRENT MEDICATIONS:  Current Outpatient Medications   Medication Sig Dispense Refill     aspirin 81 MG EC tablet Take 1 tablet (81 mg) by mouth daily       BIOTIN PO Take by mouth daily       calcium 600 MG tablet Take 1 tablet by mouth every evening       Calcium Carb-Cholecalciferol 600-25 MG-MCG CAPS Take 1 capsule by mouth every morning       dorzolamide-timolol (COSOPT) 2-0.5 % ophthalmic solution Place 1 drop into both eyes 2 times daily. 10 mL 11     ezetimibe (ZETIA) 10 MG tablet Take 1 tablet (10 mg) by mouth daily. 90 tablet 3     Lactobacillus (PROBIOTIC CHILDRENS) CHEW Take 1 chew tab by mouth 2 times daily       letrozole (FEMARA) 2.5 MG tablet Take 1 tablet (2.5 mg) by mouth daily 30 tablet 11     loratadine (CLARITIN) 10 MG tablet Take 10 mg by mouth daily as needed for allergies Seasonal for ragweed and lilacs       metoprolol succinate ER (TOPROL XL) 25 MG 24 hr tablet Take 1 tablet (25 mg) by mouth every evening 7PM 90 tablet 3     rosuvastatin (CRESTOR) 40 MG tablet Take 1 tablet (40 mg) by mouth daily 90 tablet 3     Vitamin D3 (CHOLECALCIFEROL) 25 mcg (1000 units) tablet Take 50 mcg by mouth every evening         PAST SURGICAL HISTORY:  Past Surgical History:   Procedure Laterality Date     ANGIOGRAM  8/28/2024     Procedure: ANGIOGRAM;  Surgeon: Jesús Tianjero MD;  Location: [facility] CARDIAC CATH LAB     BIOPSY NODE SENTINEL Left 10/17/2022    Procedure: LEFT seed localized lumpectomy with sentinel node biopsy;  Surgeon: Alphonso Cadet MD;  Location: [facility] OR     CATARACT IOL, RT/LT Right 2006     CV CORONARY ANGIOGRAM N/A 8/28/2024    Procedure: Coronary Angiogram;  Surgeon: Jesús Tinajero MD;  Location: [facility] CARDIAC CATH LAB     CV TRANSCATHETER AORTIC VALVE REPLACEMENT-FEMORAL APPROACH N/A 8/28/2024    Procedure: Transcatheter Aortic Valve Replacement-Femoral Approach;  Surgeon: Jesús Tinajero MD;  Location: [facility] CARDIAC CATH LAB     HC YAG LASER CAPSULOTOMY Right 2009     LASER VITREOLYSIS, ANTERIOR OD (RIGHT EYE) Right 2007     LUMPECTOMY BREAST WITH SENTINEL NODE, COMBINED Left 10/17/2022    Procedure: LEFT seed localized lumpectomy with sentinel node biopsy;  Surgeon: Alphonso Cadet MD;  Location: [facility] OR     OR TRANSCATHETER AORTIC VALVE REPLACEMENT, FEMORAL PERCUTANEOUS APPROACH (STANDBY) N/A 8/28/2024    Procedure: OR TRANSCATHETER AORTIC VALVE REPLACEMENT, FEMORAL PERCUTANEOUS APPROACH (STANDBY);  Surgeon: Stephen Bauer MD;  Location: [facility] CARDIAC CATH LAB     REPOSITION INTRAOCULAR LENS Right 11/18/2014    Procedure: REPOSITION INTRAOCULAR LENS;  Surgeon: Vickie Guerra MD;  Location: [facility]     VITRECTOMY PARSPLANA WITH 23 GAUGE SYSTEM Right 11/18/2014    Procedure: VITRECTOMY PARSPLANA WITH 23 GAUGE SYSTEM;  Surgeon: Vickie Guerra MD;  Location: [facility]       ALLERGIES:     Allergies   Allergen Reactions     Bactrim [Sulfamethoxazole-Trimethoprim] Headache and Nausea     Seasonal Allergies      Typhoid Vaccines Nausea and Vomiting     Fever, vice- h/a, body aches.  Was hospitalized for 2d.       FAMILY HISTORY:  Family History   Problem Relation Age of Onset     C.A.D. Mother      Diabetes Mother         Type 2 in her 80s     Arthritis Mother      Thyroid Disease  Mother         goiter removed     Macular Degeneration Mother         Had signs in her 80s (nothing severe)     C.A.D. Father      Glaucoma Brother      Breast Cancer Sister      Arthritis Sister      Breast Cancer Sister      Genetic Disorder Other         nephew     Cancer Sister         Survived breast cancer twice     Cancer Sister         Breast cancer chemo impacted organs (I had genetic testing)     Cancer Brother         Minor skin cancer removal     Hypertension Brother      Thyroid Disease Brother         goiter removed         SOCIAL HISTORY:  Social History     Tobacco Use     Smoking status: Never     Passive exposure: Never     Smokeless tobacco: Never     Tobacco comments:     zero history   Vaping Use     Vaping status: Never Used   Substance Use Topics     Alcohol use: Not Currently     Comment: extremely infrequent after cancer diagnosis (12 w/in a yr)     Drug use: Never                        ROS:   Constitutional: No fever, chills, or sweats. Weight stable.   Cardiovascular: As per HPI.     Exam:  /84 (BP Location: Left arm, Patient Position: Chair, Cuff Size: Adult Regular)   Pulse 82   Wt 87.6 kg (193 lb 3.2 oz)   LMP 05/17/2011   SpO2 95%   BMI 31.18 kg/m    GENERAL APPEARANCE: alert and no distress  HEENT: no icterus, no central cyanosis  LYMPH/NECK: no adenopathy, no asymmetry, JVP not elevated  RESPIRATORY: lungs clear to auscultation - no rales, rhonchi or wheezes, no use of accessory muscles, no retractions, respirations are unlabored, normal respiratory rate  CARDIOVASCULAR: regular rhythm, normal S1, S2, no S3 or S4  EXTREMITIES: no edema  NEURO: alert, normal speech,and affect  SKIN: no ecchymoses, no rashes     I have reviewed the labs and personally reviewed the imaging below and made my comment in the assessment and plan.      Labs:  CBC RESULTS:   Lab Results   Component Value Date    WBC 5.2 09/23/2024    WBC 6.1 09/08/2014    RBC 4.16 09/23/2024    RBC 4.60 09/08/2014     HGB 13.0 09/23/2024    HGB 14.2 10/13/2017    HCT 38.1 09/23/2024    HCT 45.5 10/13/2017    MCV 92 09/23/2024    MCV 95.9 10/13/2017    MCH 31.3 09/23/2024    MCH 30.0 10/13/2017    MCHC 34.1 09/23/2024    MCHC 31.2 (L) 10/13/2017    RDW 13.1 09/23/2024    RDW 12.7 09/08/2014     (L) 09/23/2024     09/08/2014       BMP RESULTS:  Lab Results   Component Value Date     09/23/2024    .4 07/29/2020    POTASSIUM 4.1 09/23/2024    POTASSIUM 3.5 08/19/2022    POTASSIUM 4.2 07/29/2020    CHLORIDE 107 09/23/2024    CHLORIDE 106 08/19/2022    CHLORIDE 99.3 07/29/2020    CO2 25 09/23/2024    CO2 27 08/19/2022    CO2 28.7 07/29/2020    ANIONGAP 9 09/23/2024    ANIONGAP 7 08/19/2022    ANIONGAP 8 09/08/2014    GLC 85 09/23/2024     (H) 08/29/2024     (H) 08/19/2022    .0 (H) 07/29/2020    BUN 14.7 09/23/2024    BUN 12 08/19/2022    BUN 14.3 07/29/2020    CR 0.65 09/23/2024    CR 0.7 07/29/2020    GFRESTIMATED >90 09/23/2024    GFRESTIMATED 86.8 07/29/2020    GFRESTBLACK >90 07/29/2020    VALENTINA 9.6 09/23/2024    VALENTINA 9.8 07/29/2020   Transthoracic echocardiogram 2/28/2024  Global and regional left ventricular function is normal with an EF of 60-65%.  Right ventricular function, chamber size, wall motion, and thickness are normal.  Both atria appear normal.  Severe aortic stenosis. Peak aortic velocity 4.5m/sec, AV mean gradient  47mmHg. Valve area ps overestimated due to LVOT diameter and VTI measurement.  IVC diameter <2.1 cm collapsing >50% with sniff suggests a normal RA pressure  of 3 mmHg.  No pericardial effusion is present.  This study was compared with the study from 11/13/2023. Progression of AS to  severe now.  Transthoracic echocardiogram 2/20/2023  3D LVEF volumetric analysis is 57.7%.  Global peak LV longitudinal strain is averaged at -19%. This is within  reported normal limits (normal <-18%).  Right ventricular function, chamber size, wall motion, and thickness  are  normal.  Severe aortic stenosis is present.  The inferior vena cava is normal.  No pericardial effusion is present.    Cardiac MRI January 27, 2022:  1. The LV is normal in cavity size and wall thickness. The global systolic function is normal. The LVEF is  63%. There are no regional wall motion abnormalities. 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      2. The RV is normal in cavity size. The global systolic function is normal. The RVEF is 64%.      3. Both atria are normal in size.     4. The aortic valve is calcified and likely tricuspid. There is restricted opening of NCC and RCC. Aortic  stenosis is present, not quantified.     5. Late gadolinium enhancement imaging shows no MI, fibrosis or infiltrative disease.      6. Regadenoson stress perfusion imaging shows no ischemia.     7. There is no pericardial effusion or thickening.     8. There is no intracardiac thrombus.     MRA Aorta     1. The aortic root, ascending aorta, transverse arch and descending thoracic aorta are normal in size  without an aneurysm or dissection.     2. The aortic arch is left sided. There is normal branching of the arch vessels. There is no coarctation.     3. The main and proximal branch pulmonary arteries are normal in size.      4. The systemic venous connections are normal.      CONCLUSIONS: No myocardial ischemia or infarction, with normal cardiac function, and no evidence of infiltrative disease. Tricuspid aortic valve, stenosis present (not quantified), with normal thoracic  aorta.   Echocardiogram 11/3/2021  Moderate, paradoxical low-flow, aortic stenosis is present.  Global and regional left ventricular function is normal with an EF of 60-65%.  Global right ventricular function is normal.  IVC diameter <2.1 cm collapsing >50% with sniff suggests a normal RA pressure  of 3 mmHg.  There is no prior study for direct comparison.  Moderate aortic valve calcification is present. Moderate aortic stenosis  is  present. The calculated aortic valve are is 1.2 cm^2. The mean AoV pressure  gradient is 17.8 mmHg.    EKG 12/6/2021 personally reviewed in clinic shows sinus rhythm otherwise normal      Zio patch                Transthoracic echocardiogram 9/23/2024  Left ventricular size, wall motion and function are normal. The ejection  fraction is 60-65%.  Right ventricular function, chamber size, wall motion, and thickness are  normal.  Trace mitral insufficiency is present.  S/P TAVR with 23 mm Durán Danial 3 Ultra Resilia prosthesis (8/28/2024) The  prosthetic aortic valve is well-seated. There is trace to mild paravalvular  regurgitation. The mean gradient is 13 mmHg.  The inferior vena cava was normal in size with preserved respiratory  variability. No pericardial effusion is present.     No significant changes noted.    Assessment and Plan:   Patient is seen in clinic today after recent TAVR.  Overall doing well.  In rehab now.  Has some fatigue but no other cardiac symptoms.  Denies feeling any significant difference since TAVR.  She was having some palpitations but that has resolved over the last 1 week.  Sinus rhythm in clinic today.    #Frequent monomorphic PVCs detected on Zio patch (9.9%) corresponding to the patient's symptoms  -Patient has been on metoprolol 25 mg which improved her symptoms.  Cardiac MRI showed normal LV size and function.    #severe aortic stenosis status post TAVR 8/28/2024  -Most recent echocardiogram on 9/23 showed  normal functioning TAVR valve.  Biventricular size and function is normal.  No other valve disease.  Patient is currently asymptomatic.  Euvolemic on exam.  -Reminded to avoid dental appointments for 6 months.  -Endocarditis prophylaxis prior to dental procedures    #History of breast cancer  -Status post chemo and radiation treatment.    # Hyperlipidemia: Nonobstructive mild coronary artery disease  -She was on Crestor 40 mg.  Recently started on Zetia since LDL was  118.    No medication changes today.  Return to clinic 1 year.    Total time spent today for this visit is 28 minutes including precharting, face-to-face clinic visit, review of labs/imaging and medical documentation.    Nicole BACA MD  [facility] Division of Cardiology  Pager [phone]              Please do not hesitate to contact me if you have any questions/concerns.     Sincerely,     Nicole Baca MD

## 2024-10-03 NOTE — NURSING NOTE
Chief Complaint   Patient presents with    Follow Up     Return Cardiology- Return cardiology to discuss ongoing fluttering in chest.     Vitals were taken and medications reconciled.    Suleman Castillo, ELISHA  9:29 AM

## 2024-10-04 ENCOUNTER — HOSPITAL ENCOUNTER (OUTPATIENT)
Dept: CARDIAC REHAB | Facility: CLINIC | Age: 69
Discharge: HOME | End: 2024-10-04
Attending: SURGERY
Payer: MEDICARE

## 2024-10-04 PROCEDURE — 93798 PHYS/QHP OP CAR RHAB W/ECG: CPT

## 2024-10-07 ENCOUNTER — HOSPITAL ENCOUNTER (OUTPATIENT)
Dept: CARDIAC REHAB | Facility: CLINIC | Age: 69
Discharge: HOME | End: 2024-10-07
Attending: SURGERY
Payer: MEDICARE

## 2024-10-07 PROCEDURE — 93798 PHYS/QHP OP CAR RHAB W/ECG: CPT

## 2024-10-09 ENCOUNTER — HOSPITAL ENCOUNTER (OUTPATIENT)
Dept: CARDIAC REHAB | Facility: CLINIC | Age: 69
Discharge: HOME | End: 2024-10-09
Attending: SURGERY
Payer: MEDICARE

## 2024-10-09 PROCEDURE — 93798 PHYS/QHP OP CAR RHAB W/ECG: CPT

## 2024-10-11 ENCOUNTER — HOSPITAL ENCOUNTER (OUTPATIENT)
Dept: CARDIAC REHAB | Facility: CLINIC | Age: 69
Discharge: HOME | End: 2024-10-11
Attending: SURGERY
Payer: MEDICARE

## 2024-10-11 PROCEDURE — 93798 PHYS/QHP OP CAR RHAB W/ECG: CPT

## 2024-10-14 ENCOUNTER — HOSPITAL ENCOUNTER (OUTPATIENT)
Dept: CARDIAC REHAB | Facility: CLINIC | Age: 69
Discharge: HOME | End: 2024-10-14
Attending: SURGERY
Payer: MEDICARE

## 2024-10-14 PROCEDURE — 93797 PHYS/QHP OP CAR RHAB WO ECG: CPT | Mod: XU

## 2024-10-14 PROCEDURE — 93798 PHYS/QHP OP CAR RHAB W/ECG: CPT

## 2024-10-16 ENCOUNTER — HOSPITAL ENCOUNTER (OUTPATIENT)
Dept: CARDIAC REHAB | Facility: CLINIC | Age: 69
Discharge: HOME | End: 2024-10-16
Attending: SURGERY
Payer: MEDICARE

## 2024-10-16 PROCEDURE — 93798 PHYS/QHP OP CAR RHAB W/ECG: CPT

## 2024-10-21 ENCOUNTER — HOSPITAL ENCOUNTER (OUTPATIENT)
Dept: CARDIAC REHAB | Facility: CLINIC | Age: 69
Discharge: HOME | End: 2024-10-21
Attending: SURGERY
Payer: MEDICARE

## 2024-10-21 PROCEDURE — 93798 PHYS/QHP OP CAR RHAB W/ECG: CPT

## 2024-10-21 PROCEDURE — 93797 PHYS/QHP OP CAR RHAB WO ECG: CPT | Mod: XU

## 2024-10-23 ENCOUNTER — HOSPITAL ENCOUNTER (OUTPATIENT)
Dept: CARDIAC REHAB | Facility: CLINIC | Age: 69
Discharge: HOME | End: 2024-10-23
Attending: SURGERY
Payer: MEDICARE

## 2024-10-23 PROCEDURE — 93798 PHYS/QHP OP CAR RHAB W/ECG: CPT

## 2024-10-25 ENCOUNTER — HOSPITAL ENCOUNTER (OUTPATIENT)
Dept: CARDIAC REHAB | Facility: CLINIC | Age: 69
Discharge: HOME | End: 2024-10-25
Attending: SURGERY
Payer: MEDICARE

## 2024-10-25 PROCEDURE — 93798 PHYS/QHP OP CAR RHAB W/ECG: CPT

## 2024-10-27 RX ORDER — HEPARIN SODIUM (PORCINE) LOCK FLUSH IV SOLN 100 UNIT/ML 100 UNIT/ML
5 SOLUTION INTRAVENOUS
OUTPATIENT
Start: 2025-01-19

## 2024-10-27 RX ORDER — HEPARIN SODIUM,PORCINE 10 UNIT/ML
5 VIAL (ML) INTRAVENOUS
OUTPATIENT
Start: 2025-01-19

## 2024-10-27 RX ORDER — ZOLEDRONIC ACID 0.04 MG/ML
4 INJECTION, SOLUTION INTRAVENOUS ONCE
OUTPATIENT
Start: 2025-01-19 | End: 2025-01-19
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
## 2024-10-28 ENCOUNTER — HOSPITAL ENCOUNTER (OUTPATIENT)
Dept: CARDIAC REHAB | Facility: CLINIC | Age: 69
Discharge: HOME | End: 2024-10-28
Attending: SURGERY
Payer: MEDICARE

## 2024-10-28 ENCOUNTER — ONCOLOGY VISIT (OUTPATIENT)
Dept: ONCOLOGY | Facility: CLINIC | Age: 69
End: 2024-10-28
Payer: MEDICARE

## 2024-10-28 VITALS
WEIGHT: 191 LBS | OXYGEN SATURATION: 96 % | DIASTOLIC BLOOD PRESSURE: 66 MMHG | HEIGHT: 66 IN | HEART RATE: 70 BPM | BODY MASS INDEX: 30.7 KG/M2 | TEMPERATURE: 98 F | RESPIRATION RATE: 20 BRPM | SYSTOLIC BLOOD PRESSURE: 110 MMHG

## 2024-10-28 DIAGNOSIS — M17.0 PRIMARY OSTEOARTHRITIS OF BOTH KNEES: ICD-10-CM

## 2024-10-28 DIAGNOSIS — R91.8 PULMONARY NODULES: ICD-10-CM

## 2024-10-28 DIAGNOSIS — C50.212 MALIGNANT NEOPLASM OF UPPER-INNER QUADRANT OF LEFT BREAST IN FEMALE, ESTROGEN RECEPTOR POSITIVE (H): Primary | ICD-10-CM

## 2024-10-28 DIAGNOSIS — Z17.0 MALIGNANT NEOPLASM OF UPPER-INNER QUADRANT OF LEFT BREAST IN FEMALE, ESTROGEN RECEPTOR POSITIVE (H): Primary | ICD-10-CM

## 2024-10-28 DIAGNOSIS — M85.89 OSTEOPENIA OF MULTIPLE SITES: ICD-10-CM

## 2024-10-28 DIAGNOSIS — Z12.31 VISIT FOR SCREENING MAMMOGRAM: ICD-10-CM

## 2024-10-28 DIAGNOSIS — Z79.811 LONG TERM (CURRENT) USE OF AROMATASE INHIBITORS: ICD-10-CM

## 2024-10-28 PROBLEM — T50.A95A: Status: ACTIVE | Noted: 2024-10-28

## 2024-10-28 PROCEDURE — 99214 OFFICE O/P EST MOD 30 MIN: CPT | Performed by: INTERNAL MEDICINE

## 2024-10-28 PROCEDURE — G0463 HOSPITAL OUTPT CLINIC VISIT: HCPCS | Performed by: INTERNAL MEDICINE

## 2024-10-28 PROCEDURE — 93798 PHYS/QHP OP CAR RHAB W/ECG: CPT

## 2024-10-28 ASSESSMENT — PAIN SCALES - GENERAL: PAINLEVEL_OUTOF10: SEVERE PAIN (6)

## 2024-10-28 NOTE — NURSING NOTE
"Oncology Rooming Note    October 28, 2024 3:47 PM   Vickie Alvarado is a 69 year old female who presents for:    Chief Complaint   Patient presents with    Oncology Clinic Visit     Breast Cancer     Initial Vitals: /66   Pulse 70   Temp 98  F (36.7  C) (Tympanic)   Resp 20   Ht 1.676 m (5' 6\")   Wt 86.6 kg (191 lb)   LMP 05/17/2011   SpO2 96%   BMI 30.83 kg/m   Estimated body mass index is 30.83 kg/m  as calculated from the following:    Height as of this encounter: 1.676 m (5' 6\").    Weight as of this encounter: 86.6 kg (191 lb). Body surface area is 2.01 meters squared.  Severe Pain (6) Comment: bilateral arthritic knee pain   Patient's last menstrual period was 05/17/2011.  Allergies reviewed: Yes  Medications reviewed: Yes    Medications: Medication refills not needed today.  Pharm[facility] entered into [employer]: [id number] [place] [id number] & [facility]    Frailty Screening:   Is the patient here for a new oncology consult visit in cancer care? 2. No      Clinical concerns: patient brought a list of questions she would like to discuss with the provider      Venancio George LPN              "
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
## 2024-10-29 NOTE — PROGRESS NOTES
Pre-visit planning and chart review completed.     NEW patient appointment:    11/5 with Dr. Bessie Foley  11/5 CT chest wo contrast  11/5 PFTs  7/23 Brain MRI    - On Aspirin  - Never smoker.  - Hx of Breast cancer > follows with Dr. Horvath.    CE updated. Medications, allergies, problem list, and immunizations reconciled.

## 2024-10-30 ENCOUNTER — HOSPITAL ENCOUNTER (OUTPATIENT)
Dept: CARDIAC REHAB | Facility: CLINIC | Age: 69
Discharge: HOME | End: 2024-10-30
Attending: SURGERY
Payer: MEDICARE

## 2024-10-30 PROCEDURE — 93798 PHYS/QHP OP CAR RHAB W/ECG: CPT

## 2024-10-31 ENCOUNTER — PATIENT OUTREACH (OUTPATIENT)
Dept: ONCOLOGY | Facility: CLINIC | Age: 69
End: 2024-10-31
Payer: MEDICARE

## 2024-10-31 ENCOUNTER — NURSE TRIAGE (OUTPATIENT)
Dept: ONCOLOGY | Facility: CLINIC | Age: 69
End: 2024-10-31
Payer: MEDICARE

## 2024-10-31 NOTE — TELEPHONE ENCOUNTER
Pt sees Dr. Foley on Tuesday 11/5 with CT and PFT prior. She was transferred to triage because [facility] was unavailable.   She has not been given instructions about these appts, wondering if there are any she should be aware of so she is prepared, and she also has a follow up questions.   She did call the PFT clinic and all they said was to not drink caffeine.   Writer informed she will have to speak to care team. She is requesting [facility] call her back at [phone].

## 2024-10-31 NOTE — PROGRESS NOTES
I rec'd an IB message that patient had some questions about her upcoming appts for her IP (Interventional Pulmonology) consult.  I explained the reason behind the PFT's and CT Chest and verified there are no restrictions or prep for these.  She appreciated the conversation and ability to get her questions answered.

## 2024-11-01 ENCOUNTER — HOSPITAL ENCOUNTER (OUTPATIENT)
Dept: CARDIAC REHAB | Facility: CLINIC | Age: 69
Discharge: HOME | End: 2024-11-01
Attending: SURGERY
Payer: MEDICARE

## 2024-11-01 PROCEDURE — 93798 PHYS/QHP OP CAR RHAB W/ECG: CPT

## 2024-11-04 ENCOUNTER — HOSPITAL ENCOUNTER (OUTPATIENT)
Dept: CARDIAC REHAB | Facility: CLINIC | Age: 69
Discharge: HOME | End: 2024-11-04
Attending: SURGERY
Payer: MEDICARE

## 2024-11-04 PROCEDURE — 93798 PHYS/QHP OP CAR RHAB W/ECG: CPT

## 2024-11-05 ENCOUNTER — PRE VISIT (OUTPATIENT)
Dept: PULMONOLOGY | Facility: CLINIC | Age: 69
End: 2024-11-05

## 2024-11-05 ENCOUNTER — ONCOLOGY VISIT (OUTPATIENT)
Dept: PULMONOLOGY | Facility: CLINIC | Age: 69
End: 2024-11-05
Attending: INTERNAL MEDICINE
Payer: MEDICARE

## 2024-11-05 ENCOUNTER — ANCILLARY PROCEDURE (OUTPATIENT)
Dept: CT IMAGING | Facility: CLINIC | Age: 69
End: 2024-11-05
Attending: INTERNAL MEDICINE
Payer: MEDICARE

## 2024-11-05 VITALS
HEART RATE: 71 BPM | DIASTOLIC BLOOD PRESSURE: 77 MMHG | WEIGHT: 192.1 LBS | RESPIRATION RATE: 14 BRPM | TEMPERATURE: 97.6 F | SYSTOLIC BLOOD PRESSURE: 131 MMHG | HEIGHT: 65 IN | BODY MASS INDEX: 32.01 KG/M2 | OXYGEN SATURATION: 97 %

## 2024-11-05 DIAGNOSIS — R91.8 PULMONARY NODULES: ICD-10-CM

## 2024-11-05 PROCEDURE — 94150 VITAL CAPACITY TEST: CPT | Performed by: INTERNAL MEDICINE

## 2024-11-05 PROCEDURE — 99204 OFFICE O/P NEW MOD 45 MIN: CPT | Performed by: INTERNAL MEDICINE

## 2024-11-05 PROCEDURE — 94375 RESPIRATORY FLOW VOLUME LOOP: CPT | Performed by: INTERNAL MEDICINE

## 2024-11-05 PROCEDURE — G0463 HOSPITAL OUTPT CLINIC VISIT: HCPCS | Performed by: INTERNAL MEDICINE

## 2024-11-05 PROCEDURE — 94726 PLETHYSMOGRAPHY LUNG VOLUMES: CPT | Performed by: INTERNAL MEDICINE

## 2024-11-05 PROCEDURE — 94729 DIFFUSING CAPACITY: CPT | Performed by: INTERNAL MEDICINE

## 2024-11-05 PROCEDURE — G1010 CDSM STANSON: HCPCS | Mod: GC | Performed by: RADIOLOGY

## 2024-11-05 PROCEDURE — 71250 CT THORAX DX C-: CPT | Mod: ME | Performed by: RADIOLOGY

## 2024-11-05 ASSESSMENT — PAIN SCALES - GENERAL: PAINLEVEL_OUTOF10: MODERATE PAIN (4)

## 2024-11-05 NOTE — NURSING NOTE
"Oncology Rooming Note    November 5, 2024 2:25 PM   Vickie Alvarado is a 69 year old female who presents for:    Chief Complaint   Patient presents with    Oncology Clinic Visit     Pulmonary nodules     Initial Vitals: /77 (BP Location: Right arm, Patient Position: Sitting, Cuff Size: Adult Regular)   Pulse 71   Temp 97.6  F (36.4  C) (Oral)   Resp 14   Ht 1.661 m (5' 5.39\")   Wt 87.1 kg (192 lb 1.6 oz)   LMP 05/17/2011   SpO2 97%   BMI 31.58 kg/m   Estimated body mass index is 31.58 kg/m  as calculated from the following:    Height as of this encounter: 1.661 m (5' 5.39\").    Weight as of this encounter: 87.1 kg (192 lb 1.6 oz). Body surface area is 2 meters squared.  Moderate Pain (4) Comment: Data Unavailable   Patient's last menstrual period was 05/17/2011.  Allergies reviewed: Yes  Medications reviewed: Yes    Medications: Medication refills not needed today.  Pharmacy name entered into [id number]: [id number] [place] [address] & [facility]    Frailty Screening:   Is the patient here for a new oncology consult visit in cancer care? 1. Yes. Over the past month, have you experienced difficulty or required a caregiver to assist with:   1. Balance, walking or general mobility (including any falls)? NO  2. Completion of self-care tasks such as bathing, dressing, toileting, grooming/hygiene?  NO  3. Concentration or memory that affects your daily life?  NO       Clinical concerns: Patient is anxious to know if today's tests revealed anything she needs to be concerned about.        Patrice Alfaro, EMT            "
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
## 2024-11-06 ENCOUNTER — HOSPITAL ENCOUNTER (OUTPATIENT)
Dept: CARDIAC REHAB | Facility: CLINIC | Age: 69
Discharge: HOME | End: 2024-11-06
Attending: SURGERY
Payer: MEDICARE

## 2024-11-06 LAB
DLCOUNC-%PRED-PRE: 112 %
DLCOUNC-PRE: 22.5 ML/MIN/MMHG
DLCOUNC-PRED: 20.01 ML/MIN/MMHG
ERV-%PRED-PRE: 79 %
ERV-PRE: 0.85 L
ERV-PRED: 1.08 L
EXPTIME-PRE: 7.66 SEC
FEF2575-%PRED-PRE: 86 %
FEF2575-PRE: 1.62 L/SEC
FEF2575-PRED: 1.88 L/SEC
FEFMAX-%PRED-PRE: 94 %
FEFMAX-PRE: 5.66 L/SEC
FEFMAX-PRED: 6.01 L/SEC
FEV1-%PRED-PRE: 103 %
FEV1-PRE: 2.3 L
FEV1FEV6-PRE: 72 %
FEV1FEV6-PRED: 79 %
FEV1FVC-PRE: 72 %
FEV1FVC-PRED: 79 %
FEV1SVC-PRE: 68 %
FEV1SVC-PRED: 67 %
FIFMAX-PRE: 4.02 L/SEC
FRCPLETH-%PRED-PRE: 128 %
FRCPLETH-PRE: 3.62 L
FRCPLETH-PRED: 2.82 L
FVC-%PRED-PRE: 113 %
FVC-PRE: 3.21 L
FVC-PRED: 2.83 L
IC-%PRED-PRE: 118 %
IC-PRE: 2.56 L
IC-PRED: 2.16 L
RVPLETH-%PRED-PRE: 129 %
RVPLETH-PRE: 2.77 L
RVPLETH-PRED: 2.14 L
TLCPLETH-%PRED-PRE: 117 %
TLCPLETH-PRE: 6.18 L
TLCPLETH-PRED: 5.27 L
VA-%PRED-PRE: 105 %
VA-PRE: 5.16 L
VC-%PRED-PRE: 102 %
VC-PRE: 3.41 L
VC-PRED: 3.32 L

## 2024-11-06 PROCEDURE — 93798 PHYS/QHP OP CAR RHAB W/ECG: CPT

## 2024-11-08 ENCOUNTER — HOSPITAL ENCOUNTER (OUTPATIENT)
Dept: CARDIAC REHAB | Facility: CLINIC | Age: 69
Discharge: HOME | End: 2024-11-08
Attending: SURGERY
Payer: MEDICARE

## 2024-11-08 PROCEDURE — 93798 PHYS/QHP OP CAR RHAB W/ECG: CPT | Performed by: OCCUPATIONAL THERAPIST

## 2024-11-13 ENCOUNTER — HOSPITAL ENCOUNTER (OUTPATIENT)
Dept: CARDIAC REHAB | Facility: CLINIC | Age: 69
Discharge: HOME | End: 2024-11-13
Attending: SURGERY
Payer: MEDICARE

## 2024-11-13 PROCEDURE — 93798 PHYS/QHP OP CAR RHAB W/ECG: CPT

## 2024-11-15 ENCOUNTER — HOSPITAL ENCOUNTER (OUTPATIENT)
Dept: CARDIAC REHAB | Facility: CLINIC | Age: 69
Discharge: HOME | End: 2024-11-15
Attending: SURGERY
Payer: MEDICARE

## 2024-11-15 PROCEDURE — 93798 PHYS/QHP OP CAR RHAB W/ECG: CPT

## 2024-11-18 ENCOUNTER — HOSPITAL ENCOUNTER (OUTPATIENT)
Dept: CARDIAC REHAB | Facility: CLINIC | Age: 69
Discharge: HOME | End: 2024-11-18
Attending: SURGERY
Payer: MEDICARE

## 2024-11-18 PROCEDURE — 93798 PHYS/QHP OP CAR RHAB W/ECG: CPT

## 2024-11-20 ENCOUNTER — HOSPITAL ENCOUNTER (OUTPATIENT)
Dept: CARDIAC REHAB | Facility: CLINIC | Age: 69
Discharge: HOME | End: 2024-11-20
Attending: SURGERY
Payer: MEDICARE

## 2024-11-20 PROCEDURE — 93798 PHYS/QHP OP CAR RHAB W/ECG: CPT

## 2024-11-22 ENCOUNTER — HOSPITAL ENCOUNTER (OUTPATIENT)
Dept: CARDIAC REHAB | Facility: CLINIC | Age: 69
Discharge: HOME | End: 2024-11-22
Attending: SURGERY
Payer: MEDICARE

## 2024-11-22 PROCEDURE — 93798 PHYS/QHP OP CAR RHAB W/ECG: CPT

## 2024-11-25 ENCOUNTER — HOSPITAL ENCOUNTER (OUTPATIENT)
Dept: CARDIAC REHAB | Facility: CLINIC | Age: 69
Discharge: HOME | End: 2024-11-25
Attending: SURGERY
Payer: MEDICARE

## 2024-11-25 PROCEDURE — 93797 PHYS/QHP OP CAR RHAB WO ECG: CPT

## 2024-11-25 PROCEDURE — 93798 PHYS/QHP OP CAR RHAB W/ECG: CPT | Performed by: OCCUPATIONAL THERAPIST

## 2024-11-27 ENCOUNTER — HOSPITAL ENCOUNTER (OUTPATIENT)
Dept: CARDIAC REHAB | Facility: CLINIC | Age: 69
Discharge: HOME | End: 2024-11-27
Attending: SURGERY
Payer: MEDICARE

## 2024-11-27 PROCEDURE — 93798 PHYS/QHP OP CAR RHAB W/ECG: CPT

## 2024-12-02 ENCOUNTER — HOSPITAL ENCOUNTER (OUTPATIENT)
Dept: CARDIAC REHAB | Facility: CLINIC | Age: 69
Discharge: HOME | End: 2024-12-02
Attending: SURGERY
Payer: MEDICARE

## 2024-12-02 PROCEDURE — 93798 PHYS/QHP OP CAR RHAB W/ECG: CPT

## 2024-12-03 ENCOUNTER — HOSPITAL ENCOUNTER (OUTPATIENT)
Dept: CARDIAC REHAB | Facility: CLINIC | Age: 69
Discharge: HOME | End: 2024-12-03
Attending: SURGERY
Payer: MEDICARE

## 2024-12-03 PROCEDURE — 93797 PHYS/QHP OP CAR RHAB WO ECG: CPT

## 2024-12-03 PROCEDURE — 93798 PHYS/QHP OP CAR RHAB W/ECG: CPT

## 2024-12-04 ENCOUNTER — PATIENT OUTREACH (OUTPATIENT)
Dept: ONCOLOGY | Facility: CLINIC | Age: 69
End: 2024-12-04
Payer: MEDICARE

## 2024-12-04 NOTE — PROGRESS NOTES
[facility]: Cancer Care                                                                                          Incoming Call:   Voicemail regarding scheduling appointments from Dr. Horvath's visit on 10/28.    Outgoing Call:     Let voicemail that I have requested the scheduling team to call and assist her with scheduling. Asked that she give them a few days to reach out and if she has not heard from someone by Monday 11/9, to please call [phone] and speak to the scheduling team.       Deborah King MSN, RN ,OCN  Lead RN Care Coordinator - [facility]  [phone]

## 2024-12-09 ENCOUNTER — LAB (OUTPATIENT)
Dept: LAB | Facility: CLINIC | Age: 69
End: 2024-12-09
Payer: MEDICARE

## 2024-12-09 DIAGNOSIS — Z95.2 S/P TAVR (TRANSCATHETER AORTIC VALVE REPLACEMENT): ICD-10-CM

## 2024-12-09 DIAGNOSIS — E78.2 MIXED HYPERLIPIDEMIA: ICD-10-CM

## 2024-12-09 DIAGNOSIS — I25.10 CORONARY ARTERY DISEASE INVOLVING NATIVE CORONARY ARTERY OF NATIVE HEART WITHOUT ANGINA PECTORIS: ICD-10-CM

## 2024-12-09 LAB
CHOLEST SERPL-MCNC: 170 MG/DL
FASTING STATUS PATIENT QL REPORTED: YES
HDLC SERPL-MCNC: 73 MG/DL
LDLC SERPL CALC-MCNC: 79 MG/DL
NONHDLC SERPL-MCNC: 97 MG/DL
PLATELET # BLD AUTO: 150 10E3/UL (ref 150–450)
TRIGL SERPL-MCNC: 88 MG/DL

## 2024-12-09 NOTE — TELEPHONE ENCOUNTER
RECORDS RECEIVED FROM: Internal    REASON FOR VISIT: Visual field cut   PROVIDER: Renard Fisher MD   DATE OF APPT: 1/31/25 @ 3:15 pm    NOTES (FOR ALL VISITS) STATUS DETAILS   OFF[facility] referring provider Internal [facility]24 [facility]     OFF[facility] NOTE from other specialist Internal 8/27/24, 2/23/24, 12/28/23 Hector Choi MD @[facility]    6/27/24 Gaby Lynn MD @[facility]    4/1/24, 1/30/23 Hiro Christian MD @[facility]    11/9/23 Denver Lu MD @[facility]     DISCHARGE REPORT from the ER Internal 6/13/24 José Miguel Mayfield MD @[facility] ED     OPHTHALMOLOGY  REPORT Internal [facility]  8/27/24 OVF 24-2 Dynamic each eye  8/27/24 OCT Optic Nerve RNFL Spectralis each eye  12/28/23 OVF 24-2 Dynamic each eye  10/19/23 OVF 24-2 Dynamic each eye  1/30/23 OCT Optic Nerve RNFL Spectralis each eye     MEDICATION LIST Internal    IMAGING  (FOR ALL VISITS)     MRI (HEAD, NECK, SPINE) Internal [facility]  7/19/24 MR Brain

## 2024-12-22 ENCOUNTER — HEALTH MAINTENANCE LETTER (OUTPATIENT)
Age: 69
End: 2024-12-22

## 2025-01-21 ENCOUNTER — TELEPHONE (OUTPATIENT)
Dept: CARDIOLOGY | Facility: CLINIC | Age: 70
End: 2025-01-21
Payer: MEDICARE

## 2025-01-21 NOTE — TELEPHONE ENCOUNTER
DIAGNOSIS: RIGHT KNEE  Chronic pain of both knees [M25.561, M25.562, G89.29]  - Primary   APPOINTMENT DATE: 02/12/2025   NOTES STATUS DETAILS   OFFICE NOTE from referring provider Internal Blaise Thakur DO - [facility]   OFFICE NOTE from other specialist Internal 10/28/2024 - Chandrika Horvath MD - [facility]    05/15/2024  [facility]    04/09/2024 - Josie Jacob MD - [facility]   XRAYS (IMAGES & REPORTS) Internal

## 2025-01-21 NOTE — TELEPHONE ENCOUNTER
[facility] Call Center    Phone Message    May a detailed message be left on voicemail: yes     Reason for Call: Other: Patient received a letter stating they are due for a follow up, and just had some confusion since when they were last seen in Sept and Oct, they were told they don't need to follow up until a year. Patient will like to know if needing a follow up, would it be with the valve clinic or with their general cardiologist. Please call patient back to further discuss.     Action Taken: Other: Cardiology    Travel Screening: Not Applicable     Thank you!  [facility]

## 2025-01-21 NOTE — TELEPHONE ENCOUNTER
Return telephone from Marni. Reviewed chart and unable to find where message originated from. Upon further conversation, thinking it was from Joyce's [facility] office visit on 9/23/24. The original plan was for her to follow up with Dr. Brooke at 6 month s/p TAVR, but Marni saw Dr. Brooke on 10/3/25. She is worried that both cardiology 1 year follow up will be too close together. Discussed per TAVR registry requirement, her 1 year follow up can be done anytime after 6/26/25. This would allow 1 to take place in July and the other in October. Will discuss with Joyce Herrera and touch base tomorrow.      Chandrika Harley, RN, BSN  Lead Structural Heart Coordinator  TAVR, [facility] and [facility] Programs

## 2025-01-22 NOTE — TELEPHONE ENCOUNTER
Telephone call to Marni. Communicated Joyce's plan for TAVR follow up in July and and Dr. Brooke in October.       Chandrika Harley, RN, BSN  Lead Structural Heart Coordinator  TAVR, [facility] and [facility] Programs
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
## 2025-01-28 ENCOUNTER — APPOINTMENT (OUTPATIENT)
Dept: LAB | Facility: CLINIC | Age: 70
End: 2025-01-28
Payer: MEDICARE

## 2025-01-28 ENCOUNTER — INFUSION THERAPY VISIT (OUTPATIENT)
Dept: ONCOLOGY | Facility: CLINIC | Age: 70
End: 2025-01-28
Attending: INTERNAL MEDICINE
Payer: MEDICARE

## 2025-01-28 VITALS
WEIGHT: 193.5 LBS | BODY MASS INDEX: 31.81 KG/M2 | DIASTOLIC BLOOD PRESSURE: 77 MMHG | OXYGEN SATURATION: 96 % | TEMPERATURE: 98 F | SYSTOLIC BLOOD PRESSURE: 116 MMHG | HEART RATE: 71 BPM | RESPIRATION RATE: 16 BRPM

## 2025-01-28 DIAGNOSIS — Z17.0 MALIGNANT NEOPLASM OF UPPER-INNER QUADRANT OF LEFT BREAST IN FEMALE, ESTROGEN RECEPTOR POSITIVE (H): Primary | ICD-10-CM

## 2025-01-28 DIAGNOSIS — C50.212 MALIGNANT NEOPLASM OF UPPER-INNER QUADRANT OF LEFT BREAST IN FEMALE, ESTROGEN RECEPTOR POSITIVE (H): Primary | ICD-10-CM

## 2025-01-28 DIAGNOSIS — I89.0 LYMPHEDEMA: ICD-10-CM

## 2025-01-28 LAB
ALBUMIN SERPL BCG-MCNC: 4 G/DL (ref 3.5–5.2)
CALCIUM SERPL-MCNC: 9.3 MG/DL (ref 8.8–10.4)
CREAT SERPL-MCNC: 0.61 MG/DL (ref 0.51–0.95)
EGFRCR SERPLBLD CKD-EPI 2021: >90 ML/MIN/1.73M2

## 2025-01-28 PROCEDURE — 82040 ASSAY OF SERUM ALBUMIN: CPT | Performed by: INTERNAL MEDICINE

## 2025-01-28 PROCEDURE — 36415 COLL VENOUS BLD VENIPUNCTURE: CPT | Performed by: INTERNAL MEDICINE

## 2025-01-28 PROCEDURE — G0463 HOSPITAL OUTPT CLINIC VISIT: HCPCS | Mod: 25 | Performed by: PHYSICIAN ASSISTANT

## 2025-01-28 PROCEDURE — 96374 THER/PROPH/DIAG INJ IV PUSH: CPT

## 2025-01-28 PROCEDURE — 250N000011 HC RX IP 250 OP 636: Performed by: INTERNAL MEDICINE

## 2025-01-28 PROCEDURE — 99214 OFFICE O/P EST MOD 30 MIN: CPT | Performed by: PHYSICIAN ASSISTANT

## 2025-01-28 PROCEDURE — 82565 ASSAY OF CREATININE: CPT | Performed by: INTERNAL MEDICINE

## 2025-01-28 PROCEDURE — 82310 ASSAY OF CALCIUM: CPT | Performed by: INTERNAL MEDICINE

## 2025-01-28 PROCEDURE — 258N000003 HC RX IP 258 OP 636: Performed by: INTERNAL MEDICINE

## 2025-01-28 RX ORDER — ZOLEDRONIC ACID 0.04 MG/ML
4 INJECTION, SOLUTION INTRAVENOUS ONCE
Status: COMPLETED | OUTPATIENT
Start: 2025-01-28 | End: 2025-01-28

## 2025-01-28 RX ADMIN — ZOLEDRONIC ACID 4 MG: 0.04 INJECTION, SOLUTION INTRAVENOUS at 09:36

## 2025-01-28 RX ADMIN — SODIUM CHLORIDE 100 ML: 9 INJECTION, SOLUTION INTRAVENOUS at 09:35

## 2025-01-28 ASSESSMENT — PAIN SCALES - GENERAL: PAINLEVEL_OUTOF10: MODERATE PAIN (4)

## 2025-01-28 NOTE — NURSING NOTE
"Oncology Rooming Note    January 28, 2025 8:13 AM   Vickie Alvarado is a 69 year old female who presents for:    Chief Complaint   Patient presents with    Oncology Clinic Visit     Malignant neoplasm of upper-inner quadrant of left breast in female, estrogen receptor positive     Initial Vitals: /77 (BP Location: Right arm, Patient Position: Sitting, Cuff Size: Adult Regular)   Pulse 71   Temp 98  F (36.7  C) (Oral)   Resp 16   Wt 87.8 kg (193 lb 8 oz)   LMP 05/17/2011   SpO2 96%   BMI 31.81 kg/m   Estimated body mass index is 31.81 kg/m  as calculated from the following:    Height as of 11/5/24: 1.661 m (5' 5.39\").    Weight as of this encounter: 87.8 kg (193 lb 8 oz). Body surface area is 2.01 meters squared.  Moderate Pain (4) Comment: Data Unavailable   Patient's last menstrual period was 05/17/2011.  Allergies reviewed: Yes  Medications reviewed: Yes    Medications: Medication refills not needed today.  Pharmacy name entered into [facility]: [id number] - [place] [id number] [place] [id number] & [facility]    Frailty Screening:   Is the patient here for a new oncology consult visit in cancer care? 2. No      Clinical concerns: none      Jana Menjivar, EMT  1/28/2025            "

## 2025-01-28 NOTE — PROGRESS NOTES
Pre-visit planning and chart review completed.     RETURN patient appointment:    2/11 with Dr. Bessie Foley  2/11 CT chest wo contrast    - 3 month follow-up .    CE updated. Medications, allergies, problem list, and immunizations reconciled.
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
## 2025-01-28 NOTE — PROGRESS NOTES
Infusion Nursing Note:  Vickie Alvarado presents today for Zometa #5.    Patient seen by provider today: Yes: Jessy Rowan PA-C   present during visit today: Not Applicable.    Note: Pt saw provider prior to infusion, ok for treatment today.    Per IB from [facility], pt will receive 6 Zometa infusions.    Intravenous Access:  Peripheral IV placed.    Treatment Conditions:   Latest Reference Range & Units 01/28/25 07:53   Creatinine 0.51 - 0.95 mg/dL 0.61   GFR Estimate >60 mL/min/1.73m2 >90   Calcium 8.8 - 10.4 mg/dL 9.3   Albumin 3.5 - 5.2 g/dL 4.0       Post Infusion Assessment:  Patient tolerated infusion without incident.  Blood return noted pre and post infusion.  Site patent and intact, free from redness, edema or discomfort.  No evidence of extravasations.  Access discontinued per protocol.       Discharge Plan:   Patient declined prescription refills.  Discharge instructions reviewed with: Patient.  Patient and/or family verbalized understanding of discharge instructions and all questions answered.  AVS to patient via [employer].  Patient will return 7/28 for labs/mammo/provider/zometa, IB sent to scheduling to add infusion on..   Patient discharged in stable condition accompanied by: self.  Departure Mode: Ambulatory.    Perla Aguiar RN

## 2025-01-29 ENCOUNTER — MYC REFILL (OUTPATIENT)
Dept: ONCOLOGY | Facility: CLINIC | Age: 70
End: 2025-01-29
Payer: MEDICARE

## 2025-01-29 DIAGNOSIS — C50.212 MALIGNANT NEOPLASM OF UPPER-INNER QUADRANT OF LEFT BREAST IN FEMALE, ESTROGEN RECEPTOR POSITIVE (H): ICD-10-CM

## 2025-01-29 DIAGNOSIS — Z17.0 MALIGNANT NEOPLASM OF UPPER-INNER QUADRANT OF LEFT BREAST IN FEMALE, ESTROGEN RECEPTOR POSITIVE (H): ICD-10-CM

## 2025-01-29 RX ORDER — LETROZOLE 2.5 MG/1
2.5 TABLET, FILM COATED ORAL DAILY
Qty: 90 TABLET | Refills: 3 | Status: SHIPPED | OUTPATIENT
Start: 2025-01-29

## 2025-01-29 NOTE — TELEPHONE ENCOUNTER
Pending Prescriptions:                       Disp   Refills    letrozole (FEMARA) 2.5 MG tablet          30 tab*11           Sig: Take 1 tablet (2.5 mg) by mouth daily.    Last prescribing provider:     Last clinic visit date: 01/28/25 w/Jessy Rowan    Recommendations for requested medication (if none, N/A): Copied from last OV note: - Continues on adjuvant curative intent letrozole, tolerating well with the exception of arthralgias. Plan is to treat with a total 10 year course.     Any other pertinent information (if none, N/A): N/A    Refilled: Y/N, if NO, why?

## 2025-01-31 ENCOUNTER — PRE VISIT (OUTPATIENT)
Dept: NEUROLOGY | Facility: CLINIC | Age: 70
End: 2025-01-31

## 2025-02-07 ASSESSMENT — KOOS JR
HOW SEVERE IS YOUR KNEE STIFFNESS AFTER FIRST WAKING IN MORNING: MODERATE
TWISING OR PIVOTING ON KNEE: MILD
RISING FROM SITTING: SEVERE
KOOS JR SCORING: 57.14
STRAIGHTENING KNEE FULLY: MODERATE
STANDING UPRIGHT: MODERATE
GOING UP OR DOWN STAIRS: MODERATE

## 2025-02-11 ENCOUNTER — ONCOLOGY VISIT (OUTPATIENT)
Dept: PULMONOLOGY | Facility: CLINIC | Age: 70
End: 2025-02-11
Attending: INTERNAL MEDICINE
Payer: MEDICARE

## 2025-02-11 ENCOUNTER — ANCILLARY PROCEDURE (OUTPATIENT)
Dept: CT IMAGING | Facility: CLINIC | Age: 70
End: 2025-02-11
Attending: INTERNAL MEDICINE
Payer: MEDICARE

## 2025-02-11 ENCOUNTER — MYC MEDICAL ADVICE (OUTPATIENT)
Dept: PULMONOLOGY | Facility: CLINIC | Age: 70
End: 2025-02-11

## 2025-02-11 VITALS
BODY MASS INDEX: 31.67 KG/M2 | HEART RATE: 60 BPM | SYSTOLIC BLOOD PRESSURE: 127 MMHG | RESPIRATION RATE: 16 BRPM | OXYGEN SATURATION: 98 % | TEMPERATURE: 97.6 F | WEIGHT: 192.6 LBS | DIASTOLIC BLOOD PRESSURE: 78 MMHG

## 2025-02-11 DIAGNOSIS — R91.8 PULMONARY NODULES: ICD-10-CM

## 2025-02-11 DIAGNOSIS — R91.8 PULMONARY NODULES: Primary | ICD-10-CM

## 2025-02-11 PROCEDURE — 99214 OFFICE O/P EST MOD 30 MIN: CPT | Performed by: INTERNAL MEDICINE

## 2025-02-11 PROCEDURE — G0463 HOSPITAL OUTPT CLINIC VISIT: HCPCS | Performed by: INTERNAL MEDICINE

## 2025-02-11 PROCEDURE — 71250 CT THORAX DX C-: CPT | Performed by: RADIOLOGY

## 2025-02-11 ASSESSMENT — PAIN SCALES - GENERAL: PAINLEVEL_OUTOF10: MODERATE PAIN (4)

## 2025-02-11 NOTE — PROGRESS NOTES
[facility]  [address] [facility] [address]  Phone: [phone]  Fax: [phone]    Interventional Pulmonary Clinic Note    February 11, 2025        Chief complaint/Reason for Clinic Visit:  Vickie Alvarado is a 69 year old female seen for pulm nodules      Referred by or PCP: Gaby Lynn       Assessment and Plan:  Groups of (3-4) RML pulm nodules new compared to CT chest from Nov 2024 to July 2024. Likely to be inflammatory in origin but can not rule of malignancy at certanity. RLL nodule stable. Nodules are non-calcified. Discussed possible ethologies and follow up.  Repeat CT chest done today (Feb 11, 2025) personally reviewed showing waxing and waning RML nodules.   Will resume surveillance. And next CT in 6 months.    Post XRT pleural thickening in the L pleural surface, no change         History of Present Illness:  68 y/o woman w hx of breast cancer referred to my clinic for further evals of RML pulm nodules.     Lung problems: no  Respiratory symptoms: no  Family lung problems: one sister has developed asthma (15 years older) using inhaler  Smoking: never  Vaping: no  Exposure to chemicals, radiation or asbestos: home asbestos tiles covered (low risk). Left XRT December 2022 ended in Jan 2 2023.      PFTs: (personally reviewed) 11/5/2024 normal  CTs, CXRs, PET scans, Echos, Labs: (personally reviewed) CTs 2024 x 2        Oncology history (Dr. Chandrika Horvath)  69 year old female with a left breast cancer.  She self palpated a mass in her mid left breast.  Bilateral diagnostic mammograms showed a mass in the upper inner left breast.  Ultrasound of the left breast showed a mass at 11:00, 8 cm from the nipple measuring 3.1 cm.  There were no suspicious lymph nodes in the left axilla.  Biopsy of the left breast mass was c/w a grade 3 invasive ductal carcinoma, ER strong in 98%, NV moderate in 70%, and HER2 low by IHC (score 1+).   Oncotype DX  recurrence score was 24.  RSClin predicted a 23% risk of distant recurrence in 10 years if treated with endocrine therapy alone and a 9% absolute risk reduction with chemotherapy.  Based on this, she elected to proceed with chemotherapy.     She received neoadjuvant Taxotere and cyclophosphamide chemotherapy from 7/6/2022 - 9/8/2022.  She underwent left breast lumpectomy and left axillary sentinel lymph node procedure under the care of Dr. Cadet on 10/17/2022.  Pathology showed residual grade 2 invasive breast carcinoma (60% ductal and 40% micropapillary) measuring 2.2 cm.  Treatment related changes were present and invasive tumor cellularity was 30%.  There was associated DCIS.  Surgical margins for both invasive carcinoma and DCIS were close, but negative.  There was no lymphovascular invasion and a single sentinel lymph node was negative and without signs of prior involvement (i.e. no tumor bed or fibrotic changes in the lymph node).  Receptors were repeated on the residual invasive malignancy and were found to be ER positive (98%), SC positive (70%), HER-2 equivocal by IHC (2+), and HER-2 positive by FISH. She completed radiation to the left breast (4240 cGy to the breast with 1250 cGy boost to the lumpectomy cavity) on 1/2/2023.  She received one year of adjuvant Kadcyla from 11/21/2022 - 11/13/2023.  On adjuvant letrozole since 12/19/2022.                      Allergies   Allergen Reactions    Bactrim [Sulfamethoxazole-Trimethoprim] Headache and Nausea    Typhoid Vaccines Headache, Muscle Pain (Myalgia) and Nausea and Vomiting     Hospitalized for 2 days.         Past Medical History:   Diagnosis Date    Aortic stenosis     Arthritis in my 50s on hands    acute knee arthritis currently    Breast cancer (H) 06/2022    Hyperlipidemia     Hypertension merely monitoring    not on any meds    Insufficiency fracture of tibia, sequela 09/28/2021    Nonsenile cataract 1 cataract surgery    (apparently from airbag  deployment)    Polyp of colon 05/26/2016        Past Surgical History:   Procedure Laterality Date    ANGIOGRAM  8/28/2024    Procedure: ANGIOGRAM;  Surgeon: Jesús Tinajero MD;  Location: [facility] CARDIAC CATH LAB    BIOPSY NODE SENTINEL Left 10/17/2022    Procedure: LEFT seed localized lumpectomy with sentinel node biopsy;  Surgeon: Alphonso Cadet MD;  [place] [facility]    CATARACT IOL, RT/LT Right 2006    CV CORONARY ANGIOGRAM N/A 8/28/2024    Procedure: Coronary Angiogram;  Surgeon: Jesús Tinajero MD;  Location: [facility] CARDIAC CATH LAB    CV TRANSCATHETER AORTIC VALVE REPLACEMENT-FEMORAL APPROACH N/A 8/28/2024    Procedure: Transcatheter Aortic Valve Replacement-Femoral Approach;  Surgeon: Jesús Tinajero MD;  Location: [facility] CARDIAC CATH LAB    HC YAG LASER CAPSULOTOMY Right 2009    LASER VITREOLYSIS, ANTERIOR OD (RIGHT EYE) Right 2007    LUMPECTOMY BREAST WITH SENTINEL NODE, COMBINED Left 10/17/2022    Procedure: LEFT seed localized lumpectomy with sentinel node biopsy;  Surgeon: Alphonso Cadet MD;  [place] [facility] [place] [place]    OR TRANSCATHETER AORTIC VALVE REPLACEMENT, FEMORAL PERCUTANEOUS APPROACH (STANDBY) N/A 8/28/2024    Procedure: OR TRANSCATHETER AORTIC VALVE REPLACEMENT, FEMORAL PERCUTANEOUS APPROACH (STANDBY);  Surgeon: Stephen Bauer MD;  Location: [facility] CARDIAC CATH LAB    REPOSITION INTRAOCULAR LENS Right 11/18/2014    Procedure: REPOSITION INTRAOCULAR LENS;  Surgeon: Vickie Guerra MD;  Location: [facility]    VITRECTOMY PARSPLANA WITH 23 GAUGE SYSTEM Right 11/18/2014    Procedure: VITRECTOMY PARSPLANA WITH 23 GAUGE SYSTEM;  Surgeon: Vickie Guerra MD;  Location: [facility]        Social History     Socioeconomic History    Marital status: Single     Spouse name: Not on file    Number of children: 0    Years of education: Not on file    Highest education level: Not on file   Occupational History    Occupation: Retired - [employer], maintain online curriculum    Tobacco Use    Smoking status: Never     Passive exposure: Never    Smokeless tobacco: Never    Tobacco comments:     zero history   Vaping Use    Vaping status: Never Used   Substance and Sexual Activity    Alcohol use: Not Currently     Comment: extremely infrequent after cancer diagnosis (12 w/in a yr)    Drug use: Never    Sexual activity: Not Currently     Partners: Male     Birth control/protection: Post-menopausal     Comment: Never ; no children   Other Topics Concern    Not on file   Social History Narrative    Not on file     Social Drivers of Health     Financial Resource Strain: Low Risk  (8/28/2024)    Financial Resource Strain     Within the past 12 months, have you or your family members you live with been unable to get utilities (heat, electricity) when it was really needed?: No   Food Insecurity: Low Risk  (8/28/2024)    Food Insecurity     Within the past 12 months, did you worry that your food would run out before you got money to buy more?: No     Within the past 12 months, did the food you bought just not last and you didn t have money to get more?: No   Transportation Needs: Low Risk  (8/28/2024)    Transportation Needs     Within the past 12 months, has lack of transportation kept you from medical appointments, getting your medicines, non-medical meetings or appointments, work, or from getting things that you need?: No   Physical Activity: Insufficiently Active (2/19/2024)    Exercise Vital Sign     Days of Exercise per Week: 2 days     Minutes of Exercise per Session: 20 min   Stress: No Stress Concern Present (2/19/2024)    [place] [employer] - Occupational Stress Questionnaire     Feeling of Stress : Not at all   Social Connections: Unknown (2/19/2024)    Social Connection and Isolation Panel [NHANES]     Frequency of Communication with Friends and Family: Not on file     Frequency of Social Gatherings with Friends and Family: Twice a week     Attends  Zoroastrianism Services: Not on file     Active Member of Clubs or Organizations: Not on file     Attends Club or Organization Meetings: Not on file     Marital Status: Not on file   Interpersonal Safety: Low Risk  (8/28/2024)    Interpersonal Safety     Do you feel physically and emotionally safe where you currently live?: Yes     Within the past 12 months, have you been hit, slapped, kicked or otherwise physically hurt by someone?: No     Within the past 12 months, have you been humiliated or emotionally abused in other ways by your partner or ex-partner?: No   Housing Stability: Low Risk  (8/28/2024)    Housing Stability     Do you have housing? : Yes     Are you worried about losing your housing?: No        Family History   Problem Relation Age of Onset    C.A.D. Mother     Diabetes Mother         Type 2 in her 80s    Arthritis Mother     Thyroid Disease Mother         goiter removed    Macular Degeneration Mother         Had signs in her 80s (nothing severe)    C.A.D. Father     Glaucoma Brother     Breast Cancer Sister     Arthritis Sister     Breast Cancer Sister     Genetic Disorder Other         nephew    Cancer Sister         Survived breast cancer twice    Cancer Sister         Breast cancer chemo impacted organs (I had genetic testing)    Cancer Brother         Minor skin cancer removal    Hypertension Brother     Thyroid Disease Brother         goiter removed        Immunization History   Administered Date(s) Administered    COVID-19 12+ (Pfizer) 10/19/2023, 04/20/2024, 10/29/2024    COVID-19 Bivalent 12+ (Pfizer) 10/03/2022    COVID-19 MONOVALENT 12+ (Pfizer) 02/25/2021, 03/18/2021, 10/25/2021    COVID-19 Monovalent 12+ (Pfizer 2022) 04/12/2022    Flu 65+ (Fluad) 11/10/2023    Flu, Unspecified 11/24/2009, 11/03/2022    HEPA 05/05/2011, 11/17/2011    HepB 11/19/2008, 02/11/2009, 11/24/2009    HepB, Unspecified 11/24/2009    Hepatitis A (ADULT 19+) 05/05/2011, 11/17/2011    Hepatitis B, Adult 11/19/2008     Hepatitis B, Peds 02/11/2009    Influenza (High Dose) Trivalent,PF (Fluzone) 10/14/2024    Influenza (IIV3) PF 11/07/2007, 11/24/2009, 11/04/2010, 10/13/2011, 10/11/2012, 09/24/2013, 09/23/2014    Influenza Not Indicated - By Hx 09/27/2020    Influenza Vaccine 65+ (FLUAD) 11/03/2022    Influenza Vaccine 65+ (Fluzone HD) 09/27/2020, 09/28/2021, 11/10/2023    Influenza Vaccine >6 months,quad, PF 09/24/2013, 11/25/2015, 01/31/2017, 10/12/2017, 10/07/2018    Influenza Vaccine, 6+MO IM (QUADRIVALENT W/PRESERVATIVES) 03/06/2020    MMR Not Indicated - By Titer 01[birth year]/1957    Measles 1955    Pneumo Conj 13-V (2010&after) 12/11/2018    Pneumococcal 23 valent 08/05/2020    RSV Vaccine (Abrysvo) 12/07/2023    TD,PF 7+ (Tenivac) 09/24/1996    TDAP (Adacel,Boostrix) 08/28/2007, 10/12/2017    TDAP Vaccine (Boostrix) 10/12/2017    Td (Adult), Adsorbed 09/24/1996    Varicella 1955    Varicella Pt Report Hx of Varicella/Chicken Pox 01/01/1957, 05/05/2011    Zoster Vaccine, Unspecified (historical) 08/15/2020, 10/01/2020, 10/21/2020    Zoster recombinant adjuvanted (SHINGRIX) 08/15/2020, 10/21/2020       Current Outpatient Medications   Medication Sig Dispense Refill    aspirin 81 MG EC tablet Take 1 tablet (81 mg) by mouth daily      BIOTIN PO Take 1 tablet by mouth daily.      calcium 600 MG tablet Take 1 tablet by mouth every evening      Calcium Carb-Cholecalciferol 600-25 MG-MCG CAPS Take 1 capsule by mouth every morning      dorzolamide-timolol (COSOPT) 2-0.5 % ophthalmic solution Place 1 drop into both eyes 2 times daily. 10 mL 11    ezetimibe (ZETIA) 10 MG tablet Take 1 tablet (10 mg) by mouth daily. 90 tablet 3    Lactobacillus (PROBIOTIC CHILDRENS) CHEW Take 1 chew tab by mouth 2 times daily      letrozole (FEMARA) 2.5 MG tablet Take 1 tablet (2.5 mg) by mouth daily. 90 tablet 3    loratadine (CLARITIN) 10 MG tablet Take 10 mg by mouth daily as needed for allergies Seasonal for ragweed and lilacs      metoprolol  succinate ER (TOPROL XL) 25 MG 24 hr tablet Take 1 tablet (25 mg) by mouth every evening 7PM 90 tablet 3    rosuvastatin (CRESTOR) 40 MG tablet Take 1 tablet (40 mg) by mouth daily 90 tablet 3    Vitamin D3 (CHOLECALCIFEROL) 25 mcg (1000 units) tablet Take 50 mcg by mouth every evening       No current facility-administered medications for this visit.        Review of Systems:  I have done 10 points of review systems and all negative except for those mentioned in HPI    Physical examination:  Constitutional: Oriented, not in distress  @vitals  Eyes: No icterus, nystagmus, pupils isocoric  Head and neck: normal posture and movements  Respiratory: Normal tidal breathing, no shortness of breath, no audible wheezing or stridor   Musculoskeletal: Normal muscle mass, no deformity on hands/fingers  Integumentary:  No rash on visible skin areas   Neurological: Alert, orientedx3, no motor deficits  Psychiatric:  Mood and affect are appropriate with insight into his/her medical condition    Data:  Lab Results   Component Value Date    WBC 5.2 09/23/2024    WBC 6.1 09/08/2014     Lab Results   Component Value Date    RBC 4.16 09/23/2024    RBC 4.60 09/08/2014     Lab Results   Component Value Date    HGB 13.0 09/23/2024    HGB 14.2 10/13/2017     Lab Results   Component Value Date    HCT 38.1 09/23/2024    HCT 45.5 10/13/2017     Lab Results   Component Value Date    MCV 92 09/23/2024    MCV 95.9 10/13/2017     Lab Results   Component Value Date    MCH 31.3 09/23/2024    MCH 30.0 10/13/2017     Lab Results   Component Value Date    MCHC 34.1 09/23/2024    MCHC 31.2 10/13/2017     Lab Results   Component Value Date    RDW 13.1 09/23/2024    RDW 12.7 09/08/2014     Lab Results   Component Value Date     12/09/2024     09/08/2014       Lab Results   Component Value Date     09/23/2024    .4 07/29/2020      Lab Results   Component Value Date    POTASSIUM 4.1 09/23/2024    POTASSIUM 3.5 08/19/2022     POTASSIUM 4.2 07/29/2020     Lab Results   Component Value Date    CHLORIDE 107 09/23/2024    CHLORIDE 106 08/19/2022    CHLORIDE 99.3 07/29/2020     Lab Results   Component Value Date    VALENTINA 9.3 01/28/2025    VALENTINA 9.8 07/29/2020     Lab Results   Component Value Date    CO2 25 09/23/2024    CO2 27 08/19/2022    CO2 28.7 07/29/2020     Lab Results   Component Value Date    BUN 14.7 09/23/2024    BUN 12 08/19/2022    BUN 14.3 07/29/2020     Lab Results   Component Value Date    CR 0.61 01/28/2025    CR 0.7 07/29/2020     Lab Results   Component Value Date    GLC 85 09/23/2024     08/29/2024     08/19/2022    .0 07/29/2020         FAWN Foley MD

## 2025-02-11 NOTE — NURSING NOTE
"Oncology Rooming Note    February 11, 2025 1:21 PM   Vickie Alvarado is a 69 year old female who presents for:    Chief Complaint   Patient presents with    Oncology Clinic Visit     RTN Pulmonary nodules      Initial Vitals: /78 (BP Location: Right arm, Patient Position: Sitting, Cuff Size: Adult Regular)   Pulse 60   Temp 97.6  F (36.4  C) (Oral)   Resp 16   Wt 87.4 kg (192 lb 9.6 oz)   LMP 05/17/2011   SpO2 98%   BMI 31.67 kg/m   Estimated body mass index is 31.67 kg/m  as calculated from the following:    Height as of 11/5/24: 1.661 m (5' 5.39\").    Weight as of this encounter: 87.4 kg (192 lb 9.6 oz). Body surface area is 2.01 meters squared.  Moderate Pain (4) Comment: Data Unavailable   Patient's last menstrual period was 05/17/2011.  Allergies reviewed: Yes  Medications reviewed: Yes    Medications: Medication refills not needed today.  Pharmacy name entered into [facility]: [id number] - [place] [id number] [place] [id number] & [facility]    Frailty Screening:   Is the patient here for a new oncology consult visit in cancer care? 2. No      Clinical concerns: none      Kendall Ordonez             "

## 2025-02-12 ENCOUNTER — ANCILLARY PROCEDURE (OUTPATIENT)
Dept: GENERAL RADIOLOGY | Facility: CLINIC | Age: 70
End: 2025-02-12
Attending: STUDENT IN AN ORGANIZED HEALTH CARE EDUCATION/TRAINING PROGRAM
Payer: MEDICARE

## 2025-02-12 ENCOUNTER — PRE VISIT (OUTPATIENT)
Dept: ORTHOPEDICS | Facility: CLINIC | Age: 70
End: 2025-02-12

## 2025-02-12 ENCOUNTER — OFFICE VISIT (OUTPATIENT)
Dept: ORTHOPEDICS | Facility: CLINIC | Age: 70
End: 2025-02-12
Payer: MEDICARE

## 2025-02-12 VITALS — BODY MASS INDEX: 29.98 KG/M2 | WEIGHT: 191 LBS | HEIGHT: 67 IN

## 2025-02-12 DIAGNOSIS — M25.561 CHRONIC PAIN OF BOTH KNEES: ICD-10-CM

## 2025-02-12 DIAGNOSIS — M17.11 PRIMARY OSTEOARTHRITIS OF RIGHT KNEE: Primary | ICD-10-CM

## 2025-02-12 DIAGNOSIS — M25.562 CHRONIC PAIN OF BOTH KNEES: ICD-10-CM

## 2025-02-12 DIAGNOSIS — G89.29 CHRONIC PAIN OF BOTH KNEES: ICD-10-CM

## 2025-02-12 DIAGNOSIS — M17.12 PRIMARY OSTEOARTHRITIS OF LEFT KNEE: ICD-10-CM

## 2025-02-12 PROCEDURE — 73562 X-RAY EXAM OF KNEE 3: CPT | Mod: LT | Performed by: RADIOLOGY

## 2025-02-12 PROCEDURE — 20610 DRAIN/INJ JOINT/BURSA W/O US: CPT | Mod: 50 | Performed by: STUDENT IN AN ORGANIZED HEALTH CARE EDUCATION/TRAINING PROGRAM

## 2025-02-12 PROCEDURE — 77073 BONE LENGTH STUDIES: CPT | Performed by: STUDENT IN AN ORGANIZED HEALTH CARE EDUCATION/TRAINING PROGRAM

## 2025-02-12 PROCEDURE — 99204 OFFICE O/P NEW MOD 45 MIN: CPT | Mod: 25 | Performed by: STUDENT IN AN ORGANIZED HEALTH CARE EDUCATION/TRAINING PROGRAM

## 2025-02-12 RX ORDER — LIDOCAINE HYDROCHLORIDE 10 MG/ML
7 INJECTION, SOLUTION EPIDURAL; INFILTRATION; INTRACAUDAL; PERINEURAL
Status: COMPLETED | OUTPATIENT
Start: 2025-02-12 | End: 2025-02-12

## 2025-02-12 RX ORDER — TRIAMCINOLONE ACETONIDE 40 MG/ML
40 INJECTION, SUSPENSION INTRA-ARTICULAR; INTRAMUSCULAR
Status: COMPLETED | OUTPATIENT
Start: 2025-02-12 | End: 2025-02-12

## 2025-02-12 RX ADMIN — TRIAMCINOLONE ACETONIDE 40 MG: 40 INJECTION, SUSPENSION INTRA-ARTICULAR; INTRAMUSCULAR at 14:17

## 2025-02-12 RX ADMIN — LIDOCAINE HYDROCHLORIDE 7 ML: 10 INJECTION, SOLUTION EPIDURAL; INFILTRATION; INTRACAUDAL; PERINEURAL at 14:17
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
## 2025-02-12 NOTE — PATIENT INSTRUCTIONS
You have been referred to [facility]  Physical Therapy:     To schedule an appointment please call our scheduling department at [phone]    Below is a list of all the locations we have:     [place]: [address], [facility]: [address] [facility] [address], [facility]: [address], [facility]: [address], [facility]: [address] [facility]: [address] [place], [place]  [place]: [address] , [address], [facility]: [address] , [address], [place], [place]  [place]: [address] [facility]: [address], [facility]: [address], [facility]: [address], [facility]: [address], [facility]: [address]: [address] [address], [facility]: Located within [facility]: [address] [facility], [address], [facility]: [address]: [address], [place], [place]  [place]: [address] [facility]: [address] [place] [address]: [address], [facility]: [address] [facility] [address], [place]  [place]: [address], [facility]: [address], [facility]: [address] [facility] [place], [place]  [facility]: [address] [place], [place]  [facility]: [facility] [place], [place]  [place]: [address] [facility], [address], [facility] [address] [facility]  [facility]: [address] [facility] [place], [place]

## 2025-02-12 NOTE — PROGRESS NOTES
Large Joint Injection/Arthocentesis: bilateral knee    Date/Time: 2025 2:17 PM    Performed by: José Miguel Vale MD  Authorized by: José Miguel Vale MD    Indications:  Pain  Needle Size:  21 G  Guidance: landmark guided    Location:  Knee  Laterality:  Bilateral      Medications (Right):  40 mg triamcinolone 40 MG/ML; 7 mL lidocaine (PF) 1 %  Medications (Left):  40 mg triamcinolone 40 MG/ML; 7 mL lidocaine (PF) 1 %  Outcome:  Tolerated well, no immediate complications  Procedure discussed: discussed risks, benefits, and alternatives    Consent Given by:  Patient  Timeout: timeout called immediately prior to procedure    Prep: patient was prepped and draped in usual sterile fashion           [facility] [address] [address]  [phone]  Dept: [phone]  ______________________________________________________________________________    Patient: Vickie Alvarado   : 1955   MRN: [id number]   2025    INVASIVE PROCEDURE SAFETY CHECKLIST    Date: 2025   Procedure:Bilateral knee injection   Patient Name: Vickie Alvarado  MRN: [id number]  YOB: 1955    Action: Complete sections as appropriate. Any discrepancy results in a HARD COPY until resolved.     PRE PROCEDURE:  Patient ID verified with 2 identifiers (name and  or MRN): Yes  Procedure and site verified with patient/designee (when able): Yes  Accurate consent documentation in medical record: Yes  H&P (or appropriate assessment) documented in medical record: Yes  H&P must be up to 20 days prior to procedure and updates within 24 hours of procedure as applicable: Yes  Relevant diagnostic and radiology test results appropriately labeled and displayed as applicable: Yes  Procedure site(s) marked with provider initials: Yes    TIMEOUT:  Time-Out performed immediately prior to starting procedure, including verbal and active participation of  all team members addressing the following:Yes  * Correct patient identify  * Confirmed that the correct side and site are marked  * An accurate procedure consent form  * Agreement on the procedure to be done  * Correct patient position  * Relevant images and results are properly labeled and appropriately displayed  * The need to administer antibiotics or fluids for irrigation purposes during the procedure as applicable   * Safety precautions based on patient history or medication use    DURING PROCEDURE: Verification of correct person, site, and procedures any time the responsibility for care of the patient is transferred to another member of the care team.       The following medications were given:         Prior to injection, verified patient identity using patient's name and date of birth.  Due to injection administration, patient instructed to remain in clinic for 15 minutes  afterwards, and to report any adverse reaction to me immediately.    Joint injection was performed.    Medication Name: Lidcaine 1% NDC [id number]  Drug Amount Wasted:  Yes: 7 mg/ml   Vial/Syringe: Single dose vial  Expiration Date:  07/2027    Medication Name Kenalog  NDC [id number]    Scribed by Lorri Roman LPN for Dr. Vale on February 12, 2025 at 221pm based on the provider's statements to me.     Lorri Roman LPN

## 2025-02-24 SDOH — HEALTH STABILITY: PHYSICAL HEALTH: ON AVERAGE, HOW MANY DAYS PER WEEK DO YOU ENGAGE IN MODERATE TO STRENUOUS EXERCISE (LIKE A BRISK WALK)?: 4 DAYS

## 2025-02-24 SDOH — HEALTH STABILITY: PHYSICAL HEALTH: ON AVERAGE, HOW MANY MINUTES DO YOU ENGAGE IN EXERCISE AT THIS LEVEL?: 40 MIN

## 2025-02-24 ASSESSMENT — SOCIAL DETERMINANTS OF HEALTH (SDOH): HOW OFTEN DO YOU GET TOGETHER WITH FRIENDS OR RELATIVES?: THREE TIMES A WEEK

## 2025-02-24 NOTE — PROGRESS NOTES
[facility]: Cancer Care Short Note                                    Discussion with Patient:                                                      Spoke with patient who calls with several questions, including vaccines and next steps.     She is excited that she has set up a home gym. She is focused on improving her strength now that chemo is completed.     She is working on her HCD and plans to have it in place before surgery.          Assessment:                                                      Assessment completed with:: Patient    Constitutional  Fatigue: Fatigue relieved by rest  Fever: Absent or within normal limits    Respiratory  Cough: Absent or within normal limits  Dyspnea: Absent or within normal limits    Gastrointestinal  Anorexia: Absent or within normal limits  Nausea: Absent or within normal limits  Vomiting: Absent or within normal limits  Dehydration: Absent or within normal limits  Mucositis Oral: Absent or within normal limits  Constipation: Absent or within normal limits  Diarrhea: Absent or within normal limits    Genitourinary  Patient Reported Genitourinary Symptoms?: No    Pain  Patient Reported Pain?: No    Patient Coping  Accepting    Clinic Utilization  Patient Aware of Next Appointment?: Yes    Other Patient Concerns  Other Patient Reported Concerns: No    Plan of Care Education Review:   Assessment completed with:: Patient        Evaluation of Learning  Patient Education Provided: Yes (Vaccines and follow-up plan)  Readiness:: Acceptance  Method:: Explanation  Response:: Verbalizes understanding           Intervention/Education provided during outreach:                                                       Answered questions to her stated satisfaction.     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 He has oligometastatic prostate cancer on conventional images. Rising PSA to 109 in July 2022, biopsy showed multiple cores with kaveh 4+4, MRI showed SV involvement, no adenopathy. Bone scan and CT scans showed only L1 lesion. Started bicalutamide and eligard. Oligo mets for newly diagnosed prostate cancer to add abiraterone/pred on ADT given kaveh 4+4 and T3b. He was evaluated by Dr. Stein. Patient radiated with [place] protocol. S/p RT to prostate 5500 cGy in 20 fx in Dec 2022, and s/p SBRT L1 2,700 cGy in 3 fx. Started abiraterone/prednisone in October 2022. PSA undetectable as of January 2023  Plan  [facility] with oligo mets s/p prostate radiation and SBRT L1 - iPSA was 109 in July 2022, undetectable as of Jan 2023. Most recent PSA on 12/23/24 was again undetectable.  - Continue abiraterone 1,000mg daily + prednisone 5mg daily, potential side effects reviewed again today.  - Continue Eligard q2svfmtm, last given 12/23/24, next due March 2025.  - DEXA ordered today.  Hypertension - BP initially 201/117, he attributes this to waiting long outside and feeling nervous whenever coming to [facility]. Reportedly checks his BP daily at home and has been 130-140's/70-80's. Repeat BP at end of visit was 160/90. - We discussed that abiraterone can cause/worsen HTN. Uncontrolled HTN can lead to heart attack and/or stroke.  - Continue anti-hypertensives as prescribed: amlodipine 5mg daily, losartan-HCTZ 100-12.5mg daily, metoprolol tartrate 25mg daily).  - Monitor BP daily, contact office if SBP >160 and/or DBP >90.  - Continue to follow with Dr. Pace/Cardiology, has f/u with PCP in April.   Hypercalcemia; borderline - 10/2023 PTHrp = normal, not c/w hyperCa of malignancy - 10/2023 PTH = 48, nl - pt takes Ca supplement once a week  Instructed to contact our office with any new/worsening symptoms. Pt educated regarding plan of care, all questions/concerns addressed to the best of my abilities and his apparent satisfaction. RTC in 4wks recommended given elevated BP but deferred to 6wks per pt insistence.  Cancer Clinic  [phone]

## 2025-02-25 ENCOUNTER — OFFICE VISIT (OUTPATIENT)
Dept: FAMILY MEDICINE | Facility: CLINIC | Age: 70
End: 2025-02-25
Payer: MEDICARE

## 2025-02-25 VITALS
TEMPERATURE: 97.6 F | BODY MASS INDEX: 30.73 KG/M2 | SYSTOLIC BLOOD PRESSURE: 134 MMHG | HEIGHT: 66 IN | HEART RATE: 68 BPM | RESPIRATION RATE: 16 BRPM | DIASTOLIC BLOOD PRESSURE: 78 MMHG | WEIGHT: 191.2 LBS | OXYGEN SATURATION: 96 %

## 2025-02-25 DIAGNOSIS — Z00.00 ENCOUNTER FOR MEDICARE ANNUAL WELLNESS EXAM: Primary | ICD-10-CM

## 2025-02-25 DIAGNOSIS — M17.0 PRIMARY OSTEOARTHRITIS OF BOTH KNEES: ICD-10-CM

## 2025-02-25 DIAGNOSIS — Z95.2 S/P AORTIC VALVE REPLACEMENT: ICD-10-CM

## 2025-02-25 DIAGNOSIS — M85.89 OSTEOPENIA OF MULTIPLE SITES: ICD-10-CM

## 2025-02-25 DIAGNOSIS — C50.212 MALIGNANT NEOPLASM OF UPPER-INNER QUADRANT OF LEFT BREAST IN FEMALE, ESTROGEN RECEPTOR POSITIVE (H): ICD-10-CM

## 2025-02-25 DIAGNOSIS — Z80.8 FAMILY HISTORY OF SKIN CANCER: ICD-10-CM

## 2025-02-25 DIAGNOSIS — Z79.2 NEED FOR ANTIBIOTIC PROPHYLAXIS FOR DENTAL PROCEDURE: ICD-10-CM

## 2025-02-25 DIAGNOSIS — N39.41 URGE INCONTINENCE OF URINE: ICD-10-CM

## 2025-02-25 DIAGNOSIS — Z17.0 MALIGNANT NEOPLASM OF UPPER-INNER QUADRANT OF LEFT BREAST IN FEMALE, ESTROGEN RECEPTOR POSITIVE (H): ICD-10-CM

## 2025-02-25 PROBLEM — I35.0 AORTIC STENOSIS, SEVERE: Status: RESOLVED | Noted: 2024-08-28 | Resolved: 2025-02-25

## 2025-02-25 PROBLEM — R29.898 HIP WEAKNESS: Status: RESOLVED | Noted: 2024-05-01 | Resolved: 2025-02-25

## 2025-02-25 PROBLEM — H40.9 GLAUCOMA: Status: ACTIVE | Noted: 2025-02-25

## 2025-02-25 PROBLEM — R07.9 CHEST PAIN, UNSPECIFIED TYPE: Status: RESOLVED | Noted: 2024-07-05 | Resolved: 2025-02-25

## 2025-02-25 PROBLEM — M80.00XA AGE-RELATED OSTEOPOROSIS WITH CURRENT PATHOLOGICAL FRACTURE, INITIAL ENCOUNTER: Chronic | Status: RESOLVED | Noted: 2021-09-28 | Resolved: 2025-02-25

## 2025-02-25 PROBLEM — B35.1 TOENAIL FUNGUS: Status: RESOLVED | Noted: 2017-01-31 | Resolved: 2025-02-25

## 2025-02-25 RX ORDER — ASPIRIN 81 MG/1
81 TABLET ORAL DAILY
COMMUNITY
Start: 2025-02-25

## 2025-02-25 RX ORDER — AMOXICILLIN 500 MG/1
CAPSULE ORAL
Qty: 4 CAPSULE | Refills: 0 | Status: SHIPPED | OUTPATIENT
Start: 2025-02-25

## 2025-02-25 RX ORDER — AMOXICILLIN 500 MG/1
1000 CAPSULE ORAL ONCE
Qty: 2 CAPSULE | Refills: 0 | Status: SHIPPED | OUTPATIENT
Start: 2025-02-25 | End: 2025-02-25
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
## 2025-02-25 NOTE — PATIENT INSTRUCTIONS
Patient Education   Preventive Care Advice   This is general advice given by our system to help you stay healthy. However, your care team may have specific advice just for you. Please talk to your care team about your preventive care needs.  Nutrition  Eat 5 or more servings of fruits and vegetables each day.  Try wheat bread, brown rice and whole grain pasta (instead of white bread, rice, and pasta).  Get enough calcium and vitamin D. Check the label on foods and aim for 100% of the RDA (recommended daily allowance).  Lifestyle  Exercise at least 150 minutes each week  (30 minutes a day, 5 days a week).  Do muscle strengthening activities 2 days a week. These help control your weight and prevent disease.  No smoking.  Wear sunscreen to prevent skin cancer.  Have a dental exam and cleaning every 6 months.  Yearly exams  See your health care team every year to talk about:  Any changes in your health.  Any medicines your care team has prescribed.  Preventive care, family planning, and ways to prevent chronic diseases.  Shots (vaccines)   HPV shots (up to age 26), if you've never had them before.  Hepatitis B shots (up to age 59), if you've never had them before.  COVID-19 shot: Get this shot when it's due.  Flu shot: Get a flu shot every year.  Tetanus shot: Get a tetanus shot every 10 years.  Pneumococcal, hepatitis A, and RSV shots: Ask your care team if you need these based on your risk.  Shingles shot (for age 50 and up)  General health tests  Diabetes screening:  Starting at age 35, Get screened for diabetes at least every 3 years.  If you are younger than age 35, ask your care team if you should be screened for diabetes.  Cholesterol test: At age 39, start having a cholesterol test every 5 years, or more often if advised.  Bone density scan (DEXA): At age 50, ask your care team if you should have this scan for osteoporosis (brittle bones).  Hepatitis C: Get tested at least once in your life.  STIs (sexually  transmitted infections)  Before age 24: Ask your care team if you should be screened for STIs.  After age 24: Get screened for STIs if you're at risk. You are at risk for STIs (including HIV) if:  You are sexually active with more than one person.  You don't use condoms every time.  You or a partner was diagnosed with a sexually transmitted infection.  If you are at risk for HIV, ask about PrEP medicine to prevent HIV.  Get tested for HIV at least once in your life, whether you are at risk for HIV or not.  Cancer screening tests  Cervical cancer screening: If you have a cervix, begin getting regular cervical cancer screening tests starting at age 21.  Breast cancer scan (mammogram): If you've ever had breasts, begin having regular mammograms starting at age 40. This is a scan to check for breast cancer.  Colon cancer screening: It is important to start screening for colon cancer at age 45.  Have a colonoscopy test every 10 years (or more often if you're at risk) Or, ask your provider about stool tests like a FIT test every year or Cologuard test every 3 years.  To learn more about your testing options, visit:   .  For help making a decision, visit:   https://bit.ly/rv79917.  Prostate cancer screening test: If you have a prostate, ask your care team if a prostate cancer screening test (PSA) at age 55 is right for you.  Lung cancer screening: If you are a current or former smoker ages 50 to 80, ask your care team if ongoing lung cancer screenings are right for you.  For informational purposes only. Not to replace the advice of your health care provider. Copyright   2023 [facility]. All rights reserved. Clinically reviewed by the [facility]itions [facility]. [employer] 660813 - REV 01/24.  Preventing Falls: Care Instructions  Injuries and health problems such as trouble walking or poor eyesight can increase your risk of falling. So can some medicines. But there are things you can do to help  "prevent falls. You can exercise to get stronger. You can also arrange your home to make it safer.    Talk to your doctor about the medicines you take. Ask if any of them increase the risk of falls and whether they can be changed or stopped.   Try to exercise regularly. It can help improve your strength and balance. This can help lower your risk of falling.         Practice fall safety and prevention.   Wear low-heeled shoes that fit well and give your feet good support. Talk to your doctor if you have foot problems that make this hard.  Carry a cellphone or wear a medical alert device that you can use to call for help.  Use stepladders instead of chairs to reach high objects. Don't climb if you're at risk for falls. Ask for help, if needed.  Wear the correct eyeglasses, if you need them.        Make your home safer.   Remove rugs, cords, clutter, and furniture from walkways.  Keep your house well lit. Use night-lights in hallways and bathrooms.  Install and use sturdy handrails on stairways.  Wear nonskid footwear, even inside. Don't walk barefoot or in socks without shoes.        Be safe outside.   Use handrails, curb cuts, and ramps whenever possible.  Keep your hands free by using a shoulder bag or backpack.  Try to walk in well-lit areas. Watch out for uneven ground, changes in pavement, and debris.  Be careful in the winter. Walk on the grass or gravel when sidewalks are slippery. Use de-icer on steps and walkways. Add non-slip devices to shoes.    Put grab bars and nonskid mats in your shower or tub and near the toilet. Try to use a shower chair or bath bench when bathing.   Get into a tub or shower by putting in your weaker leg first. Get out with your strong side first. Have a phone or medical alert device in the bathroom with you.   Where can you learn more?  Go to https://www.[employer].net/patiented  Enter G117 in the search box to learn more about \"Preventing Falls: Care Instructions.\"  Current as of: " July 31, 2024  Content Version: 14.3    2024 [employer].   Care instructions adapted under license by your healthcare professional. If you have questions about a medical condition or this instruction, always ask your healthcare professional. [employer] disclaims any warranty or liability for your use of this information.    Bladder Training: Care Instructions  Your Care Instructions     Bladder training is used to treat urge incontinence and stress incontinence. Urge incontinence means that the need to urinate comes on so fast that you can't get to a toilet in time. Stress incontinence means that you leak urine because of pressure on your bladder. For example, it may happen when you laugh, cough, or lift something heavy.  Bladder training can increase how long you can wait before you have to urinate. It can also help your bladder hold more urine. And it can give you better control over the urge to urinate.  It is important to remember that bladder training takes a few weeks to a few months to make a difference. You may not see results right away, but don't give up.  Follow-up care is a key part of your treatment and safety. Be sure to make and go to all appointments, and call your doctor if you are having problems. It's also a good idea to know your test results and keep a list of the medicines you take.  How can you care for yourself at home?  Work with your doctor to come up with a bladder training program that is right for you. You may use one or more of the following methods.  Delayed urination  In the beginning, try to keep from urinating for 5 minutes after you first feel the need to go.  While you wait, take deep, slow breaths to relax. Kegel exercises can also help you delay the need to go to the bathroom.  After some practice, when you can easily wait 5 minutes to urinate, try to wait 10 minutes before you urinate.  Slowly increase the waiting period until you are able to control  "when you have to urinate.  Scheduled urination  Empty your bladder when you first wake up in the morning.  Schedule times throughout the day when you will urinate.  Start by going to the bathroom every hour, even if you don't need to go.  Slowly increase the time between trips to the bathroom.  When you have found a schedule that works well for you, keep doing it.  If you wake up during the night and have to urinate, do it. Apply your schedule to waking hours only.  Kegel exercises  These tighten and strengthen pelvic muscles, which can help you control the flow of urine. (If doing these exercises causes pain, stop doing them and talk with your doctor.) To do Kegel exercises:  Squeeze your muscles as if you were trying not to pass gas. Or squeeze your muscles as if you were stopping the flow of urine. Your belly, legs, and buttocks shouldn't move.  Hold the squeeze for 3 seconds, then relax for 5 to 10 seconds.  Start with 3 seconds, then add 1 second each week until you are able to squeeze for 10 seconds.  Repeat the exercise 10 times a session. Do 3 to 8 sessions a day.  When should you call for help?  Watch closely for changes in your health, and be sure to contact your doctor if:    Your incontinence is getting worse.     You do not get better as expected.   Where can you learn more?  Go to https://www.[employer].net/patiented  Enter V684 in the search box to learn more about \"Bladder Training: Care Instructions.\"  Current as of: April 30, 2024  Content Version: 14.3    2024 [employer].   Care instructions adapted under license by your healthcare professional. If you have questions about a medical condition or this instruction, always ask your healthcare professional. [employer] disclaims any warranty or liability for your use of this information.       "

## 2025-02-26 ENCOUNTER — THERAPY VISIT (OUTPATIENT)
Dept: PHYSICAL THERAPY | Facility: CLINIC | Age: 70
End: 2025-02-26
Attending: STUDENT IN AN ORGANIZED HEALTH CARE EDUCATION/TRAINING PROGRAM
Payer: MEDICARE

## 2025-02-26 DIAGNOSIS — M17.12 PRIMARY OSTEOARTHRITIS OF LEFT KNEE: Primary | ICD-10-CM

## 2025-02-26 DIAGNOSIS — M25.562 LEFT KNEE PAIN: ICD-10-CM

## 2025-02-26 PROCEDURE — 97530 THERAPEUTIC ACTIVITIES: CPT | Mod: GP | Performed by: PHYSICAL THERAPIST

## 2025-02-26 PROCEDURE — 97161 PT EVAL LOW COMPLEX 20 MIN: CPT | Mod: GP | Performed by: PHYSICAL THERAPIST

## 2025-02-26 NOTE — PROGRESS NOTES
PHYSICAL THERAPY EVALUATION  Type of Visit: Evaluation              Subjective         Presenting condition or subjective complaint:  R knee primary issue since - . Since then has gone through extensive treatment for breast CA. At that time obtained an  brace for her R knee OA. Since then has had significant health issues that have impacted her mobility and function. Her knee pain is limiting her participation in her community and tolerance to standing. Since last Orthopedic visit, she received a cortisone injection in B knee. She has less pain since the injection but still limited in strength and endurance    Date of onset: 25    Relevant medical history: Cancer; Heart problems; Osteoarthritis; Overweight; Radiation treatment ; Migraines; OA; Vision (eye surgeries); Knee pain at rest/night  Dates & types of surgery: Left Breast Lumpectomy-10/17/22; atrial valve replacement-24; both at [facility]    Prior diagnostic imaging/testing results:     X Ray 25- Mod-severe medial compartment joint space loss B knee  Prior therapy history for the same diagnosis, illness or injury: No      Living Environment  Social support: Alone   Type of home: 1 level; Basement   Stairs to enter the home: No       Ramp: No   Stairs inside the home: No       Help at home: None  Equipment owned: Four-point cane; Grab bars ; walking stick, crutches, grab bars, commode bars  Employment: No   Retired; volunteerism on hold d/t knee pain  Hobbies/Interests: Meeting with friends, attending events, volunteerism  Patient goals for therapy: Dec pain; trial bracing for L knee; improve function  Pain assessment: See objective evaluation for additional pain details     Objective   System  Physical Exam   Ossur Knee  Brace Trial:     Pain Rating    Affected Joint: Left Knee    Without  brace:    At rest 3/10  Walkin/10    Squatting: 3/10   Stairs: 3/10      Wearing  brace:    At rest /10  Walking:  2/10    Squattin/10   Stairs: Not tested due to time constraints     Brace and Measurements:    Brace trialed:     Type - Ossur  One  Size - Medium  Side - Medial  Affected Knee - Left Knee    Circumference 6 inches below mid patella: 16.5 inches (Ossur ) and Circumference 6 inches above mid patella: 22 inches (Ossur OA Ease Knee )    Other Comments:  See PT evaluation in [facility] for any additional information including joint special testing/ligament testing     Assessment & Plan   CLINICAL IMPRESSIONS  Medical Diagnosis: L knee Primary OA    Treatment Diagnosis: L knee Primary OA; L knee pain   Impression/Assessment: Patient is a 69 year old female with B; L > R complaints.  The following significant findings have been identified: Pain, Decreased ROM/flexibility, Decreased joint mobility, Decreased strength, Impaired gait, Impaired muscle performance, Decreased activity tolerance, and Instability. These impairments interfere with their ability to perform self care tasks, recreational activities, household chores, household mobility, and community mobility as compared to previous level of function.     Clinical Decision Making (Complexity):  Clinical Presentation: Evolving/Changing  Clinical Presentation Rationale: based on medical and personal factors listed in PT evaluation  Clinical Decision Making (Complexity): Low complexity    PLAN OF CARE  Treatment Interventions:  Interventions: Gait Training, Manual Therapy, Neuromuscular Re-education, Therapeutic Activity, Therapeutic Exercise, Self-Care/Home Management, Orthotic Fitting/Training    Long Term Goals     PT Goal 1  Goal Identifier: Standing  Goal Description: standing x 20 minutes w/o having to sit d/t knee pain  Rationale: to maximize safety and independence with performance of ADLs and functional tasks;to maximize safety and independence within the home;to maximize safety and independence within the community  Target Date:  05/27/25      Frequency of Treatment: wkly x 4 wks to bi-monthly x 6 wks  Duration of Treatment: up to 90d  Education Assessment:      Risks and benefits of evaluation/treatment have been explained.   Patient/Family/caregiver agrees with Plan of Care.     Evaluation Time:     PT Eval, Low Complexity Minutes (52664): 15  Signing Clinician: Nelly Ag PT      [facility]                                                                                   OUTPATIENT PHYSICAL THERAPY      PLAN OF TREATMENT FOR OUTPATIENT REHABILITATION   Patient's Last Name, First Name, Vickie Presley YOB: 1955   Provider's Name   [facility]   Medical Record No.  [id number]     Onset Date: 02/12/25  Start of Care Date: 02/26/25     Medical Diagnosis:  L knee Primary OA      PT Treatment Diagnosis:  L knee Primary OA; L knee pain Plan of Treatment  Frequency/Duration: wkly x 4 wks to bi-monthly x 6 wks/ up to 90d    Certification date from 02/26/25 to 05/27/25         See note for plan of treatment details and functional goals     Nelly Ag PT                         I CERTIFY THE NEED FOR THESE SERVICES FURNISHED UNDER        THIS PLAN OF TREATMENT AND WHILE UNDER MY CARE     (Physician attestation of this document indicates review and certification of the therapy plan).              Referring Provider:  José Miguel Vale    Initial Assessment  See Epic Evaluation- Start of Care Date: 02/26/25

## 2025-02-27 ENCOUNTER — DOCUMENTATION ONLY (OUTPATIENT)
Dept: ORTHOPEDICS | Facility: CLINIC | Age: 70
End: 2025-02-27
Payer: MEDICARE

## 2025-02-27 DIAGNOSIS — M25.562 CHRONIC PAIN OF LEFT KNEE: ICD-10-CM

## 2025-02-27 DIAGNOSIS — G89.29 CHRONIC PAIN OF LEFT KNEE: ICD-10-CM

## 2025-02-27 DIAGNOSIS — M17.12 PRIMARY OSTEOARTHRITIS OF LEFT KNEE: Primary | ICD-10-CM

## 2025-03-04 ENCOUNTER — OFFICE VISIT (OUTPATIENT)
Dept: OPHTHALMOLOGY | Facility: CLINIC | Age: 70
End: 2025-03-04
Attending: OPHTHALMOLOGY
Payer: MEDICARE

## 2025-03-04 DIAGNOSIS — H40.003 GLAUCOMA SUSPECT OF BOTH EYES: ICD-10-CM

## 2025-03-04 PROCEDURE — 92133 CPTRZD OPH DX IMG PST SGM ON: CPT | Performed by: OPHTHALMOLOGY

## 2025-03-04 PROCEDURE — G0463 HOSPITAL OUTPT CLINIC VISIT: HCPCS | Performed by: OPHTHALMOLOGY

## 2025-03-04 PROCEDURE — 92083 EXTENDED VISUAL FIELD XM: CPT | Performed by: OPHTHALMOLOGY

## 2025-03-04 ASSESSMENT — CONF VISUAL FIELD
OS_NORMAL: 1
OS_INFERIOR_TEMPORAL_RESTRICTION: 0
OS_SUPERIOR_NASAL_RESTRICTION: 0
OD_INFERIOR_NASAL_RESTRICTION: 0
OD_SUPERIOR_NASAL_RESTRICTION: 0
OD_INFERIOR_TEMPORAL_RESTRICTION: 0
OD_SUPERIOR_TEMPORAL_RESTRICTION: 0
OS_INFERIOR_NASAL_RESTRICTION: 0
OD_NORMAL: 1
OS_SUPERIOR_TEMPORAL_RESTRICTION: 0
METHOD: COUNTING FINGERS

## 2025-03-04 ASSESSMENT — VISUAL ACUITY
METHOD: SNELLEN - LINEAR
OS_CC: 20/20
OD_CC: 20/20
OS_CC+: -1
CORRECTION_TYPE: GLASSES

## 2025-03-04 ASSESSMENT — REFRACTION_WEARINGRX
OD_SPHERE: -1.00
OS_ADD: +2.50
OS_CYLINDER: +1.25
OD_CYLINDER: +0.25
OS_AXIS: 080
OS_SPHERE: -3.25
OD_ADD: +2.50
OD_AXIS: 100

## 2025-03-04 ASSESSMENT — EXTERNAL EXAM - RIGHT EYE: OD_EXAM: NORMAL

## 2025-03-04 ASSESSMENT — TONOMETRY
OS_IOP_MMHG: 13
IOP_METHOD: TONOPEN
OD_IOP_MMHG: 12

## 2025-03-04 ASSESSMENT — SLIT LAMP EXAM - LIDS
COMMENTS: DERMATOCHALSIS
COMMENTS: DERMATOCHALASIS

## 2025-03-04 ASSESSMENT — CUP TO DISC RATIO
OS_RATIO: 0.6
OD_RATIO: 0.5

## 2025-03-04 ASSESSMENT — EXTERNAL EXAM - LEFT EYE: OS_EXAM: NORMAL

## 2025-03-04 NOTE — NURSING NOTE
Chief Complaints and History of Present Illnesses   Patient presents with    Glaucoma Follow-Up     Chief Complaint(s) and History of Present Illness(es)       Glaucoma Follow-Up              Laterality: both eyes    Associated symptoms: Negative for flashes and floaters    Treatment side effects: none    Compliance with Treatment: always    Pain scale: 0/10              Comments    The patient uses Cosopt twice daily in Both eyes.  She notes that now the regular Cosopt is working well.  The patient reports stable vision.   Katarzyna Cr COA, COA 10:03 AM 03/04/2025
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
## 2025-03-12 ENCOUNTER — THERAPY VISIT (OUTPATIENT)
Dept: PHYSICAL THERAPY | Facility: CLINIC | Age: 70
End: 2025-03-12
Attending: STUDENT IN AN ORGANIZED HEALTH CARE EDUCATION/TRAINING PROGRAM
Payer: MEDICARE

## 2025-03-12 DIAGNOSIS — G89.29 CHRONIC PAIN OF BOTH KNEES: Primary | ICD-10-CM

## 2025-03-12 DIAGNOSIS — M25.562 CHRONIC PAIN OF BOTH KNEES: Primary | ICD-10-CM

## 2025-03-12 DIAGNOSIS — M25.561 CHRONIC PAIN OF BOTH KNEES: Primary | ICD-10-CM

## 2025-03-12 DIAGNOSIS — M17.0 PRIMARY OSTEOARTHRITIS OF BOTH KNEES: ICD-10-CM

## 2025-03-12 PROCEDURE — 97110 THERAPEUTIC EXERCISES: CPT | Mod: GP | Performed by: PHYSICAL THERAPIST

## 2025-03-19 ENCOUNTER — THERAPY VISIT (OUTPATIENT)
Dept: OCCUPATIONAL THERAPY | Facility: CLINIC | Age: 70
End: 2025-03-19
Payer: MEDICARE

## 2025-03-19 DIAGNOSIS — I89.0 LYMPHEDEMA: Primary | ICD-10-CM

## 2025-03-31 ENCOUNTER — ANCILLARY PROCEDURE (OUTPATIENT)
Dept: BONE DENSITY | Facility: CLINIC | Age: 70
End: 2025-03-31
Attending: FAMILY MEDICINE
Payer: MEDICARE

## 2025-03-31 DIAGNOSIS — M85.89 OSTEOPENIA OF MULTIPLE SITES: ICD-10-CM

## 2025-03-31 PROCEDURE — 77080 DXA BONE DENSITY AXIAL: CPT | Performed by: INTERNAL MEDICINE

## 2025-04-02 ENCOUNTER — THERAPY VISIT (OUTPATIENT)
Dept: OCCUPATIONAL THERAPY | Facility: CLINIC | Age: 70
End: 2025-04-02
Payer: MEDICARE

## 2025-04-02 DIAGNOSIS — I89.0 LYMPHEDEMA: Primary | ICD-10-CM

## 2025-04-02 PROCEDURE — 97140 MANUAL THERAPY 1/> REGIONS: CPT | Mod: GO | Performed by: OCCUPATIONAL THERAPIST

## 2025-04-07 ENCOUNTER — OFFICE VISIT (OUTPATIENT)
Dept: OPHTHALMOLOGY | Facility: CLINIC | Age: 70
End: 2025-04-07
Attending: OPHTHALMOLOGY
Payer: MEDICARE

## 2025-04-07 DIAGNOSIS — Z96.1 PSEUDOPHAKIA, RIGHT EYE: Primary | ICD-10-CM

## 2025-04-07 DIAGNOSIS — H40.003 GLAUCOMA SUSPECT OF BOTH EYES: ICD-10-CM

## 2025-04-07 DIAGNOSIS — H25.12 AGE-RELATED NUCLEAR CATARACT OF LEFT EYE: ICD-10-CM

## 2025-04-07 PROCEDURE — 99214 OFFICE O/P EST MOD 30 MIN: CPT | Mod: GC | Performed by: OPHTHALMOLOGY

## 2025-04-07 PROCEDURE — G0463 HOSPITAL OUTPT CLINIC VISIT: HCPCS | Performed by: OPHTHALMOLOGY

## 2025-04-07 ASSESSMENT — EXTERNAL EXAM - LEFT EYE: OS_EXAM: NORMAL

## 2025-04-07 ASSESSMENT — REFRACTION_WEARINGRX
OD_SPHERE: -1.00
OD_CYLINDER: +0.25
OS_CYLINDER: +1.25
OS_AXIS: 080
OD_AXIS: 100
OD_ADD: +2.50
OS_ADD: +2.50
OS_SPHERE: -3.25

## 2025-04-07 ASSESSMENT — VISUAL ACUITY
OD_CC: 20/20
OS_CC+: -3
OS_CC: 20/20
CORRECTION_TYPE: GLASSES
METHOD: SNELLEN - LINEAR

## 2025-04-07 ASSESSMENT — EXTERNAL EXAM - RIGHT EYE: OD_EXAM: NORMAL

## 2025-04-07 ASSESSMENT — SLIT LAMP EXAM - LIDS
COMMENTS: DERMATOCHALASIS
COMMENTS: DERMATOCHALSIS

## 2025-04-07 ASSESSMENT — TONOMETRY
OS_IOP_MMHG: 11
OD_IOP_MMHG: 10
IOP_METHOD: TONOPEN

## 2025-04-07 NOTE — NURSING NOTE
"Chief Complaints and History of Present Illnesses   Patient presents with    Follow Up     s/p secondary sutured IOL right eye  Dry eye, OU     Chief Complaint(s) and History of Present Illness(es)       Follow Up              Comments: s/p secondary sutured IOL right eye  Dry eye, OU              Comments    Pt states no change in VA since last visit  Dryness both eye , AT\"S ond occasional onit at night   Very cccasional floaters, not new   No flashes or eye pain     Gisele Mejia COT 9:36 AM April 7, 2025                        "

## 2025-04-07 NOTE — PROGRESS NOTES
"CC: s/p sutured IOL right eye (2/23/17)    HPI: 70 yo CF with history of glaucoma. S/p sutured IOL OD. Chronic dry eyes OU. History of breast cancer. Was having irritation with cosopt while on chemo, was changed to PF Cosopt, but was cost prohibitive, now back on non-preserved cosopt, but tolerating off chemo. Patient had a recent heart valve replaced (percutaneous). Bovine heart valve, on ASA only.     Interval hx 04/07/2025  Chief Complaint(s) and History of Present Illness(es)       Follow Up     Additional comments: s/p secondary sutured IOL right eye  Dry eye, OU             Comments    Pt states no change in VA since last visit  Dryness both eye , AT\"S ond occasional onit at night   Very cccasional floaters, not new   No flashes or eye pain     Gisele Mejia COT 9:36 AM April 7, 2025           Gtts:  4/7/2025  Refresh tears PRN OU  Artificial tear gel drops PRN at bedtime   Cosopt BID ou     POHx:  Resuturing of dislocated IOL OD - complicated by post-op dislocation  Secondary sutured IOL right eye (2/23/17)    +FHx glaucoma: grandfather, brother    A/P:  # s/p secondary sutured IOL OD  - doing great  - suture inferotemp covered by conj but elevated  - more aggressive AT's and ointment   - if no improvement, pt to contact eye clinic    # Nuclear sclerosis, left eye  -minimally visually significant - patient denies halos/glare - happy with OS vision  -discussed proceeding with CE/IOL OS if vision deteriorates or symptoms worse     # Potential NTG in setting of potential PDS  Under Dr Choi    # Refractive error, OU   - 20/25 OU with present mrx and with new mrx gets to 20/20 with minimal change in strength   - Discussed and dispense MRX     #Dry eye, OU  - PEE 3+ 01/30/23 patient symptomatic  - Increase AT to 5-6x/day   - AT edwardo QHS PRN  - Humidifier    #Visual disturbances  Years of ocular migraines lasting 10 minutes but getting more frequent  No history of migraine headaches  Recommend PCP evaluation or " neurology and can discuss brain MRI    F/u 1 year, earlier if needed.   Glaucoma as scheduled.    Poly Ovalle MD  Cornea and External Disease Fellow  [facility]    Attending Physician Attestation:  Complete documentation of historical and exam elements from today's encounter can be found in the full encounter summary report (not reduplicated in this progress note).  I personally obtained the chief complaint(s) and history of present illness.  I confirmed and edited as necessary the review of systems, past medical/surgical history, family history, social history, and examination findings as documented by others; and I examined the patient myself.  I personally reviewed the relevant tests, images, and reports as documented above.  I formulated and edited as necessary the assessment and plan and discussed the findings and management plan with the patient and family. - Hiro Christian MD

## 2025-04-16 ENCOUNTER — THERAPY VISIT (OUTPATIENT)
Dept: OCCUPATIONAL THERAPY | Facility: CLINIC | Age: 70
End: 2025-04-16
Payer: MEDICARE

## 2025-04-16 DIAGNOSIS — I89.0 LYMPHEDEMA: Primary | ICD-10-CM

## 2025-04-16 PROCEDURE — 97140 MANUAL THERAPY 1/> REGIONS: CPT | Mod: GO | Performed by: OCCUPATIONAL THERAPIST

## 2025-04-17 ENCOUNTER — THERAPY VISIT (OUTPATIENT)
Dept: PHYSICAL THERAPY | Facility: CLINIC | Age: 70
End: 2025-04-17
Payer: MEDICARE

## 2025-04-17 DIAGNOSIS — M17.0 PRIMARY OSTEOARTHRITIS OF BOTH KNEES: ICD-10-CM

## 2025-04-17 DIAGNOSIS — M25.561 CHRONIC PAIN OF BOTH KNEES: Primary | ICD-10-CM

## 2025-04-17 DIAGNOSIS — G89.29 CHRONIC PAIN OF BOTH KNEES: Primary | ICD-10-CM

## 2025-04-17 DIAGNOSIS — M25.562 CHRONIC PAIN OF BOTH KNEES: Primary | ICD-10-CM

## 2025-04-23 ENCOUNTER — THERAPY VISIT (OUTPATIENT)
Dept: OCCUPATIONAL THERAPY | Facility: CLINIC | Age: 70
End: 2025-04-23
Payer: MEDICARE

## 2025-04-23 DIAGNOSIS — I89.0 LYMPHEDEMA: Primary | ICD-10-CM

## 2025-04-23 PROCEDURE — 97140 MANUAL THERAPY 1/> REGIONS: CPT | Mod: GO | Performed by: OCCUPATIONAL THERAPIST

## 2025-04-23 NOTE — PROGRESS NOTES
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, hands, knees   Progress Note/Certification   Start Of Care Date 02/14/25   Onset of Illness/Injury or Date of Surgery 10/17/22   Therapy Frequency 1x wk x 4-8 weeks with decreasing frequency as appropriate   Predicted Duration 8 weeks   Certification date from 02/14/25   Certification date to 05/14/25   OT Goal 1   Goal Identifier Garment   Goal Description Pt will be independent in donning/doffing, wearing schedule, and care of compression garments with swell spots for I with home management of LUQ lymphedema needed to reduce risk skin infections and promote less seroma development in left breast.   Rationale In order to maximize safety and independence with ADL/IADLs   Goal Progress Pt is I with donning/doffing, care of and wear schedule for compression bra and swell spot. Goal met.   Target Date 05/14/25   Date Met 04/23/25   OT Goal 2   Goal Identifier Educ   Goal Description Patient will report understanding of signs/symptoms of lymphedema, AMINA, and skin infections based on personal risk factors and when to seek medical attention for assessment by discharge   Rationale In order to maximize safety and independence with ADL/IADLs   Target Date 05/14/25   Goal Progress Pt verbalizes understanding of how to manage lymphedema and fibrosis and when to return with updated referral PRN. Goal met.   Date Met 04/23/25   OT Goal 3   Goal Identifier Home   Goal Description Pt will demonstrate understanding of long-term management of left UQ edema and scar mgmt. including manual techniques, exercise, and compression- by discharge.   Rationale In order to maximize safety and independence with ADL/IADLs   Target Date 05/14/25   Goal Progress Pt has asked questions  throughout POC for clarification in HEP and demonstrates excellent understanding of management recommendations for lymphedema. Goal met.   Date Met 04/23/25   Subjective Report   Subjective Report Pt reports she received compression garments and has been wearing.   Manual Therapy   Manual Therapy Minutes (44191) 60   Manual Therapy 1 - Details Fibrosis inferior breast continues to improve and appears softer overall, seroma consistent and firm with fibrotic borders. Fat necrosis present near scar tissue, breast seroma superior to inscision present and firm inferiorly, slight whole breast swelling and axillary swelling that responds well to MLD. Initiated MLD with pt in supine completing short neck, abd, and deep abd tech. Cleared B axilla and established interaxillary anastamosis, cleared L ing nodes and established axillo-ing anastamosis. Cleared L axilla and shed fluid primarily toward ing nodes. Breast pumps to whole breast and shed fluid to functional node groups. Fibrosis tech to inferior breast which softens well with tx and mobilized fluid to L ing nodes primarily. Additional manual fibrosis tech to seroma/scar tissue which softens with repetition-mobilized fluid from superior breast primarily toward R axillary nodes. Pt brought in dryer balls to see if these would work for fibrosis management-OT verifying these would be a helpful therapeutic tool to implement. Reviewed individualized lymphedema management strategies, assessed goal progress, and edu on how to obtain future referral if needed. Pt verbalizing understanding of all recommendations and is appropriate for discharge at time of session.   Skilled Intervention MLD, STM, fibrosis work   Patient Response/Progress Tissue softening and flattening with tx; progress toward goals   Education   Learner/Method Patient;Listening;Reading;Demonstration;No Barriers to Learning   Plan   Home program edema exercises (from Evelyn), self MLD, breast pumps/seroma  massage, compression bra/tank and swell spots   Plan for next session continue follow up on home program, check goal progress, MLD, STM/MFR, discharge if ready   Total Session Time   Timed Code Treatment Minutes 60   Total Treatment Time (sum of timed and untimed services) 60         DISCHARGE  Reason for Discharge: Patient has met all goals.    Equipment Issued: handouts    Discharge Plan: Patient to continue home program.    Referring Provider:  Jessy Rowan

## 2025-04-24 ENCOUNTER — THERAPY VISIT (OUTPATIENT)
Dept: PHYSICAL THERAPY | Facility: CLINIC | Age: 70
End: 2025-04-24
Payer: MEDICARE

## 2025-04-24 DIAGNOSIS — M25.562 CHRONIC PAIN OF BOTH KNEES: Primary | ICD-10-CM

## 2025-04-24 DIAGNOSIS — G89.29 CHRONIC PAIN OF BOTH KNEES: Primary | ICD-10-CM

## 2025-04-24 DIAGNOSIS — M17.0 PRIMARY OSTEOARTHRITIS OF BOTH KNEES: ICD-10-CM

## 2025-04-24 DIAGNOSIS — M25.561 CHRONIC PAIN OF BOTH KNEES: Primary | ICD-10-CM

## 2025-05-01 ENCOUNTER — THERAPY VISIT (OUTPATIENT)
Dept: PHYSICAL THERAPY | Facility: CLINIC | Age: 70
End: 2025-05-01
Payer: MEDICARE

## 2025-05-01 DIAGNOSIS — G89.29 CHRONIC PAIN OF BOTH KNEES: Primary | ICD-10-CM

## 2025-05-01 DIAGNOSIS — M17.0 PRIMARY OSTEOARTHRITIS OF BOTH KNEES: ICD-10-CM

## 2025-05-01 DIAGNOSIS — M25.561 CHRONIC PAIN OF BOTH KNEES: Primary | ICD-10-CM

## 2025-05-01 DIAGNOSIS — M25.562 CHRONIC PAIN OF BOTH KNEES: Primary | ICD-10-CM

## 2025-05-13 ENCOUNTER — THERAPY VISIT (OUTPATIENT)
Dept: PHYSICAL THERAPY | Facility: CLINIC | Age: 70
End: 2025-05-13
Payer: MEDICARE

## 2025-05-13 DIAGNOSIS — M25.562 CHRONIC PAIN OF BOTH KNEES: Primary | ICD-10-CM

## 2025-05-13 DIAGNOSIS — G89.29 CHRONIC PAIN OF BOTH KNEES: Primary | ICD-10-CM

## 2025-05-13 DIAGNOSIS — M25.561 CHRONIC PAIN OF BOTH KNEES: Primary | ICD-10-CM

## 2025-05-13 DIAGNOSIS — M17.0 PRIMARY OSTEOARTHRITIS OF BOTH KNEES: ICD-10-CM

## 2025-05-13 PROCEDURE — 97110 THERAPEUTIC EXERCISES: CPT | Mod: GP | Performed by: PHYSICAL THERAPIST

## 2025-05-20 ENCOUNTER — THERAPY VISIT (OUTPATIENT)
Dept: PHYSICAL THERAPY | Facility: CLINIC | Age: 70
End: 2025-05-20
Payer: MEDICARE

## 2025-05-20 DIAGNOSIS — M17.0 PRIMARY OSTEOARTHRITIS OF BOTH KNEES: ICD-10-CM

## 2025-05-20 DIAGNOSIS — M25.561 CHRONIC PAIN OF BOTH KNEES: Primary | ICD-10-CM

## 2025-05-20 DIAGNOSIS — G89.29 CHRONIC PAIN OF BOTH KNEES: Primary | ICD-10-CM

## 2025-05-20 DIAGNOSIS — M25.562 CHRONIC PAIN OF BOTH KNEES: Primary | ICD-10-CM

## 2025-05-20 PROCEDURE — 97110 THERAPEUTIC EXERCISES: CPT | Mod: GP | Performed by: PHYSICAL THERAPIST

## 2025-05-28 ENCOUNTER — THERAPY VISIT (OUTPATIENT)
Dept: PHYSICAL THERAPY | Facility: CLINIC | Age: 70
End: 2025-05-28
Payer: MEDICARE

## 2025-05-28 DIAGNOSIS — M25.561 CHRONIC PAIN OF BOTH KNEES: Primary | ICD-10-CM

## 2025-05-28 DIAGNOSIS — M17.0 PRIMARY OSTEOARTHRITIS OF BOTH KNEES: ICD-10-CM

## 2025-05-28 DIAGNOSIS — M25.562 CHRONIC PAIN OF BOTH KNEES: Primary | ICD-10-CM

## 2025-05-28 DIAGNOSIS — G89.29 CHRONIC PAIN OF BOTH KNEES: Primary | ICD-10-CM

## 2025-05-28 PROCEDURE — 97530 THERAPEUTIC ACTIVITIES: CPT | Mod: GP | Performed by: PHYSICAL THERAPIST

## 2025-05-28 PROCEDURE — 97110 THERAPEUTIC EXERCISES: CPT | Mod: GP | Performed by: PHYSICAL THERAPIST

## 2025-05-28 NOTE — PROGRESS NOTES
05/28/25 0500   Appointment Info   Signing clinician's name / credentials Isela Ag, DPT, ATC   Total/Authorized Visits 8+ 4 per Re-Cert   Visits Used 8   Medical Diagnosis L knee Primary OA   PT Tx Diagnosis L knee Primary OA; L knee pain   Precautions/Limitations hx of cancer   Other pertinent information prefers PTRx   Progress Note/Certification   Start of Care Date 02/26/25   Onset of illness/injury or Date of Surgery 02/12/25   Therapy Frequency bi-monthly   Predicted Duration add'l 2 months   Certification date from 05/28/25   Certification date to 07/27/25   Progress Note Due Date 05/27/25   PT Goal 1   Goal Identifier Standing   Goal Description standing x 20 minutes w/o having to sit d/t knee pain   Rationale to maximize safety and independence with performance of ADLs and functional tasks;to maximize safety and independence within the home;to maximize safety and independence within the community   Goal Progress improving, able to stand  10 min   Target Date 05/27/25   Subjective Report   Subjective Report Increased exercises to 2 exercises x 20 reps.   Treatment Interventions (PT)   Interventions Therapeutic Procedure/Exercise;Therapeutic Activity   Therapeutic Procedure/Exercise   Therapeutic Procedures: strength, endurance, ROM, flexibility minutes (95706) 30   Ther Proc 1 upright bike sh5   Ther Proc 1 - Details lvl2 7'   PTRx Ther Proc 1 Hip Flexion Straight Leg Raise   PTRx Ther Proc 1 - Details x 20 each leg   PTRx Ther Proc 2 Hip Abduction Straight Leg Raise   PTRx Ther Proc 2 - Details x 20 each leg   PTRx Ther Proc 3 Bridging #1   PTRx Ther Proc 3 - Details x 20   PTRx Ther Proc 4 Standing Hip Flexion Straight Leg Raise   PTRx Ther Proc 4 - Details x 15   PTRx Ther Proc 5 Hip AROM Standing Abduction   PTRx Ther Proc 5 - Details x 15   PTRx Ther Proc 6 Toe Raises   PTRx Ther Proc 6 - Details x 20   PTRx Ther Proc 7 Knee Bends   PTRx Ther Proc 7 - Details x 15   Skilled Intervention  encouraged inc reps on 2 exercise per day   Patient Response/Progress LE fatigue, pt understanding   Therapeutic Activity   Therapeutic Activities: dynamic activities to improve functional performance minutes (23521) 8   Therapeutic Activities Ther Act 8   Ther Act 1 Discussed pt progress so far- pt is getting stronger   Patient Response/Progress recommend cont visitis every other week to progress strengthening at home   Plan   Updates to plan of care cont HEP   Plan for next session progress next; SLS; resisted standing exercises; step up   Total Session Time   Timed Code Treatment Minutes 38   Total Treatment Time (sum of timed and untimed services) 38         [facility]                                                                                   OUTPATIENT PHYSICAL THERAPY    PLAN OF TREATMENT FOR OUTPATIENT REHABILITATION   Patient's Last Name, First Name, Vickie Presley YOB: 1955   Provider's Name   [facility]   Medical Record No.  [id number]     Onset Date: 02/12/25  Start of Care Date: 02/26/25     Medical Diagnosis:  L knee Primary OA      PT Treatment Diagnosis:  L knee Primary OA; L knee pain Plan of Treatment  Frequency/Duration: bi-monthly/ add'l 2 months    Certification date from 05/28/25 to 07/27/25         See note for plan of treatment details and functional goals     Nelly Ag, PT                         I CERTIFY THE NEED FOR THESE SERVICES FURNISHED UNDER        THIS PLAN OF TREATMENT AND WHILE UNDER MY CARE     (Physician attestation of this document indicates review and certification of the therapy plan).              Referring Provider:  José Miguel Vale    Initial Assessment  See Epic Evaluation- Start of Care Date: 02/26/25

## 2025-06-09 ENCOUNTER — DOCUMENTATION ONLY (OUTPATIENT)
Dept: PULMONOLOGY | Facility: CLINIC | Age: 70
End: 2025-06-09
Payer: MEDICARE

## 2025-06-09 NOTE — NURSING NOTE
Pre-visit planning and chart review completed.     RETURN patient appointment:    7/1 with Dr. Besise Foley  7/1 CT chest wo contrast    - 6 month follow-up .    CE updated. Medications, allergies, problem list, and immunizations reconciled.

## 2025-06-18 ENCOUNTER — OFFICE VISIT (OUTPATIENT)
Dept: FAMILY MEDICINE | Facility: CLINIC | Age: 70
End: 2025-06-18
Payer: MEDICARE

## 2025-06-18 VITALS
SYSTOLIC BLOOD PRESSURE: 129 MMHG | OXYGEN SATURATION: 95 % | DIASTOLIC BLOOD PRESSURE: 73 MMHG | WEIGHT: 183.9 LBS | BODY MASS INDEX: 29.56 KG/M2 | TEMPERATURE: 98.1 F | RESPIRATION RATE: 16 BRPM | HEIGHT: 66 IN | HEART RATE: 74 BPM

## 2025-06-18 DIAGNOSIS — Z63.6 CAREGIVER STRESS: Primary | ICD-10-CM

## 2025-06-18 DIAGNOSIS — F41.8 SITUATIONAL ANXIETY: ICD-10-CM

## 2025-06-18 RX ORDER — HYDROXYZINE HYDROCHLORIDE 25 MG/1
25 TABLET, FILM COATED ORAL 3 TIMES DAILY PRN
Qty: 30 TABLET | Refills: 3 | Status: SHIPPED | OUTPATIENT
Start: 2025-06-18

## 2025-06-18 SDOH — SOCIAL STABILITY - SOCIAL INSECURITY: DEPENDENT RELATIVE NEEDING CARE AT HOME: Z63.6

## 2025-06-18 ASSESSMENT — ANXIETY QUESTIONNAIRES
7. FEELING AFRAID AS IF SOMETHING AWFUL MIGHT HAPPEN: SEVERAL DAYS
8. IF YOU CHECKED OFF ANY PROBLEMS, HOW DIFFICULT HAVE THESE MADE IT FOR YOU TO DO YOUR WORK, TAKE CARE OF THINGS AT HOME, OR GET ALONG WITH OTHER PEOPLE?: SOMEWHAT DIFFICULT
6. BECOMING EASILY ANNOYED OR IRRITABLE: NOT AT ALL
2. NOT BEING ABLE TO STOP OR CONTROL WORRYING: SEVERAL DAYS
7. FEELING AFRAID AS IF SOMETHING AWFUL MIGHT HAPPEN: SEVERAL DAYS
GAD7 TOTAL SCORE: 4
4. TROUBLE RELAXING: SEVERAL DAYS
GAD7 TOTAL SCORE: 4
IF YOU CHECKED OFF ANY PROBLEMS ON THIS QUESTIONNAIRE, HOW DIFFICULT HAVE THESE PROBLEMS MADE IT FOR YOU TO DO YOUR WORK, TAKE CARE OF THINGS AT HOME, OR GET ALONG WITH OTHER PEOPLE: SOMEWHAT DIFFICULT
3. WORRYING TOO MUCH ABOUT DIFFERENT THINGS: NOT AT ALL
GAD7 TOTAL SCORE: 4
5. BEING SO RESTLESS THAT IT IS HARD TO SIT STILL: NOT AT ALL
1. FEELING NERVOUS, ANXIOUS, OR ON EDGE: SEVERAL DAYS

## 2025-06-18 ASSESSMENT — PATIENT HEALTH QUESTIONNAIRE - PHQ9
SUM OF ALL RESPONSES TO PHQ QUESTIONS 1-9: 3
10. IF YOU CHECKED OFF ANY PROBLEMS, HOW DIFFICULT HAVE THESE PROBLEMS MADE IT FOR YOU TO DO YOUR WORK, TAKE CARE OF THINGS AT HOME, OR GET ALONG WITH OTHER PEOPLE: SOMEWHAT DIFFICULT
SUM OF ALL RESPONSES TO PHQ QUESTIONS 1-9: 3

## 2025-06-18 NOTE — PROGRESS NOTES
"  Assessment & Plan     Caregiver stress  Situational anxiety  Utilizing self help strategies but finding insufficient. Discussed additional options for symptom management including mental health referral for counseling and medication. Prefers to avoid daily medication. Mentioned that she has a good response to prn Benadryl when she has trouble sleeping and reviewed hydroxyzine as an option for prn use. She is high functioning, low fall risk and we discussed risk of sedation and anticholinergic side effects. Is Agreed to trial of at bedtime prn and follow-up check in via My Chart. Is already on a beta blocker, so if hydroxyzine is too sedating would reduce dose by half vs consider daily med.   - hydroxyzine HCl (ATARAX) 25 MG tablet  Dispense: 30 tablet; Refill: 3          Subjective   Marni is a 69 year old, presenting for the following health issues:  Anxiety      6/18/2025     8:48 AM   Additional Questions   Roomed by Ohn   Accompanied by Self         6/18/2025    Information    services provided? No     HPI    Marni presents today with concerns about anxiety.    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   - Nephew age 53, remote h/o Guillain Bonesteel and chronic pain since. Prescription opiate dependence, many other meds  - also with familial hypertrophic cardiomyopathy, CAD, CVA, COPD/smoker   - 4 admissions since Dec for CHF, COPD, CKD, new DM, STEVEN  - Marni has assumed role as one of his primary caregivers. Does not live with him but responds to emergencies and manages a lot of his daily needs, appts.  - Finds that she is good in crisis, but is finding that after she is having trouble sleeping/relaxing  - has new twitching in post L leg  - feels jittery, feels like too much caffeine  - never has needed meds for anxiety but feels like she is on high alert, feels \"PTSD\" when phone rings thinking it's another emergency    Answers submitted by the patient for this " "visit:  Patient Health Questionnaire (Submitted on 6/18/2025)  If you checked off any problems, how difficult have these problems made it for you to do your work, take care of things at home, or get along with other people?: Somewhat difficult  PHQ9 TOTAL SCORE: 3  Patient Health Questionnaire (G7) (Submitted on 6/18/2025)  [place] 7 TOTAL SCORE: 4    No prior mental health history. Multiple health scares in last years.      Objective    /73   Pulse 74   Temp 98.1  F (36.7  C) (Temporal)   Resp 16   Ht 1.664 m (5' 5.5\")   Wt 83.4 kg (183 lb 14.4 oz)   LMP 05/17/2011   SpO2 95%   BMI 30.14 kg/m        Physical Exam  Constitutional:       Appearance: Normal appearance.   Neurological:      Mental Status: She is alert.   Psychiatric:         Attention and Perception: Attention normal.         Behavior: Behavior normal.         Thought Content: Thought content normal.         Cognition and Memory: Cognition normal.      Comments: Appropriately groomed and dressed for season. Mood described as situationally anxious. Affect is calm. Provides very detailed account of her nephews health issues and circumstances, offered redirection to focus on her as our visit time is limited, which she accepted.           TSH   Date Value Ref Range Status   06/27/2024 1.61 0.30 - 4.20 uIU/mL Final   08/20/2021 1.41 0.40 - 4.00 mU/L Final   10/13/2017 1.51 0.40 - 4.00 mU/L Final        Signed Electronically by: Gaby Lynn MD    "

## 2025-06-19 ENCOUNTER — THERAPY VISIT (OUTPATIENT)
Dept: PHYSICAL THERAPY | Facility: CLINIC | Age: 70
End: 2025-06-19
Payer: MEDICARE

## 2025-06-19 DIAGNOSIS — M25.562 LEFT KNEE PAIN, UNSPECIFIED CHRONICITY: ICD-10-CM

## 2025-06-19 DIAGNOSIS — M17.12 PRIMARY OSTEOARTHRITIS OF LEFT KNEE: Primary | ICD-10-CM
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
## 2025-07-01 ENCOUNTER — ANCILLARY PROCEDURE (OUTPATIENT)
Dept: CT IMAGING | Facility: CLINIC | Age: 70
End: 2025-07-01
Attending: INTERNAL MEDICINE
Payer: MEDICARE

## 2025-07-01 ENCOUNTER — ONCOLOGY VISIT (OUTPATIENT)
Dept: PULMONOLOGY | Facility: CLINIC | Age: 70
End: 2025-07-01
Attending: INTERNAL MEDICINE
Payer: MEDICARE

## 2025-07-01 VITALS
BODY MASS INDEX: 30.12 KG/M2 | SYSTOLIC BLOOD PRESSURE: 129 MMHG | RESPIRATION RATE: 12 BRPM | TEMPERATURE: 97.9 F | HEART RATE: 65 BPM | DIASTOLIC BLOOD PRESSURE: 84 MMHG | WEIGHT: 183.8 LBS | OXYGEN SATURATION: 96 %

## 2025-07-01 DIAGNOSIS — R91.8 PULMONARY NODULES: ICD-10-CM

## 2025-07-01 DIAGNOSIS — R91.8 PULMONARY NODULES: Primary | ICD-10-CM

## 2025-07-01 PROCEDURE — 99214 OFFICE O/P EST MOD 30 MIN: CPT | Performed by: INTERNAL MEDICINE

## 2025-07-01 PROCEDURE — G0463 HOSPITAL OUTPT CLINIC VISIT: HCPCS | Performed by: INTERNAL MEDICINE

## 2025-07-01 PROCEDURE — 71250 CT THORAX DX C-: CPT | Mod: GC | Performed by: RADIOLOGY

## 2025-07-01 NOTE — NURSING NOTE
"Oncology Rooming Note    July 1, 2025 1:51 PM   Vickie Alvarado is a 69 year old female who presents for:    Chief Complaint   Patient presents with    Oncology Clinic Visit     RTN Pulmonary nodules      Initial Vitals: /84 (BP Location: Right arm, Patient Position: Sitting, Cuff Size: Adult Regular)   Pulse 65   Temp 97.9  F (36.6  C) (Oral)   Resp 12   Wt 83.4 kg (183 lb 12.8 oz)   LMP 05/17/2011   SpO2 96%   BMI 30.12 kg/m   Estimated body mass index is 30.12 kg/m  as calculated from the following:    Height as of 6/18/25: 1.664 m (5' 5.5\").    Weight as of this encounter: 83.4 kg (183 lb 12.8 oz). Body surface area is 1.96 meters squared.  Data Unavailable Comment: 3   Patient's last menstrual period was 05/17/2011.  Allergies reviewed: Yes  Medications reviewed: Yes    Medications: Medication refills not needed today.  Pharmacy name entered into [employer]: [id number] - [place] [id number] & [address]    Frailty Screening:   Is the patient here for a new oncology consult visit in cancer care? 2. No    PHQ9:  Did this patient require a PHQ9?: No      Clinical concerns: None      Kendall Ordonez"
27-Apr-2021 19:22

## 2025-07-02 ENCOUNTER — THERAPY VISIT (OUTPATIENT)
Dept: PHYSICAL THERAPY | Facility: CLINIC | Age: 70
End: 2025-07-02
Payer: MEDICARE

## 2025-07-02 DIAGNOSIS — M25.562 LEFT KNEE PAIN, UNSPECIFIED CHRONICITY: ICD-10-CM

## 2025-07-02 DIAGNOSIS — M17.12 PRIMARY OSTEOARTHRITIS OF LEFT KNEE: Primary | ICD-10-CM

## 2025-07-02 PROCEDURE — 97110 THERAPEUTIC EXERCISES: CPT | Mod: GP | Performed by: PHYSICAL THERAPIST

## 2025-07-02 NOTE — PROGRESS NOTES
07/02/25 0500   Appointment Info   Signing clinician's name / credentials Isela Ag, DPT, ATC   Total/Authorized Visits 8+ 4 per Re-Cert   Visits Used 10   Medical Diagnosis L knee Primary OA   PT Tx Diagnosis L knee Primary OA; L knee pain   Precautions/Limitations hx of cancer   Other pertinent information prefers PTRx   Progress Note/Certification   Start of Care Date 02/26/25   Onset of illness/injury or Date of Surgery 02/12/25   Therapy Frequency bi-monthly   Predicted Duration add'l 2 months   Certification date from 05/28/25   Certification date to 07/27/25   Progress Note Due Date 05/27/25   Progress Note Completed Date 07/02/25   PT Goal 1   Goal Identifier Standing   Goal Description standing x 20 minutes w/o having to sit d/t knee pain   Rationale to maximize safety and independence with performance of ADLs and functional tasks;to maximize safety and independence within the home;to maximize safety and independence within the community   Goal Progress improving, able to stand  10 min   Target Date 05/27/25   Subjective Report   Subjective Report Inc knee pain the last 2 wks; several episodes where she was on her feet more. Working towards 20 reps each exercise   Objective Measures   Objective Measures Objective Measure 2   Objective Measure 1   Objective Measure progressed lumbopelvic strength; improving quadriceps and glute med strength   Treatment Interventions (PT)   Interventions Therapeutic Procedure/Exercise;Therapeutic Activity   Therapeutic Procedure/Exercise   Therapeutic Procedures: strength, endurance, ROM, flexibility minutes (19293) 30   Ther Proc 1 upright bike sh5   Ther Proc 1 - Details lvl2 9'   PTRx Ther Proc 1 Hip Flexion Straight Leg Raise   PTRx Ther Proc 1 - Details x 20 each leg   PTRx Ther Proc 2 Hip Abduction Straight Leg Raise   PTRx Ther Proc 2 - Details x 20 each leg   PTRx Ther Proc 3 Bridging #1   PTRx Ther Proc 3 - Details x 20   PTRx Ther Proc 4 Standing Hip  Flexion Straight Leg Raise   PTRx Ther Proc 4 - Details x 15   PTRx Ther Proc 5 Hip AROM Standing Abduction   PTRx Ther Proc 5 - Details x 15   PTRx Ther Proc 6 Toe Raises   PTRx Ther Proc 6 - Details x 20   PTRx Ther Proc 7 Knee Bends   PTRx Ther Proc 7 - Details x 15   PTRx Ther Proc 8 Roll Ins Hooklying   PTRx Ther Proc 8 - Details No Notes   PTRx Ther Proc 9 Supine Abdominal Exercise #1 (Arm Extension)   PTRx Ther Proc 9 - Details No Notes   Skilled Intervention encouraged inc reps on 2 exercise per day   Patient Response/Progress LE fatigue, pt understanding   Therapeutic Activity   Therapeutic Activities Ther Act 8   Ther Act 1 Discussed pt progress so far- pt is getting stronger   Patient Response/Progress recommend cont visit is every other week to progress strengthening at home   Plan   Updates to plan of care cont HEP   Plan for next session progress next; SLS; resisted standing exercises; step up   Total Session Time   Timed Code Treatment Minutes 30   Total Treatment Time (sum of timed and untimed services) 30         PLAN  Continue therapy per current plan of care.    Beginning/End Dates of Progress Note Reporting Period:  05/28/25 to 07/02/2025    Referring Provider:  José Miguel Vale
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
## 2025-07-14 ENCOUNTER — OFFICE VISIT (OUTPATIENT)
Dept: CARDIOLOGY | Facility: CLINIC | Age: 70
End: 2025-07-14
Attending: NURSE PRACTITIONER
Payer: MEDICARE

## 2025-07-14 ENCOUNTER — LAB (OUTPATIENT)
Dept: LAB | Facility: CLINIC | Age: 70
End: 2025-07-14
Attending: NURSE PRACTITIONER
Payer: MEDICARE

## 2025-07-14 VITALS
DIASTOLIC BLOOD PRESSURE: 76 MMHG | SYSTOLIC BLOOD PRESSURE: 111 MMHG | OXYGEN SATURATION: 95 % | HEART RATE: 75 BPM | BODY MASS INDEX: 30.32 KG/M2 | WEIGHT: 185 LBS

## 2025-07-14 DIAGNOSIS — Z95.2 S/P TAVR (TRANSCATHETER AORTIC VALVE REPLACEMENT): ICD-10-CM

## 2025-07-14 LAB
ALBUMIN SERPL BCG-MCNC: 3.9 G/DL (ref 3.5–5.2)
ALP SERPL-CCNC: 47 U/L (ref 40–150)
ALT SERPL W P-5'-P-CCNC: 8 U/L (ref 0–50)
ANION GAP SERPL CALCULATED.3IONS-SCNC: 9 MMOL/L (ref 7–15)
AST SERPL W P-5'-P-CCNC: 24 U/L (ref 0–45)
ATRIAL RATE - MUSE: 61 BPM
BILIRUB SERPL-MCNC: 0.5 MG/DL
BUN SERPL-MCNC: 11.2 MG/DL (ref 8–23)
CALCIUM SERPL-MCNC: 9.3 MG/DL (ref 8.8–10.4)
CHLORIDE SERPL-SCNC: 107 MMOL/L (ref 98–107)
CREAT SERPL-MCNC: 0.69 MG/DL (ref 0.51–0.95)
DIASTOLIC BLOOD PRESSURE - MUSE: NORMAL MMHG
EGFRCR SERPLBLD CKD-EPI 2021: >90 ML/MIN/1.73M2
ERYTHROCYTE [DISTWIDTH] IN BLOOD BY AUTOMATED COUNT: 12.6 % (ref 10–15)
GLUCOSE SERPL-MCNC: 94 MG/DL (ref 70–99)
HCO3 SERPL-SCNC: 28 MMOL/L (ref 22–29)
HCT VFR BLD AUTO: 39.9 % (ref 35–47)
HGB BLD-MCNC: 13.3 G/DL (ref 11.7–15.7)
INTERPRETATION ECG - MUSE: NORMAL
LVEF ECHO: NORMAL
MCH RBC QN AUTO: 30.9 PG (ref 26.5–33)
MCHC RBC AUTO-ENTMCNC: 33.3 G/DL (ref 31.5–36.5)
MCV RBC AUTO: 93 FL (ref 78–100)
P AXIS - MUSE: 15 DEGREES
PLATELET # BLD AUTO: 157 10E3/UL (ref 150–450)
POTASSIUM SERPL-SCNC: 4.3 MMOL/L (ref 3.4–5.3)
PR INTERVAL - MUSE: 128 MS
PROT SERPL-MCNC: 6.6 G/DL (ref 6.4–8.3)
QRS DURATION - MUSE: 84 MS
QT - MUSE: 430 MS
QTC - MUSE: 432 MS
R AXIS - MUSE: -28 DEGREES
RBC # BLD AUTO: 4.31 10E6/UL (ref 3.8–5.2)
SODIUM SERPL-SCNC: 144 MMOL/L (ref 135–145)
SYSTOLIC BLOOD PRESSURE - MUSE: NORMAL MMHG
T AXIS - MUSE: 27 DEGREES
VENTRICULAR RATE- MUSE: 61 BPM
WBC # BLD AUTO: 5.6 10E3/UL (ref 4–11)

## 2025-07-14 PROCEDURE — G0463 HOSPITAL OUTPT CLINIC VISIT: HCPCS | Performed by: NURSE PRACTITIONER

## 2025-07-14 PROCEDURE — 3074F SYST BP LT 130 MM HG: CPT | Performed by: NURSE PRACTITIONER

## 2025-07-14 PROCEDURE — 1126F AMNT PAIN NOTED NONE PRSNT: CPT | Performed by: NURSE PRACTITIONER

## 2025-07-14 PROCEDURE — 99214 OFFICE O/P EST MOD 30 MIN: CPT | Mod: 25 | Performed by: NURSE PRACTITIONER

## 2025-07-14 PROCEDURE — 80053 COMPREHEN METABOLIC PANEL: CPT | Performed by: PATHOLOGY

## 2025-07-14 PROCEDURE — 93005 ELECTROCARDIOGRAM TRACING: CPT

## 2025-07-14 PROCEDURE — 85027 COMPLETE CBC AUTOMATED: CPT | Performed by: PATHOLOGY

## 2025-07-14 PROCEDURE — 93306 TTE W/DOPPLER COMPLETE: CPT | Performed by: INTERNAL MEDICINE

## 2025-07-14 PROCEDURE — 3078F DIAST BP <80 MM HG: CPT | Performed by: NURSE PRACTITIONER

## 2025-07-14 PROCEDURE — 36415 COLL VENOUS BLD VENIPUNCTURE: CPT | Performed by: PATHOLOGY

## 2025-07-14 ASSESSMENT — PAIN SCALES - GENERAL: PAINLEVEL_OUTOF10: NO PAIN (0)

## 2025-07-14 NOTE — PATIENT INSTRUCTIONS
You were seen today in the Cardiovascular Clinic at the [facility] by:       RAS ARREAGA CNP     ECHO is not back yet, will message with results.   EKG shows NSR, Labs are stable.    Your visit summary and instructions are as follows:    Continue aspirin 81 mg daily lifelong  For all future dental cleanings and procedures you will need to take antibiotics prior - see instructions below.   Continue cardiac rehab   Follow up with primary if worsening fatigue   Follow-up with Dr. Brooke in December as scheduled      Prevention of Infective (Bacterial) Endocarditis:  You are at increased risk for developing adverse outcomes from infective endocarditis (IE), also known as bacterial endocarditis (BE) because of the new device in your heart. The guidelines for prevention of IE are to give patients antibiotics prior to any dental procedures that involve manipulation of gingival tissue or the periapical region of teeth, or perforation of the oral mucosa:      It is recommended to take Amoxicillin 2 gm by mouth as a single dose 30 to 60 minutes before procedure.     OR if allergic to Penicillin or Ampicillin:     Cephalexin 2 gm by mouth, or  Clindamycin 600 mg by mouth, or  Azithromycin or Clarithromycin 500 mg PO     Thank you for your visit today!      Please MyChart message me or call my nurse if you have any questions or concerns.     During Business Hours:   [phone], Structural Heart  Alicia Burrell     After hours, weekends or holidays:   [phone], Option #4  Ask to speak to the On-Call Cardiologist. Inform them you are a cardiology patient at the [facility].
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
## 2025-07-14 NOTE — NURSING NOTE
[place] Cardiomyopathy Questionnaire  (CQ-12)  Date: 07/14/25    1 year post-TAVR      1.  A.  5      B.  6       C.  6  2.  5  3.  5  4.  7  5.  5  6.  5  7.  4  8.  A. 4     B.  4     C.  5

## 2025-07-14 NOTE — NURSING NOTE
Chief Complaint   Patient presents with    Follow Up     RETURN TAVR     Vitals were taken, medications reconciled, and EKG was performed.    Reggie Bonilla EMT  1:07 PM

## 2025-07-16 ENCOUNTER — THERAPY VISIT (OUTPATIENT)
Dept: PHYSICAL THERAPY | Facility: CLINIC | Age: 70
End: 2025-07-16
Payer: MEDICARE

## 2025-07-16 DIAGNOSIS — M17.12 PRIMARY OSTEOARTHRITIS OF LEFT KNEE: Primary | ICD-10-CM

## 2025-07-16 DIAGNOSIS — M25.562 CHRONIC PAIN OF LEFT KNEE: ICD-10-CM

## 2025-07-16 DIAGNOSIS — G89.29 CHRONIC PAIN OF LEFT KNEE: ICD-10-CM

## 2025-07-16 PROCEDURE — 97110 THERAPEUTIC EXERCISES: CPT | Mod: GP | Performed by: PHYSICAL THERAPIST
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
## 2025-07-28 ENCOUNTER — ONCOLOGY VISIT (OUTPATIENT)
Dept: ONCOLOGY | Facility: CLINIC | Age: 70
End: 2025-07-28
Payer: MEDICARE

## 2025-07-28 ENCOUNTER — INFUSION THERAPY VISIT (OUTPATIENT)
Dept: ONCOLOGY | Facility: CLINIC | Age: 70
End: 2025-07-28
Attending: INTERNAL MEDICINE
Payer: MEDICARE

## 2025-07-28 ENCOUNTER — ANCILLARY PROCEDURE (OUTPATIENT)
Dept: MAMMOGRAPHY | Facility: CLINIC | Age: 70
End: 2025-07-28
Attending: INTERNAL MEDICINE
Payer: MEDICARE

## 2025-07-28 VITALS
BODY MASS INDEX: 29.78 KG/M2 | HEART RATE: 86 BPM | WEIGHT: 181.7 LBS | SYSTOLIC BLOOD PRESSURE: 132 MMHG | RESPIRATION RATE: 18 BRPM | OXYGEN SATURATION: 97 % | DIASTOLIC BLOOD PRESSURE: 76 MMHG | TEMPERATURE: 98.2 F

## 2025-07-28 DIAGNOSIS — Z17.0 MALIGNANT NEOPLASM OF UPPER-INNER QUADRANT OF LEFT BREAST IN FEMALE, ESTROGEN RECEPTOR POSITIVE (H): Primary | ICD-10-CM

## 2025-07-28 DIAGNOSIS — Z79.811 LONG TERM (CURRENT) USE OF AROMATASE INHIBITORS: ICD-10-CM

## 2025-07-28 DIAGNOSIS — C50.212 MALIGNANT NEOPLASM OF UPPER-INNER QUADRANT OF LEFT BREAST IN FEMALE, ESTROGEN RECEPTOR POSITIVE (H): Primary | ICD-10-CM

## 2025-07-28 DIAGNOSIS — Z12.31 VISIT FOR SCREENING MAMMOGRAM: ICD-10-CM

## 2025-07-28 LAB
ALBUMIN SERPL BCG-MCNC: 4.1 G/DL (ref 3.5–5.2)
CALCIUM SERPL-MCNC: 9.6 MG/DL (ref 8.8–10.4)
CREAT SERPL-MCNC: 0.61 MG/DL (ref 0.51–0.95)
EGFRCR SERPLBLD CKD-EPI 2021: >90 ML/MIN/1.73M2

## 2025-07-28 PROCEDURE — 96365 THER/PROPH/DIAG IV INF INIT: CPT

## 2025-07-28 PROCEDURE — 82310 ASSAY OF CALCIUM: CPT | Performed by: INTERNAL MEDICINE

## 2025-07-28 PROCEDURE — 36415 COLL VENOUS BLD VENIPUNCTURE: CPT | Performed by: INTERNAL MEDICINE

## 2025-07-28 PROCEDURE — 82565 ASSAY OF CREATININE: CPT | Performed by: INTERNAL MEDICINE

## 2025-07-28 PROCEDURE — 99214 OFFICE O/P EST MOD 30 MIN: CPT

## 2025-07-28 PROCEDURE — G0463 HOSPITAL OUTPT CLINIC VISIT: HCPCS | Mod: 25

## 2025-07-28 PROCEDURE — 77067 SCR MAMMO BI INCL CAD: CPT | Mod: GC | Performed by: RADIOLOGY

## 2025-07-28 PROCEDURE — 258N000003 HC RX IP 258 OP 636

## 2025-07-28 PROCEDURE — 82040 ASSAY OF SERUM ALBUMIN: CPT | Performed by: INTERNAL MEDICINE

## 2025-07-28 PROCEDURE — 77063 BREAST TOMOSYNTHESIS BI: CPT | Mod: GC | Performed by: RADIOLOGY

## 2025-07-28 PROCEDURE — 250N000011 HC RX IP 250 OP 636

## 2025-07-28 RX ORDER — HEPARIN SODIUM (PORCINE) LOCK FLUSH IV SOLN 100 UNIT/ML 100 UNIT/ML
5 SOLUTION INTRAVENOUS
Status: CANCELLED | OUTPATIENT
Start: 2025-07-28

## 2025-07-28 RX ORDER — ZOLEDRONIC ACID 0.04 MG/ML
4 INJECTION, SOLUTION INTRAVENOUS ONCE
Status: COMPLETED | OUTPATIENT
Start: 2025-07-28 | End: 2025-07-28

## 2025-07-28 RX ORDER — HEPARIN SODIUM,PORCINE 10 UNIT/ML
5 VIAL (ML) INTRAVENOUS
Status: CANCELLED | OUTPATIENT
Start: 2025-07-28

## 2025-07-28 RX ORDER — ZOLEDRONIC ACID 0.04 MG/ML
4 INJECTION, SOLUTION INTRAVENOUS ONCE
Status: CANCELLED | OUTPATIENT
Start: 2025-07-28 | End: 2025-07-28

## 2025-07-28 RX ADMIN — SODIUM CHLORIDE 250 ML: 0.9 INJECTION, SOLUTION INTRAVENOUS at 14:12

## 2025-07-28 RX ADMIN — ZOLEDRONIC ACID 4 MG: 0.04 INJECTION, SOLUTION INTRAVENOUS at 14:10

## 2025-07-28 ASSESSMENT — PAIN SCALES - GENERAL: PAINLEVEL_OUTOF10: MILD PAIN (2)

## 2025-07-28 NOTE — NURSING NOTE
"Oncology Rooming Note    July 28, 2025 1:00 PM   Vickie Alvarado is a 70 year old female who presents for:    Chief Complaint   Patient presents with    Blood Draw     Labs drawn with PIV start by VAT. Pt tolerated well. Vitals taken. Patient checked into next appointment.      Oncology Clinic Visit     Malignant neoplasm of upper-inner quadrant of left breast in female,        Initial Vitals: /76 (BP Location: Right arm, Patient Position: Sitting, Cuff Size: Adult Regular)   Pulse 86   Temp 98.2  F (36.8  C) (Oral)   Resp 18   Wt 82.4 kg (181 lb 11.2 oz)   LMP 05/17/2011   SpO2 97%   BMI 29.78 kg/m   Estimated body mass index is 29.78 kg/m  as calculated from the following:    Height as of 6/18/25: 1.664 m (5' 5.5\").    Weight as of this encounter: 82.4 kg (181 lb 11.2 oz). Body surface area is 1.95 meters squared.  Mild Pain (2) Comment: Data Unavailable   Patient's last menstrual period was 05/17/2011.  Allergies reviewed: Yes  Medications reviewed: Yes    Medications: Medication refills not needed today.  Pharmacy name entered into Rebl[facility]: [id number] - [place] [id number] & [facility]    PHQ9:  Did this patient require a PHQ9?: No      Clinical concerns: pt had lymphatic therapy.      Hiro Grimaldo, EMT              "

## 2025-07-28 NOTE — PATIENT INSTRUCTIONS
[facility] Triage and after hours / weekends / holidays:  [phone]    Please call the triage or after hours line if you experience a temperature greater than or equal to 100.4, shaking chills, have uncontrolled nausea, vomiting and/or diarrhea, dizziness, shortness of breath, chest pain, bleeding, unexplained bruising, or if you have any other new/concerning symptoms, questions or concerns.      If you are having any concerning symptoms or wish to speak to a provider before your next infusion visit, please call triage to notify them so we can adequately serve you.     If you need a refill on a narcotic prescription or other medication, please call before your infusion appointment.                July 2025 Sunday Monday Tuesday Wednesday Thursday Friday Saturday             1    CT CHEST W/O CONTRAST  12:45 PM   (20 min.)   [email or web address]   [facility] [place]    Return Patient   1:45 PM   (30 min.)   Moises Foley MD   [facility] 2    Unloading Brace Treatment  10:40 AM   (40 min.)   Isela Ag PT   [facility] [place] 3     4     5       6     7     8     9     10     11  Happy Birthday!     12       13     14    ECHO COMPLETE  11:15 AM   (60 min.)   [id number]    LAB  12:30 PM   (15 min.)    LAB   [facility]    Return TAVR  12:45 PM   (40 min.)   Joyce Herrera NP   [facility] 15     16    Unloading Brace Treatment   1:50 PM   (40 min.)   Isela Ag PT   [facility] [place] 17     18     19       20     21     22     23     24     25     26       27     28    MA SCREENING DIGITAL BILATERAL  10:45 AM   (15 min.)   [facility]   [facility] [place]    Lab Peripheral  12:00 PM   (15 min.)   [facility]    Return Patient  12:15 PM   (45 min.)   Paty Oakes PA-C   [facility]    Infusion 60   1:00 PM   (60 min.)   [facility] ONC INFUSION NURSE   [facility] 29     30    Unloading Brace Treatment   1:50 PM   (40 min.)   Isela Ag PT   [facility] 31 August 2025 Sunday Monday Tuesday Wednesday Thursday Friday Saturday                            1     2       3     4     5     6     7     8     9       10     11     12     13    Unloading Brace Treatment   8:00 AM   (40 min.)   Isela Ag PT   [facility] 14     15     16       17     18     19     20     21     22     23       24     25    Return Patient   3:15 PM   (30 min.)   Chandrika Horvath MD   [facility] 26     27     28     29     30       31                                                     Lab Results:  Recent Results (from the past 12 hours)   Calcium    Collection Time: 07/28/25 12:46 PM   Result Value Ref Range    Calcium 9.6 8.8 - 10.4 mg/dL   Creatinine    Collection Time: 07/28/25 12:46 PM   Result Value Ref Range    Creatinine 0.61 0.51 - 0.95 mg/dL    GFR Estimate >90 >60 mL/min/1.73m2   Albumin level    Collection Time: 07/28/25 12:46 PM   Result Value Ref Range    Albumin 4.1 3.5 - 5.2 g/dL

## 2025-07-28 NOTE — PROGRESS NOTES
Infusion Nursing Note:  Vickie Alvarado presents today for Cycle 6 Zometa.    Patient seen by provider today: Yes: IVETTE Chapman   present during visit today: Not Applicable.    Note: Marni comes in feeling well for her infusion visit. She does not have any questions following her clinic visit with alexus and wished to proceed with treatment.     Marni confirms that she is taking her Calcium and Vit D supplements.     Intravenous Access:  Peripheral IV placed.    Treatment Conditions:  Lab Results   Component Value Date    HGB 13.3 07/14/2025    WBC 5.6 07/14/2025    ANEU 3.8 07/05/2024     07/14/2025        Lab Results   Component Value Date     07/14/2025    POTASSIUM 4.3 07/14/2025    MAG 1.7 08/29/2024    CR 0.61 07/28/2025    VALENTINA 9.6 07/28/2025    BILITOTAL 0.5 07/14/2025    ALBUMIN 4.1 07/28/2025    ALT 8 07/14/2025    AST 24 07/14/2025       Results reviewed, labs MET treatment parameters, ok to proceed with treatment.      Post Infusion Assessment:  Patient tolerated infusion without incident.  Blood return noted pre and post infusion.  Site patent and intact, free from redness, edema or discomfort.  No evidence of extravasations.  Access discontinued per protocol.       Discharge Plan:   Patient declined prescription refills.  AVS to patient via [employer].   Will return 8/25 for visit with Dr. Horvath  Patient discharged in stable condition accompanied by: self.  Departure Mode: Ambulatory.      Adri Trimble RN
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
## 2025-07-28 NOTE — NURSING NOTE
Chief Complaint   Patient presents with    Blood Draw     Labs drawn with PIV start by VAT. Pt tolerated well. Vitals taken. Patient checked into next appointment.       Crista Randhawa RN

## 2025-07-29 ENCOUNTER — ORDERS ONLY (AUTO-RELEASED) (OUTPATIENT)
Dept: CARDIOLOGY | Facility: CLINIC | Age: 70
End: 2025-07-29
Payer: MEDICARE

## 2025-07-29 DIAGNOSIS — Z95.2 S/P TAVR (TRANSCATHETER AORTIC VALVE REPLACEMENT): ICD-10-CM

## 2025-07-29 DIAGNOSIS — I49.3 PVC'S (PREMATURE VENTRICULAR CONTRACTIONS): ICD-10-CM

## 2025-07-29 DIAGNOSIS — I49.3 PVC'S (PREMATURE VENTRICULAR CONTRACTIONS): Primary | ICD-10-CM

## 2025-07-30 ENCOUNTER — THERAPY VISIT (OUTPATIENT)
Dept: PHYSICAL THERAPY | Facility: CLINIC | Age: 70
End: 2025-07-30
Payer: MEDICARE

## 2025-07-30 DIAGNOSIS — M25.561 CHRONIC PAIN OF BOTH KNEES: ICD-10-CM

## 2025-07-30 DIAGNOSIS — M25.562 LEFT KNEE PAIN: ICD-10-CM

## 2025-07-30 DIAGNOSIS — M17.12 PRIMARY OSTEOARTHRITIS OF LEFT KNEE: Primary | ICD-10-CM

## 2025-07-30 DIAGNOSIS — M17.0 PRIMARY OSTEOARTHRITIS OF BOTH KNEES: ICD-10-CM

## 2025-07-30 DIAGNOSIS — G89.29 CHRONIC PAIN OF BOTH KNEES: ICD-10-CM

## 2025-07-30 DIAGNOSIS — M25.562 CHRONIC PAIN OF BOTH KNEES: ICD-10-CM

## 2025-07-30 PROCEDURE — 97110 THERAPEUTIC EXERCISES: CPT | Mod: GP | Performed by: PHYSICAL THERAPIST

## 2025-07-30 PROCEDURE — 97530 THERAPEUTIC ACTIVITIES: CPT | Mod: GP | Performed by: PHYSICAL THERAPIST

## 2025-07-31 NOTE — PROGRESS NOTES
07/30/25 0500   Appointment Info   Signing clinician's name / credentials Isela Ag, DPT, ATC   Total/Authorized Visits 8+ 4 per Re-Cert   Visits Used 12   Medical Diagnosis L knee Primary OA   PT Tx Diagnosis L knee Primary OA; L knee pain   Precautions/Limitations hx of cancer   Other pertinent information prefers PTRx   Progress Note/Certification   Start of Care Date 02/26/25   Onset of illness/injury or Date of Surgery 02/12/25   Therapy Frequency bi-monthly   Predicted Duration add'l 6 wks   Certification date from 07/28/25   Certification date to 09/10/25   Progress Note Due Date 09/10/25   Progress Note Completed Date 07/30/25   GOALS   PT Goals 2   PT Goal 1   Goal Identifier Standing   Goal Description standing x 20 minutes w/o having to sit d/t knee pain   Rationale to maximize safety and independence with performance of ADLs and functional tasks;to maximize safety and independence within the home;to maximize safety and independence within the community   Goal Progress x 20 minutes   Target Date 07/27/25   Date Met 07/28/25   PT Goal 2   Goal Identifier Walking   Goal Description 20 minutes walk w/o restriction d/t knee pain   Rationale to maximize safety and independence within the home;to maximize safety and independence within the community;to maximize safety and independence with self cares;to maximize safety and independence with performance of ADLs and functional tasks   Target Date 09/10/25   Subjective Report   Subjective Report Started cardiac rehab, and started chair yoga. feeling more independently consistent with activity. Recently cleared on scans and continued motivation to continue to improve   Objective Measures   Objective Measures Objective Measure 2   Objective Measure 1   Objective Measure gait with minimal deviation   Details Improving LE endurance, hip abduction strength.   Objective Measure 2   Objective Measure Decreased apprehension to increased intensity of exercises    Treatment Interventions (PT)   Interventions Therapeutic Procedure/Exercise;Therapeutic Activity   Therapeutic Procedure/Exercise   Therapeutic Procedures: strength, endurance, ROM, flexibility minutes (69014) 30   Ther Proc 1 NUSTEP sh8   Ther Proc 1 - Details lvl3 10'   PTRx Ther Proc 1 Hip Flexion Straight Leg Raise   PTRx Ther Proc 1 - Details x 20   PTRx Ther Proc 2 Hip Abduction Straight Leg Raise   PTRx Ther Proc 2 - Details x 20   PTRx Ther Proc 3 Bridging #1   PTRx Ther Proc 3 - Details x 20   PTRx Ther Proc 4 Standing Hip Flexion Straight Leg Raise   PTRx Ther Proc 4 - Details x 20   PTRx Ther Proc 5 Hip AROM Standing Abduction   PTRx Ther Proc 5 - Details x 20   PTRx Ther Proc 6 Toe Raises   PTRx Ther Proc 6 - Details x 20   PTRx Ther Proc 7 Knee Bends   PTRx Ther Proc 7 - Details x 20   PTRx Ther Proc 8 Roll Ins Hooklying   PTRx Ther Proc 8 - Details x 15   PTRx Ther Proc 9 Supine Abdominal Exercise #1 (Arm Extension)   PTRx Ther Proc 9 - Details x 15   Skilled Intervention inc reps, added resistance   Patient Response/Progress LE fatigue, pt understanding   Therapeutic Activity   Therapeutic Activities: dynamic activities to improve functional performance minutes (49269) 8   Therapeutic Activities Ther Act 8   Ther Act 1 Discussed progress, encouraged patient to continue to increase walking distance, walking activity   Ther Act 1 - Details Reinforced her positive outlook and motivation   Plan   Updates to plan of care cont HEP   Plan for next session progress next; SLS; step up   Total Session Time   Timed Code Treatment Minutes 38   Total Treatment Time (sum of timed and untimed services) 38           [facility]                                                                                   OUTPATIENT PHYSICAL THERAPY    PLAN OF TREATMENT FOR OUTPATIENT REHABILITATION   Patient's Last Name, First Name, Vickie Presley YOB: 1955   Provider's  Name   [facility]   Medical Record No.  [id number]     Onset Date: 02/12/25  Start of Care Date: 02/26/25     Medical Diagnosis:  L knee Primary OA      PT Treatment Diagnosis:  L knee Primary OA; L knee pain Plan of Treatment  Frequency/Duration: bi-monthly/ add'l 6 wks    Certification date from 07/28/25 to 09/10/25         See note for plan of treatment details and functional goals     Nelly Ag, PT                         I CERTIFY THE NEED FOR THESE SERVICES FURNISHED UNDER        THIS PLAN OF TREATMENT AND WHILE UNDER MY CARE     (Physician attestation of this document indicates review and certification of the therapy plan).              Referring Provider:  José Miguel Vale    Initial Assessment  See Epic Evaluation- Start of Care Date: 02/26/25

## 2025-08-11 DIAGNOSIS — I25.10 CORONARY ARTERY DISEASE INVOLVING NATIVE CORONARY ARTERY OF NATIVE HEART WITHOUT ANGINA PECTORIS: ICD-10-CM

## 2025-08-11 RX ORDER — ROSUVASTATIN CALCIUM 40 MG/1
40 TABLET, COATED ORAL DAILY
Qty: 90 TABLET | Refills: 3 | Status: SHIPPED | OUTPATIENT
Start: 2025-08-11

## 2025-08-14 LAB — CV ZIO PRELIM RESULTS: NORMAL

## 2025-08-15 PROBLEM — M17.0 PRIMARY OSTEOARTHRITIS OF BOTH KNEES: Status: RESOLVED | Noted: 2024-05-02 | Resolved: 2025-08-15

## 2025-08-15 PROBLEM — M17.12 PRIMARY OSTEOARTHRITIS OF LEFT KNEE: Status: RESOLVED | Noted: 2025-02-27 | Resolved: 2025-08-15

## 2025-08-15 PROBLEM — G89.29 CHRONIC PAIN OF BOTH KNEES: Status: RESOLVED | Noted: 2024-05-01 | Resolved: 2025-08-15

## 2025-08-15 PROBLEM — M25.562 LEFT KNEE PAIN: Status: RESOLVED | Noted: 2025-02-27 | Resolved: 2025-08-15

## 2025-08-15 PROBLEM — M25.561 CHRONIC PAIN OF BOTH KNEES: Status: RESOLVED | Noted: 2024-05-01 | Resolved: 2025-08-15

## 2025-08-15 PROBLEM — M25.562 CHRONIC PAIN OF BOTH KNEES: Status: RESOLVED | Noted: 2024-05-01 | Resolved: 2025-08-15

## 2025-08-19 ENCOUNTER — RESULTS FOLLOW-UP (OUTPATIENT)
Dept: CARDIOLOGY | Facility: CLINIC | Age: 70
End: 2025-08-19
Payer: MEDICARE

## (undated) DEVICE — DRAPE IOBAN INCISE 23X17" 6650EZ

## (undated) DEVICE — DRAPE LAP TRANSVERSE 29421

## (undated) DEVICE — ESU GROUND PAD ADULT W/CORD E7507

## (undated) DEVICE — SU PDS II 4-0 FS-2 27" Z422H

## (undated) DEVICE — SYR 10ML FINGER CONTROL W/O NDL 309695

## (undated) DEVICE — CATH ANGIO INFINITI 3DRC 6FRX100CM 534676T

## (undated) DEVICE — PACK GOWN 3/PK DISP XL SBA32GPFCB

## (undated) DEVICE — INFLATION DEVICE ATRION QL2530

## (undated) DEVICE — Device

## (undated) DEVICE — SU SILK 3-0 SH 30" K832H

## (undated) DEVICE — PACK MINOR CUSTOM ASC

## (undated) DEVICE — NDL 27GA 1.25" 305136

## (undated) DEVICE — WIRE GUIDE 0.035"X150CM EMERALD STR 502542

## (undated) DEVICE — CATH ANGIO INFINITI PIGTAIL 145 6 SH 6FRX110CM  534-652S

## (undated) DEVICE — GW VASC .035IN DIA 260CML 7CML 3 MM RADIUS J CURVE 502455

## (undated) DEVICE — SPECIMEN CONTAINER W/10% BUFFERED FORMALIN 120ML 591201

## (undated) DEVICE — DRAPE SHEET MED 44X70" 9355

## (undated) DEVICE — LINEN TOWEL PACK X30 5481

## (undated) DEVICE — WIRE GUIDE LUNDERQUIST 0.035"X260CM DBL CVD

## (undated) DEVICE — NDL 30GA 0.5" 305106

## (undated) DEVICE — DRAPE STERI FLUOROSCOPE 35X43"1012 LATEX FREE

## (undated) DEVICE — ESU ELEC BLADE 2.75" COATED/INSULATED E1455

## (undated) DEVICE — TUBING PRESSURE 30"

## (undated) DEVICE — SOL NACL 0.9% IRRIG 1000ML BOTTLE 2F7124

## (undated) DEVICE — SYR 50ML LL W/O NDL 309653

## (undated) DEVICE — CATH EP PACEL 5FRX110CM 1MM RIGHT HEART CURVE 401763

## (undated) DEVICE — KIT HAND CONTROL ACIST 014644 AR-P54

## (undated) DEVICE — PREP CHLORAPREP 26ML TINTED HI-LITE ORANGE 930815

## (undated) DEVICE — LINEN TOWEL PACK X5 5464

## (undated) DEVICE — SYR 05ML LL W/O NDL

## (undated) DEVICE — SU VICRYL 3-0 SH 27" J316H

## (undated) DEVICE — MANIFOLD KIT ANGIO AUTOMATED 014613

## (undated) DEVICE — BOWL STERILE 32OZ DYND50320

## (undated) DEVICE — SPONGE RAY-TEC 4X8" 7318

## (undated) DEVICE — CATH ANGIO SUPERTORQUE AL1 6FRX100CM 532-645

## (undated) DEVICE — INTRO SHEATH 6FRX25CM PINNACLE RSS606

## (undated) DEVICE — MARKER MARGIN MARKER STD 6 COLOR SGL USE MMS6

## (undated) DEVICE — DRSG PRIMAPORE 02X3" 7133

## (undated) DEVICE — LINEN GOWN XLG 5407

## (undated) DEVICE — GUIDEWIRE VASC SAFARI2 0.035X275CM H74939406XS1

## (undated) DEVICE — CLIP HORIZON MED BLUE 002200

## (undated) DEVICE — PAD CHUX UNDERPAD 30X30"

## (undated) DEVICE — NDL BLUNT 18GA 1" W/O FILTER 305181

## (undated) DEVICE — CATH ANGIO SUPERTORQUE PLUS JL4 6FRX100CM 533620

## (undated) DEVICE — ADH SKIN CLOSURE PREMIERPRO EXOFIN 1.0ML 3470

## (undated) DEVICE — CLOSURE DEVICE 6FR VASC PROGLIDE MEDICATED SUTURE 12673-03

## (undated) DEVICE — BNDG COHESIVE 2"X5YDS UNSTERILE ASSORTED COLORS LF 8962

## (undated) DEVICE — GLOVE PROTEXIS POWDER FREE SMT 8.0  2D72PT80X

## (undated) DEVICE — CATH ANGIO SUPERTORQUE PLUS JR4 6FRX100CM 533621

## (undated) DEVICE — PACK HEART LEFT CUSTOM

## (undated) DEVICE — SOL NACL 0.9% IRRIG 500ML BOTTLE 2F7123

## (undated) DEVICE — DRSG TEGADERM 4X4 3/4" 1626W

## (undated) DEVICE — NDL 25GA 2"  8881200441

## (undated) DEVICE — DRAPE TIBURON CARDIOVASCULAR PERI-GROIN LF 9154

## (undated) DEVICE — PREP CHLORAPREP 26ML TINTED ORANGE  260815

## (undated) DEVICE — INTRO SHEATH MICRO PLATINUM TIP 4FRX40CM 7274

## (undated) DEVICE — PREP PAD ALCOHOL 6818

## (undated) DEVICE — DRSG GAUZE 4X4" TRAY 6939

## (undated) DEVICE — KIT VALVE AORTIC SAPIEN 3 ULTRA RESILIA OD23 MM S3URCM23A

## (undated) DEVICE — CLIP HORIZON SM RED WIDE SLOT 001201

## (undated) DEVICE — SUCTION MANIFOLD NEPTUNE 2 SYS 1 PORT 702-025-000

## (undated) DEVICE — SOL WATER IRRIG 500ML BOTTLE 2F7113

## (undated) DEVICE — WIRE GUIDE 0.035"X150CM EMERALD J TIP 502521

## (undated) RX ORDER — ACETAMINOPHEN 325 MG/1
TABLET ORAL
Status: DISPENSED
Start: 2022-10-17

## (undated) RX ORDER — FENTANYL CITRATE 50 UG/ML
INJECTION, SOLUTION INTRAMUSCULAR; INTRAVENOUS
Status: DISPENSED
Start: 2024-08-28

## (undated) RX ORDER — ACETAMINOPHEN 325 MG/1
TABLET ORAL
Status: DISPENSED
Start: 2024-08-28

## (undated) RX ORDER — PROPOFOL 10 MG/ML
INJECTION, EMULSION INTRAVENOUS
Status: DISPENSED
Start: 2022-10-17

## (undated) RX ORDER — ASPIRIN 325 MG
TABLET, DELAYED RELEASE (ENTERIC COATED) ORAL
Status: DISPENSED
Start: 2024-08-28

## (undated) RX ORDER — NOREPINEPHRINE BITARTRATE 0.06 MG/ML
INJECTION, SOLUTION INTRAVENOUS
Status: DISPENSED
Start: 2024-08-28

## (undated) RX ORDER — BUPIVACAINE HYDROCHLORIDE 5 MG/ML
INJECTION, SOLUTION EPIDURAL; INTRACAUDAL
Status: DISPENSED
Start: 2024-08-28

## (undated) RX ORDER — HEPARIN SODIUM 1000 [USP'U]/ML
INJECTION, SOLUTION INTRAVENOUS; SUBCUTANEOUS
Status: DISPENSED
Start: 2024-08-28

## (undated) RX ORDER — SODIUM CHLORIDE 9 MG/ML
INJECTION, SOLUTION INTRAVENOUS
Status: DISPENSED
Start: 2024-08-28

## (undated) RX ORDER — REGADENOSON 0.08 MG/ML
INJECTION, SOLUTION INTRAVENOUS
Status: DISPENSED
Start: 2022-01-27

## (undated) RX ORDER — KETOROLAC TROMETHAMINE 30 MG/ML
INJECTION, SOLUTION INTRAMUSCULAR; INTRAVENOUS
Status: DISPENSED
Start: 2022-10-17

## (undated) RX ORDER — TRIAMCINOLONE ACETONIDE 40 MG/ML
INJECTION, SUSPENSION INTRA-ARTICULAR; INTRAMUSCULAR
Status: DISPENSED
Start: 2025-02-12

## (undated) RX ORDER — PROTAMINE SULFATE 10 MG/ML
INJECTION, SOLUTION INTRAVENOUS
Status: DISPENSED
Start: 2024-08-28

## (undated) RX ORDER — FENTANYL CITRATE 50 UG/ML
INJECTION, SOLUTION INTRAMUSCULAR; INTRAVENOUS
Status: DISPENSED
Start: 2022-10-17

## (undated) RX ORDER — ONDANSETRON 2 MG/ML
INJECTION INTRAMUSCULAR; INTRAVENOUS
Status: DISPENSED
Start: 2022-10-17

## (undated) RX ORDER — BUPIVACAINE HYDROCHLORIDE 2.5 MG/ML
INJECTION, SOLUTION EPIDURAL; INFILTRATION; INTRACAUDAL
Status: DISPENSED
Start: 2022-10-17

## (undated) RX ORDER — ISOSULFAN BLUE 50 MG/5ML
INJECTION, SOLUTION SUBCUTANEOUS
Status: DISPENSED
Start: 2022-10-17

## (undated) RX ORDER — LIDOCAINE HYDROCHLORIDE AND EPINEPHRINE 10; 10 MG/ML; UG/ML
INJECTION, SOLUTION INFILTRATION; PERINEURAL
Status: DISPENSED
Start: 2022-10-17

## (undated) RX ORDER — ONDANSETRON 2 MG/ML
INJECTION INTRAMUSCULAR; INTRAVENOUS
Status: DISPENSED
Start: 2024-08-28

## (undated) RX ORDER — FENTANYL CITRATE-0.9 % NACL/PF 10 MCG/ML
PLASTIC BAG, INJECTION (ML) INTRAVENOUS
Status: DISPENSED
Start: 2022-10-17

## (undated) RX ORDER — OXYCODONE HYDROCHLORIDE 5 MG/1
TABLET ORAL
Status: DISPENSED
Start: 2022-10-17

## (undated) RX ORDER — LIDOCAINE HYDROCHLORIDE 10 MG/ML
INJECTION, SOLUTION INFILTRATION; PERINEURAL
Status: DISPENSED
Start: 2025-02-12

## (undated) RX ORDER — LIDOCAINE HYDROCHLORIDE 20 MG/ML
INJECTION, SOLUTION EPIDURAL; INFILTRATION; INTRACAUDAL; PERINEURAL
Status: DISPENSED
Start: 2022-10-17

## (undated) RX ORDER — NITROGLYCERIN 5 MG/ML
VIAL (ML) INTRAVENOUS
Status: DISPENSED
Start: 2024-08-28

## (undated) RX ORDER — CEFAZOLIN SODIUM 2 G/100ML
INJECTION, SOLUTION INTRAVENOUS
Status: DISPENSED
Start: 2024-08-28

## (undated) RX ORDER — EPHEDRINE SULFATE 50 MG/ML
INJECTION, SOLUTION INTRAMUSCULAR; INTRAVENOUS; SUBCUTANEOUS
Status: DISPENSED
Start: 2022-10-17

## (undated) RX ORDER — AMINOPHYLLINE 25 MG/ML
INJECTION, SOLUTION INTRAVENOUS
Status: DISPENSED
Start: 2022-01-27

## (undated) RX ORDER — DEXAMETHASONE SODIUM PHOSPHATE 4 MG/ML
INJECTION, SOLUTION INTRA-ARTICULAR; INTRALESIONAL; INTRAMUSCULAR; INTRAVENOUS; SOFT TISSUE
Status: DISPENSED
Start: 2022-10-17

## (undated) RX ORDER — FENTANYL CITRATE-0.9 % NACL/PF 10 MCG/ML
PLASTIC BAG, INJECTION (ML) INTRAVENOUS
Status: DISPENSED
Start: 2024-08-28